# Patient Record
Sex: MALE | Race: BLACK OR AFRICAN AMERICAN | NOT HISPANIC OR LATINO | URBAN - METROPOLITAN AREA
[De-identification: names, ages, dates, MRNs, and addresses within clinical notes are randomized per-mention and may not be internally consistent; named-entity substitution may affect disease eponyms.]

---

## 2017-03-17 ENCOUNTER — EMERGENCY (EMERGENCY)
Facility: HOSPITAL | Age: 27
LOS: 1 days | Discharge: HOME | End: 2017-03-17
Attending: EMERGENCY MEDICINE | Admitting: EMERGENCY MEDICINE
Payer: MEDICAID

## 2017-03-17 VITALS
RESPIRATION RATE: 18 BRPM | DIASTOLIC BLOOD PRESSURE: 78 MMHG | TEMPERATURE: 98 F | OXYGEN SATURATION: 100 % | SYSTOLIC BLOOD PRESSURE: 133 MMHG | HEART RATE: 123 BPM

## 2017-03-17 LAB
ALBUMIN SERPL ELPH-MCNC: 3.2 G/DL — LOW (ref 3.3–5)
ALP SERPL-CCNC: 100 U/L — SIGNIFICANT CHANGE UP (ref 40–120)
ALT FLD-CCNC: 13 U/L — SIGNIFICANT CHANGE UP (ref 4–41)
AST SERPL-CCNC: 24 U/L — SIGNIFICANT CHANGE UP (ref 4–40)
BASOPHILS # BLD AUTO: 0.04 K/UL — SIGNIFICANT CHANGE UP (ref 0–0.2)
BASOPHILS NFR BLD AUTO: 0.2 % — SIGNIFICANT CHANGE UP (ref 0–2)
BILIRUB SERPL-MCNC: 0.2 MG/DL — SIGNIFICANT CHANGE UP (ref 0.2–1.2)
BUN SERPL-MCNC: 13 MG/DL — SIGNIFICANT CHANGE UP (ref 7–23)
CALCIUM SERPL-MCNC: 9.1 MG/DL — SIGNIFICANT CHANGE UP (ref 8.4–10.5)
CHLORIDE SERPL-SCNC: 95 MMOL/L — LOW (ref 98–107)
CO2 SERPL-SCNC: 28 MMOL/L — SIGNIFICANT CHANGE UP (ref 22–31)
CREAT SERPL-MCNC: 0.77 MG/DL — SIGNIFICANT CHANGE UP (ref 0.5–1.3)
EOSINOPHIL # BLD AUTO: 0.07 K/UL — SIGNIFICANT CHANGE UP (ref 0–0.5)
EOSINOPHIL NFR BLD AUTO: 0.4 % — SIGNIFICANT CHANGE UP (ref 0–6)
ERYTHROCYTE [SEDIMENTATION RATE] IN BLOOD: 104 MM/HR — HIGH (ref 1–15)
GLUCOSE SERPL-MCNC: 106 MG/DL — HIGH (ref 70–99)
HCT VFR BLD CALC: 34.1 % — LOW (ref 39–50)
HGB BLD-MCNC: 11 G/DL — LOW (ref 13–17)
HIV1 AG SER QL: SIGNIFICANT CHANGE UP
HIV1+2 AB SPEC QL: SIGNIFICANT CHANGE UP
IMM GRANULOCYTES NFR BLD AUTO: 0.5 % — SIGNIFICANT CHANGE UP (ref 0–1.5)
LYMPHOCYTES # BLD AUTO: 18.4 % — SIGNIFICANT CHANGE UP (ref 13–44)
LYMPHOCYTES # BLD AUTO: 3.07 K/UL — SIGNIFICANT CHANGE UP (ref 1–3.3)
MCHC RBC-ENTMCNC: 26.7 PG — LOW (ref 27–34)
MCHC RBC-ENTMCNC: 32.3 % — SIGNIFICANT CHANGE UP (ref 32–36)
MCV RBC AUTO: 82.8 FL — SIGNIFICANT CHANGE UP (ref 80–100)
MONOCYTES # BLD AUTO: 1.05 K/UL — HIGH (ref 0–0.9)
MONOCYTES NFR BLD AUTO: 6.3 % — SIGNIFICANT CHANGE UP (ref 2–14)
NEUTROPHILS # BLD AUTO: 12.37 K/UL — HIGH (ref 1.8–7.4)
NEUTROPHILS NFR BLD AUTO: 74.2 % — SIGNIFICANT CHANGE UP (ref 43–77)
PLATELET # BLD AUTO: 355 K/UL — SIGNIFICANT CHANGE UP (ref 150–400)
PMV BLD: 9.6 FL — SIGNIFICANT CHANGE UP (ref 7–13)
POTASSIUM SERPL-MCNC: 3.9 MMOL/L — SIGNIFICANT CHANGE UP (ref 3.5–5.3)
POTASSIUM SERPL-SCNC: 3.9 MMOL/L — SIGNIFICANT CHANGE UP (ref 3.5–5.3)
PROT SERPL-MCNC: 9.5 G/DL — HIGH (ref 6–8.3)
RBC # BLD: 4.12 M/UL — LOW (ref 4.2–5.8)
RBC # FLD: 15.3 % — HIGH (ref 10.3–14.5)
SODIUM SERPL-SCNC: 135 MMOL/L — SIGNIFICANT CHANGE UP (ref 135–145)
WBC # BLD: 16.69 K/UL — HIGH (ref 3.8–10.5)
WBC # FLD AUTO: 16.69 K/UL — HIGH (ref 3.8–10.5)

## 2017-03-17 PROCEDURE — 99284 EMERGENCY DEPT VISIT MOD MDM: CPT

## 2017-03-17 PROCEDURE — 73564 X-RAY EXAM KNEE 4 OR MORE: CPT | Mod: 26,RT

## 2017-03-17 PROCEDURE — 73610 X-RAY EXAM OF ANKLE: CPT | Mod: 26,RT

## 2017-03-17 RX ORDER — CLINDAMYCIN PHOSPHATE GEL USP, 1% 10 MG/G
1 GEL TOPICAL
Qty: 1 | Refills: 0
Start: 2017-03-17 | End: 2017-04-16

## 2017-03-17 RX ORDER — SODIUM CHLORIDE 9 MG/ML
1000 INJECTION INTRAMUSCULAR; INTRAVENOUS; SUBCUTANEOUS ONCE
Qty: 0 | Refills: 0 | Status: COMPLETED | OUTPATIENT
Start: 2017-03-17 | End: 2017-03-17

## 2017-03-17 NOTE — ED PROVIDER NOTE - LOWER EXTREMITY EXAM, RIGHT
R knee joint effusion, able to straight leg raise, able to bend only to 20 degrees, ankle non-tender, no swelling

## 2017-03-17 NOTE — ED ADULT TRIAGE NOTE - CHIEF COMPLAINT QUOTE
Pt c/o right ankle pain x a week,  right knee pain x a month, and crusted/ scabbed area on both sides of face- Pt reports they were initially pimples in the summer that open and began draining.  Denies trauma or fall. States  x Saw his PMD and was given Naproxen, and an antibiotic for the face- completed medications but still having symptoms.

## 2017-03-17 NOTE — ED PROVIDER NOTE - SKIN RASH DESCRIPTION
beard w/ crusting lesions on inferior portions of the beard, no active drainage, no cervical adenopathy, both axilla w/ evidence of healed abscess, consistent w/ hidradenitis suppurativa

## 2017-03-17 NOTE — ED PROVIDER NOTE - NS ED MD SCRIBE ATTENDING SCRIBE SECTIONS
VITAL SIGNS( Pullset)/HISTORY OF PRESENT ILLNESS/DISPOSITION/PAST MEDICAL/SURGICAL/SOCIAL HISTORY/REVIEW OF SYSTEMS/PHYSICAL EXAM/HIV

## 2017-03-17 NOTE — ED PROVIDER NOTE - PLAN OF CARE
Seen in ED for skin changes to under chin area and right knee swelling. Please call [facility] at [phone] Monday morning to make an appointment to discuss these matters. See your regular doctor within 2-3 days of discharge. Return to ED for any new or worsening symptoms particularly if you cannot bend your right knee, if right knee feels red or hot, or if you cannot bend your right knee as these are signs of a serious joint infection,.

## 2017-03-17 NOTE — ED PROVIDER NOTE - PROGRESS NOTE DETAILS
Ramandeep PGY3: Had extensive conversation about arthrocentesis with pt and family, explained risks and benefits of performing arthrocentesis, risks including pain, infection, benefits including improved diagnostic ability, improved pain in knee. Pt declines arthrocentesis at this time. Pt and family aware they can change their mind at any time Gabriela attg: Patient signed out to me at midnight, dispo pending fluids repeat vitals and discussion of admission. Following ivf, hr 90, patient feeling better, reports submental impetigo chronic, reaffirms rt knee swollen x 4 mo. Denies warm joint, red joint, unable to bend joint, unable to stand on joint. Declined arthrocentesis for therpeutic effect. Given elevated esr and chronicity of symptoms patient referred to ID clinic. Results and dc instructions below d/w patient.

## 2017-03-17 NOTE — ED PROVIDER NOTE - MEDICAL DECISION MAKING DETAILS
Impression - hidradenitis suppurativa w/ chronic impetigo of the face, tx w/ clindamycin and topical clindamycin; knee will get XR, get ESR, possible knee tap, labs.

## 2017-03-17 NOTE — ED PROVIDER NOTE - OBJECTIVE STATEMENT
25 y/o M, w/ no sig PMHx, presents to ED w/ multiple complaints. Reports rash/lesions w/ drainage under the R jaw and to the chin since summer 2016. Endorses soreness to armpits which worsen w/ sweat/heat. Has seen PMD regarding rash in February 2017 and was started on abx which dried out the lesions. Notes pus-like drainage from the areas of rash/lesions. Also c/o back soreness and upper chest soreness. Reports R knee pain x3-4months from an uncertain injury and ankle pain x2wks; no XR ever done. Was on Naproxen per PMD for the knee pain. Denies fever, chills, SOB, and other complaints. NKDA.

## 2017-03-17 NOTE — ED PROVIDER NOTE - CARE PLAN
Instructions for follow-up, activity and diet:	Seen in ED for skin changes to under chin area and right knee swelling. Please call [facility] at [phone] Monday morning to make an appointment to discuss these matters. See your regular doctor within 2-3 days of discharge. Return to ED for any new or worsening symptoms particularly if you cannot bend your right knee, if right knee feels red or hot, or if you cannot bend your right knee as these are signs of a serious joint infection,. Principal Discharge DX:	Hidradenitis suppurativa  Instructions for follow-up, activity and diet:	Seen in ED for skin changes to under chin area and right knee swelling. Please call [facility] at [phone] Monday morning to make an appointment to discuss these matters. See your regular doctor within 2-3 days of discharge. Return to ED for any new or worsening symptoms particularly if you cannot bend your right knee, if right knee feels red or hot, or if you cannot bend your right knee as these are signs of a serious joint infection,.  Secondary Diagnosis:	Arthritis

## 2017-03-18 VITALS
SYSTOLIC BLOOD PRESSURE: 138 MMHG | HEART RATE: 96 BPM | RESPIRATION RATE: 18 BRPM | TEMPERATURE: 99 F | DIASTOLIC BLOOD PRESSURE: 79 MMHG | OXYGEN SATURATION: 100 %

## 2017-03-18 RX ADMIN — SODIUM CHLORIDE 1000 MILLILITER(S): 9 INJECTION INTRAMUSCULAR; INTRAVENOUS; SUBCUTANEOUS at 00:07

## 2017-03-18 RX ADMIN — Medication 100 MILLIGRAM(S): at 00:06

## 2017-04-04 ENCOUNTER — APPOINTMENT (OUTPATIENT)
Dept: INFECTIOUS DISEASE | Facility: CLINIC | Age: 27
End: 2017-04-04

## 2017-04-04 ENCOUNTER — LABORATORY RESULT (OUTPATIENT)
Age: 27
End: 2017-04-04

## 2017-04-04 ENCOUNTER — OUTPATIENT (OUTPATIENT)
Dept: OUTPATIENT SERVICES | Facility: HOSPITAL | Age: 27
LOS: 1 days | End: 2017-04-04
Payer: MEDICAID

## 2017-04-04 VITALS
DIASTOLIC BLOOD PRESSURE: 80 MMHG | OXYGEN SATURATION: 100 % | HEIGHT: 73 IN | HEART RATE: 145 BPM | SYSTOLIC BLOOD PRESSURE: 118 MMHG | TEMPERATURE: 97.7 F | WEIGHT: 162 LBS | BODY MASS INDEX: 21.47 KG/M2

## 2017-04-04 DIAGNOSIS — Z78.9 OTHER SPECIFIED HEALTH STATUS: ICD-10-CM

## 2017-04-04 DIAGNOSIS — B97.89 OTHER VIRAL AGENTS AS THE CAUSE OF DISEASES CLASSIFIED ELSEWHERE: ICD-10-CM

## 2017-04-04 DIAGNOSIS — M25.561 PAIN IN RIGHT KNEE: ICD-10-CM

## 2017-04-04 LAB
HCT VFR BLD CALC: 37.3 % — LOW (ref 39–50)
HGB BLD-MCNC: 11.6 G/DL — LOW (ref 13–17)
MCHC RBC-ENTMCNC: 26.9 PG — LOW (ref 27–34)
MCHC RBC-ENTMCNC: 31.1 GM/DL — LOW (ref 32–36)
MCV RBC AUTO: 86.5 FL — SIGNIFICANT CHANGE UP (ref 80–100)
PLATELET # BLD AUTO: 489 K/UL — HIGH (ref 150–400)
RBC # BLD: 4.31 M/UL — SIGNIFICANT CHANGE UP (ref 4.2–5.8)
RBC # FLD: 16.2 % — HIGH (ref 10.3–14.5)
WBC # BLD: 16.74 K/UL — HIGH (ref 3.8–10.5)
WBC # FLD AUTO: 16.74 K/UL — HIGH (ref 3.8–10.5)

## 2017-04-04 PROCEDURE — 85027 COMPLETE CBC AUTOMATED: CPT

## 2017-04-04 PROCEDURE — G0463: CPT

## 2017-04-04 PROCEDURE — 87040 BLOOD CULTURE FOR BACTERIA: CPT

## 2017-04-04 PROCEDURE — 87389 HIV-1 AG W/HIV-1&-2 AB AG IA: CPT

## 2017-04-04 PROCEDURE — 36415 COLL VENOUS BLD VENIPUNCTURE: CPT

## 2017-04-05 LAB — HIV 1+2 AB+HIV1 P24 AG SERPL QL IA: SIGNIFICANT CHANGE UP

## 2017-04-10 LAB
CULTURE RESULTS: SIGNIFICANT CHANGE UP
CULTURE RESULTS: SIGNIFICANT CHANGE UP
SPECIMEN SOURCE: SIGNIFICANT CHANGE UP
SPECIMEN SOURCE: SIGNIFICANT CHANGE UP

## 2017-04-12 DIAGNOSIS — Z28.21 IMMUNIZATION NOT CARRIED OUT BECAUSE OF PATIENT REFUSAL: ICD-10-CM

## 2017-04-12 DIAGNOSIS — M25.561 PAIN IN RIGHT KNEE: ICD-10-CM

## 2017-04-12 DIAGNOSIS — D72.829 ELEVATED WHITE BLOOD CELL COUNT, UNSPECIFIED: ICD-10-CM

## 2017-04-12 DIAGNOSIS — L73.2 HIDRADENITIS SUPPURATIVA: ICD-10-CM

## 2017-04-26 ENCOUNTER — OUTPATIENT (OUTPATIENT)
Dept: OUTPATIENT SERVICES | Facility: HOSPITAL | Age: 27
LOS: 1 days | End: 2017-04-26
Payer: MEDICAID

## 2017-04-26 ENCOUNTER — APPOINTMENT (OUTPATIENT)
Dept: INFECTIOUS DISEASE | Facility: CLINIC | Age: 27
End: 2017-04-26

## 2017-04-26 VITALS
TEMPERATURE: 98.1 F | HEIGHT: 73 IN | BODY MASS INDEX: 21.6 KG/M2 | OXYGEN SATURATION: 100 % | HEART RATE: 98 BPM | DIASTOLIC BLOOD PRESSURE: 82 MMHG | SYSTOLIC BLOOD PRESSURE: 121 MMHG | WEIGHT: 163 LBS

## 2017-04-26 DIAGNOSIS — B97.89 OTHER VIRAL AGENTS AS THE CAUSE OF DISEASES CLASSIFIED ELSEWHERE: ICD-10-CM

## 2017-04-26 DIAGNOSIS — B20 HUMAN IMMUNODEFICIENCY VIRUS [HIV] DISEASE: ICD-10-CM

## 2017-04-26 PROCEDURE — G0463: CPT

## 2017-04-27 DIAGNOSIS — D72.829 ELEVATED WHITE BLOOD CELL COUNT, UNSPECIFIED: ICD-10-CM

## 2017-04-27 DIAGNOSIS — L73.2 HIDRADENITIS SUPPURATIVA: ICD-10-CM

## 2017-07-21 ENCOUNTER — INPATIENT (INPATIENT)
Facility: HOSPITAL | Age: 27
LOS: 9 days | Discharge: HOME | End: 2017-07-31
Attending: INTERNAL MEDICINE | Admitting: INTERNAL MEDICINE
Payer: MEDICAID

## 2017-07-21 VITALS
SYSTOLIC BLOOD PRESSURE: 115 MMHG | OXYGEN SATURATION: 98 % | DIASTOLIC BLOOD PRESSURE: 82 MMHG | TEMPERATURE: 98 F | RESPIRATION RATE: 16 BRPM | HEART RATE: 136 BPM

## 2017-07-21 DIAGNOSIS — Z29.9 ENCOUNTER FOR PROPHYLACTIC MEASURES, UNSPECIFIED: ICD-10-CM
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
## 2017-07-21 DIAGNOSIS — R55 SYNCOPE AND COLLAPSE: ICD-10-CM

## 2017-07-21 DIAGNOSIS — D72.829 ELEVATED WHITE BLOOD CELL COUNT, UNSPECIFIED: ICD-10-CM

## 2017-07-21 DIAGNOSIS — L73.2 HIDRADENITIS SUPPURATIVA: ICD-10-CM

## 2017-07-21 LAB
ALBUMIN SERPL ELPH-MCNC: 3.5 G/DL — SIGNIFICANT CHANGE UP (ref 3.3–5)
ALP SERPL-CCNC: 112 U/L — SIGNIFICANT CHANGE UP (ref 40–120)
ALT FLD-CCNC: 7 U/L — SIGNIFICANT CHANGE UP (ref 4–41)
APPEARANCE UR: CLEAR — SIGNIFICANT CHANGE UP
AST SERPL-CCNC: 16 U/L — SIGNIFICANT CHANGE UP (ref 4–40)
BASE EXCESS BLDV CALC-SCNC: 5.1 MMOL/L — SIGNIFICANT CHANGE UP
BASOPHILS # BLD AUTO: 0.05 K/UL — SIGNIFICANT CHANGE UP (ref 0–0.2)
BASOPHILS NFR BLD AUTO: 0.3 % — SIGNIFICANT CHANGE UP (ref 0–2)
BILIRUB SERPL-MCNC: 0.4 MG/DL — SIGNIFICANT CHANGE UP (ref 0.2–1.2)
BILIRUB UR-MCNC: NEGATIVE — SIGNIFICANT CHANGE UP
BLOOD GAS VENOUS - CREATININE: 0.68 MG/DL — SIGNIFICANT CHANGE UP (ref 0.5–1.3)
BLOOD UR QL VISUAL: NEGATIVE — SIGNIFICANT CHANGE UP
BUN SERPL-MCNC: 13 MG/DL — SIGNIFICANT CHANGE UP (ref 7–23)
CALCIUM SERPL-MCNC: 9.8 MG/DL — SIGNIFICANT CHANGE UP (ref 8.4–10.5)
CHLORIDE BLDV-SCNC: 109 MMOL/L — HIGH (ref 96–108)
CHLORIDE SERPL-SCNC: 95 MMOL/L — LOW (ref 98–107)
CK MB BLD-MCNC: 1 NG/ML — SIGNIFICANT CHANGE UP (ref 1–6.6)
CO2 SERPL-SCNC: 26 MMOL/L — SIGNIFICANT CHANGE UP (ref 22–31)
COLOR SPEC: YELLOW — SIGNIFICANT CHANGE UP
CREAT SERPL-MCNC: 0.97 MG/DL — SIGNIFICANT CHANGE UP (ref 0.5–1.3)
EOSINOPHIL # BLD AUTO: 0.01 K/UL — SIGNIFICANT CHANGE UP (ref 0–0.5)
EOSINOPHIL NFR BLD AUTO: 0.1 % — SIGNIFICANT CHANGE UP (ref 0–6)
GAS PNL BLDV: 132 MMOL/L — LOW (ref 136–146)
GLUCOSE BLDV-MCNC: 127 — HIGH (ref 70–99)
GLUCOSE SERPL-MCNC: 119 MG/DL — HIGH (ref 70–99)
GLUCOSE UR-MCNC: NEGATIVE — SIGNIFICANT CHANGE UP
HCO3 BLDV-SCNC: 27 MMOL/L — SIGNIFICANT CHANGE UP (ref 20–27)
HCT VFR BLD CALC: 37.4 % — LOW (ref 39–50)
HCT VFR BLDV CALC: 38.6 % — LOW (ref 39–51)
HGB BLD-MCNC: 12 G/DL — LOW (ref 13–17)
HGB BLDV-MCNC: 12.5 G/DL — LOW (ref 13–17)
IGA FLD-MCNC: 410 MG/DL — HIGH (ref 70–400)
IGG FLD-MCNC: 3374 MG/DL — HIGH (ref 700–1600)
IGM SERPL-MCNC: 173 MG/DL — SIGNIFICANT CHANGE UP (ref 40–230)
IMM GRANULOCYTES # BLD AUTO: 0.11 # — SIGNIFICANT CHANGE UP
IMM GRANULOCYTES NFR BLD AUTO: 0.6 % — SIGNIFICANT CHANGE UP (ref 0–1.5)
KETONES UR-MCNC: NEGATIVE — SIGNIFICANT CHANGE UP
LACTATE BLDV-MCNC: 1.4 MMOL/L — SIGNIFICANT CHANGE UP (ref 0.5–2)
LEUKOCYTE ESTERASE UR-ACNC: NEGATIVE — SIGNIFICANT CHANGE UP
LYMPHOCYTES # BLD AUTO: 1.97 K/UL — SIGNIFICANT CHANGE UP (ref 1–3.3)
LYMPHOCYTES # BLD AUTO: 10.9 % — LOW (ref 13–44)
MCHC RBC-ENTMCNC: 25.9 PG — LOW (ref 27–34)
MCHC RBC-ENTMCNC: 32.1 % — SIGNIFICANT CHANGE UP (ref 32–36)
MCV RBC AUTO: 80.8 FL — SIGNIFICANT CHANGE UP (ref 80–100)
MONOCYTES # BLD AUTO: 0.84 K/UL — SIGNIFICANT CHANGE UP (ref 0–0.9)
MONOCYTES NFR BLD AUTO: 4.6 % — SIGNIFICANT CHANGE UP (ref 2–14)
MUCOUS THREADS # UR AUTO: SIGNIFICANT CHANGE UP
NEUTROPHILS # BLD AUTO: 15.15 K/UL — HIGH (ref 1.8–7.4)
NEUTROPHILS NFR BLD AUTO: 83.5 % — HIGH (ref 43–77)
NITRITE UR-MCNC: NEGATIVE — SIGNIFICANT CHANGE UP
NRBC # FLD: 0 — SIGNIFICANT CHANGE UP
PCO2 BLDV: 52 MMHG — HIGH (ref 41–51)
PH BLDV: 7.38 PH — SIGNIFICANT CHANGE UP (ref 7.32–7.43)
PH UR: 7 — SIGNIFICANT CHANGE UP (ref 4.6–8)
PLATELET # BLD AUTO: 427 K/UL — HIGH (ref 150–400)
PMV BLD: 10.1 FL — SIGNIFICANT CHANGE UP (ref 7–13)
PO2 BLDV: 28 MMHG — LOW (ref 35–40)
POTASSIUM BLDV-SCNC: 3.9 MMOL/L — SIGNIFICANT CHANGE UP (ref 3.4–4.5)
POTASSIUM SERPL-MCNC: 4.2 MMOL/L — SIGNIFICANT CHANGE UP (ref 3.5–5.3)
POTASSIUM SERPL-SCNC: 4.2 MMOL/L — SIGNIFICANT CHANGE UP (ref 3.5–5.3)
PROT SERPL-MCNC: 11.1 G/DL — HIGH (ref 6–8.3)
PROT SERPL-MCNC: 8.4 G/DL — HIGH (ref 6–8.3)
PROT UR-MCNC: 20 — SIGNIFICANT CHANGE UP
RBC # BLD: 4.63 M/UL — SIGNIFICANT CHANGE UP (ref 4.2–5.8)
RBC # FLD: 15.7 % — HIGH (ref 10.3–14.5)
RBC CASTS # UR COMP ASSIST: SIGNIFICANT CHANGE UP (ref 0–?)
SAO2 % BLDV: 41.7 % — LOW (ref 60–85)
SODIUM SERPL-SCNC: 136 MMOL/L — SIGNIFICANT CHANGE UP (ref 135–145)
SP GR SPEC: 1.04 — HIGH (ref 1–1.03)
TROPONIN T SERPL-MCNC: < 0.06 NG/ML — SIGNIFICANT CHANGE UP (ref 0–0.06)
UROBILINOGEN FLD QL: NORMAL E.U. — SIGNIFICANT CHANGE UP (ref 0.1–0.2)
WBC # BLD: 18.13 K/UL — HIGH (ref 3.8–10.5)
WBC # FLD AUTO: 18.13 K/UL — HIGH (ref 3.8–10.5)
WBC UR QL: SIGNIFICANT CHANGE UP (ref 0–?)

## 2017-07-21 PROCEDURE — 70450 CT HEAD/BRAIN W/O DYE: CPT | Mod: 26

## 2017-07-21 PROCEDURE — 73030 X-RAY EXAM OF SHOULDER: CPT | Mod: 26,LT

## 2017-07-21 PROCEDURE — 99233 SBSQ HOSP IP/OBS HIGH 50: CPT | Mod: GC

## 2017-07-21 PROCEDURE — 74177 CT ABD & PELVIS W/CONTRAST: CPT | Mod: 26

## 2017-07-21 PROCEDURE — 71260 CT THORAX DX C+: CPT | Mod: 26

## 2017-07-21 PROCEDURE — 71020: CPT | Mod: 26

## 2017-07-21 RX ORDER — SODIUM CHLORIDE 9 MG/ML
2000 INJECTION INTRAMUSCULAR; INTRAVENOUS; SUBCUTANEOUS ONCE
Qty: 0 | Refills: 0 | Status: COMPLETED | OUTPATIENT
Start: 2017-07-21 | End: 2017-07-21

## 2017-07-21 RX ORDER — ACETAMINOPHEN 500 MG
650 TABLET ORAL EVERY 6 HOURS
Qty: 0 | Refills: 0 | Status: DISCONTINUED | OUTPATIENT
Start: 2017-07-21 | End: 2017-07-25

## 2017-07-21 RX ORDER — SODIUM CHLORIDE 9 MG/ML
1000 INJECTION INTRAMUSCULAR; INTRAVENOUS; SUBCUTANEOUS
Qty: 0 | Refills: 0 | Status: COMPLETED | OUTPATIENT
Start: 2017-07-21 | End: 2017-07-22

## 2017-07-21 RX ORDER — ACETAMINOPHEN 500 MG
650 TABLET ORAL ONCE
Qty: 0 | Refills: 0 | Status: COMPLETED | OUTPATIENT
Start: 2017-07-21 | End: 2017-07-21

## 2017-07-21 RX ADMIN — SODIUM CHLORIDE 2000 MILLILITER(S): 9 INJECTION INTRAMUSCULAR; INTRAVENOUS; SUBCUTANEOUS at 11:13

## 2017-07-21 RX ADMIN — SODIUM CHLORIDE 100 MILLILITER(S): 9 INJECTION INTRAMUSCULAR; INTRAVENOUS; SUBCUTANEOUS at 17:47

## 2017-07-21 NOTE — ED ADULT TRIAGE NOTE - CHIEF COMPLAINT QUOTE
states passed out onto floor. appears pale. . states hit head upon fall to floor.  vomited x1 yesterday. c/o l shoulder  x 3 days. c/o bilateral knee pain x 6 wks. states seen [facility] ed fungal infection/blood.  denies ch pains  fs  161... pulse irrreg hr 140-74. ekg presently states passed out onto floor. appears pale. . states hit head upon fall to floor.  vomited x1 yesterday. c/o l shoulder   pain x 3 days. c/o bilateral knee pain x 6 wks. states seen [facility] ed fungal infection/blood.  denies ch pains  fs  161... pulse irrreg hr 140-74. ekg presently

## 2017-07-21 NOTE — CONSULT NOTE ADULT - PROBLEM SELECTOR RECOMMENDATION 9
CT chest/abd/pelvis to eval lymphadenopathy  SPEP/UPEP, Serum LYUDMILA, Urine LYUDMILA, Immunoglobulins   smear reviewed CT chest/abd/pelvis to eval lymphadenopathy  SPEP/UPEP, Serum LYUDMILA, Urine LYUDMILA, Immunoglobulins   leukocytosis neutrophilic predominant, no immature cells reported  peripheral smear not available for review  will follow up labs and radiology

## 2017-07-21 NOTE — ED PROVIDER NOTE - SKIN, MLM
Skin normal color for race, warm, dry and intact. No evidence of rash. Skin normal color for race, warm, dry and intact. Rash to chin left face, chronic, raised, no redness or pus

## 2017-07-21 NOTE — H&P ADULT - PROBLEM SELECTOR PLAN 1
- Unwitnessed fall today with no LOC or pre-syncopal symptoms   - Most like due to dehydration from episodes of emesis and decreased PO intake   - EKG shows Sinus tachycardia, no ST changes, irregular rhythm or delta waves   - Check orthostatic BP  - c/w NS@100cc for 12 hours and reassess  - Check TSH

## 2017-07-21 NOTE — ED PROVIDER NOTE - ATTENDING CONTRIBUTION TO CARE
I performed a face to face evaluation of this patient and performed a full history and physical examination on the patient.  I agree with the resident's history, physical examination, and plan of the patient.  Pt with near syncope, hit head, mild headache, fell after getting up from chair felt lightheaded, no cp, sob HEENT no ecchymoses, rash to chin old and chronic, Neck supple nontender, heart s1,s2 rrr lung cta heart wnl, abd soft nontender, neuro wnl.  labs, ct head, monitor, ekg

## 2017-07-21 NOTE — ED PROVIDER NOTE - PROGRESS NOTE DETAILS
Carmelina Hunter MD PGY4  communciated w/ heme/onc fellow regarding patient spoke with Dr. Thorne, pt's pmd who is aware pt is here- he has not followed up- endorsed total protein had been high and WBC and was concerned for a hematologic process.  Pt also endorsed 30 lb weight loss without trying since April

## 2017-07-21 NOTE — H&P ADULT - PROBLEM SELECTOR PLAN 2
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

## 2017-07-21 NOTE — H&P ADULT - NSHPREVIEWOFSYSTEMS_GEN_ALL_CORE
REVIEW OF SYSTEMS:  CONSTITUTIONAL: No fever, weight loss, or fatigue  EYES: No eye pain, visual disturbances, or discharge  ENMT:  No difficulty hearing, tinnitus, vertigo; No sinus or throat pain  RESPIRATORY: No cough, wheezing, chills or hemoptysis; No shortness of breath  CARDIOVASCULAR: No chest pain, palpitations, dizziness, or leg swelling  GASTROINTESTINAL: No abdominal or epigastric pain. No nausea, vomiting, or hematemesis; No diarrhea or constipation. No melena or hematochezia.  GENITOURINARY: No dysuria, frequency, hematuria, or incontinence  NEUROLOGICAL: No headaches, memory loss, loss of strength, numbness, or tremors. + for fall  SKIN: No itching, burning. + for lesion along the right chin and arm pit   ENDOCRINE: No heat or cold intolerance; No hair loss  MUSCULOSKELETAL: + for knee, shoulder and ankle pain   PSYCHIATRIC: No depression, anxiety, mood swings, or difficulty sleeping  HEME/LYMPH: No easy bruising, or bleeding gums

## 2017-07-21 NOTE — H&P ADULT - NSHPSOCIALHISTORY_GEN_ALL_CORE
Social History:    Marital Status:  (   )    (  x ) Single    (   )    (  )   Occupation: Unemployed  Lives with: (  ) alone  (  ) children   (  ) spouse   ( x ) parents  (  ) other    Substance Use (street drugs): (  ) never used  ( x ) other: marijuana   Tobacco Usage:  (  x ) never smoked   (   ) former smoker   (   ) current smoker  (     ) pack year  (        ) last cigarette date  Alcohol Usage: Never drinker

## 2017-07-21 NOTE — ED PROVIDER NOTE - OBJECTIVE STATEMENT
26 y/o male w/ no signifiant PMH p/w syncopal episode. patient was in susual state of health, until he health nausea dn vomiting x 2 today following hamburger helper. patient stood up from rest and syncopized, hitting the floor. Mother found him on the floor.  Currently c/o left shoulder pain for 2 days, worsened w/ movment.. No chest pain or shortness of breath. Endorsing left sided headache, no visiion changes or nausea. 26 y/o male w/ no significant PMH p/w syncopal episode. patient was in usual state of health, until he health nausea dn vomiting x 2 today following hamburger helper. patient stood up from rest and synopsized, hitting the floor. Mother found him on the floor.  Currently c/o left shoulder pain for 2 days, worsened w/ movement.. No chest pain or shortness of breath. Endorsing left sided headache, no vision changes or nausea.  Mother at bedside provided additional history, denies seizure-like activity or confusion

## 2017-07-21 NOTE — ED ADULT NURSE NOTE - OBJECTIVE STATEMENT
Pt AOX3 recently tx with abx for facial infection, pt presents s/p syncopal episode tachy . Pt states that he was nauseous this morning after eating hamburger helper last night. Pt states he had a hard time keeping anything down this morning and vomited, then later stood up from the table, became dizzy then passed out. Pt denies chest pain, SOB, but reports recent non traumatic knee and ankle pain. Pt ordinally presented tachycardic on CM hr 120, now NSR at 99. Resp even and unlabored

## 2017-07-21 NOTE — H&P ADULT - HISTORY OF PRESENT ILLNESS
27 y.o. male with PMHx of Hidradenitis suppurative presenting after a fall. The patient state he was trying to standup from his chair felt lightheaded and fell on to his left sided. Denies any headache or vision changes prior to the fall and no LOC, tremors, bladder incontinence, tongue bitting after the fall. The patient's mother came after he had fell and helped him back into the chair. The patient stated he felt light headed. States he had 2-3 episode of emesis for the last two days after eating dinner two days ago. Last time he had vomited was the morning of the fall. The patient states he has been having decreased PO intake for the last few months and has lost up to 30lbs over the last 3 months. The patient also endorses left shoulder pain from the fall, and chronic pain in the right ankle and knee. Was previously assessed for the ankle and knee pain in March 2017, found to have a small effusion in the right knee, which the patient opted no to have tapped. Was also diagnosed with hidradenitis suppurative at that ED visit and was started on a course of clindamycin. The patient's PMD was contacted and stated the patient has been chronically leukocytotic and lab showed hypergammaglobulinemia. Suggested patient be admitted for further work for leukemia/lymphoma.      In the ED:    The patient received 1L bolus of NS and tylenol for pain. CT head was negative for acute pathology and CXR showed clear lungs. 27 y.o. male with PMHx of Hidradenitis suppurative presenting after a fall. The patient state he was trying to standup from his chair felt lightheaded and fell on to his left sided. Denies any headache or vision changes prior to the fall and no LOC, tremors, bladder incontinence, tongue bitting after the fall. The patient's mother came after he had fell and helped him back into the chair. The patient stated he felt light headed. States he had 2-3 episode of emesis for the last two days after eating dinner two days ago that has upset his stomach. Last time he had vomited was the morning of the fall. The patient states he has been having decreased PO intake for the last few months and has lost up to 30lbs over the last 3 months. The patient also endorses left shoulder pain from the fall, and chronic pain in the right ankle and knee. Was previously assessed for the ankle and knee pain in March 2017, found to have a small effusion in the right knee, which the patient opted no to have tapped. Was also diagnosed with hidradenitis suppurative at that ED visit and was started on a course of clindamycin. The patient's PMD was contacted and stated the patient has been chronically leukocytotic and lab showed hypergammaglobulinemia. Suggested patient be admitted for further work for leukemia/lymphoma.      In the ED:    The patient received 1L bolus of NS and tylenol for pain. CT head was negative for acute pathology and CXR showed clear lungs.

## 2017-07-21 NOTE — H&P ADULT - NSHPPHYSICALEXAM_GEN_ALL_CORE
Vital Signs Last 24 Hrs  T(C): 36.8 (21 Jul 2017 10:10), Max: 36.8 (21 Jul 2017 10:10)  T(F): 98.2 (21 Jul 2017 10:10), Max: 98.2 (21 Jul 2017 10:10)  HR: 97 (21 Jul 2017 15:43) (97 - 136)  BP: 110/69 (21 Jul 2017 15:43) (110/69 - 127/95)  BP(mean): --  RR: 16 (21 Jul 2017 15:43) (16 - 16)  SpO2: 100% (21 Jul 2017 15:43) (98% - 100%)    PHYSICAL EXAM:  GENERAL: NAD, well-groomed, well-developed  HEAD:  Atraumatic, Normocephalic  EYES: EOMI, PERRLA, conjunctiva and sclera clear  ENMT: No tonsillar erythema, exudates, or enlargement; Moist mucous membranes, Good dentition, No lesions  NECK: Supple, No JVD  CHEST/LUNG: Clear to percussion bilaterally; No rales, rhonchi, wheezing, or rubs  HEART: Regular rate and rhythm; No murmurs, rubs, or gallops  ABDOMEN: Soft, Nontender, Nondistended; Bowel sounds present  VASCULAR:  2+ Peripheral Pulses, No clubbing, cyanosis, or edema  LYMPH: Lymphadenopathy in the inguinal area  SKIN: Lesion on the right chin, with dry skin and erythema. Right axillary shows keloids and lesions  NERVOUS SYSTEM:  Alert & Oriented X3, Good concentration; Motor Strength 5/5 B/L upper and lower extremities

## 2017-07-21 NOTE — ED ADULT NURSE NOTE - CHIEF COMPLAINT QUOTE
states passed out onto floor. appears pale. . states hit head upon fall to floor.  vomited x1 yesterday. c/o l shoulder   pain x 3 days. c/o bilateral knee pain x 6 wks. states seen [facility] ed fungal infection/blood.  denies ch pains  fs  161... pulse irrreg hr 140-74. ekg presently

## 2017-07-21 NOTE — H&P ADULT - NSHPLABSRESULTS_GEN_ALL_CORE
Labs:    07-21    136  |  95<L>  |  13  ----------------------------<  119<H>  4.2   |  26  |  0.97    Ca    9.8      21 Jul 2017 11:24    TPro  11.1<H>  /  Alb  3.5  /  TBili  0.4  /  DBili  x   /  AST  16  /  ALT  7   /  AlkPhos  112  07-21                            12.0   18.13 )-----------( 427      ( 21 Jul 2017 11:24 )             37.4     pH, Venous: 7.38 pH (07-21-17 @ 13:52)  pCO2, Venous: 52 mmHg (07-21-17 @ 13:52)  pO2, Venous: 28 mmHg (07-21-17 @ 13:52)  HCO3, Venous: 27 mmol/L (07-21-17 @ 13:52)    Blood Gas Venous - Lactate: 1.4 mmol/L (07-21-17 @ 13:52)    Radiology:    < from: CT Head No Cont (07.21.17 @ 11:45) >    IMPRESSION:  Normal noncontrast head CT.    < end of copied text >    < from: Xray Chest 2 Views PA/Lat (07.21.17 @ 12:54) >    IMPRESSION Normal chest    < end of copied text >    EKG: Sinus tachycardia, no ST changes.

## 2017-07-21 NOTE — H&P ADULT - ASSESSMENT
27 y.o. male with PMHx of Hidradenitis suppurative admitted for fall due to dehydration. Concern for underlying hematological disorder with chronic leukocytosis, protein gap of 7.6, and outpatient records showing hypergammaglobulinemia 27 y.o. male with PMHx of Hidradenitis suppurative admitted for fall due to dehydration from food poisoning. Concern for underlying hematological disorder with chronic leukocytosis, protein gap of 7.6, and outpatient records showing hypergammaglobulinemia

## 2017-07-21 NOTE — H&P ADULT - PROBLEM SELECTOR PLAN 3
- Diagnosed on last ED visit   - Finished clindamycin course   - Healing better per the patient   - stable for now

## 2017-07-21 NOTE — CONSULT NOTE ADULT - SUBJECTIVE AND OBJECTIVE BOX
HPI:  27 y.o. male with PMHx of Hidradenitis suppurative presenting after a fall. The patient state he was trying to standup from his chair felt lightheaded and fell on to his left sided.    Patient had leukocytosis since March 2017. Saw infectious disease in April after fall and found to have hidradenitis suppurative on bilateral mandibles, axilla, and groin. Completed course of antibiotics. Patient has had weight lost of 3 lbs and presented to the ED with fall.    Also has protein gap and leukocytosis in the ED.     In the ED:    The patient received 1L bolus of NS and tylenol for pain. CT head was negative for acute pathology and CXR showed clear lungs. (21 Jul 2017 15:52)      Allergies    No Known Allergies    Intolerances        MEDICATIONS  (STANDING):  sodium chloride 0.9%. 1000 milliLiter(s) (100 mL/Hr) IV Continuous <Continuous>    MEDICATIONS  (PRN):  acetaminophen   Tablet. 650 milliGRAM(s) Oral every 6 hours PRN Mild and Moderate Pain      PAST MEDICAL & SURGICAL HISTORY:  Hidradenitis suppurativa  No significant past surgical history      FAMILY HISTORY:  No significant family history      SOCIAL HISTORY: No EtOH, no tobacco    REVIEW OF SYSTEMS:    CONSTITUTIONAL: Weight loss of 30 lbs  EYES/ENT: No visual changes;  No vertigo or throat pain   NECK: No pain or stiffness  RESPIRATORY: No cough, wheezing, hemoptysis; No shortness of breath  CARDIOVASCULAR: No chest pain or palpitations  GASTROINTESTINAL: Nausea and vomitiing X2  GENITOURINARY: No dysuria, frequency or hematuria  NEUROLOGICAL: No numbness or weakness  SKIN: Rashes as described in HPI  All other review of systems is negative unless indicated above.        T(F): 98.2 (07-21-17 @ 10:10), Max: 98.2 (07-21-17 @ 10:10)  HR: 97 (07-21-17 @ 15:43)  BP: 110/69 (07-21-17 @ 15:43)  RR: 16 (07-21-17 @ 15:43)  SpO2: 100% (07-21-17 @ 15:43)  Wt(kg): --    GENERAL: NAD, well-developed  HEAD:  Atraumatic, Normocephalic  EYES: EOMI, PERRLA, conjunctiva and sclera clear  NECK: Supple, No JVD  CHEST/LUNG: Clear to auscultation bilaterally; No wheeze  HEART: Regular rate and rhythm; No murmurs, rubs, or gallops  ABDOMEN: Soft, Nontender, Nondistended;   EXTREMITIES:  2+ Peripheral Pulses, No clubbing, cyanosis, or edema  NEUROLOGY: non-focal  SKIN: Pustular rash on bilateral lower mandibles, groin, and axilla  LYMPH: lymphadenopathy in groin 1.5-2cm rubbery mobile                          12.0   18.13 )-----------( 427      ( 21 Jul 2017 11:24 )             37.4     Complete Blood Count + Automated Diff (07.21.17 @ 11:24)    Nucleated RBC #: 0    WBC Count: 18.13 K/uL    RBC Count: 4.63 M/uL    Hemoglobin: 12.0 g/dL    Hematocrit: 37.4 %    Mean Cell Volume: 80.8 fL    Mean Cell Hemoglobin: 25.9 pg    Mean Cell Hemoglobin Conc: 32.1 %    Red Cell Distrib Width: 15.7 %    Platelet Count - Automated: 427 K/uL    MPV: 10.1 fl    Auto Neutrophil #: 15.15 K/uL    Auto Lymphocyte #: 1.97 K/uL    Auto Monocyte #: 0.84 K/uL    Auto Eosinophil #: 0.01 K/uL    Auto Basophil #: 0.05 K/uL    Auto Immature Granulocyte #: 0.11: (Includes meta, myelo and promyelocytes) #    Auto Neutrophil %: 83.5 %    Auto Lymphocyte %: 10.9 %    Auto Monocyte %: 4.6 %    Auto Eosinophil %: 0.1 %    Auto Basophil %: 0.3 %    Auto Immature Granulocyte %: 0.6: (Includes meta, myelo and promyelocytes) %        07-21    136  |  95<L>  |  13  ----------------------------<  119<H>  4.2   |  26  |  0.97    Ca    9.8      21 Jul 2017 11:24    TPro  11.1<H>  /  Alb  3.5  /  TBili  0.4  /  DBili  x   /  AST  16  /  ALT  7   /  AlkPhos  112  07-21

## 2017-07-22 DIAGNOSIS — M19.90 UNSPECIFIED OSTEOARTHRITIS, UNSPECIFIED SITE: ICD-10-CM

## 2017-07-22 LAB
ALBUMIN SERPL ELPH-MCNC: 2.6 G/DL — LOW (ref 3.3–5)
ALP SERPL-CCNC: 89 U/L — SIGNIFICANT CHANGE UP (ref 40–120)
ALT FLD-CCNC: 6 U/L — SIGNIFICANT CHANGE UP (ref 4–41)
AST SERPL-CCNC: 16 U/L — SIGNIFICANT CHANGE UP (ref 4–40)
BASOPHILS # BLD AUTO: 0.07 K/UL — SIGNIFICANT CHANGE UP (ref 0–0.2)
BASOPHILS NFR BLD AUTO: 0.6 % — SIGNIFICANT CHANGE UP (ref 0–2)
BILIRUB SERPL-MCNC: 0.2 MG/DL — SIGNIFICANT CHANGE UP (ref 0.2–1.2)
BUN SERPL-MCNC: 7 MG/DL — SIGNIFICANT CHANGE UP (ref 7–23)
CALCIUM SERPL-MCNC: 8.5 MG/DL — SIGNIFICANT CHANGE UP (ref 8.4–10.5)
CHLORIDE SERPL-SCNC: 102 MMOL/L — SIGNIFICANT CHANGE UP (ref 98–107)
CO2 SERPL-SCNC: 27 MMOL/L — SIGNIFICANT CHANGE UP (ref 22–31)
CREAT SERPL-MCNC: 0.57 MG/DL — SIGNIFICANT CHANGE UP (ref 0.5–1.3)
CRP SERPL-MCNC: 76.7 MG/L — HIGH (ref 0.3–5)
EOSINOPHIL # BLD AUTO: 0.24 K/UL — SIGNIFICANT CHANGE UP (ref 0–0.5)
EOSINOPHIL NFR BLD AUTO: 2 % — SIGNIFICANT CHANGE UP (ref 0–6)
ERYTHROCYTE [SEDIMENTATION RATE] IN BLOOD: 102 MM/HR — HIGH (ref 1–15)
GLUCOSE SERPL-MCNC: 112 MG/DL — HIGH (ref 70–99)
HCT VFR BLD CALC: 30.1 % — LOW (ref 39–50)
HGB BLD-MCNC: 9.4 G/DL — LOW (ref 13–17)
HIV1 AG SER QL: SIGNIFICANT CHANGE UP
HIV1+2 AB SPEC QL: SIGNIFICANT CHANGE UP
IGA FLD-MCNC: 375 MG/DL — SIGNIFICANT CHANGE UP (ref 70–400)
IGG FLD-MCNC: 3044 MG/DL — HIGH (ref 700–1600)
IGM SERPL-MCNC: 163 MG/DL — SIGNIFICANT CHANGE UP (ref 40–230)
IMM GRANULOCYTES # BLD AUTO: 0.05 # — SIGNIFICANT CHANGE UP
IMM GRANULOCYTES NFR BLD AUTO: 0.4 % — SIGNIFICANT CHANGE UP (ref 0–1.5)
LYMPHOCYTES # BLD AUTO: 2.97 K/UL — SIGNIFICANT CHANGE UP (ref 1–3.3)
LYMPHOCYTES # BLD AUTO: 25 % — SIGNIFICANT CHANGE UP (ref 13–44)
MAGNESIUM SERPL-MCNC: 1.9 MG/DL — SIGNIFICANT CHANGE UP (ref 1.6–2.6)
MCHC RBC-ENTMCNC: 26.2 PG — LOW (ref 27–34)
MCHC RBC-ENTMCNC: 31.2 % — LOW (ref 32–36)
MCV RBC AUTO: 83.8 FL — SIGNIFICANT CHANGE UP (ref 80–100)
MONOCYTES # BLD AUTO: 0.94 K/UL — HIGH (ref 0–0.9)
MONOCYTES NFR BLD AUTO: 7.9 % — SIGNIFICANT CHANGE UP (ref 2–14)
NEUTROPHILS # BLD AUTO: 7.61 K/UL — HIGH (ref 1.8–7.4)
NEUTROPHILS NFR BLD AUTO: 64.1 % — SIGNIFICANT CHANGE UP (ref 43–77)
NRBC # FLD: 0 — SIGNIFICANT CHANGE UP
PHOSPHATE SERPL-MCNC: 3.8 MG/DL — SIGNIFICANT CHANGE UP (ref 2.5–4.5)
PLASMODIUM AG BLD QL: SIGNIFICANT CHANGE UP
PLATELET # BLD AUTO: 355 K/UL — SIGNIFICANT CHANGE UP (ref 150–400)
PMV BLD: 10.4 FL — SIGNIFICANT CHANGE UP (ref 7–13)
POTASSIUM SERPL-MCNC: 3.8 MMOL/L — SIGNIFICANT CHANGE UP (ref 3.5–5.3)
POTASSIUM SERPL-SCNC: 3.8 MMOL/L — SIGNIFICANT CHANGE UP (ref 3.5–5.3)
PROT SERPL-MCNC: 8.3 G/DL — SIGNIFICANT CHANGE UP (ref 6–8.3)
PROT SERPL-MCNC: 8.5 G/DL — HIGH (ref 6–8.3)
PROT UR-MCNC: 10.4 MG/DL — SIGNIFICANT CHANGE UP
RBC # BLD: 3.59 M/UL — LOW (ref 4.2–5.8)
RBC # FLD: 15.8 % — HIGH (ref 10.3–14.5)
SODIUM SERPL-SCNC: 138 MMOL/L — SIGNIFICANT CHANGE UP (ref 135–145)
SPECIMEN SOURCE: SIGNIFICANT CHANGE UP
SPECIMEN SOURCE: SIGNIFICANT CHANGE UP
TSH SERPL-MCNC: 0.53 UIU/ML — SIGNIFICANT CHANGE UP (ref 0.27–4.2)
WBC # BLD: 11.88 K/UL — HIGH (ref 3.8–10.5)
WBC # FLD AUTO: 11.88 K/UL — HIGH (ref 3.8–10.5)

## 2017-07-22 PROCEDURE — 86334 IMMUNOFIX E-PHORESIS SERUM: CPT | Mod: 26

## 2017-07-22 PROCEDURE — 86335 IMMUNFIX E-PHORSIS/URINE/CSF: CPT | Mod: 26

## 2017-07-22 PROCEDURE — 84165 PROTEIN E-PHORESIS SERUM: CPT | Mod: 26

## 2017-07-22 PROCEDURE — 84166 PROTEIN E-PHORESIS/URINE/CSF: CPT | Mod: 26

## 2017-07-22 PROCEDURE — 99222 1ST HOSP IP/OBS MODERATE 55: CPT

## 2017-07-22 RX ORDER — SODIUM CHLORIDE 9 MG/ML
1000 INJECTION INTRAMUSCULAR; INTRAVENOUS; SUBCUTANEOUS
Qty: 0 | Refills: 0 | Status: DISCONTINUED | OUTPATIENT
Start: 2017-07-22 | End: 2017-07-23

## 2017-07-22 RX ADMIN — SODIUM CHLORIDE 100 MILLILITER(S): 9 INJECTION INTRAMUSCULAR; INTRAVENOUS; SUBCUTANEOUS at 05:18

## 2017-07-22 RX ADMIN — SODIUM CHLORIDE 75 MILLILITER(S): 9 INJECTION INTRAMUSCULAR; INTRAVENOUS; SUBCUTANEOUS at 17:08

## 2017-07-22 RX ADMIN — Medication 100 MILLIGRAM(S): at 17:06

## 2017-07-22 NOTE — PROGRESS NOTE ADULT - SUBJECTIVE AND OBJECTIVE BOX
Patient is a 27y old  Male who presents with a chief complaint of fall (2017 15:52)      SUBJECTIVE / OVERNIGHT EVENTS:    Vital Signs Last 24 Hrs  T(C): 36.7 (2017 05:17), Max: 36.8 (2017 10:10)  T(F): 98 (2017 05:17), Max: 98.2 (2017 10:10)  HR: 82 (2017 05:17) (82 - 136)  BP: 102/61 (2017 05:17) (102/61 - 127/95)  BP(mean): --  RR: 17 (2017 05:17) (16 - 18)  SpO2: 100% (2017 05:17) (98% - 100%)    CAPILLARY BLOOD GLUCOSE      I&O's Summary    2017 07:01  -  2017 07:00  --------------------------------------------------------  IN: 1200 mL / OUT: 1300 mL / NET: -100 mL    2017 07:01  -  2017 07:52  --------------------------------------------------------  IN: 0 mL / OUT: 700 mL / NET: -700 mL        MEDICATIONS  (STANDING):    MEDICATIONS  (PRN):  acetaminophen   Tablet. 650 milliGRAM(s) Oral every 6 hours PRN Mild and Moderate Pain        PHYSICAL EXAM  GENERAL: NAD, well-developed  HEAD:  Atraumatic, Normocephalic  EYES: EOMI, PERRLA, conjunctiva and sclera clear  NECK: Supple, No JVD  CHEST/LUNG: Clear to auscultation bilaterally; No wheeze  HEART: Regular rate and rhythm; No murmurs, rubs, or gallops  ABDOMEN: Soft, Nontender, Nondistended; Bowel sounds present  EXTREMITIES:  2+ Peripheral Pulses, No clubbing, cyanosis, or edema  PSYCH: AAOx3  SKIN: No rashes or lesions    LABS:                        9.4    11.88 )-----------( 355      ( 2017 06:00 )             30.1     07-22    138  |  102  |  7   ----------------------------<  112<H>  3.8   |  27  |  0.57    Ca    8.5      2017 06:00  Phos  3.8     -  Mg     1.9         TPro  8.3  /  Alb  2.6<L>  /  TBili  0.2  /  DBili  x   /  AST  16  /  ALT  6   /  AlkPhos  89        CARDIAC MARKERS ( 2017 11:24 )  x     / < 0.06 ng/mL / x     / 1.00 ng/mL / x          Urinalysis Basic - ( 2017 22:40 )    Color: YELLOW / Appearance: CLEAR / S.038 / pH: 7.0  Gluc: NEGATIVE / Ketone: NEGATIVE  / Bili: NEGATIVE / Urobili: NORMAL E.U.   Blood: NEGATIVE / Protein: 20 / Nitrite: NEGATIVE   Leuk Esterase: NEGATIVE / RBC: 0-2 / WBC 0-2   Sq Epi: x / Non Sq Epi: x / Bacteria: x        RADIOLOGY & ADDITIONAL TESTS:     MICROBIOLOGY:     CONSULTS: Patient is a 27y old  Male who presents with a chief complaint of fall (2017 15:52)      SUBJECTIVE / OVERNIGHT EVENTS:  -Feels better.     Vital Signs Last 24 Hrs  T(C): 36.7 (2017 05:17), Max: 36.8 (2017 10:10)  T(F): 98 (2017 05:17), Max: 98.2 (2017 10:10)  HR: 82 (2017 05:17) (82 - 136)  BP: 102/61 (2017 05:17) (102/61 - 127/95)  BP(mean): --  RR: 17 (2017 05:17) (16 - 18)  SpO2: 100% (2017 05:17) (98% - 100%)    CAPILLARY BLOOD GLUCOSE      I&O's Summary    2017 07:01  -  2017 07:00  --------------------------------------------------------  IN: 1200 mL / OUT: 1300 mL / NET: -100 mL    2017 07:01  -  2017 07:52  --------------------------------------------------------  IN: 0 mL / OUT: 700 mL / NET: -700 mL        MEDICATIONS  (STANDING):    MEDICATIONS  (PRN):  acetaminophen   Tablet. 650 milliGRAM(s) Oral every 6 hours PRN Mild and Moderate Pain        PHYSICAL EXAM  GENERAL: NAD, well-developed  HEAD:  Atraumatic, Normocephalic  EYES: EOMI, PERRLA, conjunctiva and sclera clear  NECK: Supple, No JVD  CHEST/LUNG: Clear to auscultation bilaterally; No wheeze  HEART: Regular rate and rhythm; No murmurs, rubs, or gallops  ABDOMEN: Soft, Nontender, Nondistended; Bowel sounds present  EXTREMITIES:  2+ Peripheral Pulses, No clubbing, cyanosis, or edema  PSYCH: AAOx3  SKIN: No rashes or lesions    LABS:                        9.4    11.88 )-----------( 355      ( 2017 06:00 )             30.1     07-22    138  |  102  |  7   ----------------------------<  112<H>  3.8   |  27  |  0.57    Ca    8.5      2017 06:00  Phos  3.8       Mg     1.9         TPro  8.3  /  Alb  2.6<L>  /  TBili  0.2  /  DBili  x   /  AST  16  /  ALT  6   /  AlkPhos  89        CARDIAC MARKERS ( 2017 11:24 )  x     / < 0.06 ng/mL / x     / 1.00 ng/mL / x          Urinalysis Basic - ( 2017 22:40 )    Color: YELLOW / Appearance: CLEAR / S.038 / pH: 7.0  Gluc: NEGATIVE / Ketone: NEGATIVE  / Bili: NEGATIVE / Urobili: NORMAL E.U.   Blood: NEGATIVE / Protein: 20 / Nitrite: NEGATIVE   Leuk Esterase: NEGATIVE / RBC: 0-2 / WBC 0-2   Sq Epi: x / Non Sq Epi: x / Bacteria: x        RADIOLOGY & ADDITIONAL TESTS:     MICROBIOLOGY:     CONSULTS: Patient is a 27y old  Male who presents with a chief complaint of fall (2017 15:52)      SUBJECTIVE / OVERNIGHT EVENTS:  -Feels better. Endorses few seconds of blurry vision the past couple weeks, has dec appetite. Denies further nausea, vomiting, dizziness since yesterday. Doesn't know why he passed out; was standing about to start walking when fell  -Endorses pain in L shoulder (2 wks, no trauma, no erythema, was swollen now resolved and R knee (2 mo ago banged R knee climbing into bed, has been swollen since starting the next morning).  -Hidradenitis suppurativa (b/l jaw, axilla, sacrum) started last fall. Used to be worse, but better with abx in past    Vital Signs Last 24 Hrs  T(C): 36.7 (2017 05:17), Max: 36.8 (2017 10:10)  T(F): 98 (2017 05:17), Max: 98.2 (2017 10:10)  HR: 82 (2017 05:17) (82 - 136)  BP: 102/61 (2017 05:17) (102/61 - 127/95)  BP(mean): --  RR: 17 (2017 05:17) (16 - 18)  SpO2: 100% (2017 05:17) (98% - 100%)    CAPILLARY BLOOD GLUCOSE      I&O's Summary    2017 07:  -  2017 07:00  --------------------------------------------------------  IN: 1200 mL / OUT: 1300 mL / NET: -100 mL    2017 07:01  -  2017 07:52  --------------------------------------------------------  IN: 0 mL / OUT: 700 mL / NET: -700 mL        MEDICATIONS  (STANDING):    MEDICATIONS  (PRN):  acetaminophen   Tablet. 650 milliGRAM(s) Oral every 6 hours PRN Mild and Moderate Pain        PHYSICAL EXAM  GENERAL: NAD, well-developed, thin  HEAD:  Atraumatic, Normocephalic  EYES: EOMI, PERRLA, conjunctiva and sclera clear  NECK: Supple, No JVD  CHEST/LUNG: Clear to auscultation bilaterally; respirations nonlabored  HEART: Regular rate and rhythm; No murmurs, rubs, or gallops  ABDOMEN: Soft, Nontender, Nondistended; Bowel sounds present  EXTREMITIES:  2+ Peripheral Pulses, No clubbing, cyanosis, or edema. L shoulder: no swelling or swelling, AROM limited 2/2 pain, TTP. R knee: swollen, limited flexion, no erythema, TTP.  PSYCH: AAOx3  NEURO: CNII - XII intact, no focal deficits, no meningeal signs  SKIN: open draining sores with foul smelling purulent discharge on jaw b/l     LABS:                        9.4    11.88 )-----------( 355      ( 2017 06:00 )             30.1     07-    138  |  102  |  7   ----------------------------<  112<H>  3.8   |  27  |  0.57    Ca    8.5      2017 06:00  Phos  3.8     -22  Mg     1.9     -    TPro  8.3  /  Alb  2.6<L>  /  TBili  0.2  /  DBili  x   /  AST  16  /  ALT  6   /  AlkPhos  89  -    serum protein 8.3  serum albumin 2.6  protein gap 5.7  IgG 3044    CARDIAC MARKERS ( 2017 11:24 )  x     / < 0.06 ng/mL / x     / 1.00 ng/mL / x        Urinalysis Basic - ( 2017 22:40 )    Color: YELLOW / Appearance: CLEAR / S.038 / pH: 7.0  Gluc: NEGATIVE / Ketone: NEGATIVE  / Bili: NEGATIVE / Urobili: NORMAL E.U.   Blood: NEGATIVE / Protein: 20 / Nitrite: NEGATIVE   Leuk Esterase: NEGATIVE / RBC: 0-2 / WBC 0-2   Sq Epi: x / Non Sq Epi: x / Bacteria: x        RADIOLOGY & ADDITIONAL TESTS:  < from: CT Head No Cont (07.21.17 @ 11:45) >  IMPRESSION:  Normal noncontrast head CT.    < from: CT Abdomen and Pelvis w/ Oral Cont and w/ IV Cont (17 @ 21:53) >  IMPRESSION:     Very mild lymphadenopathy in the inguinal region bilaterally.  Question of cellulitis involving bilateral gluteal region and the   subcutaneous tissues of the right shoulder.      < from: Xray Shoulder 2 Views, Left (17 @ 16:49) >  IMPRESSION:  Slightly increased sclerosis and cortical contour irregularity of the   left acromion may reflect presence of an os acromiale. Appears similarly   on the right side on concurrently performed chest radiograph. However   subtle acromial fracture cannot be entirely excluded. No dislocations or   additional suspected fractures.    Preserved glenohumeral joint space.    Maintained subacromial and coracoclavicular spaces.    No destructive osseous lesions or periosteal reaction.    No periarticular soft tissue calcifications.    < from: Xray Knee 4 Views, Right (17 @ 23:58) >  IMPRESSION:    No acute fracture.  Small joint effusion.       MICROBIOLOGY:   BCx  ng  HIV neg    CONSULTS: tristin, ID, will place dermatology Patient is a 27y old  Male who presents with a chief complaint of fall (2017 15:52)      SUBJECTIVE / OVERNIGHT EVENTS:  -Feels better. Endorses few seconds of blurry vision the past couple weeks, dec appetite. Denies further nausea, vomiting, dizziness since yesterday. Doesn't know why he passed out when stood up after vomiting earlier in the day  -Endorses pain in L shoulder (2 wks, no trauma, no erythema, was swollen now resolved and R knee (2 mo ago banged R knee climbing into bed, has been swollen since starting the next morning).  -Hidradenitis suppurativa (b/l jaw, axilla, sacrum) started last fall. Used to be worse, but better with clindamycin in past    Vital Signs Last 24 Hrs  T(C): 36.7 (2017 05:17), Max: 36.8 (2017 10:10)  T(F): 98 (2017 05:17), Max: 98.2 (2017 10:10)  HR: 82 (2017 05:17) (82 - 136)  BP: 102/61 (2017 05:17) (102/61 - 127/95)  BP(mean): --  RR: 17 (2017 05:17) (16 - 18)  SpO2: 100% (2017 05:17) (98% - 100%)    CAPILLARY BLOOD GLUCOSE      I&O's Summary    2017 07:  -  2017 07:00  --------------------------------------------------------  IN: 1200 mL / OUT: 1300 mL / NET: -100 mL    2017 07:01  -  2017 07:52  --------------------------------------------------------  IN: 0 mL / OUT: 700 mL / NET: -700 mL        MEDICATIONS  (STANDING):    MEDICATIONS  (PRN):  acetaminophen   Tablet. 650 milliGRAM(s) Oral every 6 hours PRN Mild and Moderate Pain        PHYSICAL EXAM  GENERAL: NAD, well-developed, thin  HEAD:  Atraumatic, Normocephalic  EYES: EOMI, PERRLA, conjunctiva and sclera clear  NECK: Supple, No JVD  CHEST/LUNG: Clear to auscultation bilaterally; respirations nonlabored  HEART: Regular rate and rhythm; No murmurs, rubs, or gallops  ABDOMEN: Soft, Nontender, Nondistended; Bowel sounds present, no masses  EXTREMITIES:  2+ Peripheral Pulses, No clubbing, cyanosis, or edema. L shoulder: no swelling or swelling, AROM limited 2/2 pain, TTP. R knee: swollen, limited flexion, no erythema, TTP.  PSYCH: AAOx3  NEURO: CNII - XII intact, no focal deficits, Brudzinski neg  LYMPH: palpable nontender, nondraining inguinal LN b/l  SKIN: open draining sores with foul smelling purulent discharge on jaw b/l, scabbed over sores on b/l axilla and sacrum    LABS:                        9.4    11.88 )-----------( 355      ( 2017 06:00 )             30.1     07-22    138  |  102  |  7   ----------------------------<  112<H>  3.8   |  27  |  0.57    Ca    8.5      2017 06:00  Phos  3.8     07-22  Mg     1.9     07-22    TPro  8.3  /  Alb  2.6<L>  /  TBili  0.2  /  DBili  x   /  AST  16  /  ALT  6   /  AlkPhos  89  07-22    serum protein 8.3  serum albumin 2.6  protein gap 5.7  IgG 3044    CARDIAC MARKERS ( 2017 11:24 )  x     / < 0.06 ng/mL / x     / 1.00 ng/mL / x        Urinalysis Basic - ( 2017 22:40 )    Color: YELLOW / Appearance: CLEAR / S.038 / pH: 7.0  Gluc: NEGATIVE / Ketone: NEGATIVE  / Bili: NEGATIVE / Urobili: NORMAL E.U.   Blood: NEGATIVE / Protein: 20 / Nitrite: NEGATIVE   Leuk Esterase: NEGATIVE / RBC: 0-2 / WBC 0-2   Sq Epi: x / Non Sq Epi: x / Bacteria: x        RADIOLOGY & ADDITIONAL TESTS:  < from: CT Head No Cont (17 @ 11:45) >  IMPRESSION:  Normal noncontrast head CT.    < from: CT Abdomen and Pelvis w/ Oral Cont and w/ IV Cont (17 @ 21:53) >  IMPRESSION:     Very mild lymphadenopathy in the inguinal region bilaterally.  Question of cellulitis involving bilateral gluteal region and the   subcutaneous tissues of the right shoulder.      < from: Xray Shoulder 2 Views, Left (17 @ 16:49) >  IMPRESSION:  Slightly increased sclerosis and cortical contour irregularity of the   left acromion may reflect presence of an os acromiale. Appears similarly   on the right side on concurrently performed chest radiograph. However   subtle acromial fracture cannot be entirely excluded. No dislocations or   additional suspected fractures.    Preserved glenohumeral joint space.    Maintained subacromial and coracoclavicular spaces.    No destructive osseous lesions or periosteal reaction.    No periarticular soft tissue calcifications.    < from: Xray Knee 4 Views, Right (17 @ 23:58) >  IMPRESSION:    No acute fracture.  Small joint effusion.       MICROBIOLOGY:   BCx  ng  HIV neg    CONSULTS: tristin, ID, will place dermatology Patient is a 27y old  Male who presents with a chief complaint of fall (2017 15:52)      SUBJECTIVE / OVERNIGHT EVENTS:  -Feels better. Endorses few seconds of blurry vision the past couple weeks, dec appetite. Denies further nausea, vomiting, dizziness since yesterday. Doesn't know why he passed out when stood up after vomiting earlier in the day  -Endorses pain in L shoulder (2 wks, no trauma, no erythema, was swollen now resolved and R knee (2 mo ago banged R knee climbing into bed, has been swollen since starting the next morning).  -Hidradenitis suppurativa (b/l jaw, axilla, sacrum) started last fall. Used to be worse, but better with clindamycin in past    Vital Signs Last 24 Hrs  T(C): 36.7 (2017 05:17), Max: 36.8 (2017 10:10)  T(F): 98 (2017 05:17), Max: 98.2 (2017 10:10)  HR: 82 (2017 05:17) (82 - 136)  BP: 102/61 (2017 05:17) (102/61 - 127/95)  BP(mean): --  RR: 17 (2017 05:17) (16 - 18)  SpO2: 100% (2017 05:17) (98% - 100%)    CAPILLARY BLOOD GLUCOSE      I&O's Summary    2017 07:  -  2017 07:00  --------------------------------------------------------  IN: 1200 mL / OUT: 1300 mL / NET: -100 mL    2017 07:01  -  2017 07:52  --------------------------------------------------------  IN: 0 mL / OUT: 700 mL / NET: -700 mL        MEDICATIONS  (STANDING):    MEDICATIONS  (PRN):  acetaminophen   Tablet. 650 milliGRAM(s) Oral every 6 hours PRN Mild and Moderate Pain        PHYSICAL EXAM  GENERAL: NAD, well-developed, thin  HEAD:  Atraumatic, Normocephalic  EYES: EOMI, PERRLA, conjunctiva and sclera clear  NECK: Supple, No JVD  CHEST/LUNG: Clear to auscultation bilaterally; respirations nonlabored  HEART: Regular rate and rhythm; No murmurs, rubs, or gallops  ABDOMEN: Soft, Nontender, Nondistended; Bowel sounds present, no masses  EXTREMITIES:  2+ Peripheral Pulses, No clubbing, cyanosis, or edema. L shoulder: no swelling or swelling, AROM limited 2/2 pain, TTP. R knee: swollen, limited flexion, no erythema, TTP.  PSYCH: AAOx3  NEURO: CNII - XII intact, no focal deficits, Brudzinski neg  LYMPH: palpable nontender, nondraining inguinal LN b/l  SKIN: open draining sores with foul smelling purulent discharge on jaw b/l, scabbed over sores on b/l axilla and sacrum    LABS:                        9.4    11.88 )-----------( 355      ( 2017 06:00 )             30.1     07-22    138  |  102  |  7   ----------------------------<  112<H>  3.8   |  27  |  0.57    Ca    8.5      2017 06:00  Phos  3.8     07-22  Mg     1.9     07-22    TPro  8.3  /  Alb  2.6<L>  /  TBili  0.2  /  DBili  x   /  AST  16  /  ALT  6   /  AlkPhos  89  07-22    serum protein 8.3  serum albumin 2.6  protein gap 5.7  IgG 3044    CRP 76    CARDIAC MARKERS ( 2017 11:24 )  x     / < 0.06 ng/mL / x     / 1.00 ng/mL / x        Urinalysis Basic - ( 2017 22:40 )    Color: YELLOW / Appearance: CLEAR / S.038 / pH: 7.0  Gluc: NEGATIVE / Ketone: NEGATIVE  / Bili: NEGATIVE / Urobili: NORMAL E.U.   Blood: NEGATIVE / Protein: 20 / Nitrite: NEGATIVE   Leuk Esterase: NEGATIVE / RBC: 0-2 / WBC 0-2   Sq Epi: x / Non Sq Epi: x / Bacteria: x        RADIOLOGY & ADDITIONAL TESTS:  < from: CT Head No Cont (17 @ 11:45) >  IMPRESSION:  Normal noncontrast head CT.    < from: CT Abdomen and Pelvis w/ Oral Cont and w/ IV Cont (17 @ 21:53) >  IMPRESSION:     Very mild lymphadenopathy in the inguinal region bilaterally.  Question of cellulitis involving bilateral gluteal region and the   subcutaneous tissues of the right shoulder.      < from: Xray Shoulder 2 Views, Left (17 @ 16:49) >  IMPRESSION:  Slightly increased sclerosis and cortical contour irregularity of the   left acromion may reflect presence of an os acromiale. Appears similarly   on the right side on concurrently performed chest radiograph. However   subtle acromial fracture cannot be entirely excluded. No dislocations or   additional suspected fractures.    Preserved glenohumeral joint space.    Maintained subacromial and coracoclavicular spaces.    No destructive osseous lesions or periosteal reaction.    No periarticular soft tissue calcifications.    < from: Xray Knee 4 Views, Right (17 @ 23:58) >  IMPRESSION:    No acute fracture.  Small joint effusion.       MICROBIOLOGY:   BCx  ng  HIV neg    CONSULTS: tristin, ID, will place dermatology

## 2017-07-22 NOTE — PROGRESS NOTE ADULT - PROBLEM SELECTOR PLAN 1
- Unwitnessed fall on 7/21/17 with no LOC or pre-syncopal symptoms   - Most like due to dehydration from episodes of emesis and decreased PO intake   - EKG shows Sinus tachycardia, no ST changes, irregular rhythm or delta waves

## 2017-07-22 NOTE — PROGRESS NOTE ADULT - PROBLEM SELECTOR PLAN 2
- Patient has had elevated Leukocytosis since March 2017  - Protein gap today of 5.7 and elevated IgG to 3000s.  - Weight loss of 30s and Lymphadenopathy with elevated ESR and CRP concerning for leukemia/lymphoma   - check SPEP/UPEP, serum and urine immunofixation  - CT chest/abd/pelvis to check for lymphadenopathy   - Hematology consulted, follow up recs

## 2017-07-22 NOTE — PROGRESS NOTE ADULT - PROBLEM SELECTOR PLAN 2
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 s/p 4L NS in the ED  - c/w NS@100cc for 12 hours and reassess need for IVF in AM

## 2017-07-22 NOTE — PROGRESS NOTE ADULT - PROBLEM SELECTOR PLAN 4
- Diagnosed on last ED visit. Currently affecting his buttocks, axilla, mandibles and groin.   - Finished clindamycin course   - Healing better per the patient   - stable for now. Will contact the PCP about necessary antibiotics during his stay as well as the utility of culture.  - Will F/U with wound care.

## 2017-07-22 NOTE — PROGRESS NOTE ADULT - ASSESSMENT
27 y.o. male with PMHx of Hidradenitis suppurative admitted for fall due to dehydration from food poisoning. Concern for underlying hematological disorder with chronic leukocytosis, protein gap of 7.6, and outpatient records showing hypergammaglobulinemia

## 2017-07-22 NOTE — PROGRESS NOTE ADULT - SUBJECTIVE AND OBJECTIVE BOX
Patient is a 27y old  Male who presents with a chief complaint of fall (2017 15:52)        SUBJECTIVE / OVERNIGHT EVENTS:      MEDICATIONS  (STANDING):    MEDICATIONS  (PRN):  acetaminophen   Tablet. 650 milliGRAM(s) Oral every 6 hours PRN Mild and Moderate Pain        CAPILLARY BLOOD GLUCOSE        I&O's Summary    2017 07:01  -  2017 07:00  --------------------------------------------------------  IN: 1200 mL / OUT: 1300 mL / NET: -100 mL    2017 07:  -  2017 09:05  --------------------------------------------------------  IN: 0 mL / OUT: 700 mL / NET: -700 mL        PHYSICAL EXAM  GENERAL: NAD, well-developed  HEAD:  Atraumatic, Normocephalic  EYES: EOMI, PERRLA, conjunctiva and sclera clear  NECK: Supple, No JVD  CHEST/LUNG: Clear to auscultation bilaterally; No wheeze  HEART: Regular rate and rhythm; No murmurs, rubs, or gallops  ABDOMEN: Soft, Nontender, Nondistended; Bowel sounds present  EXTREMITIES:  2+ Peripheral Pulses, No clubbing, cyanosis, or edema  PSYCH: AAOx3  SKIN: No rashes or lesions    LABS:                        9.4    11.88 )-----------( 355      ( 2017 06:00 )             30.1     -22    138  |  102  |  7   ----------------------------<  112<H>  3.8   |  27  |  0.57    Ca    8.5      2017 06:00  Phos  3.8     0722  Mg     1.9         TPro  8.3  /  Alb  2.6<L>  /  TBili  0.2  /  DBili  x   /  AST  16  /  ALT  6   /  AlkPhos  89        CARDIAC MARKERS ( 2017 11:24 )  x     / < 0.06 ng/mL / x     / 1.00 ng/mL / x          Urinalysis Basic - ( 2017 22:40 )    Color: YELLOW / Appearance: CLEAR / S.038 / pH: 7.0  Gluc: NEGATIVE / Ketone: NEGATIVE  / Bili: NEGATIVE / Urobili: NORMAL E.U.   Blood: NEGATIVE / Protein: 20 / Nitrite: NEGATIVE   Leuk Esterase: NEGATIVE / RBC: 0-2 / WBC 0-2   Sq Epi: x / Non Sq Epi: x / Bacteria: x        RADIOLOGY & ADDITIONAL TESTS:    Imaging Personally Reviewed:  < from: Xray Shoulder 2 Views, Left (17 @ 16:49) >  Slightly increased sclerosis and cortical contour irregularity of the   left acromion may reflect presence of an os acromiale. Appears similarly   on the right side on concurrently performed chest radiograph. However   subtle acromial fracture cannot be entirely excluded. No dislocations or   additional suspected fractures.    Preserved glenohumeral joint space.    Maintained subacromial and coracoclavicular spaces.    No destructive osseous lesionsor periosteal reaction.    No periarticular soft tissue calcifications.      < end of copied text >  < from: Xray Chest 2 Views PA/Lat (17 @ 12:54) >  Normal chest    < end of copied text >  < from: CT Head No Cont (17 @ 11:45) >  Normal noncontrast head CT.    < end of copied text >    Consultant(s) Notes Reviewed: Heme/Onc Patient is a 27y old  Male who presents with a chief complaint of fall (2017 15:52)        SUBJECTIVE / OVERNIGHT EVENTS: No acute overnight events. Pt states that he had felt light headed and passed out when he got up from a chair yesterday. His mother had witnessed the event and stated that she had not witnessed any convulsions. Afterwards, he got up and went to the couch. He has a history of hidradenitis supporativa that is affecting his jaw, axilla and groin and endorses joint pain. His right ankle, right knee and left shoulder have been causing him pain on movement and palpation. He had come into the ED once before for the knee pain, but did not want the fluid around the knee to be drained. He plays basketball in his free time, does not smoke, drinks socially and is not currently sexually active. Denies any recent fevers, chills, night sweats, chest or abdominal pain. Denies any stool or urination changes recently. States that he has lost about 40 lbs in the past few months and that his appetite has not been great.      MEDICATIONS  (STANDING):    MEDICATIONS  (PRN):  acetaminophen   Tablet. 650 milliGRAM(s) Oral every 6 hours PRN Mild and Moderate Pain        CAPILLARY BLOOD GLUCOSE        I&O's Summary    2017 07:  -  2017 07:00  --------------------------------------------------------  IN: 1200 mL / OUT: 1300 mL / NET: -100 mL    2017 07:  -  2017 09:05  --------------------------------------------------------  IN: 0 mL / OUT: 700 mL / NET: -700 mL        PHYSICAL EXAM  GENERAL: NAD, well-developed  HEAD:  Atraumatic, Normocephalic. Scales and fluid drainage are seen on the mandibles bilaterally.  EYES: EOMI, PERRLA, conjunctiva and sclera clear  NECK: Supple, No JVD  CHEST/LUNG: Clear to auscultation bilaterally; No wheeze  HEART: Regular rate and rhythm; No murmurs, rubs, or gallops  ABDOMEN: Soft, Nontender, Nondistended; Bowel sounds present  EXTREMITIES:  2+ Peripheral Pulses, No clubbing, cyanosis, or edema.  JOINTS: Right ankle joint is inflamed and with some effusion. ROM is moderately limited as compared to the left ankle joint. Slight pain on palpation and with motion. Right knee joint also inflamed and with effusion. Pain on palpation of the patella, but not medial or lateral tenderness. Anterior and posterior drawer negative. No medial tibial tenderness. ROM is limited by pain. Left shoulder joint painful on palpation. No effusion noted. Left shoulder extension, flexion, and abduction 3/5 in strength.  PSYCH: AAOx3  SKIN: No rashes or lesions    LABS:                        9.4    11.88 )-----------( 355      ( 2017 06:00 )             30.1     07-22    138  |  102  |  7   ----------------------------<  112<H>  3.8   |  27  |  0.57    Ca    8.5      2017 06:00  Phos  3.8     07-22  Mg     1.9     07-22    TPro  8.3  /  Alb  2.6<L>  /  TBili  0.2  /  DBili  x   /  AST  16  /  ALT  6   /  AlkPhos  89  07-22      CARDIAC MARKERS ( 2017 11:24 )  x     / < 0.06 ng/mL / x     / 1.00 ng/mL / x          Urinalysis Basic - ( 2017 22:40 )    Color: YELLOW / Appearance: CLEAR / S.038 / pH: 7.0  Gluc: NEGATIVE / Ketone: NEGATIVE  / Bili: NEGATIVE / Urobili: NORMAL E.U.   Blood: NEGATIVE / Protein: 20 / Nitrite: NEGATIVE   Leuk Esterase: NEGATIVE / RBC: 0-2 / WBC 0-2   Sq Epi: x / Non Sq Epi: x / Bacteria: x        RADIOLOGY & ADDITIONAL TESTS:    Imaging Personally Reviewed:  < from: Xray Shoulder 2 Views, Left (17 @ 16:49) >  Slightly increased sclerosis and cortical contour irregularity of the   left acromion may reflect presence of an os acromiale. Appears similarly   on the right side on concurrently performed chest radiograph. However   subtle acromial fracture cannot be entirely excluded. No dislocations or   additional suspected fractures.    Preserved glenohumeral joint space.    Maintained subacromial and coracoclavicular spaces.    No destructive osseous lesionsor periosteal reaction.    No periarticular soft tissue calcifications.      < end of copied text >  < from: Xray Chest 2 Views PA/Lat (17 @ 12:54) >  Normal chest    < end of copied text >  < from: CT Head No Cont (17 @ 11:45) >  Normal noncontrast head CT.    < end of copied text >    Consultant(s) Notes Reviewed: Heme/Onc

## 2017-07-22 NOTE — PROGRESS NOTE ADULT - PROBLEM SELECTOR PLAN 1
- Unwitnessed fall today with no LOC or pre-syncopal symptoms   - Most like due to dehydration from episodes of emesis and decreased PO intake   - EKG shows Sinus tachycardia, no ST changes, irregular rhythm or delta waves   - Check orthostatic BP  - c/w NS@100cc for 12 hours and reassess  - Check TSH - Unwitnessed fall today with no LOC or pre-syncopal symptoms, did hit head L forehead, CTH neg  - Most like due to dehydration from episodes of emesis and decreased PO intake   - EKG shows Sinus tachycardia, no ST changes, irregular rhythm or delta waves   - c/w NS@100cc for 12 hours and reassess - Unwitnessed fall today with no LOC or pre-syncopal symptoms, did hit head L forehead, CTH neg  - Most like due to dehydration from episodes of emesis and decreased PO intake   - EKG shows Sinus tachycardia, no ST changes, irregular rhythm or delta waves   -orthostatic hypotension neg  - c/w NS@100cc for 12 hours and reassess need for IVF in AM Patient has had elevated Leukocytosis since March 2017. Weight loss of 30s and LAD on exam concerning for leukemia/ lymphoma, CT c/a/p positive for inguinal LAD. +hypergammaglobulinemia  - f/u SPEP/UPEP, serum and urine immunofixation  - Hematology consulted, recommendations appreciated

## 2017-07-22 NOTE — CONSULT NOTE ADULT - SUBJECTIVE AND OBJECTIVE BOX
HPI:  27 y.o. male with PMHx of Hidradenitis suppurative presenting after a fall. The patient state he was trying to standup from his chair felt lightheaded and fell on to his left sided. Denies any headache or vision changes prior to the fall and no LOC, tremors, bladder incontinence, tongue bitting after the fall. The patient's mother came after he had fell and helped him back into the chair. The patient stated he felt light headed. States he had 2-3 episode of emesis for the last two days after eating dinner two days ago that has upset his stomach. Last time he had vomited was the morning of the fall. The patient states he has been having decreased PO intake for the last few months and has lost up to 30lbs over the last 3 months. The patient also endorses left shoulder pain from the fall, and chronic pain in the right ankle and knee. Was previously assessed for the ankle and knee pain in 2017, found to have a small effusion in the right knee, which the patient opted no to have tapped    Was also diagnosed with hidradenitis suppurative at that ED visit and was started on a course of clindamycin. The patient's PMD was contacted and stated the patient has been chronically leukocytotic and lab showed hypergammaglobulinemia. Suggested patient be admitted for further work for leukemia/lymphoma.          In the ED:    The patient received 1L bolus of NS and tylenol for pain. CT head was negative for acute pathology and CXR showed clear lungs. (2017 15:52)      PAST MEDICAL & SURGICAL HISTORY:  Hidradenitis suppurativa  No significant past surgical history      Allergies    No Known Allergies    Intolerances        ANTIMICROBIALS:      OTHER MEDS:  acetaminophen   Tablet. 650 milliGRAM(s) Oral every 6 hours PRN      SOCIAL HISTORY: single, not employed, no tobacco, rare EtoH    FAMILY HISTORY:  No significant family history      REVIEW OF SYSTEMS  [  ] ROS unobtainable because:    [  ] All other systems negative except as noted below:	    Constitutional:  [ ] fever [x ] weight loss  Skin:  [ ] rash [ ] phlebitis	  Eyes: [ ] icterus [ ] inflammation	  ENMT: [ ] discharge [ ] thrush [ ] ulcers [ ] exudates  Respiratory: [ ] dyspnea [ ] hemoptysis [ ] cough [ ] sputum	  Cardiovascular:  [ ] chest pain [ ] palpitations [ ] edema	  Gastrointestinal:  [ ] nausea [ ] vomiting [ ] diarrhea [ ] constipation [ ] pain	  Genitourinary:  [ ] dysuria [ ] frequency [ ] hematuria [ ] discharge [ ] flank pain  Musculoskeletal:  [ ] myalgias [ ] arthralgias [ ] arthritis	  Neurological:  [ ] headache [ ] seizures	  Psychiatric:  [ ] anxiety [ ] depression	  Hematology/Lymphatics:  [ ] lymphadenopathy  Endocrine:  [ ] adrenal [ ] thyroid  Allergic/Immunologic:	 [ ] transplant [ ] seasonal    PHYSICAL EXAM:  General: [x ] non-toxic  HEAD/EYES: [ ] crusted skin lesions bilaterally, with driange  ENT:  [x] normal [ ] supple [ ] thrush [ ] pharyngeal exudate  Cardiovascular:   [ ] murmur [x ] normal [ ] PPM/AICD  Respiratory:  [x ] clear to ausculation bilaterally  GI:  [x ] soft, non-tender, normal bowel sounds  :  [ ] alaniz [x ] no CVA tenderness   Musculoskeletal:  [x ] no synovitis  Neurologic:  [ ] non-focal exam   Skin:  [ ] scarring to bilateral axilla, ad gluteal region. tender areas with fluctuence  Lymph: [ ] no lymphadenopathy  Psychiatric:  [ ] appropriate affect [x ] alert & oriented  Lines:  [ x] no phlebitis [ ] central line          Drug Dosing Weight  Height (cm): 182.88 (2017 18:04)  Weight (kg): 66.4 (2017 18:04)  BMI (kg/m2): 19.9 (2017 18:04)  BSA (m2): 1.87 (2017 18:04)    Vital Signs Last 24 Hrs  T(F): 98 (17 @ 05:17), Max: 98.2 (17 @ 10:10)    Vital Signs Last 24 Hrs  HR: 82 (17 @ 05:17) (82 - 97)  BP: 102/61 (17 @ 05:17) (102/61 - 126/83)  RR: 17 (17 @ 05:17)  SpO2: 100% (17 @ 05:17) (100% - 100%)  Wt(kg): --                          9.4    11.88 )-----------( 355      ( 2017 06:00 )             30.1           138  |  102  |  7   ----------------------------<  112<H>  3.8   |  27  |  0.57    Ca    8.5      2017 06:00  Phos  3.8       Mg     1.9         TPro  8.3  /  Alb  2.6<L>  /  TBili  0.2  /  DBili  x   /  AST  16  /  ALT  6   /  AlkPhos  89        Urinalysis Basic - ( 2017 22:40 )    Color: YELLOW / Appearance: CLEAR / S.038 / pH: 7.0  Gluc: NEGATIVE / Ketone: NEGATIVE  / Bili: NEGATIVE / Urobili: NORMAL E.U.   Blood: NEGATIVE / Protein: 20 / Nitrite: NEGATIVE   Leuk Esterase: NEGATIVE / RBC: 0-2 / WBC 0-2   Sq Epi: x / Non Sq Epi: x / Bacteria: x        MICROBIOLOGY:    Blood culture : testing    RADIOLOGY:    < from: CT Abdomen and Pelvis w/ Oral Cont and w/ IV Cont (17 @ 21:53) >    EXAM:  CT ABDOMEN AND PELVIS OC IC      EXAM:  CT CHEST OC IC        PROCEDURE DATE:  2017         INTERPRETATION:  CLINICAL INFORMATION: Leukocytosis, concern for lymphoma    COMPARISON: None    PROCEDURE:   CT of the Chest, Abdomen and Pelvis was performed with intravenous   contrast.   Intravenous contrast: 90 ml Omnipaque 350. 10 ml discarded.  Oral contrast: positive contrast was administered.  Sagittal and coronal reformats were performed.    FINDINGS:    CHEST:     LUNGS AND LARGEAIRWAYS: Patent central airways. No pulmonary   consolidation.  PLEURA: No pleural effusion.  VESSELS: Within normal limits.  HEART: Heart size is normal. No pericardial effusion.  MEDIASTINUM AND MANDY: No lymphadenopathy.  CHEST WALL AND LOWER NECK:Bilateral gynecomastia. Minimal soft tissue   thickening at the level of right clavicle. A single enlarged left   axillary lymph node of 11 mm in short axis diameter.    ABDOMEN AND PELVIS:    LIVER: Within normal limits.  BILE DUCTS: Normal caliber.  GALLBLADDER: Within normal limits.  SPLEEN: Within normal limits.  PANCREAS: Within normal limits.  ADRENALS: Within normal limits.  KIDNEYS/URETERS: Symmetrically enhancing. No hydronephrosis or   nephrolithiasis.    BLADDER: Distended.  REPRODUCTIVE ORGANS: The prostate is normal.    BOWEL: No bowel obstruction. Appendix is normal.  PERITONEUM: No ascites.  VESSELS:  Within normal limits.  RETROPERITONEUM: No lymphadenopathy.    ABDOMINAL WALL: Moderate skin thickening and small amount of freefluid   in the subcutaneous tissues of gluteal region. Bilateral mildly enlarged   inguinal lymph nodes.   *  A right-sided inguinal node measures 3.0 x 1.4 cm (2:163).   *  The right inguinal node measures 1.7 x 1.5 cm (2:178).  *  A left-sided inguinal node measures 1.9 x 0.9 cm (2:164).  BONES: Likely costochondritis bilaterally at the level of the first ribs.   Os acromiale bilaterally. Increased mineralization at the sternomanubrial   joint may be related to prior trauma.     IMPRESSION:     Very mild lymphadenopathy in the inguinal region bilaterally.  Question of cellulitis involving bilateral gluteal region and the   subcutaneous tissues of the right shoulder.    < end of copied text >

## 2017-07-22 NOTE — PROGRESS NOTE ADULT - PROBLEM SELECTOR PLAN 3
- Inflammation and effusion of right knee, right ankle and left shoulder joint.  - Will consult rheumatology about possible drainage and evaluation.

## 2017-07-22 NOTE — PROGRESS NOTE ADULT - ASSESSMENT
27 y.o. male with PMHx of Hidradenitis suppurative admitted for fall due to dehydration from food poisoning. Concern for underlying hematological disorder with chronic leukocytosis, protein gap of 7.6, and outpatient records showing hypergammaglobulinemia 28yo M with PMHx of hidradenitis suppurative admitted for fall due to dehydration from food poisoning. Concern for underlying hematological disorder with chronic leukocytosis, protein gap of 7.6, and outpatient records showing hypergammaglobulinemia.

## 2017-07-22 NOTE — PROGRESS NOTE ADULT - PROBLEM SELECTOR PLAN 4
- IMPROVE score of 0   - No VTE PPX needed - IMPROVE score of 0   - No VTE PPX needed  - regular diet

## 2017-07-22 NOTE — CONSULT NOTE ADULT - ASSESSMENT
27 year old male admitted s/p fall with elevated WBC.  He denies recent fever.    On exam, he has evidence of suppurative hiradenitis with scarring. He would benefit from dermatology evaluation with long term follow up with dermatology.    In his case, I think a long 2-3 months course of doxycyline may be beneficial.  I would start doxycyline pending a dermatology evaluation (inpatient or outpatient)     Follow up blood cultures.

## 2017-07-22 NOTE — PROGRESS NOTE ADULT - PROBLEM SELECTOR PLAN 3
- Diagnosed on last ED visit   - Finished clindamycin course   - Healing better per the patient   - stable for now - Diagnosed on last ED visit. Finished clindamycin course and has been to ID clinic  -ID consulted  -will need derm f/u as OP for longterm therapy  - start doxycycline

## 2017-07-23 LAB
BASOPHILS # BLD AUTO: 0.07 K/UL — SIGNIFICANT CHANGE UP (ref 0–0.2)
BASOPHILS NFR BLD AUTO: 0.6 % — SIGNIFICANT CHANGE UP (ref 0–2)
BUN SERPL-MCNC: 5 MG/DL — LOW (ref 7–23)
CALCIUM SERPL-MCNC: 8.6 MG/DL — SIGNIFICANT CHANGE UP (ref 8.4–10.5)
CHLORIDE SERPL-SCNC: 104 MMOL/L — SIGNIFICANT CHANGE UP (ref 98–107)
CO2 SERPL-SCNC: 26 MMOL/L — SIGNIFICANT CHANGE UP (ref 22–31)
CREAT SERPL-MCNC: 0.59 MG/DL — SIGNIFICANT CHANGE UP (ref 0.5–1.3)
EOSINOPHIL # BLD AUTO: 0.28 K/UL — SIGNIFICANT CHANGE UP (ref 0–0.5)
EOSINOPHIL NFR BLD AUTO: 2.5 % — SIGNIFICANT CHANGE UP (ref 0–6)
GLUCOSE SERPL-MCNC: 82 MG/DL — SIGNIFICANT CHANGE UP (ref 70–99)
HCT VFR BLD CALC: 29.5 % — LOW (ref 39–50)
HGB BLD-MCNC: 9.6 G/DL — LOW (ref 13–17)
IMM GRANULOCYTES # BLD AUTO: 0.03 # — SIGNIFICANT CHANGE UP
IMM GRANULOCYTES NFR BLD AUTO: 0.3 % — SIGNIFICANT CHANGE UP (ref 0–1.5)
LDH SERPL L TO P-CCNC: 152 U/L — SIGNIFICANT CHANGE UP (ref 135–225)
LYMPHOCYTES # BLD AUTO: 3.47 K/UL — HIGH (ref 1–3.3)
LYMPHOCYTES # BLD AUTO: 30.8 % — SIGNIFICANT CHANGE UP (ref 13–44)
MAGNESIUM SERPL-MCNC: 1.8 MG/DL — SIGNIFICANT CHANGE UP (ref 1.6–2.6)
MCHC RBC-ENTMCNC: 27.3 PG — SIGNIFICANT CHANGE UP (ref 27–34)
MCHC RBC-ENTMCNC: 32.5 % — SIGNIFICANT CHANGE UP (ref 32–36)
MCV RBC AUTO: 83.8 FL — SIGNIFICANT CHANGE UP (ref 80–100)
MONOCYTES # BLD AUTO: 0.8 K/UL — SIGNIFICANT CHANGE UP (ref 0–0.9)
MONOCYTES NFR BLD AUTO: 7.1 % — SIGNIFICANT CHANGE UP (ref 2–14)
NEUTROPHILS # BLD AUTO: 6.62 K/UL — SIGNIFICANT CHANGE UP (ref 1.8–7.4)
NEUTROPHILS NFR BLD AUTO: 58.7 % — SIGNIFICANT CHANGE UP (ref 43–77)
NRBC # FLD: 0 — SIGNIFICANT CHANGE UP
PHOSPHATE SERPL-MCNC: 3.9 MG/DL — SIGNIFICANT CHANGE UP (ref 2.5–4.5)
PLATELET # BLD AUTO: 361 K/UL — SIGNIFICANT CHANGE UP (ref 150–400)
PMV BLD: 10.8 FL — SIGNIFICANT CHANGE UP (ref 7–13)
POTASSIUM SERPL-MCNC: 4 MMOL/L — SIGNIFICANT CHANGE UP (ref 3.5–5.3)
POTASSIUM SERPL-SCNC: 4 MMOL/L — SIGNIFICANT CHANGE UP (ref 3.5–5.3)
RBC # BLD: 3.52 M/UL — LOW (ref 4.2–5.8)
RBC # FLD: 15.7 % — HIGH (ref 10.3–14.5)
SODIUM SERPL-SCNC: 140 MMOL/L — SIGNIFICANT CHANGE UP (ref 135–145)
SPECIMEN SOURCE: SIGNIFICANT CHANGE UP
SPECIMEN SOURCE: SIGNIFICANT CHANGE UP
WBC # BLD: 11.27 K/UL — HIGH (ref 3.8–10.5)
WBC # FLD AUTO: 11.27 K/UL — HIGH (ref 3.8–10.5)

## 2017-07-23 PROCEDURE — 99232 SBSQ HOSP IP/OBS MODERATE 35: CPT | Mod: GC

## 2017-07-23 RX ADMIN — SODIUM CHLORIDE 75 MILLILITER(S): 9 INJECTION INTRAMUSCULAR; INTRAVENOUS; SUBCUTANEOUS at 06:09

## 2017-07-23 RX ADMIN — Medication 100 MILLIGRAM(S): at 17:30

## 2017-07-23 RX ADMIN — Medication 100 MILLIGRAM(S): at 06:09

## 2017-07-23 NOTE — CONSULT NOTE ADULT - SUBJECTIVE AND OBJECTIVE BOX
PEDIATRIC GENERAL SURGERY CONSULT NOTE    Patient is a 27y old  Male who presents with a chief complaint of fall (2017 15:52)      HPI:  27 y.o. male with PMHx of Hidradenitis suppurative presenting after a fall. The patient state he was trying to standup from his chair felt lightheaded and fell on to his left sided. Denies any headache or vision changes prior to the fall and no LOC, tremors, bladder incontinence, tongue bitting after the fall. The patient's mother came after he had fell and helped him back into the chair. The patient stated he felt light headed. States he had 2-3 episode of emesis for the last two days after eating dinner two days ago that has upset his stomach. Last time he had vomited was the morning of the fall. The patient states he has been having decreased PO intake for the last few months and has lost up to 30lbs over the last 3 months. The patient also endorses left shoulder pain from the fall, and chronic pain in the right ankle and knee. Was previously assessed for the ankle and knee pain in 2017, found to have a small effusion in the right knee, which the patient opted not to have tapped. Was also diagnosed with hidradenitis suppurative at that ED visit and was started on a course of clindamycin. The patient's PMD was contacted and stated the patient has been chronically leukocytotic and lab showed hypergammaglobulinemia. Suggested patient be admitted for further work for leukemia/lymphoma.      Surgical Oncology was consulted for groin lymph node biopsy to rule out lymphoma. The patient has a history of groin hidradenitis, but has had several months of palpable lymphadenopathy in bilateral groins. He again endorses a 30 pound weight loss over the past few months.         PAST MEDICAL & SURGICAL HISTORY:  Hidradenitis suppurativa  No significant past surgical history      FAMILY HISTORY:  No significant family history      SOCIAL HISTORY: Lives with mother    MEDICATIONS  (STANDING):  doxycycline hyclate Capsule 100 milliGRAM(s) Oral every 12 hours    MEDICATIONS  (PRN):  acetaminophen   Tablet. 650 milliGRAM(s) Oral every 6 hours PRN Mild and Moderate Pain    Allergies    No Known Allergies    Intolerances        Vital Signs Last 24 Hrs  T(C): 36.9 (2017 06:07), Max: 36.9 (2017 14:50)  T(F): 98.5 (2017 06:07), Max: 98.5 (2017 14:50)  HR: 76 (2017 06:07) (73 - 76)  BP: 112/76 (2017 06:07) (112/76 - 119/75)  BP(mean): --  RR: 17 (2017 06:07) (17 - 18)  SpO2: 100% (2017 06:07) (100% - 100%)  Daily     Daily                             9.6    11.27 )-----------( 361      ( 2017 06:30 )             29.5     07-23    140  |  104  |  5<L>  ----------------------------<  82  4.0   |  26  |  0.59    Ca    8.6      2017 06:20  Phos  3.9     07-23  Mg     1.8     07-23    TPro  8.3  /  Alb  2.6<L>  /  TBili  0.2  /  DBili  x   /  AST  16  /  ALT  6   /  AlkPhos  89  07-22      Urinalysis Basic - ( 2017 22:40 )    Color: YELLOW / Appearance: CLEAR / S.038 / pH: 7.0  Gluc: NEGATIVE / Ketone: NEGATIVE  / Bili: NEGATIVE / Urobili: NORMAL E.U.   Blood: NEGATIVE / Protein: 20 / Nitrite: NEGATIVE   Leuk Esterase: NEGATIVE / RBC: 0-2 / WBC 0-2   Sq Epi: x / Non Sq Epi: x / Bacteria: x    Physical Exam:  NAD, awake and alert  Scaly skin around mandibular area b/l  No cervical or supraclavicular adenopathy  Respirations nonlabored  Abdomen soft, nontender, nondistended  No guarding or rebound tenderness   Bilateral groin lymphadenopathy, multiple nodes, firm, nontender

## 2017-07-23 NOTE — PROVIDER CONTACT NOTE (CRITICAL VALUE NOTIFICATION) - ASSESSMENT
Pt is alert and oriented x4, displays no signs and symptoms of active bleeding at this time and is asymptomatic

## 2017-07-23 NOTE — PROGRESS NOTE ADULT - SUBJECTIVE AND OBJECTIVE BOX
CC: 27y old  Male p/w mechanical falls in setting of lightheadedness/weakness (21 Jul 2017 15:52)        SUBJECTIVE / OVERNIGHT EVENTS: No acute overnight events. Denies any  subjective fevers, chills, night sweats, chest or abdominal pain. Denies any stool or urination changes recently. tolerating antibiotics well, w/ no complaints.     MEDICATIONS  (STANDING):  doxycycline hyclate Capsule 100 milliGRAM(s) Oral every 12 hours  sodium chloride 0.9%. 1000 milliLiter(s) (75 mL/Hr) IV Continuous <Continuous>    MEDICATIONS  (PRN):  acetaminophen   Tablet. 650 milliGRAM(s) Oral every 6 hours PRN Mild and Moderate Pain    VS  T(C): 36.9 (07-23-17 @ 06:07)  HR: 76 (07-23-17 @ 06:07)  BP: 112/76 (07-23-17 @ 06:07)  RR: 17 (07-23-17 @ 06:07)  SpO2: 100% (07-23-17 @ 06:07)        PHYSICAL EXAM  GENERAL: NAD, well-developed  HEENT:  Atraumatic, Normocephalic. Scales and fluid drainage are seen on the mandibles bilaterally; EOMI, PERRLA, conjunctiva and sclera clear  NECK: Supple, No JVD  CHEST/LUNG: Clear to auscultation bilaterally; No wheeze  HEART: Regular rate and rhythm; No murmurs, rubs, or gallops  ABDOMEN: Soft, Nontender, Nondistended; Bowel sounds present  EXTREMITIES:  2+ Peripheral Pulses, No clubbing, cyanosis, or edema.  JOINTS: Right ankle joint is inflamed and with some effusion. ROM is moderately limited as compared to the left ankle joint. Slight pain on palpation and with motion. Right knee joint also inflamed and with effusion. Pain on palpation of the patella, but not medial or lateral tenderness. Anterior and posterior drawer negative. No medial tibial tenderness. ROM is limited by pain. Left shoulder joint painful on palpation. No effusion noted. Left shoulder extension, flexion, and abduction 3/5 in strength.  PSYCH: AAOx3  SKIN: No rashes or lesions      LABS: (Pending)     Consultant(s) Notes Reviewed: Heme/Onc

## 2017-07-23 NOTE — PROGRESS NOTE ADULT - PROBLEM SELECTOR PLAN 1
Patient with Leukocytosis, Lymphadenopathy and weight loss. Patient with inguinal LN on CT scans and on physical exam, most likely reactive given his skin lesion in the pubic area.   -Derm consult for biopsy.   -Unclear if lymphadenopathy is from reactive disease vs infection vs malignancy. Check LDH and we will start with core biopsies by IR.   -Check RPR, Send Flow cytometry from the morning labs (dark green top)  -We will review the smear.   -Check HTLV-1

## 2017-07-23 NOTE — PROGRESS NOTE ADULT - SUBJECTIVE AND OBJECTIVE BOX
Hematology Follow-up    INTERVAL HPI/OVERNIGHT EVENTS:  Patient S&E at bedside. Patient hydradenitis supurativa since last summer, per the patient the lesions in his face is getting better.     VITAL SIGNS:  T(F): 98.6 (17 @ 14:53)  HR: 72 (17 @ 14:53)  BP: 123/82 (17 @ 14:53)  RR: 18 (17 @ 14:53)  SpO2: 100% (17 @ 14:53)  Wt(kg): --    PHYSICAL EXAM:    Constitutional: AAOx3, NAD,   Eyes: PERRL, EOMI, sclera non-icteric  Neck: supple, no masses, no JVD  Respiratory: CTA b/l, good air entry b/l,  Cardiovascular: RRR, normal S1S2, no M/R/G  Gastrointestinal: soft, NTND, no masses palpable, BS normal in all four quadrants, no HSM  Extremities:  no c/c/e  Neurological: Grossly intact  Skin: rash with whitish drainage in his face     MEDICATIONS  (STANDING):  doxycycline hyclate Capsule 100 milliGRAM(s) Oral every 12 hours    MEDICATIONS  (PRN):  acetaminophen   Tablet. 650 milliGRAM(s) Oral every 6 hours PRN Mild and Moderate Pain      No Known Allergies      LABS:                        9.6    11.27 )-----------( 361      ( 2017 06:30 )             29.5         140  |  104  |  5<L>  ----------------------------<  82  4.0   |  26  |  0.59    Ca    8.6      2017 06:20  Phos  3.9       Mg     1.8         TPro  8.3  /  Alb  2.6<L>  /  TBili  0.2  /  DBili  x   /  AST  16  /  ALT  6   /  AlkPhos  89       Lactate Dehydrogenase, Serum: 152 U/L ( @ 06:20)    Urinalysis Basic - ( 2017 22:40 )    Color: YELLOW / Appearance: CLEAR / S.038 / pH: 7.0  Gluc: NEGATIVE / Ketone: NEGATIVE  / Bili: NEGATIVE / Urobili: NORMAL E.U.   Blood: NEGATIVE / Protein: 20 / Nitrite: NEGATIVE   Leuk Esterase: NEGATIVE / RBC: 0-2 / WBC 0-2   Sq Epi: x / Non Sq Epi: x / Bacteria: x        RADIOLOGY & ADDITIONAL TESTS:  Studies reviewed. Hematology Follow-up    INTERVAL HPI/OVERNIGHT EVENTS:  Patient S&E at bedside. Patient hydradenitis supurativa since last summer, per the patient the lesions in his face is getting better.     VITAL SIGNS:  T(F): 98.6 (17 @ 14:53)  HR: 72 (17 @ 14:53)  BP: 123/82 (17 @ 14:53)  RR: 18 (17 @ 14:53)  SpO2: 100% (17 @ 14:53)  Wt(kg): --    PHYSICAL EXAM:    Constitutional: AAOx3, NAD,   Eyes: PERRL, EOMI, sclera non-icteric  Neck: supple, no masses, no JVD  Respiratory: CTA b/l, good air entry b/l,  Cardiovascular: RRR, normal S1S2, no M/R/G  Gastrointestinal: soft, NTND, no masses palpable, BS normal in all four quadrants, no HSM  Extremities:  no c/c/e  Neurological: Grossly intact  Skin:  papules with draining sinuses in his face (getting better), drainage in his axilla bilateral, same lesions in the genital area. LN in the inguinal area are  palpable (right and left area)    MEDICATIONS  (STANDING):  doxycycline hyclate Capsule 100 milliGRAM(s) Oral every 12 hours    MEDICATIONS  (PRN):  acetaminophen   Tablet. 650 milliGRAM(s) Oral every 6 hours PRN Mild and Moderate Pain      No Known Allergies      LABS:                        9.6    11.27 )-----------( 361      ( 2017 06:30 )             29.5         140  |  104  |  5<L>  ----------------------------<  82  4.0   |  26  |  0.59    Ca    8.6      2017 06:20  Phos  3.9       Mg     1.8         TPro  8.3  /  Alb  2.6<L>  /  TBili  0.2  /  DBili  x   /  AST  16  /  ALT  6   /  AlkPhos  89       Lactate Dehydrogenase, Serum: 152 U/L ( @ 06:20)    Urinalysis Basic - ( 2017 22:40 )    Color: YELLOW / Appearance: CLEAR / S.038 / pH: 7.0  Gluc: NEGATIVE / Ketone: NEGATIVE  / Bili: NEGATIVE / Urobili: NORMAL E.U.   Blood: NEGATIVE / Protein: 20 / Nitrite: NEGATIVE   Leuk Esterase: NEGATIVE / RBC: 0-2 / WBC 0-2   Sq Epi: x / Non Sq Epi: x / Bacteria: x        RADIOLOGY & ADDITIONAL TESTS:  Studies reviewed.      < from: CT Abdomen and Pelvis w/ Oral Cont and w/ IV Cont (17 @ 21:53) >  RETROPERITONEUM: No lymphadenopathy.    ABDOMINAL WALL: Moderate skin thickening and small amount of freefluid   in the subcutaneous tissues of gluteal region. Bilateral mildly enlarged   inguinal lymph nodes.   *  A right-sided inguinal node measures 3.0 x 1.4 cm (2:163).   *  The right inguinal node measures 1.7 x 1.5 cm (2:178).  *  A left-sided inguinal node measures 1.9 x 0.9 cm (2:164).  BONES: Likely costochondritis bilaterally at the level of the first ribs.   Os acromiale bilaterally. Increased mineralization at the sternomanubrial   joint may be related to prior trauma.     IMPRESSION:     Very mild lymphadenopathy in the inguinal region bilaterally.  Question of cellulitis involving bilateral gluteal region and the   subcutaneous tissues of the right shoulder.    < end of copied text >

## 2017-07-24 ENCOUNTER — RESULT REVIEW (OUTPATIENT)
Age: 27
End: 2017-07-24

## 2017-07-24 DIAGNOSIS — R21 RASH AND OTHER NONSPECIFIC SKIN ERUPTION: ICD-10-CM

## 2017-07-24 LAB
BACTERIA WND CULT: SIGNIFICANT CHANGE UP
BASOPHILS # BLD AUTO: 0.08 K/UL — SIGNIFICANT CHANGE UP (ref 0–0.2)
BASOPHILS NFR BLD AUTO: 0.6 % — SIGNIFICANT CHANGE UP (ref 0–2)
BLD GP AB SCN SERPL QL: NEGATIVE — SIGNIFICANT CHANGE UP
BUN SERPL-MCNC: 10 MG/DL — SIGNIFICANT CHANGE UP (ref 7–23)
CALCIUM SERPL-MCNC: 8.9 MG/DL — SIGNIFICANT CHANGE UP (ref 8.4–10.5)
CHLORIDE SERPL-SCNC: 98 MMOL/L — SIGNIFICANT CHANGE UP (ref 98–107)
CO2 SERPL-SCNC: 28 MMOL/L — SIGNIFICANT CHANGE UP (ref 22–31)
CREAT SERPL-MCNC: 0.75 MG/DL — SIGNIFICANT CHANGE UP (ref 0.5–1.3)
EOSINOPHIL # BLD AUTO: 0.34 K/UL — SIGNIFICANT CHANGE UP (ref 0–0.5)
EOSINOPHIL NFR BLD AUTO: 2.5 % — SIGNIFICANT CHANGE UP (ref 0–6)
GLUCOSE SERPL-MCNC: 83 MG/DL — SIGNIFICANT CHANGE UP (ref 70–99)
HCT VFR BLD CALC: 34.7 % — LOW (ref 39–50)
HGB BLD-MCNC: 11.1 G/DL — LOW (ref 13–17)
IMM GRANULOCYTES # BLD AUTO: 0.07 # — SIGNIFICANT CHANGE UP
IMM GRANULOCYTES NFR BLD AUTO: 0.5 % — SIGNIFICANT CHANGE UP (ref 0–1.5)
LDH SERPL L TO P-CCNC: 133 U/L — LOW (ref 135–225)
LYMPHOCYTES # BLD AUTO: 30.3 % — SIGNIFICANT CHANGE UP (ref 13–44)
LYMPHOCYTES # BLD AUTO: 4.19 K/UL — HIGH (ref 1–3.3)
MAGNESIUM SERPL-MCNC: 1.9 MG/DL — SIGNIFICANT CHANGE UP (ref 1.6–2.6)
MCHC RBC-ENTMCNC: 26.6 PG — LOW (ref 27–34)
MCHC RBC-ENTMCNC: 32 % — SIGNIFICANT CHANGE UP (ref 32–36)
MCV RBC AUTO: 83.2 FL — SIGNIFICANT CHANGE UP (ref 80–100)
MONOCYTES # BLD AUTO: 0.98 K/UL — HIGH (ref 0–0.9)
MONOCYTES NFR BLD AUTO: 7.1 % — SIGNIFICANT CHANGE UP (ref 2–14)
NEUTROPHILS # BLD AUTO: 8.18 K/UL — HIGH (ref 1.8–7.4)
NEUTROPHILS NFR BLD AUTO: 59 % — SIGNIFICANT CHANGE UP (ref 43–77)
NRBC # FLD: 0 — SIGNIFICANT CHANGE UP
PHOSPHATE SERPL-MCNC: 3.9 MG/DL — SIGNIFICANT CHANGE UP (ref 2.5–4.5)
PLATELET # BLD AUTO: 421 K/UL — HIGH (ref 150–400)
PMV BLD: 10.6 FL — SIGNIFICANT CHANGE UP (ref 7–13)
POTASSIUM SERPL-MCNC: 4 MMOL/L — SIGNIFICANT CHANGE UP (ref 3.5–5.3)
POTASSIUM SERPL-SCNC: 4 MMOL/L — SIGNIFICANT CHANGE UP (ref 3.5–5.3)
PROT SERPL-MCNC: 9.6 G/DL — HIGH (ref 6–8.3)
PROT UR-MCNC: < 6 MG/DL — SIGNIFICANT CHANGE UP
RBC # BLD: 4.17 M/UL — LOW (ref 4.2–5.8)
RBC # FLD: 15.8 % — HIGH (ref 10.3–14.5)
RH IG SCN BLD-IMP: POSITIVE — SIGNIFICANT CHANGE UP
SODIUM SERPL-SCNC: 137 MMOL/L — SIGNIFICANT CHANGE UP (ref 135–145)
T PALLIDUM AB TITR SER: NEGATIVE — SIGNIFICANT CHANGE UP
WBC # BLD: 13.84 K/UL — HIGH (ref 3.8–10.5)
WBC # FLD AUTO: 13.84 K/UL — HIGH (ref 3.8–10.5)

## 2017-07-24 PROCEDURE — 11101 BIOPSY SKIN SUBQ&/MUCOUS MEMBRANE EA ADDL LESN: CPT

## 2017-07-24 PROCEDURE — 88305 TISSUE EXAM BY PATHOLOGIST: CPT | Mod: 26

## 2017-07-24 PROCEDURE — 11100 BX SKIN SUBCUTANEOUS&/MUCOUS MEMBRANE 1 LESION: CPT

## 2017-07-24 PROCEDURE — 99223 1ST HOSP IP/OBS HIGH 75: CPT | Mod: 25,GC

## 2017-07-24 RX ORDER — DEXTROSE MONOHYDRATE, SODIUM CHLORIDE, AND POTASSIUM CHLORIDE 50; .745; 4.5 G/1000ML; G/1000ML; G/1000ML
1000 INJECTION, SOLUTION INTRAVENOUS
Qty: 0 | Refills: 0 | Status: DISCONTINUED | OUTPATIENT
Start: 2017-07-24 | End: 2017-07-25

## 2017-07-24 RX ADMIN — Medication 100 MILLIGRAM(S): at 18:01

## 2017-07-24 RX ADMIN — Medication 100 MILLIGRAM(S): at 06:03

## 2017-07-24 NOTE — PROGRESS NOTE ADULT - PROBLEM SELECTOR PLAN 2
- Unwitnessed fall today with no LOC or pre-syncopal symptoms, did hit head L forehead, CTH neg  - Most like due to dehydration from episodes of emesis and decreased PO intake   - EKG shows Sinus tachycardia, no ST changes, irregular rhythm or delta waves   - orthostatic hypotension neg. s/p 4L NS in the ED  - ambulating well, good PO intake, no dizziness, d/c IVF Diagnosed on last ED visit. Finished topical clindamycin course and has been to ID clinic. 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 Finished topical clindamycin course and has been to ID clinic. 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

## 2017-07-24 NOTE — PROGRESS NOTE ADULT - ASSESSMENT
28yo M with PMHx of hidradenitis suppurative admitted for fall due to dehydration from food poisoning. Concern for underlying hematological disorder with chronic leukocytosis, protein gap of 7.6, and outpatient records showing hypergammaglobulinemia.

## 2017-07-24 NOTE — PROGRESS NOTE ADULT - PROBLEM SELECTOR PLAN 4
- IMPROVE score of 0   - No VTE PPX needed  - regular diet - IMPROVE score of 0   - No VTE PPX needed  - regular diet + ensure

## 2017-07-24 NOTE — ADVANCED PRACTICE NURSE CONSULT - REASON FOR CONSULT
Patient seen on skin care rounds after wound care referral received for assessment of skin impairment and recommendations of topical management. Chart reviewed: WBC 13.84k/uL, Serum albumin 2.6 g/dL, Bubba 21-22, patient interviewed. History of Hidradenitis Suppurativa, seen by Infectious disease in ED with topical Clindamycin. Patient admitted after a fall. As per H&P patient endorsed unintentional weight loss, 30 lbs in 3 months. Patient admitted for work up for leukemia/lymphoma. Patient seen on skin care rounds after wound care referral received for assessment of skin impairment and recommendations of topical management. Chart reviewed: WBC 13.84k/uL, Serum albumin 2.6 g/dL, Bubba 21-22, patient interviewed. History of Hidradenitis Suppurativa, seen by Infectious disease in ED with topical Clindamycin. Patient admitted after a fall. As per H&P patient endorsed unintentional weight loss, 30 lbs in 3 months. Patient admitted for work up for leukemia/lymphoma. Followed by General Surgery and Infectious Disease.

## 2017-07-24 NOTE — CONSULT NOTE ADULT - SUBJECTIVE AND OBJECTIVE BOX
HPI:  27 y.o. male with reported history of HS presenting after a fall. The patient state he was trying to standup from his chair felt lightheaded and fell on to his left sided. Denies any headache or vision changes prior to the fall and no LOC, tremors, bladder incontinence, tongue bitting after the fall. The patient's mother came after he had fell and helped him back into the chair. The patient stated he felt light headed. States he had 2-3 episode of emesis for the last two days after eating dinner two days ago that has upset his stomach. Last time he had vomited was the morning of the fall. The patient states he has been having decreased PO intake for the last few months and has lost up to 30lbs over the last 3 months. The patient's PMD was contacted and stated the patient has been chronically leukocytotic and lab showed hypergammaglobulinemia. Suggested patient be admitted for further work for leukemia/lymphoma.      Regarding his HS, he reports the lesions started approximately one year ago but did not seek care until an unrelated ED visit earlier this year. He was reportedly prescribed clindamycin po and took a short course for which it helped. He did not follow-up therafter. He feels the clindamycin helped. Started on Doxy here by ID      PAST MEDICAL & SURGICAL HISTORY:  Hidradenitis suppurativa  No significant past surgical history      REVIEW OF SYSTEMS      General: no fevers/chills, no lethary	    Skin/Breast: see HPI  	  Ophthalmologic: no eye pain or change in vision  	  ENMT: no dysphagia or change in hearing    Respiratory and Thorax: no SOB or cough  	  Cardiovascular: no palpitations or chest pain    Gastrointestinal: no abdomenal pain or blood in stool     Genitourinary: no dysuria or frequency    Musculoskeletal: no joint pains or weakness	    Neurological:no weakness, numbness , or tingling    MEDICATIONS  (STANDING):  doxycycline hyclate Capsule 100 milliGRAM(s) Oral every 12 hours  dextrose 5% + sodium chloride 0.45% with potassium chloride 20 mEq/L 1000 milliLiter(s) (75 mL/Hr) IV Continuous <Continuous>    MEDICATIONS  (PRN):  acetaminophen   Tablet. 650 milliGRAM(s) Oral every 6 hours PRN Mild and Moderate Pain      Allergies    No Known Allergies    SOCIAL HISTORY: [facility]    FAMILY HISTORY:  No significant family history      Vital Signs Last 24 Hrs  T(C): 37 (24 Jul 2017 14:54), Max: 37.1 (23 Jul 2017 21:34)  T(F): 98.6 (24 Jul 2017 14:54), Max: 98.8 (23 Jul 2017 21:34)  HR: 93 (24 Jul 2017 14:54) (70 - 93)  BP: 111/77 (24 Jul 2017 14:54) (111/77 - 127/82)  BP(mean): --  RR: 18 (24 Jul 2017 14:54) (18 - 18)  SpO2: 100% (24 Jul 2017 14:54) (100% - 100%)    PHYSICAL EXAM:     The patient was alert and oriented X 3, well nourished, and in no  apparent distress.  OP showed no ulcerations  There was no visible lymphadenopathy.  Conjunctiva were non injected  There was no clubbing or edema of extremities.  The scalp, hair, face, eyebrows, lips, OP, neck, chest, back,   extremities X 4, nails were examined.  There was no hyperhidrosis or bromhidrosis.    Of note on skin exam:     Face with crusted hypopigmented plaques    Axillae and groin with pseudoverrucous papules and nodules forming large eroded, draining plaques, some cord like areas and sinus tract formation    LABS:                        11.1   13.84 )-----------( 421      ( 24 Jul 2017 05:55 )             34.7     07-24    137  |  98  |  10  ----------------------------<  83  4.0   |  28  |  0.75    Ca    8.9      24 Jul 2017 05:55  Phos  3.9     07-24  Mg     1.9     07-24    TPro  9.6<H>  /  Alb  x   /  TBili  x   /  DBili  x   /  AST  x   /  ALT  x   /  AlkPhos  x   07-24          RADIOLOGY & ADDITIONAL STUDIES:

## 2017-07-24 NOTE — PROGRESS NOTE ADULT - PROBLEM SELECTOR PLAN 1
Patient has had elevated Leukocytosis since March 2017. Weight loss of 30s and LAD on exam concerning for leukemia/ lymphoma, CT c/a/p positive for inguinal LAD. +hypergammaglobulinemia  - f/u SPEP/UPEP, serum and urine immunofixation  - Hematology consulted, recommendations appreciated Patient has had elevated Leukocytosis since March 2017. Weight loss of 30s and LAD on exam concerning for leukemia/ lymphoma, CT c/a/p positive for inguinal LAD. +hypergammaglobulinemia  - f/u SPEP/UPEP, serum and urine immunofixation  - Hematology consulted, recommendations appreciated  - Surg onc: pt consented for inguinal LN biopsy tmrw

## 2017-07-24 NOTE — CONSULT NOTE ADULT - ATTENDING COMMENTS
Patient seen and examined agree with above. Biopsy c/w hidradenitis suppurativa. Presentation overall is c/f an autoinflammatory syndrome. Agree with inguinal lymph node biopsy to r/o underlying malignancy given history of B symptoms. Continue with doxycycline 100mg BID with plan to f/u as outpatient for further management of HS.
Mr. Reyna was seen in the emergency room. He presented several weeks ago with pruritus and a rash. He was diagnosed with hidradenitis suppurativa, affecting the axilla, groin, as well as both lateral aspects of the mandible. The appearance of the rash at this time looks more like impetigo. He was treated with clindamycin and he was seen in followup by infectious diseases. An HIV test was negative. He has lost approximately 30 pounds over the past 2 months with a poor appetite. He has had an elevated white blood cell count as well which was initially ascribed to the hidradenitis.    Given his significant weight loss and loss of appetite, we are concerned about the possibility of a lymphoma. He has lymphadenopathy in the groin on the right side which may be reactive, but also has the characteristics of a lymphoma. He has keloid formation in the right axilla which is a recent development along with hidradenitis.    CT scans should be performed to evaluate for additional lymphadenopathy. I agree with the evaluation as recommended by Dr. Reed. His mother was present in the emergency room and all questions were answered to the best of our ability.

## 2017-07-24 NOTE — ADVANCED PRACTICE NURSE CONSULT - ASSESSMENT
General: A&Ox4, ambulates independently, continent of urine and stool. Skin warm, dry with increased moisture in intertriginous folds, adequate skin turgor. Noted scattered areas of circular hypopigmentation on bilateral ileac crests and lumbar spine.    Hidradenitis Suppurativa- Bilateral mandibles extending beneath chin (affected area measures [id number], 100% crust beneath beard, unable to lift while cleansing), bilateral axilla, bilateral groin, sacral fold- all sites of H.S. similar in appearance evidence of scarring, no active drainage at time of assessment, no odor noted. Periwound skin intact, with increased moisture, no tenderness erythema or edema at this time.

## 2017-07-24 NOTE — PROGRESS NOTE ADULT - SUBJECTIVE AND OBJECTIVE BOX
Vital Signs Last 24 Hrs  T(C): 36.9 (24 Jul 2017 06:51), Max: 37.1 (23 Jul 2017 21:34)  T(F): 98.5 (24 Jul 2017 06:51), Max: 98.8 (23 Jul 2017 21:34)  HR: 70 (24 Jul 2017 06:51) (70 - 80)  BP: 118/79 (24 Jul 2017 06:51) (118/79 - 127/82)  BP(mean): --  RR: 18 (24 Jul 2017 06:51) (18 - 18)  SpO2: 100% (24 Jul 2017 06:51) (100% - 100%)    I&O's Detail    23 Jul 2017 07:01  -  24 Jul 2017 07:00  --------------------------------------------------------  IN:    Oral Fluid: 300 mL  Total IN: 300 mL    OUT:    Voided: 900 mL  Total OUT: 900 mL    Total NET: -600 mL                                11.1   13.84 )-----------( 421      ( 24 Jul 2017 05:55 )             34.7       07-24    137  |  98  |  10  ----------------------------<  83  4.0   |  28  |  0.75    Ca    8.9      24 Jul 2017 05:55  Phos  3.9     07-24  Mg     1.9     07-24    TPro  9.6<H>  /  Alb  x   /  TBili  x   /  DBili  x   /  AST  x   /  ALT  x   /  AlkPhos  x   07-24    HIV : negative    Palpable adenopathy ( right > left )      PLAN:    will schedule right inguinal lymph node biopsy for tomorrow

## 2017-07-24 NOTE — PROGRESS NOTE ADULT - SUBJECTIVE AND OBJECTIVE BOX
Patient is a 27y old  Male who presents with a chief complaint of fall (21 Jul 2017 15:52)      SUBJECTIVE / OVERNIGHT EVENTS:  -    Vital Signs Last 24 Hrs  T(C): 36.9 (24 Jul 2017 06:51), Max: 37.1 (23 Jul 2017 21:34)  T(F): 98.5 (24 Jul 2017 06:51), Max: 98.8 (23 Jul 2017 21:34)  HR: 70 (24 Jul 2017 06:51) (70 - 80)  BP: 118/79 (24 Jul 2017 06:51) (118/79 - 127/82)  BP(mean): --  RR: 18 (24 Jul 2017 06:51) (18 - 18)  SpO2: 100% (24 Jul 2017 06:51) (100% - 100%)      CAPILLARY BLOOD GLUCOSE      I&O's Detail        MEDICATIONS  (STANDING):  doxycycline hyclate Capsule 100 milliGRAM(s) Oral every 12 hours    MEDICATIONS  (PRN):  acetaminophen   Tablet. 650 milliGRAM(s) Oral every 6 hours PRN Mild and Moderate Pain      PHYSICAL EXAM  GENERAL: NAD, well-developed  HEENT:  Atraumatic, Normocephalic. Scales and fluid drainage are seen on the mandibles bilaterally; EOMI, PERRLA, conjunctiva and sclera clear  NECK: Supple, No JVD  CHEST/LUNG: Clear to auscultation bilaterally; No wheeze  HEART: Regular rate and rhythm; No murmurs, rubs, or gallops  ABDOMEN: Soft, Nontender, Nondistended; Bowel sounds present  EXTREMITIES:  2+ Peripheral Pulses, No clubbing, cyanosis, or edema.  JOINTS: Right ankle joint is inflamed and with some effusion. ROM is moderately limited as compared to the left ankle joint. Slight pain on palpation and with motion. Right knee joint also inflamed and with effusion. Pain on palpation of the patella, but not medial or lateral tenderness. Anterior and posterior drawer negative. No medial tibial tenderness. ROM is limited by pain. Left shoulder joint painful on palpation. No effusion noted. Left shoulder extension, flexion, and abduction 3/5 in strength.  PSYCH: AAOx3  SKIN: No rashes or lesions    LABS:                         RADIOLOGY & ADDITIONAL TESTS:       MICROBIOLOGY:   BCx 7/21 ng  HIV neg    CONSULTS: heme, ID, dermatology, surg onc Patient is a 27y old  Male who presents with a chief complaint of fall (21 Jul 2017 15:52)      SUBJECTIVE / OVERNIGHT EVENTS:  -No further nausea, no emesis in hospital. Ambulates to bathroom w/o issue. Denies CP, SOB, F/C, lightheadedness.    Vital Signs Last 24 Hrs  T(C): 36.9 (24 Jul 2017 06:51), Max: 37.1 (23 Jul 2017 21:34)  T(F): 98.5 (24 Jul 2017 06:51), Max: 98.8 (23 Jul 2017 21:34)  HR: 70 (24 Jul 2017 06:51) (70 - 80)  BP: 118/79 (24 Jul 2017 06:51) (118/79 - 127/82)  BP(mean): --  RR: 18 (24 Jul 2017 06:51) (18 - 18)  SpO2: 100% (24 Jul 2017 06:51) (100% - 100%)      CAPILLARY BLOOD GLUCOSE      I&O's Detail        MEDICATIONS  (STANDING):  doxycycline hyclate Capsule 100 milliGRAM(s) Oral every 12 hours    MEDICATIONS  (PRN):  acetaminophen   Tablet. 650 milliGRAM(s) Oral every 6 hours PRN Mild and Moderate Pain      PHYSICAL EXAM  GENERAL: NAD, well-developed  HEENT:  Atraumatic, Normocephalic. Scales and fluid drainage are seen on the mandibles bilaterally; EOMI, PERRLA, conjunctiva and sclera clear  NECK: Supple, No JVD  CHEST/LUNG: Clear to auscultation bilaterally; No wheeze  HEART: Regular rate and rhythm; No murmurs, rubs, or gallops  ABDOMEN: Soft, Nontender, Nondistended; Bowel sounds present  EXTREMITIES:  2+ Peripheral Pulses, No clubbing, cyanosis, or edema.  JOINTS: Right ankle joint is inflamed and with some effusion. ROM is moderately limited as compared to the left ankle joint. Slight pain on palpation and with motion. Right knee joint also inflamed and with effusion. Pain on palpation of the patella, but not medial or lateral tenderness. Anterior and posterior drawer negative. No medial tibial tenderness. ROM is limited by pain. Left shoulder joint painful on palpation. No effusion noted. Left shoulder extension, flexion, and abduction 3/5 in strength.  PSYCH: AAOx3  SKIN: No rashes or lesions    LABS:                        11.1   13.84 )-----------( 421      ( 24 Jul 2017 05:55 )             34.7     24 Jul 2017 05:55    137    |  98     |  10     ----------------------------<  83     4.0     |  28     |  0.75     Ca    8.9        24 Jul 2017 05:55  Phos  3.9       24 Jul 2017 05:55  Mg     1.9       24 Jul 2017 05:55    TPro  9.6    /  Alb  x      /  TBili  x      /  DBili  x      /  AST  x      /  ALT  x      /  AlkPhos  x      24 Jul 2017 05:55      RADIOLOGY & ADDITIONAL TESTS:       MICROBIOLOGY:   Wound cx 7/22 Corynebacterium, sensitivities pending   BCx 7/21 ng  HIV neg    CONSULTS: heme, dermatology, surg onc Patient is a 27y old  Male who presents with a chief complaint of fall (21 Jul 2017 15:52)      SUBJECTIVE / OVERNIGHT EVENTS:  -No further nausea, no emesis in hospital. Ambulates to bathroom w/o issue. Denies CP, SOB, F/C, lightheadedness.  -wound care saw pt, placed recs    Vital Signs Last 24 Hrs  T(C): 36.9 (24 Jul 2017 06:51), Max: 37.1 (23 Jul 2017 21:34)  T(F): 98.5 (24 Jul 2017 06:51), Max: 98.8 (23 Jul 2017 21:34)  HR: 70 (24 Jul 2017 06:51) (70 - 80)  BP: 118/79 (24 Jul 2017 06:51) (118/79 - 127/82)  BP(mean): --  RR: 18 (24 Jul 2017 06:51) (18 - 18)  SpO2: 100% (24 Jul 2017 06:51) (100% - 100%)      CAPILLARY BLOOD GLUCOSE      I&O's Detail        MEDICATIONS  (STANDING):  doxycycline hyclate Capsule 100 milliGRAM(s) Oral every 12 hours    MEDICATIONS  (PRN):  acetaminophen   Tablet. 650 milliGRAM(s) Oral every 6 hours PRN Mild and Moderate Pain      PHYSICAL EXAM  GENERAL: NAD, well-developed  HEENT:  Atraumatic, Normocephalic. Scales and fluid drainage are seen on the mandibles bilaterally; EOMI, PERRLA, conjunctiva and sclera clear  NECK: Supple, No JVD  CHEST/LUNG: Clear to auscultation bilaterally; No wheeze  HEART: Regular rate and rhythm; No murmurs, rubs, or gallops  ABDOMEN: Soft, Nontender, Nondistended; Bowel sounds present  EXTREMITIES:  2+ Peripheral Pulses, No clubbing, cyanosis, or edema.  JOINTS: Right ankle joint is inflamed and with some effusion. ROM is moderately limited as compared to the left ankle joint. Slight pain on palpation and with motion. Right knee joint also inflamed and with effusion. Pain on palpation of the patella, but not medial or lateral tenderness. Anterior and posterior drawer negative. No medial tibial tenderness. ROM is limited by pain. Left shoulder joint painful on palpation. No effusion noted. Left shoulder extension, flexion, and abduction 3/5 in strength.  PSYCH: AAOx3  SKIN: No rashes or lesions    LABS:                        11.1   13.84 )-----------( 421      ( 24 Jul 2017 05:55 )             34.7     24 Jul 2017 05:55    137    |  98     |  10     ----------------------------<  83     4.0     |  28     |  0.75     Ca    8.9        24 Jul 2017 05:55  Phos  3.9       24 Jul 2017 05:55  Mg     1.9       24 Jul 2017 05:55    TPro  9.6    /  Alb  x      /  TBili  x      /  DBili  x      /  AST  x      /  ALT  x      /  AlkPhos  x      24 Jul 2017 05:55      RADIOLOGY & ADDITIONAL TESTS:       MICROBIOLOGY:   Wound cx 7/22 Corynebacterium, sensitivities pending   BCx 7/21 ng  HIV neg    CONSULTS: heme, dermatology, surg onc

## 2017-07-24 NOTE — CONSULT NOTE ADULT - PROBLEM SELECTOR RECOMMENDATION 9
Though HS is in differential, certain features would be very unusual to see in HS alone (facial crusted plaques, pseudo verrucous plaques). Additional entities include inflammatory syndrome, malignancy, or atypical infectious process  -skin biopsy and tissue cx performed today to further characterize, results in 3-5 business days  -would proceed with LN bx 7/25 as planned  -ok to continue w/ doxy per ID  -will follow Though HS is in differential, certain features would be very unusual to see in HS alone (facial crusted plaques, pseudo verrucous plaques). Additional entities include autoinflammatory syndrome, malignancy, or atypical infectious process  -skin biopsy and tissue cx performed today to further characterize, results in 3-5 business days  -would proceed with LN bx 7/25 as planned  -ok to continue w/ doxy per ID  -will follow

## 2017-07-24 NOTE — PROGRESS NOTE ADULT - SUBJECTIVE AND OBJECTIVE BOX
SURGICAL ONCOLOGY PREOPERATIVE NOTE    HPI: 27M with PMHx hidradenitis now with weakness, weight loss, vomiting, admitted with leukocystosis/hypergammaglobulinemia and being worked up for lymphoma.    Vital Signs Last 24 Hrs  T(C): 36.9 (24 Jul 2017 06:51), Max: 37.1 (23 Jul 2017 21:34)  T(F): 98.5 (24 Jul 2017 06:51), Max: 98.8 (23 Jul 2017 21:34)  HR: 70 (24 Jul 2017 06:51) (70 - 80)  BP: 118/79 (24 Jul 2017 06:51) (118/79 - 127/82)  BP(mean): --  RR: 18 (24 Jul 2017 06:51) (18 - 18)  SpO2: 100% (24 Jul 2017 06:51) (100% - 100%)                        11.1   13.84 )-----------( 421      ( 24 Jul 2017 05:55 )             34.7     07-24    137  |  98  |  10  ----------------------------<  83  4.0   |  28  |  0.75    Ca    8.9      24 Jul 2017 05:55  Phos  3.9     07-24  Mg     1.9     07-24    TPro  9.6<H>  /  Alb  x   /  TBili  x   /  DBili  x   /  AST  x   /  ALT  x   /  AlkPhos  x   07-24    Gen: NAD  Groin: Palpable adenopathy (right > left). Nontender, mobile.

## 2017-07-24 NOTE — PROGRESS NOTE ADULT - PROBLEM SELECTOR PLAN 3
- Diagnosed on last ED visit. Finished clindamycin course and has been to ID clinic  - ID consulted, derm consult placed  - will need derm f/u as OP for longterm therapy  - c/w doxycycline - Unwitnessed fall today with no LOC or pre-syncopal symptoms, did hit head L forehead, CTH neg  - Most like due to dehydration from episodes of emesis and decreased PO intake   - EKG shows Sinus tachycardia, no ST changes, irregular rhythm or delta waves   - orthostatic hypotension neg. s/p 4L NS in the ED  - ambulating well, good PO intake, no dizziness, d/c IVF

## 2017-07-24 NOTE — PROGRESS NOTE ADULT - ASSESSMENT
26yo M w/u for lymphoma, scheduled for right inguinal lymph node biopsy on 7/25.  - NPO after midnight  - IVF after midnight  - Type & screen ordered  - Consent in chart  - D/w Dr. Calvillo

## 2017-07-24 NOTE — ADVANCED PRACTICE NURSE CONSULT - RECOMMEDATIONS
As per Infectious Disease continue Doxycyline. Follow up Dermatology consult.    Topical Recommendations:  Hidradenitis Suppurative- Maintain dry environment, affected areas with increased moisture from intertriginous locations, not actively draining at this time. Cleanse affected areas with NS. Dry well. Apply Interdry textile sheeting, under intertriginous folds leaving 2 inches exposed at ends to wick, remove to wash & dry affected area, then replace. Individual sheeting may be used for up to 5 days unless soiled. Apply liquid barrier film to sacral fold daily.     Continue low air loss bed therapy, continue heel elevation with Z-flex fluidized positioning boots, continue to turn & reposition q2h with Z-maty fluidized positioning device, soft pillow between bony prominences, continue moisture management as recommended above & single breathable pad, continue measures to decrease friction/shear/pressure. Continue with nutritional support as per dietary/orders.    Findings and plan discussed with patient and primary team. All questions and concerns addressed.

## 2017-07-25 ENCOUNTER — RESULT REVIEW (OUTPATIENT)
Age: 27
End: 2017-07-25

## 2017-07-25 ENCOUNTER — APPOINTMENT (OUTPATIENT)
Dept: SURGICAL ONCOLOGY | Facility: HOSPITAL | Age: 27
End: 2017-07-25

## 2017-07-25 LAB
BASOPHILS # BLD AUTO: 0.08 K/UL — SIGNIFICANT CHANGE UP (ref 0–0.2)
BASOPHILS NFR BLD AUTO: 0.6 % — SIGNIFICANT CHANGE UP (ref 0–2)
BUN SERPL-MCNC: 8 MG/DL — SIGNIFICANT CHANGE UP (ref 7–23)
CALCIUM SERPL-MCNC: 9 MG/DL — SIGNIFICANT CHANGE UP (ref 8.4–10.5)
CHLORIDE SERPL-SCNC: 97 MMOL/L — LOW (ref 98–107)
CO2 SERPL-SCNC: 28 MMOL/L — SIGNIFICANT CHANGE UP (ref 22–31)
CREAT SERPL-MCNC: 0.64 MG/DL — SIGNIFICANT CHANGE UP (ref 0.5–1.3)
CULTURE - ACID FAST SMEAR CONCENTRATED: SIGNIFICANT CHANGE UP
EOSINOPHIL # BLD AUTO: 0.28 K/UL — SIGNIFICANT CHANGE UP (ref 0–0.5)
EOSINOPHIL NFR BLD AUTO: 2.3 % — SIGNIFICANT CHANGE UP (ref 0–6)
GAS PNL BLDMV: SIGNIFICANT CHANGE UP
GAS PNL BLDMV: SIGNIFICANT CHANGE UP
GLUCOSE SERPL-MCNC: 86 MG/DL — SIGNIFICANT CHANGE UP (ref 70–99)
GRAM STN WND: SIGNIFICANT CHANGE UP
HCT VFR BLD CALC: 34.2 % — LOW (ref 39–50)
HGB BLD-MCNC: 11 G/DL — LOW (ref 13–17)
IMM GRANULOCYTES # BLD AUTO: 0.05 # — SIGNIFICANT CHANGE UP
IMM GRANULOCYTES NFR BLD AUTO: 0.4 % — SIGNIFICANT CHANGE UP (ref 0–1.5)
LYMPHOCYTES # BLD AUTO: 26.4 % — SIGNIFICANT CHANGE UP (ref 13–44)
LYMPHOCYTES # BLD AUTO: 3.29 K/UL — SIGNIFICANT CHANGE UP (ref 1–3.3)
MAGNESIUM SERPL-MCNC: 2 MG/DL — SIGNIFICANT CHANGE UP (ref 1.6–2.6)
MCHC RBC-ENTMCNC: 27.1 PG — SIGNIFICANT CHANGE UP (ref 27–34)
MCHC RBC-ENTMCNC: 32.2 % — SIGNIFICANT CHANGE UP (ref 32–36)
MCV RBC AUTO: 84.2 FL — SIGNIFICANT CHANGE UP (ref 80–100)
MONOCYTES # BLD AUTO: 0.96 K/UL — HIGH (ref 0–0.9)
MONOCYTES NFR BLD AUTO: 7.7 % — SIGNIFICANT CHANGE UP (ref 2–14)
NEUTROPHILS # BLD AUTO: 7.78 K/UL — HIGH (ref 1.8–7.4)
NEUTROPHILS NFR BLD AUTO: 62.6 % — SIGNIFICANT CHANGE UP (ref 43–77)
NRBC # FLD: 0 — SIGNIFICANT CHANGE UP
PHOSPHATE SERPL-MCNC: 4.2 MG/DL — SIGNIFICANT CHANGE UP (ref 2.5–4.5)
PLATELET # BLD AUTO: 385 K/UL — SIGNIFICANT CHANGE UP (ref 150–400)
PMV BLD: 10.3 FL — SIGNIFICANT CHANGE UP (ref 7–13)
POTASSIUM SERPL-MCNC: 3.9 MMOL/L — SIGNIFICANT CHANGE UP (ref 3.5–5.3)
POTASSIUM SERPL-SCNC: 3.9 MMOL/L — SIGNIFICANT CHANGE UP (ref 3.5–5.3)
RBC # BLD: 4.06 M/UL — LOW (ref 4.2–5.8)
RBC # FLD: 15.9 % — HIGH (ref 10.3–14.5)
SODIUM SERPL-SCNC: 136 MMOL/L — SIGNIFICANT CHANGE UP (ref 135–145)
SPECIMEN SOURCE: SIGNIFICANT CHANGE UP
WBC # BLD: 12.44 K/UL — HIGH (ref 3.8–10.5)
WBC # FLD AUTO: 12.44 K/UL — HIGH (ref 3.8–10.5)

## 2017-07-25 PROCEDURE — 88189 FLOWCYTOMETRY/READ 16 & >: CPT

## 2017-07-25 PROCEDURE — 88342 IMHCHEM/IMCYTCHM 1ST ANTB: CPT | Mod: 26,59

## 2017-07-25 PROCEDURE — 88360 TUMOR IMMUNOHISTOCHEM/MANUAL: CPT | Mod: 26,59

## 2017-07-25 PROCEDURE — 88364 INSITU HYBRIDIZATION (FISH): CPT | Mod: 26

## 2017-07-25 PROCEDURE — 88365 INSITU HYBRIDIZATION (FISH): CPT | Mod: 26,59

## 2017-07-25 PROCEDURE — 88341 IMHCHEM/IMCYTCHM EA ADD ANTB: CPT | Mod: 26,59

## 2017-07-25 PROCEDURE — 88307 TISSUE EXAM BY PATHOLOGIST: CPT | Mod: 26

## 2017-07-25 PROCEDURE — 88367 INSITU HYBRIDIZATION AUTO: CPT | Mod: 26

## 2017-07-25 PROCEDURE — 88312 SPECIAL STAINS GROUP 1: CPT | Mod: 26

## 2017-07-25 RX ORDER — OXYCODONE HYDROCHLORIDE 5 MG/1
10 TABLET ORAL ONCE
Qty: 0 | Refills: 0 | Status: DISCONTINUED | OUTPATIENT
Start: 2017-07-25 | End: 2017-07-25

## 2017-07-25 RX ORDER — ONDANSETRON 8 MG/1
4 TABLET, FILM COATED ORAL ONCE
Qty: 0 | Refills: 0 | Status: DISCONTINUED | OUTPATIENT
Start: 2017-07-25 | End: 2017-07-25

## 2017-07-25 RX ORDER — SODIUM CHLORIDE 9 MG/ML
1000 INJECTION, SOLUTION INTRAVENOUS
Qty: 0 | Refills: 0 | Status: DISCONTINUED | OUTPATIENT
Start: 2017-07-25 | End: 2017-07-26

## 2017-07-25 RX ORDER — OXYCODONE HYDROCHLORIDE 5 MG/1
5 TABLET ORAL ONCE
Qty: 0 | Refills: 0 | Status: DISCONTINUED | OUTPATIENT
Start: 2017-07-25 | End: 2017-07-25

## 2017-07-25 RX ORDER — FENTANYL CITRATE 50 UG/ML
25 INJECTION INTRAVENOUS
Qty: 0 | Refills: 0 | Status: DISCONTINUED | OUTPATIENT
Start: 2017-07-25 | End: 2017-07-25

## 2017-07-25 RX ADMIN — DEXTROSE MONOHYDRATE, SODIUM CHLORIDE, AND POTASSIUM CHLORIDE 75 MILLILITER(S): 50; .745; 4.5 INJECTION, SOLUTION INTRAVENOUS at 00:10

## 2017-07-25 RX ADMIN — DEXTROSE MONOHYDRATE, SODIUM CHLORIDE, AND POTASSIUM CHLORIDE 75 MILLILITER(S): 50; .745; 4.5 INJECTION, SOLUTION INTRAVENOUS at 13:05

## 2017-07-25 RX ADMIN — SODIUM CHLORIDE 75 MILLILITER(S): 9 INJECTION, SOLUTION INTRAVENOUS at 18:25

## 2017-07-25 RX ADMIN — SODIUM CHLORIDE 75 MILLILITER(S): 9 INJECTION, SOLUTION INTRAVENOUS at 21:56

## 2017-07-25 NOTE — PROGRESS NOTE ADULT - SUBJECTIVE AND OBJECTIVE BOX
Patient is a 27y old  Male who presents with a chief complaint of fall (21 Jul 2017 15:52)      SUBJECTIVE / OVERNIGHT EVENTS:  -Skin bx performed  -wound cx sent: AFB, fungal, bact    Vital Signs Last 24 Hrs  T(C): 36.7 (24 Jul 2017 21:16), Max: 37 (24 Jul 2017 14:54)  T(F): 98 (24 Jul 2017 21:16), Max: 98.6 (24 Jul 2017 14:54)  HR: 78 (24 Jul 2017 21:16) (78 - 93)  BP: 134/83 (24 Jul 2017 21:16) (111/77 - 134/83)  BP(mean): --  RR: 18 (24 Jul 2017 21:16) (18 - 18)  SpO2: 100% (24 Jul 2017 21:16) (100% - 100%)      CAPILLARY BLOOD GLUCOSE      I&O's Detail      MEDICATIONS  (STANDING):  doxycycline hyclate Capsule 100 milliGRAM(s) Oral every 12 hours  dextrose 5% + sodium chloride 0.45% with potassium chloride 20 mEq/L 1000 milliLiter(s) (75 mL/Hr) IV Continuous <Continuous>    MEDICATIONS  (PRN):  acetaminophen   Tablet. 650 milliGRAM(s) Oral every 6 hours PRN Mild and Moderate Pain      PHYSICAL EXAM  GENERAL: NAD, well-developed  HEENT:  Atraumatic, Normocephalic. Scales and fluid drainage are seen on the mandibles bilaterally; EOMI, PERRLA, conjunctiva and sclera clear  NECK: Supple, No JVD  CHEST/LUNG: Clear to auscultation bilaterally; No wheeze  HEART: Regular rate and rhythm; No murmurs, rubs, or gallops  ABDOMEN: Soft, Nontender, Nondistended; Bowel sounds present  EXTREMITIES:  2+ Peripheral Pulses, No clubbing, cyanosis, or edema.  JOINTS: Right ankle joint is inflamed and with some effusion. ROM is moderately limited as compared to the left ankle joint. Slight pain on palpation and with motion. Right knee joint also inflamed and with effusion. Pain on palpation of the patella, but not medial or lateral tenderness. Anterior and posterior drawer negative. No medial tibial tenderness. ROM is limited by pain. Left shoulder joint painful on palpation. No effusion noted. Left shoulder extension, flexion, and abduction 3/5 in strength.  PSYCH: AAOx3  SKIN: No rashes or lesions    LABS:      RADIOLOGY & ADDITIONAL TESTS:       MICROBIOLOGY:   Wound cx 7/22 Corynebacterium, sensitivities pending   BCx 7/21 ng  HIV neg    CONSULTS: heme, dermatology, surg onc Patient is a 27y old  Male who presents with a chief complaint of fall (21 Jul 2017 15:52)      SUBJECTIVE / OVERNIGHT EVENTS:  -Skin bx performed  -wound cx sent: AFB, fungal, bact  -to have inguinal bx today  -Pt has no complaints, denies CP/SOB    Vital Signs Last 24 Hrs  T(C): 36.6 (25 Jul 2017 14:33), Max: 37.4 (25 Jul 2017 07:49)  T(F): 97.8 (25 Jul 2017 14:33), Max: 99.3 (25 Jul 2017 07:49)  HR: 86 (25 Jul 2017 14:33) (65 - 86)  BP: 125/73 (25 Jul 2017 14:33) (115/72 - 134/83)  BP(mean): --  RR: 16 (25 Jul 2017 14:33) (16 - 18)  SpO2: 100% (25 Jul 2017 14:33) (100% - 100%)    CAPILLARY BLOOD GLUCOSE      I&O's Detail      MEDICATIONS  (STANDING):  doxycycline hyclate Capsule 100 milliGRAM(s) Oral every 12 hours  dextrose 5% + sodium chloride 0.45% with potassium chloride 20 mEq/L 1000 milliLiter(s) (75 mL/Hr) IV Continuous <Continuous>    MEDICATIONS  (PRN):  acetaminophen   Tablet. 650 milliGRAM(s) Oral every 6 hours PRN Mild and Moderate Pain      PHYSICAL EXAM  GENERAL: NAD, well-developed  HEENT:  Atraumatic, Normocephalic. Scales and fluid drainage are seen on the mandibles bilaterally; EOMI, PERRLA, conjunctiva and sclera clear  NECK: Supple, No JVD  CHEST/LUNG: Clear to auscultation bilaterally; No wheeze  HEART: Regular rate and rhythm; No murmurs, rubs, or gallops  ABDOMEN: Soft, Nontender, Nondistended; Bowel sounds present  EXTREMITIES:  2+ Peripheral Pulses, No clubbing, cyanosis, or edema.  JOINTS: Right ankle joint is inflamed and with some effusion. ROM is moderately limited as compared to the left ankle joint. Slight pain on palpation and with motion. Right knee joint also inflamed and with effusion. Pain on palpation of the patella, but not medial or lateral tenderness. Anterior and posterior drawer negative. No medial tibial tenderness. ROM is limited by pain. Left shoulder joint painful on palpation. No effusion noted. Left shoulder extension, flexion, and abduction 3/5 in strength.  PSYCH: AAOx3  SKIN: No rashes or lesions    LABS:                        11.0   12.44 )-----------( 385      ( 25 Jul 2017 06:00 )             34.2     25 Jul 2017 06:00    136    |  97     |  8      ----------------------------<  86     3.9     |  28     |  0.64     Ca    9.0        25 Jul 2017 06:00  Phos  4.2       25 Jul 2017 06:00  Mg     2.0       25 Jul 2017 06:00    UPEP nl  SPEP chronic inflammation, low albumin    RADIOLOGY & ADDITIONAL TESTS:  Skin biopsy 7/24  Final Diagnosis    Skin, left groin, punch biopsy  - Consistent with hidradenitis suppurativa     MICROBIOLOGY:   Wound cx 7/22 Corynebacterium, sensitivities pending   BCx 7/21 ng  RPR neg  HIV neg    CONSULTS: heme, dermatology, surg onc

## 2017-07-25 NOTE — PROGRESS NOTE ADULT - PROBLEM SELECTOR PLAN 2
Diagnosed on last ED visit. Finished topical clindamycin course and has been to ID clinic. Will need derm f/u as OP for longterm therapy  - f/u wound cx Corynebacterium prelim  - c/w doxycycline per ID  - f/u derm recs, HTLV for possible cutaneous T cell lymphoma, RPR Diagnosed on last ED visit. Finished topical clindamycin course and has been to ID clinic. Will need derm f/u as OP for longterm therapy. Skin bx consistent with hidradenitis suppurativa  - f/u wound cx Corynebacterium prelim  - c/w doxycycline per ID  - f/u derm recs, HTLV for possible cutaneous T cell lymphoma  -c/w wound care

## 2017-07-25 NOTE — PROGRESS NOTE ADULT - SUBJECTIVE AND OBJECTIVE BOX
27y    Male    has peripheral adenopathy, most prominent in right groin, with associated weight loss and anorexia, scheduled for excisional LN bx            CBC Full  -  ( 25 Jul 2017 06:00 )  WBC Count : 12.44 K/uL  Hemoglobin : 11.0 g/dL  Hematocrit : 34.2 %  Platelet Count - Automated : 385 K/uL  Mean Cell Volume : 84.2 fL  Mean Cell Hemoglobin : 27.1 pg  Mean Cell Hemoglobin Concentration : 32.2 %  Auto Neutrophil # : 7.78 K/uL  Auto Lymphocyte # : 3.29 K/uL  Auto Monocyte # : 0.96 K/uL  Auto Eosinophil # : 0.28 K/uL  Auto Basophil # : 0.08 K/uL  Auto Neutrophil % : 62.6 %  Auto Lymphocyte % : 26.4 %  Auto Monocyte % : 7.7 %  Auto Eosinophil % : 2.3 %  Auto Basophil % : 0.6 %    07-24    137  |  98  |  10  ----------------------------<  83  4.0   |  28  |  0.75    Ca    8.9      24 Jul 2017 05:55  Phos  3.9     07-24  Mg     1.9     07-24    TPro  9.6<H>  /  Alb  x   /  TBili  x   /  DBili  x   /  AST  x   /  ALT  x   /  AlkPhos  x   07-24        LIVER FUNCTIONS - ( 24 Jul 2017 05:55 )  Alb: x     / Pro: 9.6 g/dL / ALK PHOS: x     / ALT: x     / AST: x     / GGT: x             Vital Signs Last 24 Hrs  T(C): 36.7 (25 Jul 2017 07:50), Max: 37 (24 Jul 2017 14:54)  T(F): 98.1 (25 Jul 2017 07:50), Max: 98.6 (24 Jul 2017 14:54)  HR: 65 (25 Jul 2017 07:50) (65 - 93)  BP: 115/72 (25 Jul 2017 07:50) (111/77 - 134/83)  BP(mean): --  RR: 16 (25 Jul 2017 07:50) (16 - 18)  SpO2: 100% (25 Jul 2017 07:50) (100% - 100%)      I&O's Detail    I&O's Summary

## 2017-07-25 NOTE — PROGRESS NOTE ADULT - PROBLEM SELECTOR PLAN 3
- Unwitnessed fall today with no LOC or pre-syncopal symptoms, did hit head L forehead, CTH neg  - Most like due to dehydration from episodes of emesis and decreased PO intake   - EKG shows Sinus tachycardia, no ST changes, irregular rhythm or delta waves   - orthostatic hypotension neg. s/p 4L NS in the ED  - ambulating well, good PO intake, no dizziness, d/c IVF

## 2017-07-25 NOTE — PROGRESS NOTE ADULT - PROBLEM SELECTOR PLAN 1
Patient has had elevated Leukocytosis since March 2017. Weight loss of 30s and LAD on exam concerning for leukemia/ lymphoma, CT c/a/p positive for inguinal LAD. +hypergammaglobulinemia  - f/u SPEP/UPEP, serum and urine immunofixation  - Hematology consulted, recommendations appreciated  - Surg onc: pt consented for inguinal LN biopsy tmrw Patient has had elevated Leukocytosis since March 2017. Weight loss of 30s and LAD on exam concerning for leukemia/ lymphoma, CT c/a/p positive for inguinal LAD. +hypergammaglobulinemia  - Hematology consulted, recommendations appreciated  - Surg onc: inguinal LN biopsy lara

## 2017-07-25 NOTE — CHART NOTE - NSCHARTNOTEFT_GEN_A_CORE
Pre-Op Note    Pre-Op Diagnosis: Groin lymphadenopathy  Procedure: Right inguinal lymph node biopsy  Surgeon: Dr. Escoto    Hx: 27 y.o. male with PMHx of Hidradenitis suppurative presenting after a fall. The patient has been having decreased PO intake for the last few months and has lost up to 30lbs over the last 3 months. He has been chronically leukocytotic and lab showed hypergammaglobulinemia. Currently admitted for further work for leukemia/lymphoma.  Surgical Oncology was consulted for groin lymph node biopsy to rule out lymphoma. The patient has a history of groin hidradenitis, but has had several months of palpable lymphadenopathy in bilateral groins.    PAST MEDICAL & SURGICAL HISTORY:  Hidradenitis suppurativa  No significant past surgical history    CBC Full  -  ( 25 Jul 2017 06:00 )  WBC Count : 12.44 K/uL  Hemoglobin : 11.0 g/dL  Hematocrit : 34.2 %  Platelet Count - Automated : 385 K/uL  Mean Cell Volume : 84.2 fL  Mean Cell Hemoglobin : 27.1 pg  Mean Cell Hemoglobin Concentration : 32.2 %  Auto Neutrophil # : 7.78 K/uL  Auto Lymphocyte # : 3.29 K/uL  Auto Monocyte # : 0.96 K/uL  Auto Eosinophil # : 0.28 K/uL  Auto Basophil # : 0.08 K/uL  Auto Neutrophil % : 62.6 %  Auto Lymphocyte % : 26.4 %  Auto Monocyte % : 7.7 %  Auto Eosinophil % : 2.3 %  Auto Basophil % : 0.6 %    07-25    136  |  97<L>  |  8   ----------------------------<  86  3.9   |  28  |  0.64    Ca    9.0      25 Jul 2017 06:00  Phos  4.2     07-25  Mg     2.0     07-25    TPro  9.6<H>  /  Alb  x   /  TBili  x   /  DBili  x   /  AST  x   /  ALT  x   /  AlkPhos  x   07-24      CXR: clear lungs  EKG: N/A  Echo: N/A      A/P: 27yMale w/ b/l groin adenopathy, unintentional weight loss, leukocytosis planned for right inguinal lymp node biopsy today.  - NPO except meds  - IVF  - Pain control  - Consent in chart  - Dispo: back to floor

## 2017-07-25 NOTE — PROGRESS NOTE ADULT - ASSESSMENT
28yo M with PMHx of hidradenitis suppurative admitted for fall due to dehydration from food poisoning. Concern for underlying hematological disorder with chronic leukocytosis, protein gap of 7.6, and outpatient records showing hypergammaglobulinemia. 28yo M with PMHx of hidradenitis suppurative admitted for fall due to dehydration from food poisoning. Concern for underlying hematological disorder with chronic leukocytosis, protein gap of 7.6, and outpatient records showing hypergammaglobulinemia, SPEP consistent with chronic inflammation, pending inguinal LN biopsy.

## 2017-07-26 ENCOUNTER — TRANSCRIPTION ENCOUNTER (OUTPATIENT)
Age: 27
End: 2017-07-26

## 2017-07-26 LAB
BACTERIA BLD CULT: SIGNIFICANT CHANGE UP
BACTERIA BLD CULT: SIGNIFICANT CHANGE UP
CULTURE - ACID FAST SMEAR CONCENTRATED: SIGNIFICANT CHANGE UP
GAS PNL BLDMV: SIGNIFICANT CHANGE UP
GAS PNL BLDMV: SIGNIFICANT CHANGE UP
HTLV I+II AB PATRN SER RIPA-IMP: SIGNIFICANT CHANGE UP
SPECIMEN SOURCE: SIGNIFICANT CHANGE UP
SPECIMEN SOURCE: SIGNIFICANT CHANGE UP

## 2017-07-26 RX ORDER — DOCUSATE SODIUM 100 MG
100 CAPSULE ORAL THREE TIMES A DAY
Qty: 0 | Refills: 0 | Status: DISCONTINUED | OUTPATIENT
Start: 2017-07-26 | End: 2017-07-31

## 2017-07-26 RX ORDER — POLYETHYLENE GLYCOL 3350 17 G/17G
17 POWDER, FOR SOLUTION ORAL
Qty: 0 | Refills: 0 | Status: DISCONTINUED | OUTPATIENT
Start: 2017-07-26 | End: 2017-07-28

## 2017-07-26 RX ORDER — SENNA PLUS 8.6 MG/1
2 TABLET ORAL AT BEDTIME
Qty: 0 | Refills: 0 | Status: DISCONTINUED | OUTPATIENT
Start: 2017-07-26 | End: 2017-07-31

## 2017-07-26 RX ADMIN — Medication 100 MILLIGRAM(S): at 17:23

## 2017-07-26 RX ADMIN — Medication 100 MILLIGRAM(S): at 13:51

## 2017-07-26 RX ADMIN — POLYETHYLENE GLYCOL 3350 17 GRAM(S): 17 POWDER, FOR SOLUTION ORAL at 13:54

## 2017-07-26 NOTE — PROGRESS NOTE ADULT - PROBLEM SELECTOR PLAN 2
Diagnosed on last ED visit. Finished topical clindamycin course and has been to ID clinic. Will need derm f/u as OP for longterm therapy. Skin bx consistent with hidradenitis suppurativa  - f/u wound cx AFB, fungal, Corynebacterium, HTLV for possible cutaneous T cell lymphoma  - c/w doxycycline per ID  - c/w wound care

## 2017-07-26 NOTE — PROGRESS NOTE ADULT - SUBJECTIVE AND OBJECTIVE BOX
SURGICAL ONCOLOGY PROGRESS NOTE    Doing well. Tolerating diet. Pain controlled. Ambulating.    Vital Signs Last 24 Hrs  T(C): 36.7 (26 Jul 2017 05:37), Max: 37.4 (25 Jul 2017 07:49)  T(F): 98 (26 Jul 2017 05:37), Max: 99.3 (25 Jul 2017 07:49)  HR: 65 (26 Jul 2017 05:37) (60 - 99)  BP: 112/79 (26 Jul 2017 05:37) (112/79 - 133/75)  BP(mean): --  RR: 18 (26 Jul 2017 05:37) (14 - 21)  SpO2: 100% (26 Jul 2017 05:37) (97% - 100%)                        11.0   12.44 )-----------( 385      ( 25 Jul 2017 06:00 )             34.2     07-25    136  |  97<L>  |  8   ----------------------------<  86  3.9   |  28  |  0.64    Ca    9.0      25 Jul 2017 06:00  Phos  4.2     07-25  Mg     2.0     07-25      Exam:  Gen: NAD  Right groin: Incision c/d/i. Steri-strips in place. No swelling, erythema, drainage.

## 2017-07-26 NOTE — PROGRESS NOTE ADULT - ASSESSMENT
26yo M with PMHx of hidradenitis suppurative admitted for fall due to dehydration from food poisoning. Concern for underlying hematological disorder with chronic leukocytosis, protein gap of 7.6, and outpatient records showing hypergammaglobulinemia, SPEP consistent with chronic inflammation, pending inguinal LN biopsy.

## 2017-07-26 NOTE — PROGRESS NOTE ADULT - PROBLEM SELECTOR PLAN 1
Patient has had elevated Leukocytosis since March 2017. Weight loss of 30s and LAD on exam concerning for leukemia/ lymphoma, CT c/a/p positive for inguinal LAD. +hypergammaglobulinemia  - Hematology consulted, recommendations appreciated  - Surg onc: s/p R inguinal LN bx 7/25  - Derm: certain features of his hidradenitis suppurativa (facial crusted plaques, pseudo verrucous plaques) are unusual for HS alone. Additional DDx include inflammatory syndrome, malignancy, or atypical infectious process  -f/u path of LN bx

## 2017-07-26 NOTE — PROGRESS NOTE ADULT - ATTENDING COMMENTS
technique and indications of surgical biopsy of right inguinal adenopathy reviewed with him.    all questions answered.    consent on chart
agree with above
pending dermatology consult  lymph node biopsy in am
was seen by derm s/p skin biopsy  plan for ln biopsy today  continue doxy
will call id follow up hematologic work up  will need derma follow up can be done as outpt
appreciate id input  derm consult  heme work up in progress
Patient with extensive facial, axillary and groin weeping skin lesions over 1 year and weight loss of 30+ pounds since end of last year. He states that the facial skin lesions are somewhat improved since taking antibiotics. A CAT scan showed only small lymph nodes in the groin, and no other lymphadenopathy or organomegaly. We are asked about biopsy for the groin node. There is a possibility of lymphoma, but would also suspect that there could be inflammatory changes in the groin node due to all the skin breakdown in the groin. He also seems to form keloids. It might be better to start with a core needle of the groin node rather than excision. Moreover, a review of outpatient records, a dermatologic consult and a group discussion with hospital medicine and ID is warranted prior to making any final recommendations. In the meantime, please send RPR and HTLV ([facility] decent) to help assess for unusual skin manifestations.

## 2017-07-26 NOTE — PROGRESS NOTE ADULT - SUBJECTIVE AND OBJECTIVE BOX
ANESTHESIA POSTOP CHECK    27y Male POSTOP DAY 1 S/P     Vital Signs Last 24 Hrs  T(C): 36.9 (26 Jul 2017 14:51), Max: 36.9 (25 Jul 2017 20:49)  T(F): 98.4 (26 Jul 2017 14:51), Max: 98.4 (25 Jul 2017 20:49)  HR: 91 (26 Jul 2017 14:51) (60 - 99)  BP: 136/83 (26 Jul 2017 14:51) (112/79 - 136/83)  BP(mean): --  RR: 18 (26 Jul 2017 14:51) (14 - 21)  SpO2: 100% (26 Jul 2017 14:51) (97% - 100%)  I&O's Summary    25 Jul 2017 07:01  -  26 Jul 2017 07:00  --------------------------------------------------------  IN: 240 mL / OUT: 0 mL / NET: 240 mL        [x] NO APPARENT ANESTHESIA COMPLICATIONS

## 2017-07-26 NOTE — PROGRESS NOTE ADULT - ASSESSMENT
ASSESSMENT  27y male s/p  R inguinal lymph node biopsy. He is doing well post biopsy.     PLAN  - Diet: Regular   - Pain control with PO/IV medications.    - Continue home medications  - OOB, ambulate as tolerated  - continue chemical VTE ppx  - Will discuss with attending.    Clarke May PGY1  [id number]

## 2017-07-26 NOTE — PROGRESS NOTE ADULT - SUBJECTIVE AND OBJECTIVE BOX
STATUS POST:    Right Inguinal Lymph Node Biopsy     SUBJECTIVE:   Pt seen + examined. Denies groin pain and swelling. No specific complaints. Otherwise he denies nausea/vomiting, fever/chills, CP/SOB. Pain is well controlled.        VITALS  T(C): 36.9 (07-25-17 @ 20:49), Max: 37.4 (07-25-17 @ 07:49)  HR: 68 (07-25-17 @ 20:49) (60 - 99)  BP: 126/81 (07-25-17 @ 20:49) (115/72 - 133/75)  BP(mean): --  RR: 19 (07-25-17 @ 20:49) (14 - 21)  SpO2: 100% (07-25-17 @ 20:49) (97% - 100%)  Wt(kg): --  CAPILLARY BLOOD GLUCOSE          Is/Os    07-25 @ 07:01  -  07-26 @ 00:04  --------------------------------------------------------  IN:    Oral Fluid: 240 mL  Total IN: 240 mL    OUT:  Total OUT: 0 mL    Total NET: 240 mL        PHYSICAL EXAM:  General: NAD, Lying in bed comfortably    Neuro: alert, oriented x3    Wound: Patients R groin dressing is c/d/i.      Musculoskeletal: All 4 extremities moving spontaneously, no limitations, no LE edema, no calf tenderness.    MEDICATIONS (STANDING): lactated ringers. 1000 milliLiter(s) IV Continuous <Continuous>    MEDICATIONS (PRN):    LABS  CBC (07-25 @ 06:00)                              11.0<L>                         12.44<H>  )----------------(  385        62.6  % Neutrophils, 26.4  % Lymphocytes, ANC: 7.78<H>                              34.2<L>    BMP (07-25 @ 06:00)             136     |  97<L>   |  8     		Ca++ --      Ca 9.0                ---------------------------------( 86    		Mg 2.0                3.9     |  28      |  0.64  			Ph 4.2

## 2017-07-26 NOTE — PROGRESS NOTE ADULT - SUBJECTIVE AND OBJECTIVE BOX
Patient is a 27y old  Male who presents with a chief complaint of fall (21 Jul 2017 15:52)      SUBJECTIVE / OVERNIGHT EVENTS:  -R inguinal LN bx yesterday, no complaints. Is ambulating to bathroom. Pt would like to go home. Denies f/c/n/v/d, sob.      MEDICATIONS  (STANDING):  lactated ringers. 1000 milliLiter(s) (75 mL/Hr) IV Continuous <Continuous>  doxycycline hyclate Capsule 100 milliGRAM(s) Oral every 12 hours    MEDICATIONS  (PRN):      Vital Signs Last 24 Hrs  T(C): 36.7 (26 Jul 2017 05:37), Max: 37.4 (25 Jul 2017 13:42)  T(F): 98 (26 Jul 2017 05:37), Max: 99.3 (25 Jul 2017 13:42)  HR: 65 (26 Jul 2017 05:37) (60 - 99)  BP: 112/79 (26 Jul 2017 05:37) (112/79 - 133/75)  BP(mean): --  RR: 18 (26 Jul 2017 05:37) (14 - 21)  SpO2: 100% (26 Jul 2017 05:37) (97% - 100%)    CAPILLARY BLOOD GLUCOSE    I&O's Detail      PHYSICAL EXAM  GENERAL: NAD, well-developed  HEENT:  Atraumatic, Normocephalic. Scales and fluid drainage are seen on the mandibles bilaterally; EOMI, PERRLA, conjunctiva and sclera clear  NECK: Supple, No JVD  CHEST/LUNG: Clear to auscultation bilaterally; No wheeze  HEART: Regular rate and rhythm; No murmurs, rubs, or gallops  ABDOMEN: Soft, Nontender, Nondistended; Bowel sounds present  EXTREMITIES:  2+ Peripheral Pulses, No clubbing, cyanosis, or edema.  JOINTS: Right ankle joint is inflamed and with some effusion. ROM is moderately limited as compared to the left ankle joint. Slight pain on palpation and with motion. Right knee joint also inflamed and with effusion. Pain on palpation of the patella, but not medial or lateral tenderness. Anterior and posterior drawer negative. No medial tibial tenderness. ROM is limited by pain. Left shoulder joint painful on palpation. No effusion noted. Left shoulder extension, flexion, and abduction 3/5 in strength.  PSYCH: AAOx3  SKIN: No rashes or lesions, incision for R inguinal LN biopsy steristrips are c/d/i, site nontender    LABS:      Ca    9.0        25 Jul 2017 06:00      UPEP nl  SPEP chronic inflammation, low albumin    RADIOLOGY & ADDITIONAL TESTS:  Skin biopsy 7/24  Final Diagnosis    Skin, left groin, punch biopsy  - Consistent with hidradenitis suppurativa     MICROBIOLOGY:  Wound cx 7/24 bact, AFB, fungus pending  Wound cx 7/22 Corynebacterium  BCx 7/21 ng  RPR neg  HIV neg    CONSULTS: heme, dermatology, surg onc

## 2017-07-26 NOTE — PROGRESS NOTE ADULT - ASSESSMENT
26yo M with lymphadenopathy now POD1 s/p R inguinal lymph node biopsy. Doing well.  - Primary team to send pt home today.  - Can f/u with Dr. Presley Escoto in 1-2 weeks. Please call [phone] to make an appointment.  - D/w Dr. Calvillo

## 2017-07-27 ENCOUNTER — TRANSCRIPTION ENCOUNTER (OUTPATIENT)
Age: 27
End: 2017-07-27

## 2017-07-27 LAB
-  CEFAZOLIN: SIGNIFICANT CHANGE UP
-  CIPROFLOXACIN: SIGNIFICANT CHANGE UP
-  CLINDAMYCIN: SIGNIFICANT CHANGE UP
-  ERYTHROMYCIN: SIGNIFICANT CHANGE UP
-  GENTAMICIN: SIGNIFICANT CHANGE UP
-  MOXIFLOXACIN(AEROBIC): SIGNIFICANT CHANGE UP
-  OXACILLIN: SIGNIFICANT CHANGE UP
-  RIFAMPIN.: SIGNIFICANT CHANGE UP
-  TETRACYCLINE: SIGNIFICANT CHANGE UP
-  TRIMETHOPRIM/SULFAMETHOXAZOLE: SIGNIFICANT CHANGE UP
-  VANCOMYCIN: SIGNIFICANT CHANGE UP
BACTERIA BLD CULT: SIGNIFICANT CHANGE UP
BASOPHILS # BLD AUTO: 0.07 K/UL — SIGNIFICANT CHANGE UP (ref 0–0.2)
BASOPHILS NFR BLD AUTO: 0.6 % — SIGNIFICANT CHANGE UP (ref 0–2)
BUN SERPL-MCNC: 8 MG/DL — SIGNIFICANT CHANGE UP (ref 7–23)
CALCIUM SERPL-MCNC: 8.9 MG/DL — SIGNIFICANT CHANGE UP (ref 8.4–10.5)
CHLORIDE SERPL-SCNC: 100 MMOL/L — SIGNIFICANT CHANGE UP (ref 98–107)
CO2 SERPL-SCNC: 27 MMOL/L — SIGNIFICANT CHANGE UP (ref 22–31)
CREAT SERPL-MCNC: 0.75 MG/DL — SIGNIFICANT CHANGE UP (ref 0.5–1.3)
EOSINOPHIL # BLD AUTO: 0.26 K/UL — SIGNIFICANT CHANGE UP (ref 0–0.5)
EOSINOPHIL NFR BLD AUTO: 2.2 % — SIGNIFICANT CHANGE UP (ref 0–6)
GLUCOSE SERPL-MCNC: 87 MG/DL — SIGNIFICANT CHANGE UP (ref 70–99)
HCT VFR BLD CALC: 32.5 % — LOW (ref 39–50)
HGB BLD-MCNC: 10.5 G/DL — LOW (ref 13–17)
IMM GRANULOCYTES # BLD AUTO: 0.04 # — SIGNIFICANT CHANGE UP
IMM GRANULOCYTES NFR BLD AUTO: 0.3 % — SIGNIFICANT CHANGE UP (ref 0–1.5)
LYMPHOCYTES # BLD AUTO: 29.3 % — SIGNIFICANT CHANGE UP (ref 13–44)
LYMPHOCYTES # BLD AUTO: 3.39 K/UL — HIGH (ref 1–3.3)
MAGNESIUM SERPL-MCNC: 2 MG/DL — SIGNIFICANT CHANGE UP (ref 1.6–2.6)
MCHC RBC-ENTMCNC: 27 PG — SIGNIFICANT CHANGE UP (ref 27–34)
MCHC RBC-ENTMCNC: 32.3 % — SIGNIFICANT CHANGE UP (ref 32–36)
MCV RBC AUTO: 83.5 FL — SIGNIFICANT CHANGE UP (ref 80–100)
METHOD TYPE: SIGNIFICANT CHANGE UP
MONOCYTES # BLD AUTO: 0.86 K/UL — SIGNIFICANT CHANGE UP (ref 0–0.9)
MONOCYTES NFR BLD AUTO: 7.4 % — SIGNIFICANT CHANGE UP (ref 2–14)
NEUTROPHILS # BLD AUTO: 6.96 K/UL — SIGNIFICANT CHANGE UP (ref 1.8–7.4)
NEUTROPHILS NFR BLD AUTO: 60.2 % — SIGNIFICANT CHANGE UP (ref 43–77)
NRBC # FLD: 0 — SIGNIFICANT CHANGE UP
ORGANISM # SPEC MICROSCOPIC CNT: SIGNIFICANT CHANGE UP
PHOSPHATE SERPL-MCNC: 4.2 MG/DL — SIGNIFICANT CHANGE UP (ref 2.5–4.5)
PLATELET # BLD AUTO: 367 K/UL — SIGNIFICANT CHANGE UP (ref 150–400)
PMV BLD: 10.3 FL — SIGNIFICANT CHANGE UP (ref 7–13)
POTASSIUM SERPL-MCNC: 3.7 MMOL/L — SIGNIFICANT CHANGE UP (ref 3.5–5.3)
POTASSIUM SERPL-SCNC: 3.7 MMOL/L — SIGNIFICANT CHANGE UP (ref 3.5–5.3)
RBC # BLD: 3.89 M/UL — LOW (ref 4.2–5.8)
RBC # FLD: 16.1 % — HIGH (ref 10.3–14.5)
SODIUM SERPL-SCNC: 138 MMOL/L — SIGNIFICANT CHANGE UP (ref 135–145)
SPECIMEN SOURCE: SIGNIFICANT CHANGE UP
TM INTERPRETATION: SIGNIFICANT CHANGE UP
WBC # BLD: 11.58 K/UL — HIGH (ref 3.8–10.5)
WBC # FLD AUTO: 11.58 K/UL — HIGH (ref 3.8–10.5)

## 2017-07-27 RX ADMIN — Medication 100 MILLIGRAM(S): at 17:51

## 2017-07-27 RX ADMIN — Medication 100 MILLIGRAM(S): at 05:34

## 2017-07-27 NOTE — DISCHARGE NOTE ADULT - PATIENT PORTAL LINK FT
“You can access the [employer] Patient Portal, offered by [facility], by registering with the following website: [email or web address]”

## 2017-07-27 NOTE — DISCHARGE NOTE ADULT - CARE PROVIDERS DIRECT ADDRESSES
[email or web address]

## 2017-07-27 NOTE — DISCHARGE NOTE ADULT - ADDITIONAL INSTRUCTIONS
Please follow-up with the dermatology clinic within the next 1-2 weeks for further evaluation and recommendations.   Please follow-up with the hematologist within the next 1-2 weeks fro further evaluation.   Follow-up with your primary care doctor within the next several weeks for further management. Please follow-up with Dr. Mobley in the next 2 weeks. Please call [phone] to make an appointment. The clinic is located on: [address] [place] [address].   Please follow-up with Dr. Presley Escoto in 1-2 weeks. Please call [phone] to make an appointment.  Please follow-up with Dr. Cedric Blandon in the hematology clinic within the next 1-2 weeks for further evaluation adn follow-up of your lymph node biopsy. The clinic was located on: [address] [place] [address]. To schedule an appointment, please call: [phone].

## 2017-07-27 NOTE — DISCHARGE NOTE ADULT - PROVIDER TOKENS
TOKEN:'[id number]',TOKEN:'[id number]',FREE:[LAST:[Presley],FIRST:[Dr. Escoto],PHONE:[(   )    -],FAX:[(   )    -]]

## 2017-07-27 NOTE — DISCHARGE NOTE ADULT - CARE PLAN
Principal Discharge DX:	Hidradenitis suppurativa  Goal:	Treatment  Instructions for follow-up, activity and diet:	You were evaluated by the dermatology team during your hospitalization. You will need to continue taking doxycycline 100mg twice daily. Please see dermatology outpatient within the next 1 -2 weeks for further evaluation and recommendations.  Secondary Diagnosis:	Leukocytosis, unspecified type  Goal:	Management  Instructions for follow-up, activity and diet:	You had an elevated white count during your hospitalization, likely reactive secondary ot your acute skin infection.  You had a lymph node biopsy performed to further evaluate the cause for your elevated white count. Please continue taking doxycycline at home, as prescribed. Follow-up with the hematolgoists for results of your lymph  node biopsy. Follow- up with your primary care doctor for further evaluation and recommendations.  Secondary Diagnosis:	Lymphadenopathy  Goal:	Management  Instructions for follow-up, activity and diet:	You had a lymph node biopsy performed to further evaluate the cause for your elevated white count. Please continue taking doxycycline at home, as prescribed. Follow-up with the hematologist for results of your lymph  node biopsy.  Secondary Diagnosis:	Cutaneous eruption  Goal:	Management  Instructions for follow-up, activity and diet:	Continue to take doxycycline 100mg oral , twice daily, as prescribed.   Follow-up with the dermatologists and hematologists outpatient for further evaluation and recommendations.

## 2017-07-27 NOTE — DISCHARGE NOTE ADULT - HOSPITAL COURSE
27 y.o. male with PMHx of Hidradenitis suppurative presenting after a fall. The patient state he was trying to standup from his chair felt lightheaded and fell on to his left sided. Denies any headache or vision changes prior to the fall and no LOC, tremors, bladder incontinence, tongue bitting after the fall. The patient's mother came after he had fell and helped him back into the chair. The patient stated he felt light headed. States he had 2-3 episode of emesis for the last two days after eating dinner two days ago that has upset his stomach. Last time he had vomited was the morning of the fall. The patient states he has been having decreased PO intake for the last few months and has lost up to 30lbs over the last 3 months. The patient also endorses left shoulder pain from the fall, and chronic pain in the right ankle and knee. Was previously assessed for the ankle and knee pain in March 2017, found to have a small effusion in the right knee, which the patient opted no to have tapped. Was also diagnosed with hidradenitis suppurative at that ED visit and was started on a course of clindamycin. The patient's PMD was contacted and stated the patient has been chronically leukocytotic and lab showed hypergammaglobulinemia. Suggested patient be admitted for further work for leukemia/lymphoma.      In the ED: The patient received 1L bolus of NS and tylenol for pain. CT head was negative for acute pathology and CXR showed clear lungs.    Hospital course:  7/22: CT c/a/p pos for inguinal LAD and possible gluteal/Rt shoulder cellulitis, ID consult for hidradenitis suppurativa, start doxy, will need derm f/u as OP. Wound cx and BCx sent, orthostatics neg, spoke to lab and they received SPEP/UPEP  7/23: derm consult obtained, pending recs.; spoke with heme/onc attg.- unclear what is causing pt's skin rash and LAD, a skin bx might be helpful in diagnosing before proceeding with an excisional biopsy of LN or a core needle bx ; checking LDH, HTLV and syphilis screen per onc recs  7/24: consented for inguinal LN bx tmrw w/ surg onc, derm saw pt, wound care recs sent to nurse, wound cx growing Corynebacterium sensitivities pending, derm performed skin bx  7/25: skin bx shows hidradenitis suppurativa, had right inguinal LN bx with surg onc  7/26: ready to go home per jarred, tristin would like pt to stay in hospital until LN path results  7/27: inguinal LN flow cytometry: The lymphocyte immunophenotypic findings show no diagnostic abnormalities. Whether discharge per [facility] based on path results 27 y.o. male with PMHx of hidradenitis suppurative presenting after a fall. The patient states he was trying to standup from his chair felt lightheaded and fell on to his left sided. Denies any headache or vision changes prior to the fall and no LOC, tremors, bladder incontinence, tongue bitting after the fall. The patient's mother came after he had fell and helped him back into the chair. The patient stated he felt light headed. States he had 2-3 episode of emesis for the last two days after eating dinner two days ago that has upset his stomach. Last time he had vomited was the morning of the fall. The patient states he has been having decreased PO intake for the last few months and has lost up to 30lbs over the last 3 months. The patient also endorses left shoulder pain from the fall, and chronic pain in the right ankle and knee. Was previously assessed for the ankle and knee pain in March 2017, found to have a small effusion in the right knee, which the patient opted no to have tapped. Was also diagnosed with hidradenitis suppurative at that ED visit and was started on a course of clindamycin. The patient's PMD was contacted and stated the patient has been chronically leukocytotic and lab showed hypergammaglobulinemia. Suggested patient be admitted for further work for leukemia/lymphoma.      In the ED: The patient received 1L bolus of NS and tylenol for pain. CT head was negative for acute pathology and CXR showed clear lungs.    Hospital course: Hematology/oncology was consulted. On 7/22, CT c/a/p with oral and IV contrast was pos for inguinal LAD and possible gluteal/Rt shoulder cellulitis. ID was consulted for hidradenitis suppurative, which recommended to start doxycycline 100mg po q12h (7/22 - ) and a dermatology f/u as OP. Wound cx and BCx were sent. No orthostatic hypotension, pt had good PO intake, IVF were cancelled. On 7/23, dermatology was consulted. Per hematology/oncology consult, it is unclear what is causing pt's skin lesions and LAD, a skin bx might be helpful in diagnosing before proceeding with an excisional biopsy of LN or a core needle bx. Pt was HTLV and syphilis (RPR) neg on screening on hematology recs.  WBC protein gap, hypergammaglobulinemia values    7/24: consented for inguinal LN bx tmrw w/ surg onc, derm saw pt, wound care recs sent to nurse, wound cx growing Corynebacterium sensitivities pending, derm performed skin bx  7/25: skin bx shows hidradenitis suppurativa, had right inguinal LN bx with surg onc  7/26: ready to go home per jarred, tristin would like pt to stay in hospital until LN path results  7/27: inguinal LN flow cytometry: The lymphocyte immunophenotypic findings show no diagnostic abnormalities. Whether discharge per [facility] based on path results 27 y.o. male with PMHx of hidradenitis suppurative presenting after a fall. The patient states he was trying to standup from his chair felt lightheaded and fell on to his left sided. Denies any headache or vision changes prior to the fall and no LOC, tremors, bladder incontinence, tongue bitting after the fall. The patient's mother came after he had fell and helped him back into the chair. The patient stated he felt light headed. States he had 2-3 episode of emesis for the last two days after eating dinner two days ago that has upset his stomach. Last time he had vomited was the morning of the fall. The patient states he has been having decreased PO intake for the last few months and has lost up to 30lbs over the last 3 months. The patient also endorses left shoulder pain from the fall, and chronic pain in the right ankle and knee. Was previously assessed for the ankle and knee pain in March 2017, found to have a small effusion in the right knee, which the patient opted no to have tapped. Was also diagnosed with hidradenitis suppurative at that ED visit and was started on a course of clindamycin. The patient's PMD was contacted and stated the patient has been chronically leukocytotic and lab showed hypergammaglobulinemia. Suggested patient be admitted for further work for leukemia/lymphoma.      In the ED: The patient received 1L bolus of NS and tylenol for pain. CT head was negative for acute pathology and CXR showed clear lungs.    Hospital course: Hematology/oncology was consulted. On 7/22, CT c/a/p with oral and IV contrast was pos for inguinal LAD and possible gluteal/Rt shoulder cellulitis. ID was consulted for hidradenitis suppurative, which recommended to start doxycycline 100mg po q12h (7/22 - ) and a dermatology f/u as OP. Wound cx and BCx were sent. No orthostatic hypotension, pt had good PO intake, IVF were cancelled. On 7/23, dermatology was consulted. Per hematology/oncology consult, it is unclear what is causing pt's skin lesions and LAD, a skin bx might be helpful in diagnosing before proceeding with an excisional biopsy of LN or a core needle bx. Pt was HTLV and syphilis (RPR) neg on screening on hematology recommendations. Labs were remarkable for an leukocytosis an protein gap. On 7/ 7/24: consented for inguinal LN bx tmrw w/ surg onc, derm saw pt, wound care recs sent to nurse, wound cx growing Corynebacterium sensitivities pending, derm performed skin bx  7/25: skin bx shows hidradenitis suppurativa, had right inguinal LN bx with surg onc  7/26: ready to go home per derm, heme would like pt to stay in hospital until LN path results  7/27: inguinal LN flow cytometry: The lymphocyte immunophenotypic findings show no diagnostic abnormalities. Whether discharge per [facility] based on path results 27 y.o. male with PMHx of hidradenitis suppurative presenting after a fall. The patient states he was trying to standup from his chair felt lightheaded and fell on to his left sided. Denies any headache or vision changes prior to the fall and no LOC, tremors, bladder incontinence, tongue bitting after the fall. The patient's mother came after he had fell and helped him back into the chair. The patient stated he felt light headed. States he had 2-3 episode of emesis for the last two days after eating dinner two days ago that has upset his stomach. Last time he had vomited was the morning of the fall. The patient states he has been having decreased PO intake for the last few months and has lost up to 30lbs over the last 3 months. The patient also endorses left shoulder pain from the fall, and chronic pain in the right ankle and knee. Was previously assessed for the ankle and knee pain in March 2017, found to have a small effusion in the right knee, which the patient opted no to have tapped. Was also diagnosed with hidradenitis suppurative at that ED visit and was started on a course of clindamycin. The patient's PMD was contacted and stated the patient has been chronically leukocytotic and lab showed hypergammaglobulinemia. Suggested patient be admitted for further work for leukemia/lymphoma.      In the ED: The patient received 1L bolus of NS and tylenol for pain. CT head was negative for acute pathology and CXR showed clear lungs.    Hospital course: Hematology/oncology was consulted. On 7/22, CT c/a/p with oral and IV contrast was pos for inguinal LAD and possible gluteal/Rt shoulder cellulitis. ID was consulted for hidradenitis suppurative, which recommended to start doxycycline 100mg po q12h (7/22 - ) and a dermatology f/u as OP. Wound cx and BCx were sent. No orthostatic hypotension, pt had good PO intake, IVF were cancelled. On 7/23, dermatology was consulted. Per hematology/oncology consult, it is unclear what is causing pt's skin lesions and LAD, a skin bx might be helpful in diagnosing before proceeding with an excisional biopsy of LN or a core needle bx. Pt was HTLV and syphilis (RPR) neg on screening on hematology recommendations. Labs were remarkable for an leukocytosis an protein gap. Wound culture growing Corynebacterium sensitivities. Dermatology was consulted and obtained a skin biopsy on 7/25/17; results showed hidradenitis suppurativa. Pt had an excisional right inguinal lymph node biopsy on 7/25/17 by the surgical team. Flow cytometry reveals normal results (polyclonal B cells) and complete pathology still pending. On 7/31/17, patient was deemed clinically improve and medically optimized for discharge home with close outpatient follow-up with the hematology, surgery and the dermatology clinics following discharge.

## 2017-07-27 NOTE — DISCHARGE NOTE ADULT - PLAN OF CARE
Treatment You were evaluated by the dermatology team during your hospitalization. You will need to continue taking doxycycline 100mg twice daily. Please see dermatology outpatient within the next 1 -2 weeks for further evaluation and recommendations. Management You had an elevated white count during your hospitalization, likely reactive secondary ot your acute skin infection.  You had a lymph node biopsy performed to further evaluate the cause for your elevated white count. Please continue taking doxycycline at home, as prescribed. Follow-up with the hematolgoists for results of your lymph  node biopsy. Follow- up with your primary care doctor for further evaluation and recommendations. You had a lymph node biopsy performed to further evaluate the cause for your elevated white count. Please continue taking doxycycline at home, as prescribed. Follow-up with the hematologist for results of your lymph  node biopsy. Continue to take doxycycline 100mg oral , twice daily, as prescribed.   Follow-up with the dermatologists and hematologists outpatient for further evaluation and recommendations.

## 2017-07-27 NOTE — PROGRESS NOTE ADULT - ASSESSMENT
26yo M with PMHx of hidradenitis suppurative admitted for fall due to dehydration from food poisoning being worked up  for underlying hematological disorder with chronic leukocytosis, protein gap of 7.6, and outpatient records showing hypergammaglobulinemia, SPEP consistent with chronic inflammation, pending inguinal LN biopsy path.

## 2017-07-27 NOTE — DISCHARGE NOTE ADULT - CARE PROVIDER_API CALL
Cedric Blandon), Hematology; Internal Medicine; Medical Oncology  [address] [place] [address]  Phone: [phone]  Fax: [phone]    Lisa Mobley), Medicine  Dermatology  [address] [place] [address]  Phone: [phone]  Fax: [phone]    Dr. Tigist Sherwood  Phone: (   )    -  Fax: (   )    -

## 2017-07-27 NOTE — DISCHARGE NOTE ADULT - MEDICATION SUMMARY - MEDICATIONS TO TAKE
I will START or STAY ON the medications listed below when I get home from the hospital:    doxycycline monohydrate 100 mg oral capsule  -- 1 cap(s) by mouth every 12 hours  -- Indication: For Hidradenitis suppurativa

## 2017-07-27 NOTE — PROGRESS NOTE ADULT - SUBJECTIVE AND OBJECTIVE BOX
Patient is a 27y old  Male who presents with a chief complaint of fall (21 Jul 2017 15:52)      SUBJECTIVE / OVERNIGHT EVENTS:  -No complaints. No pain at R inguinal bx site. Denies f/c/n/v/d, sob.      MEDICATIONS  (STANDING):  doxycycline hyclate Capsule 100 milliGRAM(s) Oral every 12 hours  docusate sodium 100 milliGRAM(s) Oral three times a day  senna 2 Tablet(s) Oral at bedtime  polyethylene glycol 3350 17 Gram(s) Oral two times a day    MEDICATIONS  (PRN):      Vital Signs Last 24 Hrs  T(C): 36.9 (27 Jul 2017 05:29), Max: 36.9 (26 Jul 2017 14:51)  T(F): 98.5 (27 Jul 2017 05:29), Max: 98.5 (27 Jul 2017 05:29)  HR: 73 (27 Jul 2017 05:29) (73 - 91)  BP: 124/80 (27 Jul 2017 05:29) (124/80 - 136/83)  BP(mean): --  RR: 16 (27 Jul 2017 05:29) (16 - 18)  SpO2: 100% (27 Jul 2017 05:29) (100% - 100%)    CAPILLARY BLOOD GLUCOSE    I&O's Detail      PHYSICAL EXAM  GENERAL: NAD, well-developed  HEENT:  Atraumatic, Normocephalic. Scales and fluid drainage are seen on the mandibles bilaterally; EOMI, PERRLA, conjunctiva and sclera clear  NECK: Supple, No JVD  CHEST/LUNG: Clear to auscultation bilaterally; No wheeze  HEART: Regular rate and rhythm; No murmurs, rubs, or gallops  ABDOMEN: Soft, Nontender, Nondistended; Bowel sounds present  EXTREMITIES:  2+ Peripheral Pulses, No clubbing, cyanosis, or edema.  JOINTS: Right ankle joint is inflamed and with some effusion. ROM is moderately limited as compared to the left ankle joint. Slight pain on palpation and with motion. Right knee joint also inflamed and with effusion. Pain on palpation of the patella, but not medial or lateral tenderness. Anterior and posterior drawer negative. No medial tibial tenderness. ROM is limited by pain. Left shoulder joint painful on palpation. No effusion noted. Left shoulder extension, flexion, and abduction 3/5 in strength.  PSYCH: AAOx3  SKIN: No rashes or lesions, incision for R inguinal LN biopsy steristrips are c/d/i, site nontender    LABS:                        10.5   11.58 )-----------( 367      ( 27 Jul 2017 05:25 )             32.5     27 Jul 2017 05:25    138    |  100    |  8      ----------------------------<  87     3.7     |  27     |  0.75     Ca    8.9        27 Jul 2017 05:25  Phos  4.2       27 Jul 2017 05:25  Mg     2.0       27 Jul 2017 05:25        UPEP nl  SPEP chronic inflammation, low albumin    RADIOLOGY & ADDITIONAL TESTS:       MICROBIOLOGY:  Wound cx 7/24 Staph aureus, Staph epidermidis  Wound cx 7/24 AFB, fungus ng  Wound cx 7/22 Corynebacterium  BCx 7/21 ng  RPR neg  HIV neg    CONSULTS: heme, dermatology, surg onc Patient is a 27y old  Male who presents with a chief complaint of fall (21 Jul 2017 15:52)      SUBJECTIVE / OVERNIGHT EVENTS:  -No complaints. No pain at inguinal bx site. Denies f/c/n/v/d, sob.      MEDICATIONS  (STANDING):  doxycycline hyclate Capsule 100 milliGRAM(s) Oral every 12 hours  docusate sodium 100 milliGRAM(s) Oral three times a day  senna 2 Tablet(s) Oral at bedtime  polyethylene glycol 3350 17 Gram(s) Oral two times a day    MEDICATIONS  (PRN):      Vital Signs Last 24 Hrs  T(C): 36.9 (27 Jul 2017 05:29), Max: 36.9 (26 Jul 2017 14:51)  T(F): 98.5 (27 Jul 2017 05:29), Max: 98.5 (27 Jul 2017 05:29)  HR: 73 (27 Jul 2017 05:29) (73 - 91)  BP: 124/80 (27 Jul 2017 05:29) (124/80 - 136/83)  BP(mean): --  RR: 16 (27 Jul 2017 05:29) (16 - 18)  SpO2: 100% (27 Jul 2017 05:29) (100% - 100%)    CAPILLARY BLOOD GLUCOSE    I&O's Detail      PHYSICAL EXAM  GENERAL: NAD, well-developed  HEENT:  Atraumatic, Normocephalic. Scales and fluid drainage are seen on the mandibles bilaterally; EOMI, PERRLA, conjunctiva and sclera clear  NECK: Supple, No JVD  CHEST/LUNG: Clear to auscultation bilaterally; No wheeze  HEART: Regular rate and rhythm; No murmurs, rubs, or gallops  ABDOMEN: Soft, Nontender, Nondistended; Bowel sounds present  EXTREMITIES:  2+ Peripheral Pulses, No clubbing, cyanosis, or edema.  JOINTS: Right ankle joint is inflamed and with some effusion. ROM is moderately limited as compared to the left ankle joint. Slight pain on palpation and with motion. Right knee joint also inflamed and with effusion. Pain on palpation of the patella, but not medial or lateral tenderness. Anterior and posterior drawer negative. No medial tibial tenderness. ROM is limited by pain. Left shoulder joint painful on palpation. No effusion noted. Left shoulder extension, flexion, and abduction 3/5 in strength.  PSYCH: AAOx3  SKIN: No rashes or lesions, incision for R inguinal LN biopsy steristrips are c/d/i, site nontender    LABS:                        10.5   11.58 )-----------( 367      ( 27 Jul 2017 05:25 )             32.5     27 Jul 2017 05:25    138    |  100    |  8      ----------------------------<  87     3.7     |  27     |  0.75     Ca    8.9        27 Jul 2017 05:25  Phos  4.2       27 Jul 2017 05:25  Mg     2.0       27 Jul 2017 05:25        UPEP nl  SPEP chronic inflammation, low albumin    RADIOLOGY & ADDITIONAL TESTS:       MICROBIOLOGY:  Wound cx 7/24 Staph aureus, Staph epidermidis  Wound cx 7/24 AFB, fungus ng  Wound cx 7/22 Corynebacterium  BCx 7/21 ng  RPR neg  HIV neg    CONSULTS: heme, dermatology, surg onc

## 2017-07-28 LAB
BASOPHILS # BLD AUTO: 0.05 K/UL — SIGNIFICANT CHANGE UP (ref 0–0.2)
BASOPHILS NFR BLD AUTO: 0.4 % — SIGNIFICANT CHANGE UP (ref 0–2)
EOSINOPHIL # BLD AUTO: 0.3 K/UL — SIGNIFICANT CHANGE UP (ref 0–0.5)
EOSINOPHIL NFR BLD AUTO: 2.4 % — SIGNIFICANT CHANGE UP (ref 0–6)
HCT VFR BLD CALC: 33.9 % — LOW (ref 39–50)
HGB BLD-MCNC: 10.7 G/DL — LOW (ref 13–17)
IMM GRANULOCYTES # BLD AUTO: 0.03 # — SIGNIFICANT CHANGE UP
IMM GRANULOCYTES NFR BLD AUTO: 0.2 % — SIGNIFICANT CHANGE UP (ref 0–1.5)
LYMPHOCYTES # BLD AUTO: 28.7 % — SIGNIFICANT CHANGE UP (ref 13–44)
LYMPHOCYTES # BLD AUTO: 3.55 K/UL — HIGH (ref 1–3.3)
MCHC RBC-ENTMCNC: 26 PG — LOW (ref 27–34)
MCHC RBC-ENTMCNC: 31.6 % — LOW (ref 32–36)
MCV RBC AUTO: 82.3 FL — SIGNIFICANT CHANGE UP (ref 80–100)
MONOCYTES # BLD AUTO: 0.8 K/UL — SIGNIFICANT CHANGE UP (ref 0–0.9)
MONOCYTES NFR BLD AUTO: 6.5 % — SIGNIFICANT CHANGE UP (ref 2–14)
NEUTROPHILS # BLD AUTO: 7.62 K/UL — HIGH (ref 1.8–7.4)
NEUTROPHILS NFR BLD AUTO: 61.8 % — SIGNIFICANT CHANGE UP (ref 43–77)
NRBC # FLD: 0 — SIGNIFICANT CHANGE UP
PLATELET # BLD AUTO: 376 K/UL — SIGNIFICANT CHANGE UP (ref 150–400)
PMV BLD: 10.1 FL — SIGNIFICANT CHANGE UP (ref 7–13)
RBC # BLD: 4.12 M/UL — LOW (ref 4.2–5.8)
RBC # FLD: 16.3 % — HIGH (ref 10.3–14.5)
WBC # BLD: 12.35 K/UL — HIGH (ref 3.8–10.5)
WBC # FLD AUTO: 12.35 K/UL — HIGH (ref 3.8–10.5)

## 2017-07-28 RX ADMIN — Medication 100 MILLIGRAM(S): at 18:30

## 2017-07-28 RX ADMIN — Medication 100 MILLIGRAM(S): at 05:51

## 2017-07-28 NOTE — PROGRESS NOTE ADULT - PROBLEM SELECTOR PLAN 1
Patient has had elevated Leukocytosis since March 2017. Weight loss of 30s and LAD on exam concerning for leukemia/ lymphoma, CT c/a/p positive for inguinal LAD. +hypergammaglobulinemia  - Hematology consulted, recommendations appreciated  - Surg onc: s/p R inguinal LN bx 7/25  - Derm: certain features of his hidradenitis suppurativa (facial crusted plaques, pseudo verrucous plaques) are unusual for HS alone. Additional DDx include inflammatory syndrome, malignancy, or atypical infectious process  -f/u path of LN bx  -dispo: can go home per derm, f/u heme whether to wait for path results

## 2017-07-28 NOTE — PROGRESS NOTE ADULT - PROBLEM SELECTOR PLAN 3
- Unwitnessed fall pta with no LOC or pre-syncopal symptoms, did hit head L forehead, CTH neg  - Most like due to dehydration from episodes of emesis and decreased PO intake   - EKG shows Sinus tachycardia, no ST changes, irregular rhythm or delta waves   - orthostatic hypotension neg. s/p 4L NS in the ED  - ambulating well, good PO intake, no dizziness

## 2017-07-28 NOTE — PROGRESS NOTE ADULT - SUBJECTIVE AND OBJECTIVE BOX
Patient is a 27y old  Male who presents with a chief complaint of fall (21 Jul 2017 15:52)      SUBJECTIVE / OVERNIGHT EVENTS:  -No complaints. BM yesterday, nl color and consistency. Denies f/c/n/v/d, sob.      MEDICATIONS  (STANDING):  doxycycline hyclate Capsule 100 milliGRAM(s) Oral every 12 hours  docusate sodium 100 milliGRAM(s) Oral three times a day  senna 2 Tablet(s) Oral at bedtime  polyethylene glycol 3350 17 Gram(s) Oral two times a day    MEDICATIONS  (PRN):      Vital Signs Last 24 Hrs  T(C): 36.8 (28 Jul 2017 05:49), Max: 37.1 (27 Jul 2017 15:21)  T(F): 98.2 (28 Jul 2017 05:49), Max: 98.7 (27 Jul 2017 15:21)  HR: 90 (28 Jul 2017 05:49) (89 - 105)  BP: 116/74 (28 Jul 2017 05:49) (114/72 - 118/78)  BP(mean): --  RR: 16 (28 Jul 2017 05:49) (16 - 18)  SpO2: 100% (28 Jul 2017 05:49) (100% - 100%)    CAPILLARY BLOOD GLUCOSE    I&O's Summary      PHYSICAL EXAM  GENERAL: NAD, well-developed  HEENT:  Atraumatic, Normocephalic. Scales and fluid drainage are seen on the mandibles bilaterally; EOMI, PERRLA, conjunctiva and sclera clear  NECK: Supple, No JVD  CHEST/LUNG: Clear to auscultation bilaterally; No wheeze  HEART: Regular rate and rhythm; No murmurs, rubs, or gallops  ABDOMEN: Soft, Nontender, Nondistended; Bowel sounds present  EXTREMITIES:  2+ Peripheral Pulses, No clubbing, cyanosis, or edema.  JOINTS: Right ankle joint is inflamed and with some effusion. ROM is moderately limited as compared to the left ankle joint. Slight pain on palpation and with motion. Right knee joint also inflamed and with effusion. Pain on palpation of the patella, but not medial or lateral tenderness. Anterior and posterior drawer negative. No medial tibial tenderness. ROM is limited by pain. Left shoulder joint painful on palpation. No effusion noted. Left shoulder extension, flexion, and abduction 3/5 in strength.  PSYCH: AAOx3  SKIN: No rashes or lesions, incision for R inguinal LN biopsy steristrips are c/d/i, site nontender    LABS:                        10.7   12.35 )-----------( 376      ( 28 Jul 2017 06:00 )             33.9       RADIOLOGY & ADDITIONAL TESTS:       MICROBIOLOGY:  Wound cx 7/24 Staph aureus, Staph epidermidis  Wound cx 7/24 AFB, fungus ng  Wound cx 7/22 Corynebacterium  BCx 7/21 ng  RPR neg  HIV neg    CONSULTS: heme, dermatology, surg onc

## 2017-07-28 NOTE — PROGRESS NOTE ADULT - ASSESSMENT
28yo M with PMHx of hidradenitis suppurative admitted for fall due to dehydration from food poisoning being worked up  for underlying hematological disorder with chronic leukocytosis, protein gap of 7.6, and outpatient records showing hypergammaglobulinemia, SPEP consistent with chronic inflammation, pending inguinal LN biopsy path.

## 2017-07-29 LAB
BASOPHILS # BLD AUTO: 0.08 K/UL — SIGNIFICANT CHANGE UP (ref 0–0.2)
BASOPHILS NFR BLD AUTO: 0.6 % — SIGNIFICANT CHANGE UP (ref 0–2)
BUN SERPL-MCNC: 15 MG/DL — SIGNIFICANT CHANGE UP (ref 7–23)
CALCIUM SERPL-MCNC: 9.4 MG/DL — SIGNIFICANT CHANGE UP (ref 8.4–10.5)
CHLORIDE SERPL-SCNC: 97 MMOL/L — LOW (ref 98–107)
CO2 SERPL-SCNC: 25 MMOL/L — SIGNIFICANT CHANGE UP (ref 22–31)
CREAT SERPL-MCNC: 0.69 MG/DL — SIGNIFICANT CHANGE UP (ref 0.5–1.3)
EOSINOPHIL # BLD AUTO: 0.39 K/UL — SIGNIFICANT CHANGE UP (ref 0–0.5)
EOSINOPHIL NFR BLD AUTO: 2.8 % — SIGNIFICANT CHANGE UP (ref 0–6)
GLUCOSE SERPL-MCNC: 90 MG/DL — SIGNIFICANT CHANGE UP (ref 70–99)
HCT VFR BLD CALC: 35.5 % — LOW (ref 39–50)
HGB BLD-MCNC: 11.4 G/DL — LOW (ref 13–17)
IMM GRANULOCYTES # BLD AUTO: 0.04 # — SIGNIFICANT CHANGE UP
IMM GRANULOCYTES NFR BLD AUTO: 0.3 % — SIGNIFICANT CHANGE UP (ref 0–1.5)
LYMPHOCYTES # BLD AUTO: 34.4 % — SIGNIFICANT CHANGE UP (ref 13–44)
LYMPHOCYTES # BLD AUTO: 4.75 K/UL — HIGH (ref 1–3.3)
MAGNESIUM SERPL-MCNC: 2.1 MG/DL — SIGNIFICANT CHANGE UP (ref 1.6–2.6)
MCHC RBC-ENTMCNC: 26.8 PG — LOW (ref 27–34)
MCHC RBC-ENTMCNC: 32.1 % — SIGNIFICANT CHANGE UP (ref 32–36)
MCV RBC AUTO: 83.5 FL — SIGNIFICANT CHANGE UP (ref 80–100)
MONOCYTES # BLD AUTO: 0.92 K/UL — HIGH (ref 0–0.9)
MONOCYTES NFR BLD AUTO: 6.7 % — SIGNIFICANT CHANGE UP (ref 2–14)
NEUTROPHILS # BLD AUTO: 7.63 K/UL — HIGH (ref 1.8–7.4)
NEUTROPHILS NFR BLD AUTO: 55.2 % — SIGNIFICANT CHANGE UP (ref 43–77)
NRBC # FLD: 0 — SIGNIFICANT CHANGE UP
PHOSPHATE SERPL-MCNC: 4.5 MG/DL — SIGNIFICANT CHANGE UP (ref 2.5–4.5)
PLATELET # BLD AUTO: 398 K/UL — SIGNIFICANT CHANGE UP (ref 150–400)
PMV BLD: 10.2 FL — SIGNIFICANT CHANGE UP (ref 7–13)
POTASSIUM SERPL-MCNC: 3.9 MMOL/L — SIGNIFICANT CHANGE UP (ref 3.5–5.3)
POTASSIUM SERPL-SCNC: 3.9 MMOL/L — SIGNIFICANT CHANGE UP (ref 3.5–5.3)
RBC # BLD: 4.25 M/UL — SIGNIFICANT CHANGE UP (ref 4.2–5.8)
RBC # FLD: 16.1 % — HIGH (ref 10.3–14.5)
SODIUM SERPL-SCNC: 135 MMOL/L — SIGNIFICANT CHANGE UP (ref 135–145)
WBC # BLD: 13.81 K/UL — HIGH (ref 3.8–10.5)
WBC # FLD AUTO: 13.81 K/UL — HIGH (ref 3.8–10.5)

## 2017-07-29 RX ADMIN — Medication 100 MILLIGRAM(S): at 17:57

## 2017-07-29 RX ADMIN — Medication 100 MILLIGRAM(S): at 05:18

## 2017-07-29 NOTE — PROGRESS NOTE ADULT - SUBJECTIVE AND OBJECTIVE BOX
CC: Patient is a 27y old  Male who presents with a chief complaint of fall (21 Jul 2017 15:52)      SUBJECTIVE / OVERNIGHT EVENTS:No complaints. BM yesterday, nl color and consistency. Denies f/c/n/v/d, sob.      MEDICATIONS  (STANDING):  doxycycline hyclate Capsule 100 milliGRAM(s) Oral every 12 hours  docusate sodium 100 milliGRAM(s) Oral three times a day  senna 2 Tablet(s) Oral at bedtime    MEDICATIONS  (PRN):    Vital Signs Last 24 Hrs  T(C): 36.7 (29 Jul 2017 05:14), Max: 37.2 (28 Jul 2017 15:04)  T(F): 98 (29 Jul 2017 05:14), Max: 98.9 (28 Jul 2017 15:04)  HR: 82 (29 Jul 2017 05:14) (66 - 95)  BP: 121/81 (29 Jul 2017 05:14) (109/61 - 121/81)  BP(mean): --  RR: 18 (29 Jul 2017 05:14) (18 - 18)  SpO2: 100% (29 Jul 2017 05:14) (100% - 100%)    PHYSICAL EXAM  GENERAL: NAD, well-developed  HEENT:  Atraumatic, Normocephalic. Scales and fluid drainage are seen on the mandibles bilaterally; EOMI, PERRLA, conjunctiva and sclera clear  NECK: Supple, No JVD  CHEST/LUNG: Clear to auscultation bilaterally; No wheeze  HEART: Regular rate and rhythm; No murmurs, rubs, or gallops  ABDOMEN: Soft, Nontender, Nondistended; Bowel sounds present  EXTREMITIES:  2+ Peripheral Pulses, No clubbing, cyanosis, or edema.  JOINTS: Right ankle joint is inflamed and with some effusion. ROM is moderately limited as compared to the left ankle joint. Slight pain on palpation and with motion. Right knee joint also inflamed and with effusion. Pain on palpation of the patella, but not medial or lateral tenderness. Anterior and posterior drawer negative. No medial tibial tenderness. ROM is limited by pain. Left shoulder joint painful on palpation. No effusion noted. Left shoulder extension, flexion, and abduction 3/5 in strength.  PSYCH: AAOx3  SKIN: No rashes or lesions, incision for R inguinal LN biopsy steristrips are c/d/i, site nontender    LABS:                       11.4   13.81 )-----------( 398      ( 29 Jul 2017 05:30 )             35.5     29 Jul 2017 05:30    135    |  97     |  15     ----------------------------<  90     3.9     |  25     |  0.69     Ca    9.4        29 Jul 2017 05:30  Phos  4.5       29 Jul 2017 05:30  Mg     2.1       29 Jul 2017 05:30         MICROBIOLOGY:  Wound cx 7/24 Staph aureus, Staph epidermidis  Wound cx 7/24 AFB, fungus ng  Wound cx 7/22 Corynebacterium  BCx 7/21 ng  RPR neg  HIV neg    CONSULTS: heme, dermatology, surg onc CC: Patient is a 27y old  Male who presents with a chief complaint of fall (21 Jul 2017 15:52)      SUBJECTIVE / OVERNIGHT EVENTS:No complaints today, pt feels well. Denies f/c/n/v/d, sob.Tolerating diet and ambulating without difficulty.       MEDICATIONS  (STANDING):  doxycycline hyclate Capsule 100 milliGRAM(s) Oral every 12 hours  docusate sodium 100 milliGRAM(s) Oral three times a day  senna 2 Tablet(s) Oral at bedtime    MEDICATIONS  (PRN):    Vital Signs Last 24 Hrs  T(C): 36.7 (29 Jul 2017 05:14), Max: 37.2 (28 Jul 2017 15:04)  T(F): 98 (29 Jul 2017 05:14), Max: 98.9 (28 Jul 2017 15:04)  HR: 82 (29 Jul 2017 05:14) (66 - 95)  BP: 121/81 (29 Jul 2017 05:14) (109/61 - 121/81)  BP(mean): --  RR: 18 (29 Jul 2017 05:14) (18 - 18)  SpO2: 100% (29 Jul 2017 05:14) (100% - 100%)    PHYSICAL EXAM  GENERAL: NAD, well-developed  HEENT:  Atraumatic, Normocephalic. Scales and fluid drainage are seen on the mandibles bilaterally; EOMI, PERRLA, conjunctiva and sclera clear  NECK: Supple, No JVD  CHEST/LUNG: Clear to auscultation bilaterally; No wheeze  HEART: Regular rate and rhythm; No murmurs, rubs, or gallops  ABDOMEN: Soft, Nontender, Nondistended; Bowel sounds present  EXTREMITIES:  2+ Peripheral Pulses, No clubbing, cyanosis, or edema.  JOINTS: Right ankle joint is inflamed and with some effusion. ROM is moderately limited as compared to the left ankle joint. Slight pain on palpation and with motion. Right knee joint also inflamed and with effusion. Pain on palpation of the patella, but not medial or lateral tenderness. Anterior and posterior drawer negative. No medial tibial tenderness. ROM is limited by pain. Left shoulder joint painful on palpation. No effusion noted. Left shoulder extension, flexion, and abduction 3/5 in strength.  PSYCH: AAOx3  SKIN: No rashes or lesions, incision for R inguinal LN biopsy steristrips are c/d/i, site nontender    LABS:                       11.4   13.81 )-----------( 398      ( 29 Jul 2017 05:30 )             35.5     29 Jul 2017 05:30    135    |  97     |  15     ----------------------------<  90     3.9     |  25     |  0.69     Ca    9.4        29 Jul 2017 05:30  Phos  4.5       29 Jul 2017 05:30  Mg     2.1       29 Jul 2017 05:30         MICROBIOLOGY:  Wound cx 7/24 Staph aureus, Staph epidermidis  Wound cx 7/24 AFB, fungus ng  Wound cx 7/22 Corynebacterium  BCx 7/21 ng  RPR neg  HIV neg    CONSULTS: heme, dermatology, surg onc

## 2017-07-29 NOTE — PROGRESS NOTE ADULT - ASSESSMENT
28yo M with PMHx of hidradenitis suppurative admitted for fall due to dehydration from food poisoning being worked up  for underlying hematological disorder with chronic leukocytosis, protein gap of 7.6, and outpatient records showing hypergammaglobulinemia, SPEP consistent with chronic inflammation, pending inguinal LN biopsy path. 28yo M with PMHx of hidradenitis suppurative admitted for fall due to dehydration from food poisoning being worked up  for underlying hematological disorder with chronic leukocytosis, protein gap of 7.6, and outpatient records showing hypergammaglobulinemia, SPEP consistent with chronic inflammation. S/p excisional inguinal LN biopsy to r/o leukemia/lymphocytic process, awaiting pathology.

## 2017-07-30 LAB
BASOPHILS # BLD AUTO: 0.02 K/UL — SIGNIFICANT CHANGE UP (ref 0–0.2)
BASOPHILS NFR BLD AUTO: 0.2 % — SIGNIFICANT CHANGE UP (ref 0–2)
BUN SERPL-MCNC: 21 MG/DL — SIGNIFICANT CHANGE UP (ref 7–23)
CALCIUM SERPL-MCNC: 9.3 MG/DL — SIGNIFICANT CHANGE UP (ref 8.4–10.5)
CHLORIDE SERPL-SCNC: 98 MMOL/L — SIGNIFICANT CHANGE UP (ref 98–107)
CO2 SERPL-SCNC: 29 MMOL/L — SIGNIFICANT CHANGE UP (ref 22–31)
CREAT SERPL-MCNC: 0.8 MG/DL — SIGNIFICANT CHANGE UP (ref 0.5–1.3)
EOSINOPHIL # BLD AUTO: 0.06 K/UL — SIGNIFICANT CHANGE UP (ref 0–0.5)
EOSINOPHIL NFR BLD AUTO: 0.7 % — SIGNIFICANT CHANGE UP (ref 0–6)
GLUCOSE SERPL-MCNC: 81 MG/DL — SIGNIFICANT CHANGE UP (ref 70–99)
HCT VFR BLD CALC: 36.1 % — LOW (ref 39–50)
HGB BLD-MCNC: 11.5 G/DL — LOW (ref 13–17)
IMM GRANULOCYTES # BLD AUTO: 0.06 # — SIGNIFICANT CHANGE UP
IMM GRANULOCYTES NFR BLD AUTO: 0.7 % — SIGNIFICANT CHANGE UP (ref 0–1.5)
LYMPHOCYTES # BLD AUTO: 2.49 K/UL — SIGNIFICANT CHANGE UP (ref 1–3.3)
LYMPHOCYTES # BLD AUTO: 27.3 % — SIGNIFICANT CHANGE UP (ref 13–44)
MAGNESIUM SERPL-MCNC: 2.3 MG/DL — SIGNIFICANT CHANGE UP (ref 1.6–2.6)
MCHC RBC-ENTMCNC: 26.4 PG — LOW (ref 27–34)
MCHC RBC-ENTMCNC: 31.9 % — LOW (ref 32–36)
MCV RBC AUTO: 82.8 FL — SIGNIFICANT CHANGE UP (ref 80–100)
MONOCYTES # BLD AUTO: 0.66 K/UL — SIGNIFICANT CHANGE UP (ref 0–0.9)
MONOCYTES NFR BLD AUTO: 7.2 % — SIGNIFICANT CHANGE UP (ref 2–14)
NEUTROPHILS # BLD AUTO: 5.83 K/UL — SIGNIFICANT CHANGE UP (ref 1.8–7.4)
NEUTROPHILS NFR BLD AUTO: 63.9 % — SIGNIFICANT CHANGE UP (ref 43–77)
NRBC # FLD: 0 — SIGNIFICANT CHANGE UP
PHOSPHATE SERPL-MCNC: 4.6 MG/DL — HIGH (ref 2.5–4.5)
PLATELET # BLD AUTO: 396 K/UL — SIGNIFICANT CHANGE UP (ref 150–400)
PMV BLD: 10.2 FL — SIGNIFICANT CHANGE UP (ref 7–13)
POTASSIUM SERPL-MCNC: 4.4 MMOL/L — SIGNIFICANT CHANGE UP (ref 3.5–5.3)
POTASSIUM SERPL-SCNC: 4.4 MMOL/L — SIGNIFICANT CHANGE UP (ref 3.5–5.3)
RBC # BLD: 4.36 M/UL — SIGNIFICANT CHANGE UP (ref 4.2–5.8)
RBC # FLD: 16.4 % — HIGH (ref 10.3–14.5)
SODIUM SERPL-SCNC: 138 MMOL/L — SIGNIFICANT CHANGE UP (ref 135–145)
WBC # BLD: 9.12 K/UL — SIGNIFICANT CHANGE UP (ref 3.8–10.5)
WBC # FLD AUTO: 9.12 K/UL — SIGNIFICANT CHANGE UP (ref 3.8–10.5)

## 2017-07-30 RX ADMIN — Medication 100 MILLIGRAM(S): at 18:44

## 2017-07-30 RX ADMIN — Medication 100 MILLIGRAM(S): at 06:25

## 2017-07-30 NOTE — PROGRESS NOTE ADULT - SUBJECTIVE AND OBJECTIVE BOX
CC: Patient is a 27y old  Male who presents with a chief complaint of fall (21 Jul 2017 15:52)      SUBJECTIVE / OVERNIGHT EVENTS:No complaints today, pt feels well. Denies f/c/n/v/d, sob.Tolerating diet and ambulating without difficulty.       MEDICATIONS  (STANDING):  doxycycline hyclate Capsule 100 milliGRAM(s) Oral every 12 hours  docusate sodium 100 milliGRAM(s) Oral three times a day  senna 2 Tablet(s) Oral at bedtime    MEDICATIONS  (PRN):      Vital Signs Last 24 Hrs  T(C): 36.8 (30 Jul 2017 06:24), Max: 37.2 (29 Jul 2017 15:53)  T(F): 98.3 (30 Jul 2017 06:24), Max: 98.9 (29 Jul 2017 15:53)  HR: 85 (30 Jul 2017 06:24) (85 - 95)  BP: 122/82 (30 Jul 2017 06:24) (122/82 - 127/76)  BP(mean): --  RR: 18 (30 Jul 2017 06:24) (18 - 18)  SpO2: 100% (30 Jul 2017 06:24) (100% - 100%)      PHYSICAL EXAM  GENERAL: NAD, well-developed  HEENT:  Atraumatic, Normocephalic. Scales and fluid drainage are seen on the mandibles bilaterally; EOMI, PERRLA, conjunctiva and sclera clear  NECK: Supple, No JVD  CHEST/LUNG: Clear to auscultation bilaterally; No wheeze  HEART: Regular rate and rhythm; No murmurs, rubs, or gallops  ABDOMEN: Soft, Nontender, Nondistended; Bowel sounds present  EXTREMITIES:  2+ Peripheral Pulses, No clubbing, cyanosis, or edema.  JOINTS: Right ankle joint is inflamed and with some effusion. ROM is moderately limited as compared to the left ankle joint. Slight pain on palpation and with motion. Right knee joint also inflamed and with effusion. Pain on palpation of the patella, but not medial or lateral tenderness. Anterior and posterior drawer negative. No medial tibial tenderness. ROM is limited by pain. Left shoulder joint painful on palpation. No effusion noted. Left shoulder extension, flexion, and abduction 3/5 in strength.  PSYCH: AAOx3  SKIN: No rashes or lesions, incision for R inguinal LN biopsy steristrips are c/d/i, site nontender    LABS:                        11.5   x     )-----------( 396      ( 30 Jul 2017 06:21 )             36.1       Ca    9.4        29 Jul 2017 05:30         MICROBIOLOGY:  Wound cx 7/24 Staph aureus, Staph epidermidis  Wound cx 7/24 AFB, fungus ng  Wound cx 7/22 Corynebacterium  BCx 7/21 ng  RPR neg  HIV neg    CONSULTS: heme, dermatology, surg onc CC: Patient is a 27y old  Male who presents with a chief complaint of fall (21 Jul 2017 15:52)      SUBJECTIVE / OVERNIGHT EVENTS: No complaints today, pt feels well, would like to go home. Denies f/c/n/v/d, sob. Tolerating diet and ambulating without difficulty.      MEDICATIONS  (STANDING):  doxycycline hyclate Capsule 100 milliGRAM(s) Oral every 12 hours  docusate sodium 100 milliGRAM(s) Oral three times a day  senna 2 Tablet(s) Oral at bedtime    MEDICATIONS  (PRN):      Vital Signs Last 24 Hrs  T(C): 36.8 (30 Jul 2017 06:24), Max: 37.2 (29 Jul 2017 15:53)  T(F): 98.3 (30 Jul 2017 06:24), Max: 98.9 (29 Jul 2017 15:53)  HR: 85 (30 Jul 2017 06:24) (85 - 95)  BP: 122/82 (30 Jul 2017 06:24) (122/82 - 127/76)  BP(mean): --  RR: 18 (30 Jul 2017 06:24) (18 - 18)  SpO2: 100% (30 Jul 2017 06:24) (100% - 100%)      PHYSICAL EXAM  GENERAL: NAD, well-developed  HEENT:  Atraumatic, Normocephalic; EOMI, PERRLA, conjunctiva and sclera clear  NECK: Supple, No JVD  CHEST/LUNG: Clear to auscultation bilaterally; No wheeze  HEART: Regular rate and rhythm; No murmurs, rubs, or gallops  ABDOMEN: Soft, Nontender, Nondistended; Bowel sounds present  EXTREMITIES:  2+ Peripheral Pulses, No clubbing, cyanosis, or edema.  JOINTS: Nl . ROM is limited by pain. Left shoulder joint painful on palpation. No effusion noted. Left shoulder extension, flexion, and abduction 3/5 in strength.  PSYCH: AAOx3  SKIN: No rashes, incision for R inguinal LN biopsy steristrips are c/d/i, site nontender. On bilateral mandibles, axillae, and sacrum: Scales and scant purulent drainage, heaped up skin on axillae    LABS:                        11.5   9.12  )-----------( 396      ( 30 Jul 2017 06:21 )             36.1     30 Jul 2017 06:21    138    |  98     |  21     ----------------------------<  81     4.4     |  29     |  0.80     Ca    9.3        30 Jul 2017 06:21  Phos  4.6       30 Jul 2017 06:21  Mg     2.3       30 Jul 2017 06:21           MICROBIOLOGY:  Wound cx 7/24 Staph aureus, Staph epidermidis  Wound cx 7/24 AFB, fungus ng  Wound cx 7/22 Corynebacterium  BCx 7/21 ng  RPR neg  HIV neg    CONSULTS: heme, dermatology, surg onc

## 2017-07-30 NOTE — PROGRESS NOTE ADULT - ASSESSMENT
28yo M with PMHx of hidradenitis suppurative admitted for fall due to dehydration from food poisoning being worked up  for underlying hematological disorder with chronic leukocytosis, protein gap of 7.6, and outpatient records showing hypergammaglobulinemia, SPEP consistent with chronic inflammation. S/p excisional inguinal LN biopsy to r/o leukemia/lymphocytic process, awaiting pathology.

## 2017-07-30 NOTE — PROGRESS NOTE ADULT - PROBLEM SELECTOR PLAN 1
Patient has had elevated Leukocytosis since March 2017. Weight loss of 30s and LAD on exam concerning for leukemia/ lymphoma, CT c/a/p positive for inguinal LAD. +hypergammaglobulinemia  - Hematology consulted, recommendations appreciated  - Surg onc: s/p R inguinal LN bx 7/25  - Derm: certain features of his hidradenitis suppurativa (facial crusted plaques, pseudo verrucous plaques) are unusual for HS alone. Additional DDx include inflammatory syndrome, malignancy, or atypical infectious process  -f/u path of LN bx  -dispo: can go home per derm, f/u heme whether to wait for path results Luekocytosis now resolved. Patient has had elevated Leukocytosis since March 2017. Weight loss of 30s and LAD on exam concerning for leukemia/ lymphoma, CT c/a/p positive for inguinal LAD. +hypergammaglobulinemia  - Hematology consulted, recommendations appreciated  - Surg onc: s/p R inguinal LN bx 7/25  - Derm: certain features of his hidradenitis suppurativa (facial crusted plaques, pseudo verrucous plaques) are unusual for HS alone. Additional DDx include inflammatory syndrome, malignancy, or atypical infectious process  -f/u path of LN bx  -dispo: can go home per derm, f/u heme whether to wait for path results now that leukocytosis resolved, likely d/c home tmrw with Umesh GOEL appt

## 2017-07-30 NOTE — PROGRESS NOTE ADULT - PROBLEM SELECTOR PLAN 2
Diagnosed on last ED visit. Finished topical clindamycin course and has been to ID clinic. Will need derm f/u as OP for longterm therapy. 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 Finished topical clindamycin course and has been to ID clinic. Will need derm f/u as OP for longterm therapy. Skin bx consistent with hidradenitis suppurativa  - f/u wound cx AFB, fungal  - c/w doxycycline per ID  - c/w wound care

## 2017-07-31 VITALS
DIASTOLIC BLOOD PRESSURE: 83 MMHG | HEART RATE: 98 BPM | RESPIRATION RATE: 18 BRPM | SYSTOLIC BLOOD PRESSURE: 125 MMHG | TEMPERATURE: 99 F | OXYGEN SATURATION: 100 %

## 2017-07-31 LAB
BASOPHILS # BLD AUTO: 0.1 K/UL — SIGNIFICANT CHANGE UP (ref 0–0.2)
BASOPHILS NFR BLD AUTO: 0.8 % — SIGNIFICANT CHANGE UP (ref 0–2)
BUN SERPL-MCNC: 22 MG/DL — SIGNIFICANT CHANGE UP (ref 7–23)
CALCIUM SERPL-MCNC: 9.5 MG/DL — SIGNIFICANT CHANGE UP (ref 8.4–10.5)
CHLORIDE SERPL-SCNC: 100 MMOL/L — SIGNIFICANT CHANGE UP (ref 98–107)
CO2 SERPL-SCNC: 27 MMOL/L — SIGNIFICANT CHANGE UP (ref 22–31)
CREAT SERPL-MCNC: 0.75 MG/DL — SIGNIFICANT CHANGE UP (ref 0.5–1.3)
CULTURE - SURGICAL SITE: SIGNIFICANT CHANGE UP
EOSINOPHIL # BLD AUTO: 0.29 K/UL — SIGNIFICANT CHANGE UP (ref 0–0.5)
EOSINOPHIL NFR BLD AUTO: 2.4 % — SIGNIFICANT CHANGE UP (ref 0–6)
GLUCOSE SERPL-MCNC: 84 MG/DL — SIGNIFICANT CHANGE UP (ref 70–99)
HCT VFR BLD CALC: 34.8 % — LOW (ref 39–50)
HGB BLD-MCNC: 11.3 G/DL — LOW (ref 13–17)
IMM GRANULOCYTES # BLD AUTO: 0.03 # — SIGNIFICANT CHANGE UP
IMM GRANULOCYTES NFR BLD AUTO: 0.2 % — SIGNIFICANT CHANGE UP (ref 0–1.5)
LYMPHOCYTES # BLD AUTO: 26.4 % — SIGNIFICANT CHANGE UP (ref 13–44)
LYMPHOCYTES # BLD AUTO: 3.25 K/UL — SIGNIFICANT CHANGE UP (ref 1–3.3)
MAGNESIUM SERPL-MCNC: 2.1 MG/DL — SIGNIFICANT CHANGE UP (ref 1.6–2.6)
MCHC RBC-ENTMCNC: 27 PG — SIGNIFICANT CHANGE UP (ref 27–34)
MCHC RBC-ENTMCNC: 32.5 % — SIGNIFICANT CHANGE UP (ref 32–36)
MCV RBC AUTO: 83.3 FL — SIGNIFICANT CHANGE UP (ref 80–100)
MONOCYTES # BLD AUTO: 1 K/UL — HIGH (ref 0–0.9)
MONOCYTES NFR BLD AUTO: 8.1 % — SIGNIFICANT CHANGE UP (ref 2–14)
NEUTROPHILS # BLD AUTO: 7.62 K/UL — HIGH (ref 1.8–7.4)
NEUTROPHILS NFR BLD AUTO: 62.1 % — SIGNIFICANT CHANGE UP (ref 43–77)
NRBC # FLD: 0 — SIGNIFICANT CHANGE UP
PHOSPHATE SERPL-MCNC: 4.7 MG/DL — HIGH (ref 2.5–4.5)
PLATELET # BLD AUTO: 372 K/UL — SIGNIFICANT CHANGE UP (ref 150–400)
PMV BLD: 10.5 FL — SIGNIFICANT CHANGE UP (ref 7–13)
POTASSIUM SERPL-MCNC: 4.6 MMOL/L — SIGNIFICANT CHANGE UP (ref 3.5–5.3)
POTASSIUM SERPL-SCNC: 4.6 MMOL/L — SIGNIFICANT CHANGE UP (ref 3.5–5.3)
RBC # BLD: 4.18 M/UL — LOW (ref 4.2–5.8)
RBC # FLD: 16.5 % — HIGH (ref 10.3–14.5)
SODIUM SERPL-SCNC: 140 MMOL/L — SIGNIFICANT CHANGE UP (ref 135–145)
WBC # BLD: 12.29 K/UL — HIGH (ref 3.8–10.5)
WBC # FLD AUTO: 12.29 K/UL — HIGH (ref 3.8–10.5)

## 2017-07-31 RX ADMIN — Medication 100 MILLIGRAM(S): at 05:24

## 2017-07-31 RX ADMIN — Medication 100 MILLIGRAM(S): at 17:29

## 2017-07-31 NOTE — PROGRESS NOTE ADULT - SUBJECTIVE AND OBJECTIVE BOX
CC: Patient is a 27y old  Male who presents with a chief complaint of fall (21 Jul 2017 15:52)      SUBJECTIVE / OVERNIGHT EVENTS: No complaints today, pt feels well, would like to go home. Denies f/c/n/v/d, sob. Tolerating diet and ambulating without difficulty.      MEDICATIONS  (STANDING):  doxycycline hyclate Capsule 100 milliGRAM(s) Oral every 12 hours  docusate sodium 100 milliGRAM(s) Oral three times a day  senna 2 Tablet(s) Oral at bedtime    MEDICATIONS  (PRN):      Vital Signs Last 24 Hrs  T(C): 37 (31 Jul 2017 05:22), Max: 37.1 (30 Jul 2017 20:55)  T(F): 98.6 (31 Jul 2017 05:22), Max: 98.7 (30 Jul 2017 20:55)  HR: 92 (31 Jul 2017 05:22) (88 - 101)  BP: 117/78 (31 Jul 2017 05:22) (117/78 - 128/85)  BP(mean): --  RR: 16 (31 Jul 2017 05:22) (16 - 18)  SpO2: 100% (31 Jul 2017 05:22) (99% - 100%)    CAPILLARY BLOOD GLUCOSE    I&O's Detail    30 Jul 2017 07:01  -  31 Jul 2017 07:00  --------------------------------------------------------  IN:  Total IN: 0 mL    OUT:    Voided: 650 mL  Total OUT: 650 mL    Total NET: -650 mL      PHYSICAL EXAM  GENERAL: NAD, well-developed  HEENT:  Atraumatic, Normocephalic; EOMI, PERRLA, conjunctiva and sclera clear  NECK: Supple, No JVD  CHEST/LUNG: Clear to auscultation bilaterally; No wheeze  HEART: Regular rate and rhythm; No murmurs, rubs, or gallops  ABDOMEN: Soft, Nontender, Nondistended; Bowel sounds present  EXTREMITIES:  2+ Peripheral Pulses, No clubbing, cyanosis, or edema.  JOINTS: Nl . ROM is limited by pain. Left shoulder joint painful on palpation. No effusion noted. Left shoulder extension, flexion, and abduction 3/5 in strength.  PSYCH: AAOx3  SKIN: No rashes, incision for R inguinal LN biopsy steristrips are c/d/i, site nontender. On bilateral mandibles, axillae, and sacrum: Scales and scant purulent drainage, heaped up skin on axillae    LABS:                        11.3   12.29 )-----------( 372      ( 31 Jul 2017 05:20 )             34.8     31 Jul 2017 05:20    140    |  100    |  22     ----------------------------<  84     4.6     |  27     |  0.75     Ca    9.5        31 Jul 2017 05:20  Phos  4.7       31 Jul 2017 05:20  Mg     2.1       31 Jul 2017 05:20    MICROBIOLOGY:  Wound cx 7/24 Staph aureus, Staph epidermidis  Wound cx 7/24 AFB, fungus ng  Wound cx 7/22 Corynebacterium  BCx 7/21 ng  RPR neg  HIV neg    CONSULTS: heme, dermatology, surg onc

## 2017-07-31 NOTE — PROGRESS NOTE ADULT - PROBLEM SELECTOR PROBLEM 4
Need for prophylactic measure
Hidradenitis suppurativa
Need for prophylactic measure
Hidradenitis suppurativa

## 2017-07-31 NOTE — PROGRESS NOTE ADULT - ASSESSMENT
26yo M with PMHx of hidradenitis suppurative admitted for fall due to dehydration from food poisoning being worked up  for underlying hematological disorder with chronic leukocytosis, protein gap of 7.6, and outpatient records showing hypergammaglobulinemia, SPEP consistent with chronic inflammation. S/p excisional inguinal LN biopsy to r/o leukemia/lymphocytic process, awaiting pathology.

## 2017-07-31 NOTE — PROGRESS NOTE ADULT - PROBLEM SELECTOR PLAN 2
Diagnosed on last ED visit. Finished topical clindamycin course and has been to ID clinic. Will need derm f/u as OP for longterm therapy. Skin bx consistent with hidradenitis suppurativa  - f/u wound cx AFB, fungal  - c/w doxycycline per ID  - c/w wound care

## 2017-07-31 NOTE — PROGRESS NOTE ADULT - PROBLEM SELECTOR PLAN 1
Luekocytosis now resolved. Patient has had elevated Leukocytosis since March 2017. Weight loss of 30s and LAD on exam concerning for leukemia/ lymphoma, CT c/a/p positive for inguinal LAD. +hypergammaglobulinemia  - Hematology consulted, recommendations appreciated  - Surg onc: s/p R inguinal LN bx 7/25  - Derm: certain features of his hidradenitis suppurativa (facial crusted plaques, pseudo verrucous plaques) are unusual for HS alone. Additional DDx include inflammatory syndrome, malignancy, or atypical infectious process  -f/u path of LN bx  -dispo: can go home per derm, f/u heme whether to wait for path results now that leukocytosis resolved, likely d/c home tmrw with Umesh GOEL appt

## 2017-08-01 LAB — SPECIMEN SOURCE: SIGNIFICANT CHANGE UP

## 2017-08-02 LAB — HEMATOPATHOLOGY REPORT: SIGNIFICANT CHANGE UP

## 2017-08-09 ENCOUNTER — OUTPATIENT (OUTPATIENT)
Dept: OUTPATIENT SERVICES | Facility: HOSPITAL | Age: 27
LOS: 1 days | Discharge: HOME | End: 2017-08-09

## 2017-08-09 DIAGNOSIS — C85.10 UNSPECIFIED B-CELL LYMPHOMA, UNSPECIFIED SITE: ICD-10-CM

## 2017-08-10 ENCOUNTER — RESULT REVIEW (OUTPATIENT)
Age: 27
End: 2017-08-10

## 2017-08-10 ENCOUNTER — APPOINTMENT (OUTPATIENT)
Dept: HEMATOLOGY ONCOLOGY | Facility: CLINIC | Age: 27
End: 2017-08-10

## 2017-08-10 VITALS
HEIGHT: 72 IN | WEIGHT: 164.24 LBS | RESPIRATION RATE: 16 BRPM | TEMPERATURE: 98.2 F | BODY MASS INDEX: 22.25 KG/M2 | HEART RATE: 78 BPM | DIASTOLIC BLOOD PRESSURE: 74 MMHG | OXYGEN SATURATION: 100 % | SYSTOLIC BLOOD PRESSURE: 108 MMHG

## 2017-08-10 LAB
FERRITIN SERPL-MCNC: 111 NG/ML — SIGNIFICANT CHANGE UP (ref 30–400)
FOLATE SERPL-MCNC: 8.7 NG/ML — SIGNIFICANT CHANGE UP (ref 4.8–24.2)
HAPTOGLOB SERPL-MCNC: 253 MG/DL — HIGH (ref 34–200)
IRON SATN MFR SERPL: 20 UG/DL — LOW (ref 45–165)
IRON SATN MFR SERPL: 8 % — LOW (ref 16–55)
LDH SERPL L TO P-CCNC: 117 U/L — SIGNIFICANT CHANGE UP (ref 50–242)
TIBC SERPL-MCNC: 265 UG/DL — SIGNIFICANT CHANGE UP (ref 220–430)
TRANSFERRIN SERPL-MCNC: 215 MG/DL — SIGNIFICANT CHANGE UP (ref 200–360)
UIBC SERPL-MCNC: 245 UG/DL — SIGNIFICANT CHANGE UP (ref 110–370)
VIT B12 SERPL-MCNC: 684 PG/ML — SIGNIFICANT CHANGE UP (ref 243–894)

## 2017-08-11 DIAGNOSIS — D64.9 ANEMIA, UNSPECIFIED: ICD-10-CM

## 2017-08-14 ENCOUNTER — APPOINTMENT (OUTPATIENT)
Dept: DERMATOLOGY | Facility: CLINIC | Age: 27
End: 2017-08-14
Payer: COMMERCIAL

## 2017-08-14 VITALS
SYSTOLIC BLOOD PRESSURE: 110 MMHG | WEIGHT: 165 LBS | BODY MASS INDEX: 22.35 KG/M2 | HEIGHT: 72 IN | DIASTOLIC BLOOD PRESSURE: 70 MMHG

## 2017-08-14 DIAGNOSIS — Z91.89 OTHER SPECIFIED PERSONAL RISK FACTORS, NOT ELSEWHERE CLASSIFIED: ICD-10-CM

## 2017-08-14 PROCEDURE — 99214 OFFICE O/P EST MOD 30 MIN: CPT | Mod: 25,GC

## 2017-08-14 PROCEDURE — 11900 INJECT SKIN LESIONS </W 7: CPT | Mod: GC

## 2017-08-22 LAB — FUNGUS SPEC QL CULT: SIGNIFICANT CHANGE UP

## 2017-08-23 LAB — FUNGUS SPEC QL CULT: SIGNIFICANT CHANGE UP

## 2017-09-04 LAB — ACID FAST STN TISS: SIGNIFICANT CHANGE UP

## 2017-09-05 LAB — ACID FAST STN SPEC: SIGNIFICANT CHANGE UP

## 2017-10-16 ENCOUNTER — APPOINTMENT (OUTPATIENT)
Dept: DERMATOLOGY | Facility: CLINIC | Age: 27
End: 2017-10-16
Payer: MEDICAID

## 2017-10-16 VITALS — SYSTOLIC BLOOD PRESSURE: 122 MMHG | DIASTOLIC BLOOD PRESSURE: 70 MMHG

## 2017-10-16 PROCEDURE — 99214 OFFICE O/P EST MOD 30 MIN: CPT | Mod: GC

## 2017-10-16 RX ORDER — DOXYCYCLINE HYCLATE 100 MG/1
100 TABLET, DELAYED RELEASE ORAL
Qty: 60 | Refills: 0 | Status: COMPLETED | COMMUNITY
Start: 2017-08-14

## 2017-10-16 RX ORDER — CHLORHEXIDINE GLUCONATE 4% 4 G/100ML
4 LIQUID TOPICAL
Qty: 237 | Refills: 0 | Status: COMPLETED | COMMUNITY
Start: 2017-08-14

## 2017-11-07 ENCOUNTER — FORM ENCOUNTER (OUTPATIENT)
Age: 27
End: 2017-11-07

## 2017-11-08 ENCOUNTER — APPOINTMENT (OUTPATIENT)
Dept: CT IMAGING | Facility: IMAGING CENTER | Age: 27
End: 2017-11-08
Payer: MEDICAID

## 2017-11-08 ENCOUNTER — OUTPATIENT (OUTPATIENT)
Dept: OUTPATIENT SERVICES | Facility: HOSPITAL | Age: 27
LOS: 1 days | End: 2017-11-08
Payer: MEDICAID

## 2017-11-08 DIAGNOSIS — R59.1 GENERALIZED ENLARGED LYMPH NODES: ICD-10-CM

## 2017-11-08 PROCEDURE — 71260 CT THORAX DX C+: CPT

## 2017-11-08 PROCEDURE — 71260 CT THORAX DX C+: CPT | Mod: 26

## 2017-11-08 PROCEDURE — 74177 CT ABD & PELVIS W/CONTRAST: CPT | Mod: 26

## 2017-11-08 PROCEDURE — 74177 CT ABD & PELVIS W/CONTRAST: CPT

## 2017-11-13 ENCOUNTER — OUTPATIENT (OUTPATIENT)
Dept: OUTPATIENT SERVICES | Facility: HOSPITAL | Age: 27
LOS: 1 days | Discharge: HOME | End: 2017-11-13
Payer: MEDICAID

## 2017-11-13 DIAGNOSIS — C85.10 UNSPECIFIED B-CELL LYMPHOMA, UNSPECIFIED SITE: ICD-10-CM

## 2017-11-16 ENCOUNTER — RESULT REVIEW (OUTPATIENT)
Age: 27
End: 2017-11-16

## 2017-11-16 ENCOUNTER — APPOINTMENT (OUTPATIENT)
Dept: HEMATOLOGY ONCOLOGY | Facility: CLINIC | Age: 27
End: 2017-11-16

## 2017-11-16 VITALS
HEART RATE: 93 BPM | RESPIRATION RATE: 16 BRPM | TEMPERATURE: 98 F | DIASTOLIC BLOOD PRESSURE: 75 MMHG | WEIGHT: 151.02 LBS | SYSTOLIC BLOOD PRESSURE: 108 MMHG | OXYGEN SATURATION: 98 % | BODY MASS INDEX: 20.48 KG/M2

## 2017-11-16 PROCEDURE — 83020 HEMOGLOBIN ELECTROPHORESIS: CPT | Mod: 26,59

## 2017-11-17 LAB
BASOPHILS # BLD AUTO: 0.1 K/UL — SIGNIFICANT CHANGE UP (ref 0–0.2)
BASOPHILS NFR BLD AUTO: 0.8 % — SIGNIFICANT CHANGE UP (ref 0–2)
EOSINOPHIL # BLD AUTO: 0.2 K/UL — SIGNIFICANT CHANGE UP (ref 0–0.5)
EOSINOPHIL NFR BLD AUTO: 1.3 % — SIGNIFICANT CHANGE UP (ref 0–6)
LYMPHOCYTES # BLD AUTO: 29 % — SIGNIFICANT CHANGE UP (ref 13–44)
LYMPHOCYTES # BLD AUTO: 3.6 K/UL — HIGH (ref 1–3.3)
MONOCYTES # BLD AUTO: 0.6 K/UL — SIGNIFICANT CHANGE UP (ref 0–0.9)
MONOCYTES NFR BLD AUTO: 4.9 % — SIGNIFICANT CHANGE UP (ref 2–14)
NEUTROPHILS # BLD AUTO: 7.9 K/UL — HIGH (ref 1.8–7.4)
NEUTROPHILS NFR BLD AUTO: 63.9 % — SIGNIFICANT CHANGE UP (ref 43–77)

## 2017-11-20 LAB
HEMOGLOBIN INTERPRETATION: SIGNIFICANT CHANGE UP
HGB A MFR BLD: 97.8 % — SIGNIFICANT CHANGE UP (ref 95.8–98)
HGB A2 MFR BLD: 2.2 % — SIGNIFICANT CHANGE UP (ref 2–3.2)

## 2018-01-16 ENCOUNTER — LABORATORY RESULT (OUTPATIENT)
Age: 28
End: 2018-01-16

## 2018-01-16 ENCOUNTER — APPOINTMENT (OUTPATIENT)
Dept: DERMATOLOGY | Facility: CLINIC | Age: 28
End: 2018-01-16
Payer: MEDICAID

## 2018-01-16 VITALS — SYSTOLIC BLOOD PRESSURE: 110 MMHG | DIASTOLIC BLOOD PRESSURE: 70 MMHG

## 2018-01-16 PROCEDURE — 99214 OFFICE O/P EST MOD 30 MIN: CPT | Mod: 25,GC

## 2018-01-16 PROCEDURE — 11900 INJECT SKIN LESIONS </W 7: CPT | Mod: GC

## 2018-01-16 RX ORDER — DOXYCYCLINE HYCLATE 100 MG/1
100 TABLET ORAL TWICE DAILY
Qty: 1 | Refills: 2 | Status: COMPLETED | COMMUNITY
Start: 2017-08-14 | End: 2018-01-16

## 2018-01-17 ENCOUNTER — CHART COPY (OUTPATIENT)
Age: 28
End: 2018-01-17

## 2018-01-17 LAB
ALBUMIN SERPL ELPH-MCNC: 3.3 G/DL
ALP BLD-CCNC: 121 U/L
ALT SERPL-CCNC: 16 U/L
ANION GAP SERPL CALC-SCNC: 12 MMOL/L
AST SERPL-CCNC: 23 U/L
BASOPHILS # BLD AUTO: 0.23 K/UL
BASOPHILS NFR BLD AUTO: 1.8 %
BILIRUB SERPL-MCNC: <0.2 MG/DL
BUN SERPL-MCNC: 13 MG/DL
CALCIUM SERPL-MCNC: 9.4 MG/DL
CHLORIDE SERPL-SCNC: 102 MMOL/L
CO2 SERPL-SCNC: 26 MMOL/L
CREAT SERPL-MCNC: 0.76 MG/DL
EOSINOPHIL # BLD AUTO: 0.56 K/UL
EOSINOPHIL NFR BLD AUTO: 4.5
GLUCOSE SERPL-MCNC: 73 MG/DL
HBV SURFACE AB SER QL: REACTIVE
HBV SURFACE AG SER QL: NONREACTIVE
HCT VFR BLD CALC: 34.7 %
HCV RNA SERPL NAA DL=5-ACNC: NOT DETECTED IU/ML
HCV RNA SERPL NAA+PROBE-LOG IU: NOT DETECTED LOGIU/ML
HGB BLD-MCNC: 10.7 G/DL
LYMPHOCYTES # BLD AUTO: 4.13 K/UL
LYMPHOCYTES NFR BLD AUTO: 33 %
MAN DIFF?: NORMAL
MCHC RBC-ENTMCNC: 26.4 PG
MCHC RBC-ENTMCNC: 30.8 GM/DL
MCV RBC AUTO: 85.7 FL
MONOCYTES # BLD AUTO: 0.89 K/UL
MONOCYTES NFR BLD AUTO: 7.1 %
NEUTROPHILS # BLD AUTO: 6.7 K/UL
NEUTROPHILS NFR BLD AUTO: 53.6 %
PLATELET # BLD AUTO: 427 K/UL
POTASSIUM SERPL-SCNC: 4.3 MMOL/L
PROT SERPL-MCNC: 9.4 G/DL
RBC # BLD: 4.05 M/UL
RBC # FLD: 17.1 %
SODIUM SERPL-SCNC: 140 MMOL/L
WBC # FLD AUTO: 12.5 K/UL

## 2018-01-18 LAB
ADJUSTED MITOGEN: 4.27 IU/ML
ADJUSTED TB AG: 0 IU/ML
M TB IFN-G BLD-IMP: NEGATIVE
QUANTIFERON GOLD NIL: 0.01 IU/ML

## 2018-03-13 ENCOUNTER — OUTPATIENT (OUTPATIENT)
Dept: OUTPATIENT SERVICES | Facility: HOSPITAL | Age: 28
LOS: 1 days | Discharge: HOME | End: 2018-03-13

## 2018-03-13 DIAGNOSIS — C85.10 UNSPECIFIED B-CELL LYMPHOMA, UNSPECIFIED SITE: ICD-10-CM

## 2018-03-15 ENCOUNTER — APPOINTMENT (OUTPATIENT)
Dept: HEMATOLOGY ONCOLOGY | Facility: CLINIC | Age: 28
End: 2018-03-15

## 2018-07-03 NOTE — PRE-OP CHECKLIST - AS BP NONINV SITE
Problem: Suicide/Homicide (Adult/Pediatric)  Goal: *STG: Remains safe in hospital  Outcome: Progressing Towards Goal  Pt slept 5 hours. Pt had uneventful night and required no PRN's. Pt had no complaints or signs of distress throughout night. Respirations were unlabored. Continuing to monitor with q15 min rounds for safety. right upper arm

## 2018-08-14 PROBLEM — L73.2 HIDRADENITIS SUPPURATIVA: Chronic | Status: ACTIVE | Noted: 2017-07-21

## 2018-08-23 ENCOUNTER — APPOINTMENT (OUTPATIENT)
Dept: INTERNAL MEDICINE | Facility: CLINIC | Age: 28
End: 2018-08-23
Payer: MEDICAID

## 2018-08-23 VITALS
BODY MASS INDEX: 22.35 KG/M2 | TEMPERATURE: 99.1 F | HEIGHT: 72 IN | OXYGEN SATURATION: 99 % | SYSTOLIC BLOOD PRESSURE: 120 MMHG | WEIGHT: 165 LBS | RESPIRATION RATE: 16 BRPM | DIASTOLIC BLOOD PRESSURE: 84 MMHG | HEART RATE: 98 BPM

## 2018-08-23 DIAGNOSIS — Z82.49 FAMILY HISTORY OF ISCHEMIC HEART DISEASE AND OTHER DISEASES OF THE CIRCULATORY SYSTEM: ICD-10-CM

## 2018-08-23 DIAGNOSIS — Z78.9 OTHER SPECIFIED HEALTH STATUS: ICD-10-CM

## 2018-08-23 PROCEDURE — 99204 OFFICE O/P NEW MOD 45 MIN: CPT | Mod: Q5

## 2018-08-23 RX ORDER — NAPROXEN 500 MG/1
TABLET ORAL
Refills: 0 | Status: DISCONTINUED | COMMUNITY
End: 2018-08-23

## 2018-08-23 RX ORDER — DOXYCYCLINE HYCLATE 100 MG/1
100 CAPSULE ORAL
Qty: 1 | Refills: 1 | Status: DISCONTINUED | COMMUNITY
Start: 2018-01-16 | End: 2018-08-23

## 2018-08-23 NOTE — PHYSICAL EXAM
[No Acute Distress] : no acute distress [Well Nourished] : well nourished [Well Developed] : well developed [Well-Appearing] : well-appearing [Normal Oropharynx] : the oropharynx was normal [No JVD] : no jugular venous distention [No Respiratory Distress] : no respiratory distress  [Clear to Auscultation] : lungs were clear to auscultation bilaterally [Normal Rate] : normal rate  [No Edema] : there was no peripheral edema [Soft] : abdomen soft [Normal Anterior Cervical Nodes] : no anterior cervical lymphadenopathy [No CVA Tenderness] : no CVA  tenderness [Speech Grossly Normal] : speech grossly normal [Memory Grossly Normal] : memory grossly normal [Normal Affect] : the affect was normal [Alert and Oriented x3] : oriented to person, place, and time [Normal Mood] : the mood was normal [Normal Insight/Judgement] : insight and judgment were intact [de-identified] : +Slight odor detected [de-identified] : ++Severe Scarring, Boils/ Abcesses oozing, Robi Axilla, Groin area, Inner thighs, Scab/ Crusting, ++buttocks, Anus  [de-identified] : Walks slowly

## 2018-08-23 NOTE — ASSESSMENT
[FreeTextEntry1] : 28 yr old presents seeking to establish new PCP\par #1 Hidradenitis Supp-  ++Severe, encouraged strongly to return to Derm and consider other type of treatment offered. Continue supportive skin care. Restarted Doxy 100mg po BID \par #2 Perianal abcess- Consider surgical Referral in near future\par #3 Anemia- Get labs from recent ED visit/ [facility]\par Awaiting records from [facility] [address] / Labs , F/U 1 month ( After Derm)

## 2018-08-23 NOTE — COUNSELING
[Healthy eating counseling provided] : healthy eating [Activity counseling provided] : activity [Engage in a relaxing activity] : Engage in a relaxing activity [Plan in advance] : Plan in advance [None] : None [Good understanding] : Patient has a good understanding of lifestyle changes and the steps needed to achieve self management goals [Financial issues] : Financial issues [de-identified] : Discussed proper  nutritional intake with less sugars if possible, Discussed importance of hygiene with this conditions

## 2018-08-23 NOTE — HEALTH RISK ASSESSMENT
[No falls in past year] : Patient reported no falls in the past year [0] : 2) Feeling down, depressed, or hopeless: Not at all (0) [Intercurrent ED visits] : went to ED [] : No [de-identified] : [facility]  [de-identified] : nothing recently/ Derm in past  [[id number]] : 0

## 2018-08-23 NOTE — HISTORY OF PRESENT ILLNESS
[FreeTextEntry8] : 28 yr old presents today, seeks to establish new PCP since last PCP Dr. Elder retired. Pt has no major complaints today. Reports recent visit to ED for fainting and was told he was slightly dehydrated and his BP was elevated. Pt has severe skin condition/ chronic boils and abscess which drain constantly producing a foul odor. Pt has been to several specialists including Derm and stopped antibiotic on his own. Apparently he was lost to F/U with Derm, but Provider had long discussion of newer agent medication risks/ benefits. Pt had to stop working due to his condition. Lives with his Mother at this time.

## 2018-08-23 NOTE — REVIEW OF SYSTEMS
[Itching] : itching [Shortness Of Breath] : shortness of breath [Skin Rash] : skin rash [Negative] : Heme/Lymph [FreeTextEntry3] : +discomfort  [FreeTextEntry6] : on occasion [de-identified] : Many boils/ scabs/ oozing under arms, perineal, Anus , painful

## 2018-09-24 ENCOUNTER — APPOINTMENT (OUTPATIENT)
Dept: DERMATOLOGY | Facility: CLINIC | Age: 28
End: 2018-09-24
Payer: MEDICAID

## 2018-09-24 LAB
ALBUMIN SERPL ELPH-MCNC: 3.7 G/DL
ALP BLD-CCNC: 102 U/L
ALT SERPL-CCNC: 7 U/L
ANION GAP SERPL CALC-SCNC: 12 MMOL/L
AST SERPL-CCNC: 12 U/L
BASOPHILS # BLD AUTO: 0.06 K/UL
BASOPHILS NFR BLD AUTO: 0.6 %
BILIRUB SERPL-MCNC: 0.2 MG/DL
BUN SERPL-MCNC: 10 MG/DL
CALCIUM SERPL-MCNC: 9.3 MG/DL
CHLORIDE SERPL-SCNC: 102 MMOL/L
CO2 SERPL-SCNC: 26 MMOL/L
CREAT SERPL-MCNC: 0.75 MG/DL
EOSINOPHIL # BLD AUTO: 0.3 K/UL
EOSINOPHIL NFR BLD AUTO: 2.9 %
HCT VFR BLD CALC: 36.9 %
HGB BLD-MCNC: 11.6 G/DL
IMM GRANULOCYTES NFR BLD AUTO: 0.2 %
LYMPHOCYTES # BLD AUTO: 2.64 K/UL
LYMPHOCYTES NFR BLD AUTO: 25.5 %
MAN DIFF?: NORMAL
MCHC RBC-ENTMCNC: 25.8 PG
MCHC RBC-ENTMCNC: 31.4 GM/DL
MCV RBC AUTO: 82.2 FL
MONOCYTES # BLD AUTO: 0.94 K/UL
MONOCYTES NFR BLD AUTO: 9.1 %
NEUTROPHILS # BLD AUTO: 6.41 K/UL
NEUTROPHILS NFR BLD AUTO: 61.7 %
PLATELET # BLD AUTO: 385 K/UL
POTASSIUM SERPL-SCNC: 4.4 MMOL/L
PROT SERPL-MCNC: 8.5 G/DL
RBC # BLD: 4.49 M/UL
RBC # FLD: 18.4 %
SODIUM SERPL-SCNC: 140 MMOL/L
WBC # FLD AUTO: 10.37 K/UL

## 2018-09-24 PROCEDURE — 99214 OFFICE O/P EST MOD 30 MIN: CPT

## 2018-09-24 RX ORDER — CHLORHEXIDINE GLUCONATE 20 %
20 SOLUTION, NON-ORAL MISCELLANEOUS
Qty: 1 | Refills: 11 | Status: ACTIVE | COMMUNITY
Start: 2017-08-14 | End: 1900-01-01

## 2018-10-16 ENCOUNTER — APPOINTMENT (OUTPATIENT)
Dept: DERMATOLOGY | Facility: CLINIC | Age: 28
End: 2018-10-16
Payer: MEDICAID

## 2018-10-16 VITALS — DIASTOLIC BLOOD PRESSURE: 76 MMHG | SYSTOLIC BLOOD PRESSURE: 124 MMHG

## 2018-10-16 PROCEDURE — 96401 CHEMO ANTI-NEOPL SQ/IM: CPT

## 2018-10-16 PROCEDURE — 96372 THER/PROPH/DIAG INJ SC/IM: CPT | Mod: 59

## 2018-10-16 PROCEDURE — 99214 OFFICE O/P EST MOD 30 MIN: CPT | Mod: 25

## 2019-01-15 ENCOUNTER — APPOINTMENT (OUTPATIENT)
Dept: DERMATOLOGY | Facility: CLINIC | Age: 29
End: 2019-01-15
Payer: MEDICAID

## 2019-01-15 PROCEDURE — 99214 OFFICE O/P EST MOD 30 MIN: CPT

## 2019-01-15 NOTE — PHYSICAL EXAM
[Alert] : alert [Oriented x 3] : ~L oriented x 3 [Well Nourished] : well nourished [Conjunctiva Non-injected] : conjunctiva non-injected [No Visual Lymphadenopathy] : no visual  lymphadenopathy [No Clubbing] : no clubbing [No Edema] : no edema [No Bromhidrosis] : no bromhidrosis [No Chromhidrosis] : no chromhidrosis [FreeTextEntry3] : scarred sinus tracts in b/l axilla, groin, intergluteal cleft, no active drainage today\par \par hypertrophic scars on face

## 2019-01-15 NOTE — HISTORY OF PRESENT ILLNESS
[FreeTextEntry1] : f/u HS [de-identified] : 28m here to f/u HS. Significantly improved on Humira (start Oct 2018).  Almost all active draining areas have cleared.\par Pt very pleased. Denies SE to Humira. \par \par \par Accompanied by Grandfather\par Presenting hx:\par He has had draining and painful growths of both axillae and groin, treated in the past with courses of antibiotics including clinda and Keflex for cellulitis. Also has crusting on face. Of note, previously seen at [facility] where he was a/w syncopal episode and underwent LN bx as part of r/o lymphoma w/u (remainder of workup neg). While in the hospital, biopsy performed of groin lesion, c/w HS.

## 2019-01-17 LAB
ALBUMIN SERPL ELPH-MCNC: 3.8 G/DL
ALP BLD-CCNC: 101 U/L
ALT SERPL-CCNC: 9 U/L
ANION GAP SERPL CALC-SCNC: 12 MMOL/L
AST SERPL-CCNC: 21 U/L
BASOPHILS # BLD AUTO: 0.06 K/UL
BASOPHILS NFR BLD AUTO: 0.6 %
BILIRUB SERPL-MCNC: 0.2 MG/DL
BUN SERPL-MCNC: 18 MG/DL
CALCIUM SERPL-MCNC: 8.9 MG/DL
CHLORIDE SERPL-SCNC: 103 MMOL/L
CO2 SERPL-SCNC: 22 MMOL/L
CREAT SERPL-MCNC: 0.91 MG/DL
EOSINOPHIL # BLD AUTO: 0.26 K/UL
EOSINOPHIL NFR BLD AUTO: 2.7 %
GLUCOSE SERPL-MCNC: 87 MG/DL
HBV SURFACE AB SER QL: REACTIVE
HBV SURFACE AG SER QL: NONREACTIVE
HCT VFR BLD CALC: 40.5 %
HCV AB SER QL: NONREACTIVE
HCV S/CO RATIO: 0.16 S/CO
HGB BLD-MCNC: 12.5 G/DL
IMM GRANULOCYTES NFR BLD AUTO: 0.1 %
LYMPHOCYTES # BLD AUTO: 4.36 K/UL
LYMPHOCYTES NFR BLD AUTO: 45.5 %
M TB IFN-G BLD-IMP: NEGATIVE
MAN DIFF?: NORMAL
MCHC RBC-ENTMCNC: 26.8 PG
MCHC RBC-ENTMCNC: 30.9 GM/DL
MCV RBC AUTO: 86.9 FL
MONOCYTES # BLD AUTO: 0.74 K/UL
MONOCYTES NFR BLD AUTO: 7.7 %
NEUTROPHILS # BLD AUTO: 4.16 K/UL
NEUTROPHILS NFR BLD AUTO: 43.4 %
PLATELET # BLD AUTO: 230 K/UL
POTASSIUM SERPL-SCNC: 4.5 MMOL/L
PROT SERPL-MCNC: 8.5 G/DL
QUANTIFERON TB PLUS MITOGEN MINUS NIL: 8.8 IU/ML
QUANTIFERON TB PLUS NIL: 0.02 IU/ML
QUANTIFERON TB PLUS TB1 MINUS NIL: 0 IU/ML
QUANTIFERON TB PLUS TB2 MINUS NIL: 0 IU/ML
RBC # BLD: 4.66 M/UL
RBC # FLD: 18.5 %
SODIUM SERPL-SCNC: 137 MMOL/L
WBC # FLD AUTO: 9.59 K/UL

## 2019-01-18 ENCOUNTER — MEDICATION RENEWAL (OUTPATIENT)
Age: 29
End: 2019-01-18

## 2019-04-16 ENCOUNTER — APPOINTMENT (OUTPATIENT)
Dept: DERMATOLOGY | Facility: CLINIC | Age: 29
End: 2019-04-16
Payer: MEDICAID

## 2019-04-16 ENCOUNTER — LABORATORY RESULT (OUTPATIENT)
Age: 29
End: 2019-04-16

## 2019-04-16 PROCEDURE — 99214 OFFICE O/P EST MOD 30 MIN: CPT

## 2019-04-17 LAB
ALBUMIN SERPL ELPH-MCNC: 4.4 G/DL
ALP BLD-CCNC: 98 U/L
ALT SERPL-CCNC: <5 U/L
ANION GAP SERPL CALC-SCNC: 7 MMOL/L
AST SERPL-CCNC: <5 U/L
BASOPHILS # BLD AUTO: 0.09 K/UL
BASOPHILS NFR BLD AUTO: 0.9 %
BILIRUB SERPL-MCNC: <0.2 MG/DL
BUN SERPL-MCNC: 13 MG/DL
CALCIUM SERPL-MCNC: 9.3 MG/DL
CHLORIDE SERPL-SCNC: 106 MMOL/L
CO2 SERPL-SCNC: 28 MMOL/L
CREAT SERPL-MCNC: 0.95 MG/DL
EOSINOPHIL # BLD AUTO: 0.21 K/UL
EOSINOPHIL NFR BLD AUTO: 2 %
GLUCOSE SERPL-MCNC: 98 MG/DL
HCT VFR BLD CALC: 43 %
HGB BLD-MCNC: 13.9 G/DL
IMM GRANULOCYTES NFR BLD AUTO: 0.1 %
LYMPHOCYTES # BLD AUTO: 4.74 K/UL
LYMPHOCYTES NFR BLD AUTO: 45.7 %
MAN DIFF?: NORMAL
MCHC RBC-ENTMCNC: 28.5 PG
MCHC RBC-ENTMCNC: 32.3 GM/DL
MCV RBC AUTO: 88.1 FL
MONOCYTES # BLD AUTO: 0.68 K/UL
MONOCYTES NFR BLD AUTO: 6.6 %
NEUTROPHILS # BLD AUTO: 4.65 K/UL
NEUTROPHILS NFR BLD AUTO: 44.7 %
PLATELET # BLD AUTO: 232 K/UL
POTASSIUM SERPL-SCNC: 5 MMOL/L
PROT SERPL-MCNC: 8.1 G/DL
RBC # BLD: 4.88 M/UL
RBC # FLD: 15.4 %
SODIUM SERPL-SCNC: 141 MMOL/L
WBC # FLD AUTO: 10.38 K/UL

## 2019-07-16 ENCOUNTER — LABORATORY RESULT (OUTPATIENT)
Age: 29
End: 2019-07-16

## 2019-07-16 ENCOUNTER — APPOINTMENT (OUTPATIENT)
Dept: DERMATOLOGY | Facility: CLINIC | Age: 29
End: 2019-07-16
Payer: MEDICAID

## 2019-07-16 VITALS
HEIGHT: 74 IN | BODY MASS INDEX: 25.03 KG/M2 | WEIGHT: 195 LBS | SYSTOLIC BLOOD PRESSURE: 122 MMHG | DIASTOLIC BLOOD PRESSURE: 84 MMHG

## 2019-07-16 LAB
ALBUMIN SERPL ELPH-MCNC: 4.5 G/DL
ALP BLD-CCNC: 116 U/L
ALT SERPL-CCNC: 13 U/L
ANION GAP SERPL CALC-SCNC: 11 MMOL/L
AST SERPL-CCNC: 12 U/L
BASOPHILS # BLD AUTO: 0.09 K/UL
BASOPHILS NFR BLD AUTO: 1 %
BILIRUB SERPL-MCNC: 0.2 MG/DL
BUN SERPL-MCNC: 18 MG/DL
CALCIUM SERPL-MCNC: 9.4 MG/DL
CHLORIDE SERPL-SCNC: 104 MMOL/L
CO2 SERPL-SCNC: 25 MMOL/L
CREAT SERPL-MCNC: 1.04 MG/DL
EOSINOPHIL # BLD AUTO: 0.24 K/UL
EOSINOPHIL NFR BLD AUTO: 2.6 %
GLUCOSE SERPL-MCNC: 96 MG/DL
HCT VFR BLD CALC: 45.3 %
HGB BLD-MCNC: 14.4 G/DL
IMM GRANULOCYTES NFR BLD AUTO: 0.2 %
LYMPHOCYTES # BLD AUTO: 4.55 K/UL
LYMPHOCYTES NFR BLD AUTO: 49.6 %
MAN DIFF?: NORMAL
MCHC RBC-ENTMCNC: 29.4 PG
MCHC RBC-ENTMCNC: 31.8 GM/DL
MCV RBC AUTO: 92.6 FL
MONOCYTES # BLD AUTO: 0.61 K/UL
MONOCYTES NFR BLD AUTO: 6.7 %
NEUTROPHILS # BLD AUTO: 3.66 K/UL
NEUTROPHILS NFR BLD AUTO: 39.9 %
PLATELET # BLD AUTO: 225 K/UL
POTASSIUM SERPL-SCNC: 4.6 MMOL/L
PROT SERPL-MCNC: 8.1 G/DL
RBC # BLD: 4.89 M/UL
RBC # FLD: 14.3 %
SODIUM SERPL-SCNC: 140 MMOL/L
WBC # FLD AUTO: 9.17 K/UL

## 2019-07-16 PROCEDURE — 99214 OFFICE O/P EST MOD 30 MIN: CPT | Mod: 25

## 2019-07-16 PROCEDURE — 11900 INJECT SKIN LESIONS </W 7: CPT

## 2019-07-17 ENCOUNTER — RX RENEWAL (OUTPATIENT)
Age: 29
End: 2019-07-17

## 2019-08-16 NOTE — END OF VISIT
Diabetes education follow up     Taking diabetes medications?   yes:     Diabetes Medication(s)     Biguanides       metFORMIN (GLUCOPHAGE-XR) 500 MG 24 hr tablet    TAKE ONE TABLET BY MOUTH ONE TIME DAILY (WITH DINNER)    Insulin       insulin aspart (NOVOLOG FLEXPEN) 100 UNIT/ML pen    inject 7 units subcutaneously with dinner + sliding scale  (max daily dose 15units)     insulin glargine (LANTUS SOLOSTAR) 100 UNIT/ML pen    Inject 40 units in the evening.          Fastin, 123, 128, 127, 125, 127  Before lunch 126, 124, 130, 129, 117, 139, 133, 123  Before dinner 122, 120, 119, 127, 12, 128  Bedtime: 124, 128, 129, 124, 135, 124, 127, 126    Assessmenet/Plan  Blood sugars in target.  New A1c lab ordered for 19 or after.    Ameena Sesay RD, LD, CDE  Diabetes Education     [] : Resident

## 2019-08-27 NOTE — DISCHARGE NOTE ADULT - NS AS DC FU INST LIST INST
Nine Month Well Child Exam      Patricia Gardner is a 8 m.o. female here for well child exam.    INFORMANT: mom    Parent concerns    no    Any major changes in the family lately? no  Any concerns with vision or hearing? no     DIET HISTORY:  Feeding pattern: Danielle Rim, 8oz, 4 times per day for a total of about 32 oz/day  Juice? 0 oz per day  Baby cereal? 2 TBSP,  2 times per day  Stage 2 baby food, 2 times per day  Has started table foods? no  Feeding difficulties? no  Understands no honey, eggs, milk, peanut butter or strawberries until after 1 year? n/a    ELIMINATION:  Wets 6-8 diapers/day? yes  Has at least 1 bowel movement/day? yes  BMs are soft? yes    SLEEP:  Falls asleep independently? yes  Sleeps in parents' bed? no  Sleeps through without feeding?:  yes  Problems? no    DEVELOPMENTAL:  Special services:    Receives OT, PT, Speech, and/or is involved with Early Intervention? no  Fine Motor:   Uses a pincer grasp? Yes   Feeds self? yes   Holds own bottle? yes   Uses a sippy cup? no     Gross Motor:              Crawls? yes   Sits without support? yes   Pulls self to standing? yes    Language:   Says mama/dhruv non-specifically? Yes  Mama, [address], baba, [place]. Social:   Waves bye-bye? trying   Plays pat-a-cake? no   Claps? yes    SAFETY:    Uses a car-seat? Yes  Is it rear-facing? Yes  Any smokers in the home?  No  Has smoke detectors in home?:  Yes  Has carbon monoxide detectors?:  Yes  Guns/weapons in the home?: no  Any other safety concerns in the home?:  No  Adverse reactions to 6 month immunizations? no  Pets?  no    SOCIAL:  Current child-care arrangements: : 4 days per week, 9 hrs per day  Sibling relations: sister and brother  Caregiver  has been feeling sad, anxious, hopeless or depressed?: no  CHIEF COMPLAINT    Chief Complaint   Patient presents with    Well Child     10 month       HPI    Hemal Quinones is a 8 m.o. female who presents for [place] was the Mother    Review of no

## 2019-10-15 ENCOUNTER — RESULT REVIEW (OUTPATIENT)
Age: 29
End: 2019-10-15

## 2019-10-15 ENCOUNTER — LABORATORY RESULT (OUTPATIENT)
Age: 29
End: 2019-10-15

## 2019-10-15 ENCOUNTER — APPOINTMENT (OUTPATIENT)
Dept: DERMATOLOGY | Facility: CLINIC | Age: 29
End: 2019-10-15
Payer: MEDICAID

## 2019-10-15 LAB
ALBUMIN SERPL ELPH-MCNC: 4.4 G/DL
ALP BLD-CCNC: 101 U/L
ALT SERPL-CCNC: 14 U/L
ANION GAP SERPL CALC-SCNC: 15 MMOL/L
AST SERPL-CCNC: 14 U/L
BASOPHILS # BLD AUTO: 0.09 K/UL
BASOPHILS NFR BLD AUTO: 0.9 %
BILIRUB SERPL-MCNC: <0.2 MG/DL
BUN SERPL-MCNC: 18 MG/DL
CALCIUM SERPL-MCNC: 9.7 MG/DL
CHLORIDE SERPL-SCNC: 103 MMOL/L
CO2 SERPL-SCNC: 24 MMOL/L
CREAT SERPL-MCNC: 1.07 MG/DL
EOSINOPHIL # BLD AUTO: 0.21 K/UL
EOSINOPHIL NFR BLD AUTO: 2 %
GLUCOSE SERPL-MCNC: 113 MG/DL
HCT VFR BLD CALC: 44.8 %
HGB BLD-MCNC: 14.2 G/DL
IMM GRANULOCYTES NFR BLD AUTO: 0.2 %
LYMPHOCYTES # BLD AUTO: 5.7 K/UL
LYMPHOCYTES NFR BLD AUTO: 54.9 %
MAN DIFF?: NORMAL
MCHC RBC-ENTMCNC: 29 PG
MCHC RBC-ENTMCNC: 31.7 GM/DL
MCV RBC AUTO: 91.4 FL
MONOCYTES # BLD AUTO: 0.54 K/UL
MONOCYTES NFR BLD AUTO: 5.2 %
NEUTROPHILS # BLD AUTO: 3.82 K/UL
NEUTROPHILS NFR BLD AUTO: 36.8 %
PLATELET # BLD AUTO: 243 K/UL
POTASSIUM SERPL-SCNC: 4.4 MMOL/L
PROT SERPL-MCNC: 7.8 G/DL
RBC # BLD: 4.9 M/UL
RBC # FLD: 14.5 %
SODIUM SERPL-SCNC: 142 MMOL/L
WBC # FLD AUTO: 10.38 K/UL

## 2019-10-15 PROCEDURE — 99214 OFFICE O/P EST MOD 30 MIN: CPT

## 2019-10-15 NOTE — PHYSICAL EXAM
[Oriented x 3] : ~L oriented x 3 [Alert] : alert [Well Nourished] : well nourished [Conjunctiva Non-injected] : conjunctiva non-injected [No Visual Lymphadenopathy] : no visual  lymphadenopathy [No Clubbing] : no clubbing [No Edema] : no edema [No Bromhidrosis] : no bromhidrosis [No Chromhidrosis] : no chromhidrosis [FreeTextEntry3] : scarred sinus tracts in scalp b/l axilla, groin, intergluteal cleft and buttocks no active drainage today\par \par hypertrophic scars on face

## 2019-10-15 NOTE — HISTORY OF PRESENT ILLNESS
[FreeTextEntry1] : f/u HS [de-identified] : 29M here to f/u HS. Significantly improved on Humira (start Oct 2018).  Almost all active draining areas have cleared. No active lesions. Using BPO and clindamycin.\par \par Pt very pleased. Denies SE to Humira. \par \par Presenting hx:\par He has had draining and painful growths of both axillae and groin, treated in the past with courses of antibiotics including clinda and Keflex for cellulitis. Also has crusting on face. Of note, previously seen at [facility] where he was a/w syncopal episode and underwent LN bx as part of r/o lymphoma w/u (remainder of workup neg). While in the hospital, biopsy performed of groin lesion, c/w HS.

## 2019-11-20 ENCOUNTER — APPOINTMENT (OUTPATIENT)
Dept: INTERNAL MEDICINE | Facility: CLINIC | Age: 29
End: 2019-11-20
Payer: MEDICAID

## 2019-11-20 VITALS
WEIGHT: 199 LBS | BODY MASS INDEX: 25.54 KG/M2 | HEART RATE: 111 BPM | DIASTOLIC BLOOD PRESSURE: 79 MMHG | TEMPERATURE: 100.1 F | OXYGEN SATURATION: 97 % | HEIGHT: 74 IN | SYSTOLIC BLOOD PRESSURE: 110 MMHG

## 2019-11-20 DIAGNOSIS — K61.0 ANAL ABSCESS: ICD-10-CM

## 2019-11-20 PROCEDURE — 99213 OFFICE O/P EST LOW 20 MIN: CPT

## 2019-11-20 NOTE — PLAN
[FreeTextEntry1] : antibiotics, contact dr. isabel,\par pain meds sparingly\par stool softerners\par

## 2019-11-20 NOTE — HISTORY OF PRESENT ILLNESS
[FreeTextEntry8] : started humira  about a year ago and doing well. until Tues, flared up and can't sit down. no fever no chills

## 2019-11-20 NOTE — HEALTH RISK ASSESSMENT
[No] : In the past 12 months have you used drugs other than those required for medical reasons? No [No falls in past year] : Patient reported no falls in the past year [0] : 2) Feeling down, depressed, or hopeless: Not at all (0) [] : No [Audit-CScore] : 0 [de-identified] : dermatologist  [de-identified] : none [de-identified] : walk [[id number]] : 0

## 2019-11-20 NOTE — PHYSICAL EXAM
[Normal] : no respiratory distress, lungs were clear to auscultation bilaterally and no accessory muscle use [de-identified] : firm and tender abscess in right buttock, no puss, indurated [de-identified] : unable to sit down

## 2019-12-19 ENCOUNTER — RX RENEWAL (OUTPATIENT)
Age: 29
End: 2019-12-19

## 2020-01-21 ENCOUNTER — LABORATORY RESULT (OUTPATIENT)
Age: 30
End: 2020-01-21

## 2020-01-21 ENCOUNTER — APPOINTMENT (OUTPATIENT)
Dept: DERMATOLOGY | Facility: CLINIC | Age: 30
End: 2020-01-21
Payer: MEDICAID

## 2020-01-21 PROCEDURE — 99214 OFFICE O/P EST MOD 30 MIN: CPT

## 2020-01-23 LAB
ALBUMIN SERPL ELPH-MCNC: 4.2 G/DL
ALP BLD-CCNC: 87 U/L
ALT SERPL-CCNC: 6 U/L
ANION GAP SERPL CALC-SCNC: 12 MMOL/L
AST SERPL-CCNC: 12 U/L
BASOPHILS # BLD AUTO: 0.1 K/UL
BASOPHILS NFR BLD AUTO: 0.9 %
BILIRUB SERPL-MCNC: 0.2 MG/DL
BUN SERPL-MCNC: 11 MG/DL
CALCIUM SERPL-MCNC: 9.5 MG/DL
CHLORIDE SERPL-SCNC: 104 MMOL/L
CO2 SERPL-SCNC: 25 MMOL/L
CREAT SERPL-MCNC: 1.02 MG/DL
EOSINOPHIL # BLD AUTO: 0.2 K/UL
EOSINOPHIL NFR BLD AUTO: 1.8 %
GLUCOSE SERPL-MCNC: 97 MG/DL
HBV CORE IGG+IGM SER QL: NONREACTIVE
HBV SURFACE AB SER QL: REACTIVE
HBV SURFACE AG SER QL: NONREACTIVE
HCT VFR BLD CALC: 45.7 %
HCV AB SER QL: NONREACTIVE
HCV S/CO RATIO: 0.17 S/CO
HGB BLD-MCNC: 14.4 G/DL
LYMPHOCYTES # BLD AUTO: 5.21 K/UL
LYMPHOCYTES NFR BLD AUTO: 47.8 %
M TB IFN-G BLD-IMP: NEGATIVE
MAN DIFF?: NORMAL
MCHC RBC-ENTMCNC: 29.3 PG
MCHC RBC-ENTMCNC: 31.5 GM/DL
MCV RBC AUTO: 93.1 FL
MONOCYTES # BLD AUTO: 0.85 K/UL
MONOCYTES NFR BLD AUTO: 7.8 %
NEUTROPHILS # BLD AUTO: 3.69 K/UL
NEUTROPHILS NFR BLD AUTO: 33.9 %
PLATELET # BLD AUTO: 231 K/UL
POTASSIUM SERPL-SCNC: 4.1 MMOL/L
PROT SERPL-MCNC: 8.2 G/DL
QUANTIFERON TB PLUS MITOGEN MINUS NIL: >10 IU/ML
QUANTIFERON TB PLUS NIL: 0.04 IU/ML
QUANTIFERON TB PLUS TB1 MINUS NIL: 0.01 IU/ML
QUANTIFERON TB PLUS TB2 MINUS NIL: -0.01 IU/ML
RBC # BLD: 4.91 M/UL
RBC # FLD: 14.8 %
SODIUM SERPL-SCNC: 141 MMOL/L
WBC # FLD AUTO: 10.89 K/UL

## 2020-02-22 ENCOUNTER — APPOINTMENT (OUTPATIENT)
Dept: INTERNAL MEDICINE | Facility: CLINIC | Age: 30
End: 2020-02-22
Payer: MEDICAID

## 2020-02-22 ENCOUNTER — LABORATORY RESULT (OUTPATIENT)
Age: 30
End: 2020-02-22

## 2020-02-22 VITALS — DIASTOLIC BLOOD PRESSURE: 80 MMHG | SYSTOLIC BLOOD PRESSURE: 110 MMHG

## 2020-02-22 VITALS — OXYGEN SATURATION: 98 % | BODY MASS INDEX: 28.36 KG/M2 | TEMPERATURE: 98.5 F | WEIGHT: 221 LBS | HEIGHT: 74 IN

## 2020-02-22 DIAGNOSIS — Z00.00 ENCOUNTER FOR GENERAL ADULT MEDICAL EXAMINATION W/OUT ABNORMAL FINDINGS: ICD-10-CM

## 2020-02-22 DIAGNOSIS — R59.1 GENERALIZED ENLARGED LYMPH NODES: ICD-10-CM

## 2020-02-22 PROCEDURE — 99214 OFFICE O/P EST MOD 30 MIN: CPT | Mod: 25

## 2020-02-22 PROCEDURE — 36415 COLL VENOUS BLD VENIPUNCTURE: CPT

## 2020-02-22 NOTE — HISTORY OF PRESENT ILLNESS
[FreeTextEntry1] : evaluation skin [de-identified] : Mr Reyna is a 28 yo male with long hx hidradenitis suppurativa comes to office for evaluation. major current issue is to start medical marijuana and referral to Dr Mcgowan was given. patient followed regularly by dermatology. also c/o white spot R pharynx area w/o sore throat

## 2020-02-22 NOTE — ASSESSMENT
[FreeTextEntry1] : returns for routine labs and referral for medical marijuana  will check to r/o strep R pharynx

## 2020-02-22 NOTE — HEALTH RISK ASSESSMENT
[No falls in past year] : Patient reported no falls in the past year [No] : In the past 12 months have you used drugs other than those required for medical reasons? No [0] : 1) Little interest or pleasure doing things: Not at all (0) [] : No [de-identified] : dermatologist  [Audit-CScore] : 0 [de-identified] : none [[id number]] : 0 [de-identified] : normal

## 2020-02-22 NOTE — PHYSICAL EXAM
[No Lymphadenopathy] : no lymphadenopathy [No Respiratory Distress] : no respiratory distress  [Regular Rhythm] : with a regular rhythm [No Edema] : there was no peripheral edema [Normal Rate] : normal rate  [No Focal Deficits] : no focal deficits [de-identified] : white ruptured follicle R posterior throat [Soft] : abdomen soft [Non Tender] : non-tender

## 2020-03-12 LAB
ALBUMIN SERPL ELPH-MCNC: 4.7 G/DL
ALP BLD-CCNC: 86 U/L
ALT SERPL-CCNC: 12 U/L
ANION GAP SERPL CALC-SCNC: 10 MMOL/L
AST SERPL-CCNC: 19 U/L
BACTERIA THROAT CULT: NORMAL
BASOPHILS # BLD AUTO: 0.1 K/UL
BASOPHILS NFR BLD AUTO: 1 %
BILIRUB SERPL-MCNC: 0.3 MG/DL
BUN SERPL-MCNC: 16 MG/DL
CALCIUM SERPL-MCNC: 9.4 MG/DL
CHLORIDE SERPL-SCNC: 105 MMOL/L
CHOLEST SERPL-MCNC: 205 MG/DL
CHOLEST/HDLC SERPL: 3.6 RATIO
CO2 SERPL-SCNC: 26 MMOL/L
CREAT SERPL-MCNC: 0.93 MG/DL
EOSINOPHIL # BLD AUTO: 0.2 K/UL
EOSINOPHIL NFR BLD AUTO: 1.9 %
ESTIMATED AVERAGE GLUCOSE: 105 MG/DL
GLUCOSE SERPL-MCNC: 91 MG/DL
HBA1C MFR BLD HPLC: 5.3 %
HCT VFR BLD CALC: 46.1 %
HDLC SERPL-MCNC: 58 MG/DL
HGB BLD-MCNC: 14.7 G/DL
IMM GRANULOCYTES NFR BLD AUTO: 0.3 %
LDLC SERPL CALC-MCNC: 131 MG/DL
LYMPHOCYTES # BLD AUTO: 5.26 K/UL
LYMPHOCYTES NFR BLD AUTO: 50.7 %
MAN DIFF?: NORMAL
MCHC RBC-ENTMCNC: 29.4 PG
MCHC RBC-ENTMCNC: 31.9 GM/DL
MCV RBC AUTO: 92.2 FL
MONOCYTES # BLD AUTO: 0.64 K/UL
MONOCYTES NFR BLD AUTO: 6.2 %
NEUTROPHILS # BLD AUTO: 4.15 K/UL
NEUTROPHILS NFR BLD AUTO: 39.9 %
PLATELET # BLD AUTO: 242 K/UL
POTASSIUM SERPL-SCNC: 5.1 MMOL/L
PROT SERPL-MCNC: 8.4 G/DL
RBC # BLD: 5 M/UL
RBC # FLD: 15.2 %
SODIUM SERPL-SCNC: 142 MMOL/L
T4 FREE SERPL-MCNC: 1.3 NG/DL
TRIGL SERPL-MCNC: 82 MG/DL
TSH SERPL-ACNC: 1.31 UIU/ML
WBC # FLD AUTO: 10.38 K/UL

## 2020-04-21 ENCOUNTER — LABORATORY RESULT (OUTPATIENT)
Age: 30
End: 2020-04-21

## 2020-04-21 ENCOUNTER — APPOINTMENT (OUTPATIENT)
Dept: DERMATOLOGY | Facility: CLINIC | Age: 30
End: 2020-04-21
Payer: MEDICAID

## 2020-04-21 VITALS — HEIGHT: 73 IN | BODY MASS INDEX: 29.82 KG/M2 | WEIGHT: 225 LBS

## 2020-04-21 LAB
ALBUMIN SERPL ELPH-MCNC: 4.2 G/DL
ALP BLD-CCNC: 100 U/L
ALT SERPL-CCNC: 13 U/L
ANION GAP SERPL CALC-SCNC: 12 MMOL/L
AST SERPL-CCNC: 20 U/L
BASOPHILS # BLD AUTO: 0.11 K/UL
BASOPHILS NFR BLD AUTO: 0.7 %
BILIRUB SERPL-MCNC: 0.2 MG/DL
BUN SERPL-MCNC: 19 MG/DL
CALCIUM SERPL-MCNC: 9.6 MG/DL
CHLORIDE SERPL-SCNC: 103 MMOL/L
CO2 SERPL-SCNC: 24 MMOL/L
CREAT SERPL-MCNC: 0.88 MG/DL
EOSINOPHIL # BLD AUTO: 0.28 K/UL
EOSINOPHIL NFR BLD AUTO: 1.9 %
GLUCOSE SERPL-MCNC: 100 MG/DL
HCT VFR BLD CALC: 45.5 %
HGB BLD-MCNC: 14 G/DL
IMM GRANULOCYTES NFR BLD AUTO: 0.3 %
LYMPHOCYTES # BLD AUTO: 7.3 K/UL
LYMPHOCYTES NFR BLD AUTO: 49.7 %
MAN DIFF?: NORMAL
MCHC RBC-ENTMCNC: 28.7 PG
MCHC RBC-ENTMCNC: 30.8 GM/DL
MCV RBC AUTO: 93.2 FL
MONOCYTES # BLD AUTO: 1.11 K/UL
MONOCYTES NFR BLD AUTO: 7.6 %
NEUTROPHILS # BLD AUTO: 5.84 K/UL
NEUTROPHILS NFR BLD AUTO: 39.8 %
PLATELET # BLD AUTO: 274 K/UL
POTASSIUM SERPL-SCNC: 5.1 MMOL/L
PROT SERPL-MCNC: 8.4 G/DL
RBC # BLD: 4.88 M/UL
RBC # FLD: 14.7 %
SODIUM SERPL-SCNC: 139 MMOL/L
WBC # FLD AUTO: 14.68 K/UL

## 2020-04-21 PROCEDURE — 99214 OFFICE O/P EST MOD 30 MIN: CPT

## 2020-04-21 NOTE — HISTORY OF PRESENT ILLNESS
[de-identified] : 29M here to f/u HS. Significantly improved on Humira (start Oct 2018).  Almost all active draining areas have cleared. Has an active area on the R temple and R suprapubic area x 1 week. Tender. The one in the R pubic area has drained. No fevers, chills. Using BPO and clindamycin.\par \par Pt very pleased. Denies SE to Humira. \par \par Presenting hx:\par He has had draining and painful growths of both axillae and groin, treated in the past with courses of antibiotics including clinda and Keflex for cellulitis. Also has crusting on face. Of note, previously seen at [facility] where he was a/w syncopal episode and underwent LN bx as part of r/o lymphoma w/u (remainder of workup neg). While in the hospital, biopsy performed of groin lesion, c/w HS.  [FreeTextEntry1] : f/u HS

## 2020-07-02 NOTE — ED PROVIDER NOTE - ST/T WAVE
· Acute metabolic encephalopathy secondary to pneumonia    · Mentation appears to be at baseline no ischemic changes

## 2020-07-06 ENCOUNTER — EMERGENCY (EMERGENCY)
Facility: HOSPITAL | Age: 30
LOS: 1 days | Discharge: HOME | End: 2020-07-06
Attending: EMERGENCY MEDICINE | Admitting: EMERGENCY MEDICINE
Payer: MEDICAID

## 2020-07-06 VITALS
HEART RATE: 111 BPM | DIASTOLIC BLOOD PRESSURE: 99 MMHG | SYSTOLIC BLOOD PRESSURE: 152 MMHG | RESPIRATION RATE: 18 BRPM | OXYGEN SATURATION: 95 % | TEMPERATURE: 99 F

## 2020-07-06 VITALS — OXYGEN SATURATION: 100 %

## 2020-07-06 LAB
ALBUMIN SERPL ELPH-MCNC: 4.3 G/DL — SIGNIFICANT CHANGE UP (ref 3.3–5)
ALP SERPL-CCNC: 87 U/L — SIGNIFICANT CHANGE UP (ref 40–120)
ALT FLD-CCNC: 8 U/L — SIGNIFICANT CHANGE UP (ref 4–41)
ANION GAP SERPL CALC-SCNC: 13 MMO/L — SIGNIFICANT CHANGE UP (ref 7–14)
AST SERPL-CCNC: 13 U/L — SIGNIFICANT CHANGE UP (ref 4–40)
BASOPHILS # BLD AUTO: 0.06 K/UL — SIGNIFICANT CHANGE UP (ref 0–0.2)
BASOPHILS NFR BLD AUTO: 0.4 % — SIGNIFICANT CHANGE UP (ref 0–2)
BILIRUB SERPL-MCNC: 0.4 MG/DL — SIGNIFICANT CHANGE UP (ref 0.2–1.2)
BUN SERPL-MCNC: 5 MG/DL — LOW (ref 7–23)
CALCIUM SERPL-MCNC: 9.5 MG/DL — SIGNIFICANT CHANGE UP (ref 8.4–10.5)
CHLORIDE SERPL-SCNC: 101 MMOL/L — SIGNIFICANT CHANGE UP (ref 98–107)
CO2 SERPL-SCNC: 24 MMOL/L — SIGNIFICANT CHANGE UP (ref 22–31)
CREAT SERPL-MCNC: 0.81 MG/DL — SIGNIFICANT CHANGE UP (ref 0.5–1.3)
EOSINOPHIL # BLD AUTO: 0.02 K/UL — SIGNIFICANT CHANGE UP (ref 0–0.5)
EOSINOPHIL NFR BLD AUTO: 0.1 % — SIGNIFICANT CHANGE UP (ref 0–6)
GLUCOSE SERPL-MCNC: 103 MG/DL — HIGH (ref 70–99)
HCT VFR BLD CALC: 42 % — SIGNIFICANT CHANGE UP (ref 39–50)
HGB BLD-MCNC: 14.2 G/DL — SIGNIFICANT CHANGE UP (ref 13–17)
IMM GRANULOCYTES NFR BLD AUTO: 0.3 % — SIGNIFICANT CHANGE UP (ref 0–1.5)
LYMPHOCYTES # BLD AUTO: 32.5 % — SIGNIFICANT CHANGE UP (ref 13–44)
LYMPHOCYTES # BLD AUTO: 4.99 K/UL — HIGH (ref 1–3.3)
MCHC RBC-ENTMCNC: 29.5 PG — SIGNIFICANT CHANGE UP (ref 27–34)
MCHC RBC-ENTMCNC: 33.8 % — SIGNIFICANT CHANGE UP (ref 32–36)
MCV RBC AUTO: 87.3 FL — SIGNIFICANT CHANGE UP (ref 80–100)
MONOCYTES # BLD AUTO: 0.98 K/UL — HIGH (ref 0–0.9)
MONOCYTES NFR BLD AUTO: 6.4 % — SIGNIFICANT CHANGE UP (ref 2–14)
NEUTROPHILS # BLD AUTO: 9.27 K/UL — HIGH (ref 1.8–7.4)
NEUTROPHILS NFR BLD AUTO: 60.3 % — SIGNIFICANT CHANGE UP (ref 43–77)
NRBC # FLD: 0 K/UL — SIGNIFICANT CHANGE UP (ref 0–0)
PLATELET # BLD AUTO: 264 K/UL — SIGNIFICANT CHANGE UP (ref 150–400)
PMV BLD: 11.2 FL — SIGNIFICANT CHANGE UP (ref 7–13)
POTASSIUM SERPL-MCNC: 3.4 MMOL/L — LOW (ref 3.5–5.3)
POTASSIUM SERPL-SCNC: 3.4 MMOL/L — LOW (ref 3.5–5.3)
PROT SERPL-MCNC: 8.8 G/DL — HIGH (ref 6–8.3)
RBC # BLD: 4.81 M/UL — SIGNIFICANT CHANGE UP (ref 4.2–5.8)
RBC # FLD: 14.8 % — HIGH (ref 10.3–14.5)
SODIUM SERPL-SCNC: 138 MMOL/L — SIGNIFICANT CHANGE UP (ref 135–145)
WBC # BLD: 15.36 K/UL — HIGH (ref 3.8–10.5)
WBC # FLD AUTO: 15.36 K/UL — HIGH (ref 3.8–10.5)

## 2020-07-06 PROCEDURE — 99283 EMERGENCY DEPT VISIT LOW MDM: CPT

## 2020-07-06 RX ORDER — SODIUM CHLORIDE 9 MG/ML
1000 INJECTION INTRAMUSCULAR; INTRAVENOUS; SUBCUTANEOUS ONCE
Refills: 0 | Status: COMPLETED | OUTPATIENT
Start: 2020-07-06 | End: 2020-07-06

## 2020-07-06 RX ADMIN — SODIUM CHLORIDE 1000 MILLILITER(S): 9 INJECTION INTRAMUSCULAR; INTRAVENOUS; SUBCUTANEOUS at 22:47

## 2020-07-06 RX ADMIN — Medication 300 MILLIGRAM(S): at 22:47

## 2020-07-06 RX ADMIN — SODIUM CHLORIDE 1000 MILLILITER(S): 9 INJECTION INTRAMUSCULAR; INTRAVENOUS; SUBCUTANEOUS at 21:36

## 2020-07-06 NOTE — ED PROVIDER NOTE - OBJECTIVE STATEMENT
29 y/o male with hx of hidradenitis and bacteremia presents to the ED c/o lightheadedness. He reports he had a draining lesion on abdomen a few days ago and afterwards began to feel lightheaded when he stood. Denies chest pain or shortness of breath. Reports he was concerned he could be developing sepsis because of his past bacteremia. Denies fevers, chills, nausea, or vomiting.

## 2020-07-06 NOTE — ED PROVIDER NOTE - ATTENDING CONTRIBUTION TO CARE
agree with resident note    "31 y/o male with hx of hidradenitis and bacteremia presents to the ED c/o lightheadedness. He reports he had a draining lesion on abdomen a few days ago and afterwards began to feel lightheaded when he stood. Denies chest pain or shortness of breath. Reports he was concerned he could be developing sepsis because of his past bacteremia. Denies fevers, chills, nausea, or vomiting."  Pt concerned because in past had a draining abscess and developed sepsis. Then fever, vomiting.  Now no systemic symptoms    PE: well appearing male in no distress; VSS: CTAB/L; s1 s2 no m/r/g abd soft/NT/ND ext: no edema skin: has lesions none draining; no cellulitis    afebrile well appearing; will check labs given prior hx of sepsis

## 2020-07-06 NOTE — ED ADULT NURSE NOTE - ED STAT RN HANDOFF DETAILS
Blood cultures cancelled by provider.  Pt medicated as per EMAR.  Patient discharged by provider with instructions.  Patient leaving ED ambulatory without assistance.

## 2020-07-06 NOTE — ED PROVIDER NOTE - PATIENT PORTAL LINK FT
You can access the FollowMyHealth Patient Portal offered by [facility] by registering at the following website: [email or web address]. By joining [employer]’s FollowMyHealth portal, you will also be able to view your health information using other applications (apps) compatible with our system.

## 2020-07-06 NOTE — ED PROVIDER NOTE - CLINICAL SUMMARY MEDICAL DECISION MAKING FREE TEXT BOX
29 y/o male with hx of hidradenitis and bacteremia presenting to the ED with lightheadedness. Vitals stable. EKG with t wave inversions in the inferior leads which are unchanged from prior. Exam with diffuse furuncles. Will check labs to r/o infectious etiology and orthostatics. Dose fluids. Reassess.

## 2020-07-06 NOTE — ED ADULT TRIAGE NOTE - CHIEF COMPLAINT QUOTE
pt complaints of lightheadedness and dizziness when standing up from a sitting position that started today. pt appears comfortable, using cellphone in triage.

## 2020-07-06 NOTE — ED ADULT NURSE NOTE - OBJECTIVE STATEMENT
Patient is a 30y male, A&Ox4, ambulatory at baseline, complaining of dizziness when standing up x 2days. History of Hidradenitis suppurativa. Orthostatic blood pressures completed as per order. At time of orthostatics, patient denied dizziness when standing. IV initiated 20 gauge left antecubital. Labs drawn and sent. Stretcher in lowest locked position. blanket provided for comfort. Patient brought to Results waiting via wheelchair.

## 2020-07-06 NOTE — ED PROVIDER NOTE - PHYSICAL EXAMINATION
GENERAL: Awake, alert, NAD  HEENT: NC/AT, moist mucous membranes, PERRL, EOMI  LUNGS: CTAB, no wheezes or crackles   CARDIAC: RRR, no m/r/g  ABDOMEN: Soft, normal BS, non tender, non distended, no rebound, no guarding  BACK: No midline spinal tenderness, no CVA tenderness  EXT: No edema, no calf tenderness, 2+ DP pulses bilaterally, no deformities.  NEURO: A&Ox3. Moving all extremities.  SKIN: Warm and dry. Diffuse furuncles and chronic skin lesions.  PSYCH: Normal affect.

## 2020-07-06 NOTE — ED ADULT NURSE NOTE - NSIMPLEMENTINTERV_GEN_ALL_ED
Implemented All Universal Safety Interventions:  Hope Hull to call system. Call bell, personal items and telephone within reach. Instruct patient to call for assistance. Room bathroom lighting operational. Non-slip footwear when patient is off stretcher. Physically safe environment: no spills, clutter or unnecessary equipment. Stretcher in lowest position, wheels locked, appropriate side rails in place.

## 2020-07-08 ENCOUNTER — OUTPATIENT (OUTPATIENT)
Dept: OUTPATIENT SERVICES | Facility: HOSPITAL | Age: 30
LOS: 1 days | Discharge: HOME | End: 2020-07-08

## 2020-07-08 DIAGNOSIS — C85.10 UNSPECIFIED B-CELL LYMPHOMA, UNSPECIFIED SITE: ICD-10-CM

## 2020-07-09 ENCOUNTER — RESULT REVIEW (OUTPATIENT)
Age: 30
End: 2020-07-09

## 2020-07-09 ENCOUNTER — APPOINTMENT (OUTPATIENT)
Dept: HEMATOLOGY ONCOLOGY | Facility: CLINIC | Age: 30
End: 2020-07-09
Payer: MEDICAID

## 2020-07-09 ENCOUNTER — OUTPATIENT (OUTPATIENT)
Dept: OUTPATIENT SERVICES | Facility: HOSPITAL | Age: 30
LOS: 1 days | End: 2020-07-09
Payer: MEDICAID

## 2020-07-09 VITALS
SYSTOLIC BLOOD PRESSURE: 134 MMHG | HEART RATE: 66 BPM | WEIGHT: 199.51 LBS | BODY MASS INDEX: 26.32 KG/M2 | RESPIRATION RATE: 14 BRPM | OXYGEN SATURATION: 99 % | TEMPERATURE: 98.3 F | DIASTOLIC BLOOD PRESSURE: 82 MMHG

## 2020-07-09 DIAGNOSIS — C85.10 UNSPECIFIED B-CELL LYMPHOMA, UNSPECIFIED SITE: ICD-10-CM

## 2020-07-09 LAB
BASOPHILS # BLD AUTO: 0.07 K/UL — SIGNIFICANT CHANGE UP (ref 0–0.2)
BASOPHILS NFR BLD AUTO: 0.7 % — SIGNIFICANT CHANGE UP (ref 0–2)
EOSINOPHIL # BLD AUTO: 0.09 K/UL — SIGNIFICANT CHANGE UP (ref 0–0.5)
EOSINOPHIL NFR BLD AUTO: 0.9 % — SIGNIFICANT CHANGE UP (ref 0–6)
ERYTHROCYTE [SEDIMENTATION RATE] IN BLOOD: 25 MM/HR — HIGH (ref 0–15)
HCT VFR BLD CALC: 44.8 % — SIGNIFICANT CHANGE UP (ref 39–50)
HGB BLD-MCNC: 14.4 G/DL — SIGNIFICANT CHANGE UP (ref 13–17)
IMM GRANULOCYTES NFR BLD AUTO: 0.4 % — SIGNIFICANT CHANGE UP (ref 0–1.5)
LYMPHOCYTES # BLD AUTO: 3.61 K/UL — HIGH (ref 1–3.3)
LYMPHOCYTES # BLD AUTO: 37.6 % — SIGNIFICANT CHANGE UP (ref 13–44)
MCHC RBC-ENTMCNC: 29.3 PG — SIGNIFICANT CHANGE UP (ref 27–34)
MCHC RBC-ENTMCNC: 32.1 GM/DL — SIGNIFICANT CHANGE UP (ref 32–36)
MCV RBC AUTO: 91.1 FL — SIGNIFICANT CHANGE UP (ref 80–100)
MONOCYTES # BLD AUTO: 0.72 K/UL — SIGNIFICANT CHANGE UP (ref 0–0.9)
MONOCYTES NFR BLD AUTO: 7.5 % — SIGNIFICANT CHANGE UP (ref 2–14)
NEUTROPHILS # BLD AUTO: 5.07 K/UL — SIGNIFICANT CHANGE UP (ref 1.8–7.4)
NEUTROPHILS NFR BLD AUTO: 52.9 % — SIGNIFICANT CHANGE UP (ref 43–77)
NRBC # BLD: 0 /100 WBCS — SIGNIFICANT CHANGE UP (ref 0–0)
PLATELET # BLD AUTO: 273 K/UL — SIGNIFICANT CHANGE UP (ref 150–400)
RBC # BLD: 4.92 M/UL — SIGNIFICANT CHANGE UP (ref 4.2–5.8)
RBC # FLD: 15.1 % — HIGH (ref 10.3–14.5)
WBC # BLD: 9.6 K/UL — SIGNIFICANT CHANGE UP (ref 3.8–10.5)
WBC # FLD AUTO: 9.6 K/UL — SIGNIFICANT CHANGE UP (ref 3.8–10.5)

## 2020-07-09 PROCEDURE — 87205 SMEAR GRAM STAIN: CPT

## 2020-07-09 PROCEDURE — 88184 FLOWCYTOMETRY/ TC 1 MARKER: CPT

## 2020-07-09 PROCEDURE — 99214 OFFICE O/P EST MOD 30 MIN: CPT

## 2020-07-09 PROCEDURE — 88185 FLOWCYTOMETRY/TC ADD-ON: CPT

## 2020-07-09 RX ORDER — ADALIMUMAB 40MG/0.8ML
40 KIT SUBCUTANEOUS
Qty: 3 | Refills: 0 | Status: DISCONTINUED | COMMUNITY
Start: 2018-09-24 | End: 2020-07-09

## 2020-07-10 LAB — TM INTERPRETATION: SIGNIFICANT CHANGE UP

## 2020-07-16 LAB
CRP SERPL-MCNC: 0.57 MG/DL
FOLATE SERPL-MCNC: >20 NG/ML
RHEUMATOID FACT SER QL: 10 IU/ML
URATE SERPL-MCNC: 6.9 MG/DL
VIT B12 SERPL-MCNC: 738 PG/ML

## 2020-07-21 ENCOUNTER — LABORATORY RESULT (OUTPATIENT)
Age: 30
End: 2020-07-21

## 2020-07-21 ENCOUNTER — APPOINTMENT (OUTPATIENT)
Dept: DERMATOLOGY | Facility: CLINIC | Age: 30
End: 2020-07-21
Payer: MEDICAID

## 2020-07-21 VITALS — HEIGHT: 73 IN | WEIGHT: 196 LBS | BODY MASS INDEX: 25.98 KG/M2

## 2020-07-21 VITALS — TEMPERATURE: 96 F

## 2020-07-21 PROCEDURE — 11900 INJECT SKIN LESIONS </W 7: CPT

## 2020-07-21 PROCEDURE — 99214 OFFICE O/P EST MOD 30 MIN: CPT | Mod: 25

## 2020-07-22 LAB
ALBUMIN SERPL ELPH-MCNC: 4.5 G/DL
ALP BLD-CCNC: 98 U/L
ALT SERPL-CCNC: 8 U/L
ANION GAP SERPL CALC-SCNC: 12 MMOL/L
AST SERPL-CCNC: 16 U/L
BASOPHILS # BLD AUTO: 0.08 K/UL
BASOPHILS NFR BLD AUTO: 0.3 %
BILIRUB SERPL-MCNC: 0.7 MG/DL
BUN SERPL-MCNC: 13 MG/DL
CALCIUM SERPL-MCNC: 10 MG/DL
CHLORIDE SERPL-SCNC: 99 MMOL/L
CO2 SERPL-SCNC: 25 MMOL/L
CREAT SERPL-MCNC: 1.06 MG/DL
EOSINOPHIL # BLD AUTO: 0.02 K/UL
EOSINOPHIL NFR BLD AUTO: 0.1 %
GLUCOSE SERPL-MCNC: 123 MG/DL
HCT VFR BLD CALC: 44.5 %
HGB BLD-MCNC: 14.3 G/DL
IMM GRANULOCYTES NFR BLD AUTO: 0.5 %
LYMPHOCYTES # BLD AUTO: 4.48 K/UL
LYMPHOCYTES NFR BLD AUTO: 18.9 %
MAN DIFF?: NORMAL
MCHC RBC-ENTMCNC: 29.3 PG
MCHC RBC-ENTMCNC: 32.1 GM/DL
MCV RBC AUTO: 91.2 FL
MONOCYTES # BLD AUTO: 1.88 K/UL
MONOCYTES NFR BLD AUTO: 7.9 %
NEUTROPHILS # BLD AUTO: 17.08 K/UL
NEUTROPHILS NFR BLD AUTO: 72.3 %
PLATELET # BLD AUTO: 244 K/UL
POTASSIUM SERPL-SCNC: 4.2 MMOL/L
PROT SERPL-MCNC: 8.4 G/DL
RBC # BLD: 4.88 M/UL
RBC # FLD: 15.3 %
SODIUM SERPL-SCNC: 135 MMOL/L
WBC # FLD AUTO: 23.66 K/UL

## 2020-07-28 ENCOUNTER — LABORATORY RESULT (OUTPATIENT)
Age: 30
End: 2020-07-28

## 2020-07-29 LAB
BASOPHILS # BLD AUTO: 0.09 K/UL
BASOPHILS NFR BLD AUTO: 0.7 %
EOSINOPHIL # BLD AUTO: 0.22 K/UL
EOSINOPHIL NFR BLD AUTO: 1.6 %
HCT VFR BLD CALC: 41.2 %
HGB BLD-MCNC: 12.5 G/DL
IMM GRANULOCYTES NFR BLD AUTO: 0.6 %
LYMPHOCYTES # BLD AUTO: 5.91 K/UL
LYMPHOCYTES NFR BLD AUTO: 43.3 %
MAN DIFF?: NORMAL
MCHC RBC-ENTMCNC: 28.5 PG
MCHC RBC-ENTMCNC: 30.3 GM/DL
MCV RBC AUTO: 93.8 FL
MONOCYTES # BLD AUTO: 1.04 K/UL
MONOCYTES NFR BLD AUTO: 7.6 %
NEUTROPHILS # BLD AUTO: 6.31 K/UL
NEUTROPHILS NFR BLD AUTO: 46.2 %
PLATELET # BLD AUTO: 346 K/UL
RBC # BLD: 4.39 M/UL
RBC # FLD: 15.6 %
WBC # FLD AUTO: 13.65 K/UL

## 2020-10-23 ENCOUNTER — APPOINTMENT (OUTPATIENT)
Dept: DERMATOLOGY | Facility: CLINIC | Age: 30
End: 2020-10-23
Payer: MEDICAID

## 2020-10-23 ENCOUNTER — LABORATORY RESULT (OUTPATIENT)
Age: 30
End: 2020-10-23

## 2020-10-23 VITALS — TEMPERATURE: 96.8 F

## 2020-10-23 PROCEDURE — 99214 OFFICE O/P EST MOD 30 MIN: CPT

## 2020-10-23 PROCEDURE — 99072 ADDL SUPL MATRL&STAF TM PHE: CPT

## 2020-10-25 LAB
ALBUMIN SERPL ELPH-MCNC: 4.3 G/DL
ALP BLD-CCNC: 93 U/L
ALT SERPL-CCNC: 9 U/L
ANION GAP SERPL CALC-SCNC: 9 MMOL/L
AST SERPL-CCNC: 12 U/L
BASOPHILS # BLD AUTO: 0.07 K/UL
BASOPHILS NFR BLD AUTO: 0.6 %
BILIRUB SERPL-MCNC: 0.3 MG/DL
BUN SERPL-MCNC: 13 MG/DL
CALCIUM SERPL-MCNC: 9.3 MG/DL
CHLORIDE SERPL-SCNC: 106 MMOL/L
CO2 SERPL-SCNC: 28 MMOL/L
CREAT SERPL-MCNC: 1.03 MG/DL
EOSINOPHIL # BLD AUTO: 0.18 K/UL
EOSINOPHIL NFR BLD AUTO: 1.6 %
GLUCOSE SERPL-MCNC: 95 MG/DL
HBV CORE IGG+IGM SER QL: NONREACTIVE
HBV SURFACE AG SER QL: NONREACTIVE
HCT VFR BLD CALC: 43.9 %
HCV AB SER QL: NONREACTIVE
HCV S/CO RATIO: 0.16 S/CO
HGB BLD-MCNC: 14.1 G/DL
IMM GRANULOCYTES NFR BLD AUTO: 0.2 %
LYMPHOCYTES # BLD AUTO: 4.82 K/UL
LYMPHOCYTES NFR BLD AUTO: 43.2 %
M TB IFN-G BLD-IMP: NEGATIVE
MAN DIFF?: NORMAL
MCHC RBC-ENTMCNC: 29.2 PG
MCHC RBC-ENTMCNC: 32.1 GM/DL
MCV RBC AUTO: 90.9 FL
MONOCYTES # BLD AUTO: 0.85 K/UL
MONOCYTES NFR BLD AUTO: 7.6 %
NEUTROPHILS # BLD AUTO: 5.22 K/UL
NEUTROPHILS NFR BLD AUTO: 46.8 %
PLATELET # BLD AUTO: 218 K/UL
POTASSIUM SERPL-SCNC: 4.5 MMOL/L
PROT SERPL-MCNC: 7.6 G/DL
QUANTIFERON TB PLUS MITOGEN MINUS NIL: 8.29 IU/ML
QUANTIFERON TB PLUS NIL: 0.02 IU/ML
QUANTIFERON TB PLUS TB1 MINUS NIL: 0 IU/ML
QUANTIFERON TB PLUS TB2 MINUS NIL: 0 IU/ML
RBC # BLD: 4.83 M/UL
RBC # FLD: 15.1 %
SODIUM SERPL-SCNC: 143 MMOL/L
WBC # FLD AUTO: 11.16 K/UL

## 2020-11-23 NOTE — PATIENT PROFILE ADULT. - ARE SIGNIFICANT INDICATORS COMPLETE.
11/23/20 0754   Patient Assessment/Suction   Level of Consciousness (AVPU) alert   Respiratory Effort Unlabored   All Lung Fields Breath Sounds diminished   PRE-TX-O2   O2 Device (Oxygen Therapy) nasal cannula   $ Is the patient on Low Flow Oxygen? Yes   Flow (L/min) 2   SpO2 (!) 93 %   Pulse Oximetry Type Continuous   $ Pulse Oximetry - Multiple Charge Pulse Oximetry - Multiple   Pulse 87   Resp 16   Inhaler   $ Inhaler Charges MDI (Metered Dose Inahler) Treatment   Daily Review of Necessity (Inhaler) completed   Respiratory Treatment Status (Inhaler) given   Treatment Route (Inhaler) spacer/holding chamber   Patient Position (Inhaler) HOB elevated   Post Treatment Assessment (Inhaler) breath sounds unchanged   Signs of Intolerance (Inhaler) none   Respiratory Evaluation   $ Care Plan Tech Time 15 min      Yes

## 2021-01-06 ENCOUNTER — OUTPATIENT (OUTPATIENT)
Dept: OUTPATIENT SERVICES | Facility: HOSPITAL | Age: 31
LOS: 1 days | Discharge: HOME | End: 2021-01-06

## 2021-01-06 DIAGNOSIS — C85.10 UNSPECIFIED B-CELL LYMPHOMA, UNSPECIFIED SITE: ICD-10-CM

## 2021-01-06 NOTE — PHYSICAL EXAM
[Normal] : normoactive bowel sounds, soft and nontender, no hepatosplenomegaly or masses appreciated [de-identified] : no axillary lymphadenopathy, there is shotty sub centemeber bilateral inguinal lymphadenopathy.   [de-identified] : Prominent left SCM tension without lymphadenopathy.  [de-identified] : Hydradenitis nodules in the axilla, groin, scarring without nodules in the buttocks and gluteal fold.

## 2021-01-06 NOTE — ASSESSMENT
[FreeTextEntry1] : This is a 30 year old man with a history of hydradenitis suppurativa.  Patient on Humira and responding well, no perceivable side effects with treatment.  Patient is feeling well, no major complaints. HE was asked t o return to our service for reevaluation of lymphocytosis and lymphadenopathy.  WBC 14.68k/uL, Hg 14.0 ALC 7,300 49.7% without neutropenia (N39.8% ANC 5,800),  The lymphadenopathy is not pronounced right now, on exam they are subcentimeter in the groin area. Suspected that this may be reactive, however given the Humira therapy will need to rule out clonal lymphocyte disorder. Check flow cytometry.  Would follow roughly every 6 months unless a clone is detected.  \par \par Spent > 30 minutes in direct patient care and and addressed all questions and concerns.  >50% of this time was in direct face to face contact with patient during exam and counseling. \par \par Addendum CAlled patient informed him on 7/14/20, WBC back to a normal 9.6 with normal diff, Flow cytometry unremarkable. Leukocytosis probably from hydradenitis flare, does not appear to be a primary hematologic disorder.

## 2021-01-06 NOTE — REVIEW OF SYSTEMS
[Negative] : Respiratory [de-identified] : Hydradenitis suppurativa flares occasionally [de-identified] : noted small LN in groin

## 2021-01-06 NOTE — HISTORY OF PRESENT ILLNESS
Verified Results  MA US BREAST SCREEN KEEGAN [id number] 04:17PM NICOLE PORTILLO   Ordering Provider: NICOLE PORTILLO.    Reason For Study: dense breasts,dense breasts.   [Aug 17, 2018 5:45PM NICOLE PORTILLO]  Mammogram is good, continue self breast exam, continue screening mammogram.     Test Name Result Flag Reference   MA US BREAST SCREEN KEEGAN (Report)     Accession #    [id number]    #[id number] - MA US BREAST SCREEN KEEGAN    COMPLETE SCREENING ULTRASOUND OF BOTH BREASTS AND AXILLA: 8/16/2018    CLINICAL HISTORY:Dense breasts (asymptomatic screen).    COMPARISON:   Comparison is made to exams dated: 7/31/2018 mammogram and 7/27/2017 mammogram - [facility].    FINDINGS:  Color flow and real-time ultrasound of both breasts four quadrants, retroareolar, and axilla   regions were performed. All representative images including gray scale of the real time   examination were reviewed.    No abnormalities were seen sonographically in either breast or either axilla.    IMPRESSION: BENIGN  There is no sonographic evidence of malignancy.  Return to annual mammogram screening schedule is recommended.    ULTRASOUND BI-RADS: 2 BENIGN    Mich Ortiz M.D.  ag/alexia:8/17/2018 08:20:18    Imaging Technologist: Kiersten Molina, [facility]  letter sent: Normal Single Exam  [id number]     **** FINAL ****    Dictated By:   ALESSANDRA JAMES, MICH CROCKER    Electronically Reviewed and Approved By:  ALESSANDRA JAMES, MICH CROCKER       Message   Mammogram is good, continue self breast exam, continue screening mammogram.      
[de-identified] : This is a 30 year old man with a history of hidradenitis suppurativa, currently doing well with this for the past year on Humira since 2019.  Hx of lymphadenopathy in the axillary and gorin area. Biopsy in 2017 demonstrated reactive LN.  Since then he has not required hematologic follow up until more recently, WBC increased to 14.6 with a lymphocytosis 49.7% with ALC 7,300.  Patient was instructed to resume follow up with hematology.  Patient states eh feels fine except for occasional flare despite the Humira 40mg weekly.  FOr the most part this is much better controlled, only occasionally has a nodule that will exude pus.  He denies fevers, chills, loss of appetite. His appetite is a lot better. no bleeding from anywhere.   Lost a lot of weight in 2017 with the original flare of the Hydradenitis, and has since gained it back now 199 lbs.  \par \par patient's cell is [phone]

## 2021-01-11 ENCOUNTER — RESULT REVIEW (OUTPATIENT)
Age: 31
End: 2021-01-11

## 2021-01-11 ENCOUNTER — APPOINTMENT (OUTPATIENT)
Dept: HEMATOLOGY ONCOLOGY | Facility: CLINIC | Age: 31
End: 2021-01-11
Payer: MEDICAID

## 2021-01-11 VITALS
SYSTOLIC BLOOD PRESSURE: 124 MMHG | HEART RATE: 66 BPM | OXYGEN SATURATION: 99 % | RESPIRATION RATE: 14 BRPM | WEIGHT: 198.42 LBS | TEMPERATURE: 98 F | DIASTOLIC BLOOD PRESSURE: 78 MMHG | BODY MASS INDEX: 26.18 KG/M2

## 2021-01-11 LAB
BASOPHILS # BLD AUTO: 0.09 K/UL — SIGNIFICANT CHANGE UP (ref 0–0.2)
BASOPHILS NFR BLD AUTO: 1 % — SIGNIFICANT CHANGE UP (ref 0–2)
EOSINOPHIL # BLD AUTO: 0.17 K/UL — SIGNIFICANT CHANGE UP (ref 0–0.5)
EOSINOPHIL NFR BLD AUTO: 2 % — SIGNIFICANT CHANGE UP (ref 0–6)
HCT VFR BLD CALC: 43.5 % — SIGNIFICANT CHANGE UP (ref 39–50)
HGB BLD-MCNC: 13.4 G/DL — SIGNIFICANT CHANGE UP (ref 13–17)
LYMPHOCYTES # BLD AUTO: 4.77 K/UL — HIGH (ref 1–3.3)
LYMPHOCYTES # BLD AUTO: 55 % — HIGH (ref 13–44)
MCHC RBC-ENTMCNC: 28.8 PG — SIGNIFICANT CHANGE UP (ref 27–34)
MCHC RBC-ENTMCNC: 30.8 G/DL — LOW (ref 32–36)
MCV RBC AUTO: 93.3 FL — SIGNIFICANT CHANGE UP (ref 80–100)
MONOCYTES # BLD AUTO: 0.95 K/UL — HIGH (ref 0–0.9)
MONOCYTES NFR BLD AUTO: 11 % — SIGNIFICANT CHANGE UP (ref 2–14)
NEUTROPHILS # BLD AUTO: 2.69 K/UL — SIGNIFICANT CHANGE UP (ref 1.8–7.4)
NEUTROPHILS NFR BLD AUTO: 31 % — LOW (ref 43–77)
NRBC # BLD: 1 /100 — HIGH (ref 0–0)
NRBC # BLD: SIGNIFICANT CHANGE UP /100 WBCS (ref 0–0)
PLAT MORPH BLD: NORMAL — SIGNIFICANT CHANGE UP
PLATELET # BLD AUTO: 224 K/UL — SIGNIFICANT CHANGE UP (ref 150–400)
RBC # BLD: 4.66 M/UL — SIGNIFICANT CHANGE UP (ref 4.2–5.8)
RBC # FLD: 15.4 % — HIGH (ref 10.3–14.5)
RBC BLD AUTO: SIGNIFICANT CHANGE UP
WBC # BLD: 8.68 K/UL — SIGNIFICANT CHANGE UP (ref 3.8–10.5)
WBC # FLD AUTO: 8.68 K/UL — SIGNIFICANT CHANGE UP (ref 3.8–10.5)

## 2021-01-11 PROCEDURE — 99213 OFFICE O/P EST LOW 20 MIN: CPT

## 2021-01-11 PROCEDURE — 99072 ADDL SUPL MATRL&STAF TM PHE: CPT

## 2021-01-19 LAB
ALBUMIN MFR SERPL ELPH: 50.4 %
ALBUMIN SERPL ELPH-MCNC: 4.1 G/DL
ALBUMIN SERPL-MCNC: 4 G/DL
ALBUMIN/GLOB SERPL: 1 RATIO
ALP BLD-CCNC: 89 U/L
ALPHA1 GLOB MFR SERPL ELPH: 3.6 %
ALPHA1 GLOB SERPL ELPH-MCNC: 0.3 G/DL
ALPHA2 GLOB MFR SERPL ELPH: 6.6 %
ALPHA2 GLOB SERPL ELPH-MCNC: 0.5 G/DL
ALT SERPL-CCNC: 10 U/L
ANION GAP SERPL CALC-SCNC: 8 MMOL/L
AST SERPL-CCNC: 14 U/L
B-GLOBULIN MFR SERPL ELPH: 10.7 %
B-GLOBULIN SERPL ELPH-MCNC: 0.9 G/DL
BILIRUB SERPL-MCNC: 0.2 MG/DL
BUN SERPL-MCNC: 12 MG/DL
CALCIUM SERPL-MCNC: 9.1 MG/DL
CHLORIDE SERPL-SCNC: 106 MMOL/L
CO2 SERPL-SCNC: 28 MMOL/L
CREAT SERPL-MCNC: 1.14 MG/DL
DEPRECATED KAPPA LC FREE/LAMBDA SER: 1.68 RATIO
GAMMA GLOB FLD ELPH-MCNC: 2.3 G/DL
GAMMA GLOB MFR SERPL ELPH: 28.7 %
GLUCOSE SERPL-MCNC: 90 MG/DL
IGA SER QL IEP: 311 MG/DL
IGG SER QL IEP: 2342 MG/DL
IGM SER QL IEP: 220 MG/DL
INTERPRETATION SERPL IEP-IMP: NORMAL
KAPPA LC CSF-MCNC: 2.25 MG/DL
KAPPA LC SERPL-MCNC: 3.77 MG/DL
LDH SERPL-CCNC: 134 U/L
M PROTEIN SPEC IFE-MCNC: NORMAL
POTASSIUM SERPL-SCNC: 4.8 MMOL/L
PROT SERPL-MCNC: 8 G/DL
SODIUM SERPL-SCNC: 141 MMOL/L

## 2021-01-19 NOTE — REVIEW OF SYSTEMS
[Negative] : Neurological [de-identified] : hydradenitis lesions in the cheek, axilla, groin, inner thighs, and gluteal fold, significant scarring, none appear overtly active right now.

## 2021-01-19 NOTE — HISTORY OF PRESENT ILLNESS
[de-identified] : This is a 30 year old man with a history of hidradenitis suppurativa, currently doing well with this for the past year on Humira since 2019.  Hx of lymphadenopathy in the axillary and groin area. Biopsy in 2017 demonstrated reactive LN.  Since then he has not required hematologic follow up until more recently, WBC increased to 14.6 with a lymphocytosis 49.7% with ALC 7,300.  Patient was instructed to resume follow up with hematology.  Patient states eh feels fine except for occasional flare despite the Humira 40mg weekly.  FOr the most part this is much better controlled, only occasionally has a nodule that will exude pus.  He denies fevers, chills, loss of appetite. His appetite is a lot better. no bleeding from anywhere.   Lost a lot of weight in 2017 with the original flare of the Hydradenitis, and has since gained it back now 199 lbs.  \par \par patient's cell is [phone] [de-identified] : Takes Humira once a week, during flare-ups patient is treated at Dr. Mobley at the office.  Flares resolved, however WBC counts \par During flare-ups patient sees Dr. Mobley.  During follow ups in between WBC as high as 23,000, though more recently in October it has been in the 11,000 range.  Patient still experiencing flare-ups and complications of infections and pain in the area around the HS lesions on his axilla, buttocks and inner thighs from time to time, none right now.  More recently Humira had been tapered to 4-8 mg weekly.

## 2021-01-19 NOTE — ASSESSMENT
[FreeTextEntry1] : This is a 30 year old man with a history of hydradenitis suppurativa.  Patient on Humira and responding well, no perceivable side effects with treatment.  Patient is feeling well, no major complaints. HE was asked t o return to our service for reevaluation of lymphocytosis and lymphadenopathy.  WBC 14.68k/uL, Hg 14.0 ALC 7,300 49.7% without neutropenia (N39.8% ANC 5,800) on initial presentation. Today WBC 8.6k/uL, normal differential.  Leukocytosis appears to be reactive coinciding with the flares of his hydradenitis suppuritiva, or during active infections of these lesions.  Leukemoid reaction. Does not require hematologic follow up. Can discontinue, and return should new hematologic issues arise.  \par \par Addendum 1/19/21\par Called patient back re: bloodwork.  I had to walk back from my comments about patient not needing to come back for hematologic follow up anymore.  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  INformed patient path this was not previously detected, but this is something which should be followed up on.  Patient is on immune therapy with the weekly humira, which can increase the incidence of hematologic disorder's like this. Will follow with him periodically for a while longer. Will have checkout staff call him to arrange follow up in 6 months.

## 2021-04-23 ENCOUNTER — APPOINTMENT (OUTPATIENT)
Dept: DERMATOLOGY | Facility: CLINIC | Age: 31
End: 2021-04-23
Payer: MEDICAID

## 2021-04-23 ENCOUNTER — LABORATORY RESULT (OUTPATIENT)
Age: 31
End: 2021-04-23

## 2021-04-23 PROCEDURE — 99072 ADDL SUPL MATRL&STAF TM PHE: CPT

## 2021-04-23 PROCEDURE — 99214 OFFICE O/P EST MOD 30 MIN: CPT

## 2021-04-25 LAB
ALBUMIN SERPL ELPH-MCNC: 4.5 G/DL
ALP BLD-CCNC: 95 U/L
ALT SERPL-CCNC: 11 U/L
ANION GAP SERPL CALC-SCNC: 8 MMOL/L
AST SERPL-CCNC: 17 U/L
BASOPHILS # BLD AUTO: 0.09 K/UL
BASOPHILS NFR BLD AUTO: 1 %
BILIRUB SERPL-MCNC: 0.2 MG/DL
BUN SERPL-MCNC: 13 MG/DL
CALCIUM SERPL-MCNC: 9.8 MG/DL
CHLORIDE SERPL-SCNC: 105 MMOL/L
CO2 SERPL-SCNC: 29 MMOL/L
CREAT SERPL-MCNC: 1.01 MG/DL
EOSINOPHIL # BLD AUTO: 0.17 K/UL
EOSINOPHIL NFR BLD AUTO: 2 %
GLUCOSE SERPL-MCNC: 107 MG/DL
HCT VFR BLD CALC: 45.1 %
HGB BLD-MCNC: 14.3 G/DL
IMM GRANULOCYTES NFR BLD AUTO: 0.2 %
LYMPHOCYTES # BLD AUTO: 4.51 K/UL
LYMPHOCYTES NFR BLD AUTO: 52 %
MAN DIFF?: NORMAL
MCHC RBC-ENTMCNC: 29.2 PG
MCHC RBC-ENTMCNC: 31.7 GM/DL
MCV RBC AUTO: 92.2 FL
MONOCYTES # BLD AUTO: 0.56 K/UL
MONOCYTES NFR BLD AUTO: 6.5 %
NEUTROPHILS # BLD AUTO: 3.33 K/UL
NEUTROPHILS NFR BLD AUTO: 38.3 %
PLATELET # BLD AUTO: 237 K/UL
POTASSIUM SERPL-SCNC: 4.5 MMOL/L
PROT SERPL-MCNC: 8.3 G/DL
RBC # BLD: 4.89 M/UL
RBC # FLD: 15 %
SODIUM SERPL-SCNC: 142 MMOL/L
WBC # FLD AUTO: 8.68 K/UL

## 2021-04-29 ENCOUNTER — NON-APPOINTMENT (OUTPATIENT)
Age: 31
End: 2021-04-29

## 2021-05-07 ENCOUNTER — NON-APPOINTMENT (OUTPATIENT)
Age: 31
End: 2021-05-07

## 2021-07-08 NOTE — PHYSICAL EXAM
No [Oriented x 3] : ~L oriented x 3 [Alert] : alert [Conjunctiva Non-injected] : conjunctiva non-injected [Well Nourished] : well nourished [No Clubbing] : no clubbing [No Visual Lymphadenopathy] : no visual  lymphadenopathy [No Edema] : no edema [No Bromhidrosis] : no bromhidrosis [No Chromhidrosis] : no chromhidrosis [FreeTextEntry3] : scarred sinus tracts in scalp b/l axilla, groin, intergluteal cleft and buttocks\par R temple and R suprapubic region with erythematous tender nodules\par \par hypertrophic scars on face

## 2021-10-26 ENCOUNTER — LABORATORY RESULT (OUTPATIENT)
Age: 31
End: 2021-10-26

## 2021-10-26 ENCOUNTER — APPOINTMENT (OUTPATIENT)
Dept: DERMATOLOGY | Facility: CLINIC | Age: 31
End: 2021-10-26
Payer: MEDICAID

## 2021-10-26 PROCEDURE — 99214 OFFICE O/P EST MOD 30 MIN: CPT

## 2021-10-29 ENCOUNTER — NON-APPOINTMENT (OUTPATIENT)
Age: 31
End: 2021-10-29

## 2022-01-01 NOTE — ED ADULT NURSE NOTE - DOES PATIENT HAVE ADVANCE DIRECTIVE
Thanks for visiting us today!    Remember these important phone numbers:    [phone] for phone nurses during the day and our nurse answering service at night    [phone]  for scheduling or changing future appointments    [phone] for the Poison Control Center    When leaving a message for our staff, please include:   the spelling of your child’s full name and date of birth  your full name and relationship to child  best phone number and time to reach you   reason for the call        We strongly recommend that all children age 6 months and older receive the COVID-19 vaccine. Call our office at [phone] to schedule.        Is your child signed up for SeeOn? If you do this, you can message us rather than playing phone tag! You can also look at labs, pay your bills, and do some scheduling. Go to your own account first. (If you don't have one yet, you can set one up at the website below or at your doctor's office).  Using a web browser (not the phone moira), on the right hand side of your page, click the button marked \"Request Access to my Child's Records.\" Fill out the information. In a few days your child's information will be linked to your account. It's that simple!! Here is the website for more information:     [email or web address]      --------------------------------------------------------------------------------------------------------------------    Healthy Habits:    * Avoid sun, do not use sunscreen    * No second-hand smoke     Diet:    * Breast milk or formula with iron  * Supplemental vitamin D recommended   * Feed every 2-4 hours ad romain, typically between 1-3 ounces per feed    Injury Prevention:    * Car seat safety: facing to the rear, properly secured    * Crib safety: no pillows, blankets, bumper pads, stuffed animals. No co-sleeping. Back to sleep   * Vary head position to avoid flat areas on head, place on tummy to play    Family Interaction:    * Hold, cuddle, rock, talk,  sing, play, read - can't spoil at this age   * Many moms feel down, depressed, or hopeless at this time - contact your doctor if you feel this way     Other:    * Skin care: normal  rashes, peeling   * Cord care: keep clean and dry. No tub bath until cord off   * Illness: call with temperature 100.4 F or greater (rectal). Wash hands, avoid sick people   * Recommend that all caregivers be immunized for pertussis    unknown

## 2022-04-26 ENCOUNTER — APPOINTMENT (OUTPATIENT)
Dept: DERMATOLOGY | Facility: CLINIC | Age: 32
End: 2022-04-26
Payer: MEDICAID

## 2022-04-26 PROCEDURE — 99214 OFFICE O/P EST MOD 30 MIN: CPT

## 2022-04-27 LAB
ALBUMIN SERPL ELPH-MCNC: 5 G/DL
ALP BLD-CCNC: 103 U/L
ALT SERPL-CCNC: 12 U/L
ANION GAP SERPL CALC-SCNC: 13 MMOL/L
AST SERPL-CCNC: 17 U/L
BASOPHILS # BLD AUTO: 0.06 K/UL
BASOPHILS NFR BLD AUTO: 0.5 %
BILIRUB SERPL-MCNC: 0.4 MG/DL
BUN SERPL-MCNC: 14 MG/DL
CALCIUM SERPL-MCNC: 10.2 MG/DL
CHLORIDE SERPL-SCNC: 103 MMOL/L
CO2 SERPL-SCNC: 24 MMOL/L
CREAT SERPL-MCNC: 1.07 MG/DL
EGFR: 95 ML/MIN/1.73M2
EOSINOPHIL # BLD AUTO: 0.11 K/UL
EOSINOPHIL NFR BLD AUTO: 1 %
GLUCOSE SERPL-MCNC: 97 MG/DL
HCT VFR BLD CALC: 47.1 %
HGB BLD-MCNC: 15.2 G/DL
IMM GRANULOCYTES NFR BLD AUTO: 0.3 %
LYMPHOCYTES # BLD AUTO: 4.01 K/UL
LYMPHOCYTES NFR BLD AUTO: 35.4 %
MAN DIFF?: NORMAL
MCHC RBC-ENTMCNC: 29.8 PG
MCHC RBC-ENTMCNC: 32.3 GM/DL
MCV RBC AUTO: 92.4 FL
MONOCYTES # BLD AUTO: 0.87 K/UL
MONOCYTES NFR BLD AUTO: 7.7 %
NEUTROPHILS # BLD AUTO: 6.25 K/UL
NEUTROPHILS NFR BLD AUTO: 55.1 %
PLATELET # BLD AUTO: 291 K/UL
POTASSIUM SERPL-SCNC: 4.6 MMOL/L
PROT SERPL-MCNC: 8.9 G/DL
RBC # BLD: 5.1 M/UL
RBC # FLD: 13.9 %
SODIUM SERPL-SCNC: 140 MMOL/L
WBC # FLD AUTO: 11.33 K/UL

## 2022-05-02 ENCOUNTER — NON-APPOINTMENT (OUTPATIENT)
Age: 32
End: 2022-05-02

## 2022-07-29 ENCOUNTER — APPOINTMENT (OUTPATIENT)
Dept: DERMATOLOGY | Facility: CLINIC | Age: 32
End: 2022-07-29

## 2022-07-29 PROCEDURE — 99214 OFFICE O/P EST MOD 30 MIN: CPT | Mod: 25

## 2022-07-29 PROCEDURE — 11900 INJECT SKIN LESIONS </W 7: CPT

## 2022-08-25 ENCOUNTER — APPOINTMENT (OUTPATIENT)
Dept: INTERNAL MEDICINE | Facility: CLINIC | Age: 32
End: 2022-08-25

## 2022-08-25 VITALS
DIASTOLIC BLOOD PRESSURE: 80 MMHG | OXYGEN SATURATION: 97 % | WEIGHT: 186 LBS | HEIGHT: 73 IN | TEMPERATURE: 98.1 F | HEART RATE: 94 BPM | SYSTOLIC BLOOD PRESSURE: 124 MMHG | BODY MASS INDEX: 24.65 KG/M2

## 2022-08-25 DIAGNOSIS — Z87.891 PERSONAL HISTORY OF NICOTINE DEPENDENCE: ICD-10-CM

## 2022-08-25 PROCEDURE — 36415 COLL VENOUS BLD VENIPUNCTURE: CPT

## 2022-08-25 PROCEDURE — 99395 PREV VISIT EST AGE 18-39: CPT | Mod: 25

## 2022-09-05 LAB
ALBUMIN SERPL ELPH-MCNC: 4.3 G/DL
ALP BLD-CCNC: 97 U/L
ALT SERPL-CCNC: 11 U/L
ANION GAP SERPL CALC-SCNC: 11 MMOL/L
AST SERPL-CCNC: 16 U/L
BASOPHILS # BLD AUTO: 0.08 K/UL
BASOPHILS NFR BLD AUTO: 0.9 %
BILIRUB SERPL-MCNC: 0.4 MG/DL
BUN SERPL-MCNC: 12 MG/DL
CALCIUM SERPL-MCNC: 9.3 MG/DL
CHLORIDE SERPL-SCNC: 100 MMOL/L
CHOLEST SERPL-MCNC: 168 MG/DL
CO2 SERPL-SCNC: 28 MMOL/L
CREAT SERPL-MCNC: 1.3 MG/DL
EGFR: 75 ML/MIN/1.73M2
EOSINOPHIL # BLD AUTO: 0.11 K/UL
EOSINOPHIL NFR BLD AUTO: 1.2 %
GLUCOSE SERPL-MCNC: 89 MG/DL
HBV SURFACE AB SER QL: REACTIVE
HBV SURFACE AG SER QL: NONREACTIVE
HCT VFR BLD CALC: 43.4 %
HCV AB SER QL: NONREACTIVE
HCV S/CO RATIO: 0.23 S/CO
HDLC SERPL-MCNC: 54 MG/DL
HGB BLD-MCNC: 13.9 G/DL
HIV1+2 AB SPEC QL IA.RAPID: NONREACTIVE
IMM GRANULOCYTES NFR BLD AUTO: 0.2 %
LDLC SERPL CALC-MCNC: 97 MG/DL
LYMPHOCYTES # BLD AUTO: 2.26 K/UL
LYMPHOCYTES NFR BLD AUTO: 24 %
MAN DIFF?: NORMAL
MCHC RBC-ENTMCNC: 29.8 PG
MCHC RBC-ENTMCNC: 32 GM/DL
MCV RBC AUTO: 92.9 FL
MONOCYTES # BLD AUTO: 0.96 K/UL
MONOCYTES NFR BLD AUTO: 10.2 %
NEUTROPHILS # BLD AUTO: 5.98 K/UL
NEUTROPHILS NFR BLD AUTO: 63.5 %
NONHDLC SERPL-MCNC: 114 MG/DL
PLATELET # BLD AUTO: 203 K/UL
POTASSIUM SERPL-SCNC: 4.5 MMOL/L
PROT SERPL-MCNC: 8.6 G/DL
RBC # BLD: 4.67 M/UL
RBC # FLD: 15 %
SODIUM SERPL-SCNC: 139 MMOL/L
T4 FREE SERPL-MCNC: 1.3 NG/DL
TRIGL SERPL-MCNC: 83 MG/DL
TSH SERPL-ACNC: 0.89 UIU/ML
WBC # FLD AUTO: 9.41 K/UL

## 2022-09-06 ENCOUNTER — TRANSCRIPTION ENCOUNTER (OUTPATIENT)
Age: 32
End: 2022-09-06

## 2022-09-07 NOTE — HEALTH RISK ASSESSMENT
[Good] : ~his/her~  mood as  good [Never] : Never [No] : In the past 12 months have you used drugs other than those required for medical reasons? No [No falls in past year] : Patient reported no falls in the past year [0] : 2) Feeling down, depressed, or hopeless: Not at all (0) [HIV test declined] : HIV test declined [Hepatitis C test declined] : Hepatitis C test declined [Fully functional (bathing, dressing, toileting, transferring, walking, feeding)] : Fully functional (bathing, dressing, toileting, transferring, walking, feeding) [Fully functional (using the telephone, shopping, preparing meals, housekeeping, doing laundry, using] : Fully functional and needs no help or supervision to perform IADLs (using the telephone, shopping, preparing meals, housekeeping, doing laundry, using transportation, managing medications and managing finances) [Reports normal functional visual acuity (ie: able to read med bottle)] : Reports normal functional visual acuity [Carbon Monoxide Detector] : carbon monoxide detector [Safety elements used in home] : safety elements used in home [Seat Belt] :  uses seat belt [FreeTextEntry1] : dermatologist Dr. Mobley [de-identified] : none [de-identified] : none [Audit-CScore] : 0 [de-identified] : walking [de-identified] : regular [[id number]] : 0 [Change in mental status noted] : No change in mental status noted [Reports changes in hearing] : Reports no changes in hearing [Reports changes in vision] : Reports no changes in vision [Reports changes in dental health] : Reports no changes in dental health [Smoke Detector] : no smoke detector [Sunscreen] : does not use sunscreen

## 2022-09-07 NOTE — HISTORY OF PRESENT ILLNESS
[FreeTextEntry1] : no current complaints\par requesting exam [de-identified] : 31 yo male currently on Humera for management hidradenitis suppurativa present for routine f/u\par at time of exam there were no specific complaints

## 2022-09-07 NOTE — PHYSICAL EXAM
[No Acute Distress] : no acute distress [No JVD] : no jugular venous distention [No Lymphadenopathy] : no lymphadenopathy [No Accessory Muscle Use] : no accessory muscle use [Normal Rate] : normal rate  [Regular Rhythm] : with a regular rhythm [No Edema] : there was no peripheral edema [Soft] : abdomen soft [Non Tender] : non-tender [No Mass] : no mass [No Focal Deficits] : no focal deficits [Normal Gait] : normal gait [Normal Affect] : the affect was normal [Alert and Oriented x3] : oriented to person, place, and time [de-identified] : scattered areas fibrotic scars neck

## 2022-09-15 ENCOUNTER — APPOINTMENT (OUTPATIENT)
Dept: DERMATOLOGY | Facility: CLINIC | Age: 32
End: 2022-09-15

## 2022-09-15 PROCEDURE — 99213 OFFICE O/P EST LOW 20 MIN: CPT

## 2022-09-20 ENCOUNTER — NON-APPOINTMENT (OUTPATIENT)
Age: 32
End: 2022-09-20

## 2022-10-01 NOTE — ED ADULT TRIAGE NOTE - SOURCE OF INFORMATION
Pt resting comfortably on [place]'s cart, transported out of ED, belonging given to [place] prior to leaving from Bin [facility].   Patient

## 2022-10-05 RX ORDER — BENZOYL PEROXIDE 100 MG/ML
10 LIQUID TOPICAL
Qty: 1 | Refills: 5 | Status: ACTIVE | COMMUNITY
Start: 2019-01-15 | End: 1900-01-01

## 2022-10-25 ENCOUNTER — APPOINTMENT (OUTPATIENT)
Dept: DERMATOLOGY | Facility: CLINIC | Age: 32
End: 2022-10-25

## 2022-10-25 DIAGNOSIS — Z79.899 OTHER LONG TERM (CURRENT) DRUG THERAPY: ICD-10-CM

## 2022-10-25 PROCEDURE — 99214 OFFICE O/P EST MOD 30 MIN: CPT

## 2022-11-03 NOTE — ED PROVIDER NOTE - MEDICAL DECISION MAKING DETAILS
28 y/o male w/ syncope following nausea and vomiting. Will send labs, ct head for fall, discuss findings with PMD, possible admission Picato Counseling:  I discussed with the patient the risks of Picato including but not limited to erythema, scaling, itching, weeping, crusting, and pain.

## 2022-11-10 ENCOUNTER — APPOINTMENT (OUTPATIENT)
Dept: DERMATOLOGY | Facility: CLINIC | Age: 32
End: 2022-11-10

## 2023-01-18 ENCOUNTER — APPOINTMENT (OUTPATIENT)
Dept: PLASTIC SURGERY | Facility: CLINIC | Age: 33
End: 2023-01-18
Payer: MEDICAID

## 2023-01-18 VITALS
BODY MASS INDEX: 22.48 KG/M2 | HEART RATE: 123 BPM | TEMPERATURE: 97.5 F | WEIGHT: 166 LBS | SYSTOLIC BLOOD PRESSURE: 111 MMHG | DIASTOLIC BLOOD PRESSURE: 76 MMHG | HEIGHT: 72 IN

## 2023-01-18 PROCEDURE — 99203 OFFICE O/P NEW LOW 30 MIN: CPT

## 2023-01-18 NOTE — HISTORY OF PRESENT ILLNESS
[FreeTextEntry1] : 32-year-old left-hand-dominant man with a longstanding history of hidradenitis suppurativa.  He is currently managed by dermatology with Humira.  His areas of greatest concern include the right and left buttocks.  He is also concerned about the bilateral axilla.  He denies prior surgical procedures.\par \par He does not use nicotine or tobacco products.  He regularly smokes marijuana and has a prescription card.  He occasionally drinks alcohol.  He can have assistance at home in the evenings.

## 2023-01-18 NOTE — ASSESSMENT
[FreeTextEntry1] : Patient agrees to switch to non-smoked forms of marijuana.  We will plan for excision of the bilateral buttock involved tissues with skin grafting 3 weeks later.  The patient will need to be off of Humira for the procedures, which he takes weekly.

## 2023-01-18 NOTE — PHYSICAL EXAM
[de-identified] : NAD.  BMI 22.5 [de-identified] : Normal respiratory effort [de-identified] : Bilateral [place] stage III hidradenitis of the axillae [de-identified] : Right upper buttock with a large, approximately 25 x 15 cm area of induration.  There is a small area of left perianal involvement and left upper buttock involvement.  There is no expressible drainage.

## 2023-02-18 ENCOUNTER — INPATIENT (INPATIENT)
Facility: HOSPITAL | Age: 33
LOS: 17 days | Discharge: NURSING, REHAB OR LONG-TERM CARE | End: 2023-03-08
Attending: SURGERY | Admitting: SURGERY
Payer: MEDICAID

## 2023-02-18 VITALS
TEMPERATURE: 99 F | SYSTOLIC BLOOD PRESSURE: 130 MMHG | OXYGEN SATURATION: 98 % | DIASTOLIC BLOOD PRESSURE: 78 MMHG | RESPIRATION RATE: 16 BRPM | HEART RATE: 145 BPM

## 2023-02-18 LAB
ALBUMIN SERPL ELPH-MCNC: 2.2 G/DL — LOW (ref 3.3–5)
ALP SERPL-CCNC: 89 U/L — SIGNIFICANT CHANGE UP (ref 40–120)
ALT FLD-CCNC: 6 U/L — SIGNIFICANT CHANGE UP (ref 4–41)
ANION GAP SERPL CALC-SCNC: 8 MMOL/L — SIGNIFICANT CHANGE UP (ref 7–14)
AST SERPL-CCNC: 13 U/L — SIGNIFICANT CHANGE UP (ref 4–40)
B PERT DNA SPEC QL NAA+PROBE: SIGNIFICANT CHANGE UP
B PERT+PARAPERT DNA PNL SPEC NAA+PROBE: SIGNIFICANT CHANGE UP
BASE EXCESS BLDV CALC-SCNC: 5.5 MMOL/L — HIGH (ref -2–3)
BASOPHILS # BLD AUTO: 0.09 K/UL — SIGNIFICANT CHANGE UP (ref 0–0.2)
BASOPHILS NFR BLD AUTO: 0.4 % — SIGNIFICANT CHANGE UP (ref 0–2)
BILIRUB SERPL-MCNC: 0.4 MG/DL — SIGNIFICANT CHANGE UP (ref 0.2–1.2)
BLOOD GAS VENOUS COMPREHENSIVE RESULT: SIGNIFICANT CHANGE UP
BORDETELLA PARAPERTUSSIS (RAPRVP): SIGNIFICANT CHANGE UP
BUN SERPL-MCNC: 17 MG/DL — SIGNIFICANT CHANGE UP (ref 7–23)
C PNEUM DNA SPEC QL NAA+PROBE: SIGNIFICANT CHANGE UP
CALCIUM SERPL-MCNC: 8.7 MG/DL — SIGNIFICANT CHANGE UP (ref 8.4–10.5)
CHLORIDE BLDV-SCNC: 99 MMOL/L — SIGNIFICANT CHANGE UP (ref 96–108)
CHLORIDE SERPL-SCNC: 95 MMOL/L — LOW (ref 98–107)
CO2 BLDV-SCNC: 31.9 MMOL/L — HIGH (ref 22–26)
CO2 SERPL-SCNC: 26 MMOL/L — SIGNIFICANT CHANGE UP (ref 22–31)
CREAT SERPL-MCNC: 0.83 MG/DL — SIGNIFICANT CHANGE UP (ref 0.5–1.3)
CRP SERPL-MCNC: 188 MG/L — HIGH
EGFR: 119 ML/MIN/1.73M2 — SIGNIFICANT CHANGE UP
EOSINOPHIL # BLD AUTO: 0.05 K/UL — SIGNIFICANT CHANGE UP (ref 0–0.5)
EOSINOPHIL NFR BLD AUTO: 0.2 % — SIGNIFICANT CHANGE UP (ref 0–6)
ERYTHROCYTE [SEDIMENTATION RATE] IN BLOOD: 124 MM/HR — HIGH (ref 1–15)
FLUAV SUBTYP SPEC NAA+PROBE: SIGNIFICANT CHANGE UP
FLUBV RNA SPEC QL NAA+PROBE: SIGNIFICANT CHANGE UP
GAS PNL BLDV: 132 MMOL/L — LOW (ref 136–145)
GLUCOSE BLDV-MCNC: 106 MG/DL — HIGH (ref 70–99)
GLUCOSE SERPL-MCNC: 104 MG/DL — HIGH (ref 70–99)
HADV DNA SPEC QL NAA+PROBE: SIGNIFICANT CHANGE UP
HCO3 BLDV-SCNC: 30 MMOL/L — HIGH (ref 22–29)
HCOV 229E RNA SPEC QL NAA+PROBE: SIGNIFICANT CHANGE UP
HCOV HKU1 RNA SPEC QL NAA+PROBE: SIGNIFICANT CHANGE UP
HCOV NL63 RNA SPEC QL NAA+PROBE: SIGNIFICANT CHANGE UP
HCOV OC43 RNA SPEC QL NAA+PROBE: SIGNIFICANT CHANGE UP
HCT VFR BLD CALC: 27.3 % — LOW (ref 39–50)
HCT VFR BLDA CALC: 28 % — LOW (ref 39–51)
HGB BLD CALC-MCNC: 9.3 G/DL — LOW (ref 12.6–17.4)
HGB BLD-MCNC: 8.5 G/DL — LOW (ref 13–17)
HMPV RNA SPEC QL NAA+PROBE: SIGNIFICANT CHANGE UP
HPIV1 RNA SPEC QL NAA+PROBE: SIGNIFICANT CHANGE UP
HPIV2 RNA SPEC QL NAA+PROBE: SIGNIFICANT CHANGE UP
HPIV3 RNA SPEC QL NAA+PROBE: SIGNIFICANT CHANGE UP
HPIV4 RNA SPEC QL NAA+PROBE: SIGNIFICANT CHANGE UP
IANC: 16.57 K/UL — HIGH (ref 1.8–7.4)
IMM GRANULOCYTES NFR BLD AUTO: 0.8 % — SIGNIFICANT CHANGE UP (ref 0–0.9)
LACTATE BLDV-MCNC: 1 MMOL/L — SIGNIFICANT CHANGE UP (ref 0.5–2)
LACTATE BLDV-MCNC: 2.1 MMOL/L — HIGH (ref 0.5–2)
LYMPHOCYTES # BLD AUTO: 16.6 % — SIGNIFICANT CHANGE UP (ref 13–44)
LYMPHOCYTES # BLD AUTO: 3.67 K/UL — HIGH (ref 1–3.3)
M PNEUMO DNA SPEC QL NAA+PROBE: SIGNIFICANT CHANGE UP
MCHC RBC-ENTMCNC: 24.1 PG — LOW (ref 27–34)
MCHC RBC-ENTMCNC: 31.1 GM/DL — LOW (ref 32–36)
MCV RBC AUTO: 77.3 FL — LOW (ref 80–100)
MONOCYTES # BLD AUTO: 1.51 K/UL — HIGH (ref 0–0.9)
MONOCYTES NFR BLD AUTO: 6.8 % — SIGNIFICANT CHANGE UP (ref 2–14)
NEUTROPHILS # BLD AUTO: 16.57 K/UL — HIGH (ref 1.8–7.4)
NEUTROPHILS NFR BLD AUTO: 75.2 % — SIGNIFICANT CHANGE UP (ref 43–77)
NRBC # BLD: 0 /100 WBCS — SIGNIFICANT CHANGE UP (ref 0–0)
NRBC # FLD: 0 K/UL — SIGNIFICANT CHANGE UP (ref 0–0)
PCO2 BLDV: 46 MMHG — SIGNIFICANT CHANGE UP (ref 42–55)
PH BLDV: 7.43 — SIGNIFICANT CHANGE UP (ref 7.32–7.43)
PLATELET # BLD AUTO: 467 K/UL — HIGH (ref 150–400)
PO2 BLDV: 43 MMHG — SIGNIFICANT CHANGE UP (ref 25–45)
POTASSIUM BLDV-SCNC: 4.4 MMOL/L — SIGNIFICANT CHANGE UP (ref 3.5–5.1)
POTASSIUM SERPL-MCNC: 4.4 MMOL/L — SIGNIFICANT CHANGE UP (ref 3.5–5.3)
POTASSIUM SERPL-SCNC: 4.4 MMOL/L — SIGNIFICANT CHANGE UP (ref 3.5–5.3)
PROT SERPL-MCNC: 10.4 G/DL — HIGH (ref 6–8.3)
RAPID RVP RESULT: SIGNIFICANT CHANGE UP
RBC # BLD: 3.53 M/UL — LOW (ref 4.2–5.8)
RBC # FLD: 16.8 % — HIGH (ref 10.3–14.5)
RSV RNA SPEC QL NAA+PROBE: SIGNIFICANT CHANGE UP
RV+EV RNA SPEC QL NAA+PROBE: SIGNIFICANT CHANGE UP
SAO2 % BLDV: 66.3 % — LOW (ref 67–88)
SARS-COV-2 RNA SPEC QL NAA+PROBE: SIGNIFICANT CHANGE UP
SODIUM SERPL-SCNC: 129 MMOL/L — LOW (ref 135–145)
TSH SERPL-MCNC: 1.29 UIU/ML — SIGNIFICANT CHANGE UP (ref 0.27–4.2)
WBC # BLD: 22.07 K/UL — HIGH (ref 3.8–10.5)
WBC # FLD AUTO: 22.07 K/UL — HIGH (ref 3.8–10.5)

## 2023-02-18 PROCEDURE — G1004: CPT

## 2023-02-18 PROCEDURE — 72193 CT PELVIS W/DYE: CPT | Mod: 26,MG

## 2023-02-18 PROCEDURE — 99285 EMERGENCY DEPT VISIT HI MDM: CPT

## 2023-02-18 PROCEDURE — 73610 X-RAY EXAM OF ANKLE: CPT | Mod: 26,50

## 2023-02-18 PROCEDURE — 71045 X-RAY EXAM CHEST 1 VIEW: CPT | Mod: 26

## 2023-02-18 PROCEDURE — 93970 EXTREMITY STUDY: CPT | Mod: 26

## 2023-02-18 RX ORDER — PIPERACILLIN AND TAZOBACTAM 4; .5 G/20ML; G/20ML
3.38 INJECTION, POWDER, LYOPHILIZED, FOR SOLUTION INTRAVENOUS ONCE
Refills: 0 | Status: COMPLETED | OUTPATIENT
Start: 2023-02-18 | End: 2023-02-18

## 2023-02-18 RX ORDER — SODIUM CHLORIDE 9 MG/ML
1000 INJECTION INTRAMUSCULAR; INTRAVENOUS; SUBCUTANEOUS ONCE
Refills: 0 | Status: COMPLETED | OUTPATIENT
Start: 2023-02-18 | End: 2023-02-18

## 2023-02-18 RX ORDER — ACETAMINOPHEN 500 MG
975 TABLET ORAL ONCE
Refills: 0 | Status: COMPLETED | OUTPATIENT
Start: 2023-02-18 | End: 2023-02-18

## 2023-02-18 RX ORDER — VANCOMYCIN HCL 1 G
1000 VIAL (EA) INTRAVENOUS ONCE
Refills: 0 | Status: COMPLETED | OUTPATIENT
Start: 2023-02-18 | End: 2023-02-18

## 2023-02-18 RX ADMIN — PIPERACILLIN AND TAZOBACTAM 200 GRAM(S): 4; .5 INJECTION, POWDER, LYOPHILIZED, FOR SOLUTION INTRAVENOUS at 18:05

## 2023-02-18 RX ADMIN — SODIUM CHLORIDE 1000 MILLILITER(S): 9 INJECTION INTRAMUSCULAR; INTRAVENOUS; SUBCUTANEOUS at 19:21

## 2023-02-18 RX ADMIN — PIPERACILLIN AND TAZOBACTAM 3.38 GRAM(S): 4; .5 INJECTION, POWDER, LYOPHILIZED, FOR SOLUTION INTRAVENOUS at 18:40

## 2023-02-18 RX ADMIN — Medication 1000 MILLIGRAM(S): at 20:22

## 2023-02-18 RX ADMIN — SODIUM CHLORIDE 1000 MILLILITER(S): 9 INJECTION INTRAMUSCULAR; INTRAVENOUS; SUBCUTANEOUS at 17:58

## 2023-02-18 RX ADMIN — Medication 975 MILLIGRAM(S): at 20:22

## 2023-02-18 RX ADMIN — Medication 250 MILLIGRAM(S): at 19:07

## 2023-02-18 RX ADMIN — Medication 975 MILLIGRAM(S): at 18:06

## 2023-02-18 RX ADMIN — SODIUM CHLORIDE 1000 MILLILITER(S): 9 INJECTION INTRAMUSCULAR; INTRAVENOUS; SUBCUTANEOUS at 18:06

## 2023-02-18 NOTE — ED PROVIDER NOTE - OBJECTIVE STATEMENT
31 y/o male hx hidradenitis suppurativa presents to ER c/o ankle swelling. Pt. states that for the past 3-4 days has been experiencing bl ankle swelling pain and states feel very warm. Pt. denies any trauma to area but states that he has been stretching them a lot. Pt. also sttaes he has had had increased  draining from gluteal wounds which are chronic - supposed to follow up with derm in april for possible procedure but states recently increased drainage to area. Pt. denies fever chills weakness dizziness sob bodyaches.

## 2023-02-18 NOTE — ED PROVIDER NOTE - ATTENDING APP SHARED VISIT CONTRIBUTION OF CARE
I have personally seen and examined this patient.  I have fully participated in the care of this patient. I performed a substantive portion of the visit including all aspects of the medical decision making. I have reviewed all pertinent clinical information, including history, physical exam, plan and the ACP’s note and agree except as noted. - MD Lacho.     see dispo charts

## 2023-02-18 NOTE — ED PROVIDER NOTE - NS ED ATTENDING STATEMENT MOD
This was a shared visit with the DALIA. I reviewed and verified the documentation and independently performed the documented:

## 2023-02-18 NOTE — ED PROVIDER NOTE - SKIN WOUND TYPE
gluteal region: + diffuse scattered shallow ulcerations with erythema and drainaged noted. no localized fluctuance  but induration tracking inferior along medial gluteal ridge.  + purulent/blood drainage noted.

## 2023-02-18 NOTE — ED PROVIDER NOTE - PROGRESS NOTE DETAILS
Jami PGY 2 Received sign out regarding patient, will follow up with labs, imaging.  currently pending labs / imaging Jami PGY 2 Surgery aware.

## 2023-02-18 NOTE — ED ADULT NURSE NOTE - SEPSIS REFERENCE DATA FOR CRITERIA 1 WBC
From: Paulino Graves  To: Dianne Deutsch CNP  Sent: 2/6/2018 10:09 AM CST  Subject: Prescription Question    This message is being sent by Zulema Barraza on behalf of Paulino Guerra. Day    Needs refill on birth control sent to [facility]. Thanks!
Abnormal WBC: < 4,000 OR > 12,000

## 2023-02-18 NOTE — ED ADULT NURSE NOTE - NSIMPLEMENTINTERV_GEN_ALL_ED
Implemented All Fall Risk Interventions:  [place] to call system. Call bell, personal items and telephone within reach. Instruct patient to call for assistance. Room bathroom lighting operational. Non-slip footwear when patient is off stretcher. Physically safe environment: no spills, clutter or unnecessary equipment. Stretcher in lowest position, wheels locked, appropriate side rails in place. Provide visual cue, wrist band, yellow gown, etc. Monitor gait and stability. Monitor for mental status changes and reorient to person, place, and time. Review medications for side effects contributing to fall risk. Reinforce activity limits and safety measures with patient and family.

## 2023-02-18 NOTE — ED ADULT NURSE NOTE - OBJECTIVE STATEMENT
Pt endorsing B/L ankle pain and swelling x4days. Pt states on Tuesday he stretched his ankles and has had pain to the site ever since. Pt b/l ankles are noted to be swollen and hot to the touch. Pt also noted to be tachycardic with a hr of 130's. Pt denies SOB, CP, dizziness or any other cardiac symptoms. EKG completed in triage.

## 2023-02-18 NOTE — ED PROVIDER NOTE - CLINICAL SUMMARY MEDICAL DECISION MAKING FREE TEXT BOX
31 y/o male c/o bl ankle swelling and warmth - and worsening gluteal wound drainage  -found to be febrile and tachycardic in ER - r/o sepsis likely secondary to infected wounds - will need to r/o deep space infection as well as dvt  -labs blood cx   -xray ankle us duplex  -ct pelvis w/ iv contrast

## 2023-02-18 NOTE — ED ADULT TRIAGE NOTE - CHIEF COMPLAINT QUOTE
Pt c/o bilateral ankle swelling since last week. Denies pain or injury, denies SOB. Tachycardic in triage, denies chest pain or palpitations.

## 2023-02-18 NOTE — ED PROVIDER NOTE - CARDIAC, MLM
1. No change in BP/ heart medicines  2.  Follow up with myself or Dr. Darnell Charlton in February 2018
Normal rate, regular rhythm.  Heart sounds S1, S2.  No murmurs, rubs or gallops.

## 2023-02-19 ENCOUNTER — NON-APPOINTMENT (OUTPATIENT)
Age: 33
End: 2023-02-19

## 2023-02-19 DIAGNOSIS — R50.9 FEVER, UNSPECIFIED: ICD-10-CM

## 2023-02-19 DIAGNOSIS — A41.9 SEPSIS, UNSPECIFIED ORGANISM: ICD-10-CM

## 2023-02-19 DIAGNOSIS — E87.1 HYPO-OSMOLALITY AND HYPONATREMIA: ICD-10-CM

## 2023-02-19 DIAGNOSIS — L73.2 HIDRADENITIS SUPPURATIVA: ICD-10-CM

## 2023-02-19 LAB
ANION GAP SERPL CALC-SCNC: 6 MMOL/L — LOW (ref 7–14)
APPEARANCE UR: CLEAR — SIGNIFICANT CHANGE UP
BASOPHILS # BLD AUTO: 0.06 K/UL — SIGNIFICANT CHANGE UP (ref 0–0.2)
BASOPHILS NFR BLD AUTO: 0.4 % — SIGNIFICANT CHANGE UP (ref 0–2)
BILIRUB UR-MCNC: NEGATIVE — SIGNIFICANT CHANGE UP
BUN SERPL-MCNC: 9 MG/DL — SIGNIFICANT CHANGE UP (ref 7–23)
CALCIUM SERPL-MCNC: 8.4 MG/DL — SIGNIFICANT CHANGE UP (ref 8.4–10.5)
CHLORIDE SERPL-SCNC: 99 MMOL/L — SIGNIFICANT CHANGE UP (ref 98–107)
CO2 SERPL-SCNC: 26 MMOL/L — SIGNIFICANT CHANGE UP (ref 22–31)
COLOR SPEC: SIGNIFICANT CHANGE UP
CREAT SERPL-MCNC: 0.61 MG/DL — SIGNIFICANT CHANGE UP (ref 0.5–1.3)
DIFF PNL FLD: NEGATIVE — SIGNIFICANT CHANGE UP
EGFR: 131 ML/MIN/1.73M2 — SIGNIFICANT CHANGE UP
EOSINOPHIL # BLD AUTO: 0.24 K/UL — SIGNIFICANT CHANGE UP (ref 0–0.5)
EOSINOPHIL NFR BLD AUTO: 1.7 % — SIGNIFICANT CHANGE UP (ref 0–6)
FERRITIN SERPL-MCNC: 452 NG/ML — HIGH (ref 30–400)
FOLATE SERPL-MCNC: 14.3 NG/ML — SIGNIFICANT CHANGE UP (ref 3.1–17.5)
GLUCOSE SERPL-MCNC: 148 MG/DL — HIGH (ref 70–99)
GLUCOSE UR QL: NEGATIVE — SIGNIFICANT CHANGE UP
HCT VFR BLD CALC: 26.2 % — LOW (ref 39–50)
HGB BLD-MCNC: 8 G/DL — LOW (ref 13–17)
IANC: 10.49 K/UL — HIGH (ref 1.8–7.4)
IMM GRANULOCYTES NFR BLD AUTO: 0.5 % — SIGNIFICANT CHANGE UP (ref 0–0.9)
IRON SATN MFR SERPL: 18 % — SIGNIFICANT CHANGE UP (ref 14–50)
IRON SATN MFR SERPL: 26 UG/DL — LOW (ref 45–165)
KETONES UR-MCNC: NEGATIVE — SIGNIFICANT CHANGE UP
LEUKOCYTE ESTERASE UR-ACNC: NEGATIVE — SIGNIFICANT CHANGE UP
LYMPHOCYTES # BLD AUTO: 17.4 % — SIGNIFICANT CHANGE UP (ref 13–44)
LYMPHOCYTES # BLD AUTO: 2.52 K/UL — SIGNIFICANT CHANGE UP (ref 1–3.3)
MAGNESIUM SERPL-MCNC: 1.9 MG/DL — SIGNIFICANT CHANGE UP (ref 1.6–2.6)
MCHC RBC-ENTMCNC: 23.9 PG — LOW (ref 27–34)
MCHC RBC-ENTMCNC: 30.5 GM/DL — LOW (ref 32–36)
MCV RBC AUTO: 78.2 FL — LOW (ref 80–100)
MONOCYTES # BLD AUTO: 1.09 K/UL — HIGH (ref 0–0.9)
MONOCYTES NFR BLD AUTO: 7.5 % — SIGNIFICANT CHANGE UP (ref 2–14)
MRSA PCR RESULT.: SIGNIFICANT CHANGE UP
NEUTROPHILS # BLD AUTO: 10.49 K/UL — HIGH (ref 1.8–7.4)
NEUTROPHILS NFR BLD AUTO: 72.5 % — SIGNIFICANT CHANGE UP (ref 43–77)
NITRITE UR-MCNC: NEGATIVE — SIGNIFICANT CHANGE UP
NRBC # BLD: 0 /100 WBCS — SIGNIFICANT CHANGE UP (ref 0–0)
NRBC # FLD: 0 K/UL — SIGNIFICANT CHANGE UP (ref 0–0)
PH UR: 6.5 — SIGNIFICANT CHANGE UP (ref 5–8)
PHOSPHATE SERPL-MCNC: 3.7 MG/DL — SIGNIFICANT CHANGE UP (ref 2.5–4.5)
PLATELET # BLD AUTO: 431 K/UL — HIGH (ref 150–400)
POTASSIUM SERPL-MCNC: 3.8 MMOL/L — SIGNIFICANT CHANGE UP (ref 3.5–5.3)
POTASSIUM SERPL-SCNC: 3.8 MMOL/L — SIGNIFICANT CHANGE UP (ref 3.5–5.3)
PROT UR-MCNC: NEGATIVE — SIGNIFICANT CHANGE UP
RBC # BLD: 3.35 M/UL — LOW (ref 4.2–5.8)
RBC # FLD: 16.9 % — HIGH (ref 10.3–14.5)
RBC CASTS # UR COMP ASSIST: 1 /HPF — SIGNIFICANT CHANGE UP (ref 0–4)
S AUREUS DNA NOSE QL NAA+PROBE: SIGNIFICANT CHANGE UP
SODIUM SERPL-SCNC: 131 MMOL/L — LOW (ref 135–145)
SP GR SPEC: 1.01 — SIGNIFICANT CHANGE UP (ref 1.01–1.05)
TIBC SERPL-MCNC: 143 UG/DL — LOW (ref 220–430)
UIBC SERPL-MCNC: 117 UG/DL — SIGNIFICANT CHANGE UP (ref 110–370)
UROBILINOGEN FLD QL: SIGNIFICANT CHANGE UP
VIT B12 SERPL-MCNC: 1075 PG/ML — HIGH (ref 200–900)
WBC # BLD: 14.47 K/UL — HIGH (ref 3.8–10.5)
WBC # FLD AUTO: 14.47 K/UL — HIGH (ref 3.8–10.5)
WBC UR QL: 1 /HPF — SIGNIFICANT CHANGE UP (ref 0–5)

## 2023-02-19 PROCEDURE — 99222 1ST HOSP IP/OBS MODERATE 55: CPT

## 2023-02-19 PROCEDURE — 99223 1ST HOSP IP/OBS HIGH 75: CPT

## 2023-02-19 RX ORDER — CHLORHEXIDINE GLUCONATE 213 G/1000ML
1 SOLUTION TOPICAL DAILY
Refills: 0 | Status: DISCONTINUED | OUTPATIENT
Start: 2023-02-19 | End: 2023-03-08

## 2023-02-19 RX ORDER — VANCOMYCIN HCL 1 G
1000 VIAL (EA) INTRAVENOUS EVERY 12 HOURS
Refills: 0 | Status: DISCONTINUED | OUTPATIENT
Start: 2023-02-19 | End: 2023-02-19

## 2023-02-19 RX ORDER — PIPERACILLIN AND TAZOBACTAM 4; .5 G/20ML; G/20ML
3.38 INJECTION, POWDER, LYOPHILIZED, FOR SOLUTION INTRAVENOUS EVERY 8 HOURS
Refills: 0 | Status: DISCONTINUED | OUTPATIENT
Start: 2023-02-19 | End: 2023-02-22

## 2023-02-19 RX ORDER — ACETAMINOPHEN 500 MG
650 TABLET ORAL ONCE
Refills: 0 | Status: COMPLETED | OUTPATIENT
Start: 2023-02-19 | End: 2023-02-19

## 2023-02-19 RX ORDER — PIPERACILLIN AND TAZOBACTAM 4; .5 G/20ML; G/20ML
3.38 INJECTION, POWDER, LYOPHILIZED, FOR SOLUTION INTRAVENOUS ONCE
Refills: 0 | Status: DISCONTINUED | OUTPATIENT
Start: 2023-02-19 | End: 2023-02-19

## 2023-02-19 RX ORDER — ACETAMINOPHEN 500 MG
650 TABLET ORAL EVERY 6 HOURS
Refills: 0 | Status: DISCONTINUED | OUTPATIENT
Start: 2023-02-19 | End: 2023-02-22

## 2023-02-19 RX ORDER — INFLUENZA VIRUS VACCINE 15; 15; 15; 15 UG/.5ML; UG/.5ML; UG/.5ML; UG/.5ML
0.5 SUSPENSION INTRAMUSCULAR ONCE
Refills: 0 | Status: COMPLETED | OUTPATIENT
Start: 2023-02-19 | End: 2023-02-19

## 2023-02-19 RX ORDER — LIDOCAINE HCL 20 MG/ML
40 VIAL (ML) INJECTION ONCE
Refills: 0 | Status: COMPLETED | OUTPATIENT
Start: 2023-02-19 | End: 2023-02-19

## 2023-02-19 RX ORDER — VANCOMYCIN HCL 1 G
1000 VIAL (EA) INTRAVENOUS ONCE
Refills: 0 | Status: COMPLETED | OUTPATIENT
Start: 2023-02-19 | End: 2023-02-19

## 2023-02-19 RX ADMIN — Medication 650 MILLIGRAM(S): at 23:00

## 2023-02-19 RX ADMIN — PIPERACILLIN AND TAZOBACTAM 25 GRAM(S): 4; .5 INJECTION, POWDER, LYOPHILIZED, FOR SOLUTION INTRAVENOUS at 15:57

## 2023-02-19 RX ADMIN — Medication 100 MILLIGRAM(S): at 20:06

## 2023-02-19 RX ADMIN — Medication 100 MILLIGRAM(S): at 05:05

## 2023-02-19 RX ADMIN — PIPERACILLIN AND TAZOBACTAM 25 GRAM(S): 4; .5 INJECTION, POWDER, LYOPHILIZED, FOR SOLUTION INTRAVENOUS at 07:36

## 2023-02-19 RX ADMIN — Medication 100 MILLIGRAM(S): at 15:05

## 2023-02-19 RX ADMIN — PIPERACILLIN AND TAZOBACTAM 25 GRAM(S): 4; .5 INJECTION, POWDER, LYOPHILIZED, FOR SOLUTION INTRAVENOUS at 22:13

## 2023-02-19 RX ADMIN — Medication 250 MILLIGRAM(S): at 06:01

## 2023-02-19 RX ADMIN — Medication 650 MILLIGRAM(S): at 22:12

## 2023-02-19 RX ADMIN — CHLORHEXIDINE GLUCONATE 1 APPLICATION(S): 213 SOLUTION TOPICAL at 12:07

## 2023-02-19 RX ADMIN — Medication 650 MILLIGRAM(S): at 05:05

## 2023-02-19 RX ADMIN — Medication 650 MILLIGRAM(S): at 06:09

## 2023-02-19 NOTE — H&P ADULT - ASSESSMENT
32 y,o with hidradenitis suppurativa on Humira, who presents with bilateral ankle pain and increased discharge from hidradenitis, found to be septic likely due to buttock superficial abscesses.

## 2023-02-19 NOTE — H&P ADULT - HISTORY OF PRESENT ILLNESS
32 y,o with hidradenitis suppurativa on Humira, who presents with bilateral ankle pain and increased discharge from hidradenitis.     Patient report started to have bilateral ankle pain on Monday, denies any trauma, falls or twisted ankle. Pain is ache, able to move and walk, pain improved with tylenol. Also endorses few days of worsening discharge from buttocks lesions, discharge described as yellow and dark. He denies any pain, states area is not as swollen as other flare episodes. He denies any fever, chills, sick contacts, nausea/vomiting/diarrhea. Has been on Humira per OP Dermatology, 40 mg weekly, missed last week dose and was due for it tomorrow 2/20 (Dr. Mobley OP derm).    In the Emergency room patient found to be febrile to 101.9, , leukocytosis of 22, x-ray of ankle without fracture or acute pathology, CT abdomen showed extensive skin thickening/soft tissue inflammation laterally along the gluteal fold. Small areas of thin hypodense collection with variable degrees of internal gas may represent superficial abscesses. Patient received broad spectrum antibiotics.

## 2023-02-19 NOTE — CONSULT NOTE ADULT - ASSESSMENT
is a 32 y,o with hidradenitis suppurativa on Humira, who presents with bilateral ankle pain and increased discharge from hidradenitis. Patient report started to have bilateral ankle pain on Monday, denies any trauma, falls or twisted ankle. Pain is ache, able to move and walk, pain improved with tylenol. Also endorses few days of worsening discharge from buttocks lesions, discharge described as yellow and dark. He denies any pain, states area is not as swollen as other flare episodes. He denies any fever, chills, sick contacts, nausea/vomiting/diarrhea. Has been on Humira per OP Dermatology, 40 mg weekly, missed last week dose and was due for it tomorrow 2/20 (Dr. Mobley OP derm). In the Emergency room patient found to be febrile to 101.9, , leukocytosis of 22, x-ray of ankle without fracture or acute pathology, CT abdomen showed extensive skin thickening/soft tissue inflammation laterally along the gluteal fold. Small areas of thin hypodense collection with variable degrees of internal gas may represent superficial abscesses. Patient received vanc, clinda and zosyn and ID consulted.    WORKUP  CXR, RVP and UA negative  CRP: 188.0 and  mm/hr (02-18   CT Pelvis w/ IV Cont (02.18.): Extensive skin thickening/soft tissue inflammation laterally along the gluteal fold. Small areas of thin hypodense collection with variable  degrees of internal gas may represent superficial abscesses. Presence of subcutaneous air in this patient may be secondary to recent  instrumentation/procedure, however gas-forming bacterial infection cannot  be excluded on the basis of this study alone. Bulky, rounded lymph nodes, similar in appearance to that seen 2017.   x-ray of ankle without fracture or acute pathology,     DIAGNOSIS and IMPRESSION  ·	Gluteal abscess with SubQ air  ·	Gluteal HS on Humira outpt  ·	ANkle Pain    Fever to 101.9 with leukocytosis to 22k on admission  Pt is on clindamycin IVPB 600 every 8 hours, Zosyn 3.375 every 8 hours and vanc 1000 every 12 hours    RECOMMENDATIONS  BLood cx in lab -> Will f/u      PT TO BE SEEN. PRELIM NOTE  PENDING RECS. PLEASE WAIT FOR FINAL RECS AFTER DISCUSSION WITH ATTENDING#    Antoine Gutierrez MD, PGY5  ID fellow  [employer] Teams Preferred  After 5pm/weekends call [phone]    is a 32 y,o with hidradenitis suppurativa on Humira, who presents with bilateral ankle pain and increased discharge from hidradenitis. Patient report started to have bilateral ankle pain on Monday, denies any trauma, falls or twisted ankle. Pain is ache, able to move and walk, pain improved with tylenol. Also endorses few days of worsening discharge from buttocks lesions, discharge described as yellow and dark. He denies any pain, states area is not as swollen as other flare episodes. He denies any fever, chills, sick contacts, nausea/vomiting/diarrhea. Has been on Humira per OP Dermatology, 40 mg weekly, missed last week dose and was due for it tomorrow 2/20 (Dr. Mobley OP derm). In the Emergency room patient found to be febrile to 101.9, , leukocytosis of 22, x-ray of ankle without fracture or acute pathology, CT abdomen showed extensive skin thickening/soft tissue inflammation laterally along the gluteal fold. Small areas of thin hypodense collection with variable degrees of internal gas may represent superficial abscesses. Patient received vanc, clinda and zosyn and ID consulted.    WORKUP  CXR, RVP and UA negative  CRP: 188.0 and  mm/hr (02-18   CT Pelvis w/ IV Cont (02.18.): Extensive skin thickening/soft tissue inflammation laterally along the gluteal fold. Small areas of thin hypodense collection with variable  degrees of internal gas may represent superficial abscesses. Presence of subcutaneous air in this patient may be secondary to recent  instrumentation/procedure, however gas-forming bacterial infection cannot  be excluded on the basis of this study alone. Bulky, rounded lymph nodes, similar in appearance to that seen 2017.   x-ray of ankle without fracture or acute pathology,     DIAGNOSIS and IMPRESSION  ·	Gluteal abscess with SubQ air  ·	Gluteal HS on Humira outpt  ·	ANkle Pain    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     RECOMMENDATIONS  BLood cx in lab -> Will f/u  Gluteal wound with significant discharge -> Swabbed for G/S and culture  c/w Zosyn    PT TO BE SEEN. PRELIM NOTE  PENDING RECS. PLEASE WAIT FOR FINAL RECS AFTER DISCUSSION WITH ATTENDING#    Antoine Gutierrez MD, PGY5  ID fellow  [employer] Teams Preferred  After 5pm/weekends call [phone]    is a 32 y,o with hidradenitis suppurativa on Humira, who presents with bilateral ankle pain and increased discharge from hidradenitis. Patient report started to have bilateral ankle pain on Monday, denies any trauma, falls or twisted ankle. Pain is ache, able to move and walk, pain improved with tylenol. Also endorses few days of worsening discharge from buttocks lesions, discharge described as yellow and dark. He denies any pain, states area is not as swollen as other flare episodes. He denies any fever, chills, sick contacts, nausea/vomiting/diarrhea. Has been on Humira per OP Dermatology, 40 mg weekly, missed last week dose and was due for it tomorrow 2/20 (Dr. Mobley OP derm). In the Emergency room patient found to be febrile to 101.9, , leukocytosis of 22, x-ray of ankle without fracture or acute pathology, CT abdomen showed extensive skin thickening/soft tissue inflammation laterally along the gluteal fold. Small areas of thin hypodense collection with variable degrees of internal gas may represent superficial abscesses. Patient received vanc, clinda and zosyn and ID consulted.    WORKUP  CXR, RVP and UA negative  CRP: 188.0 and  mm/hr (02-18   CT Pelvis w/ IV Cont (02.18.): Extensive skin thickening/soft tissue inflammation laterally along the gluteal fold. Small areas of thin hypodense collection with variable  degrees of internal gas may represent superficial abscesses. Presence of subcutaneous air in this patient may be secondary to recent  instrumentation/procedure, however gas-forming bacterial infection cannot  be excluded on the basis of this study alone. Bulky, rounded lymph nodes, similar in appearance to that seen 2017.   x-ray of ankle without fracture or acute pathology,     DIAGNOSIS and IMPRESSION  ·	Gluteal abscess with SubQ air  ·	Gluteal HS on Humira outpt  ·	ANkle Pain    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     RECOMMENDATIONS  BLood cx in lab -> Will f/u  Gluteal wound with significant discharge -> Swabbed for G/S and culture  c/w Zosyn  Recommend Dermat and Plastic sx eval  Recommend Doxy 100mg BID PO  Stop Clinda and Vanc    Pt seen and examined. Case d/w attending   Recommendation provided to primary team via MS Teams.      Antoine Gutierrez MD, PGY5  ID fellow  [employer] Teams Preferred  After 5pm/weekends call [phone]

## 2023-02-19 NOTE — CHART NOTE - NSCHARTNOTEFT_GEN_A_CORE
Called and left a message with plastic surgery service for consult. phone number given for call back. Will await call back and follow up recommendations. Called and left a message with plastic surgery service for consult. phone number given for call back. Will await call back and follow up recommendations.    Addedum: Spoke with Dr. Tariq who advised acute care surgery should follow and manage patient. Will attempt to coordinate to have surgery and plastics discuss to determine who best can manage patient. Called and left a message with plastic surgery service for consult. phone number given for call back. Will await call back and follow up recommendations.    Addedum: Spoke with Dr. Tariq who advised acute care surgery should follow and manage patient. Spoke to surgery B team and provided Dr. Tariq's phone number so they can discuss the case. Medicine tem to continue medical care.

## 2023-02-19 NOTE — ED ADULT NURSE REASSESSMENT NOTE - NS ED NURSE REASSESS COMMENT FT1
Patient resting in stretcher, no acute distress noted at this time. Patient updated on plan of care, will continue to monitor.
pt noted to have draining wounds to the intergluteal cleft as well as b/l axilla. Pt has PMHx Hidradenitis.
Report received from ROYAL Kumar. Pt A&Ox4, ambulatory. Respirations equal and unlabored. Pt resting in stretcher at this time with no complaints. IV patent. VSS. Safety maintained, bed in lowest position, side rails raised, call bell in reach. Awaiting CT results. No acute distress noted at this time.

## 2023-02-19 NOTE — PATIENT PROFILE ADULT - FALL HARM RISK - HARM RISK INTERVENTIONS

## 2023-02-19 NOTE — CONSULT NOTE ADULT - ASSESSMENT
32 year old male with bilateral buttock hidradenitis suppurativa. Presenting with leukocytosis and tachycardia.    Plan:  - Given that patient is scheduled for surgery for this problem with plastic surgery (Dr. Tariq) in April and the fact that patient would like to have plastics evaluate him, would request plastic surgery consultation for evaluation and possible drainage  - Recommend work up for leukocytosis  - Recommend resuscitation for tachycardia    To be discussed with acute care surgery attending on call, Dr. Marie.      B Team Surgery  [id number]

## 2023-02-19 NOTE — H&P ADULT - NSHPREVIEWOFSYSTEMS_GEN_ALL_CORE

## 2023-02-19 NOTE — H&P ADULT - NSHPLABSRESULTS_GEN_ALL_CORE
.  LABS:                         8.0    14.47 )-----------( 431      ( 19 Feb 2023 10:38 )             26.2     02-19    131<L>  |  99  |  9   ----------------------------<  148<H>  3.8   |  26  |  0.61    Ca    8.4      19 Feb 2023 10:38  Phos  3.7     02-19  Mg     1.90     02-19    TPro  10.4<H>  /  Alb  2.2<L>  /  TBili  0.4  /  DBili  x   /  AST  13  /  ALT  6   /  AlkPhos  89  02-18                  RADIOLOGY, EKG & ADDITIONAL TESTS: Reviewed.   < from: CT Pelvis w/ IV Cont (02.18.23 @ 22:09) >    IMPRESSION:  Extensive skin thickening/soft tissue inflammation laterally along the   gluteal fold. Small areas of thin hypodense collection with variable   degrees of internal gas may represent superficial abscesses.    Presence of subcutaneous air in this patient may be secondary to recent   instrumentation/procedure, however gas-forming bacterial infection cannot   be excluded on the basis of this study alone.    Bulky, rounded lymph nodes, similar in appearance to that seen 2017.    Circumferential bladder wall thickening; correlate with urinalysis.      < end of copied text >    < from: US Duplex Venous Lower Ext Complete, Bilateral (02.18.23 @ 18:54) >    IMPRESSION:  No evidence of deep venous thrombosis in either lower extremity.  Prominent right groin lymph node. CT of 11/8/2017 did demonstrate a   similar finding.    < end of copied text >    CXR personally reviewed: no infiltrate, clear lungs

## 2023-02-19 NOTE — H&P ADULT - PROBLEM SELECTOR PLAN 2
On OP Humira 40 mg weekly  -follows with Dr. Mobley OP derm  -Been evaluated OP for excision by plastic surgery  -follow up surgery recs

## 2023-02-19 NOTE — CONSULT NOTE ADULT - ATTENDING COMMENTS
Above noted. The History reviewed, the patient was examined yesterday, after speaking with Dr. Tariq, who asked me to take care of the urgent care for the patient.  Physical Exam: Low grade fever.  Buttocks: Multiple areas with purulent drainage over both buttock areas, more extensive on the right side.  Impression: Chronic hidradenitis suppurativa over both buttocks.  Plan: Debridement in the Operating Room today. Discussed in detail with the patient and his family (Mother and Grandfather), including risks: hemorrhage, re-infection and the need for further debridements.

## 2023-02-19 NOTE — H&P ADULT - PROBLEM SELECTOR PLAN 1
Fever to 101.9, , leukocytosis of 22. Increase hidradenitis discharge and ankle pain.   CT abdomen showed extensive skin thickening/soft tissue inflammation laterally along the gluteal fold. Small areas of thin hypodense collection with variable degrees of internal gas may represent superficial abscesses.  Gas-forming bacterial infection cannot be excluded.  Source gluteal wounds  -CXR no PNA, RVP/covid negative  -U/A pending collection  -follow up BCx, MRSA swab  -on broad spectrum: Vanc, Zosyn + clinda  -surgery consulted for I & D  -ID consult Fever to 101.9, , leukocytosis of 22, lactate>2. Increase hidradenitis discharge and ankle pain.   CT abdomen showed extensive skin thickening/soft tissue inflammation laterally along the gluteal fold. Small areas of thin hypodense collection with variable degrees of internal gas may represent superficial abscesses.  Gas-forming bacterial infection cannot be excluded.  Source gluteal wounds  -CXR no PNA, RVP/covid negative  -U/A pending collection  -follow up BCx, MRSA swab  -on broad spectrum: Vanc, Zosyn + clinda  -surgery consulted for I & D  -ID consult

## 2023-02-19 NOTE — H&P ADULT - PROBLEM SELECTOR PLAN 3
Na 129, likely hypovolemic hyponatremia from fever/insensible losses  s/p IVFs, improving  -trend BMP  -encourage PO intake

## 2023-02-19 NOTE — CONSULT NOTE ADULT - SUBJECTIVE AND OBJECTIVE BOX
Patient is a 32y old  Male who presents with a chief complaint of sepsis (2023 12:23)    HPI:  is a 32 y,o with hidradenitis suppurativa on Humira, who presents with bilateral ankle pain and increased discharge from hidradenitis. Patient report started to have bilateral ankle pain on Monday, denies any trauma, falls or twisted ankle. Pain is ache, able to move and walk, pain improved with tylenol. Also endorses few days of worsening discharge from buttocks lesions, discharge described as yellow and dark. He denies any pain, states area is not as swollen as other flare episodes. He denies any fever, chills, sick contacts, nausea/vomiting/diarrhea. Has been on Humira per [facility], 40 mg weekly, missed last week dose and was due for it tomorrow  (Dr. Mobley [facility]). In the Emergency room patient found to be febrile to 101.9, , leukocytosis of 22, x-ray of ankle without fracture or acute pathology, CT abdomen showed extensive skin thickening/soft tissue inflammation laterally along the gluteal fold. Small areas of thin hypodense collection with variable degrees of internal gas may represent superficial abscesses. Patient received vanc, clinda and zosyn and ID consulted.       REVIEW OF SYSTEMS  [  ] ROS unobtainable because:    [ x ] All other systems negative except as noted below    Constitutional:  [ ] fever [ ] chills  [ ] weight loss  [ ]night sweat  [ ]poor appetite/PO intake [ ]fatigue   Skin:  [ ] rash [ ] phlebitis	  Eyes: [ ] icterus [ ] pain  [ ] discharge	  ENMT: [ ] sore throat  [ ] thrush [ ] ulcers [ ] exudates [ ]anosmia  Respiratory: [ ] dyspnea [ ] hemoptysis [ ] cough [ ] sputum	  Cardiovascular:  [ ] chest pain [ ] palpitations [ ] edema	  Gastrointestinal:  [ ] nausea [ ] vomiting [ ] diarrhea [ ] constipation [ ] pain	  Genitourinary:  [ ] dysuria [ ] frequency [ ] hematuria [ ] discharge [ ] flank pain  [ ] incontinence  Musculoskeletal:  [ ] myalgias [ ] arthralgias [ ] arthritis  [ ] back pain  Neurological:  [ ] headache [ ] weakness [ ] seizures  [ ] confusion/altered mental status    prior hospital charts reviewed [V]  primary team notes reviewed [V]  other consultant notes reviewed [V]    PAST MEDICAL & SURGICAL HISTORY:  Hidradenitis suppurativa      No significant past surgical history          SOCIAL HISTORY:  - Denied smoking/vaping/alcohol/recreational drug use    FAMILY HISTORY:  No significant family history        Allergies  No Known Allergies        ANTIMICROBIALS:  clindamycin IVPB 600 every 8 hours  piperacillin/tazobactam IVPB.. 3.375 every 8 hours  vancomycin  IVPB 1000 every 12 hours      ANTIMICROBIALS (past 90 days):  MEDICATIONS  (STANDING):  clindamycin IVPB   100 mL/Hr IV Intermittent (23 @ 05:05)    piperacillin/tazobactam IVPB..   25 mL/Hr IV Intermittent (23 @ 07:36)    piperacillin/tazobactam IVPB...   200 mL/Hr IV Intermittent (23 @ 18:05)    vancomycin  IVPB.   250 mL/Hr IV Intermittent (23 @ 19:07)    vancomycin  IVPB.   250 mL/Hr IV Intermittent (23 @ 06:01)        OTHER MEDS:   MEDICATIONS  (STANDING):  influenza   Vaccine 0.5 once      VITALS:  Vital Signs Last 24 Hrs  T(F): 98.3 (23 @ 07:25), Max: 101.9 (23 @ 17:05)    Vital Signs Last 24 Hrs  HR: 85 (23 @ 07:25) (85 - 145)  BP: 112/57 (23 @ 07:25) (98/58 - 130/78)  RR: 16 (23 @ 07:25)  SpO2: 100% (23 @ 07:25) (98% - 100%)  Wt(kg): --    EXAM:    GA: NAD, AOx3  HEENT: oral cavity no lesion  CV: nl S1/S2, no RMG  Lungs: CTAB, No distress  Abd: BS+, soft, nontender, no rebounding pain  Ext: no edema  Neuro: No focal deficits  Skin: Intact  IV: no phlebitis    Labs:                        8.0    14.47 )-----------( 431      ( 2023 10:38 )             26.2         131<L>  |  99  |  9   ----------------------------<  148<H>  3.8   |  26  |  0.61    Ca    8.4      2023 10:38  Phos  3.7       Mg     1.90         TPro  10.4<H>  /  Alb  2.2<L>  /  TBili  0.4  /  DBili  x   /  AST  13  /  ALT  6   /  AlkPhos  89        WBC Trend:  WBC Count: 14.47 (23 @ 10:38)  WBC Count: 22.07 (23 @ 17:50)      Auto Neutrophil #: 10.49 K/uL (23 @ 10:38)  Auto Neutrophil #: 16.57 K/uL (23 @ 17:50)      Creatine Trend:  Creatinine, Serum: 0.61 ()  Creatinine, Serum: 0.83 ()      Liver Biochemical Testing Trend:  Alanine Aminotransferase (ALT/SGPT): 6 ()  Aspartate Aminotransferase (AST/SGOT): 13 (23 @ 17:50)  Bilirubin Total, Serum: 0.4 ()      Trend LDH      Auto Eosinophil %: 1.7 % (23 @ 10:38)  Auto Eosinophil %: 0.2 % (23 @ 17:50)      Urinalysis Basic - ( 2023 12:45 )    Color: Light Yellow / Appearance: Clear / S.012 / pH: x  Gluc: x / Ketone: Negative  / Bili: Negative / Urobili: <2 mg/dL   Blood: x / Protein: Negative / Nitrite: Negative   Leuk Esterase: Negative / RBC: x / WBC x   Sq Epi: x / Non Sq Epi: x / Bacteria: x        MICROBIOLOGY:    RVPt: NotDetec ( @ 18:01)  CRP: 188.0 ()  Ferritin 452 ()   mm/hr (23 @ 17:50)    RADIOLOGY:  imaging below personally reviewed    < from: CT Pelvis w/ IV Cont (23 @ 22:09) >  Extensive skin thickening/soft tissue inflammation laterally along the gluteal fold. Small areas of thin hypodense collection with variable  degrees of internal gas may represent superficial abscesses. Presence of subcutaneous air in this patient may be secondary to recent  instrumentation/procedure, however gas-forming bacterial infection cannot  be excluded on the basis of this study alone. Bulky, rounded lymph nodes, similar in appearance to that seen 2017. Circumferential bladder wall thickening; correlate with urinalysis.   < end of copied text >      < from: US Duplex Venous Lower Ext Complete, Bilateral (23 @ 18:54) >  No evidence of deep venous thrombosis in either lower extremity. Prominent right groin lymph node. CT of 2017 did demonstrate a similar finding.  < end of copied text >    < from: Xray Chest 1 View- PORTABLE-Urgent (Xray Chest 1 View- PORTABLE-Urgent .) (23 @ 18:07) >  Clear lungs.  < end of copied text >    < from: Xray Ankle Complete 3 Views, Bilateral (23 @ 18:07) >  No acute fracture  < end of copied text >     Patient is a 32y old  Male who presents with a chief complaint of sepsis (2023 12:23)    HPI:  is a 32 y,o with hidradenitis suppurativa on Humira, who presents with bilateral ankle pain and increased discharge from hidradenitis. Patient report started to have bilateral ankle pain on Monday, denies any trauma, falls or twisted ankle. Pain is ache, able to move and walk, pain improved with tylenol. Also endorses few days of worsening discharge from buttocks lesions, discharge described as yellow and dark. He denies any pain, states area is not as swollen as other flare episodes. He denies any fever, chills, sick contacts, nausea/vomiting/diarrhea. Has been on Humira per [facility], 40 mg weekly, missed last week dose and was due for it tomorrow  (Dr. Mobley [facility]). In the Emergency room patient found to be febrile to 101.9, , leukocytosis of 22, x-ray of ankle without fracture or acute pathology, CT abdomen showed extensive skin thickening/soft tissue inflammation laterally along the gluteal fold. Small areas of thin hypodense collection with variable degrees of internal gas may represent superficial abscesses. Patient received vanc, clinda and zosyn and ID consulted.   Pt report no fevers at home. Reports pain started when he was streching his legs minimally. No FH of autoimmune conditions. Reports that he has been having worsening discharge from gluteal and axillary regions since january and is planned for a surgery in april. HS was diagnosed in  and has been on humira for a few years now.        REVIEW OF SYSTEMS  [  ] ROS unobtainable because:    [ x ] All other systems negative except as noted below    Constitutional:  [ ] fever [ ] chills  [ ] weight loss  [ ]night sweat  [ ]poor appetite/PO intake [ ]fatigue   Skin:  [ ] rash [ ] phlebitis	  Eyes: [ ] icterus [ ] pain  [ ] discharge	  ENMT: [ ] sore throat  [ ] thrush [ ] ulcers [ ] exudates [ ]anosmia  Respiratory: [ ] dyspnea [ ] hemoptysis [ ] cough [ ] sputum	  Cardiovascular:  [ ] chest pain [ ] palpitations [ ] edema	  Gastrointestinal:  [ ] nausea [ ] vomiting [ ] diarrhea [ ] constipation [ ] pain	  Genitourinary:  [ ] dysuria [ ] frequency [ ] hematuria [ ] discharge [ ] flank pain  [ ] incontinence  Musculoskeletal:  [ ] myalgias [ ] arthralgias [ ] arthritis  [ ] back pain  Neurological:  [ ] headache [ ] weakness [ ] seizures  [ ] confusion/altered mental status    prior hospital charts reviewed [V]  primary team notes reviewed [V]  other consultant notes reviewed [V]    PAST MEDICAL & SURGICAL HISTORY:  Hidradenitis suppurativa      No significant past surgical history          SOCIAL HISTORY:  - Denied smoking/vaping/alcohol/recreational drug use    FAMILY HISTORY:  No significant family history        Allergies  No Known Allergies        ANTIMICROBIALS:  clindamycin IVPB 600 every 8 hours  piperacillin/tazobactam IVPB.. 3.375 every 8 hours  vancomycin  IVPB 1000 every 12 hours      ANTIMICROBIALS (past 90 days):  MEDICATIONS  (STANDING):  clindamycin IVPB   100 mL/Hr IV Intermittent (23 @ 05:05)    piperacillin/tazobactam IVPB..   25 mL/Hr IV Intermittent (23 @ 07:36)    piperacillin/tazobactam IVPB...   200 mL/Hr IV Intermittent (23 @ 18:05)    vancomycin  IVPB.   250 mL/Hr IV Intermittent (23 @ 19:07)    vancomycin  IVPB.   250 mL/Hr IV Intermittent (23 @ 06:01)        OTHER MEDS:   MEDICATIONS  (STANDING):  influenza   Vaccine 0.5 once      VITALS:  Vital Signs Last 24 Hrs  T(F): 98.3 (23 @ 07:25), Max: 101.9 (23 @ 17:05)    Vital Signs Last 24 Hrs  HR: 85 (23 @ 07:25) (85 - 145)  BP: 112/57 (23 @ 07:25) (98/58 - 130/78)  RR: 16 (23 @ 07:25)  SpO2: 100% (23 @ 07:25) (98% - 100%)  Wt(kg): --    EXAM:    GA: NAD, AOx3  HEENT: oral cavity no lesion  CV: nl S1/S2, no RMG  Lungs: CTAB, No distress  Abd: BS+, soft, nontender, no rebounding pain  Ext: B/L ankle tender and right ankle swollen. No warmth or erythema  Neuro: No focal deficits  Skin: Significant scarring and sinus tracts in B.L axillary and gluteal cleft with purulence whitish drain in axillary and purulent brownish drainage in gluteal cleft. Associated with tenderness  IV: no phlebitis    Labs:                        8.0    14.47 )-----------( 431      ( 2023 10:38 )             26.2         131<L>  |  99  |  9   ----------------------------<  148<H>  3.8   |  26  |  0.61    Ca    8.4      2023 10:38  Phos  3.7       Mg     1.90         TPro  10.4<H>  /  Alb  2.2<L>  /  TBili  0.4  /  DBili  x   /  AST  13  /  ALT  6   /  AlkPhos  89        WBC Trend:  WBC Count: 14.47 (23 @ 10:38)  WBC Count: 22.07 (23 @ 17:50)      Auto Neutrophil #: 10.49 K/uL (23 @ 10:38)  Auto Neutrophil #: 16.57 K/uL (23 @ 17:50)      Creatine Trend:  Creatinine, Serum: 0.61 ()  Creatinine, Serum: 0.83 ()      Liver Biochemical Testing Trend:  Alanine Aminotransferase (ALT/SGPT): 6 ()  Aspartate Aminotransferase (AST/SGOT): 13 (23 @ 17:50)  Bilirubin Total, Serum: 0.4 ()      Trend LDH      Auto Eosinophil %: 1.7 % (23 @ 10:38)  Auto Eosinophil %: 0.2 % (23 @ 17:50)      Urinalysis Basic - ( 2023 12:45 )    Color: Light Yellow / Appearance: Clear / S.012 / pH: x  Gluc: x / Ketone: Negative  / Bili: Negative / Urobili: <2 mg/dL   Blood: x / Protein: Negative / Nitrite: Negative   Leuk Esterase: Negative / RBC: x / WBC x   Sq Epi: x / Non Sq Epi: x / Bacteria: x        MICROBIOLOGY:    RVPt: NotDetec ( @ 18:01)  CRP: 188.0 ()  Ferritin 452 ()   mm/hr (23 @ 17:50)    RADIOLOGY:  imaging below personally reviewed    < from: CT Pelvis w/ IV Cont (23 @ 22:09) >  Extensive skin thickening/soft tissue inflammation laterally along the gluteal fold. Small areas of thin hypodense collection with variable  degrees of internal gas may represent superficial abscesses. Presence of subcutaneous air in this patient may be secondary to recent  instrumentation/procedure, however gas-forming bacterial infection cannot  be excluded on the basis of this study alone. Bulky, rounded lymph nodes, similar in appearance to that seen 2017. Circumferential bladder wall thickening; correlate with urinalysis.   < end of copied text >      < from: US Duplex Venous Lower Ext Complete, Bilateral (23 @ 18:54) >  No evidence of deep venous thrombosis in either lower extremity. Prominent right groin lymph node. CT of 2017 did demonstrate a similar finding.  < end of copied text >    < from: Xray Chest 1 View- PORTABLE-Urgent (Xray Chest 1 View- PORTABLE-Urgent .) (23 @ 18:07) >  Clear lungs.  < end of copied text >    < from: Xray Ankle Complete 3 Views, Bilateral (23 @ 18:07) >  No acute fracture  < end of copied text >     Patient is a 32y old  Male who presents with a chief complaint of sepsis (2023 12:23)    HPI:  is a 32 y,o with hidradenitis suppurativa on Humira, who presents with bilateral ankle pain and increased discharge from hidradenitis. Patient report started to have bilateral ankle pain on Monday, denies any trauma, falls or twisted ankle. Pain is ache, able to move and walk, pain improved with tylenol. Also endorses few days of worsening discharge from buttocks lesions, discharge described as yellow and dark. He denies any pain, states area is not as swollen as other flare episodes. He denies any fever, chills, sick contacts, nausea/vomiting/diarrhea. Has been on Humira per [facility], 40 mg weekly, missed last week dose and was due for it tomorrow  (Dr. Mobley [facility]). In the Emergency room patient found to be febrile to 101.9, , leukocytosis of 22, x-ray of ankle without fracture or acute pathology, CT abdomen showed extensive skin thickening/soft tissue inflammation laterally along the gluteal fold. Small areas of thin hypodense collection with variable degrees of internal gas may represent superficial abscesses. Patient received vanc, clinda and zosyn and ID consulted.   Pt report no fevers at home. Reports pain started when he was streching his legs minimally. No FH of autoimmune conditions. Reports that he has been having worsening discharge from gluteal and axillary regions since january and is planned for a surgery in april. HS was diagnosed in  and has been on humira for a few years now.        REVIEW OF SYSTEMS  [  ] ROS unobtainable because:    [ x ] All other systems negative except as noted below    Constitutional:  [ ] fever [ ] chills   Skin:  [ ] rash [ ] phlebitis; per HPI 	  Eyes: [ ] icterus [ ] pain  [ ] discharge	  ENMT: [ ] sore throat  [ ] thrush   Respiratory: [ ] dyspnea [ ] cough [ ] sputum	  Cardiovascular:  [ ] chest pain  Gastrointestinal:  [ ] nausea [ ] vomiting [ ] diarrhea [ ] constipation [ ] pain	  Genitourinary:  [ ] dysuria [ ] frequency   Musculoskeletal:  [ ] myalgias  [ ] back pain; + ankle pain and swelling   Neurological:  [ ] headache  [ ] confusion/altered mental status  Extremities: No edema.       prior hospital charts reviewed [V]  primary team notes reviewed [V]  other consultant notes reviewed [V]    PAST MEDICAL & SURGICAL HISTORY:  Hidradenitis suppurativa      No significant past surgical history          SOCIAL HISTORY:  - lives with mother, uses medical marijuana         FAMILY HISTORY:  No significant family history of autoimmune ds in first degree relatives         Allergies  No Known Allergies        ANTIMICROBIALS:  clindamycin IVPB 600 every 8 hours  piperacillin/tazobactam IVPB.. 3.375 every 8 hours  vancomycin  IVPB 1000 every 12 hours      ANTIMICROBIALS (past 90 days):  MEDICATIONS  (STANDING):  clindamycin IVPB   100 mL/Hr IV Intermittent (23 @ 05:05)    piperacillin/tazobactam IVPB..   25 mL/Hr IV Intermittent (23 @ 07:36)    piperacillin/tazobactam IVPB...   200 mL/Hr IV Intermittent (23 @ 18:05)    vancomycin  IVPB.   250 mL/Hr IV Intermittent (23 @ 19:07)    vancomycin  IVPB.   250 mL/Hr IV Intermittent (23 @ 06:01)        OTHER MEDS:   MEDICATIONS  (STANDING):  influenza   Vaccine 0.5 once      VITALS:  Vital Signs Last 24 Hrs  T(F): 98.3 (23 @ 07:25), Max: 101.9 (23 @ 17:05)    Vital Signs Last 24 Hrs  HR: 85 (23 @ 07:25) (85 - 145)  BP: 112/57 (23 @ 07:25) (98/58 - 130/78)  RR: 16 (23 @ 07:25)  SpO2: 100% (23 @ 07:25) (98% - 100%)  Wt(kg): --      EXAM:  GA: NAD, AOx3  Eyes: No discharge   ENT: oral cavity no lesion  CV: nl S1/S2,   Lungs: + air entry b/l  Abd: BS+, soft, nontender  Ext: No edema   MSK: B/L ankle tender and right ankle swollen. No warmth or erythema  Neuro: No new focal deficits  Skin: Significant scarring and sinus tracts in B.L axillary and gluteal cleft with purulence whitish drain in axillary and purulent brownish drainage in gluteal cleft. Associated with tenderness  IV: no phlebitis      Labs:                        8.0    14.47 )-----------( 431      ( 2023 10:38 )             26.2         131<L>  |  99  |  9   ----------------------------<  148<H>  3.8   |  26  |  0.61    Ca    8.4      2023 10:38  Phos  3.7       Mg     1.90         TPro  10.4<H>  /  Alb  2.2<L>  /  TBili  0.4  /  DBili  x   /  AST  13  /  ALT  6   /  AlkPhos  89        WBC Trend:  WBC Count: 14.47 (23 @ 10:38)  WBC Count: 22.07 (23 @ 17:50)      Auto Neutrophil #: 10.49 K/uL (23 @ 10:38)  Auto Neutrophil #: 16.57 K/uL (23 @ 17:50)      Creatine Trend:  Creatinine, Serum: 0.61 ()  Creatinine, Serum: 0.83 ()      Liver Biochemical Testing Trend:  Alanine Aminotransferase (ALT/SGPT): 6 ()  Aspartate Aminotransferase (AST/SGOT): 13 (23 @ 17:50)  Bilirubin Total, Serum: 0.4 ()      Trend LDH      Auto Eosinophil %: 1.7 % (23 @ 10:38)  Auto Eosinophil %: 0.2 % (23 @ 17:50)      Urinalysis Basic - ( 2023 12:45 )    Color: Light Yellow / Appearance: Clear / S.012 / pH: x  Gluc: x / Ketone: Negative  / Bili: Negative / Urobili: <2 mg/dL   Blood: x / Protein: Negative / Nitrite: Negative   Leuk Esterase: Negative / RBC: x / WBC x   Sq Epi: x / Non Sq Epi: x / Bacteria: x        MICROBIOLOGY:    RVPt: NotDetec ( @ 18:01)  CRP: 188.0 ()  Ferritin 452 ()   mm/hr (23 @ 17:50)    RADIOLOGY:  imaging below personally reviewed except doppler    < from: CT Pelvis w/ IV Cont (23 @ 22:09) >  Extensive skin thickening/soft tissue inflammation laterally along the gluteal fold. Small areas of thin hypodense collection with variable  degrees of internal gas may represent superficial abscesses. Presence of subcutaneous air in this patient may be secondary to recent  instrumentation/procedure, however gas-forming bacterial infection cannot  be excluded on the basis of this study alone. Bulky, rounded lymph nodes, similar in appearance to that seen 2017. Circumferential bladder wall thickening; correlate with urinalysis.       < from: US Duplex Venous Lower Ext Complete, Bilateral (23 @ 18:54) >  No evidence of deep venous thrombosis in either lower extremity. Prominent right groin lymph node. CT of 2017 did demonstrate a similar finding.      < from: Xray Chest 1 View- PORTABLE-Urgent (Xray Chest 1 View- PORTABLE-Urgent .) (23 @ 18:07) >  Clear lungs.      < from: Xray Ankle Complete 3 Views, Bilateral (23 @ 18:07) >  No acute fracture

## 2023-02-19 NOTE — CONSULT NOTE ADULT - ATTENDING COMMENTS
32 y,o with hidradenitis suppurativa on Humira, who presents with bilateral ankle pain and increased discharge from hidradenitis. Patient report started to have bilateral ankle pain on Monday, denies any trauma, falls or twisted ankle. Pain is ache, able to move and walk, pain improved with tylenol. Also endorses few days of worsening discharge from buttocks lesions, discharge described as yellow and dark. He denies any pain, states area is not as swollen as other flare episodes. He denies any fever, chills, sick contacts, nausea/vomiting/diarrhea. Has been on Humira per [facility], 40 mg weekly, missed last week dose and was due for it tomorrow 2/20 (Dr. Mobley [facility]). In the Emergency room patient found to be febrile to 101.9, , leukocytosis of 22, x-ray of ankle without fracture or acute pathology, CT abdomen showed extensive skin thickening/soft tissue inflammation laterally along the gluteal fold. Small areas of thin hypodense collection with variable degrees of internal gas may represent superficial abscesses. Patient received vanc, clinda and zosyn and ID consulted.    WORKUP  CXR, RVP and UA negative  CRP: 188.0 and  mm/hr (02-18   CT Pelvis w/ IV Cont (02.18.): Extensive skin thickening/soft tissue inflammation laterally along the gluteal fold. Small areas of thin hypodense collection with variable  degrees of internal gas may represent superficial abscesses. Presence of subcutaneous air in this patient may be secondary to recent  instrumentation/procedure, however gas-forming bacterial infection cannot  be excluded on the basis of this study alone. Bulky, rounded lymph nodes, similar in appearance to that seen 2017.   x-ray of ankle without fracture or acute pathology,     DIAGNOSIS and IMPRESSION  Gluteal abscess with SubQ air  immunosuppressed state   ANkle Pain  fever, leucocytosis, sepsis.      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       RECOMMENDATIONS  Blood cx in lab -> Will f/u  Gluteal wound with significant discharge -> Swabbed for G/S and culture  c/w Zosyn  Recommend Dermat and Plastic sx eval  Recommend Doxy 100mg BID PO  Stop Clinda and Vanc  trend cbc for leucocytosis   CT of ankle lt side.   c/w wound care.       Plan discussed with consulting team.     Dwight Mike  Please contact through MS Teams   If no response or past 5 pm/weekend call [phone].

## 2023-02-19 NOTE — CONSULT NOTE ADULT - SUBJECTIVE AND OBJECTIVE BOX
General Surgery Consult  Consulting surgical team: Acute Care Surgery/B Team  Consulting attending: Dr. Marie    HPI:  Patient is a 32 year old male with PMHx of hidradenitis suppurativa presenting to the ED with complaints of ankle swelling. Also has complaints of drainage from chronic gluteal wounds. He has been following with dermatology and recently plastic surgery (Dr. Tariq) for surgery of his bilateral buttock wounds. Today, there is concerns for superficial abscess in the area of his gluteal wounds on CT imaging. Has an elevated WBC count and is tachycardic in the ED. Denies nausea, vomiting, fevers, and chills.      PAST MEDICAL HISTORY:  No pertinent past medical history    Hidradenitis suppurativa        PAST SURGICAL HISTORY:  No significant past surgical history        MEDICATIONS:  lidocaine 2% Injectable 40 milliLiter(s) Local Injection Once      ALLERGIES:  No Known Allergies      VITALS & I/Os:  Vital Signs Last 24 Hrs  T(C): 36.5 (19 Feb 2023 03:37), Max: 38.8 (18 Feb 2023 17:05)  T(F): 97.7 (19 Feb 2023 03:37), Max: 101.9 (18 Feb 2023 17:05)  HR: 98 (19 Feb 2023 03:37) (85 - 145)  BP: 112/61 (19 Feb 2023 03:37) (98/58 - 130/78)  BP(mean): 70 (19 Feb 2023 00:15) (70 - 70)  RR: 17 (19 Feb 2023 03:37) (14 - 18)  SpO2: 100% (19 Feb 2023 03:37) (98% - 100%)    Parameters below as of 19 Feb 2023 03:37  Patient On (Oxygen Delivery Method): room air        I&O's Summary      PHYSICAL EXAM:  Gen: NAD  CV: Low grade tachycardic when evaluated  Resp: Nonlabored breathing on room air  Buttock: Bilateral chronic wounds with possible purulent drainage noted        LABS:                        8.5    22.07 )-----------( 467      ( 18 Feb 2023 17:50 )             27.3     02-18    129<L>  |  95<L>  |  17  ----------------------------<  104<H>  4.4   |  26  |  0.83    Ca    8.7      18 Feb 2023 17:50    TPro  10.4<H>  /  Alb  2.2<L>  /  TBili  0.4  /  DBili  x   /  AST  13  /  ALT  6   /  AlkPhos  89  02-18    Lactate:  02-18 @ 21:42  1.0  02-18 @ 17:50  2.1                  IMAGING:  < from: CT Pelvis w/ IV Cont (02.18.23 @ 22:09) >    ACC: [id number] EXAM:  CT PELVIS ONLY IC   ORDERED BY: NORMA MERINO     PROCEDURE DATE:  02/18/2023          INTERPRETATION:  CLINICAL INFORMATION: Surgical wound/site drainage.   History of hidradenitis vertebral with pain and discharge from gluteal   wounds. Surgery in January 2023.    COMPARISON: CT chest abdomen pelvis dated 11/8/2017    CONTRAST/COMPLICATIONS:  IV Contrast: Omnipaque 350  90 cc administered   10 cc discarded  Oral Contrast: NONE  Complications: None reported at time of study completion    PROCEDURE:  CT of the Pelvis was performed.  Sagittal and coronal reformats were performed.    FINDINGS:  BLADDER: Mild circumferential bladder wall thickening.  REPRODUCTIVE ORGANS: Prostate is within normal limits.  LYMPH NODES: Bulky, rounded lymph nodes in the bilateral inguinal chain.    VISUALIZED PORTIONS:  ABDOMINAL ORGANS: Within normal limits.  BOWEL: Within normal limits. Normal appendix.  PERITONEUM: No ascites.  VESSELS: Within normal limits.  ABDOMINAL WALL: Notable gluteal skin thickening with extensive soft   tissue inflammation along the gluteal fold bilaterally. There are small   areas of thin hypodense collection with variable degrees of small   internal gas bilaterally along the gluteal cleft (series 301-59)(series   602-90).  BONES: Within normal limits.    IMPRESSION:  Extensive skin thickening/soft tissue inflammation laterally along the   gluteal fold. Small areas of thin hypodense collection with variable   degrees of internal gas may represent superficial abscesses.    Presence of subcutaneous air in this patient may be secondary to recent   instrumentation/procedure, however gas-forming bacterial infection cannot   be excluded on the basis of this study alone.    Bulky, rounded lymph nodes, similar in appearance to that seen 2017.    Circumferential bladder wall thickening; correlate with urinalysis.    --- End of Report ---          RUTHY RADER MD; Resident Radiologist  This document has been electronically signed.  DAVID REEDER MD; Attending Radiologist  This document has been electronically signed. Feb 19 2023 12:03AM    < end of copied text >

## 2023-02-19 NOTE — H&P ADULT - NSHPPHYSICALEXAM_GEN_ALL_CORE
.  VITAL SIGNS:  T(C): 36.8 (02-19-23 @ 07:25), Max: 38.8 (02-18-23 @ 17:05)  T(F): 98.3 (02-19-23 @ 07:25), Max: 101.9 (02-18-23 @ 17:05)  HR: 85 (02-19-23 @ 07:25) (85 - 145)  BP: 112/57 (02-19-23 @ 07:25) (98/58 - 130/78)  BP(mean): 70 (02-19-23 @ 00:15) (70 - 70)  RR: 16 (02-19-23 @ 07:25) (14 - 18)  SpO2: 100% (02-19-23 @ 07:25) (98% - 100%)  Wt(kg): --    PHYSICAL EXAM:    Constitutional: WDWN resting comfortably in bed; NAD  Head: NC/AT  Eyes: PERRL, EOMI, anicteric sclera  ENT: no nasal discharge; uvula midline, no oropharyngeal erythema or exudates; MMM  Neck: supple; no JVD or thyromegaly  Respiratory: CTA B/L; no W/R/R, no retractions  Cardiac: +S1/S2; RRR; no M/R/G; PMI non-displaced  Gastrointestinal: abdomen soft, NT/ND; no rebound or guarding; +BSx4  Extremities: WWP, no clubbing or cyanosis; no peripheral edema  Musculoskeletal: NROM x4; bilateral ankle swelling, no tenderness or erythema  Vascular: 2+ radial, femoral, DP/PT pulses B/L  Dermatologic: bilateral buttock wounds with yellow/bloody discharge  Neurologic: AAOx3; CNII-XII grossly intact; no focal deficits  Psychiatric: affect and characteristics of appearance, verbalizations, behaviors are appropriate

## 2023-02-20 ENCOUNTER — TRANSCRIPTION ENCOUNTER (OUTPATIENT)
Age: 33
End: 2023-02-20

## 2023-02-20 DIAGNOSIS — M25.473 EFFUSION, UNSPECIFIED ANKLE: ICD-10-CM

## 2023-02-20 DIAGNOSIS — Z29.9 ENCOUNTER FOR PROPHYLACTIC MEASURES, UNSPECIFIED: ICD-10-CM

## 2023-02-20 LAB
ANION GAP SERPL CALC-SCNC: 8 MMOL/L — SIGNIFICANT CHANGE UP (ref 7–14)
APTT BLD: 29.1 SEC — SIGNIFICANT CHANGE UP (ref 27–36.3)
BLD GP AB SCN SERPL QL: NEGATIVE — SIGNIFICANT CHANGE UP
BUN SERPL-MCNC: 9 MG/DL — SIGNIFICANT CHANGE UP (ref 7–23)
CALCIUM SERPL-MCNC: 8.9 MG/DL — SIGNIFICANT CHANGE UP (ref 8.4–10.5)
CHLORIDE SERPL-SCNC: 98 MMOL/L — SIGNIFICANT CHANGE UP (ref 98–107)
CO2 SERPL-SCNC: 25 MMOL/L — SIGNIFICANT CHANGE UP (ref 22–31)
CREAT SERPL-MCNC: 0.76 MG/DL — SIGNIFICANT CHANGE UP (ref 0.5–1.3)
EGFR: 122 ML/MIN/1.73M2 — SIGNIFICANT CHANGE UP
GLUCOSE SERPL-MCNC: 92 MG/DL — SIGNIFICANT CHANGE UP (ref 70–99)
HCT VFR BLD CALC: 27.9 % — LOW (ref 39–50)
HGB BLD-MCNC: 8.4 G/DL — LOW (ref 13–17)
INR BLD: 1.56 RATIO — HIGH (ref 0.88–1.16)
MAGNESIUM SERPL-MCNC: 2 MG/DL — SIGNIFICANT CHANGE UP (ref 1.6–2.6)
MCHC RBC-ENTMCNC: 23.9 PG — LOW (ref 27–34)
MCHC RBC-ENTMCNC: 30.1 GM/DL — LOW (ref 32–36)
MCV RBC AUTO: 79.5 FL — LOW (ref 80–100)
NRBC # BLD: 0 /100 WBCS — SIGNIFICANT CHANGE UP (ref 0–0)
NRBC # FLD: 0 K/UL — SIGNIFICANT CHANGE UP (ref 0–0)
PHOSPHATE SERPL-MCNC: 4.5 MG/DL — SIGNIFICANT CHANGE UP (ref 2.5–4.5)
PLATELET # BLD AUTO: 399 K/UL — SIGNIFICANT CHANGE UP (ref 150–400)
POTASSIUM SERPL-MCNC: 4.1 MMOL/L — SIGNIFICANT CHANGE UP (ref 3.5–5.3)
POTASSIUM SERPL-SCNC: 4.1 MMOL/L — SIGNIFICANT CHANGE UP (ref 3.5–5.3)
PROTHROM AB SERPL-ACNC: 18.2 SEC — HIGH (ref 10.5–13.4)
RBC # BLD: 3.51 M/UL — LOW (ref 4.2–5.8)
RBC # FLD: 17.2 % — HIGH (ref 10.3–14.5)
RH IG SCN BLD-IMP: POSITIVE — SIGNIFICANT CHANGE UP
SODIUM SERPL-SCNC: 131 MMOL/L — LOW (ref 135–145)
WBC # BLD: 18.17 K/UL — HIGH (ref 3.8–10.5)
WBC # FLD AUTO: 18.17 K/UL — HIGH (ref 3.8–10.5)

## 2023-02-20 PROCEDURE — 99252 IP/OBS CONSLTJ NEW/EST SF 35: CPT

## 2023-02-20 PROCEDURE — 99233 SBSQ HOSP IP/OBS HIGH 50: CPT

## 2023-02-20 PROCEDURE — 99221 1ST HOSP IP/OBS SF/LOW 40: CPT

## 2023-02-20 RX ORDER — SODIUM CHLORIDE 9 MG/ML
1000 INJECTION, SOLUTION INTRAVENOUS
Refills: 0 | Status: DISCONTINUED | OUTPATIENT
Start: 2023-02-20 | End: 2023-02-21

## 2023-02-20 RX ADMIN — CHLORHEXIDINE GLUCONATE 1 APPLICATION(S): 213 SOLUTION TOPICAL at 17:18

## 2023-02-20 RX ADMIN — Medication 100 MILLIGRAM(S): at 17:17

## 2023-02-20 RX ADMIN — PIPERACILLIN AND TAZOBACTAM 25 GRAM(S): 4; .5 INJECTION, POWDER, LYOPHILIZED, FOR SOLUTION INTRAVENOUS at 15:02

## 2023-02-20 RX ADMIN — PIPERACILLIN AND TAZOBACTAM 25 GRAM(S): 4; .5 INJECTION, POWDER, LYOPHILIZED, FOR SOLUTION INTRAVENOUS at 06:22

## 2023-02-20 RX ADMIN — Medication 100 MILLIGRAM(S): at 06:22

## 2023-02-20 RX ADMIN — PIPERACILLIN AND TAZOBACTAM 25 GRAM(S): 4; .5 INJECTION, POWDER, LYOPHILIZED, FOR SOLUTION INTRAVENOUS at 23:14

## 2023-02-20 RX ADMIN — SODIUM CHLORIDE 75 MILLILITER(S): 9 INJECTION, SOLUTION INTRAVENOUS at 11:34

## 2023-02-20 NOTE — PROGRESS NOTE ADULT - ASSESSMENT
32 year old male with bilateral buttock hidradenitis suppurativa. Presenting with leukocytosis and tachycardia    Plan  - Possible OR tonight given worsening sepsis  - Keep NPO/IVF  - Abx  - Preop labs     discussed with Attending    Tanmay Lenz MD, PGY 4  B Team Surgery  [phone]

## 2023-02-20 NOTE — CONSULT NOTE ADULT - ASSESSMENT
___________________________  #HS   #Sepsis presumed 2/2 gluteal abscess given CT imaging with small areas of thin hypodense collection w/ variable   degrees of internal gas c/f superficial abscesses.    - Agree with antibiotics (Zosyn & Doxy). ID following.   - Agree with holding Humira given ongoing active infection- may re-start pending surgical intervention and when pt is afebrile.   - Plan for OR with surgery tomorrow for I&D per notes.   - HS flares may be treated with intralesional Kenalog (ILK) and oral antibiotics, given current active infection would defer any ILK at this time.     The patient's chart was reviewed in addition to being seen and examined at bedside with the dermatology attending Dr. Krishna.  Recommendations were communicated with the primary team.  Please page [phone] with a 10-digit call-back number for further related questions.        Sujey Mathur MD  Resident Physician, PGY-2  [facility] Dermatology  Pager: [phone]  Office: [phone] ___________________________  #HS   #Sepsis presumed 2/2 gluteal abscess given CT imaging with small areas of thin hypodense collection w/ variable   degrees of internal gas c/f superficial abscesses.    - Agree with antibiotics (Zosyn & Doxy). ID following.   - Agree with holding Humira given ongoing active infection- may re-start pending surgical intervention and when pt is afebrile.   - Plan for OR with surgery tomorrow for I&D per notes.   - HS flares may be treated with intralesional Kenalog (ILK) and oral antibiotics, given overall clinical picture would defer any ILK at this time.     The patient's chart was reviewed in addition to being seen and examined at bedside with the dermatology attending Dr. Krishna.  Recommendations were communicated with the primary team.  Please page [phone] with a 10-digit call-back number for further related questions.        Sujey Mathur MD  Resident Physician, PGY-2  [facility] Dermatology  Pager: [phone]  Office: [phone]

## 2023-02-20 NOTE — CONSULT NOTE ADULT - SUBJECTIVE AND OBJECTIVE BOX
Plastic Surgery Consult Note  (pg [facility]: [id number], [facility]: [phone])    HPI:  32 y,o with hidradenitis suppurativa on Humira, who presents with bilateral ankle pain and increased discharge from hidradenitis.     Patient report started to have bilateral ankle pain on Monday, denies any trauma, falls or twisted ankle. Pain is ache, able to move and walk, pain improved with tylenol. Also endorses few days of worsening discharge from buttocks lesions, discharge described as yellow and dark. He denies any pain, states area is not as swollen as other flare episodes. He denies any fever, chills, sick contacts, nausea/vomiting/diarrhea. Has been on Humira per OP Dermatology, 40 mg weekly, missed last week dose and was due for it tomorrow  (Dr. Mobley OP derm).    In the Emergency room patient found to be febrile to 101.9, , leukocytosis of 22, x-ray of ankle without fracture or acute pathology, CT abdomen showed extensive skin thickening/soft tissue inflammation laterally along the gluteal fold. Small areas of thin hypodense collection with variable degrees of internal gas may represent superficial abscesses. Patient received broad spectrum antibiotics.  (2023 12:23)    Patient is planning for formal reconstruction of his HS with Dr. Tariq in April when off Humira and more medically optimized. Patient presenting with what appears to be acute HS flare up, undergoing medical workup. He has had increased drainage from his buttocks skin for the past few days as well as some increase in pain. He denies fevers at home, chills, or any other recent changes.    PAST MEDICAL & SURGICAL HISTORY:  Hidradenitis suppurativa      No significant past surgical history        Allergies    No Known Allergies    Intolerances      Home Medications:  Humira Pen 40 mg/0.4 mL subcutaneous kit: 1 each subcutaneous once a week (2023 12:21)    MEDICATIONS  (STANDING):  chlorhexidine 2% Cloths 1 Application(s) Topical daily  doxycycline monohydrate Capsule 100 milliGRAM(s) Oral every 12 hours  influenza   Vaccine 0.5 milliLiter(s) IntraMuscular once  piperacillin/tazobactam IVPB.. 3.375 Gram(s) IV Intermittent every 8 hours      SOCIAL HISTORY:  FAMILY HISTORY:  No significant family history        ___________________________________________  OBJECTIVE:  Vital Signs Last 24 Hrs  T(C): 36.6 (2023 06:15), Max: 37.8 (2023 21:08)  T(F): 97.9 (2023 06:15), Max: 100 (2023 21:08)  HR: 97 (2023 06:15) (97 - 107)  BP: 120/77 (2023 06:15) (99/60 - 120/77)  BP(mean): --  RR: 18 (2023 06:15) (17 - 18)  SpO2: 100% (2023 06:15) (100% - 100%)    Parameters below as of 2023 06:15  Patient On (Oxygen Delivery Method): room air    CAPILLARY BLOOD GLUCOSE        I&O's Detail    General: Well developed, well nourished, NAD  Neuro: Alert and oriented, no focal deficits, moves all extremities spontaneously  HEENT: NCAT, EOMI, anicteric, mucosa moist  Respiratory: Airway patent, respirations unlabored  CVS: Regular rate and rhythm  Abdomen: Soft, nontender, nondistended  Extremities: No edema, sensation and movement grossly intact  Skin: Warm, dry, appropriate color  Buttocks: HS present, 3 areas of firmness in mid gluteal cleft, inferior gluteal cleft, and right buttock with pus draining spontaneously. Mildly tender to palpation  ____________________________________________  LABS:  CBC Full  -  ( 2023 06:00 )  WBC Count : 18.17 K/uL  RBC Count : 3.51 M/uL  Hemoglobin : 8.4 g/dL  Hematocrit : 27.9 %  Platelet Count - Automated : 399 K/uL  Mean Cell Volume : 79.5 fL  Mean Cell Hemoglobin : 23.9 pg  Mean Cell Hemoglobin Concentration : 30.1 gm/dL  Auto Neutrophil # : x  Auto Lymphocyte # : x  Auto Monocyte # : x  Auto Eosinophil # : x  Auto Basophil # : x  Auto Neutrophil % : x  Auto Lymphocyte % : x  Auto Monocyte % : x  Auto Eosinophil % : x  Auto Basophil % : x        131<L>  |  98  |  9   ----------------------------<  92  4.1   |  25  |  0.76    Ca    8.9      2023 06:00  Phos  4.5     -  Mg     2.00     -    TPro  10.4<H>  /  Alb  2.2<L>  /  TBili  0.4  /  DBili  x   /  AST  13  /  ALT  6   /  AlkPhos  89      LIVER FUNCTIONS - ( 2023 17:50 )  Alb: 2.2 g/dL / Pro: 10.4 g/dL / ALK PHOS: 89 U/L / ALT: 6 U/L / AST: 13 U/L / GGT: x             Urinalysis Basic - ( 2023 12:45 )    Color: Light Yellow / Appearance: Clear / S.012 / pH: x  Gluc: x / Ketone: Negative  / Bili: Negative / Urobili: <2 mg/dL   Blood: x / Protein: Negative / Nitrite: Negative   Leuk Esterase: Negative / RBC: 1 /HPF / WBC 1 /HPF   Sq Epi: x / Non Sq Epi: x / Bacteria: x            ____________________________________________  MICRO:  RECENT CULTURES:  Specimen Source: .Blood Blood-Venous  Date/Time:  @ 18:00  Culture Results:   No growth to date.  Gram Stain: --  Organism: --  Specimen Source: .Blood Blood  Date/Time:  @ 17:50  Culture Results:   No growth to date.  Gram Stain: --  Organism: --    ____________________________________________  RADIOLOGY:  < from: CT Pelvis w/ IV Cont (23 @ 22:09) >  COMPARISON: CT chest abdomen pelvis dated 2017    CONTRAST/COMPLICATIONS:  IV Contrast: Omnipaque 350  90 cc administered   10 cc discarded  Oral Contrast: NONE  Complications: None reported at time of study completion    PROCEDURE:  CT of the Pelvis was performed.  Sagittal and coronal reformats were performed.    FINDINGS:  BLADDER: Mild circumferential bladder wall thickening.  REPRODUCTIVE ORGANS: Prostate is within normal limits.  LYMPH NODES: Bulky, rounded lymph nodes in the bilateral inguinal chain.    VISUALIZED PORTIONS:  ABDOMINAL ORGANS: Within normal limits.  BOWEL: Within normal limits. Normal appendix.  PERITONEUM: No ascites.  VESSELS: Within normal limits.  ABDOMINAL WALL: Notable gluteal skin thickening with extensive soft   tissue inflammation along the gluteal fold bilaterally. There are small   areas of thin hypodense collection with variable degrees of small   internal gas bilaterally along the gluteal cleft (series 301-59)(series   602-90).  BONES: Within normal limits.    IMPRESSION:  Extensive skin thickening/soft tissue inflammation laterally along the   gluteal fold. Small areas of thin hypodense collection with variable   degrees of internal gas may represent superficial abscesses.    Presence of subcutaneous air in this patient may be secondary to recent   instrumentation/procedure, however gas-forming bacterial infection cannot   be excluded on the basis of this study alone.    Bulky, rounded lymph nodes, similar in appearance to that seen 2017.    Circumferential bladder wall thickening; correlate with urinalysis.    --- End of Report ---    < end of copied text >

## 2023-02-20 NOTE — CONSULT NOTE ADULT - ATTENDING COMMENTS
HS flare with complex gluteal abscess as mostly likely sepsis source. Exam with severe scarred sinus tracts involving entire gluteal region, +active pus drainage and areas firm to touch.   No acute intervention from derm standpoint, will see how patient is doing with surgery intervention (I&D). Hold Humira for now until patient is more stable from systemic standpoint.     40 minutes total spend on encounter including chart review, patient exam, and care coordination.   Results communicated with primary team.   Yamilka Krishna MD  Assistant Clinical Professor of Dermatology  [employer] at [facility]

## 2023-02-20 NOTE — PROGRESS NOTE ADULT - ASSESSMENT
32M with hidradenitis suppurativa on Humira, who presents with bilateral ankle pain and increased discharge from hidradenitis, found to be septic likely due to buttock superficial abscesses.

## 2023-02-20 NOTE — PROGRESS NOTE ADULT - SUBJECTIVE AND OBJECTIVE BOX
[facility] Division of Hospital Medicine  Jeanne Lam MD  Pager: [phone]      Patient is a 32y old  Male who presents with a chief complaint of sepsis (2023 10:34)      SUBJECTIVE / OVERNIGHT EVENTS: patient reports bilateral ankle pain and swelling x1 week, R ankle improved but L ankle still painful and swollen. Denies other joint pain/swelling, denies trauma. Never had a similar experience in the past. Denies fevers/chills. Reports increased drainage from buttocks lesions.     MEDICATIONS  (STANDING):  chlorhexidine 2% Cloths 1 Application(s) Topical daily  dextrose 5% + lactated ringers. 1000 milliLiter(s) (75 mL/Hr) IV Continuous <Continuous>  doxycycline monohydrate Capsule 100 milliGRAM(s) Oral every 12 hours  influenza   Vaccine 0.5 milliLiter(s) IntraMuscular once  piperacillin/tazobactam IVPB.. 3.375 Gram(s) IV Intermittent every 8 hours    MEDICATIONS  (PRN):  acetaminophen     Tablet .. 650 milliGRAM(s) Oral every 6 hours PRN Moderate Pain (4 - 6)      CAPILLARY BLOOD GLUCOSE        I&O's Summary      PHYSICAL EXAM:  Vital Signs Last 24 Hrs  T(C): 36.3 (2023 12:45), Max: 37.8 (2023 21:08)  T(F): 97.3 (2023 12:45), Max: 100 (2023 21:08)  HR: 104 (2023 12:45) (97 - 107)  BP: 113/67 (2023 12:45) (99/60 - 120/77)  BP(mean): --  RR: 18 (2023 12:45) (17 - 18)  SpO2: 100% (2023 12:45) (100% - 100%)    Parameters below as of 2023 12:45  Patient On (Oxygen Delivery Method): room air        CONSTITUTIONAL: NAD, well-developed, well-groomed  EYES: Conjunctiva and sclera clear  RESPIRATORY: Normal respiratory effort; lungs are clear to auscultation bilaterally  CARDIOVASCULAR:  S1/S2  ABDOMEN: Nontender to palpation, normoactive bowel sounds, no rebound/guarding  MUSCULOSKELETAL:  L ankle w/ swelling and tenderness to palpation > R ankle swelling and tenderness to palpation   PSYCH: A+O to person, place, and time; affect appropriate  NEUROLOGY: no focal deficits  SKIN: 3 areas of firmness in mid gluteal cleft, inferior gluteal cleft, and right buttock with pus draining spontaneously. Mildly tender to palpation    LABS:                        8.4    18.17 )-----------( 399      ( 2023 06:00 )             27.9     02-20    131<L>  |  98  |  9   ----------------------------<  92  4.1   |  25  |  0.76    Ca    8.9      2023 06:00  Phos  4.5     02-20  Mg     2.00     02-20    TPro  10.4<H>  /  Alb  2.2<L>  /  TBili  0.4  /  DBili  x   /  AST  13  /  ALT  6   /  AlkPhos  89  02-18    PT/INR - ( 2023 11:20 )   PT: 18.2 sec;   INR: 1.56 ratio         PTT - ( 2023 11:20 )  PTT:29.1 sec      Urinalysis Basic - ( 2023 12:45 )    Color: Light Yellow / Appearance: Clear / S.012 / pH: x  Gluc: x / Ketone: Negative  / Bili: Negative / Urobili: <2 mg/dL   Blood: x / Protein: Negative / Nitrite: Negative   Leuk Esterase: Negative / RBC: 1 /HPF / WBC 1 /HPF   Sq Epi: x / Non Sq Epi: x / Bacteria: x        Culture - Blood (collected 2023 18:00)  Source: .Blood Blood-Venous  Preliminary Report (2023 01:02):    No growth to date.    Culture - Blood (collected 2023 17:50)  Source: .Blood Blood  Preliminary Report (2023 01:02):    No growth to date.

## 2023-02-20 NOTE — PROGRESS NOTE ADULT - SUBJECTIVE AND OBJECTIVE BOX
Surgery Progress Note    S: Patient seen and examined. No acute events overnight.    O:  Physical Exam:  Gen: Laying in bed, NAD  HEENT: atrumatic, EMOI  Resp: Unlabored breathing  Abd: soft, nontender, nondistended, no rebound or guarding.   Buttock: extensive hydrinitis with multiple draining wounds   Ext: Moves 4 extremities spontaneously    Vital Signs Last 24 Hrs  T(C): 36.6 (20 Feb 2023 06:15), Max: 37.8 (19 Feb 2023 21:08)  T(F): 97.9 (20 Feb 2023 06:15), Max: 100 (19 Feb 2023 21:08)  HR: 97 (20 Feb 2023 06:15) (97 - 107)  BP: 120/77 (20 Feb 2023 06:15) (99/60 - 120/77)  BP(mean): --  RR: 18 (20 Feb 2023 06:15) (17 - 18)  SpO2: 100% (20 Feb 2023 06:15) (100% - 100%)    Parameters below as of 20 Feb 2023 06:15  Patient On (Oxygen Delivery Method): room air        I&O's Detail                            8.4    18.17 )-----------( 399      ( 20 Feb 2023 06:00 )             27.9       02-20    131<L>  |  98  |  9   ----------------------------<  92  4.1   |  25  |  0.76    Ca    8.9      20 Feb 2023 06:00  Phos  4.5     02-20  Mg     2.00     02-20    TPro  10.4<H>  /  Alb  2.2<L>  /  TBili  0.4  /  DBili  x   /  AST  13  /  ALT  6   /  AlkPhos  89  02-18

## 2023-02-20 NOTE — PROGRESS NOTE ADULT - PROBLEM SELECTOR PLAN 4
Na 129, likely hypovolemic hyponatremia from fever/insensible losses  s/p IVFs, improving  -trend BMP  -encourage PO intake Na 129, likely hypovolemic hyponatremia from fever/insensible losses  -s/p IVFs, improving  -trend BMP

## 2023-02-20 NOTE — PROGRESS NOTE ADULT - PROBLEM SELECTOR PLAN 1
Fever to 101.9, , leukocytosis of 22, lactate>2. Increase hidradenitis discharge and ankle pain.   - CT abdomen showed extensive skin thickening/soft tissue inflammation laterally along the gluteal fold. Small areas of thin hypodense collection with variable degrees of internal gas may represent superficial abscesses.  Gas-forming bacterial infection cannot be excluded.  - Source likely gluteal wounds  - CXR no PNA, RVP/covid negative. UA negative   - Bcx NGTD   - ID following, on Zosyn + PO Doxy   - Surgery consulted, tentative plan for I&D today Fever to 101.9, , leukocytosis of 22, lactate>2. Increase hidradenitis discharge and ankle pain.   - CT abdomen showed extensive skin thickening/soft tissue inflammation laterally along the gluteal fold. Small areas of thin hypodense collection with variable degrees of internal gas may represent superficial abscesses.  Gas-forming bacterial infection cannot be excluded.  - Source likely gluteal wounds  - CXR no PNA, RVP/covid negative. UA negative   - Bcx NGTD   - ID following, on Zosyn + PO Doxy   - Surgery consulted, tentative plan for I&D today--> Patient has no active chest pain, no evidence of acute CHF. His EKG shows sinus tachycardia and was done while patient had acute sepsis. He had a normal chest X-ray. Patient is medically optimized and can proceed to OR without further testing. Of note, he is on Humira, will need to clarify with derm when this can be restarted.

## 2023-02-20 NOTE — CONSULT NOTE ADULT - SUBJECTIVE AND OBJECTIVE BOX
HPI:  32 y,o with hidradenitis suppurativa on Humira, who presents with bilateral ankle pain and increased discharge from hidradenitis.     Patient report started to have bilateral ankle pain on Monday, denies any trauma, falls or twisted ankle. Pain is ache, able to move and walk, pain improved with tylenol. Also endorses few days of worsening discharge from buttocks lesions, discharge described as yellow and dark. He denies any pain, states area is not as swollen as other flare episodes. He denies any fever, chills, sick contacts, nausea/vomiting/diarrhea. Has been on Humira per OP Dermatology, 40 mg weekly, missed last week dose and was due for it tomorrow  (Dr. Mobley OP derm).    In the Emergency room patient found to be febrile to 101.9, , leukocytosis of 22, x-ray of ankle without fracture or acute pathology, CT abdomen showed extensive skin thickening/soft tissue inflammation laterally along the gluteal fold. Small areas of thin hypodense collection with variable degrees of internal gas may represent superficial abscesses. Patient received broad spectrum antibiotics.  (2023 12:23)    Derm HPI:   Confirmed the above- pt missed last dose of humira and has noticed increased drainage from buttocks. States that gluteal fold is not typically a hardened area as it is during our exam today. Buttocks is not particularly tender.         PAST MEDICAL & SURGICAL HISTORY:  Hidradenitis suppurativa      No significant past surgical history          Review of Systems:    General: no fevers/chills, no fatigue 	  Skin/Breast: see HPI  Ophthalmologic: no change in vision  ENT: no change in hearing  Respiratory and Thorax: no SOB or cough  Cardiovascular: no palpitations or chest pain  Gastrointestinal: no abdominal pain or blood in stool   Genitourinary: no dysuria or frequency  Musculoskeletal: + joint pains, no weakness	  Neurological: no weakness, numbness , or tingling    MEDICATIONS  (STANDING):  chlorhexidine 2% Cloths 1 Application(s) Topical daily  dextrose 5% + lactated ringers. 1000 milliLiter(s) IV Continuous <Continuous>  doxycycline monohydrate Capsule 100 milliGRAM(s) Oral every 12 hours  influenza   Vaccine 0.5 milliLiter(s) IntraMuscular once  piperacillin/tazobactam IVPB.. 3.375 Gram(s) IV Intermittent every 8 hours    ALLERGIES: No Known Allergies        SOCIAL HISTORY:  ____________________________________  Social History:  lives with mother, unemployed  FAMILY HISTORY:  No significant family history          VITAL SIGNS LAST 24 HOURS:  T(F): 97.3 ( @ 12:45), Max: 100 ( @ 21:08)  HR: 104 ( @ 12:45) (97 - 107)  BP: 113/67 ( @ 12:45) (99/60 - 120/77)  RR: 18 ( @ 12:45) (18 - 18)    PHYSICAL EXAM:     The patient was alert and oriented X 3, well nourished, and in no  apparent distress.  There was no visible lymphadenopathy.  Conjunctiva were non injected  There was no clubbing or edema of extremities.  The  face, eyebrows, lips, neck, back,   extremities X 4, nails were examined.  There was no hyperhidrosis or bromhidrosis.    Of note on skin exam:   - large area of induration surrounding gluteal fold with active drainage  - multiple erythematous nodules on buttocks, none tender   ____________________________________    LABS:                        8.4    18.17 )-----------( 399      ( 2023 06:00 )             27.9     02-20    131<L>  |  98  |  9   ----------------------------<  92  4.1   |  25  |  0.76    Ca    8.9      2023 06:00  Phos  4.5     02-20  Mg     2.00     02-20      PT/INR - ( 2023 11:20 )   PT: 18.2 sec;   INR: 1.56 ratio         PTT - ( 2023 11:20 )  PTT:29.1 sec  Urinalysis Basic - ( 2023 12:45 )    Color: Light Yellow / Appearance: Clear / S.012 / pH: x  Gluc: x / Ketone: Negative  / Bili: Negative / Urobili: <2 mg/dL   Blood: x / Protein: Negative / Nitrite: Negative   Leuk Esterase: Negative / RBC: 1 /HPF / WBC 1 /HPF   Sq Epi: x / Non Sq Epi: x / Bacteria: x

## 2023-02-20 NOTE — CONSULT NOTE ADULT - ASSESSMENT
ASSESSMENT/PLAN:   JANNA CYR is a 32y Male with HS presenting with likely acute flare up. He has leukocytosis and low grade tachycardia but otherwise is stable and feels well.    - No indication for formal reconstruction at this time  - Will continue to plan for formal reconstruction in April with Dr. Tariq when patient is off Humira and further optimized  - Recommend ACS evaluation for acute management of HS flare up  - Appreciate excellent care per primary team    Discussed with attending Dr. Tariq    Plastic Surgery   [id number]

## 2023-02-20 NOTE — PROGRESS NOTE ADULT - PROBLEM SELECTOR PLAN 3
Unclear etiology, will get CT of bilateral ankles per ID recs   - Pending results, consider rheum eval Unclear etiology, will get CT of bilateral ankles per ID recs   - Xrays done in ED are unremarkable   - Pending results, consider rheum eval

## 2023-02-21 LAB
ANION GAP SERPL CALC-SCNC: 7 MMOL/L — SIGNIFICANT CHANGE UP (ref 7–14)
ANION GAP SERPL CALC-SCNC: 8 MMOL/L — SIGNIFICANT CHANGE UP (ref 7–14)
APTT BLD: 29.1 SEC — SIGNIFICANT CHANGE UP (ref 27–36.3)
BLD GP AB SCN SERPL QL: NEGATIVE — SIGNIFICANT CHANGE UP
BUN SERPL-MCNC: 8 MG/DL — SIGNIFICANT CHANGE UP (ref 7–23)
BUN SERPL-MCNC: 8 MG/DL — SIGNIFICANT CHANGE UP (ref 7–23)
CALCIUM SERPL-MCNC: 8.7 MG/DL — SIGNIFICANT CHANGE UP (ref 8.4–10.5)
CALCIUM SERPL-MCNC: 9 MG/DL — SIGNIFICANT CHANGE UP (ref 8.4–10.5)
CHLORIDE SERPL-SCNC: 98 MMOL/L — SIGNIFICANT CHANGE UP (ref 98–107)
CHLORIDE SERPL-SCNC: 98 MMOL/L — SIGNIFICANT CHANGE UP (ref 98–107)
CO2 SERPL-SCNC: 24 MMOL/L — SIGNIFICANT CHANGE UP (ref 22–31)
CO2 SERPL-SCNC: 26 MMOL/L — SIGNIFICANT CHANGE UP (ref 22–31)
CREAT SERPL-MCNC: 0.74 MG/DL — SIGNIFICANT CHANGE UP (ref 0.5–1.3)
CREAT SERPL-MCNC: 0.91 MG/DL — SIGNIFICANT CHANGE UP (ref 0.5–1.3)
CULTURE RESULTS: SIGNIFICANT CHANGE UP
EGFR: 115 ML/MIN/1.73M2 — SIGNIFICANT CHANGE UP
EGFR: 123 ML/MIN/1.73M2 — SIGNIFICANT CHANGE UP
GLUCOSE SERPL-MCNC: 112 MG/DL — HIGH (ref 70–99)
GLUCOSE SERPL-MCNC: 118 MG/DL — HIGH (ref 70–99)
HCT VFR BLD CALC: 26.3 % — LOW (ref 39–50)
HCT VFR BLD CALC: 27.4 % — LOW (ref 39–50)
HGB BLD-MCNC: 8.1 G/DL — LOW (ref 13–17)
HGB BLD-MCNC: 8.2 G/DL — LOW (ref 13–17)
INR BLD: 1.49 RATIO — HIGH (ref 0.88–1.16)
MAGNESIUM SERPL-MCNC: 1.8 MG/DL — SIGNIFICANT CHANGE UP (ref 1.6–2.6)
MAGNESIUM SERPL-MCNC: 1.9 MG/DL — SIGNIFICANT CHANGE UP (ref 1.6–2.6)
MCHC RBC-ENTMCNC: 24.3 PG — LOW (ref 27–34)
MCHC RBC-ENTMCNC: 24.4 PG — LOW (ref 27–34)
MCHC RBC-ENTMCNC: 29.9 GM/DL — LOW (ref 32–36)
MCHC RBC-ENTMCNC: 30.8 GM/DL — LOW (ref 32–36)
MCV RBC AUTO: 79.2 FL — LOW (ref 80–100)
MCV RBC AUTO: 81.1 FL — SIGNIFICANT CHANGE UP (ref 80–100)
NRBC # BLD: 0 /100 WBCS — SIGNIFICANT CHANGE UP (ref 0–0)
NRBC # BLD: 0 /100 WBCS — SIGNIFICANT CHANGE UP (ref 0–0)
NRBC # FLD: 0 K/UL — SIGNIFICANT CHANGE UP (ref 0–0)
NRBC # FLD: 0 K/UL — SIGNIFICANT CHANGE UP (ref 0–0)
PHOSPHATE SERPL-MCNC: 4.5 MG/DL — SIGNIFICANT CHANGE UP (ref 2.5–4.5)
PHOSPHATE SERPL-MCNC: 5.1 MG/DL — HIGH (ref 2.5–4.5)
PLATELET # BLD AUTO: 396 K/UL — SIGNIFICANT CHANGE UP (ref 150–400)
PLATELET # BLD AUTO: 480 K/UL — HIGH (ref 150–400)
POTASSIUM SERPL-MCNC: 4.1 MMOL/L — SIGNIFICANT CHANGE UP (ref 3.5–5.3)
POTASSIUM SERPL-MCNC: 5.2 MMOL/L — SIGNIFICANT CHANGE UP (ref 3.5–5.3)
POTASSIUM SERPL-SCNC: 4.1 MMOL/L — SIGNIFICANT CHANGE UP (ref 3.5–5.3)
POTASSIUM SERPL-SCNC: 5.2 MMOL/L — SIGNIFICANT CHANGE UP (ref 3.5–5.3)
PROTHROM AB SERPL-ACNC: 17.3 SEC — HIGH (ref 10.5–13.4)
RBC # BLD: 3.32 M/UL — LOW (ref 4.2–5.8)
RBC # BLD: 3.38 M/UL — LOW (ref 4.2–5.8)
RBC # FLD: 17.1 % — HIGH (ref 10.3–14.5)
RBC # FLD: 17.7 % — HIGH (ref 10.3–14.5)
RH IG SCN BLD-IMP: POSITIVE — SIGNIFICANT CHANGE UP
SODIUM SERPL-SCNC: 130 MMOL/L — LOW (ref 135–145)
SODIUM SERPL-SCNC: 131 MMOL/L — LOW (ref 135–145)
SPECIMEN SOURCE: SIGNIFICANT CHANGE UP
WBC # BLD: 14.26 K/UL — HIGH (ref 3.8–10.5)
WBC # BLD: 16.42 K/UL — HIGH (ref 3.8–10.5)
WBC # FLD AUTO: 14.26 K/UL — HIGH (ref 3.8–10.5)
WBC # FLD AUTO: 16.42 K/UL — HIGH (ref 3.8–10.5)

## 2023-02-21 PROCEDURE — 99233 SBSQ HOSP IP/OBS HIGH 50: CPT

## 2023-02-21 PROCEDURE — 88305 TISSUE EXAM BY PATHOLOGIST: CPT | Mod: 26

## 2023-02-21 PROCEDURE — 99232 SBSQ HOSP IP/OBS MODERATE 35: CPT

## 2023-02-21 RX ORDER — HYDROMORPHONE HYDROCHLORIDE 2 MG/ML
0.5 INJECTION INTRAMUSCULAR; INTRAVENOUS; SUBCUTANEOUS
Refills: 0 | Status: DISCONTINUED | OUTPATIENT
Start: 2023-02-21 | End: 2023-02-22

## 2023-02-21 RX ORDER — SODIUM CHLORIDE 9 MG/ML
1000 INJECTION, SOLUTION INTRAVENOUS
Refills: 0 | Status: DISCONTINUED | OUTPATIENT
Start: 2023-02-21 | End: 2023-02-22

## 2023-02-21 RX ORDER — NALOXONE HYDROCHLORIDE 4 MG/.1ML
0.1 SPRAY NASAL
Refills: 0 | Status: DISCONTINUED | OUTPATIENT
Start: 2023-02-21 | End: 2023-03-08

## 2023-02-21 RX ORDER — HYDROMORPHONE HYDROCHLORIDE 2 MG/ML
30 INJECTION INTRAMUSCULAR; INTRAVENOUS; SUBCUTANEOUS
Refills: 0 | Status: DISCONTINUED | OUTPATIENT
Start: 2023-02-21 | End: 2023-02-22

## 2023-02-21 RX ORDER — OXYCODONE HYDROCHLORIDE 5 MG/1
5 TABLET ORAL ONCE
Refills: 0 | Status: DISCONTINUED | OUTPATIENT
Start: 2023-02-21 | End: 2023-02-22

## 2023-02-21 RX ORDER — ONDANSETRON 8 MG/1
4 TABLET, FILM COATED ORAL ONCE
Refills: 0 | Status: DISCONTINUED | OUTPATIENT
Start: 2023-02-21 | End: 2023-02-22

## 2023-02-21 RX ADMIN — PIPERACILLIN AND TAZOBACTAM 25 GRAM(S): 4; .5 INJECTION, POWDER, LYOPHILIZED, FOR SOLUTION INTRAVENOUS at 14:52

## 2023-02-21 RX ADMIN — CHLORHEXIDINE GLUCONATE 1 APPLICATION(S): 213 SOLUTION TOPICAL at 11:42

## 2023-02-21 RX ADMIN — PIPERACILLIN AND TAZOBACTAM 25 GRAM(S): 4; .5 INJECTION, POWDER, LYOPHILIZED, FOR SOLUTION INTRAVENOUS at 07:05

## 2023-02-21 RX ADMIN — Medication 100 MILLIGRAM(S): at 05:41

## 2023-02-21 RX ADMIN — SODIUM CHLORIDE 75 MILLILITER(S): 9 INJECTION, SOLUTION INTRAVENOUS at 22:37

## 2023-02-21 RX ADMIN — PIPERACILLIN AND TAZOBACTAM 25 GRAM(S): 4; .5 INJECTION, POWDER, LYOPHILIZED, FOR SOLUTION INTRAVENOUS at 23:00

## 2023-02-21 RX ADMIN — HYDROMORPHONE HYDROCHLORIDE 30 MILLILITER(S): 2 INJECTION INTRAMUSCULAR; INTRAVENOUS; SUBCUTANEOUS at 23:00

## 2023-02-21 RX ADMIN — SODIUM CHLORIDE 75 MILLILITER(S): 9 INJECTION, SOLUTION INTRAVENOUS at 01:30

## 2023-02-21 NOTE — PROGRESS NOTE ADULT - PROBLEM SELECTOR PLAN 4
Na 129, likely hypovolemic hyponatremia from fever/insensible losses  -s/p IVFs, improving  -trend BMP

## 2023-02-21 NOTE — ADVANCED PRACTICE NURSE CONSULT - REASON FOR CONSULT
Patient is appropriately being followed by dermatology, plastics, and surgery at this time with plans for the OR 2/21/23. Please re-consult if needed post surgery, thank you.

## 2023-02-21 NOTE — PROGRESS NOTE ADULT - ASSESSMENT
32 y,o with hidradenitis suppurativa on Humira, who presents with bilateral ankle pain and increased discharge from hidradenitis. Patient report started to have bilateral ankle pain on Monday, denies any trauma, falls or twisted ankle. Pain is ache, able to move and walk, pain improved with tylenol. Also endorses few days of worsening discharge from buttocks lesions, discharge described as yellow and dark. He denies any pain, states area is not as swollen as other flare episodes. He denies any fever, chills, sick contacts, nausea/vomiting/diarrhea. Has been on Humira per [facility], 40 mg weekly, missed last week dose and was due for it tomorrow 2/20 (Dr. Mobley OP derm). In the Emergency room patient found to be febrile to 101.9, , leukocytosis of 22, x-ray of ankle without fracture or acute pathology, CT abdomen showed extensive skin thickening/soft tissue inflammation laterally along the gluteal fold. Small areas of thin hypodense collection with variable degrees of internal gas may represent superficial abscesses. Patient received vanc, clinda and zosyn and ID consulted.    WORKUP  CXR, RVP and UA negative  CRP: 188.0 and  mm/hr (02-18   CT Pelvis w/ IV Cont (02.18.): Extensive skin thickening/soft tissue inflammation laterally along the gluteal fold. Small areas of thin hypodense collection with variable  degrees of internal gas may represent superficial abscesses. Presence of subcutaneous air in this patient may be secondary to recent  instrumentation/procedure, however gas-forming bacterial infection cannot  be excluded on the basis of this study alone. Bulky, rounded lymph nodes, similar in appearance to that seen 2017.   x-ray of ankle without fracture or acute pathology,       DIAGNOSIS and IMPRESSION  Gluteal abscess with SubQ air  immunosuppressed state   Ankle Pain  fever, leucocytosis,      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         RECOMMENDATIONS  Blood cx in lab -> NTD   Gluteal abscess with polymicrobial growth.   based on cx can transition zosyn to unasyn   s/p Dermat and Plastic sx eval.   plan for I&D per surgery.   c/w Doxy 100mg BID PO  trend cbc for leucocytosis, downtrending   CT of ankle lt side.   c/w wound care.         Dwight Mike  Please contact through MS Teams   If no response or past 5 pm/weekend call [phone].

## 2023-02-21 NOTE — PROGRESS NOTE ADULT - PROBLEM SELECTOR PLAN 3
Unclear etiology, will get CT of bilateral ankles per ID recs   - Xrays done in ED are unremarkable   [ ]  Pending results, consider rheum eval

## 2023-02-21 NOTE — PROGRESS NOTE ADULT - PROBLEM SELECTOR PLAN 2
On OP Humira 40 mg weekly  - Follows with Dr. Mobley OP derm, concern for HS flare --> derm consulted   - Was being evaluated for formal reconstruction of his HS with Dr. Tariq in April when off Humira. Seen by plastics and no indication for formal reconstruction at this time
On OP Humira 40 mg weekly  - Follows with Dr. Mobley OP derm, concern for HS flare --> derm consulted   - Was being evaluated for formal reconstruction of his HS with Dr. Tariq in April when off Humira. Seen by plastics and no indication for formal reconstruction at this time

## 2023-02-21 NOTE — PROGRESS NOTE ADULT - SUBJECTIVE AND OBJECTIVE BOX
Surgery Progress Note       Subjective:  Patient seen and examined. Abscess actively draining on dressing. Pt to go for b/l buttock I&D today      Vital Signs:  Vital Signs Last 24 Hrs  T(C): 36.7 (21 Feb 2023 05:17), Max: 36.8 (20 Feb 2023 21:55)  T(F): 98 (21 Feb 2023 05:17), Max: 98.3 (20 Feb 2023 21:55)  HR: 97 (21 Feb 2023 05:17) (97 - 104)  BP: 119/67 (21 Feb 2023 05:17) (102/61 - 119/67)  BP(mean): --  RR: 17 (21 Feb 2023 05:17) (17 - 18)  SpO2: 100% (21 Feb 2023 05:17) (100% - 100%)    Parameters below as of 21 Feb 2023 05:17  Patient On (Oxygen Delivery Method): room air        CAPILLARY BLOOD GLUCOSE          I&O's Detail        Physical Exam:  Gen: Laying in bed, NAD  HEENT: atrumatic, EMOI  Resp: Unlabored breathing  Abd: soft, nontender, nondistended, no rebound or guarding.   Buttock: extensive hydradenitis with multiple draining wounds, covered by dressing  Ext: Moves 4 extremities spontaneously      Labs:    02-21    131<L>  |  98  |  8   ----------------------------<  118<H>  4.1   |  26  |  0.74    Ca    8.7      21 Feb 2023 04:35  Phos  4.5     02-21  Mg     1.90     02-21                              8.1    14.26 )-----------( 480      ( 21 Feb 2023 04:35 )             26.3     PT/INR - ( 21 Feb 2023 04:35 )   PT: 17.3 sec;   INR: 1.49 ratio         PTT - ( 21 Feb 2023 04:35 )  PTT:29.1 sec

## 2023-02-21 NOTE — PROGRESS NOTE ADULT - SUBJECTIVE AND OBJECTIVE BOX
32yPatient is a 32y old  Male who presents with a chief complaint of sepsis (21 Feb 2023 12:21)      Interval history:  Afebrile, pain still in buttocks.     Allergies:   No Known Allergies    Antimicrobials:  doxycycline monohydrate Capsule 100 milliGRAM(s) Oral every 12 hours  piperacillin/tazobactam IVPB.. 3.375 Gram(s) IV Intermittent every 8 hours      REVIEW OF SYSTEMS:  No chest pain   No SOB  No N/V  No rash.       Vital Signs Last 24 Hrs  T(C): 37 (02-21-23 @ 17:30), Max: 37 (02-21-23 @ 08:36)  T(F): 98.6 (02-21-23 @ 17:30), Max: 98.6 (02-21-23 @ 08:36)  HR: 99 (02-21-23 @ 17:30) (97 - 101)  BP: 129/77 (02-21-23 @ 17:30) (102/61 - 129/86)  BP(mean): 90 (02-21-23 @ 17:30) (90 - 90)  RR: 18 (02-21-23 @ 17:30) (16 - 18)  SpO2: 100% (02-21-23 @ 17:30) (99% - 100%)      PHYSICAL EXAM:  Pt in no acute distress, alert, awake.   breathing comfortably   non distended abdomen  no edema LE   no phlebitis                             8.1    14.26 )-----------( 480      ( 21 Feb 2023 04:35 )             26.3   02-21    131<L>  |  98  |  8   ----------------------------<  118<H>  4.1   |  26  |  0.74    Ca    8.7      21 Feb 2023 04:35  Phos  4.5     02-21  Mg     1.90     02-21        Culture - Abscess with Gram Stain (collected 19 Feb 2023 16:01)  Source: .Abscess Gluteal  Final Report (21 Feb 2023 20:46):    Rare Actinomyces turicensis "Susceptibilities not performed"    Rare Streptococcus anginosus "Susceptibilities not performed"

## 2023-02-21 NOTE — PROGRESS NOTE ADULT - PROBLEM SELECTOR PLAN 1
Fever to 101.9, , leukocytosis of 22, lactate>2. Increase hidradenitis discharge and ankle pain.   - CT abdomen showed extensive skin thickening/soft tissue inflammation laterally along the gluteal fold. Small areas of thin hypodense collection with variable degrees of internal gas may represent superficial abscesses.  Gas-forming bacterial infection cannot be excluded.  - Source likely gluteal wounds  - CXR no PNA, RVP/covid negative. UA negative   - Bcx NGTD   - ID following, on Zosyn + PO Doxy   - Surgery consulted, tentative plan for I&D today--> Patient has no active chest pain, no evidence of acute CHF. His EKG shows sinus tachycardia and was done while patient had acute sepsis. He had a normal chest X-ray. Patient is medically optimized and can proceed to OR without further testing.   [ ] Of note, he is on Humira, will need to clarify with derm when this can be restarted.

## 2023-02-21 NOTE — PROGRESS NOTE ADULT - ASSESSMENT
32 year old male with bilateral buttock hidradenitis suppurativa. Presenting with leukocytosis and tachycardia    Plan  - OR today for b/l I&D  - Keep NPO/IVF  - Abx      B Team Surgery  [id number]

## 2023-02-21 NOTE — PROGRESS NOTE ADULT - SUBJECTIVE AND OBJECTIVE BOX
[facility]  Division of Hospital Medicine  Fabiana Goins MD  Pager: [phone]      Patient is a 32y old  Male who presents with a chief complaint of sepsis (2023 09:36)      SUBJECTIVE / OVERNIGHT EVENTS: Patient examined at bedside. Awaiting surgery, Reports he is hungry. No pain or sob   ADDITIONAL REVIEW OF SYSTEMS:    MEDICATIONS  (STANDING):  chlorhexidine 2% Cloths 1 Application(s) Topical daily  dextrose 5% + lactated ringers. 1000 milliLiter(s) (75 mL/Hr) IV Continuous <Continuous>  doxycycline monohydrate Capsule 100 milliGRAM(s) Oral every 12 hours  influenza   Vaccine 0.5 milliLiter(s) IntraMuscular once  piperacillin/tazobactam IVPB.. 3.375 Gram(s) IV Intermittent every 8 hours    MEDICATIONS  (PRN):  acetaminophen     Tablet .. 650 milliGRAM(s) Oral every 6 hours PRN Moderate Pain (4 - 6)      CAPILLARY BLOOD GLUCOSE        I&O's Summary      PHYSICAL EXAM:  Vital Signs Last 24 Hrs  T(C): 36.7 (2023 05:17), Max: 36.8 (2023 21:55)  T(F): 98 (2023 05:17), Max: 98.3 (2023 21:55)  HR: 97 (2023 05:17) (97 - 104)  BP: 119/67 (2023 05:17) (102/61 - 119/67)  BP(mean): --  RR: 17 (2023 05:17) (17 - 18)  SpO2: 100% (2023 05:17) (100% - 100%)    Parameters below as of 2023 05:17  Patient On (Oxygen Delivery Method): room air    CONSTITUTIONAL: Young man in NAD, well-developed, well-groomed  EYES: Conjunctiva and sclera clear  RESPIRATORY: Normal respiratory effort; lungs are clear to auscultation bilaterally  CARDIOVASCULAR:  S1/S2  ABDOMEN: Nontender to palpation, normoactive bowel sounds, no rebound/guarding  MUSCULOSKELETAL:  L ankle w/ swelling and tenderness to palpation > R ankle swelling and tenderness to palpation   PSYCH: A+O to person, place, and time; affect appropriate  NEUROLOGY: no focal deficits  SKIN: 3 areas of firmness in mid gluteal cleft, inferior gluteal cleft, and right buttock with pus draining spontaneously. Mildly tender to palpation    LABS:                        8.1    14.26 )-----------( 480      ( 2023 04:35 )             26.3     -    131<L>  |  98  |  8   ----------------------------<  118<H>  4.1   |  26  |  0.74    Ca    8.7      2023 04:35  Phos  4.5       Mg     1.90           PT/INR - ( 2023 04:35 )   PT: 17.3 sec;   INR: 1.49 ratio         PTT - ( 2023 04:35 )  PTT:29.1 sec      Urinalysis Basic - ( 2023 12:45 )    Color: Light Yellow / Appearance: Clear / S.012 / pH: x  Gluc: x / Ketone: Negative  / Bili: Negative / Urobili: <2 mg/dL   Blood: x / Protein: Negative / Nitrite: Negative   Leuk Esterase: Negative / RBC: 1 /HPF / WBC 1 /HPF   Sq Epi: x / Non Sq Epi: x / Bacteria: x        Culture - Abscess with Gram Stain (collected 2023 16:01)  Source: .Abscess Gluteal  Preliminary Report (2023 16:23):    No growth    Culture - Blood (collected 2023 18:00)  Source: .Blood Blood-Venous  Preliminary Report (2023 01:02):    No growth to date.    Culture - Blood (collected 2023 17:50)  Source: .Blood Blood  Preliminary Report (2023 01:02):    No growth to date.        RADIOLOGY & ADDITIONAL TESTS:  Results Reviewed:   Imaging Personally Reviewed:  Electrocardiogram Personally Reviewed:    COORDINATION OF CARE:  Care Discussed with Consultants/Other Providers [Y/N]:  Prior or Outpatient Records Reviewed [Y/N]:

## 2023-02-22 LAB
ANION GAP SERPL CALC-SCNC: 7 MMOL/L — SIGNIFICANT CHANGE UP (ref 7–14)
BUN SERPL-MCNC: 11 MG/DL — SIGNIFICANT CHANGE UP (ref 7–23)
CALCIUM SERPL-MCNC: 8.9 MG/DL — SIGNIFICANT CHANGE UP (ref 8.4–10.5)
CHLORIDE SERPL-SCNC: 97 MMOL/L — LOW (ref 98–107)
CO2 SERPL-SCNC: 27 MMOL/L — SIGNIFICANT CHANGE UP (ref 22–31)
CREAT SERPL-MCNC: 0.7 MG/DL — SIGNIFICANT CHANGE UP (ref 0.5–1.3)
EGFR: 126 ML/MIN/1.73M2 — SIGNIFICANT CHANGE UP
GLUCOSE SERPL-MCNC: 136 MG/DL — HIGH (ref 70–99)
HCT VFR BLD CALC: 26.8 % — LOW (ref 39–50)
HGB BLD-MCNC: 8.1 G/DL — LOW (ref 13–17)
MAGNESIUM SERPL-MCNC: 1.9 MG/DL — SIGNIFICANT CHANGE UP (ref 1.6–2.6)
MCHC RBC-ENTMCNC: 24.3 PG — LOW (ref 27–34)
MCHC RBC-ENTMCNC: 30.2 GM/DL — LOW (ref 32–36)
MCV RBC AUTO: 80.5 FL — SIGNIFICANT CHANGE UP (ref 80–100)
NRBC # BLD: 0 /100 WBCS — SIGNIFICANT CHANGE UP (ref 0–0)
NRBC # FLD: 0 K/UL — SIGNIFICANT CHANGE UP (ref 0–0)
PHOSPHATE SERPL-MCNC: 5.3 MG/DL — HIGH (ref 2.5–4.5)
PLATELET # BLD AUTO: 422 K/UL — HIGH (ref 150–400)
POTASSIUM SERPL-MCNC: 4.4 MMOL/L — SIGNIFICANT CHANGE UP (ref 3.5–5.3)
POTASSIUM SERPL-SCNC: 4.4 MMOL/L — SIGNIFICANT CHANGE UP (ref 3.5–5.3)
RBC # BLD: 3.33 M/UL — LOW (ref 4.2–5.8)
RBC # FLD: 17.5 % — HIGH (ref 10.3–14.5)
SODIUM SERPL-SCNC: 131 MMOL/L — LOW (ref 135–145)
WBC # BLD: 15.06 K/UL — HIGH (ref 3.8–10.5)
WBC # FLD AUTO: 15.06 K/UL — HIGH (ref 3.8–10.5)

## 2023-02-22 PROCEDURE — 99232 SBSQ HOSP IP/OBS MODERATE 35: CPT

## 2023-02-22 RX ORDER — SODIUM CHLORIDE 9 MG/ML
1000 INJECTION INTRAMUSCULAR; INTRAVENOUS; SUBCUTANEOUS
Refills: 0 | Status: DISCONTINUED | OUTPATIENT
Start: 2023-02-22 | End: 2023-02-22

## 2023-02-22 RX ORDER — OXYCODONE HYDROCHLORIDE 5 MG/1
5 TABLET ORAL
Refills: 0 | Status: DISCONTINUED | OUTPATIENT
Start: 2023-02-22 | End: 2023-02-27

## 2023-02-22 RX ORDER — AMPICILLIN SODIUM AND SULBACTAM SODIUM 250; 125 MG/ML; MG/ML
3 INJECTION, POWDER, FOR SUSPENSION INTRAMUSCULAR; INTRAVENOUS EVERY 6 HOURS
Refills: 0 | Status: DISCONTINUED | OUTPATIENT
Start: 2023-02-22 | End: 2023-02-27

## 2023-02-22 RX ORDER — ACETAMINOPHEN 500 MG
975 TABLET ORAL EVERY 6 HOURS
Refills: 0 | Status: DISCONTINUED | OUTPATIENT
Start: 2023-02-22 | End: 2023-02-25

## 2023-02-22 RX ORDER — ENOXAPARIN SODIUM 100 MG/ML
40 INJECTION SUBCUTANEOUS EVERY 24 HOURS
Refills: 0 | Status: DISCONTINUED | OUTPATIENT
Start: 2023-02-22 | End: 2023-02-27

## 2023-02-22 RX ADMIN — AMPICILLIN SODIUM AND SULBACTAM SODIUM 200 GRAM(S): 250; 125 INJECTION, POWDER, FOR SUSPENSION INTRAMUSCULAR; INTRAVENOUS at 23:21

## 2023-02-22 RX ADMIN — AMPICILLIN SODIUM AND SULBACTAM SODIUM 200 GRAM(S): 250; 125 INJECTION, POWDER, FOR SUSPENSION INTRAMUSCULAR; INTRAVENOUS at 17:10

## 2023-02-22 RX ADMIN — Medication 975 MILLIGRAM(S): at 06:38

## 2023-02-22 RX ADMIN — PIPERACILLIN AND TAZOBACTAM 25 GRAM(S): 4; .5 INJECTION, POWDER, LYOPHILIZED, FOR SOLUTION INTRAVENOUS at 06:39

## 2023-02-22 RX ADMIN — Medication 100 MILLIGRAM(S): at 17:10

## 2023-02-22 RX ADMIN — CHLORHEXIDINE GLUCONATE 1 APPLICATION(S): 213 SOLUTION TOPICAL at 11:35

## 2023-02-22 RX ADMIN — HYDROMORPHONE HYDROCHLORIDE 30 MILLILITER(S): 2 INJECTION INTRAMUSCULAR; INTRAVENOUS; SUBCUTANEOUS at 08:36

## 2023-02-22 RX ADMIN — Medication 975 MILLIGRAM(S): at 00:00

## 2023-02-22 RX ADMIN — Medication 100 MILLIGRAM(S): at 06:38

## 2023-02-22 RX ADMIN — ENOXAPARIN SODIUM 40 MILLIGRAM(S): 100 INJECTION SUBCUTANEOUS at 06:38

## 2023-02-22 RX ADMIN — HYDROMORPHONE HYDROCHLORIDE 30 MILLILITER(S): 2 INJECTION INTRAMUSCULAR; INTRAVENOUS; SUBCUTANEOUS at 00:30

## 2023-02-22 NOTE — PROGRESS NOTE ADULT - SUBJECTIVE AND OBJECTIVE BOX
Anesthesia Pain Management Service    SUBJECTIVE: Patient is doing well with IV PCA and no significant problems reported. Patient states he wants his dressing to be changed because it is partially saturated.     Pain Scale Score	At rest: _1/10__ 	With Activity: ___ 	[X ] Refer to charted pain scores    THERAPY:    [ ] IV PCA Morphine		[ ] 5 mg/mL	[ ] 1 mg/mL  [X ] IV PCA Hydromorphone	[ ] 5 mg/mL	[X ] 1 mg/mL  [ ] IV PCA Fentanyl		[ ] 50 micrograms/mL    Demand dose __0.2_ lockout __6_ (minutes) Continuous Rate _0__ Total: _0.2__   mg used (in past 24 hrs)      MEDICATIONS  (STANDING):  acetaminophen     Tablet .. 975 milliGRAM(s) Oral every 6 hours  chlorhexidine 2% Cloths 1 Application(s) Topical daily  doxycycline monohydrate Capsule 100 milliGRAM(s) Oral every 12 hours  enoxaparin Injectable 40 milliGRAM(s) SubCutaneous every 24 hours  influenza   Vaccine 0.5 milliLiter(s) IntraMuscular once  piperacillin/tazobactam IVPB.. 3.375 Gram(s) IV Intermittent every 8 hours  sodium chloride 0.9%. 1000 milliLiter(s) (75 mL/Hr) IV Continuous <Continuous>    MEDICATIONS  (PRN):  naloxone Injectable 0.1 milliGRAM(s) IV Push every 3 minutes PRN For ANY of the following changes in patient status:  A. RR LESS THAN 10 breaths per minute, B. Oxygen saturation LESS THAN 90%, C. Sedation score of 6  oxyCODONE    IR 5 milliGRAM(s) Oral every 3 hours PRN Moderate- Severe Pain (4 - 10)      OBJECTIVE: Patient sitting up in bed.    Sedation Score:	[ X] Alert	[ ] Drowsy 	[ ] Arousable	[ ] Asleep	[ ] Unresponsive    Side Effects:	[X ] None	[ ] Nausea	[ ] Vomiting	[ ] Pruritus  		[ ] Other:    Vital Signs Last 24 Hrs  T(C): 36.5 (22 Feb 2023 09:09), Max: 37 (21 Feb 2023 17:30)  T(F): 97.7 (22 Feb 2023 09:09), Max: 98.6 (21 Feb 2023 17:30)  HR: 82 (22 Feb 2023 09:09) (65 - 100)  BP: 128/64 (22 Feb 2023 09:09) (103/60 - 129/77)  BP(mean): 83 (22 Feb 2023 00:00) (70 - 90)  RR: 18 (22 Feb 2023 09:09) (13 - 21)  SpO2: 100% (22 Feb 2023 09:09) (99% - 100%)    Parameters below as of 22 Feb 2023 09:09  Patient On (Oxygen Delivery Method): room air        ASSESSMENT/ PLAN    Therapy to  be:	[ ] Continue   [ X] Discontinued   [X ] Change to prn Analgesics    Documentation and Verification of current medications:   [X] Done	[ ] Not done, not elligible    Comments: Discussed patient with primary team, IV PCA discontinued. Team to change dressing early afternoon. PRN Oral/IV opioids and/or Adjuvant non-opioid medication to be ordered at this point.    Progress Note written now but Patient was seen earlier.

## 2023-02-22 NOTE — PROGRESS NOTE ADULT - SUBJECTIVE AND OBJECTIVE BOX
Anesthesia Post Operative Note    s/p day 1 of procedure: hidradenitis    32y Male POSTOP DAY 1 S/P hidradentis    Vital Signs Last 24 Hrs  T(C): 36.5 (22 Feb 2023 09:09), Max: 37 (21 Feb 2023 17:30)  T(F): 97.7 (22 Feb 2023 09:09), Max: 98.6 (21 Feb 2023 17:30)  HR: 82 (22 Feb 2023 09:09) (65 - 99)  BP: 128/64 (22 Feb 2023 09:09) (103/60 - 129/77)  BP(mean): 83 (22 Feb 2023 00:00) (70 - 90)  RR: 18 (22 Feb 2023 09:09) (13 - 21)  SpO2: 100% (22 Feb 2023 09:09) (99% - 100%)    Parameters below as of 22 Feb 2023 09:09  Patient On (Oxygen Delivery Method): room air        Patient seen at: bedside    Doing well, no anesthetic complications or complaints noted or reported.  Pain is controlled.

## 2023-02-22 NOTE — PROGRESS NOTE ADULT - ASSESSMENT
32 y,o with hidradenitis suppurativa on Humira, who presents with bilateral ankle pain and increased discharge from hidradenitis. Patient report started to have bilateral ankle pain on Monday, denies any trauma, falls or twisted ankle. Pain is ache, able to move and walk, pain improved with tylenol. Also endorses few days of worsening discharge from buttocks lesions, discharge described as yellow and dark. He denies any pain, states area is not as swollen as other flare episodes. He denies any fever, chills, sick contacts, nausea/vomiting/diarrhea. Has been on Humira per OP Dermatology, 40 mg weekly, missed last week dose and was due for it tomorrow 2/20 (Dr. Mobley OP derm). In the Emergency room patient found to be febrile to 101.9, , leukocytosis of 22, x-ray of ankle without fracture or acute pathology, CT abdomen showed extensive skin thickening/soft tissue inflammation laterally along the gluteal fold. Small areas of thin hypodense collection with variable degrees of internal gas may represent superficial abscesses. Patient received vanc, clinda and zosyn and ID consulted.    WORKUP  CXR, RVP and UA negative  CRP: 188.0 and  mm/hr (02-18   CT Pelvis w/ IV Cont (02.18.): Extensive skin thickening/soft tissue inflammation laterally along the gluteal fold. Small areas of thin hypodense collection with variable  degrees of internal gas may represent superficial abscesses. Presence of subcutaneous air in this patient may be secondary to recent  instrumentation/procedure, however gas-forming bacterial infection cannot  be excluded on the basis of this study alone. Bulky, rounded lymph nodes, similar in appearance to that seen 2017.   x-ray of ankle without fracture or acute pathology,       DIAGNOSIS and IMPRESSION  Gluteal abscess with SubQ air  immunosuppressed state   Ankle Pain  fever, leucocytosis,      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         RECOMMENDATIONS  Blood cx in lab -> NTD   Gluteal abscess with polymicrobial growth.   c/w unasyn   s/p Dermat and Plastic sx eval.   s/p I&D; f/u OR cx  c/w Doxy 100mg BID PO  trend cbc for leucocytosis,   ankle pain improved.   c/w wound care.         Dwight Mike  Please contact through MS Teams   If no response or past 5 pm/weekend call [phone].

## 2023-02-22 NOTE — PROGRESS NOTE ADULT - SUBJECTIVE AND OBJECTIVE BOX
TEAM SURGERY PROGRESS NOTE    POST OPERATIVE DAY #: 1    SUBJECTIVE: Patient seen and examined at bedside on AM rounds, patient without complaints, pain well controlled. Denies fever, chills, n/v.     Vital Signs Last 24 Hrs  T(C): 36.5 (22 Feb 2023 06:00), Max: 37 (21 Feb 2023 17:30)  T(F): 97.7 (22 Feb 2023 06:00), Max: 98.6 (21 Feb 2023 17:30)  HR: 70 (22 Feb 2023 06:00) (65 - 100)  BP: 109/66 (22 Feb 2023 06:00) (103/60 - 129/77)  BP(mean): 83 (22 Feb 2023 00:00) (70 - 90)  RR: 18 (22 Feb 2023 06:00) (13 - 21)  SpO2: 100% (22 Feb 2023 06:00) (99% - 100%)    Parameters below as of 22 Feb 2023 06:00  Patient On (Oxygen Delivery Method): room air      I&O's Detail    21 Feb 2023 07:01  -  22 Feb 2023 07:00  --------------------------------------------------------  IN:    IV PiggyBack: 150 mL    Lactated Ringers: 525 mL    Oral Fluid: 580 mL    sodium chloride 0.9%: 75 mL  Total IN: 1330 mL    OUT:    Voided (mL): 900 mL  Total OUT: 900 mL    Total NET: 430 mL        MEDICATIONS  (STANDING):  acetaminophen     Tablet .. 975 milliGRAM(s) Oral every 6 hours  chlorhexidine 2% Cloths 1 Application(s) Topical daily  doxycycline monohydrate Capsule 100 milliGRAM(s) Oral every 12 hours  enoxaparin Injectable 40 milliGRAM(s) SubCutaneous every 24 hours  HYDROmorphone PCA (1 mG/mL) 30 milliLiter(s) PCA Continuous PCA Continuous  influenza   Vaccine 0.5 milliLiter(s) IntraMuscular once  piperacillin/tazobactam IVPB.. 3.375 Gram(s) IV Intermittent every 8 hours  sodium chloride 0.9%. 1000 milliLiter(s) (75 mL/Hr) IV Continuous <Continuous>    MEDICATIONS  (PRN):  HYDROmorphone PCA (1 mG/mL) Rescue Clinician Bolus 0.5 milliGRAM(s) IV Push every 15 minutes PRN for Pain Scale GREATER THAN 6  naloxone Injectable 0.1 milliGRAM(s) IV Push every 3 minutes PRN For ANY of the following changes in patient status:  A. RR LESS THAN 10 breaths per minute, B. Oxygen saturation LESS THAN 90%, C. Sedation score of 6      Physical Exam  General: A&Ox3, NAD  Respiratory: Equal bilateral expansion  Cardiovascular: Regular rate & rhythm  Abdominal: Soft. NTND.  Extremities: Kerlix wrap in R inner thigh mildly saturated w/ serosanguinous fluid. Posterior dressing over R/L open gluteal wounds is c/d/i.     LABS:                        8.2    16.42 )-----------( 396      ( 21 Feb 2023 22:00 )             27.4     02-21    130<L>  |  98  |  8   ----------------------------<  112<H>  5.2   |  24  |  0.91    Ca    9.0      21 Feb 2023 22:00  Phos  5.1     02-21  Mg     1.80     02-21      PT/INR - ( 21 Feb 2023 04:35 )   PT: 17.3 sec;   INR: 1.49 ratio         PTT - ( 21 Feb 2023 04:35 )  PTT:29.1 sec        Patient is a 32y Male

## 2023-02-22 NOTE — PROGRESS NOTE ADULT - SUBJECTIVE AND OBJECTIVE BOX
Anesthesia Pain Management Service- Attending Addendum    SUBJECTIVE: Patient's pain control adequate    Therapy:	  [ X] IV PCA	   [ ] Epidural           [ ] s/p Spinal Opoid              [ ] Postpartum infusion	  [ ] Patient controlled regional anesthesia (PCRA)    [ ] prn Analgesics    Allergies    No Known Allergies    Intolerances      MEDICATIONS  (STANDING):  acetaminophen     Tablet .. 975 milliGRAM(s) Oral every 6 hours  ampicillin/sulbactam  IVPB 3 Gram(s) IV Intermittent every 6 hours  chlorhexidine 2% Cloths 1 Application(s) Topical daily  doxycycline monohydrate Capsule 100 milliGRAM(s) Oral every 12 hours  enoxaparin Injectable 40 milliGRAM(s) SubCutaneous every 24 hours  influenza   Vaccine 0.5 milliLiter(s) IntraMuscular once  sodium chloride 0.9%. 1000 milliLiter(s) (75 mL/Hr) IV Continuous <Continuous>    MEDICATIONS  (PRN):  naloxone Injectable 0.1 milliGRAM(s) IV Push every 3 minutes PRN For ANY of the following changes in patient status:  A. RR LESS THAN 10 breaths per minute, B. Oxygen saturation LESS THAN 90%, C. Sedation score of 6  oxyCODONE    IR 5 milliGRAM(s) Oral every 3 hours PRN Moderate- Severe Pain (4 - 10)      OBJECTIVE:   [X] No new signs     [ ] Other:    Side Effects:  [X ] None			[ ] Other:      ASSESSMENT/PLAN  -Discontinue current therapy    [ ] Therapy changed to:    [ ] IV PCA       [ ] Epidural     [ X] prn Analgesics     Comments: Pain management per primary team, APS to sign off    Note entered after patient seen

## 2023-02-22 NOTE — PROGRESS NOTE ADULT - SUBJECTIVE AND OBJECTIVE BOX
32yPatient is a 32y old  Male who presents with a chief complaint of s/p hidradenitis (22 Feb 2023 14:01)      Interval history:  Afebrile, s/p I&D yesterday.       Allergies:   No Known Allergies    Antimicrobials:  ampicillin/sulbactam  IVPB 3 Gram(s) IV Intermittent every 6 hours  doxycycline monohydrate Capsule 100 milliGRAM(s) Oral every 12 hours      REVIEW OF SYSTEMS:  No chest pain   No  SOB  No  abdominal pain   No rash.     Vital Signs Last 24 Hrs  T(C): 36.9 (02-22-23 @ 22:00), Max: 36.9 (02-22-23 @ 14:00)  T(F): 98.4 (02-22-23 @ 22:00), Max: 98.5 (02-22-23 @ 14:00)  HR: 64 (02-22-23 @ 22:00) (64 - 100)  BP: 158/75 (02-22-23 @ 22:00) (109/66 - 158/75)  BP(mean): --  RR: 15 (02-22-23 @ 22:00) (15 - 18)  SpO2: 101% (02-22-23 @ 22:00) (100% - 101%)      PHYSICAL EXAM:  Pt in no acute distress, alert, awake.   breathing comfortably   non distended abdomen  dressing in buttock area  no edema LE   no phlebitis                             8.1    15.06 )-----------( 422      ( 22 Feb 2023 06:01 )             26.8   02-22    131<L>  |  97<L>  |  11  ----------------------------<  136<H>  4.4   |  27  |  0.70    Ca    8.9      22 Feb 2023 06:01  Phos  5.3     02-22  Mg     1.90     02-22        Culture - Fungal, Other (collected 21 Feb 2023 19:14)  Source: .Other RIGHT BUTTOCK  Preliminary Report (22 Feb 2023 07:16):    Testing in progress

## 2023-02-22 NOTE — PROGRESS NOTE ADULT - ASSESSMENT
32y Male s/p surgical resection of multiple hidradenitis suppurativa lesions on bilateral buttocks (more extensive on right side) and right inner thigh.      - Diet: regular  - Activity: bedrest, off load pressure from right side for tonight  - Pain medication: Dilaudid PCA, tylenol  - daily dressing changes (packed gauze & kerlix wrap in R inner thigh; Adaptic, gauze, abd pad taped in place over buttocks)  - consult wound care  - c/w abx  - consider PT consult   - DVT ppx    Team B, [id number].

## 2023-02-22 NOTE — CHART NOTE - NSCHARTNOTEFT_GEN_A_CORE
Surgery Post op Note    Procedure: resection of hidradenitis suppurative lesions on bilateral buttocks and R inner thigh    Subjective: Patient seen and evaluated at the bedside. Not currently endorsing any pain. States that he gave himself a push from the PCA only once, which was corroborated by interrogation of the PCA. Has not had anything to eat or drink but is hungry and is requesting a sandwich. Has not passed gas yet. States that throat is a little sore, likely from intubation.     Objective:   Vital Signs Last 24 Hrs  T(C): 36.3 (22 Feb 2023 00:30), Max: 37 (21 Feb 2023 08:36)  T(F): 97.3 (22 Feb 2023 00:30), Max: 98.6 (21 Feb 2023 08:36)  HR: 65 (22 Feb 2023 00:30) (65 - 100)  BP: 117/75 (22 Feb 2023 00:30) (103/60 - 129/86)  BP(mean): 83 (22 Feb 2023 00:00) (70 - 90)  RR: 18 (22 Feb 2023 00:30) (13 - 21)  SpO2: 100% (22 Feb 2023 00:30) (99% - 100%)    Parameters below as of 22 Feb 2023 00:30  Patient On (Oxygen Delivery Method): room air      I&O's Summary    21 Feb 2023 07:01  -  22 Feb 2023 02:58  --------------------------------------------------------  IN: 375 mL / OUT: 0 mL / NET: 375 mL      I&O's Detail    21 Feb 2023 07:01  -  22 Feb 2023 02:58  --------------------------------------------------------  IN:    IV PiggyBack: 50 mL    Lactated Ringers: 225 mL    Oral Fluid: 100 mL  Total IN: 375 mL    OUT:    Voided (mL): 0 mL  Total OUT: 0 mL    Total NET: 375 mL      Labs:                        8.2    16.42 )-----------( 396      ( 21 Feb 2023 22:00 )             27.4     02-21    130<L>  |  98  |  8   ----------------------------<  112<H>  5.2   |  24  |  0.91    Ca    9.0      21 Feb 2023 22:00  Phos  5.1     02-21  Mg     1.80     02-21      PT/INR - ( 21 Feb 2023 04:35 )   PT: 17.3 sec;   INR: 1.49 ratio         PTT - ( 21 Feb 2023 04:35 )  PTT:29.1 sec      MEDICATIONS  (STANDING):  chlorhexidine 2% Cloths 1 Application(s) Topical daily  doxycycline monohydrate Capsule 100 milliGRAM(s) Oral every 12 hours  HYDROmorphone PCA (1 mG/mL) 30 milliLiter(s) PCA Continuous PCA Continuous  influenza   Vaccine 0.5 milliLiter(s) IntraMuscular once  lactated ringers. 1000 milliLiter(s) (75 mL/Hr) IV Continuous <Continuous>  piperacillin/tazobactam IVPB.. 3.375 Gram(s) IV Intermittent every 8 hours    MEDICATIONS  (PRN):  acetaminophen     Tablet .. 650 milliGRAM(s) Oral every 6 hours PRN Moderate Pain (4 - 6)  HYDROmorphone  Injectable 0.5 milliGRAM(s) IV Push every 10 minutes PRN Moderate Pain (4 - 6)  HYDROmorphone PCA (1 mG/mL) Rescue Clinician Bolus 0.5 milliGRAM(s) IV Push every 15 minutes PRN for Pain Scale GREATER THAN 6  naloxone Injectable 0.1 milliGRAM(s) IV Push every 3 minutes PRN For ANY of the following changes in patient status:  A. RR LESS THAN 10 breaths per minute, B. Oxygen saturation LESS THAN 90%, C. Sedation score of 6  ondansetron Injectable 4 milliGRAM(s) IV Push once PRN Nausea and/or Vomiting  oxyCODONE    IR 5 milliGRAM(s) Oral once PRN Moderate Pain (4 - 6)      Physical Exam:  General: well developed, well nourished, NAD  Respiratory: respirations non labored  Cardiac: appears regular rate & rhythm  Gastrointestinal: soft, nontender, nondistended  Extremities: Kerlix wrap in R inner thigh mildly saturated w/ serosanguinous fluid. Posterior dressing over R/L open gluteal wounds is c/d/i.       Assessment/Plan: 32y Male s/p surgical resection of multiple hidradenitis suppurativa lesions on bilateral buttocks (more extensive on right side) and right inner thigh. Patient recovering appropriately, pain surprisingly well controlled.     - Diet: regular  - Activity: bedrest, off load pressure from right side for tonight  - Labs: pacu labs WNL, f/u am labs  - Pain medication: Dilaudid PCA   - daily dressing changes by surgery (packed gauze & kerlix wrap in R inner thigh; Adaptic, gauze, abd pad taped in place over buttocks)  - wound care consult   - consider PT consult   - surgery will assume care as primary team under Dr. Marie    Team B, [id number] Surgery Post op Note    Procedure: resection of hidradenitis suppurative lesions on bilateral buttocks and R inner thigh    Subjective: Patient seen and evaluated at the bedside. Not currently endorsing any pain. States that he gave himself a push from the PCA only once, which was corroborated by interrogation of the PCA. Has not had anything to eat or drink but is hungry and requesting a sandwich. Has not passed gas yet. States that throat is a little sore, likely from intubation.     Objective:   Vital Signs Last 24 Hrs  T(C): 36.3 (22 Feb 2023 00:30), Max: 37 (21 Feb 2023 08:36)  T(F): 97.3 (22 Feb 2023 00:30), Max: 98.6 (21 Feb 2023 08:36)  HR: 65 (22 Feb 2023 00:30) (65 - 100)  BP: 117/75 (22 Feb 2023 00:30) (103/60 - 129/86)  BP(mean): 83 (22 Feb 2023 00:00) (70 - 90)  RR: 18 (22 Feb 2023 00:30) (13 - 21)  SpO2: 100% (22 Feb 2023 00:30) (99% - 100%)    Parameters below as of 22 Feb 2023 00:30  Patient On (Oxygen Delivery Method): room air      I&O's Summary    21 Feb 2023 07:01  -  22 Feb 2023 02:58  --------------------------------------------------------  IN: 375 mL / OUT: 0 mL / NET: 375 mL      I&O's Detail    21 Feb 2023 07:01  -  22 Feb 2023 02:58  --------------------------------------------------------  IN:    IV PiggyBack: 50 mL    Lactated Ringers: 225 mL    Oral Fluid: 100 mL  Total IN: 375 mL    OUT:    Voided (mL): 0 mL  Total OUT: 0 mL    Total NET: 375 mL      Labs:                        8.2    16.42 )-----------( 396      ( 21 Feb 2023 22:00 )             27.4     02-21    130<L>  |  98  |  8   ----------------------------<  112<H>  5.2   |  24  |  0.91    Ca    9.0      21 Feb 2023 22:00  Phos  5.1     02-21  Mg     1.80     02-21      PT/INR - ( 21 Feb 2023 04:35 )   PT: 17.3 sec;   INR: 1.49 ratio         PTT - ( 21 Feb 2023 04:35 )  PTT:29.1 sec      MEDICATIONS  (STANDING):  chlorhexidine 2% Cloths 1 Application(s) Topical daily  doxycycline monohydrate Capsule 100 milliGRAM(s) Oral every 12 hours  HYDROmorphone PCA (1 mG/mL) 30 milliLiter(s) PCA Continuous PCA Continuous  influenza   Vaccine 0.5 milliLiter(s) IntraMuscular once  lactated ringers. 1000 milliLiter(s) (75 mL/Hr) IV Continuous <Continuous>  piperacillin/tazobactam IVPB.. 3.375 Gram(s) IV Intermittent every 8 hours    MEDICATIONS  (PRN):  acetaminophen     Tablet .. 650 milliGRAM(s) Oral every 6 hours PRN Moderate Pain (4 - 6)  HYDROmorphone  Injectable 0.5 milliGRAM(s) IV Push every 10 minutes PRN Moderate Pain (4 - 6)  HYDROmorphone PCA (1 mG/mL) Rescue Clinician Bolus 0.5 milliGRAM(s) IV Push every 15 minutes PRN for Pain Scale GREATER THAN 6  naloxone Injectable 0.1 milliGRAM(s) IV Push every 3 minutes PRN For ANY of the following changes in patient status:  A. RR LESS THAN 10 breaths per minute, B. Oxygen saturation LESS THAN 90%, C. Sedation score of 6  ondansetron Injectable 4 milliGRAM(s) IV Push once PRN Nausea and/or Vomiting  oxyCODONE    IR 5 milliGRAM(s) Oral once PRN Moderate Pain (4 - 6)      Physical Exam:  General: well developed, well nourished, NAD  Respiratory: respirations non labored  Cardiac: appears regular rate & rhythm  Gastrointestinal: soft, nontender, nondistended  Extremities: Kerlix wrap in R inner thigh mildly saturated w/ serosanguinous fluid. Posterior dressing over R/L open gluteal wounds is c/d/i.       Assessment/Plan: 32y Male s/p surgical resection of multiple hidradenitis suppurativa lesions on bilateral buttocks (more extensive on right side) and right inner thigh. Patient recovering appropriately, pain surprisingly well controlled.     - Diet: regular  - Activity: bedrest, off load pressure from right side for tonight  - Labs: pacu labs WNL, f/u am labs  - Pain medication: Dilaudid PCA   - daily dressing changes by surgery (packed gauze & kerlix wrap in R inner thigh; Adaptic, gauze, abd pad taped in place over buttocks)  - wound care consult   - consider PT consult   - surgery will assume care as primary team under Dr. Marie    Team B, [id number] Surgery Post op Note    Procedure: resection of hidradenitis suppurative lesions on bilateral buttocks and R inner thigh    Subjective: Patient seen and evaluated at the bedside. Not currently endorsing any pain. States that he gave himself a push from the PCA only once, which was corroborated by interrogation of the PCA. Has not had anything to eat or drink but is hungry and requesting a sandwich. Has not passed gas yet. States that throat is a little sore, likely from intubation.     Objective:   Vital Signs Last 24 Hrs  T(C): 36.3 (22 Feb 2023 00:30), Max: 37 (21 Feb 2023 08:36)  T(F): 97.3 (22 Feb 2023 00:30), Max: 98.6 (21 Feb 2023 08:36)  HR: 65 (22 Feb 2023 00:30) (65 - 100)  BP: 117/75 (22 Feb 2023 00:30) (103/60 - 129/86)  BP(mean): 83 (22 Feb 2023 00:00) (70 - 90)  RR: 18 (22 Feb 2023 00:30) (13 - 21)  SpO2: 100% (22 Feb 2023 00:30) (99% - 100%)    Parameters below as of 22 Feb 2023 00:30  Patient On (Oxygen Delivery Method): room air      I&O's Summary    21 Feb 2023 07:01  -  22 Feb 2023 02:58  --------------------------------------------------------  IN: 375 mL / OUT: 0 mL / NET: 375 mL      I&O's Detail    21 Feb 2023 07:01  -  22 Feb 2023 02:58  --------------------------------------------------------  IN:    IV PiggyBack: 50 mL    Lactated Ringers: 225 mL    Oral Fluid: 100 mL  Total IN: 375 mL    OUT:    Voided (mL): 0 mL  Total OUT: 0 mL    Total NET: 375 mL      Labs:                        8.2    16.42 )-----------( 396      ( 21 Feb 2023 22:00 )             27.4     02-21    130<L>  |  98  |  8   ----------------------------<  112<H>  5.2   |  24  |  0.91    Ca    9.0      21 Feb 2023 22:00  Phos  5.1     02-21  Mg     1.80     02-21      PT/INR - ( 21 Feb 2023 04:35 )   PT: 17.3 sec;   INR: 1.49 ratio         PTT - ( 21 Feb 2023 04:35 )  PTT:29.1 sec      MEDICATIONS  (STANDING):  chlorhexidine 2% Cloths 1 Application(s) Topical daily  doxycycline monohydrate Capsule 100 milliGRAM(s) Oral every 12 hours  HYDROmorphone PCA (1 mG/mL) 30 milliLiter(s) PCA Continuous PCA Continuous  influenza   Vaccine 0.5 milliLiter(s) IntraMuscular once  lactated ringers. 1000 milliLiter(s) (75 mL/Hr) IV Continuous <Continuous>  piperacillin/tazobactam IVPB.. 3.375 Gram(s) IV Intermittent every 8 hours    MEDICATIONS  (PRN):  acetaminophen     Tablet .. 650 milliGRAM(s) Oral every 6 hours PRN Moderate Pain (4 - 6)  HYDROmorphone  Injectable 0.5 milliGRAM(s) IV Push every 10 minutes PRN Moderate Pain (4 - 6)  HYDROmorphone PCA (1 mG/mL) Rescue Clinician Bolus 0.5 milliGRAM(s) IV Push every 15 minutes PRN for Pain Scale GREATER THAN 6  naloxone Injectable 0.1 milliGRAM(s) IV Push every 3 minutes PRN For ANY of the following changes in patient status:  A. RR LESS THAN 10 breaths per minute, B. Oxygen saturation LESS THAN 90%, C. Sedation score of 6  ondansetron Injectable 4 milliGRAM(s) IV Push once PRN Nausea and/or Vomiting  oxyCODONE    IR 5 milliGRAM(s) Oral once PRN Moderate Pain (4 - 6)      Physical Exam:  General: well developed, well nourished, NAD  Respiratory: respirations non labored  Cardiac: appears regular rate & rhythm  Gastrointestinal: soft, nontender, nondistended  Extremities: Kerlix wrap in R inner thigh mildly saturated w/ serosanguinous fluid. Posterior dressing over R/L open gluteal wounds is c/d/i.       Assessment/Plan: 32y Male s/p surgical resection of multiple hidradenitis suppurativa lesions on bilateral buttocks (more extensive on right side) and right inner thigh. Patient recovering appropriately, pain surprisingly well controlled.     - Diet: regular  - Activity: bedrest, off load pressure from right side for tonight  - Labs: pacu labs WNL, f/u am labs  - Pain medication: Dilaudid PCA   - daily dressing changes by surgery (packed gauze & kerlix wrap in R inner thigh; Adaptic, gauze, abd pad taped in place over buttocks)  - wound care consult   - consider PT consult   - DVT ppx  - surgery will assume care as primary team under Dr. Marie    Team B, [id number]

## 2023-02-23 LAB
ANION GAP SERPL CALC-SCNC: 7 MMOL/L — SIGNIFICANT CHANGE UP (ref 7–14)
BUN SERPL-MCNC: 10 MG/DL — SIGNIFICANT CHANGE UP (ref 7–23)
CALCIUM SERPL-MCNC: 8.6 MG/DL — SIGNIFICANT CHANGE UP (ref 8.4–10.5)
CHLORIDE SERPL-SCNC: 100 MMOL/L — SIGNIFICANT CHANGE UP (ref 98–107)
CO2 SERPL-SCNC: 26 MMOL/L — SIGNIFICANT CHANGE UP (ref 22–31)
CREAT SERPL-MCNC: 0.67 MG/DL — SIGNIFICANT CHANGE UP (ref 0.5–1.3)
EGFR: 127 ML/MIN/1.73M2 — SIGNIFICANT CHANGE UP
GLUCOSE SERPL-MCNC: 87 MG/DL — SIGNIFICANT CHANGE UP (ref 70–99)
HCT VFR BLD CALC: 25.3 % — LOW (ref 39–50)
HGB BLD-MCNC: 7.9 G/DL — LOW (ref 13–17)
MAGNESIUM SERPL-MCNC: 1.9 MG/DL — SIGNIFICANT CHANGE UP (ref 1.6–2.6)
MCHC RBC-ENTMCNC: 25.1 PG — LOW (ref 27–34)
MCHC RBC-ENTMCNC: 31.2 GM/DL — LOW (ref 32–36)
MCV RBC AUTO: 80.3 FL — SIGNIFICANT CHANGE UP (ref 80–100)
NRBC # BLD: 0 /100 WBCS — SIGNIFICANT CHANGE UP (ref 0–0)
NRBC # FLD: 0 K/UL — SIGNIFICANT CHANGE UP (ref 0–0)
PHOSPHATE SERPL-MCNC: 3.3 MG/DL — SIGNIFICANT CHANGE UP (ref 2.5–4.5)
PLATELET # BLD AUTO: 444 K/UL — HIGH (ref 150–400)
POTASSIUM SERPL-MCNC: 3.8 MMOL/L — SIGNIFICANT CHANGE UP (ref 3.5–5.3)
POTASSIUM SERPL-SCNC: 3.8 MMOL/L — SIGNIFICANT CHANGE UP (ref 3.5–5.3)
RBC # BLD: 3.15 M/UL — LOW (ref 4.2–5.8)
RBC # FLD: 17.9 % — HIGH (ref 10.3–14.5)
SODIUM SERPL-SCNC: 133 MMOL/L — LOW (ref 135–145)
WBC # BLD: 12.88 K/UL — HIGH (ref 3.8–10.5)
WBC # FLD AUTO: 12.88 K/UL — HIGH (ref 3.8–10.5)

## 2023-02-23 RX ADMIN — ENOXAPARIN SODIUM 40 MILLIGRAM(S): 100 INJECTION SUBCUTANEOUS at 05:15

## 2023-02-23 RX ADMIN — Medication 100 MILLIGRAM(S): at 17:42

## 2023-02-23 RX ADMIN — Medication 975 MILLIGRAM(S): at 12:32

## 2023-02-23 RX ADMIN — Medication 975 MILLIGRAM(S): at 18:12

## 2023-02-23 RX ADMIN — Medication 975 MILLIGRAM(S): at 17:42

## 2023-02-23 RX ADMIN — OXYCODONE HYDROCHLORIDE 5 MILLIGRAM(S): 5 TABLET ORAL at 10:13

## 2023-02-23 RX ADMIN — AMPICILLIN SODIUM AND SULBACTAM SODIUM 200 GRAM(S): 250; 125 INJECTION, POWDER, FOR SUSPENSION INTRAMUSCULAR; INTRAVENOUS at 05:13

## 2023-02-23 RX ADMIN — AMPICILLIN SODIUM AND SULBACTAM SODIUM 200 GRAM(S): 250; 125 INJECTION, POWDER, FOR SUSPENSION INTRAMUSCULAR; INTRAVENOUS at 12:32

## 2023-02-23 RX ADMIN — AMPICILLIN SODIUM AND SULBACTAM SODIUM 200 GRAM(S): 250; 125 INJECTION, POWDER, FOR SUSPENSION INTRAMUSCULAR; INTRAVENOUS at 17:41

## 2023-02-23 RX ADMIN — CHLORHEXIDINE GLUCONATE 1 APPLICATION(S): 213 SOLUTION TOPICAL at 17:42

## 2023-02-23 RX ADMIN — Medication 100 MILLIGRAM(S): at 05:13

## 2023-02-23 RX ADMIN — AMPICILLIN SODIUM AND SULBACTAM SODIUM 200 GRAM(S): 250; 125 INJECTION, POWDER, FOR SUSPENSION INTRAMUSCULAR; INTRAVENOUS at 23:33

## 2023-02-23 RX ADMIN — OXYCODONE HYDROCHLORIDE 5 MILLIGRAM(S): 5 TABLET ORAL at 09:43

## 2023-02-23 RX ADMIN — Medication 975 MILLIGRAM(S): at 23:32

## 2023-02-23 RX ADMIN — Medication 975 MILLIGRAM(S): at 14:30

## 2023-02-23 NOTE — PROGRESS NOTE ADULT - ASSESSMENT
32y Male s/p surgical resection of multiple hidradenitis suppurativa lesions on bilateral buttocks (more extensive on right side) and right inner thigh on 2/21.      - Diet: regular  - Activity: bedrest, off load pressure from right side  - Pain medication: tylenol, oxy  - daily dressing changes (packed gauze & kerlix wrap in R inner thigh; Adaptic, gauze, abd pad taped in place over buttocks)  - f/u wound care  - c/w abx  - consider PT consult   - DVT ppx    Team B, [id number].

## 2023-02-23 NOTE — ADVANCED PRACTICE NURSE CONSULT - REASON FOR CONSULT
Patient seen on skin care rounds after wound care referral received for assessment of skin impairment and recommendations of topical management. Chart reviewed: WBC 12.88, H/H 7.9/25.3, platelets 444, INR 1.49, BMI 22.2,  Bubba 19, CT abdomen/pelvis, patient interviewed stating he has had hidradentitis since 2017. Patient H/O of hidradenitis suppurativa on Humira, who presents with bilateral ankle pain and increased discharge from hidradenitis, found to be septic likely due to buttock superficial abscesses s/p surgical resection of multiple hidradenitis suppurativa lesions on bilateral buttocks with surgical team. Patient followed by infectious disease currently on IV anbx.

## 2023-02-23 NOTE — PROGRESS NOTE ADULT - SUBJECTIVE AND OBJECTIVE BOX
Surgery Progress Note       Subjective:  Patient seen and examined. Reports minimal pain.      Vital Signs:  Vital Signs Last 24 Hrs  T(C): 36.7 (23 Feb 2023 05:30), Max: 36.9 (22 Feb 2023 14:00)  T(F): 98 (23 Feb 2023 05:30), Max: 98.5 (22 Feb 2023 14:00)  HR: 100 (23 Feb 2023 05:30) (64 - 100)  BP: 107/66 (23 Feb 2023 05:30) (107/66 - 158/75)  BP(mean): --  RR: 18 (23 Feb 2023 05:30) (15 - 18)  SpO2: 98% (23 Feb 2023 05:30) (98% - 101%)    Parameters below as of 23 Feb 2023 05:30  Patient On (Oxygen Delivery Method): room air        CAPILLARY BLOOD GLUCOSE          I&O's Detail    22 Feb 2023 07:01  -  23 Feb 2023 07:00  --------------------------------------------------------  IN:    IV PiggyBack: 100 mL    Oral Fluid: 770 mL    sodium chloride 0.9%: 450 mL  Total IN: 1320 mL    OUT:    Voided (mL): 2500 mL  Total OUT: 2500 mL    Total NET: -1180 mL            Physical Exam:  General: A&Ox3, NAD  Respiratory: Equal bilateral expansion  Abdominal: Soft. NTND.  Extremities: Kerlix wrap in R inner thigh, wound underneath is packed loosely with plain packing (replaced at bedside). R/L open gluteal wounds clean, mild oozing present, dressing changed      Labs:    02-22    131<L>  |  97<L>  |  11  ----------------------------<  136<H>  4.4   |  27  |  0.70    Ca    8.9      22 Feb 2023 06:01  Phos  5.3     02-22  Mg     1.90     02-22                              7.9    12.88 )-----------( 444      ( 23 Feb 2023 07:33 )             25.3

## 2023-02-23 NOTE — ADVANCED PRACTICE NURSE CONSULT - RECOMMEDATIONS
Surgical team following    Topical recommendations:     Right buttock extending to left buttock and to perianal area- Cleanse with NS, pat dry. Apply Liquid barrier film to periwound skin (allow to dry). Apply silicone contact layer, cover with Aquacel hydrofiber, cover with gauze, abdominal pad, secure with disposable brief. Change daily or PRN if compromised.       Right medial upper thigh- Cleanse with NS, pat dry. Apply Liquid barrier film to periwound skin (allow to dry). Loosely pack cavity with hydrofiber (Aquacel), cover with silicone foam with border. Change daily.     Left axila- Cleanse with NS, pat dry. Apply Liquid barrier film to periwound skin (allow to dry). Apply Aquacel hydrofiber, cover with silicone foam with border. Change daily.    Surgical team following    Topical recommendations:     Right buttock extending to left buttock and to perianal area- Cleanse with NS, pat dry. Apply Liquid barrier film to periwound skin (allow to dry). Apply silicone contact layer, cover with Aquacel hydrofiber, cover with gauze, abdominal pad, secure with disposable brief. Change daily or PRN if compromised.     Right medial upper thigh- Cleanse with NS, pat dry. Apply Liquid barrier film to periwound skin (allow to dry). Loosely pack cavity with hydrofiber (Aquacel), cover with silicone foam with border. Change daily.     Left axila- Cleanse with NS, pat dry. Apply Liquid barrier film to periwound skin (allow to dry). Apply Aquacel hydrofiber, cover with silicone foam with border. Change daily.     Recommendations discussed with surgical team.     Please contact Wound/Ostomy Care Service Line if we can be of further assistance (ext [phone]).

## 2023-02-23 NOTE — ADVANCED PRACTICE NURSE CONSULT - ASSESSMENT
General: A&Ox4, ambulates independently, continent of urine and stool. Skin warm, dry, adequate skin turgor, scattered areas of hyperpigmentation and hypopigmentation with scar tissue to bilateral axila, and Bilateral inner thighs.     Right buttock extending to left buttock and to perianal area- hidradenitis suppurativa s/p surgical resection measuring [id number] exposing 100% adipose tissue. Undermining noted 2colck extending 2.5cm and 5oclock extending 1cm. Dressing changed with surgical team at bedside. Upon dressing removal active bleed noted to left buttock controlled with silver nitrate. Base moist with small serosanguinous drainage, no odor. Periwound skin with induration to left buttock from 6-8oclock extending 13cm, otherwise periwound skin intact.  Goals of care: Moist wound healing, IV anbx, protect periwound skin. (Of note: NPWT option discussed to help with management, holding of on vac at this time wound within border of anal opening- topical wound care recommended.     Right medial upper thigh hidradenitis s/p I&D measuring [id number] exposing 100% red, moist adipose. No dead space. Small-moderate serosanguinous drainage, no odor. Periwound skin with induration from 7-1oclock extending 1cm, remaining skin with hyperpigmentation. Goals of care: Moist wound healing, protect periwound skin.      Left axilla- hidradenitis stage 3 with pinpoint openings draining a moderate-large amount of serosanguinous drainage. Entire area measuring [id number]. No complaints of pain. Goals of care: Manage/absorb drainage, protect periwound skin.

## 2023-02-24 LAB
ANION GAP SERPL CALC-SCNC: 7 MMOL/L — SIGNIFICANT CHANGE UP (ref 7–14)
BUN SERPL-MCNC: 7 MG/DL — SIGNIFICANT CHANGE UP (ref 7–23)
CALCIUM SERPL-MCNC: 9 MG/DL — SIGNIFICANT CHANGE UP (ref 8.4–10.5)
CHLORIDE SERPL-SCNC: 101 MMOL/L — SIGNIFICANT CHANGE UP (ref 98–107)
CO2 SERPL-SCNC: 27 MMOL/L — SIGNIFICANT CHANGE UP (ref 22–31)
CREAT SERPL-MCNC: 0.61 MG/DL — SIGNIFICANT CHANGE UP (ref 0.5–1.3)
CULTURE RESULTS: SIGNIFICANT CHANGE UP
CULTURE RESULTS: SIGNIFICANT CHANGE UP
EGFR: 131 ML/MIN/1.73M2 — SIGNIFICANT CHANGE UP
GLUCOSE SERPL-MCNC: 100 MG/DL — HIGH (ref 70–99)
HCT VFR BLD CALC: 25.8 % — LOW (ref 39–50)
HGB BLD-MCNC: 7.8 G/DL — LOW (ref 13–17)
MAGNESIUM SERPL-MCNC: 1.8 MG/DL — SIGNIFICANT CHANGE UP (ref 1.6–2.6)
MCHC RBC-ENTMCNC: 24.1 PG — LOW (ref 27–34)
MCHC RBC-ENTMCNC: 30.2 GM/DL — LOW (ref 32–36)
MCV RBC AUTO: 79.6 FL — LOW (ref 80–100)
NRBC # BLD: 0 /100 WBCS — SIGNIFICANT CHANGE UP (ref 0–0)
NRBC # FLD: 0 K/UL — SIGNIFICANT CHANGE UP (ref 0–0)
PHOSPHATE SERPL-MCNC: 3.9 MG/DL — SIGNIFICANT CHANGE UP (ref 2.5–4.5)
PLATELET # BLD AUTO: 462 K/UL — HIGH (ref 150–400)
POTASSIUM SERPL-MCNC: 4 MMOL/L — SIGNIFICANT CHANGE UP (ref 3.5–5.3)
POTASSIUM SERPL-SCNC: 4 MMOL/L — SIGNIFICANT CHANGE UP (ref 3.5–5.3)
RBC # BLD: 3.24 M/UL — LOW (ref 4.2–5.8)
RBC # FLD: 18 % — HIGH (ref 10.3–14.5)
SODIUM SERPL-SCNC: 135 MMOL/L — SIGNIFICANT CHANGE UP (ref 135–145)
SPECIMEN SOURCE: SIGNIFICANT CHANGE UP
SPECIMEN SOURCE: SIGNIFICANT CHANGE UP
WBC # BLD: 11.81 K/UL — HIGH (ref 3.8–10.5)
WBC # FLD AUTO: 11.81 K/UL — HIGH (ref 3.8–10.5)

## 2023-02-24 PROCEDURE — 99232 SBSQ HOSP IP/OBS MODERATE 35: CPT

## 2023-02-24 RX ORDER — MAGNESIUM SULFATE 500 MG/ML
1 VIAL (ML) INJECTION ONCE
Refills: 0 | Status: COMPLETED | OUTPATIENT
Start: 2023-02-24 | End: 2023-02-24

## 2023-02-24 RX ADMIN — AMPICILLIN SODIUM AND SULBACTAM SODIUM 200 GRAM(S): 250; 125 INJECTION, POWDER, FOR SUSPENSION INTRAMUSCULAR; INTRAVENOUS at 05:16

## 2023-02-24 RX ADMIN — Medication 100 GRAM(S): at 10:35

## 2023-02-24 RX ADMIN — AMPICILLIN SODIUM AND SULBACTAM SODIUM 200 GRAM(S): 250; 125 INJECTION, POWDER, FOR SUSPENSION INTRAMUSCULAR; INTRAVENOUS at 13:31

## 2023-02-24 RX ADMIN — AMPICILLIN SODIUM AND SULBACTAM SODIUM 200 GRAM(S): 250; 125 INJECTION, POWDER, FOR SUSPENSION INTRAMUSCULAR; INTRAVENOUS at 23:32

## 2023-02-24 RX ADMIN — Medication 100 MILLIGRAM(S): at 05:17

## 2023-02-24 RX ADMIN — CHLORHEXIDINE GLUCONATE 1 APPLICATION(S): 213 SOLUTION TOPICAL at 13:31

## 2023-02-24 RX ADMIN — Medication 975 MILLIGRAM(S): at 05:17

## 2023-02-24 RX ADMIN — Medication 975 MILLIGRAM(S): at 05:47

## 2023-02-24 RX ADMIN — Medication 100 MILLIGRAM(S): at 18:30

## 2023-02-24 RX ADMIN — AMPICILLIN SODIUM AND SULBACTAM SODIUM 200 GRAM(S): 250; 125 INJECTION, POWDER, FOR SUSPENSION INTRAMUSCULAR; INTRAVENOUS at 18:29

## 2023-02-24 RX ADMIN — Medication 975 MILLIGRAM(S): at 00:02

## 2023-02-24 RX ADMIN — ENOXAPARIN SODIUM 40 MILLIGRAM(S): 100 INJECTION SUBCUTANEOUS at 05:17

## 2023-02-24 RX ADMIN — OXYCODONE HYDROCHLORIDE 5 MILLIGRAM(S): 5 TABLET ORAL at 11:10

## 2023-02-24 RX ADMIN — OXYCODONE HYDROCHLORIDE 5 MILLIGRAM(S): 5 TABLET ORAL at 11:40

## 2023-02-24 NOTE — PROGRESS NOTE ADULT - ASSESSMENT
32 y,o with hidradenitis suppurativa on Humira, who presents with bilateral ankle pain and increased discharge from hidradenitis. Patient report started to have bilateral ankle pain on Monday, denies any trauma, falls or twisted ankle. Pain is ache, able to move and walk, pain improved with tylenol. Also endorses few days of worsening discharge from buttocks lesions, discharge described as yellow and dark. He denies any pain, states area is not as swollen as other flare episodes. He denies any fever, chills, sick contacts, nausea/vomiting/diarrhea. Has been on Humira per OP Dermatology, 40 mg weekly, missed last week dose and was due for it tomorrow 2/20 (Dr. Mobley OP derm). In the Emergency room patient found to be febrile to 101.9, , leukocytosis of 22, x-ray of ankle without fracture or acute pathology, CT abdomen showed extensive skin thickening/soft tissue inflammation laterally along the gluteal fold. Small areas of thin hypodense collection with variable degrees of internal gas may represent superficial abscesses. Patient received vanc, clinda and zosyn and ID consulted.    WORKUP  CXR, RVP and UA negative  CRP: 188.0 and  mm/hr (02-18   CT Pelvis w/ IV Cont (02.18.): Extensive skin thickening/soft tissue inflammation laterally along the gluteal fold. Small areas of thin hypodense collection with variable  degrees of internal gas may represent superficial abscesses. Presence of subcutaneous air in this patient may be secondary to recent  instrumentation/procedure, however gas-forming bacterial infection cannot  be excluded on the basis of this study alone. Bulky, rounded lymph nodes, similar in appearance to that seen 2017.   x-ray of ankle without fracture or acute pathology,       DIAGNOSIS and IMPRESSION  Gluteal abscess with SubQ air, now s/p sx   immunosuppressed state   Ankle Pain  fever, leucocytosis,          RECOMMENDATIONS  Blood cx in lab -> NTD   Gluteal abscess with polymicrobial growth.   c/w unasyn, now pt s/p source control   s/p I&D; f/u OR cx, prelim with strep anginosus   c/w Doxy 100mg BID PO  trend cbc for leucocytosis, downtrended   ankle pain improved.   c/w wound care.   plan for 7 days of therapy post sx, given adequate source control, can transition to po augmentin on discharge 875 mg q12h.         Dwight Mike  Please contact through MS Teams   If no response or past 5 pm/weekend call [phone].

## 2023-02-24 NOTE — PROGRESS NOTE ADULT - SUBJECTIVE AND OBJECTIVE BOX
Surgery Progress Note       Subjective:  Patient seen and examined. Reports minimal pain.       Physical Exam:  General: A&Ox3, NAD  Respiratory: Equal bilateral expansion  Abdominal: Soft. NTND.  Extremities: Kerlix wrap in R inner thigh, wound underneath is packed loosely with plain packing (replaced at bedside). R/L open gluteal wounds clean, mild oozing present, dressing changed    OBJECTIVE  T(C): 36.6 (02-24-23 @ 06:00), Max: 36.9 (02-23-23 @ 18:00)  HR: 90 (02-24-23 @ 06:00) (80 - 90)  BP: 106/68 (02-24-23 @ 06:00) (106/68 - 124/74)  RR: 18 (02-24-23 @ 06:00) (16 - 18)  SpO2: 100% (02-24-23 @ 06:00) (100% - 100%)  I&O's Summary    23 Feb 2023 07:01  -  24 Feb 2023 07:00  --------------------------------------------------------  IN: 5310 mL / OUT: 2200 mL / NET: 3110 mL      I&O's Detail    23 Feb 2023 07:01  -  24 Feb 2023 07:00  --------------------------------------------------------  IN:    IV PiggyBack: 200 mL    Oral Fluid: 5110 mL  Total IN: 5310 mL    OUT:    Voided (mL): 2200 mL  Total OUT: 2200 mL    Total NET: 3110 mL        LABS                        7.8    11.81 )-----------( 462      ( 24 Feb 2023 06:49 )             25.8     02-24    135  |  101  |  7   ----------------------------<  100<H>  4.0   |  27  |  0.61    Ca    9.0      24 Feb 2023 06:49  Phos  3.9     02-24  Mg     1.80     02-24          ORDERS/MEDICATIONS  MEDICATIONS  (STANDING):  acetaminophen     Tablet .. 975 milliGRAM(s) Oral every 6 hours  ampicillin/sulbactam  IVPB 3 Gram(s) IV Intermittent every 6 hours  chlorhexidine 2% Cloths 1 Application(s) Topical daily  doxycycline monohydrate Capsule 100 milliGRAM(s) Oral every 12 hours  enoxaparin Injectable 40 milliGRAM(s) SubCutaneous every 24 hours  influenza   Vaccine 0.5 milliLiter(s) IntraMuscular once    MEDICATIONS  (PRN):  naloxone Injectable 0.1 milliGRAM(s) IV Push every 3 minutes PRN For ANY of the following changes in patient status:  A. RR LESS THAN 10 breaths per minute, B. Oxygen saturation LESS THAN 90%, C. Sedation score of 6  oxyCODONE    IR 5 milliGRAM(s) Oral every 3 hours PRN Moderate- Severe Pain (4 - 10)

## 2023-02-24 NOTE — PROGRESS NOTE ADULT - ASSESSMENT
32y Male s/p surgical resection of multiple hidradenitis suppurativa lesions on bilateral buttocks (more extensive on right side) and right inner thigh on 2/21.      - Diet: regular  - Activity: bedrest, off load pressure from right side  - Pain medication: tylenol, oxy  - daily dressing changes (packed gauze & kerlix wrap in R inner thigh; Adaptic, gauze, abd pad taped in place over buttocks)  - f/u wound care  - c/w abx  - consider PT consult   - DVT ppx    Team B, [id number].

## 2023-02-24 NOTE — PROGRESS NOTE ADULT - SUBJECTIVE AND OBJECTIVE BOX
32yPatient is a 32y old  Male who presents with a chief complaint of sepsis (24 Feb 2023 10:43)      Interval history:  Afebrile, pain improved.       Allergies:   No Known Allergies      Antimicrobials:  ampicillin/sulbactam  IVPB 3 Gram(s) IV Intermittent every 6 hours  doxycycline monohydrate Capsule 100 milliGRAM(s) Oral every 12 hours      REVIEW OF SYSTEMS:  No chest pain   No SOB  No abdominal pain  No rash.       Vital Signs Last 24 Hrs  T(C): 36.7 (02-24-23 @ 18:30), Max: 36.7 (02-23-23 @ 22:00)  T(F): 98.1 (02-24-23 @ 18:30), Max: 98.1 (02-24-23 @ 18:30)  HR: 102 (02-24-23 @ 18:30) (72 - 102)  BP: 117/70 (02-24-23 @ 18:30) (105/66 - 124/74)  BP(mean): --  RR: 18 (02-24-23 @ 18:30) (14 - 18)  SpO2: 100% (02-24-23 @ 18:30) (100% - 100%)      PHYSICAL EXAM:  Pt in no acute distress, alert, awake.   breathing comfortably   non distended abdomen  large area of ulcer now with clean base  no edema LE   no phlebitis                             7.8    11.81 )-----------( 462      ( 24 Feb 2023 06:49 )             25.8   02-24    135  |  101  |  7   ----------------------------<  100<H>  4.0   |  27  |  0.61    Ca    9.0      24 Feb 2023 06:49  Phos  3.9     02-24  Mg     1.80     02-24      Culture - Fungal, Other (collected 21 Feb 2023 19:14)  Source: .Other RIGHT BUTTOCK  Preliminary Report (22 Feb 2023 07:16):    Testing in progress    Culture - Abscess with Gram Stain (collected 21 Feb 2023 19:14)  Source: .Abscess RIGHT BUTTOCK  Preliminary Report (24 Feb 2023 12:10):    Rare Streptococcus anginosus "Susceptibilities not performed"

## 2023-02-25 LAB
ANION GAP SERPL CALC-SCNC: 8 MMOL/L — SIGNIFICANT CHANGE UP (ref 7–14)
BUN SERPL-MCNC: 11 MG/DL — SIGNIFICANT CHANGE UP (ref 7–23)
CALCIUM SERPL-MCNC: 9.1 MG/DL — SIGNIFICANT CHANGE UP (ref 8.4–10.5)
CHLORIDE SERPL-SCNC: 102 MMOL/L — SIGNIFICANT CHANGE UP (ref 98–107)
CO2 SERPL-SCNC: 26 MMOL/L — SIGNIFICANT CHANGE UP (ref 22–31)
CREAT SERPL-MCNC: 0.62 MG/DL — SIGNIFICANT CHANGE UP (ref 0.5–1.3)
EGFR: 130 ML/MIN/1.73M2 — SIGNIFICANT CHANGE UP
GLUCOSE SERPL-MCNC: 88 MG/DL — SIGNIFICANT CHANGE UP (ref 70–99)
HCT VFR BLD CALC: 26.6 % — LOW (ref 39–50)
HGB BLD-MCNC: 7.9 G/DL — LOW (ref 13–17)
MAGNESIUM SERPL-MCNC: 1.9 MG/DL — SIGNIFICANT CHANGE UP (ref 1.6–2.6)
MCHC RBC-ENTMCNC: 24.2 PG — LOW (ref 27–34)
MCHC RBC-ENTMCNC: 29.7 GM/DL — LOW (ref 32–36)
MCV RBC AUTO: 81.6 FL — SIGNIFICANT CHANGE UP (ref 80–100)
NRBC # BLD: 0 /100 WBCS — SIGNIFICANT CHANGE UP (ref 0–0)
NRBC # FLD: 0 K/UL — SIGNIFICANT CHANGE UP (ref 0–0)
PHOSPHATE SERPL-MCNC: 3.9 MG/DL — SIGNIFICANT CHANGE UP (ref 2.5–4.5)
PLATELET # BLD AUTO: 497 K/UL — HIGH (ref 150–400)
POTASSIUM SERPL-MCNC: 4 MMOL/L — SIGNIFICANT CHANGE UP (ref 3.5–5.3)
POTASSIUM SERPL-SCNC: 4 MMOL/L — SIGNIFICANT CHANGE UP (ref 3.5–5.3)
RBC # BLD: 3.26 M/UL — LOW (ref 4.2–5.8)
RBC # FLD: 18.6 % — HIGH (ref 10.3–14.5)
SODIUM SERPL-SCNC: 136 MMOL/L — SIGNIFICANT CHANGE UP (ref 135–145)
WBC # BLD: 13.53 K/UL — HIGH (ref 3.8–10.5)
WBC # FLD AUTO: 13.53 K/UL — HIGH (ref 3.8–10.5)

## 2023-02-25 RX ORDER — ACETAMINOPHEN 500 MG
650 TABLET ORAL EVERY 6 HOURS
Refills: 0 | Status: DISCONTINUED | OUTPATIENT
Start: 2023-02-25 | End: 2023-02-28

## 2023-02-25 RX ORDER — MAGNESIUM HYDROXIDE 400 MG/1
30 TABLET, CHEWABLE ORAL EVERY 6 HOURS
Refills: 0 | Status: DISCONTINUED | OUTPATIENT
Start: 2023-02-25 | End: 2023-03-08

## 2023-02-25 RX ADMIN — MAGNESIUM HYDROXIDE 30 MILLILITER(S): 400 TABLET, CHEWABLE ORAL at 11:22

## 2023-02-25 RX ADMIN — AMPICILLIN SODIUM AND SULBACTAM SODIUM 200 GRAM(S): 250; 125 INJECTION, POWDER, FOR SUSPENSION INTRAMUSCULAR; INTRAVENOUS at 11:23

## 2023-02-25 RX ADMIN — AMPICILLIN SODIUM AND SULBACTAM SODIUM 200 GRAM(S): 250; 125 INJECTION, POWDER, FOR SUSPENSION INTRAMUSCULAR; INTRAVENOUS at 05:39

## 2023-02-25 RX ADMIN — CHLORHEXIDINE GLUCONATE 1 APPLICATION(S): 213 SOLUTION TOPICAL at 11:22

## 2023-02-25 RX ADMIN — ENOXAPARIN SODIUM 40 MILLIGRAM(S): 100 INJECTION SUBCUTANEOUS at 05:40

## 2023-02-25 RX ADMIN — Medication 100 MILLIGRAM(S): at 18:06

## 2023-02-25 RX ADMIN — OXYCODONE HYDROCHLORIDE 5 MILLIGRAM(S): 5 TABLET ORAL at 14:18

## 2023-02-25 RX ADMIN — OXYCODONE HYDROCHLORIDE 5 MILLIGRAM(S): 5 TABLET ORAL at 13:48

## 2023-02-25 RX ADMIN — Medication 100 MILLIGRAM(S): at 05:38

## 2023-02-25 RX ADMIN — AMPICILLIN SODIUM AND SULBACTAM SODIUM 200 GRAM(S): 250; 125 INJECTION, POWDER, FOR SUSPENSION INTRAMUSCULAR; INTRAVENOUS at 18:06

## 2023-02-25 NOTE — PROGRESS NOTE ADULT - ASSESSMENT
HS    Will plan for further debridement this week to allow for reconstruction in a setting where patient is less likely to have a short interval recurrence.  PRS team will coordinate with primary team. Plan for service transfer following additional debridement.

## 2023-02-25 NOTE — PROGRESS NOTE ADULT - SUBJECTIVE AND OBJECTIVE BOX
Surgery Progress Note     Subjective:  Patient seen and examined.       Vital Signs:  Vital Signs Last 24 Hrs  T(C): 36.7 (25 Feb 2023 10:00), Max: 36.9 (24 Feb 2023 23:30)  T(F): 98.1 (25 Feb 2023 10:00), Max: 98.4 (24 Feb 2023 23:30)  HR: 97 (25 Feb 2023 10:00) (72 - 102)  BP: 107/61 (25 Feb 2023 10:00) (105/66 - 120/81)  BP(mean): --  RR: 17 (25 Feb 2023 10:00) (17 - 18)  SpO2: 100% (25 Feb 2023 10:00) (100% - 100%)    Parameters below as of 25 Feb 2023 10:00  Patient On (Oxygen Delivery Method): room air        CAPILLARY BLOOD GLUCOSE          I&O's Detail    24 Feb 2023 07:01  -  25 Feb 2023 07:00  --------------------------------------------------------  IN:    IV PiggyBack: 400 mL    Oral Fluid: 1500 mL  Total IN: 1900 mL    OUT:    Voided (mL): 2925 mL  Total OUT: 2925 mL    Total NET: -1025 mL      25 Feb 2023 07:01  -  25 Feb 2023 11:44  --------------------------------------------------------  IN:    Oral Fluid: 300 mL  Total IN: 300 mL    OUT:    Voided (mL): 400 mL  Total OUT: 400 mL    Total NET: -100 mL            Physical Exam:  General: A&Ox3, NAD  Respiratory: Equal bilateral expansion  Abdominal: Soft. NTND.  Extremities: Kerlix wrap in R inner thigh, wound underneath is packed loosely with plain packing (replaced at bedside) w/ allevyn over packing. R/L open gluteal wounds clean, mild oozing present, dressing changed      Labs:    02-25    136  |  102  |  11  ----------------------------<  88  4.0   |  26  |  0.62    Ca    9.1      25 Feb 2023 05:53  Phos  3.9     02-25  Mg     1.90     02-25                              7.9    13.53 )-----------( 497      ( 25 Feb 2023 05:53 )             26.6

## 2023-02-25 NOTE — PROGRESS NOTE ADULT - SUBJECTIVE AND OBJECTIVE BOX
Pt underwent debridement for HS several days ago. Now continue wound care until ready for formal reconstruction.    Examined at bedside. Active/scarred area of HS still present in left juve-anal area. Otherwise wound clean.

## 2023-02-25 NOTE — PROGRESS NOTE ADULT - ASSESSMENT
32y Male s/p surgical resection of multiple hidradenitis suppurativa lesions on bilateral buttocks (more extensive on right side) and right inner thigh on 2/21.      - Diet: regular  - Activity: bedrest, off load pressure from right side  - Pain medication: tylenol, oxy  - daily dressing changes (packed gauze & kerlix wrap in R inner thigh; Adaptic, gauze, abd pad taped in place over buttocks)  - c/w abx  - will start 30mL milk of magnesia q6 until patient has a bowel movement since he hasn't since before surgery    Team B, [id number].

## 2023-02-26 ENCOUNTER — TRANSCRIPTION ENCOUNTER (OUTPATIENT)
Age: 33
End: 2023-02-26

## 2023-02-26 LAB
ANION GAP SERPL CALC-SCNC: 9 MMOL/L — SIGNIFICANT CHANGE UP (ref 7–14)
BUN SERPL-MCNC: 11 MG/DL — SIGNIFICANT CHANGE UP (ref 7–23)
CALCIUM SERPL-MCNC: 9.3 MG/DL — SIGNIFICANT CHANGE UP (ref 8.4–10.5)
CHLORIDE SERPL-SCNC: 98 MMOL/L — SIGNIFICANT CHANGE UP (ref 98–107)
CO2 SERPL-SCNC: 25 MMOL/L — SIGNIFICANT CHANGE UP (ref 22–31)
CREAT SERPL-MCNC: 0.66 MG/DL — SIGNIFICANT CHANGE UP (ref 0.5–1.3)
EGFR: 128 ML/MIN/1.73M2 — SIGNIFICANT CHANGE UP
GLUCOSE SERPL-MCNC: 86 MG/DL — SIGNIFICANT CHANGE UP (ref 70–99)
HCT VFR BLD CALC: 27.4 % — LOW (ref 39–50)
HGB BLD-MCNC: 8.1 G/DL — LOW (ref 13–17)
MAGNESIUM SERPL-MCNC: 1.8 MG/DL — SIGNIFICANT CHANGE UP (ref 1.6–2.6)
MCHC RBC-ENTMCNC: 24.5 PG — LOW (ref 27–34)
MCHC RBC-ENTMCNC: 29.6 GM/DL — LOW (ref 32–36)
MCV RBC AUTO: 82.8 FL — SIGNIFICANT CHANGE UP (ref 80–100)
NRBC # BLD: 0 /100 WBCS — SIGNIFICANT CHANGE UP (ref 0–0)
NRBC # FLD: 0 K/UL — SIGNIFICANT CHANGE UP (ref 0–0)
PHOSPHATE SERPL-MCNC: 4.5 MG/DL — SIGNIFICANT CHANGE UP (ref 2.5–4.5)
PLATELET # BLD AUTO: 543 K/UL — HIGH (ref 150–400)
POTASSIUM SERPL-MCNC: 4.1 MMOL/L — SIGNIFICANT CHANGE UP (ref 3.5–5.3)
POTASSIUM SERPL-SCNC: 4.1 MMOL/L — SIGNIFICANT CHANGE UP (ref 3.5–5.3)
RBC # BLD: 3.31 M/UL — LOW (ref 4.2–5.8)
RBC # FLD: 19.4 % — HIGH (ref 10.3–14.5)
SARS-COV-2 RNA SPEC QL NAA+PROBE: SIGNIFICANT CHANGE UP
SODIUM SERPL-SCNC: 132 MMOL/L — LOW (ref 135–145)
WBC # BLD: 13.9 K/UL — HIGH (ref 3.8–10.5)
WBC # FLD AUTO: 13.9 K/UL — HIGH (ref 3.8–10.5)

## 2023-02-26 RX ORDER — MAGNESIUM SULFATE 500 MG/ML
1 VIAL (ML) INJECTION ONCE
Refills: 0 | Status: COMPLETED | OUTPATIENT
Start: 2023-02-26 | End: 2023-02-26

## 2023-02-26 RX ADMIN — AMPICILLIN SODIUM AND SULBACTAM SODIUM 200 GRAM(S): 250; 125 INJECTION, POWDER, FOR SUSPENSION INTRAMUSCULAR; INTRAVENOUS at 00:39

## 2023-02-26 RX ADMIN — Medication 100 MILLIGRAM(S): at 17:42

## 2023-02-26 RX ADMIN — AMPICILLIN SODIUM AND SULBACTAM SODIUM 200 GRAM(S): 250; 125 INJECTION, POWDER, FOR SUSPENSION INTRAMUSCULAR; INTRAVENOUS at 17:43

## 2023-02-26 RX ADMIN — OXYCODONE HYDROCHLORIDE 5 MILLIGRAM(S): 5 TABLET ORAL at 13:35

## 2023-02-26 RX ADMIN — Medication 100 GRAM(S): at 10:07

## 2023-02-26 RX ADMIN — CHLORHEXIDINE GLUCONATE 1 APPLICATION(S): 213 SOLUTION TOPICAL at 12:55

## 2023-02-26 RX ADMIN — Medication 100 MILLIGRAM(S): at 05:07

## 2023-02-26 RX ADMIN — AMPICILLIN SODIUM AND SULBACTAM SODIUM 200 GRAM(S): 250; 125 INJECTION, POWDER, FOR SUSPENSION INTRAMUSCULAR; INTRAVENOUS at 12:55

## 2023-02-26 RX ADMIN — OXYCODONE HYDROCHLORIDE 5 MILLIGRAM(S): 5 TABLET ORAL at 14:05

## 2023-02-26 RX ADMIN — ENOXAPARIN SODIUM 40 MILLIGRAM(S): 100 INJECTION SUBCUTANEOUS at 05:51

## 2023-02-26 RX ADMIN — AMPICILLIN SODIUM AND SULBACTAM SODIUM 200 GRAM(S): 250; 125 INJECTION, POWDER, FOR SUSPENSION INTRAMUSCULAR; INTRAVENOUS at 05:07

## 2023-02-26 RX ADMIN — MAGNESIUM HYDROXIDE 30 MILLILITER(S): 400 TABLET, CHEWABLE ORAL at 00:39

## 2023-02-26 NOTE — PROGRESS NOTE ADULT - ASSESSMENT
32y Male s/p surgical resection of multiple hidradenitis suppurativa lesions on bilateral buttocks (more extensive on right side) and right inner thigh on 2/21.      - Diet: regular, NPO at midnight  - Activity: bedrest, off load pressure from right side  - Pain medication: tylenol, oxy  - daily dressing changes (packed gauze & kerlix wrap in R inner thigh; Adaptic, gauze, abd pad taped in place over buttocks)  - c/w abx  - plastic surgery to take patient to the OR tomorrow for further debridement    Team B, [id number].

## 2023-02-26 NOTE — PROGRESS NOTE ADULT - ASSESSMENT
A: Jack is a 33yo M with HS who is s/p debridement of buttock wound. Will plan for return to OR 2/27 as add on for further debridement with Dr. Tariq.     Plan:  -Patient consented; will add on OR schedule this PM  -Please preop tonight: NPO, CBC, BMP, coags, COVID, type and screen  -Will plan to transfer to [facility] service s/p debridement   -Appreciate care per primary team    [facility] [place]  Plastic Surgery  #[phone] [facility] pager  [phone] [facility] pager  Available on teams

## 2023-02-26 NOTE — PROGRESS NOTE ADULT - SUBJECTIVE AND OBJECTIVE BOX
Surgery Progress Note     24hour Events:     Subjective:  Patient seen and examined.       Vital Signs:  Vital Signs Last 24 Hrs  T(C): 36.7 (26 Feb 2023 09:16), Max: 37.1 (25 Feb 2023 20:07)  T(F): 98.1 (26 Feb 2023 09:16), Max: 98.8 (25 Feb 2023 20:07)  HR: 108 (26 Feb 2023 09:16) (93 - 109)  BP: 114/74 (26 Feb 2023 09:16) (111/71 - 131/80)  BP(mean): --  RR: 18 (26 Feb 2023 09:16) (16 - 18)  SpO2: 100% (26 Feb 2023 09:16) (98% - 100%)    Parameters below as of 26 Feb 2023 09:16  Patient On (Oxygen Delivery Method): room air        CAPILLARY BLOOD GLUCOSE          I&O's Detail    25 Feb 2023 07:01  -  26 Feb 2023 07:00  --------------------------------------------------------  IN:    IV PiggyBack: 400 mL    Oral Fluid: 1920 mL  Total IN: 2320 mL    OUT:    Voided (mL): 2500 mL  Total OUT: 2500 mL    Total NET: -180 mL            Physical Exam:  General: NAD, resting comfortably in bed  HEENT: Normocephalic atraumatic  Respiratory: Nonlabored respirations  Cardio: pulse present  Abdomen: softly distended, appropriately tender, surgical incisions are c/d/i. NGT/OGT*  Vascular: extremities are warm and well perfused.       Labs:    02-26    132<L>  |  98  |  11  ----------------------------<  86  4.1   |  25  |  0.66    Ca    9.3      26 Feb 2023 07:00  Phos  4.5     02-26  Mg     1.80     02-26                              8.1    13.90 )-----------( 543      ( 26 Feb 2023 07:00 )             27.4          Surgery Progress Note     Subjective:  Patient seen and examined. Pt doing well, has minimal pain unless surgical site is being touched. He still has not had a bowel movement.      Vital Signs:  Vital Signs Last 24 Hrs  T(C): 36.7 (26 Feb 2023 09:16), Max: 37.1 (25 Feb 2023 20:07)  T(F): 98.1 (26 Feb 2023 09:16), Max: 98.8 (25 Feb 2023 20:07)  HR: 108 (26 Feb 2023 09:16) (93 - 109)  BP: 114/74 (26 Feb 2023 09:16) (111/71 - 131/80)  BP(mean): --  RR: 18 (26 Feb 2023 09:16) (16 - 18)  SpO2: 100% (26 Feb 2023 09:16) (98% - 100%)    Parameters below as of 26 Feb 2023 09:16  Patient On (Oxygen Delivery Method): room air        CAPILLARY BLOOD GLUCOSE          I&O's Detail    25 Feb 2023 07:01  -  26 Feb 2023 07:00  --------------------------------------------------------  IN:    IV PiggyBack: 400 mL    Oral Fluid: 1920 mL  Total IN: 2320 mL    OUT:    Voided (mL): 2500 mL  Total OUT: 2500 mL    Total NET: -180 mL            Physical Exam:  General: A&Ox3, NAD  Respiratory: Equal bilateral expansion  Abdominal: Soft. NTND.  Extremities: Kerlix wrap in R inner thigh, wound underneath is packed loosely with plain packing (replaced at bedside) w/ allevyn over packing. R/L open gluteal wounds w/ fibrinous exudate vs purulent drainage, dressing changed      Labs:    02-26    132<L>  |  98  |  11  ----------------------------<  86  4.1   |  25  |  0.66    Ca    9.3      26 Feb 2023 07:00  Phos  4.5     02-26  Mg     1.80     02-26                              8.1    13.90 )-----------( 543      ( 26 Feb 2023 07:00 )             27.4

## 2023-02-27 ENCOUNTER — RESULT REVIEW (OUTPATIENT)
Age: 33
End: 2023-02-27

## 2023-02-27 LAB
ANION GAP SERPL CALC-SCNC: 9 MMOL/L — SIGNIFICANT CHANGE UP (ref 7–14)
APTT BLD: 30.4 SEC — SIGNIFICANT CHANGE UP (ref 27–36.3)
BLD GP AB SCN SERPL QL: NEGATIVE — SIGNIFICANT CHANGE UP
BUN SERPL-MCNC: 14 MG/DL — SIGNIFICANT CHANGE UP (ref 7–23)
CALCIUM SERPL-MCNC: 9.3 MG/DL — SIGNIFICANT CHANGE UP (ref 8.4–10.5)
CHLORIDE SERPL-SCNC: 99 MMOL/L — SIGNIFICANT CHANGE UP (ref 98–107)
CO2 SERPL-SCNC: 27 MMOL/L — SIGNIFICANT CHANGE UP (ref 22–31)
CREAT SERPL-MCNC: 0.74 MG/DL — SIGNIFICANT CHANGE UP (ref 0.5–1.3)
CULTURE RESULTS: SIGNIFICANT CHANGE UP
EGFR: 123 ML/MIN/1.73M2 — SIGNIFICANT CHANGE UP
GLUCOSE SERPL-MCNC: 102 MG/DL — HIGH (ref 70–99)
GRAM STN FLD: SIGNIFICANT CHANGE UP
HCT VFR BLD CALC: 28.9 % — LOW (ref 39–50)
HGB BLD-MCNC: 8.7 G/DL — LOW (ref 13–17)
INR BLD: 1.31 RATIO — HIGH (ref 0.88–1.16)
MAGNESIUM SERPL-MCNC: 2 MG/DL — SIGNIFICANT CHANGE UP (ref 1.6–2.6)
MCHC RBC-ENTMCNC: 24.7 PG — LOW (ref 27–34)
MCHC RBC-ENTMCNC: 30.1 GM/DL — LOW (ref 32–36)
MCV RBC AUTO: 82.1 FL — SIGNIFICANT CHANGE UP (ref 80–100)
NRBC # BLD: 0 /100 WBCS — SIGNIFICANT CHANGE UP (ref 0–0)
NRBC # FLD: 0 K/UL — SIGNIFICANT CHANGE UP (ref 0–0)
PHOSPHATE SERPL-MCNC: 4.1 MG/DL — SIGNIFICANT CHANGE UP (ref 2.5–4.5)
PLATELET # BLD AUTO: 574 K/UL — HIGH (ref 150–400)
POTASSIUM SERPL-MCNC: 3.9 MMOL/L — SIGNIFICANT CHANGE UP (ref 3.5–5.3)
POTASSIUM SERPL-SCNC: 3.9 MMOL/L — SIGNIFICANT CHANGE UP (ref 3.5–5.3)
PROTHROM AB SERPL-ACNC: 15.2 SEC — HIGH (ref 10.5–13.4)
RBC # BLD: 3.52 M/UL — LOW (ref 4.2–5.8)
RBC # FLD: 19.6 % — HIGH (ref 10.3–14.5)
RH IG SCN BLD-IMP: POSITIVE — SIGNIFICANT CHANGE UP
SODIUM SERPL-SCNC: 135 MMOL/L — SIGNIFICANT CHANGE UP (ref 135–145)
SPECIMEN SOURCE: SIGNIFICANT CHANGE UP
SPECIMEN SOURCE: SIGNIFICANT CHANGE UP
WBC # BLD: 12.88 K/UL — HIGH (ref 3.8–10.5)
WBC # FLD AUTO: 12.88 K/UL — HIGH (ref 3.8–10.5)

## 2023-02-27 PROCEDURE — 11470 EXC SKN H/P/P/U SMPL/NTRM: CPT

## 2023-02-27 PROCEDURE — 88305 TISSUE EXAM BY PATHOLOGIST: CPT | Mod: 26

## 2023-02-27 PROCEDURE — 11045 DBRDMT SUBQ TISS EACH ADDL: CPT | Mod: 59

## 2023-02-27 PROCEDURE — 11042 DBRDMT SUBQ TIS 1ST 20SQCM/<: CPT | Mod: 59

## 2023-02-27 RX ORDER — HYDROMORPHONE HYDROCHLORIDE 2 MG/ML
30 INJECTION INTRAMUSCULAR; INTRAVENOUS; SUBCUTANEOUS
Refills: 0 | Status: DISCONTINUED | OUTPATIENT
Start: 2023-02-27 | End: 2023-02-28

## 2023-02-27 RX ORDER — ONDANSETRON 8 MG/1
4 TABLET, FILM COATED ORAL ONCE
Refills: 0 | Status: DISCONTINUED | OUTPATIENT
Start: 2023-02-27 | End: 2023-02-27

## 2023-02-27 RX ORDER — HYDROMORPHONE HYDROCHLORIDE 2 MG/ML
0.5 INJECTION INTRAMUSCULAR; INTRAVENOUS; SUBCUTANEOUS
Refills: 0 | Status: DISCONTINUED | OUTPATIENT
Start: 2023-02-27 | End: 2023-02-27

## 2023-02-27 RX ORDER — SODIUM CHLORIDE 9 MG/ML
1000 INJECTION, SOLUTION INTRAVENOUS
Refills: 0 | Status: DISCONTINUED | OUTPATIENT
Start: 2023-02-27 | End: 2023-02-28

## 2023-02-27 RX ADMIN — AMPICILLIN SODIUM AND SULBACTAM SODIUM 200 GRAM(S): 250; 125 INJECTION, POWDER, FOR SUSPENSION INTRAMUSCULAR; INTRAVENOUS at 00:39

## 2023-02-27 RX ADMIN — Medication 100 MILLIGRAM(S): at 18:44

## 2023-02-27 RX ADMIN — SODIUM CHLORIDE 115 MILLILITER(S): 9 INJECTION, SOLUTION INTRAVENOUS at 15:40

## 2023-02-27 RX ADMIN — SODIUM CHLORIDE 115 MILLILITER(S): 9 INJECTION, SOLUTION INTRAVENOUS at 07:54

## 2023-02-27 RX ADMIN — Medication 100 MILLIGRAM(S): at 07:12

## 2023-02-27 RX ADMIN — HYDROMORPHONE HYDROCHLORIDE 30 MILLILITER(S): 2 INJECTION INTRAMUSCULAR; INTRAVENOUS; SUBCUTANEOUS at 20:14

## 2023-02-27 RX ADMIN — AMPICILLIN SODIUM AND SULBACTAM SODIUM 200 GRAM(S): 250; 125 INJECTION, POWDER, FOR SUSPENSION INTRAMUSCULAR; INTRAVENOUS at 07:12

## 2023-02-27 RX ADMIN — HYDROMORPHONE HYDROCHLORIDE 30 MILLILITER(S): 2 INJECTION INTRAMUSCULAR; INTRAVENOUS; SUBCUTANEOUS at 18:30

## 2023-02-27 RX ADMIN — CHLORHEXIDINE GLUCONATE 1 APPLICATION(S): 213 SOLUTION TOPICAL at 11:48

## 2023-02-27 RX ADMIN — ENOXAPARIN SODIUM 40 MILLIGRAM(S): 100 INJECTION SUBCUTANEOUS at 07:12

## 2023-02-27 RX ADMIN — HYDROMORPHONE HYDROCHLORIDE 30 MILLILITER(S): 2 INJECTION INTRAMUSCULAR; INTRAVENOUS; SUBCUTANEOUS at 16:00

## 2023-02-27 RX ADMIN — SODIUM CHLORIDE 115 MILLILITER(S): 9 INJECTION, SOLUTION INTRAVENOUS at 18:44

## 2023-02-27 NOTE — DIETITIAN INITIAL EVALUATION ADULT - NS FNS DIET ORDER
Diet, NPO after Midnight:      NPO Start Date: 26-Feb-2023,   NPO Start Time: 23:59 (02-26-23 @ 11:28)  Diet, Regular (02-20-23 @ 17:52)

## 2023-02-27 NOTE — BRIEF OPERATIVE NOTE - NSICDXBRIEFPROCEDURE_GEN_ALL_CORE_FT
PROCEDURES:  Surgical removal of skin and subcutaneous tissue of perianal region with simple or intermediate repair for hidradenitis 21-Feb-2023 22:10:45  Amanda Sánchez  
PROCEDURES:  Surgical removal of skin and subcutaneous tissue of perianal region with simple or intermediate repair for hidradenitis 21-Feb-2023 22:10:45  Amanda Sánchez

## 2023-02-27 NOTE — DIETITIAN INITIAL EVALUATION ADULT - NSFNSPHYEXAMSKINFT_GEN_A_CORE
Right and left buttock, right inner thigh, right medial thigh, armpit abscess wound noted per nursing flowsheet.

## 2023-02-27 NOTE — PROGRESS NOTE ADULT - SUBJECTIVE AND OBJECTIVE BOX
GENERAL SURGERY PROGRESS NOTE    SUBJECTIVE  NAEO. Seen and examined by surgery team during morning rounds. Feeling well this morning.     10-point review of systems completed and negative except as noted above.      OBJECTIVE    MEDICATIONS  acetaminophen     Tablet .. 650 milliGRAM(s) Oral every 6 hours PRN  ampicillin/sulbactam  IVPB 3 Gram(s) IV Intermittent every 6 hours  chlorhexidine 2% Cloths 1 Application(s) Topical daily  doxycycline monohydrate Capsule 100 milliGRAM(s) Oral every 12 hours  enoxaparin Injectable 40 milliGRAM(s) SubCutaneous every 24 hours  influenza   Vaccine 0.5 milliLiter(s) IntraMuscular once  lactated ringers. 1000 milliLiter(s) IV Continuous <Continuous>  magnesium hydroxide Suspension 30 milliLiter(s) Oral every 6 hours  naloxone Injectable 0.1 milliGRAM(s) IV Push every 3 minutes PRN  oxyCODONE    IR 5 milliGRAM(s) Oral every 3 hours PRN      PHYSICAL EXAM  T(C): 37.1 (02-27-23 @ 06:00), Max: 37.1 (02-26-23 @ 21:34)  HR: 97 (02-27-23 @ 06:00) (95 - 114)  BP: 107/75 (02-27-23 @ 06:00) (100/65 - 123/88)  RR: 18 (02-27-23 @ 06:00) (18 - 20)  SpO2: 100% (02-27-23 @ 06:00) (98% - 100%)    02-26-23 @ 07:01  -  02-27-23 @ 07:00  --------------------------------------------------------  IN: 565 mL / OUT: 2450 mL / NET: -1885 mL        General: A&Ox3, NAD  Respiratory: Equal bilateral expansion  Abdominal: Soft. NTND.  Extremities: Kerlix wrap in R inner thigh, wound underneath is packed loosely with plain packing (replaced at bedside) w/ allevyn over packing. R/L open gluteal wounds w/ fibrinous exudate vs purulent drainage, dressing changed      LABS                        8.7    12.88 )-----------( 574      ( 27 Feb 2023 08:00 )             28.9     02-27    135  |  99  |  14  ----------------------------<  102<H>  3.9   |  27  |  0.74    Ca    9.3      27 Feb 2023 08:00  Phos  4.1     02-27  Mg     2.00     02-27      PT/INR - ( 27 Feb 2023 08:00 )   PT: 15.2 sec;   INR: 1.31 ratio         PTT - ( 27 Feb 2023 08:00 )  PTT:30.4 sec      RADIOLOGY & ADDITIONAL STUDIES

## 2023-02-27 NOTE — DIETITIAN INITIAL EVALUATION ADULT - PERTINENT MEDS FT
MEDICATIONS  (STANDING):  ampicillin/sulbactam  IVPB 3 Gram(s) IV Intermittent every 6 hours  chlorhexidine 2% Cloths 1 Application(s) Topical daily  doxycycline monohydrate Capsule 100 milliGRAM(s) Oral every 12 hours  enoxaparin Injectable 40 milliGRAM(s) SubCutaneous every 24 hours  influenza   Vaccine 0.5 milliLiter(s) IntraMuscular once  lactated ringers. 1000 milliLiter(s) (115 mL/Hr) IV Continuous <Continuous>  magnesium hydroxide Suspension 30 milliLiter(s) Oral every 6 hours    MEDICATIONS  (PRN):  acetaminophen     Tablet .. 650 milliGRAM(s) Oral every 6 hours PRN Temp greater or equal to 38.5C (101.3F), Mild Pain (1 - 3), Moderate Pain (4 - 6)  HYDROmorphone  Injectable 0.5 milliGRAM(s) IV Push every 10 minutes PRN Moderate Pain (4 - 6)  naloxone Injectable 0.1 milliGRAM(s) IV Push every 3 minutes PRN For ANY of the following changes in patient status:  A. RR LESS THAN 10 breaths per minute, B. Oxygen saturation LESS THAN 90%, C. Sedation score of 6  ondansetron Injectable 4 milliGRAM(s) IV Push once PRN Nausea and/or Vomiting  oxyCODONE    IR 5 milliGRAM(s) Oral every 3 hours PRN Moderate- Severe Pain (4 - 10)

## 2023-02-27 NOTE — DIETITIAN INITIAL EVALUATION ADULT - ORAL INTAKE PTA/DIET HISTORY
Patient reports good po intake and po intake usually but started to gradually decline over last 1 month. He also endorsed weight loss during this time period. Usual weight reported to be 190lbs and now around 160lbs.   Per [facility] HIE: 88.4kg/194lbs (8/25/22), 75.3kg/165.6lbs (1/28/23), 74.2kg/163lbs (2/27/23).  This is suggestive of significant 31lbs/16% weight loss over last 6 months.    Patient reports good po intake and po intake usually but started to gradually decline over >1 month. He also endorsed weight loss during this time period. Usual weight reported to be 190lbs and now around 160lbs.   Per [facility] HIE: 88.4kg/194lbs (8/25/22), 75.3kg/165.6lbs (1/28/23), 74.2kg/163lbs (2/27/23).  This is suggestive of significant 31lbs/16% weight loss over last 6 months.

## 2023-02-27 NOTE — DIETITIAN NUTRITION RISK NOTIFICATION - TREATMENT: THE FOLLOWING DIET HAS BEEN RECOMMENDED
Diet, NPO after Midnight:      NPO Start Date: 26-Feb-2023,   NPO Start Time: 23:59 (02-26-23 @ 11:28) [Active]  Diet, Regular (02-20-23 @ 17:52) [Active]

## 2023-02-27 NOTE — PROGRESS NOTE ADULT - SUBJECTIVE AND OBJECTIVE BOX
Plastic Surgery Progress Note    Subjective:     Patient seen and examined at bedside. Patient without complaints this AM. Denies N/V/F/C.     OBJECTIVE:     T(C): 36.7 (02-27-23 @ 02:00), Max: 37.1 (02-26-23 @ 21:34)  HR: 95 (02-27-23 @ 02:00) (95 - 114)  BP: 123/83 (02-27-23 @ 02:00) (100/65 - 123/88)  RR: 18 (02-27-23 @ 02:00) (18 - 20)  SpO2: 100% (02-27-23 @ 02:00) (98% - 100%)  Wt(kg): --    I&O's Detail    25 Feb 2023 07:01  -  26 Feb 2023 07:00  --------------------------------------------------------  IN:    IV PiggyBack: 400 mL    Oral Fluid: 1920 mL  Total IN: 2320 mL    OUT:    Voided (mL): 2500 mL  Total OUT: 2500 mL    Total NET: -180 mL      26 Feb 2023 07:01  -  27 Feb 2023 06:54  --------------------------------------------------------  IN:    IV PiggyBack: 300 mL  Total IN: 300 mL    OUT:    Oral Fluid: 0 mL    Voided (mL): 1600 mL  Total OUT: 1600 mL    Total NET: -1300 mL          PHYSICAL EXAM:  General: A&Ox3, NAD  Respiratory: Equal bilateral expansion  Abdominal: Soft. NTND.  Extremities: Kerlix wrap in R inner thigh, wound underneath is packed loosely with plain packing (replaced at bedside) w/ allevyn over packing. R/L open gluteal wounds w/ fibrinous exudate vs purulent drainage, dressing changed    MEDICATIONS  (STANDING):  ampicillin/sulbactam  IVPB 3 Gram(s) IV Intermittent every 6 hours  chlorhexidine 2% Cloths 1 Application(s) Topical daily  doxycycline monohydrate Capsule 100 milliGRAM(s) Oral every 12 hours  enoxaparin Injectable 40 milliGRAM(s) SubCutaneous every 24 hours  influenza   Vaccine 0.5 milliLiter(s) IntraMuscular once  lactated ringers. 1000 milliLiter(s) (115 mL/Hr) IV Continuous <Continuous>  magnesium hydroxide Suspension 30 milliLiter(s) Oral every 6 hours    MEDICATIONS  (PRN):  acetaminophen     Tablet .. 650 milliGRAM(s) Oral every 6 hours PRN Temp greater or equal to 38.5C (101.3F), Mild Pain (1 - 3), Moderate Pain (4 - 6)  naloxone Injectable 0.1 milliGRAM(s) IV Push every 3 minutes PRN For ANY of the following changes in patient status:  A. RR LESS THAN 10 breaths per minute, B. Oxygen saturation LESS THAN 90%, C. Sedation score of 6  oxyCODONE    IR 5 milliGRAM(s) Oral every 3 hours PRN Moderate- Severe Pain (4 - 10)      LABS:                          8.1    13.90 )-----------( 543      ( 26 Feb 2023 07:00 )             27.4     02-26    132<L>  |  98  |  11  ----------------------------<  86  4.1   |  25  |  0.66    Ca    9.3      26 Feb 2023 07:00  Phos  4.5     02-26  Mg     1.80     02-26

## 2023-02-27 NOTE — BRIEF OPERATIVE NOTE - OPERATION/FINDINGS
Surgical removal of moderate to severe hidradenitis suppurativa lesions on R buttock, partial L buttock adjacent to gluteal cleft, and R inner thigh.     R inner thigh wound packed with gauze and wrapped in kerlix. Gluteal incisions covered in adaptic, gauze, abd pad and secured with tape.
Active HS lesions around the previously debrided area dressed with aquacel, abd and foam tape, unroofed lesions on left hip, closed with chromic.

## 2023-02-27 NOTE — PROGRESS NOTE ADULT - ASSESSMENT
32-year-old male s/p surgical resection of multiple hidradenitis suppurativa lesions on bilateral buttocks (more extensive on right side) and right inner thigh on 2/21.      Plan:  - Diet: regular, NPO at midnight  - Activity: bedrest, off load pressure from right side  - Pain medication: tylenol, oxy  - daily dressing changes (packed gauze & kerlix wrap in R inner thigh; Adaptic, gauze, abd pad taped in place over buttocks)  - c/w abx  - plastic surgery to take patient to the OR today for further debridement, and assume care of patient    Team B, [id number].

## 2023-02-27 NOTE — BRIEF OPERATIVE NOTE - NSICDXBRIEFPOSTOP_GEN_ALL_CORE_FT
POST-OP DIAGNOSIS:  Hidradenitis suppurativa 21-Feb-2023 22:05:35  Amanda Sánchez  
POST-OP DIAGNOSIS:  Hidradenitis suppurativa 21-Feb-2023 22:05:35  Amanda Sánchez

## 2023-02-27 NOTE — BRIEF OPERATIVE NOTE - NSICDXBRIEFPREOP_GEN_ALL_CORE_FT
PRE-OP DIAGNOSIS:  Hidradenitis suppurativa 21-Feb-2023 22:05:24  Amanda Sánchez  
PRE-OP DIAGNOSIS:  Hidradenitis suppurativa 21-Feb-2023 22:05:24  Amanda Sánchez

## 2023-02-27 NOTE — DIETITIAN INITIAL EVALUATION ADULT - OTHER INFO
Patient reports appetite and PO intake improving, consuming >75% of meals now. Family is bringing him PO supplement Ensure Plus from home. Patient denies any nausea/vomiting/diarrhea/constipation or difficulty chewing and swallowing. NKFA. Continue good po intake of nutrient and protein dense foods suggested. Offer PO supplement Ensure plus HP in-house which he was amenable to.

## 2023-02-27 NOTE — ASU PREOP CHECKLIST - SELECT TESTS ORDERED
BMP/CBC/PT/PTT/INR/Type and Screen/EKG/COVID-19 BMP/CBC/PT/PTT/INR/Type and Cross/Type and Screen/EKG/COVID-19 CBC/CMP/PT/PTT/INR/Type and Cross/Type and Screen/EKG/COVID-19

## 2023-02-27 NOTE — DIETITIAN INITIAL EVALUATION ADULT - ORAL NUTRITION SUPPLEMENTS
Recommend add Ensure Enlive 240mls 3x daily (1050kcal, 60g protein) for added calories and protein.

## 2023-02-27 NOTE — DIETITIAN INITIAL EVALUATION ADULT - REASON FOR ADMISSION
Fever due to unspecified condition    32-year-old male s/p surgical resection of multiple hidradenitis suppurativa lesions on bilateral buttocks (more extensive on right side) and right inner thigh on 2/21.  NPO today for further debridement.

## 2023-02-27 NOTE — DIETITIAN INITIAL EVALUATION ADULT - PERTINENT LABORATORY DATA
02-27    135  |  99  |  14  ----------------------------<  102<H>  3.9   |  27  |  0.74    Ca    9.3      27 Feb 2023 08:00  Phos  4.1     02-27  Mg     2.00     02-27

## 2023-02-27 NOTE — PROGRESS NOTE ADULT - ASSESSMENT
32y Male s/p surgical resection of multiple hidradenitis suppurativa lesions on bilateral buttocks (more extensive on right side) and right inner thigh on 2/21.      Plan:  - Diet: regular, NPO at midnight for debridement today  - Activity: bedrest, off load pressure from right side  - Pain medication: tylenol, oxy  - c/w abx      Plastic Surgery  [id number]

## 2023-02-28 LAB
NIGHT BLUE STAIN TISS: SIGNIFICANT CHANGE UP
SPECIMEN SOURCE: SIGNIFICANT CHANGE UP

## 2023-02-28 PROCEDURE — 99232 SBSQ HOSP IP/OBS MODERATE 35: CPT

## 2023-02-28 RX ORDER — OXYCODONE HYDROCHLORIDE 5 MG/1
5 TABLET ORAL
Refills: 0 | Status: DISCONTINUED | OUTPATIENT
Start: 2023-02-28 | End: 2023-03-02

## 2023-02-28 RX ORDER — IBUPROFEN 200 MG
400 TABLET ORAL EVERY 6 HOURS
Refills: 0 | Status: DISCONTINUED | OUTPATIENT
Start: 2023-02-28 | End: 2023-03-08

## 2023-02-28 RX ORDER — HYDROMORPHONE HYDROCHLORIDE 2 MG/ML
0.3 INJECTION INTRAMUSCULAR; INTRAVENOUS; SUBCUTANEOUS
Refills: 0 | Status: DISCONTINUED | OUTPATIENT
Start: 2023-02-28 | End: 2023-03-07

## 2023-02-28 RX ORDER — ACETAMINOPHEN 500 MG
975 TABLET ORAL EVERY 6 HOURS
Refills: 0 | Status: DISCONTINUED | OUTPATIENT
Start: 2023-02-28 | End: 2023-03-08

## 2023-02-28 RX ORDER — ENOXAPARIN SODIUM 100 MG/ML
40 INJECTION SUBCUTANEOUS EVERY 24 HOURS
Refills: 0 | Status: DISCONTINUED | OUTPATIENT
Start: 2023-02-28 | End: 2023-03-08

## 2023-02-28 RX ADMIN — Medication 1 TABLET(S): at 22:06

## 2023-02-28 RX ADMIN — Medication 100 MILLIGRAM(S): at 17:40

## 2023-02-28 RX ADMIN — HYDROMORPHONE HYDROCHLORIDE 0.3 MILLIGRAM(S): 2 INJECTION INTRAMUSCULAR; INTRAVENOUS; SUBCUTANEOUS at 18:15

## 2023-02-28 RX ADMIN — CHLORHEXIDINE GLUCONATE 1 APPLICATION(S): 213 SOLUTION TOPICAL at 12:49

## 2023-02-28 RX ADMIN — HYDROMORPHONE HYDROCHLORIDE 30 MILLILITER(S): 2 INJECTION INTRAMUSCULAR; INTRAVENOUS; SUBCUTANEOUS at 08:45

## 2023-02-28 RX ADMIN — SODIUM CHLORIDE 75 MILLILITER(S): 9 INJECTION, SOLUTION INTRAVENOUS at 06:45

## 2023-02-28 RX ADMIN — Medication 100 MILLIGRAM(S): at 05:21

## 2023-02-28 RX ADMIN — HYDROMORPHONE HYDROCHLORIDE 0.3 MILLIGRAM(S): 2 INJECTION INTRAMUSCULAR; INTRAVENOUS; SUBCUTANEOUS at 17:43

## 2023-02-28 NOTE — PROGRESS NOTE ADULT - ATTENDING COMMENTS
I agree with excellent note from resident
Above noted. agree with the assessment and planned treatment.

## 2023-02-28 NOTE — PROGRESS NOTE ADULT - SUBJECTIVE AND OBJECTIVE BOX
32yPatient is a 32y old  Male who presents with a chief complaint of sepsis (28 Feb 2023 11:01)      Interval history:  Afebrile, s/p repeat debridement, no new complains.     Allergies:   No Known Allergies    Antimicrobials:    amoxicillin  875 milliGRAM(s)/clavulanate 1 Tablet(s) Oral every 12 hours  doxycycline monohydrate Capsule 100 milliGRAM(s) Oral every 12 hours    REVIEW OF SYSTEMS:  No chest pain   No abdominal pain  No dysuria   No rash.       Vital Signs Last 24 Hrs  T(C): 36.5 (02-28-23 @ 22:00), Max: 36.9 (02-28-23 @ 09:56)  T(F): 97.7 (02-28-23 @ 22:00), Max: 98.4 (02-28-23 @ 09:56)  HR: 80 (02-28-23 @ 22:00) (80 - 99)  BP: 115/74 (02-28-23 @ 22:00) (112/67 - 128/81)  BP(mean): --  RR: 16 (02-28-23 @ 22:00) (16 - 18)  SpO2: 100% (02-28-23 @ 22:00) (99% - 100%)      PHYSICAL EXAM:  Pt in no acute distress, alert, awake.   breathing comfortably   non distended abdomen  large area of ulcer now with clean base  no edema LE   no phlebitis                             8.7    12.88 )-----------( 574      ( 27 Feb 2023 08:00 )             28.9   02-27    135  |  99  |  14  ----------------------------<  102<H>  3.9   |  27  |  0.74    Ca    9.3      27 Feb 2023 08:00  Phos  4.1     02-27  Mg     2.00     02-27        Culture - Acid Fast - Other w/Smear (collected 27 Feb 2023 17:28)  Source: .Other LEFT BUTTOCK    Culture - Fungal, Other (collected 27 Feb 2023 17:28)  Source: .Other LEFT BUTTOCK  Preliminary Report (28 Feb 2023 07:21):    Testing in progress    Culture - Abscess with Gram Stain (collected 27 Feb 2023 17:28)  Source: .Abscess LEFT BUTTOCK  Preliminary Report (28 Feb 2023 15:53):    No growth

## 2023-02-28 NOTE — PROGRESS NOTE ADULT - ASSESSMENT
32 y,o with hidradenitis suppurativa on Humira, who presents with bilateral ankle pain and increased discharge from hidradenitis. Patient report started to have bilateral ankle pain on Monday, denies any trauma, falls or twisted ankle. Pain is ache, able to move and walk, pain improved with tylenol. Also endorses few days of worsening discharge from buttocks lesions, discharge described as yellow and dark. He denies any pain, states area is not as swollen as other flare episodes. He denies any fever, chills, sick contacts, nausea/vomiting/diarrhea. Has been on Humira per OP Dermatology, 40 mg weekly, missed last week dose and was due for it tomorrow 2/20 (Dr. Mobley OP derm). In the Emergency room patient found to be febrile to 101.9, , leukocytosis of 22, x-ray of ankle without fracture or acute pathology, CT abdomen showed extensive skin thickening/soft tissue inflammation laterally along the gluteal fold. Small areas of thin hypodense collection with variable degrees of internal gas may represent superficial abscesses. Patient received vanc, clinda and zosyn and ID consulted.    WORKUP  CXR, RVP and UA negative  CRP: 188.0 and  mm/hr (02-18   CT Pelvis w/ IV Cont (02.18.): Extensive skin thickening/soft tissue inflammation laterally along the gluteal fold. Small areas of thin hypodense collection with variable  degrees of internal gas may represent superficial abscesses. Presence of subcutaneous air in this patient may be secondary to recent  instrumentation/procedure, however gas-forming bacterial infection cannot  be excluded on the basis of this study alone. Bulky, rounded lymph nodes, similar in appearance to that seen 2017.   x-ray of ankle without fracture or acute pathology,       DIAGNOSIS and IMPRESSION  Gluteal abscess now s/p sx   immunosuppressed state   fever, leucocytosis, resolved           RECOMMENDATIONS  Blood cx in lab -> NTD   Gluteal abscess with polymicrobial growth.   now pt s/p source control   s/p I&D x 2; f/u OR cx, prelim NTD    c/w Doxy 100mg BID PO and augmentin   trend cbc for leucocytosis, downtrended   c/w wound care.   plan for 7 days of therapy post sx.    Will sign off, please call with questions.     Dwight Mike  Please contact through MS Teams   If no response or past 5 pm/weekend call [phone].

## 2023-02-28 NOTE — PROGRESS NOTE ADULT - ASSESSMENT
32y Male s/p surgical resection of multiple hidradenitis suppurativa lesions on bilateral buttocks (more extensive on right side) and right inner thigh on 2/21.      Plan:  - Diet: regular   - Activity: bedrest, off load pressure from right side - will plan for ambulation starting tomorrow 3/1/23  - dressing change after bowel movement   - Pain medication: tylenol, PCA  - f/u OR culture   - c/w abx      Plastic Surgery  [id number]

## 2023-02-28 NOTE — PROGRESS NOTE ADULT - SUBJECTIVE AND OBJECTIVE BOX
Anesthesia Pain Management Service    SUBJECTIVE: Patient is doing well with IV PCA and no significant problems reported. Pt comfortable, tolerating diet.    Pain Scale Score	At rest: 4/10	With Activity: ___ 	[X ] Refer to charted pain scores    THERAPY:    [ ] IV PCA Morphine		[ ] 5 mg/mL	[ ] 1 mg/mL  [X ] IV PCA Hydromorphone	[ ] 5 mg/mL	[X ] 1 mg/mL  [ ] IV PCA Fentanyl		[ ] 50 micrograms/mL    Demand dose __0.2_ lockout __6_ (minutes) Continuous Rate _0__ Total: 1 mg used (in past 24 hrs)      MEDICATIONS  (STANDING):  acetaminophen     Tablet .. 975 milliGRAM(s) Oral every 6 hours  chlorhexidine 2% Cloths 1 Application(s) Topical daily  doxycycline monohydrate Capsule 100 milliGRAM(s) Oral every 12 hours  enoxaparin Injectable 40 milliGRAM(s) SubCutaneous every 24 hours  ibuprofen  Tablet. 400 milliGRAM(s) Oral every 6 hours  influenza   Vaccine 0.5 milliLiter(s) IntraMuscular once  magnesium hydroxide Suspension 30 milliLiter(s) Oral every 6 hours    MEDICATIONS  (PRN):  naloxone Injectable 0.1 milliGRAM(s) IV Push every 3 minutes PRN For ANY of the following changes in patient status:  A. RR LESS THAN 10 breaths per minute, B. Oxygen saturation LESS THAN 90%, C. Sedation score of 6      OBJECTIVE: Pt appears comfortable, resting in bed.    Sedation Score:	[ X] Alert	[ ] Drowsy 	[ ] Arousable	[ ] Asleep	[ ] Unresponsive    Side Effects:	[X ] None	[ ] Nausea	[ ] Vomiting	[ ] Pruritus  		[ ] Other:    Vital Signs Last 24 Hrs  T(C): 36.9 (28 Feb 2023 09:56), Max: 37 (27 Feb 2023 11:05)  T(F): 98.4 (28 Feb 2023 09:56), Max: 98.6 (27 Feb 2023 11:05)  HR: 83 (28 Feb 2023 09:56) (80 - 126)  BP: 120/74 (28 Feb 2023 09:56) (107/79 - 135/90)  BP(mean): 100 (27 Feb 2023 17:00) (85 - 100)  RR: 18 (28 Feb 2023 09:56) (11 - 21)  SpO2: 100% (28 Feb 2023 09:56) (98% - 100%)    Parameters below as of 28 Feb 2023 09:56  Patient On (Oxygen Delivery Method): room air        ASSESSMENT/ PLAN    Therapy to  be:	[ ] Continue   [ X] Discontinued   [X ] Change to prn Analgesics    Documentation and Verification of current medications:   [X] Done	[ ] Not done, not elligible    Comments: PRN Oral/IV opioids and/or Adjuvant non-opioid medication to be ordered at this point.  - Recommend d/c IV PCA and transition to PRN.  - Pain service signing off, please reconsult as needed.    Progress Note written now but Patient was seen earlier.

## 2023-02-28 NOTE — PROGRESS NOTE ADULT - SUBJECTIVE AND OBJECTIVE BOX
Plastic Surgery Progress Note    Subjective:     Patient seen and examined at bedside. Patient without complaints this AM. Denies N/V/F/C. Recovering from debridement yesterday.     OBJECTIVE:       T(C): 36.7 (02-28-23 @ 06:00), Max: 37 (02-27-23 @ 11:05)  T(F): 98 (02-28-23 @ 06:00), Max: 98.6 (02-27-23 @ 11:05)  HR: 84 (02-28-23 @ 06:00) (80 - 126)  BP: 128/81 (02-28-23 @ 06:00) (107/79 - 135/90)  RR: 18 (02-28-23 @ 06:00) (11 - 21)  SpO2: 100% (02-28-23 @ 06:00) (98% - 100%)      27 Feb 2023 07:01  -  28 Feb 2023 07:00  --------------------------------------------------------  IN:    Lactated Ringers: 1365 mL    Oral Fluid: 50 mL  Total IN: 1415 mL    OUT:    Blood Loss (mL): 0 mL    Voided (mL): 2200 mL  Total OUT: 2200 mL    Total NET: -785 mL      PHYSICAL EXAM:  General: A&Ox3, NAD  Respiratory: Equal bilateral expansion  Abdominal: Soft. NTND.  Extremities: Dressing over the thighs and gluteal region intact.     MEDICATIONS  (STANDING):  ampicillin/sulbactam  IVPB 3 Gram(s) IV Intermittent every 6 hours  chlorhexidine 2% Cloths 1 Application(s) Topical daily  doxycycline monohydrate Capsule 100 milliGRAM(s) Oral every 12 hours  enoxaparin Injectable 40 milliGRAM(s) SubCutaneous every 24 hours  influenza   Vaccine 0.5 milliLiter(s) IntraMuscular once  lactated ringers. 1000 milliLiter(s) (115 mL/Hr) IV Continuous <Continuous>  magnesium hydroxide Suspension 30 milliLiter(s) Oral every 6 hours    MEDICATIONS  (PRN):  acetaminophen     Tablet .. 650 milliGRAM(s) Oral every 6 hours PRN Temp greater or equal to 38.5C (101.3F), Mild Pain (1 - 3), Moderate Pain (4 - 6)  naloxone Injectable 0.1 milliGRAM(s) IV Push every 3 minutes PRN For ANY of the following changes in patient status:  A. RR LESS THAN 10 breaths per minute, B. Oxygen saturation LESS THAN 90%, C. Sedation score of 6  oxyCODONE    IR 5 milliGRAM(s) Oral every 3 hours PRN Moderate- Severe Pain (4 - 10)      LABS:                                CAPILLARY BLOOD GLUCOSE

## 2023-03-01 LAB — SURGICAL PATHOLOGY STUDY: SIGNIFICANT CHANGE UP

## 2023-03-01 RX ADMIN — MAGNESIUM HYDROXIDE 30 MILLILITER(S): 400 TABLET, CHEWABLE ORAL at 12:55

## 2023-03-01 RX ADMIN — Medication 1 TABLET(S): at 21:45

## 2023-03-01 RX ADMIN — Medication 1 TABLET(S): at 10:12

## 2023-03-01 RX ADMIN — Medication 400 MILLIGRAM(S): at 10:11

## 2023-03-01 RX ADMIN — ENOXAPARIN SODIUM 40 MILLIGRAM(S): 100 INJECTION SUBCUTANEOUS at 05:18

## 2023-03-01 RX ADMIN — Medication 400 MILLIGRAM(S): at 22:15

## 2023-03-01 RX ADMIN — Medication 100 MILLIGRAM(S): at 18:12

## 2023-03-01 RX ADMIN — Medication 975 MILLIGRAM(S): at 18:12

## 2023-03-01 RX ADMIN — Medication 100 MILLIGRAM(S): at 05:19

## 2023-03-01 RX ADMIN — Medication 975 MILLIGRAM(S): at 12:55

## 2023-03-01 RX ADMIN — Medication 975 MILLIGRAM(S): at 05:18

## 2023-03-01 RX ADMIN — Medication 400 MILLIGRAM(S): at 21:45

## 2023-03-01 RX ADMIN — MAGNESIUM HYDROXIDE 30 MILLILITER(S): 400 TABLET, CHEWABLE ORAL at 18:12

## 2023-03-01 RX ADMIN — Medication 975 MILLIGRAM(S): at 14:00

## 2023-03-01 RX ADMIN — CHLORHEXIDINE GLUCONATE 1 APPLICATION(S): 213 SOLUTION TOPICAL at 18:13

## 2023-03-01 RX ADMIN — Medication 975 MILLIGRAM(S): at 05:48

## 2023-03-01 RX ADMIN — Medication 400 MILLIGRAM(S): at 11:15

## 2023-03-01 NOTE — PROGRESS NOTE ADULT - NUTRITIONAL ASSESSMENT
This patient has been assessed with a concern for Malnutrition and has been determined to have a diagnosis/diagnoses of Severe protein-calorie malnutrition.    This patient is being managed with:   Diet Regular-  Entered: Feb 20 2023 10:35AM

## 2023-03-01 NOTE — PROGRESS NOTE ADULT - SUBJECTIVE AND OBJECTIVE BOX
Plastic Surgery Progress Note (pg [facility]: [id number], [facility]: [phone])    SUBJECTIVE  The patient was seen and examined. No acute events overnight.    OBJECTIVE  ___________________________________________________  VITAL SIGNS / I&O's   Vital Signs Last 24 Hrs  T(C): 36.7 (01 Mar 2023 06:00), Max: 36.9 (28 Feb 2023 09:56)  T(F): 98 (01 Mar 2023 06:00), Max: 98.4 (28 Feb 2023 09:56)  HR: 86 (01 Mar 2023 06:00) (80 - 99)  BP: 100/60 (01 Mar 2023 06:00) (100/60 - 120/74)  BP(mean): --  RR: 17 (01 Mar 2023 06:00) (16 - 18)  SpO2: 100% (01 Mar 2023 06:00) (99% - 100%)    Parameters below as of 01 Mar 2023 06:00  Patient On (Oxygen Delivery Method): room air      28 Feb 2023 07:01  -  01 Mar 2023 07:00  --------------------------------------------------------  IN:    Oral Fluid: 1560 mL  Total IN: 1560 mL    OUT:    Voided (mL): 3190 mL  Total OUT: 3190 mL    Total NET: -1630 mL  ___________________________________________________  PHYSICAL EXAM:  General: A&Ox3, NAD  Respiratory: Equal bilateral expansion  Abdominal: Soft. NTND.  Extremities: Dressing over the thighs and gluteal region intact.     ___________________________________________________  LABS                        8.7    12.88 )-----------( 574      ( 27 Feb 2023 08:00 )             28.9     27 Feb 2023 08:00    135    |  99     |  14     ----------------------------<  102    3.9     |  27     |  0.74     Ca    9.3        27 Feb 2023 08:00  Phos  4.1       27 Feb 2023 08:00  Mg     2.00      27 Feb 2023 08:00      PT/INR - ( 27 Feb 2023 08:00 )   PT: 15.2 sec;   INR: 1.31 ratio         PTT - ( 27 Feb 2023 08:00 )  PTT:30.4 sec  CAPILLARY BLOOD GLUCOSE  ___________________________________________________  MICRO  Recent Cultures:  Specimen Source: .Abscess LEFT BUTTOCK, 02-27 @ 17:28; Results   No growth; Gram Stain:   Few polymorphonuclear leukocytes per low power field  No organisms seen per oil power field; Organism: --    ___________________________________________________  MEDICATIONS  (STANDING):  acetaminophen     Tablet .. 975 milliGRAM(s) Oral every 6 hours  amoxicillin  875 milliGRAM(s)/clavulanate 1 Tablet(s) Oral every 12 hours  chlorhexidine 2% Cloths 1 Application(s) Topical daily  doxycycline monohydrate Capsule 100 milliGRAM(s) Oral every 12 hours  enoxaparin Injectable 40 milliGRAM(s) SubCutaneous every 24 hours  ibuprofen  Tablet. 400 milliGRAM(s) Oral every 6 hours  influenza   Vaccine 0.5 milliLiter(s) IntraMuscular once  magnesium hydroxide Suspension 30 milliLiter(s) Oral every 6 hours    MEDICATIONS  (PRN):  HYDROmorphone  Injectable 0.3 milliGRAM(s) IV Push every 3 hours PRN Severe Breakthough Pain (7 - 10)  naloxone Injectable 0.1 milliGRAM(s) IV Push every 3 minutes PRN For ANY of the following changes in patient status:  A. RR LESS THAN 10 breaths per minute, B. Oxygen saturation LESS THAN 90%, C. Sedation score of 6  oxyCODONE    IR 5 milliGRAM(s) Oral every 3 hours PRN Moderate- Severe Pain (4 - 10)

## 2023-03-02 RX ADMIN — ENOXAPARIN SODIUM 40 MILLIGRAM(S): 100 INJECTION SUBCUTANEOUS at 05:31

## 2023-03-02 RX ADMIN — CHLORHEXIDINE GLUCONATE 1 APPLICATION(S): 213 SOLUTION TOPICAL at 14:57

## 2023-03-02 RX ADMIN — MAGNESIUM HYDROXIDE 30 MILLILITER(S): 400 TABLET, CHEWABLE ORAL at 05:31

## 2023-03-02 RX ADMIN — Medication 400 MILLIGRAM(S): at 22:36

## 2023-03-02 RX ADMIN — Medication 975 MILLIGRAM(S): at 00:04

## 2023-03-02 RX ADMIN — Medication 100 MILLIGRAM(S): at 05:31

## 2023-03-02 RX ADMIN — Medication 975 MILLIGRAM(S): at 15:21

## 2023-03-02 RX ADMIN — Medication 975 MILLIGRAM(S): at 06:02

## 2023-03-02 RX ADMIN — Medication 1 TABLET(S): at 14:56

## 2023-03-02 RX ADMIN — Medication 975 MILLIGRAM(S): at 05:32

## 2023-03-02 RX ADMIN — Medication 1 TABLET(S): at 22:35

## 2023-03-02 RX ADMIN — Medication 975 MILLIGRAM(S): at 00:34

## 2023-03-02 RX ADMIN — OXYCODONE HYDROCHLORIDE 5 MILLIGRAM(S): 5 TABLET ORAL at 07:05

## 2023-03-02 RX ADMIN — OXYCODONE HYDROCHLORIDE 5 MILLIGRAM(S): 5 TABLET ORAL at 07:35

## 2023-03-02 RX ADMIN — Medication 400 MILLIGRAM(S): at 23:06

## 2023-03-02 RX ADMIN — Medication 975 MILLIGRAM(S): at 14:51

## 2023-03-02 NOTE — PROGRESS NOTE ADULT - SUBJECTIVE AND OBJECTIVE BOX
Plastic Surgery Progress Note    Subjective:     Patient seen and examined at bedside. Patient without complaints this AM. Denies N/V/F/C.     OBJECTIVE:     T(C): 36.4 (03-02-23 @ 06:00), Max: 37.1 (03-01-23 @ 15:00)  HR: 79 (03-02-23 @ 06:00) (72 - 90)  BP: 125/80 (03-02-23 @ 06:00) (108/65 - 133/85)  RR: 17 (03-02-23 @ 06:00) (16 - 21)  SpO2: 100% (03-02-23 @ 06:00) (98% - 100%)  Wt(kg): --    I&O's Detail    01 Mar 2023 07:01  -  02 Mar 2023 07:00  --------------------------------------------------------  IN:    Oral Fluid: 480 mL  Total IN: 480 mL    OUT:    Voided (mL): 1950 mL  Total OUT: 1950 mL    Total NET: -1470 mL    PHYSICAL EXAM:  GENERAL: NAD, lying in bed comfortably  HEAD:  Atraumatic, Normocephalic  ENT: Moist mucous membranes  CHEST/LUNG: Unlabored respirations  ABDOMEN: Soft, Nontender, Nondistended.   NERVOUS SYSTEM:  Alert & Oriented X3, speech clear.   SKIN: No rashes or lesions  EXT: Dressing over the thighs and gluteal region intact.     MEDICATIONS  (STANDING):  acetaminophen     Tablet .. 975 milliGRAM(s) Oral every 6 hours  amoxicillin  875 milliGRAM(s)/clavulanate 1 Tablet(s) Oral every 12 hours  chlorhexidine 2% Cloths 1 Application(s) Topical daily  doxycycline monohydrate Capsule 100 milliGRAM(s) Oral every 12 hours  enoxaparin Injectable 40 milliGRAM(s) SubCutaneous every 24 hours  ibuprofen  Tablet. 400 milliGRAM(s) Oral every 6 hours  influenza   Vaccine 0.5 milliLiter(s) IntraMuscular once  magnesium hydroxide Suspension 30 milliLiter(s) Oral every 6 hours    MEDICATIONS  (PRN):  HYDROmorphone  Injectable 0.3 milliGRAM(s) IV Push every 3 hours PRN Severe Breakthough Pain (7 - 10)  naloxone Injectable 0.1 milliGRAM(s) IV Push every 3 minutes PRN For ANY of the following changes in patient status:  A. RR LESS THAN 10 breaths per minute, B. Oxygen saturation LESS THAN 90%, C. Sedation score of 6  oxyCODONE    IR 5 milliGRAM(s) Oral every 3 hours PRN Moderate- Severe Pain (4 - 10)      LABS:

## 2023-03-02 NOTE — PHYSICAL THERAPY INITIAL EVALUATION ADULT - ADDITIONAL COMMENTS
Pt lives in an apartment with 1 step to enter and elevator access.  Pt has been ambulating with a cane for the past two weeks, previously an independent ambulator.  Pt is independent with ADLs.  Pt bathes using a tub.  Pt has not had any recent falls. Pt lives in an apartment with 1 step to enter and elevator access.  Pt has been ambulating with a cane for the past two weeks, previously an independent ambulator.  Pt is independent with ADLs.  Pt bathes using a tub.  Per chart review, pt noted to have event today where pt was lowered down to the floor, denied injury or trauma. Per provider, pt cleared for PT eval.  Pt denies any other recent falls.

## 2023-03-02 NOTE — PHYSICAL THERAPY INITIAL EVALUATION ADULT - PERTINENT HX OF CURRENT PROBLEM, REHAB EVAL
Pt is a 32 year old male presenting with bilateral ankle pain and increased discharge from buttocks lesion secondary to hidradenitis suppurativa.  Pt admitted for sepsis likely due to buttock superficial abscesses.   CT abdomen showed extensive skin thickening/soft tissue inflammation laterally along the gluteal fold.  Chest X-ray negative.  PMH listed below. Pt is a 32 year old male presenting with bilateral ankle pain and increased discharge from buttocks lesion secondary to hidradenitis suppurativa.  Pt admitted for sepsis likely due to buttock superficial abscesses.   Pt s/p Surgical removal of skin and subcutaneous tissue of perianal region with simple or intermediate repair for hidradenitis.  CT abdomen showed extensive skin thickening/soft tissue inflammation laterally along the gluteal fold.  Chest X-ray negative.  PMH listed below. Pt is a 32 year old male presenting with bilateral ankle pain and increased discharge from buttocks lesion secondary to hidradenitis suppurativa.  Pt admitted for sepsis likely due to buttock superficial abscesses.   CT abdomen showed extensive skin thickening/soft tissue inflammation laterally along the gluteal fold.  Pt s/p Surgical removal of skin and subcutaneous tissue of perianal region with simple or intermediate repair for hidradenitis.  Chest X-ray negative.  PMH listed below.

## 2023-03-02 NOTE — PHYSICAL THERAPY INITIAL EVALUATION ADULT - BED MOBILITY LIMITATIONS, REHAB EVAL
Unable to assume sitting due to buttocks dressing./impaired ability to control trunk for mobility Unable to assume sitting due to fear of onset of pain./impaired ability to control trunk for mobility

## 2023-03-02 NOTE — PHYSICAL THERAPY INITIAL EVALUATION ADULT - LEVEL OF INDEPENDENCE: SIT/SUPINE, REHAB EVAL
Pt transferred stand to supine, to avoid WB on buttocks.  Pt assumed standing, lowered self down on to elevated bed to sidelying using bedrails, and raised bilateral LE to bed./contact guard Pt transferred stand to prone, to avoid WB on buttocks.  Pt assumed standing, lowered self down on to elevated bed to sidelying using bedrails, and raised bilateral LE to bed./contact guard Pt transferred stand to prone, to avoid WB on buttocks:  Pt started standing next to bed, lowered self down facing bed, then raised bilateral LE onto bed./contact guard

## 2023-03-02 NOTE — PHYSICAL THERAPY INITIAL EVALUATION ADULT - LEVEL OF INDEPENDENCE: SUPINE/SIT, REHAB EVAL
Pt performed supine to stand transfer, to avoid WB on buttocks.  Pt transferred to sidelying, used bilateral UE to push off of bed rails and shift weight to extended LE dangling off side to bed to assume standing./contact guard Pt performed supine to stand transfer, avoiding as much WB on buttocks as possible:  Pt first transferred to sidelying, then prone and used bilateral UE to push off of bed posteriorly to assume standing./contact guard

## 2023-03-02 NOTE — PHYSICAL THERAPY INITIAL EVALUATION ADULT - GENERAL OBSERVATIONS, REHAB EVAL
Pt received semi-supine in bed, in NAD.  Pt A&Ox4.  Pt agreeable to participate in PT evaluation.  Pt left side-lying in bed, all lines/tube intact, call bell within reach.

## 2023-03-02 NOTE — PROGRESS NOTE ADULT - ASSESSMENT
32y Male s/p surgical resection of multiple hidradenitis suppurativa lesions on bilateral buttocks (more extensive on right side) and right inner thigh on 2/21.      Plan:  - Diet: regular   - Activity: ambulate with assistance  - dressing change after bowel movement and Shower by RN  - Patient must shower every day  - Pain medication: tylenol, PCA  - f/u OR culture   - c/w abx  - Dispo planning    Plastic Surgery  [id number]

## 2023-03-02 NOTE — PHYSICAL THERAPY INITIAL EVALUATION ADULT - PATIENT PROFILE REVIEW, REHAB EVAL
PT evaluate and treat orders received.  Activity Orders: Ambulate as Tolerated.  Consulted with ROYAL Floyd, pt cleared for PT evaluation./yes PT evaluate and treat orders received.  Activity Orders: Ambulate as Tolerated.  Consulted with ROYAL Floyd and Karen Cobos (Plastics), pt cleared for PT evaluation./yes

## 2023-03-02 NOTE — CHART NOTE - NSCHARTNOTEFT_GEN_A_CORE
While walking to the bathroom, patient stated he felt weak and needed to sit down however, he was not near his bed, so PCA and patient slowly lowered patient to the ground. Patient was then able to be brought to his bed. Pt and PCA both deny any uncontrolled fall, headstrike or other trauma. Patient denies any headache, dizziness, or lightheadedness. Patient states he hasn't been out of bed a lot since his second procedure.     EXAM:  General: NAD  NEURO: A&Ox4  EXT: No signs of trauma, swelling      Plan:  continue to work to regain strength after lengthy hospitalization While walking to the bathroom, patient stated he felt weak and needed to sit down however, he was not near his bed, so PCA and patient slowly lowered patient to the ground. Patient was then able to be brought to his bed. Pt and PCA both deny any uncontrolled fall, headstrike or other trauma. Patient denies any headache, dizziness, or lightheadedness. Patient states he hasn't been out of bed a lot since his second procedure.     Hemodynamically stable    EXAM:  General: NAD  NEURO: A&Ox4  EXT: No signs of trauma, swelling      Plan:  continue to work to regain strength after lengthy hospitalization

## 2023-03-02 NOTE — PHYSICAL THERAPY INITIAL EVALUATION ADULT - DID THE PATIENT HAVE SURGERY?
n/a 2/21/23 Surgical removal of skin and subcutaneous tissue of perianal region with simple or intermediate repair for hidradenitis.  2/27/23: Active HS lesions around the previously debrided area dressed with aquacel, abd and foam tape, unroofed lesions on left hip, closed with chromic/yes

## 2023-03-03 RX ADMIN — Medication 1 TABLET(S): at 10:47

## 2023-03-03 RX ADMIN — Medication 400 MILLIGRAM(S): at 23:17

## 2023-03-03 RX ADMIN — Medication 400 MILLIGRAM(S): at 23:47

## 2023-03-03 RX ADMIN — Medication 400 MILLIGRAM(S): at 11:15

## 2023-03-03 RX ADMIN — Medication 975 MILLIGRAM(S): at 06:43

## 2023-03-03 RX ADMIN — Medication 100 MILLIGRAM(S): at 18:30

## 2023-03-03 RX ADMIN — Medication 975 MILLIGRAM(S): at 01:23

## 2023-03-03 RX ADMIN — Medication 100 MILLIGRAM(S): at 06:13

## 2023-03-03 RX ADMIN — Medication 400 MILLIGRAM(S): at 10:45

## 2023-03-03 RX ADMIN — Medication 975 MILLIGRAM(S): at 06:13

## 2023-03-03 RX ADMIN — MAGNESIUM HYDROXIDE 30 MILLILITER(S): 400 TABLET, CHEWABLE ORAL at 06:13

## 2023-03-03 RX ADMIN — Medication 1 TABLET(S): at 23:17

## 2023-03-03 RX ADMIN — ENOXAPARIN SODIUM 40 MILLIGRAM(S): 100 INJECTION SUBCUTANEOUS at 06:13

## 2023-03-03 RX ADMIN — Medication 975 MILLIGRAM(S): at 00:53

## 2023-03-03 NOTE — CHART NOTE - NSCHARTNOTEFT_GEN_A_CORE
Patient's humira injection will be held in the immediate outpatient period due to planned return to OR in 2-3 weeks for skin grafting. Patient will not be given a prescription for medical THC from this facility, Patient is able to ambulate as tolerated and does not have any seating precautions, however, should not sit on his buttock if experiencing discomfort.    Karen Cobos MD  Plastic Surgery  [id number]

## 2023-03-03 NOTE — PROVIDER CONTACT NOTE (OTHER) - BACKGROUND
admit: Fever due to unspecified condition; PMH: Hidradenitis Suppurativa
Surgical resection of multiple hidradenitis suppurativa lesions on bilateral buttocks (more extensive on right side) and right inner thigh.

## 2023-03-03 NOTE — PROGRESS NOTE ADULT - SUBJECTIVE AND OBJECTIVE BOX
Plastic Surgery Progress Note    Overnight: Pt had another episode of "feeling weak" however, continued to deny headache, dizziness or lightheadedness. Patient worked with PT yesterday without issues or complains, patient has been hemodynamically stable    Subjective:     Patient seen and examined at bedside. Patient without complaints this AM. Denies N/V/F/C.     OBJECTIVE:     T(C): 36.7 (03-03-23 @ 06:00), Max: 36.9 (03-02-23 @ 18:45)  HR: 92 (03-03-23 @ 06:00) (85 - 111)  BP: 103/67 (03-03-23 @ 06:00) (103/65 - 130/70)  RR: 18 (03-03-23 @ 06:00) (18 - 20)  SpO2: 100% (03-03-23 @ 06:00) (98% - 100%)  Wt(kg): --    I&O's Detail    02 Mar 2023 07:01  -  03 Mar 2023 07:00  --------------------------------------------------------  IN:    Oral Fluid: 1220 mL  Total IN: 1220 mL    OUT:    Voided (mL): 1700 mL    Voided (mL): 450 mL  Total OUT: 2150 mL    Total NET: -930 mL    PHYSICAL EXAM:  GENERAL: NAD, lying in bed comfortably  HEAD:  Atraumatic, Normocephalic  ENT: Moist mucous membranes  CHEST/LUNG: Unlabored respirations  ABDOMEN: Soft, Nontender, Nondistended.   NERVOUS SYSTEM:  Alert & Oriented X3, speech clear.   SKIN: No rashes or lesions  EXT: Dressing over the thighs and gluteal region intact.      NERVOUS SYSTEM:  Alert & Oriented X3, speech clear.   SKIN: No rashes or lesions    MEDICATIONS  (STANDING):  acetaminophen     Tablet .. 975 milliGRAM(s) Oral every 6 hours  amoxicillin  875 milliGRAM(s)/clavulanate 1 Tablet(s) Oral every 12 hours  chlorhexidine 2% Cloths 1 Application(s) Topical daily  doxycycline monohydrate Capsule 100 milliGRAM(s) Oral every 12 hours  enoxaparin Injectable 40 milliGRAM(s) SubCutaneous every 24 hours  ibuprofen  Tablet. 400 milliGRAM(s) Oral every 6 hours  influenza   Vaccine 0.5 milliLiter(s) IntraMuscular once  magnesium hydroxide Suspension 30 milliLiter(s) Oral every 6 hours    MEDICATIONS  (PRN):  HYDROmorphone  Injectable 0.3 milliGRAM(s) IV Push every 3 hours PRN Severe Breakthough Pain (7 - 10)  naloxone Injectable 0.1 milliGRAM(s) IV Push every 3 minutes PRN For ANY of the following changes in patient status:  A. RR LESS THAN 10 breaths per minute, B. Oxygen saturation LESS THAN 90%, C. Sedation score of 6  oxyCODONE    IR 5 milliGRAM(s) Oral every 3 hours PRN Moderate- Severe Pain (4 - 10)      LABS:

## 2023-03-03 NOTE — PROGRESS NOTE ADULT - ASSESSMENT
32y Male s/p surgical resection of multiple hidradenitis suppurativa lesions on bilateral buttocks (more extensive on right side) and right inner thigh on 2/21.      Plan:  - Diet: regular   - Activity: ambulate with assistance  - dressing change after bowel movement and Shower by RN  - Patient must shower every day  - Pain medication: tylenol, PCA  - f/u OR culture   - c/w abx  - Dispo planning    Plastic Surgery  [id number]

## 2023-03-04 LAB
CULTURE RESULTS: NO GROWTH — SIGNIFICANT CHANGE UP
SPECIMEN SOURCE: SIGNIFICANT CHANGE UP

## 2023-03-04 RX ADMIN — Medication 400 MILLIGRAM(S): at 17:39

## 2023-03-04 RX ADMIN — Medication 400 MILLIGRAM(S): at 05:57

## 2023-03-04 RX ADMIN — Medication 100 MILLIGRAM(S): at 05:57

## 2023-03-04 RX ADMIN — MAGNESIUM HYDROXIDE 30 MILLILITER(S): 400 TABLET, CHEWABLE ORAL at 13:41

## 2023-03-04 RX ADMIN — Medication 1 TABLET(S): at 22:27

## 2023-03-04 RX ADMIN — Medication 400 MILLIGRAM(S): at 06:15

## 2023-03-04 RX ADMIN — Medication 400 MILLIGRAM(S): at 18:09

## 2023-03-04 RX ADMIN — Medication 975 MILLIGRAM(S): at 21:08

## 2023-03-04 RX ADMIN — Medication 100 MILLIGRAM(S): at 17:39

## 2023-03-04 RX ADMIN — MAGNESIUM HYDROXIDE 30 MILLILITER(S): 400 TABLET, CHEWABLE ORAL at 17:40

## 2023-03-04 RX ADMIN — Medication 400 MILLIGRAM(S): at 22:27

## 2023-03-04 RX ADMIN — Medication 1 TABLET(S): at 10:26

## 2023-03-04 RX ADMIN — ENOXAPARIN SODIUM 40 MILLIGRAM(S): 100 INJECTION SUBCUTANEOUS at 05:58

## 2023-03-04 NOTE — PROGRESS NOTE ADULT - SUBJECTIVE AND OBJECTIVE BOX
Plastic Surgery Progress Note (pg [facility]: [id number], [facility]: [phone])    SUBJECTIVE:  Patient seen and examined at bedside this AM. No acute events overnight. AF/VSS. Pain well controlled. Dressing changed this AM by nursing.     OBJECTIVE:     ** VITAL SIGNS / I&O's **    Vital Signs Last 24 Hrs  T(C): 36.6 (04 Mar 2023 06:04), Max: 37.1 (03 Mar 2023 18:00)  T(F): 97.9 (04 Mar 2023 06:04), Max: 98.8 (03 Mar 2023 18:00)  HR: 100 (04 Mar 2023 06:04) (95 - 119)  BP: 116/73 (04 Mar 2023 06:04) (106/69 - 135/88)  BP(mean): --  RR: 16 (04 Mar 2023 06:04) (16 - 18)  SpO2: 97% (04 Mar 2023 06:04) (97% - 100%)    Parameters below as of 04 Mar 2023 06:04  Patient On (Oxygen Delivery Method): room air          03 Mar 2023 07:01  -  04 Mar 2023 07:00  --------------------------------------------------------  IN:    Oral Fluid: 820 mL  Total IN: 820 mL    OUT:    Voided (mL): 750 mL  Total OUT: 750 mL    Total NET: 70 mL          PHYSICAL EXAM:  GENERAL: NAD, lying in bed comfortably  HEAD:  Atraumatic, Normocephalic  ENT: Moist mucous membranes  CHEST/LUNG: Unlabored respirations  ABDOMEN: Soft, Nontender, Nondistended.   NERVOUS SYSTEM:  Alert & Oriented X3, speech clear.   SKIN: No rashes or lesions  EXT: Dressing over the thighs and gluteal region intact.   NERVOUS SYSTEM:  Alert & Oriented X3, speech clear.   SKIN: No rashes or lesions        **MEDS**  acetaminophen     Tablet .. 975 milliGRAM(s) Oral every 6 hours  amoxicillin  875 milliGRAM(s)/clavulanate 1 Tablet(s) Oral every 12 hours  chlorhexidine 2% Cloths 1 Application(s) Topical daily  doxycycline monohydrate Capsule 100 milliGRAM(s) Oral every 12 hours  enoxaparin Injectable 40 milliGRAM(s) SubCutaneous every 24 hours  HYDROmorphone  Injectable 0.3 milliGRAM(s) IV Push every 3 hours PRN  ibuprofen  Tablet. 400 milliGRAM(s) Oral every 6 hours  influenza   Vaccine 0.5 milliLiter(s) IntraMuscular once  magnesium hydroxide Suspension 30 milliLiter(s) Oral every 6 hours  naloxone Injectable 0.1 milliGRAM(s) IV Push every 3 minutes PRN  oxyCODONE    IR 5 milliGRAM(s) Oral every 3 hours PRN      ** LABS **              CAPILLARY BLOOD GLUCOSE

## 2023-03-04 NOTE — PROGRESS NOTE ADULT - ASSESSMENT
32y Male s/p surgical resection of multiple hidradenitis suppurativa lesions on bilateral buttocks (more extensive on right side) and right inner thigh on 2/21.      Plan:  - Diet: regular   - Activity: ambulate with assistance  - dressing change after bowel movement and Shower by RN  - Patient must shower every day  - Pain medication: tylenol, PCA  - f/u OR culture   - c/w abx  - Dispo planning    Plastic Surgery  [id number]

## 2023-03-04 NOTE — PROGRESS NOTE ADULT - ASSESSMENT
32y Male s/p surgical resection of multiple hidradenitis suppurativa lesions on bilateral buttocks (more extensive on right side) and right inner thigh on 2/21:     Plan:  - Diet: regular   - Activity: ambulate with assistance  - dressing change after bowel movement and Shower by RN  - Patient must shower every day  - Pain medication: tylenol, PCA  - f/u OR culture   - c/w abx  - Dispo planning    Plastic Surgery  [id number]

## 2023-03-04 NOTE — PROGRESS NOTE ADULT - SUBJECTIVE AND OBJECTIVE BOX
JANNA CYR  [id number]    Subjective:    Patient seen and examined, doing well. No complaints.        Objective:  T(C): 36.6 (03-04-23 @ 06:04), Max: 37.1 (03-03-23 @ 18:00)  HR: 100 (03-04-23 @ 06:04) (95 - 119)  BP: 116/73 (03-04-23 @ 06:04) (106/69 - 135/88)  RR: 16 (03-04-23 @ 06:04) (16 - 18)  SpO2: 97% (03-04-23 @ 06:04) (97% - 100%)  Wt(kg): --           03-03 @ 07:01  -  03-04 @ 07:00  --------------------------------------------------------  IN: 820 mL / OUT: 750 mL / NET: 70 mL      PHYSICAL EXAM:    General:             MEDICATIONS  (STANDING):  acetaminophen     Tablet .. 975 milliGRAM(s) Oral every 6 hours  amoxicillin  875 milliGRAM(s)/clavulanate 1 Tablet(s) Oral every 12 hours  chlorhexidine 2% Cloths 1 Application(s) Topical daily  doxycycline monohydrate Capsule 100 milliGRAM(s) Oral every 12 hours  enoxaparin Injectable 40 milliGRAM(s) SubCutaneous every 24 hours  ibuprofen  Tablet. 400 milliGRAM(s) Oral every 6 hours  influenza   Vaccine 0.5 milliLiter(s) IntraMuscular once  magnesium hydroxide Suspension 30 milliLiter(s) Oral every 6 hours    MEDICATIONS  (PRN):  HYDROmorphone  Injectable 0.3 milliGRAM(s) IV Push every 3 hours PRN Severe Breakthough Pain (7 - 10)  naloxone Injectable 0.1 milliGRAM(s) IV Push every 3 minutes PRN For ANY of the following changes in patient status:  A. RR LESS THAN 10 breaths per minute, B. Oxygen saturation LESS THAN 90%, C. Sedation score of 6  oxyCODONE    IR 5 milliGRAM(s) Oral every 3 hours PRN Moderate- Severe Pain (4 - 10)

## 2023-03-05 RX ADMIN — MAGNESIUM HYDROXIDE 30 MILLILITER(S): 400 TABLET, CHEWABLE ORAL at 05:43

## 2023-03-05 RX ADMIN — Medication 100 MILLIGRAM(S): at 05:43

## 2023-03-05 RX ADMIN — Medication 400 MILLIGRAM(S): at 22:36

## 2023-03-05 RX ADMIN — CHLORHEXIDINE GLUCONATE 1 APPLICATION(S): 213 SOLUTION TOPICAL at 11:54

## 2023-03-05 RX ADMIN — ENOXAPARIN SODIUM 40 MILLIGRAM(S): 100 INJECTION SUBCUTANEOUS at 05:43

## 2023-03-05 RX ADMIN — Medication 1 TABLET(S): at 12:18

## 2023-03-05 RX ADMIN — Medication 1 TABLET(S): at 22:37

## 2023-03-05 RX ADMIN — Medication 100 MILLIGRAM(S): at 18:05

## 2023-03-05 RX ADMIN — Medication 400 MILLIGRAM(S): at 23:06

## 2023-03-05 RX ADMIN — Medication 400 MILLIGRAM(S): at 05:43

## 2023-03-05 NOTE — PROGRESS NOTE ADULT - SUBJECTIVE AND OBJECTIVE BOX
Plastic Surgery Progress Note    Subjective:     Patient seen and examined at bedside. Patient without complaints this AM. Denies N/V/F/C.     OBJECTIVE:     T(C): 36.4 (03-05-23 @ 05:40), Max: 36.8 (03-04-23 @ 14:00)  HR: 83 (03-05-23 @ 05:40) (83 - 103)  BP: 118/69 (03-05-23 @ 05:40) (100/62 - 123/76)  RR: 16 (03-05-23 @ 05:40) (16 - 18)  SpO2: 100% (03-05-23 @ 05:40) (99% - 100%)  Wt(kg): --    I&O's Detail    04 Mar 2023 07:01  -  05 Mar 2023 07:00  --------------------------------------------------------  IN:    Oral Fluid: 1080 mL  Total IN: 1080 mL    OUT:    Voided (mL): 1250 mL  Total OUT: 1250 mL    Total NET: -170 mL          PHYSICAL EXAM:  GENERAL: NAD, lying in bed comfortably  HEAD:  Atraumatic, Normocephalic  ENT: Moist mucous membranes  CHEST/LUNG: Unlabored respirations  ABDOMEN: Soft, Nontender, Nondistended.   NERVOUS SYSTEM:  Alert & Oriented X3, speech clear.   SKIN: No rashes or lesions  EXT: Dressing over the thighs and gluteal region intact.       MEDICATIONS  (STANDING):  acetaminophen     Tablet .. 975 milliGRAM(s) Oral every 6 hours  amoxicillin  875 milliGRAM(s)/clavulanate 1 Tablet(s) Oral every 12 hours  chlorhexidine 2% Cloths 1 Application(s) Topical daily  doxycycline monohydrate Capsule 100 milliGRAM(s) Oral every 12 hours  enoxaparin Injectable 40 milliGRAM(s) SubCutaneous every 24 hours  ibuprofen  Tablet. 400 milliGRAM(s) Oral every 6 hours  influenza   Vaccine 0.5 milliLiter(s) IntraMuscular once  magnesium hydroxide Suspension 30 milliLiter(s) Oral every 6 hours    MEDICATIONS  (PRN):  HYDROmorphone  Injectable 0.3 milliGRAM(s) IV Push every 3 hours PRN Severe Breakthough Pain (7 - 10)  naloxone Injectable 0.1 milliGRAM(s) IV Push every 3 minutes PRN For ANY of the following changes in patient status:  A. RR LESS THAN 10 breaths per minute, B. Oxygen saturation LESS THAN 90%, C. Sedation score of 6  oxyCODONE    IR 5 milliGRAM(s) Oral every 3 hours PRN Moderate- Severe Pain (4 - 10)      LABS:

## 2023-03-05 NOTE — PROGRESS NOTE ADULT - ASSESSMENT
32y Male s/p surgical resection of multiple hidradenitis suppurativa lesions on bilateral buttocks (more extensive on right side) and right inner thigh on 2/21.      Plan:  - Diet: regular   - Activity: ambulate with assistance  - dressing change after bowel movement and Shower by RN  - Patient must shower every day  - Pain medication: tylenol, oxy, dilaudid  - f/u OR culture: NGTD  - c/w abx  - Dispo planning    Plastic Surgery  [id number]

## 2023-03-06 RX ADMIN — Medication 400 MILLIGRAM(S): at 10:50

## 2023-03-06 RX ADMIN — Medication 975 MILLIGRAM(S): at 22:08

## 2023-03-06 RX ADMIN — Medication 400 MILLIGRAM(S): at 17:47

## 2023-03-06 RX ADMIN — Medication 1 TABLET(S): at 10:50

## 2023-03-06 RX ADMIN — CHLORHEXIDINE GLUCONATE 1 APPLICATION(S): 213 SOLUTION TOPICAL at 12:45

## 2023-03-06 RX ADMIN — Medication 975 MILLIGRAM(S): at 22:38

## 2023-03-06 RX ADMIN — MAGNESIUM HYDROXIDE 30 MILLILITER(S): 400 TABLET, CHEWABLE ORAL at 12:45

## 2023-03-06 RX ADMIN — Medication 100 MILLIGRAM(S): at 05:49

## 2023-03-06 RX ADMIN — Medication 100 MILLIGRAM(S): at 17:47

## 2023-03-06 RX ADMIN — ENOXAPARIN SODIUM 40 MILLIGRAM(S): 100 INJECTION SUBCUTANEOUS at 05:49

## 2023-03-06 RX ADMIN — Medication 400 MILLIGRAM(S): at 11:20

## 2023-03-06 RX ADMIN — Medication 400 MILLIGRAM(S): at 05:49

## 2023-03-06 RX ADMIN — Medication 400 MILLIGRAM(S): at 06:19

## 2023-03-06 RX ADMIN — Medication 400 MILLIGRAM(S): at 18:17

## 2023-03-06 RX ADMIN — Medication 1 TABLET(S): at 22:09

## 2023-03-06 NOTE — PROGRESS NOTE ADULT - ASSESSMENT
32y Male s/p surgical resection of multiple hidradenitis suppurativa lesions on bilateral buttocks (more extensive on right side) and right inner thigh on 2/21.      Plan:  - Diet: regular   - Activity: ambulate with assistance  - dressing change after bowel movement and Shower by RN  - Patient must shower every day  - Pain medication: tylenol, oxy, dilaudid  - f/u OR culture: NGTD  - c/w abx  - Dispo planning    Plastic Surgery  [id number]

## 2023-03-06 NOTE — PROGRESS NOTE ADULT - SUBJECTIVE AND OBJECTIVE BOX
Plastic Surgery Progress Note    Subjective:     Patient seen and examined at bedside. Patient without complaints this AM. Denies N/V/F/C.     OBJECTIVE:     T(C): 36.4 (03-06-23 @ 06:00), Max: 37 (03-05-23 @ 10:00)  HR: 80 (03-06-23 @ 06:00) (80 - 98)  BP: 119/78 (03-06-23 @ 06:00) (101/60 - 119/78)  RR: 18 (03-06-23 @ 06:00) (16 - 18)  SpO2: 100% (03-06-23 @ 06:00) (100% - 100%)  Wt(kg): --    I&O's Detail    05 Mar 2023 07:01  -  06 Mar 2023 07:00  --------------------------------------------------------  IN:    Oral Fluid: 970 mL  Total IN: 970 mL    OUT:    Voided (mL): 500 mL  Total OUT: 500 mL    Total NET: 470 mL          PHYSICAL EXAM:  GENERAL: NAD, lying in bed comfortably  HEAD:  Atraumatic, Normocephalic  ENT: Moist mucous membranes  CHEST/LUNG: Unlabored respirations  ABDOMEN: Soft, Nontender, Nondistended.   NERVOUS SYSTEM:  Alert & Oriented X3, speech clear.   SKIN: No rashes or lesions  EXT: Dressing over the thighs and gluteal region intact.       MEDICATIONS  (STANDING):  acetaminophen     Tablet .. 975 milliGRAM(s) Oral every 6 hours  amoxicillin  875 milliGRAM(s)/clavulanate 1 Tablet(s) Oral every 12 hours  chlorhexidine 2% Cloths 1 Application(s) Topical daily  doxycycline monohydrate Capsule 100 milliGRAM(s) Oral every 12 hours  enoxaparin Injectable 40 milliGRAM(s) SubCutaneous every 24 hours  ibuprofen  Tablet. 400 milliGRAM(s) Oral every 6 hours  influenza   Vaccine 0.5 milliLiter(s) IntraMuscular once  magnesium hydroxide Suspension 30 milliLiter(s) Oral every 6 hours    MEDICATIONS  (PRN):  HYDROmorphone  Injectable 0.3 milliGRAM(s) IV Push every 3 hours PRN Severe Breakthough Pain (7 - 10)  naloxone Injectable 0.1 milliGRAM(s) IV Push every 3 minutes PRN For ANY of the following changes in patient status:  A. RR LESS THAN 10 breaths per minute, B. Oxygen saturation LESS THAN 90%, C. Sedation score of 6  oxyCODONE    IR 5 milliGRAM(s) Oral every 3 hours PRN Moderate- Severe Pain (4 - 10)      LABS:

## 2023-03-07 RX ADMIN — ENOXAPARIN SODIUM 40 MILLIGRAM(S): 100 INJECTION SUBCUTANEOUS at 05:10

## 2023-03-07 RX ADMIN — Medication 400 MILLIGRAM(S): at 05:10

## 2023-03-07 RX ADMIN — Medication 975 MILLIGRAM(S): at 23:28

## 2023-03-07 RX ADMIN — CHLORHEXIDINE GLUCONATE 1 APPLICATION(S): 213 SOLUTION TOPICAL at 11:12

## 2023-03-07 RX ADMIN — Medication 1 TABLET(S): at 09:18

## 2023-03-07 RX ADMIN — Medication 100 MILLIGRAM(S): at 05:13

## 2023-03-07 RX ADMIN — Medication 975 MILLIGRAM(S): at 23:58

## 2023-03-07 RX ADMIN — Medication 1 TABLET(S): at 23:28

## 2023-03-07 RX ADMIN — Medication 400 MILLIGRAM(S): at 05:40

## 2023-03-07 NOTE — PROGRESS NOTE ADULT - SUBJECTIVE AND OBJECTIVE BOX
Plastic Surgery Progress Note    Subjective:     Patient seen and examined at bedside. Patient without complaints this AM. Denies N/V/F/C.     OBJECTIVE:     T(C): 36.6 (03-07-23 @ 06:00), Max: 37 (03-06-23 @ 22:16)  HR: 81 (03-07-23 @ 06:00) (75 - 105)  BP: 130/71 (03-07-23 @ 06:00) (106/66 - 131/61)  RR: 18 (03-07-23 @ 06:00) (16 - 18)  SpO2: 100% (03-07-23 @ 06:00) (100% - 100%)  Wt(kg): --    I&O's Detail    06 Mar 2023 07:01  -  07 Mar 2023 07:00  --------------------------------------------------------  IN:    Oral Fluid: 1070 mL  Total IN: 1070 mL    OUT:    Voided (mL): 2800 mL  Total OUT: 2800 mL    Total NET: -1730 mL    PHYSICAL EXAM:  GENERAL: NAD, lying in bed comfortably  HEAD:  Atraumatic, Normocephalic  ENT: Moist mucous membranes  CHEST/LUNG: Unlabored respirations  ABDOMEN: Soft, Nontender, Nondistended.   NERVOUS SYSTEM:  Alert & Oriented X3, speech clear.   SKIN: No rashes or lesions  EXT: Dressing over the thighs and gluteal region intact.       MEDICATIONS  (STANDING):  acetaminophen     Tablet .. 975 milliGRAM(s) Oral every 6 hours  amoxicillin  875 milliGRAM(s)/clavulanate 1 Tablet(s) Oral every 12 hours  chlorhexidine 2% Cloths 1 Application(s) Topical daily  doxycycline monohydrate Capsule 100 milliGRAM(s) Oral every 12 hours  enoxaparin Injectable 40 milliGRAM(s) SubCutaneous every 24 hours  ibuprofen  Tablet. 400 milliGRAM(s) Oral every 6 hours  influenza   Vaccine 0.5 milliLiter(s) IntraMuscular once  magnesium hydroxide Suspension 30 milliLiter(s) Oral every 6 hours    MEDICATIONS  (PRN):  HYDROmorphone  Injectable 0.3 milliGRAM(s) IV Push every 3 hours PRN Severe Breakthough Pain (7 - 10)  naloxone Injectable 0.1 milliGRAM(s) IV Push every 3 minutes PRN For ANY of the following changes in patient status:  A. RR LESS THAN 10 breaths per minute, B. Oxygen saturation LESS THAN 90%, C. Sedation score of 6  oxyCODONE    IR 5 milliGRAM(s) Oral every 3 hours PRN Moderate- Severe Pain (4 - 10)      LABS:

## 2023-03-07 NOTE — PROGRESS NOTE ADULT - ASSESSMENT
32y Male s/p surgical resection of multiple hidradenitis suppurativa lesions on bilateral buttocks (more extensive on right side) and right inner thigh on 2/21.      Plan:  - Diet: regular   - Activity: ambulate with assistance  - dressing change after bowel movement and Shower by RN  - Patient must shower every day  - Pain medication: tylenol, oxy, dilaudid  - f/u OR culture: NGTD  - c/w abx  - Dispo planning    Plastic Surgery  [id number]   32y Male s/p surgical resection of multiple hidradenitis suppurativa lesions on bilateral buttocks (more extensive on right side) and right inner thigh on 2/21.      Plan:  - Diet: regular   - Activity: ambulate with assistance  - dressing change after bowel movement and Shower by RN  - Patient must shower every day  - Pain medication: tylenol, oxy  - f/u OR culture: NGTD  - c/w abx  - Dispo planning    Plastic Surgery  [id number]

## 2023-03-07 NOTE — CHART NOTE - NSCHARTNOTEFT_GEN_A_CORE
Source: Patient [X ]    Family [ ]     other [ X] electronic chart, RN, plastic    Diet : Diet, Regular (02-20-23 @ 17:52)    Nutrition Follow-up Note. Per chart review, 32y Male s/p surgical resection of multiple hidradenitis suppurativa lesions on bilateral buttocks (more extensive on right side) and right inner thigh on 2/21.      Met with patient at bedside. Endorses good appetite and PO intake at this time. Observed 100% of lunch completed. However, variable po intake % per nursing flowsheet during the course of admission recorded. Patient denies any nausea/vomiting/diarrhea/constipation or difficulty chewing and swallowing. Continue good po intake of nutrient and protein dense foods encouraged. Reviewed High Biological Value Protein sources (i.e. chicken, turkey, beef, fish, eggs, etc.) and to have with each meals to promote wound healing. Family has been bringing PO supplement Ensure plus. RD requested provider to add PO supplement Ensure Plus HP in-house for added calories and protein.      Weight: 74kg-2/27     74.2kg- 2/21     74.2kg-2/19      Pertinent Medications: acetaminophen     Tablet ..  ibuprofen  Tablet.  magnesium hydroxide Suspension    Pertinent Labs:        Skin:  armpit abscess, b/l buttocks and right medical wound per nursing flowsheet.     Estimated Needs:   [ X] no change since previous assessment  [ ] recalculated:       Previous Nutrition Diagnosis:     [ X] Malnutrition, Severe     Nutrition Diagnosis is [X ] ongoing  [ ] resolved [ ] not applicable       Additional Recommendations:     1. Continue current diet order, which remains appropriate at this time.   2. Monitor weights, labs, BM's, skin integrity, p.o. intake.   3. Please document % PO intake in nursing flowsheet.   4. Please Encourage po intake, assist with meals and menu selections, provide alternatives PRN.   5. Honor food and beverage preferences within diet restriction of patient in an effort to maximize level of nutrient intake.

## 2023-03-08 ENCOUNTER — TRANSCRIPTION ENCOUNTER (OUTPATIENT)
Age: 33
End: 2023-03-08

## 2023-03-08 VITALS
HEART RATE: 81 BPM | RESPIRATION RATE: 18 BRPM | SYSTOLIC BLOOD PRESSURE: 120 MMHG | TEMPERATURE: 98 F | DIASTOLIC BLOOD PRESSURE: 75 MMHG | OXYGEN SATURATION: 100 %

## 2023-03-08 LAB — SARS-COV-2 RNA SPEC QL NAA+PROBE: SIGNIFICANT CHANGE UP

## 2023-03-08 RX ORDER — IBUPROFEN 200 MG
1 TABLET ORAL
Qty: 0 | Refills: 0 | DISCHARGE
Start: 2023-03-08

## 2023-03-08 RX ORDER — ACETAMINOPHEN 500 MG
3 TABLET ORAL
Qty: 0 | Refills: 0 | DISCHARGE
Start: 2023-03-08

## 2023-03-08 RX ORDER — ADALIMUMAB 40MG/0.8ML
1 KIT SUBCUTANEOUS
Qty: 0 | Refills: 0 | DISCHARGE

## 2023-03-08 RX ADMIN — CHLORHEXIDINE GLUCONATE 1 APPLICATION(S): 213 SOLUTION TOPICAL at 11:09

## 2023-03-08 RX ADMIN — ENOXAPARIN SODIUM 40 MILLIGRAM(S): 100 INJECTION SUBCUTANEOUS at 07:01

## 2023-03-08 RX ADMIN — Medication 975 MILLIGRAM(S): at 07:01

## 2023-03-08 RX ADMIN — Medication 1 TABLET(S): at 11:09

## 2023-03-08 NOTE — PROGRESS NOTE ADULT - PROVIDER SPECIALTY LIST ADULT
Infectious Disease
Pain Medicine
Anesthesia
Infectious Disease
Infectious Disease
Plastic Surgery
Surgery
Infectious Disease
Pain Medicine
Pain Medicine
Plastic Surgery
Surgery
Plastic Surgery
Hospitalist
Hospitalist

## 2023-03-08 NOTE — CHART NOTE - NSCHARTNOTESELECT_GEN_ALL_CORE
Dispo Planning
Postoperative Check
Sergio Day 1
ACP NP/Event Note
Event Note
Event Note
Nutrition Follow-up Note/Nutrition Services

## 2023-03-08 NOTE — DISCHARGE NOTE NURSING/CASE MANAGEMENT/SOCIAL WORK - PATIENT PORTAL LINK FT
You can access the FollowMyHealth Patient Portal offered by [facility] by registering at the following website: [email or web address]. By joining [facility]’s FollowMyHealth portal, you will also be able to view your health information using other applications (apps) compatible with our system.

## 2023-03-08 NOTE — DISCHARGE NOTE PROVIDER - NSDCMRMEDTOKEN_GEN_ALL_CORE_FT
acetaminophen 325 mg oral tablet: 3 tab(s) orally every 6 hours  amoxicillin-clavulanate 875 mg-125 mg oral tablet: 1 tab(s) orally every 12 hours  ibuprofen 400 mg oral tablet: 1 tab(s) orally every 6 hours

## 2023-03-08 NOTE — DISCHARGE NOTE PROVIDER - NSDCFUSCHEDAPPT_GEN_ALL_CORE_FT
[facility]  Scheduled Appointment: 04/10/2023    [facility]  [facility] [address]  Scheduled Appointment: 04/13/2023    [facility]  DER[facility] [address]  Scheduled Appointment: 04/25/2023

## 2023-03-08 NOTE — CHART NOTE - NSCHARTNOTEFT_GEN_A_CORE
AM: Dangle protocol day first 15min dangle completed, no signs of venous congestion, good cap refill, good cook signal      PM:    Plastic Surgery  [id number]

## 2023-03-08 NOTE — DISCHARGE NOTE PROVIDER - NSDCFUADDINST_GEN_ALL_CORE_FT
WOUND CARE:  Please keep incisions clean and dry. Please do not Scrub or rub incisions. Do not use lotion or powder on incisions. Must have daily shower with dressing change afterward  BATHING: Please do not submerge wound underwater.  ACTIVITY: No heavy lifting or straining. Otherwise, you may return to your usual level of physical activity. If you are taking narcotic pain medication (such as Percocet) DO NOT drive a car, operate machinery or make important decisions.  DIET: Return to your usual diet.  NOTIFY YOUR SURGEON IF: You have any bleeding that does not stop, any pus draining from your wound(s), any fever (over 100.4 F) or chills, persistent nausea/vomiting, persistent diarrhea, or if your pain is not controlled on your discharge pain medications.  FOLLOW-UP: Please follow-up with your surgeon, within 2-3 weeks following discharge- please call to schedule an appointment if you don't already have an appointment WOUND CARE:  Please keep incisions clean and dry. Please do not Scrub or rub incisions. Do not use lotion or powder on incisions. Must have daily shower with copious irrigation of buttock area with dressing change afterward-- Dressing change: Aquacel Extra, Abd pads, foam tape without covering anus   BATHING: Please do not submerge wound underwater.  ACTIVITY: No heavy lifting or straining. Otherwise, you may return to your usual level of physical activity. If you are taking narcotic pain medication (such as Percocet) DO NOT drive a car, operate machinery or make important decisions.  DIET: Return to your usual diet.  NOTIFY YOUR SURGEON IF: You have any bleeding that does not stop, any pus draining from your wound(s), any fever (over 100.4 F) or chills, persistent nausea/vomiting, persistent diarrhea, or if your pain is not controlled on your discharge pain medications.  FOLLOW-UP: Please follow-up with your surgeon, within 2-3 weeks following discharge- please call to schedule an appointment if you don't already have an appointment

## 2023-03-08 NOTE — DISCHARGE NOTE PROVIDER - NSDCACTIVITY_GEN_ALL_CORE
The Service to Physiatry in workqueue [id number] requested on 7/30/2020 has been cancelled as, unable to contact. Ordering provider has been notified.      Please contact patient, if further communication is needed.  
Showering allowed/No heavy lifting/straining

## 2023-03-08 NOTE — DISCHARGE NOTE PROVIDER - CARE PROVIDER_API CALL
Margarito Tariq)  Surgery  PlasticReconstruct  [address], [address]  [place] [address]  Phone: [phone]  Fax: [phone]  Follow Up Time: 2 weeks

## 2023-03-08 NOTE — DISCHARGE NOTE PROVIDER - NSDCCPTREATMENT_GEN_ALL_CORE_FT
PRINCIPAL PROCEDURE  Procedure: Surgical removal of skin and subcutaneous tissue of perianal region with simple or intermediate repair for hidradenitis  Findings and Treatment:

## 2023-03-08 NOTE — PROGRESS NOTE ADULT - REASON FOR ADMISSION
s/p hidradenitis
sepsis
s/p hidradenitis
sepsis
HS
sepsis

## 2023-03-08 NOTE — DISCHARGE NOTE PROVIDER - HOSPITAL COURSE
27 y.o. male with PMHx of Hidradenitis suppurative presenting after a fall. The patient state he was trying to standup from his chair felt lightheaded and fell on to his left sided. Denies any headache or vision changes prior to the fall and no LOC, tremors, bladder incontinence, tongue bitting after the fall. The patient's mother came after he had fell and helped him back into the chair. The patient stated he felt light headed. States he had 2-3 episode of emesis for the last two days after eating dinner two days ago that has upset his stomach. Last time he had vomited was the morning of the fall. The patient stated he has been having decreased PO intake for the last few months and has lost up to 30lbs over the last 3 months. The patient also endorsed left shoulder pain from the fall, and chronic pain in the right ankle and knee. Was previously assessed for the ankle and knee pain in March 2017, found to have a small effusion in the right knee, which the patient opted no to have tapped. Was also diagnosed with hidradenitis suppurative at that ED visit and was started on a course of clindamycin. The patient's PMD was contacted and stated the patient has been chronically leukocytotic and lab showed hypergammaglobulinemia. Suggested patient be admitted for further work for leukemia/lymphoma.      Patient was taken to the OR for debridement by general surgery team on 2/21/23 and was recovering appropriately. Due to the extensiveness of his lesions, he was taken back to the OR by Plastic surgery for further debridement on 2/27/23. Patient has been hemodynamically stable and recovering well on the floor. Pt's last dose of humira was 2/13 and has been held for optimization of wound healing. Patient requires daily dressing changes with aquacel extra, abd pads, and foam tape on his buttock after daily bowel movements and shower.    Patient's condition was discussed with attending and deemed medically optimized for discharge.

## 2023-03-08 NOTE — DISCHARGE NOTE NURSING/CASE MANAGEMENT/SOCIAL WORK - NSDCPEFALRISK_GEN_ALL_CORE
For information on Fall & Injury Prevention, visit: [email or web address] OR  [email or web address] OR  https://www.cdc.gov/steadi/patient.html

## 2023-03-08 NOTE — PROGRESS NOTE ADULT - SUBJECTIVE AND OBJECTIVE BOX
Plastic Surgery Progress Note    Subjective:     Patient seen and examined at bedside. Patient without complaints this AM. Denies N/V/F/C.     OBJECTIVE:     T(C): 36.7 (03-08-23 @ 07:10), Max: 37.3 (03-07-23 @ 22:00)  HR: 77 (03-08-23 @ 07:10) (76 - 89)  BP: 114/73 (03-08-23 @ 07:10) (101/52 - 133/78)  RR: 16 (03-08-23 @ 07:10) (16 - 18)  SpO2: 100% (03-08-23 @ 07:10) (95% - 100%)  Wt(kg): --    I&O's Detail    07 Mar 2023 07:01  -  08 Mar 2023 07:00  --------------------------------------------------------  IN:    Oral Fluid: 460 mL  Total IN: 460 mL    OUT:    Voided (mL): 1750 mL  Total OUT: 1750 mL    Total NET: -1290 mL          PHYSICAL EXAM:  GENERAL: NAD, lying in bed comfortably  HEAD:  Atraumatic, Normocephalic  ENT: Moist mucous membranes  CHEST/LUNG: Unlabored respirations  ABDOMEN: Soft, Nontender, Nondistended.   NERVOUS SYSTEM:  Alert & Oriented X3, speech clear.   SKIN: No rashes or lesions  EXT: Dressing over the thighs and gluteal region intact.     MEDICATIONS  (STANDING):  acetaminophen     Tablet .. 975 milliGRAM(s) Oral every 6 hours  amoxicillin  875 milliGRAM(s)/clavulanate 1 Tablet(s) Oral every 12 hours  chlorhexidine 2% Cloths 1 Application(s) Topical daily  enoxaparin Injectable 40 milliGRAM(s) SubCutaneous every 24 hours  ibuprofen  Tablet. 400 milliGRAM(s) Oral every 6 hours  influenza   Vaccine 0.5 milliLiter(s) IntraMuscular once  magnesium hydroxide Suspension 30 milliLiter(s) Oral every 6 hours    MEDICATIONS  (PRN):  naloxone Injectable 0.1 milliGRAM(s) IV Push every 3 minutes PRN For ANY of the following changes in patient status:  A. RR LESS THAN 10 breaths per minute, B. Oxygen saturation LESS THAN 90%, C. Sedation score of 6      LABS:

## 2023-03-09 ENCOUNTER — NON-APPOINTMENT (OUTPATIENT)
Age: 33
End: 2023-03-09

## 2023-03-14 LAB — SURGICAL PATHOLOGY STUDY: SIGNIFICANT CHANGE UP

## 2023-03-17 ENCOUNTER — APPOINTMENT (OUTPATIENT)
Dept: PLASTIC SURGERY | Facility: CLINIC | Age: 33
End: 2023-03-17

## 2023-03-22 ENCOUNTER — TRANSCRIPTION ENCOUNTER (OUTPATIENT)
Age: 33
End: 2023-03-22

## 2023-03-22 LAB
CULTURE RESULTS: SIGNIFICANT CHANGE UP
SPECIMEN SOURCE: SIGNIFICANT CHANGE UP

## 2023-03-23 ENCOUNTER — APPOINTMENT (OUTPATIENT)
Dept: PLASTIC SURGERY | Facility: HOSPITAL | Age: 33
End: 2023-03-23

## 2023-03-23 ENCOUNTER — INPATIENT (INPATIENT)
Facility: HOSPITAL | Age: 33
LOS: 7 days | Discharge: HOME HEALTH | DRG: 578 | End: 2023-03-31
Attending: SURGERY | Admitting: SURGERY
Payer: MEDICAID

## 2023-03-23 VITALS
TEMPERATURE: 98 F | HEART RATE: 120 BPM | HEIGHT: 72 IN | OXYGEN SATURATION: 99 % | DIASTOLIC BLOOD PRESSURE: 84 MMHG | WEIGHT: 160.06 LBS | RESPIRATION RATE: 18 BRPM | SYSTOLIC BLOOD PRESSURE: 117 MMHG

## 2023-03-23 DIAGNOSIS — Z98.890 OTHER SPECIFIED POSTPROCEDURAL STATES: Chronic | ICD-10-CM

## 2023-03-23 DIAGNOSIS — L73.2 HIDRADENITIS SUPPURATIVA: ICD-10-CM

## 2023-03-23 DIAGNOSIS — S31.001A: ICD-10-CM

## 2023-03-23 LAB
BASOPHILS # BLD AUTO: 0.09 K/UL — SIGNIFICANT CHANGE UP (ref 0–0.2)
BASOPHILS NFR BLD AUTO: 0.7 % — SIGNIFICANT CHANGE UP (ref 0–2)
BLD GP AB SCN SERPL QL: NEGATIVE — SIGNIFICANT CHANGE UP
EOSINOPHIL # BLD AUTO: 0.23 K/UL — SIGNIFICANT CHANGE UP (ref 0–0.5)
EOSINOPHIL NFR BLD AUTO: 1.8 % — SIGNIFICANT CHANGE UP (ref 0–6)
HCT VFR BLD CALC: 35 % — LOW (ref 39–50)
HGB BLD-MCNC: 10.7 G/DL — LOW (ref 13–17)
IMM GRANULOCYTES NFR BLD AUTO: 0.3 % — SIGNIFICANT CHANGE UP (ref 0–0.9)
LYMPHOCYTES # BLD AUTO: 26.5 % — SIGNIFICANT CHANGE UP (ref 13–44)
LYMPHOCYTES # BLD AUTO: 3.47 K/UL — HIGH (ref 1–3.3)
MCHC RBC-ENTMCNC: 25.8 PG — LOW (ref 27–34)
MCHC RBC-ENTMCNC: 30.6 GM/DL — LOW (ref 32–36)
MCV RBC AUTO: 84.3 FL — SIGNIFICANT CHANGE UP (ref 80–100)
MONOCYTES # BLD AUTO: 0.91 K/UL — HIGH (ref 0–0.9)
MONOCYTES NFR BLD AUTO: 6.9 % — SIGNIFICANT CHANGE UP (ref 2–14)
NEUTROPHILS # BLD AUTO: 8.36 K/UL — HIGH (ref 1.8–7.4)
NEUTROPHILS NFR BLD AUTO: 63.8 % — SIGNIFICANT CHANGE UP (ref 43–77)
NRBC # BLD: 0 /100 WBCS — SIGNIFICANT CHANGE UP (ref 0–0)
PLATELET # BLD AUTO: 494 K/UL — HIGH (ref 150–400)
RBC # BLD: 4.15 M/UL — LOW (ref 4.2–5.8)
RBC # FLD: 18.5 % — HIGH (ref 10.3–14.5)
RH IG SCN BLD-IMP: POSITIVE — SIGNIFICANT CHANGE UP
RH IG SCN BLD-IMP: POSITIVE — SIGNIFICANT CHANGE UP
WBC # BLD: 13.1 K/UL — HIGH (ref 3.8–10.5)
WBC # FLD AUTO: 13.1 K/UL — HIGH (ref 3.8–10.5)

## 2023-03-23 PROCEDURE — 15100 SPLT AGRFT T/A/L 1ST 100SQCM: CPT | Mod: 58

## 2023-03-23 PROCEDURE — 15002 WOUND PREP TRK/ARM/LEG: CPT | Mod: 58

## 2023-03-23 PROCEDURE — 15101 SPLT AGRFT T/A/L EA ADDL 100: CPT

## 2023-03-23 PROCEDURE — 15003 WOUND PREP ADDL 100 CM: CPT

## 2023-03-23 RX ORDER — DIPHENHYDRAMINE HCL 50 MG
25 CAPSULE ORAL EVERY 4 HOURS
Refills: 0 | Status: DISCONTINUED | OUTPATIENT
Start: 2023-03-23 | End: 2023-03-31

## 2023-03-23 RX ORDER — CALCIUM CARBONATE 500(1250)
3 TABLET ORAL EVERY 6 HOURS
Refills: 0 | Status: DISCONTINUED | OUTPATIENT
Start: 2023-03-23 | End: 2023-03-31

## 2023-03-23 RX ORDER — LANOLIN ALCOHOL/MO/W.PET/CERES
3 CREAM (GRAM) TOPICAL AT BEDTIME
Refills: 0 | Status: DISCONTINUED | OUTPATIENT
Start: 2023-03-23 | End: 2023-03-31

## 2023-03-23 RX ORDER — ONDANSETRON 8 MG/1
4 TABLET, FILM COATED ORAL ONCE
Refills: 0 | Status: DISCONTINUED | OUTPATIENT
Start: 2023-03-23 | End: 2023-03-23

## 2023-03-23 RX ORDER — HYDROMORPHONE HYDROCHLORIDE 2 MG/ML
0.5 INJECTION INTRAMUSCULAR; INTRAVENOUS; SUBCUTANEOUS
Refills: 0 | Status: DISCONTINUED | OUTPATIENT
Start: 2023-03-23 | End: 2023-03-26

## 2023-03-23 RX ORDER — METOCLOPRAMIDE HCL 10 MG
10 TABLET ORAL EVERY 6 HOURS
Refills: 0 | Status: DISCONTINUED | OUTPATIENT
Start: 2023-03-23 | End: 2023-03-31

## 2023-03-23 RX ORDER — CEFAZOLIN SODIUM 1 G
2000 VIAL (EA) INJECTION EVERY 8 HOURS
Refills: 0 | Status: COMPLETED | OUTPATIENT
Start: 2023-03-23 | End: 2023-03-28

## 2023-03-23 RX ORDER — SODIUM CHLORIDE 9 MG/ML
3 INJECTION INTRAMUSCULAR; INTRAVENOUS; SUBCUTANEOUS EVERY 8 HOURS
Refills: 0 | Status: DISCONTINUED | OUTPATIENT
Start: 2023-03-23 | End: 2023-03-23

## 2023-03-23 RX ORDER — ONDANSETRON 8 MG/1
4 TABLET, FILM COATED ORAL EVERY 6 HOURS
Refills: 0 | Status: DISCONTINUED | OUTPATIENT
Start: 2023-03-23 | End: 2023-03-26

## 2023-03-23 RX ORDER — DIPHENHYDRAMINE HCL 50 MG
25 CAPSULE ORAL EVERY 4 HOURS
Refills: 0 | Status: DISCONTINUED | OUTPATIENT
Start: 2023-03-23 | End: 2023-03-26

## 2023-03-23 RX ORDER — NALOXONE HYDROCHLORIDE 4 MG/.1ML
0.1 SPRAY NASAL
Refills: 0 | Status: DISCONTINUED | OUTPATIENT
Start: 2023-03-23 | End: 2023-03-26

## 2023-03-23 RX ORDER — ONDANSETRON 8 MG/1
4 TABLET, FILM COATED ORAL EVERY 6 HOURS
Refills: 0 | Status: DISCONTINUED | OUTPATIENT
Start: 2023-03-23 | End: 2023-03-31

## 2023-03-23 RX ORDER — SODIUM CHLORIDE 9 MG/ML
1000 INJECTION, SOLUTION INTRAVENOUS
Refills: 0 | Status: DISCONTINUED | OUTPATIENT
Start: 2023-03-23 | End: 2023-03-29

## 2023-03-23 RX ORDER — HYDROMORPHONE HYDROCHLORIDE 2 MG/ML
0.5 INJECTION INTRAMUSCULAR; INTRAVENOUS; SUBCUTANEOUS
Refills: 0 | Status: DISCONTINUED | OUTPATIENT
Start: 2023-03-23 | End: 2023-03-23

## 2023-03-23 RX ORDER — CEFAZOLIN SODIUM 1 G
2000 VIAL (EA) INJECTION EVERY 8 HOURS
Refills: 0 | Status: DISCONTINUED | OUTPATIENT
Start: 2023-03-23 | End: 2023-03-23

## 2023-03-23 RX ORDER — ACETAMINOPHEN 500 MG
650 TABLET ORAL EVERY 6 HOURS
Refills: 0 | Status: DISCONTINUED | OUTPATIENT
Start: 2023-03-23 | End: 2023-03-31

## 2023-03-23 RX ORDER — INFLUENZA VIRUS VACCINE 15; 15; 15; 15 UG/.5ML; UG/.5ML; UG/.5ML; UG/.5ML
0.5 SUSPENSION INTRAMUSCULAR ONCE
Refills: 0 | Status: DISCONTINUED | OUTPATIENT
Start: 2023-03-23 | End: 2023-03-31

## 2023-03-23 RX ORDER — HYDROMORPHONE HYDROCHLORIDE 2 MG/ML
30 INJECTION INTRAMUSCULAR; INTRAVENOUS; SUBCUTANEOUS
Refills: 0 | Status: DISCONTINUED | OUTPATIENT
Start: 2023-03-23 | End: 2023-03-26

## 2023-03-23 RX ADMIN — HYDROMORPHONE HYDROCHLORIDE 30 MILLILITER(S): 2 INJECTION INTRAMUSCULAR; INTRAVENOUS; SUBCUTANEOUS at 19:55

## 2023-03-23 RX ADMIN — Medication 100 MILLIGRAM(S): at 22:45

## 2023-03-23 RX ADMIN — HYDROMORPHONE HYDROCHLORIDE 30 MILLILITER(S): 2 INJECTION INTRAMUSCULAR; INTRAVENOUS; SUBCUTANEOUS at 17:25

## 2023-03-23 RX ADMIN — SODIUM CHLORIDE 75 MILLILITER(S): 9 INJECTION, SOLUTION INTRAVENOUS at 17:22

## 2023-03-23 NOTE — BRIEF OPERATIVE NOTE - OPERATION/FINDINGS
Patient intubated and placed prone. Prior wound excess granulation tissue sharply debrided with currette. Split thickness skin graft taken with dermatome from back and meshed with 3:1 mesher. STSG placed over bilateral buttock wound and tacked in place with 4-0 chromic. Wound dressed with baci, xeroform, abd and foam tape over donor and recipient site.

## 2023-03-23 NOTE — H&P ADULT - NSICDXPASTSURGICALHX_GEN_ALL_CORE_FT
PAST SURGICAL HISTORY:  No significant past surgical history      PAST SURGICAL HISTORY:  S/P excisional debridement

## 2023-03-23 NOTE — BRIEF OPERATIVE NOTE - NSICDXBRIEFPROCEDURE_GEN_ALL_CORE_FT
PROCEDURES:  Application, graft, skin, split-thickness, to perineum 23-Mar-2023 15:57:15  Estefany Mayen

## 2023-03-23 NOTE — H&P ADULT - ASSESSMENT
32M with longstanding history of hidrarenitis suppurativa presenting today for debridement and STSG reconstruction. Previously takes Humira- d/c'ed prior to surgery 32M with longstanding history of hidrarenitis suppurativa presenting today for same day admission for planned debridement and STSG reconstruction of bilateral buttock wound.   - NPO  - discontinued Humira   - planned SDA   - consent in chart

## 2023-03-23 NOTE — H&P ADULT - NSHPPHYSICALEXAM_GEN_ALL_CORE
Physical Exam:    General: No acute distress.  HEENT: Normocephalic, atraumatic  Cardiac: S1, S2, RRR.   Respiratory: Bilateral breath sounds  Abdomen: Soft, non-distended  Skin: Right upper buttocks with large 25x15 cm areas of induration. Small left perianal involvement and left buttock debridement. No drainage.

## 2023-03-23 NOTE — PATIENT PROFILE ADULT - FALL HARM RISK - RISK INTERVENTIONS

## 2023-03-23 NOTE — PRE-ANESTHESIA EVALUATION ADULT - NSANTHPMHFT_GEN_ALL_CORE
chart reviewed. pt presents from rehab, apparently 2/2 hidradenitis - denies other sig pmh. ambulates at baseline without cp/sob

## 2023-03-23 NOTE — H&P ADULT - HISTORY OF PRESENT ILLNESS
32M with longstanding history of hidrarenitis suppurativa. He is currently managed by dermatology with Humira. His areas of greatest concern include right and left buttocks. He is also concerned about bilateral axilla. He denies prior surgical procedure. He denies using nictotine products. He presents today for stage 2 debridement and skin graft reconstruction  32M with longstanding history of hidrarenitis suppurativa. He is currently managed by dermatology with Humira and prior surgical debridements (last 2/27/23). His areas of greatest concern include right and left buttocks. He is also concerned about bilateral axilla. He denies using nicotine products. He presents today for stage 2 debridement and skin graft reconstruction. Denies anything to eat or drink since before midnight. Has been off of Humira since last admission 4 weeks ago.

## 2023-03-23 NOTE — H&P ADULT - NSHPREVIEWOFSYSTEMS_GEN_ALL_CORE
REVIEW OF SYSTEMS:    CONSTITUTIONAL: No weakness, fevers or chills  EYES/ENT: No visual changes.  NECK: No pain or stiffness  RESPIRATORY: No cough, wheezing, hemoptysis; No shortness of breath  CARDIOVASCULAR: No chest pain or palpitations  GASTROINTESTINAL: No abdominal pain. No nausea, vomiting, or hematemesis; No diarrhea or constipation.   GENITOURINARY: No dysuria  NEUROLOGICAL: No weakness  SKIN: Right upper buttocks erythema and induration; L groin and buttocks extension  All other review of systems is negative unless indicated above.

## 2023-03-24 LAB
ANION GAP SERPL CALC-SCNC: 11 MMOL/L — SIGNIFICANT CHANGE UP (ref 5–17)
BUN SERPL-MCNC: 14 MG/DL — SIGNIFICANT CHANGE UP (ref 7–23)
CALCIUM SERPL-MCNC: 8.7 MG/DL — SIGNIFICANT CHANGE UP (ref 8.4–10.5)
CHLORIDE SERPL-SCNC: 99 MMOL/L — SIGNIFICANT CHANGE UP (ref 96–108)
CO2 SERPL-SCNC: 26 MMOL/L — SIGNIFICANT CHANGE UP (ref 22–31)
CREAT SERPL-MCNC: 0.65 MG/DL — SIGNIFICANT CHANGE UP (ref 0.5–1.3)
EGFR: 128 ML/MIN/1.73M2 — SIGNIFICANT CHANGE UP
GLUCOSE SERPL-MCNC: 101 MG/DL — HIGH (ref 70–99)
HCT VFR BLD CALC: 28.9 % — LOW (ref 39–50)
HGB BLD-MCNC: 8.6 G/DL — LOW (ref 13–17)
MAGNESIUM SERPL-MCNC: 2 MG/DL — SIGNIFICANT CHANGE UP (ref 1.6–2.6)
MCHC RBC-ENTMCNC: 25.5 PG — LOW (ref 27–34)
MCHC RBC-ENTMCNC: 29.8 GM/DL — LOW (ref 32–36)
MCV RBC AUTO: 85.8 FL — SIGNIFICANT CHANGE UP (ref 80–100)
NRBC # BLD: 0 /100 WBCS — SIGNIFICANT CHANGE UP (ref 0–0)
PHOSPHATE SERPL-MCNC: 3.5 MG/DL — SIGNIFICANT CHANGE UP (ref 2.5–4.5)
PLATELET # BLD AUTO: 419 K/UL — HIGH (ref 150–400)
POTASSIUM SERPL-MCNC: 4 MMOL/L — SIGNIFICANT CHANGE UP (ref 3.5–5.3)
POTASSIUM SERPL-SCNC: 4 MMOL/L — SIGNIFICANT CHANGE UP (ref 3.5–5.3)
RBC # BLD: 3.37 M/UL — LOW (ref 4.2–5.8)
RBC # FLD: 18.4 % — HIGH (ref 10.3–14.5)
SODIUM SERPL-SCNC: 136 MMOL/L — SIGNIFICANT CHANGE UP (ref 135–145)
WBC # BLD: 19.18 K/UL — HIGH (ref 3.8–10.5)
WBC # FLD AUTO: 19.18 K/UL — HIGH (ref 3.8–10.5)

## 2023-03-24 PROCEDURE — 99222 1ST HOSP IP/OBS MODERATE 55: CPT

## 2023-03-24 RX ORDER — ENOXAPARIN SODIUM 100 MG/ML
40 INJECTION SUBCUTANEOUS EVERY 24 HOURS
Refills: 0 | Status: DISCONTINUED | OUTPATIENT
Start: 2023-03-24 | End: 2023-03-31

## 2023-03-24 RX ORDER — ENOXAPARIN SODIUM 100 MG/ML
40 INJECTION SUBCUTANEOUS EVERY 12 HOURS
Refills: 0 | Status: DISCONTINUED | OUTPATIENT
Start: 2023-03-24 | End: 2023-03-24

## 2023-03-24 RX ADMIN — SODIUM CHLORIDE 75 MILLILITER(S): 9 INJECTION, SOLUTION INTRAVENOUS at 07:42

## 2023-03-24 RX ADMIN — HYDROMORPHONE HYDROCHLORIDE 30 MILLILITER(S): 2 INJECTION INTRAMUSCULAR; INTRAVENOUS; SUBCUTANEOUS at 19:57

## 2023-03-24 RX ADMIN — SODIUM CHLORIDE 75 MILLILITER(S): 9 INJECTION, SOLUTION INTRAVENOUS at 20:00

## 2023-03-24 RX ADMIN — Medication 100 MILLIGRAM(S): at 05:08

## 2023-03-24 RX ADMIN — HYDROMORPHONE HYDROCHLORIDE 30 MILLILITER(S): 2 INJECTION INTRAMUSCULAR; INTRAVENOUS; SUBCUTANEOUS at 07:41

## 2023-03-24 RX ADMIN — Medication 100 MILLIGRAM(S): at 21:24

## 2023-03-24 RX ADMIN — ENOXAPARIN SODIUM 40 MILLIGRAM(S): 100 INJECTION SUBCUTANEOUS at 14:55

## 2023-03-24 RX ADMIN — Medication 100 MILLIGRAM(S): at 14:55

## 2023-03-24 NOTE — PROGRESS NOTE ADULT - ASSESSMENT
32M with longstanding history of hidradenitis suppurativa presenting today for same day admission for planned debridement and STSG reconstruction of bilateral buttock wound. S/P B/L skin graft of buttocks from back donor site.   32M with longstanding history of hidradenitis suppurativa presenting today for same day admission for planned debridement and STSG reconstruction of bilateral buttock wound. S/P B/L skin graft of buttocks from back donor site.    Plan:  - Pain control PRN  - LRD  - DVT ppx w/ SCDs and LVX  - Strict prone bedrest    Plastic Surgery   32M with longstanding history of hidradenitis suppurativa presenting today for same day admission for planned debridement and STSG reconstruction of bilateral buttock wound. S/P B/L skin graft of buttocks from back donor site.    Plan:  - Pain control PRN  - LRD  - DVT ppx w/ SCDs and LVX  - Strict prone bedrest  - Dressing down POD5    Plastic Surgery

## 2023-03-24 NOTE — CONSULT NOTE ADULT - SUBJECTIVE AND OBJECTIVE BOX
Wound SURGERY CONSULT NOTE    HPI:  32M with longstanding history of hidrarenitis suppurativa. He is currently managed by dermatology with Humira and prior surgical debridements (last 2/27/23). Humira has been helpful in quieting his flares, but no resolution.  His areas of greatest concern include right and left buttocks. He is also concerned about bilateral axilla. He denies using nicotine products. He presents today for stage 2 debridement and skin graft reconstruction. Has been off of Humira since last admission 4 weeks ago.  Wound consult requested by team to assist w/ management of axillary wounds.  Pt c/o pain, drainage, currently no odor, redness, or worsening swelling.  Pt has been using dove soap and guaze dressings, but not helpful with a flare up. Offloading and pericare initiated upon admission as pt sedentary 2/2 to post op protocol.   No h/o bites, scratches, falls, trauma.  Appetite good w/o  weight loss. All questions asked and answered to pt's expressed understanding and satisfaction.    Current Diet: Diet, Low Fiber (03-23-23 @ 15:32)      PAST MEDICAL & SURGICAL HISTORY:  Hidradenitis suppurativa    S/P excisional debridement        REVIEW OF SYSTEMS: General/ Skin: see HPI  All other systems negative    MEDICATIONS  (STANDING):  ceFAZolin IVPB 2000 milliGRAM(s) IV Intermittent every 8 hours  enoxaparin Injectable 40 milliGRAM(s) SubCutaneous every 24 hours  HYDROmorphone PCA (1 mG/mL) 30 milliLiter(s) PCA Continuous PCA Continuous  influenza   Vaccine 0.5 milliLiter(s) IntraMuscular once  lactated ringers. 1000 milliLiter(s) (75 mL/Hr) IV Continuous <Continuous>    MEDICATIONS  (PRN):  acetaminophen Tablet 650 milliGRAM(s) Oral every 6 hours PRN Mild Pain (1 - 3)  calcium carbonate  500 mG (Tums) Chewable 3 Tablet(s) Chew every 6 hours PRN Dyspepsia  diphenhydrAMINE 25 milliGRAM(s) Oral every 4 hours PRN Rash and/or Itching  diphenhydrAMINE Injectable 25 milliGRAM(s) IV Push every 4 hours PRN Pruritus  HYDROmorphone PCA (1 mG/mL) Rescue Clinician Bolus 0.5 milliGRAM(s) IV Push every 15 minutes PRN for Pain Scale GREATER THAN 6  melatonin 3 milliGRAM(s) Oral at bedtime PRN Insomnia  metoclopramide Injectable 10 milliGRAM(s) IV Push every 6 hours PRN Nausea and/or Vomiting  naloxone Injectable 0.1 milliGRAM(s) IV Push every 3 minutes PRN For ANY of the following changes in patient status:  A. RR LESS THAN 10 breaths per minute, B. Oxygen saturation LESS THAN 90%, C. Sedation score of 6  ondansetron Injectable 4 milliGRAM(s) IV Push every 6 hours PRN Nausea  ondansetron Injectable 4 milliGRAM(s) IV Push every 6 hours PRN Nausea    No Known Allergies      SOCIAL HISTORY:  single; lives w/ mom in SNF; (+)marijuana, Former smoker, No ETOH or other drugs    FAMILY HISTORY:  no h/o PVD or wound healing or skin/ significant problems     (+) FH: hypertension (Mother)    PHYSICAL EXAM:  Vital Signs Last 24 Hrs  T(C): 36.7 (24 Mar 2023 09:01), Max: 36.8 (23 Mar 2023 20:28)  T(F): 98 (24 Mar 2023 09:01), Max: 98.3 (23 Mar 2023 20:28)  HR: 73 (24 Mar 2023 09:01) (70 - 90)  BP: 107/61 (24 Mar 2023 09:01) (88/50 - 121/69)  BP(mean): 70 (23 Mar 2023 20:00) (63 - 77)  RR: 18 (24 Mar 2023 09:01) (12 - 18)  SpO2: 100% (24 Mar 2023 09:01) (98% - 100%)    Parameters below as of 24 Mar 2023 09:01  Patient On (Oxygen Delivery Method): room air    NAD,   A&Ox3, WD/ WN/ WG  Versa Care P500 bed  HEENT:  NC/AT, EOMI, sclera clear, mucosa moist, throat clear, trachea midline, neck supple  Respiratory: nonlabored w/ equal chest rise  Gastrointestinal: soft NT/ND   : (+)alaniz  Neurology:strength & sensation grossly intact  Psych: calm/ appropriate  Musculoskeletal:  FROM, no deformities/ contractures  Vascular: BLE equally warm,  no cyanosis, clubbing, edema, nor acute ischemia  Skin:  moist w/ good turgor  Lower Back/ Bilateral buttocks/thigh dressing c/d/i as per PRS    Bilateral Axilla      L>R induration w/ scarred skin w/ scattered shallow sinuses     no blistering      (+) seromucoid drainage Lt>Rt    (+)TTP  No odor, erythema, increased warmth,  fluctuance, nor crepitus    LABS/ CULTURES/ RADIOLOGY:                        8.6    19.18 )-----------( 419      ( 24 Mar 2023 07:10 )             28.9       136  |  99  |  14  ----------------------------<  101      [03-24-23 @ 07:12]  4.0   |  26  |  0.65        Ca     8.7     [03-24-23 @ 07:12]      Mg     2.0     [03-24-23 @ 07:12]      Phos  3.5     [03-24-23 @ 07:12]

## 2023-03-24 NOTE — CONSULT NOTE ADULT - ASSESSMENT
A/P: 32M with longstanding history of hidradenitis suppurativa presenting today for same day admission for planned debridement and STSG reconstruction of bilateral buttock wound. S/P B/L skin graft of buttocks from back donor site.      Wound Consult requested to assist w/ management of Bilateral Axillary Hydradenitis Suprativa    Consider washing  w/ Baby soap for maintenance (J&J) care     Can use Vashe cleanser or Dakin's 1/4 strength solution for acute flare ups and while in the hospital  Consider using Aquacel (Ag) + Gauze dressing securing w/ paper tape vs cling  Abx per PRS  Moisturize intact skin w/ SWEEN cream BID  Nutrition Consult for optimization        encourage high quality protein, clark/ prosource, MVI & Vit C to promote wound healing  Continue turning and positioning w/ offloading assistive devices as per protocol  ontinue w/ attends under pads and Pericare w/ alaniz maintenance as per protocol  Waffle Cushion to chair when oob to chair  Continue w/ low air loss pressure redistribution bed surface   ROHO cushion for wheel chair upon discharge home  Care as per medicine,  remain available as requested  Upon discharge f/u as outpatient at Wound Center [address] [place], [place] [phone]  D/w team, attng & RN  Thank you for this consult  Nely Rivas PA-C CWS [id number]  Nights/ Weekends/ Holidays please call:  General Surgery Consult pager ([phone]) for emergencies  Wound PT for multilayer leg wrapping or VAC issues (x [phone])   I spent 55minutes face to face w/ this pt of which more than 50% of the time was spent counseling & coordinating care of this pt.  A/P: 32M with longstanding history of hidradenitis suppurativa presenting today for same day admission for planned debridement and STSG reconstruction of bilateral buttock wound. S/P B/L skin graft of buttocks from back donor site.      Wound Consult requested to assist w/ management of Bilateral Axillary Hydradenitis Suprativa    Consider washing  w/ Baby soap for maintenance (J&J) care     Can use Vashe cleanser or Dakin's 1/4 strength solution for acute flare ups and while in the hospital  Consider using Aquacel (Ag) + Gauze dressing securing w/ paper tape vs cling  Abx per PRS  Moisturize intact skin w/ SWEEN cream BID  Nutrition Consult for optimization        encourage high quality protein, clark/ prosource, MVI & Vit C to promote wound healing  Continue turning and positioning w/ offloading assistive devices as per protocol  ontinue w/ attends under pads and Pericare w/ alaniz maintenance as per protocol  Waffle Cushion to chair when oob to chair  Continue w/ low air loss pressure redistribution bed surface   ROHO cushion for wheel chair upon discharge home  Care as per PRS,  remain available as requested  Upon discharge f/u as outpatient at Wound Center [address] [place], [place] [phone]  D/w team, attng & RN  Thank you for this consult  Nely Rivas PA-C CWS [id number]  Nights/ Weekends/ Holidays please call:  General Surgery Consult pager ([phone]) for emergencies  Wound PT for multilayer leg wrapping or VAC issues (x [phone])   I spent 55minutes face to face w/ this pt of which more than 50% of the time was spent counseling & coordinating care of this pt.

## 2023-03-25 LAB
ANION GAP SERPL CALC-SCNC: 10 MMOL/L — SIGNIFICANT CHANGE UP (ref 5–17)
BUN SERPL-MCNC: 12 MG/DL — SIGNIFICANT CHANGE UP (ref 7–23)
CALCIUM SERPL-MCNC: 8.7 MG/DL — SIGNIFICANT CHANGE UP (ref 8.4–10.5)
CHLORIDE SERPL-SCNC: 102 MMOL/L — SIGNIFICANT CHANGE UP (ref 96–108)
CO2 SERPL-SCNC: 27 MMOL/L — SIGNIFICANT CHANGE UP (ref 22–31)
CREAT SERPL-MCNC: 0.66 MG/DL — SIGNIFICANT CHANGE UP (ref 0.5–1.3)
EGFR: 128 ML/MIN/1.73M2 — SIGNIFICANT CHANGE UP
GLUCOSE SERPL-MCNC: 85 MG/DL — SIGNIFICANT CHANGE UP (ref 70–99)
HCT VFR BLD CALC: 28.7 % — LOW (ref 39–50)
HGB BLD-MCNC: 8.7 G/DL — LOW (ref 13–17)
MAGNESIUM SERPL-MCNC: 1.8 MG/DL — SIGNIFICANT CHANGE UP (ref 1.6–2.6)
MCHC RBC-ENTMCNC: 25.4 PG — LOW (ref 27–34)
MCHC RBC-ENTMCNC: 30.3 GM/DL — LOW (ref 32–36)
MCV RBC AUTO: 83.7 FL — SIGNIFICANT CHANGE UP (ref 80–100)
NRBC # BLD: 0 /100 WBCS — SIGNIFICANT CHANGE UP (ref 0–0)
PHOSPHATE SERPL-MCNC: 3.1 MG/DL — SIGNIFICANT CHANGE UP (ref 2.5–4.5)
PLATELET # BLD AUTO: 427 K/UL — HIGH (ref 150–400)
POTASSIUM SERPL-MCNC: 3.8 MMOL/L — SIGNIFICANT CHANGE UP (ref 3.5–5.3)
POTASSIUM SERPL-SCNC: 3.8 MMOL/L — SIGNIFICANT CHANGE UP (ref 3.5–5.3)
RBC # BLD: 3.43 M/UL — LOW (ref 4.2–5.8)
RBC # FLD: 18.4 % — HIGH (ref 10.3–14.5)
SODIUM SERPL-SCNC: 139 MMOL/L — SIGNIFICANT CHANGE UP (ref 135–145)
WBC # BLD: 10.55 K/UL — HIGH (ref 3.8–10.5)
WBC # FLD AUTO: 10.55 K/UL — HIGH (ref 3.8–10.5)

## 2023-03-25 RX ADMIN — Medication 100 MILLIGRAM(S): at 06:00

## 2023-03-25 RX ADMIN — Medication 100 MILLIGRAM(S): at 21:16

## 2023-03-25 RX ADMIN — Medication 100 MILLIGRAM(S): at 14:32

## 2023-03-25 RX ADMIN — SODIUM CHLORIDE 75 MILLILITER(S): 9 INJECTION, SOLUTION INTRAVENOUS at 07:00

## 2023-03-25 RX ADMIN — ENOXAPARIN SODIUM 40 MILLIGRAM(S): 100 INJECTION SUBCUTANEOUS at 14:32

## 2023-03-25 NOTE — PROGRESS NOTE ADULT - ASSESSMENT
32M with longstanding history of hidradenitis suppurativa presenting today for same day admission for planned debridement and STSG reconstruction of bilateral buttock wound. S/P B/L skin graft of buttocks from back donor site.    Plan:  - Pain control PRN  - LRD  - DVT ppx w/ SCDs and LVX  - Strict prone bedrest  - Dressing down POD5    Plastic Surgery

## 2023-03-26 LAB
ANION GAP SERPL CALC-SCNC: 8 MMOL/L — SIGNIFICANT CHANGE UP (ref 5–17)
BUN SERPL-MCNC: 12 MG/DL — SIGNIFICANT CHANGE UP (ref 7–23)
CALCIUM SERPL-MCNC: 9.1 MG/DL — SIGNIFICANT CHANGE UP (ref 8.4–10.5)
CHLORIDE SERPL-SCNC: 101 MMOL/L — SIGNIFICANT CHANGE UP (ref 96–108)
CO2 SERPL-SCNC: 28 MMOL/L — SIGNIFICANT CHANGE UP (ref 22–31)
CREAT SERPL-MCNC: 0.59 MG/DL — SIGNIFICANT CHANGE UP (ref 0.5–1.3)
EGFR: 132 ML/MIN/1.73M2 — SIGNIFICANT CHANGE UP
GLUCOSE SERPL-MCNC: 82 MG/DL — SIGNIFICANT CHANGE UP (ref 70–99)
HCT VFR BLD CALC: 29.4 % — LOW (ref 39–50)
HGB BLD-MCNC: 9.2 G/DL — LOW (ref 13–17)
MAGNESIUM SERPL-MCNC: 1.8 MG/DL — SIGNIFICANT CHANGE UP (ref 1.6–2.6)
MCHC RBC-ENTMCNC: 25.9 PG — LOW (ref 27–34)
MCHC RBC-ENTMCNC: 31.3 GM/DL — LOW (ref 32–36)
MCV RBC AUTO: 82.8 FL — SIGNIFICANT CHANGE UP (ref 80–100)
NRBC # BLD: 0 /100 WBCS — SIGNIFICANT CHANGE UP (ref 0–0)
PHOSPHATE SERPL-MCNC: 4 MG/DL — SIGNIFICANT CHANGE UP (ref 2.5–4.5)
PLATELET # BLD AUTO: 433 K/UL — HIGH (ref 150–400)
POTASSIUM SERPL-MCNC: 3.8 MMOL/L — SIGNIFICANT CHANGE UP (ref 3.5–5.3)
POTASSIUM SERPL-SCNC: 3.8 MMOL/L — SIGNIFICANT CHANGE UP (ref 3.5–5.3)
RBC # BLD: 3.55 M/UL — LOW (ref 4.2–5.8)
RBC # FLD: 18.2 % — HIGH (ref 10.3–14.5)
SODIUM SERPL-SCNC: 137 MMOL/L — SIGNIFICANT CHANGE UP (ref 135–145)
WBC # BLD: 9.51 K/UL — SIGNIFICANT CHANGE UP (ref 3.8–10.5)
WBC # FLD AUTO: 9.51 K/UL — SIGNIFICANT CHANGE UP (ref 3.8–10.5)

## 2023-03-26 RX ORDER — OXYCODONE HYDROCHLORIDE 5 MG/1
5 TABLET ORAL EVERY 4 HOURS
Refills: 0 | Status: DISCONTINUED | OUTPATIENT
Start: 2023-03-26 | End: 2023-03-31

## 2023-03-26 RX ORDER — OXYCODONE HYDROCHLORIDE 5 MG/1
5 TABLET ORAL EVERY 4 HOURS
Refills: 0 | Status: DISCONTINUED | OUTPATIENT
Start: 2023-03-26 | End: 2023-03-26

## 2023-03-26 RX ORDER — OXYCODONE HYDROCHLORIDE 5 MG/1
10 TABLET ORAL EVERY 4 HOURS
Refills: 0 | Status: DISCONTINUED | OUTPATIENT
Start: 2023-03-26 | End: 2023-03-31

## 2023-03-26 RX ADMIN — Medication 100 MILLIGRAM(S): at 21:06

## 2023-03-26 RX ADMIN — HYDROMORPHONE HYDROCHLORIDE 30 MILLILITER(S): 2 INJECTION INTRAMUSCULAR; INTRAVENOUS; SUBCUTANEOUS at 08:17

## 2023-03-26 RX ADMIN — Medication 100 MILLIGRAM(S): at 06:00

## 2023-03-26 RX ADMIN — ENOXAPARIN SODIUM 40 MILLIGRAM(S): 100 INJECTION SUBCUTANEOUS at 13:36

## 2023-03-26 RX ADMIN — Medication 100 MILLIGRAM(S): at 13:36

## 2023-03-26 NOTE — PROGRESS NOTE ADULT - ASSESSMENT
Assessment & Plan  32M with longstanding history of hidradenitis suppurativa presenting today for same day admission for planned debridement and STSG reconstruction of bilateral buttock wound. S/P B/L skin graft of buttocks from back donor site.    Plan:  - Pain control PRN  - LRD  - DVT ppx w/ SCDs and LVX  - Strict prone bedrest  - Dressing down POD5  - wound care to axilla per recs    #[id number]    Estefany Mayen  Plastic & Reconstructive Surgery, PGY-1

## 2023-03-27 RX ADMIN — Medication 100 MILLIGRAM(S): at 21:26

## 2023-03-27 RX ADMIN — Medication 100 MILLIGRAM(S): at 05:06

## 2023-03-27 RX ADMIN — ENOXAPARIN SODIUM 40 MILLIGRAM(S): 100 INJECTION SUBCUTANEOUS at 14:23

## 2023-03-27 RX ADMIN — Medication 100 MILLIGRAM(S): at 14:23

## 2023-03-27 NOTE — PROGRESS NOTE ADULT - ASSESSMENT
32M with longstanding history of hidradenitis suppurativa presenting today for same day admission for planned debridement and STSG reconstruction of bilateral buttock wound. S/P B/L skin graft of buttocks from back donor site.    Plan:  - Pain control PRN  - LRD  - DVT ppx w/ SCDs and LVX  - Strict prone bedrest  - Dressing down POD5  - wound care to axilla per recs

## 2023-03-28 ENCOUNTER — TRANSCRIPTION ENCOUNTER (OUTPATIENT)
Age: 33
End: 2023-03-28

## 2023-03-28 RX ORDER — POLYETHYLENE GLYCOL 3350 17 G/17G
17 POWDER, FOR SOLUTION ORAL DAILY
Refills: 0 | Status: DISCONTINUED | OUTPATIENT
Start: 2023-03-28 | End: 2023-03-31

## 2023-03-28 RX ORDER — MAGNESIUM HYDROXIDE 400 MG/1
30 TABLET, CHEWABLE ORAL ONCE
Refills: 0 | Status: COMPLETED | OUTPATIENT
Start: 2023-03-28 | End: 2023-03-28

## 2023-03-28 RX ADMIN — Medication 100 MILLIGRAM(S): at 05:52

## 2023-03-28 RX ADMIN — Medication 100 MILLIGRAM(S): at 13:18

## 2023-03-28 RX ADMIN — ENOXAPARIN SODIUM 40 MILLIGRAM(S): 100 INJECTION SUBCUTANEOUS at 13:24

## 2023-03-28 RX ADMIN — OXYCODONE HYDROCHLORIDE 10 MILLIGRAM(S): 5 TABLET ORAL at 07:38

## 2023-03-28 RX ADMIN — SODIUM CHLORIDE 75 MILLILITER(S): 9 INJECTION, SOLUTION INTRAVENOUS at 13:17

## 2023-03-28 RX ADMIN — OXYCODONE HYDROCHLORIDE 10 MILLIGRAM(S): 5 TABLET ORAL at 06:47

## 2023-03-28 RX ADMIN — MAGNESIUM HYDROXIDE 30 MILLILITER(S): 400 TABLET, CHEWABLE ORAL at 17:14

## 2023-03-28 RX ADMIN — POLYETHYLENE GLYCOL 3350 17 GRAM(S): 17 POWDER, FOR SOLUTION ORAL at 20:11

## 2023-03-28 NOTE — DISCHARGE NOTE PROVIDER - PROVIDER TOKENS
PROVIDER:[TOKEN:[[id number]],FOLLOWUP:[1 week]] PROVIDER:[TOKEN:[[id number]],FOLLOWUP:[2 weeks]]

## 2023-03-28 NOTE — PHYSICAL THERAPY INITIAL EVALUATION ADULT - PERTINENT HX OF CURRENT PROBLEM, REHAB EVAL
32M with longstanding history of hidradenitis suppurativa presenting today for same day admission for planned debridement and STSG reconstruction of bilateral buttock wound. S/P B/L skin graft of buttocks from back donor site.

## 2023-03-28 NOTE — PHYSICAL THERAPY INITIAL EVALUATION ADULT - ADDITIONAL COMMENTS
Pt lives in an apartment with 1 step to enter and elevator access.  Pt has been ambulating with a cane for the past two weeks, previously an independent ambulator.  Pt is independent with ADLs.  Pt bathes using a tub.  Per chart review, pt noted to have event today where pt was lowered down to the floor, denied injury or trauma. Per provider, pt cleared for PT eval.  Pt denies any other recent falls.

## 2023-03-28 NOTE — PROGRESS NOTE ADULT - ASSESSMENT
Assessment & Plan  32M with longstanding history of hidradenitis suppurativa presenting today for same day admission for planned debridement and STSG reconstruction of bilateral buttock wound. S/P B/L skin graft of buttocks from back donor site.    Plan:  - Pain control PRN  - LRD  - DVT ppx w/ SCDs and LVX  - Strict prone bedrest  - Dressing down, xeroform to donor site, aquacel extra to recipient site.  - wound care to axilla per suman Mayen  Plastic & Reconstructive Surgery, PGY-1  #[id number]

## 2023-03-28 NOTE — DISCHARGE NOTE PROVIDER - CARE PROVIDER_API CALL
Margarito Tariq)  Surgery  PlasticReconstruct  [address], [address]  [place] [address]  Phone: [phone]  Fax: [phone]  Follow Up Time: 1 week   Margarito Tariq)  Surgery  PlasticReconstruct  [address], [address]  [place] [address]  Phone: [phone]  Fax: [phone]  Follow Up Time: 2 weeks

## 2023-03-28 NOTE — DISCHARGE NOTE PROVIDER - NSDCFUADDINST_GEN_ALL_CORE_FT
You may shower. Do not scrub the affected area. Allow warm soapy water to run down affected area and gently pat dry. Redress areas as needed.  Do not sit directly on skin graft. You may walk or lay prone (face-down).  Continue daily dressing changes: xeroform to donor site and aquacel extra to recipient site.  You may take over the counter pain medication as needed. You may shower. Do not scrub the affected area. Allow warm soapy water to run down affected area and gently pat dry. Redress areas as needed.  You may sit on skin graft.  Continue daily dressing changes daily. Keep xeroform in place on back. If it comes off, replace as needed. Gently apply bacitracin daily to skin graft and cover with abd pads.  You may take over the counter pain medication as needed.  Follow up in 2 weeks

## 2023-03-28 NOTE — DISCHARGE NOTE PROVIDER - HOSPITAL COURSE
32y Male was admitted to [facility] on 03-23-23. The patient has a PMHx of Hidradenitis suppuritiva and underwent bilateral buttock debridement and wound reconstruction with STSG taken from the back in the OR. Postoperatively the patient recovered in the PACU, was hemodynamically stable, and was sent to the floor. The patient remained prone for 5 days on strict bedrest with frequent turning and pressure offloading. After 5 days, pressure dressing was removed. The skin graft was found to have good take;the donor site was dressed with xeroform and the recipient site dressed with aquacel extra. The patient's pain was controlled by PCA pump and was transitioned to PO medication. The patient was advanced to regular diet and tolerated it well. The patient worked with physical therapy who recommended OLGA. The patient was hemodynamically stable and placed on home medications. The patient was told to follow up with Dr. DEBBIE Tariq in 1-2 weeks and had no other issues. 32y Male was admitted to [facility] on 03-23-23. The patient has a PMHx of Hidradenitis suppuritiva and underwent bilateral buttock debridement and wound reconstruction with STSG taken from the back in the OR. Postoperatively the patient recovered in the PACU, was hemodynamically stable, and was sent to the floor. The patient remained prone for 5 days on strict bedrest with frequent turning and pressure offloading. After 5 days, pressure dressing was removed. The skin graft was found to have good take;the donor site was dressed with xeroform and the recipient site dressed with bacitracin and abd pads. The patient's pain was controlled by PCA pump and was transitioned to PO medication. The patient was advanced to regular diet and tolerated it well. The patient worked with physical therapy who recommended OLGA. The patient was hemodynamically stable and placed on home medications. The patient was told to follow up with Dr. DEBBIE Tariq in 1-2 weeks and had no other issues.

## 2023-03-28 NOTE — DISCHARGE NOTE PROVIDER - NSDCCPCAREPLAN_GEN_ALL_CORE_FT
PRINCIPAL DISCHARGE DIAGNOSIS  Diagnosis: Hidradenitis suppurativa  Assessment and Plan of Treatment:

## 2023-03-29 LAB
CULTURE RESULTS: SIGNIFICANT CHANGE UP
SPECIMEN SOURCE: SIGNIFICANT CHANGE UP

## 2023-03-29 RX ADMIN — POLYETHYLENE GLYCOL 3350 17 GRAM(S): 17 POWDER, FOR SOLUTION ORAL at 12:15

## 2023-03-29 RX ADMIN — ENOXAPARIN SODIUM 40 MILLIGRAM(S): 100 INJECTION SUBCUTANEOUS at 12:17

## 2023-03-29 NOTE — PROGRESS NOTE ADULT - ASSESSMENT
Assessment & Plan  32M with longstanding history of hidradenitis suppurativa presenting today for same day admission for planned debridement and STSG reconstruction of bilateral buttock wound. S/P B/L skin graft of buttocks from back donor site.    Plan:  - Pain control PRN  - red diet  - DVT ppx w/ SCDs and LVX  - Positioning: prone  - Miralax daily  - Dressing down, xeroform to donor site, aquacel extra to recipient site.  - wound care to axilla per suman Mayen  Plastic & Reconstructive Surgery, PGY-1  #[id number]

## 2023-03-30 LAB — SARS-COV-2 RNA SPEC QL NAA+PROBE: SIGNIFICANT CHANGE UP

## 2023-03-30 RX ADMIN — ENOXAPARIN SODIUM 40 MILLIGRAM(S): 100 INJECTION SUBCUTANEOUS at 14:21

## 2023-03-30 RX ADMIN — POLYETHYLENE GLYCOL 3350 17 GRAM(S): 17 POWDER, FOR SOLUTION ORAL at 12:23

## 2023-03-30 NOTE — PROGRESS NOTE ADULT - ASSESSMENT
Assessment & Plan  32M with longstanding history of hidradenitis suppurativa presenting today for same day admission for planned debridement and STSG reconstruction of bilateral buttock wound. S/P B/L skin graft of buttocks from back donor site.    Plan:  - Pain control PRN  - reg diet  - DVT ppx w/ SCDs and LVX  - Positioning: prone  - Miralax daily  - Dressing down, xeroform to donor site, bacitracin and abd pads to graft site  - Shower daily  - wound care to axilla per recs  - PT recs OLGA, RW when ambulating

## 2023-03-31 ENCOUNTER — NON-APPOINTMENT (OUTPATIENT)
Age: 33
End: 2023-03-31

## 2023-03-31 ENCOUNTER — TRANSCRIPTION ENCOUNTER (OUTPATIENT)
Age: 33
End: 2023-03-31

## 2023-03-31 VITALS
OXYGEN SATURATION: 100 % | HEART RATE: 90 BPM | RESPIRATION RATE: 18 BRPM | SYSTOLIC BLOOD PRESSURE: 115 MMHG | TEMPERATURE: 98 F | DIASTOLIC BLOOD PRESSURE: 69 MMHG

## 2023-03-31 PROCEDURE — 85025 COMPLETE CBC W/AUTO DIFF WBC: CPT

## 2023-03-31 PROCEDURE — 86850 RBC ANTIBODY SCREEN: CPT

## 2023-03-31 PROCEDURE — 86900 BLOOD TYPING SEROLOGIC ABO: CPT

## 2023-03-31 PROCEDURE — U0005: CPT

## 2023-03-31 PROCEDURE — 97162 PT EVAL MOD COMPLEX 30 MIN: CPT

## 2023-03-31 PROCEDURE — 84100 ASSAY OF PHOSPHORUS: CPT

## 2023-03-31 PROCEDURE — 97116 GAIT TRAINING THERAPY: CPT

## 2023-03-31 PROCEDURE — 36415 COLL VENOUS BLD VENIPUNCTURE: CPT

## 2023-03-31 PROCEDURE — U0003: CPT

## 2023-03-31 PROCEDURE — 86901 BLOOD TYPING SEROLOGIC RH(D): CPT

## 2023-03-31 PROCEDURE — 80048 BASIC METABOLIC PNL TOTAL CA: CPT

## 2023-03-31 PROCEDURE — 85027 COMPLETE CBC AUTOMATED: CPT

## 2023-03-31 PROCEDURE — 97110 THERAPEUTIC EXERCISES: CPT

## 2023-03-31 PROCEDURE — 83735 ASSAY OF MAGNESIUM: CPT

## 2023-03-31 PROCEDURE — C9399: CPT

## 2023-03-31 NOTE — PROGRESS NOTE ADULT - REASON FOR ADMISSION
Buttock wound recon with skin graft

## 2023-03-31 NOTE — PROGRESS NOTE ADULT - SUBJECTIVE AND OBJECTIVE BOX
JANNA CYR  [id number]    Subjective:  Patient is a 32y old  Male who presents with a chief complaint of Buttock wound recon with skin graft (23 Mar 2023 08:32)     Patient seen and examined at bedside. Patient with no new concerns. Denies fevers, chills, CP, dyspnea, dizziness, nausea, vomiting.    Objective:  T(C): 36.7 (03-24-23 @ 05:08), Max: 36.8 (03-23-23 @ 20:28)  HR: 70 (03-24-23 @ 05:08) (70 - 120)  BP: 110/64 (03-24-23 @ 05:08) (88/50 - 121/69)  RR: 18 (03-24-23 @ 05:08) (12 - 18)  SpO2: 100% (03-24-23 @ 05:08) (98% - 100%)  Wt(kg): --                             10.7   13.10 )-----------( 494      ( 23 Mar 2023 11:07 )             35.0       03-23 @ 07:01  -  03-24 @ 07:00  --------------------------------------------------------  IN: 1395 mL / OUT: 1500 mL / NET: -105 mL      PHYSICAL EXAM:    General:   General: No acute distress.  HEENT: Normocephalic, atraumatic  Respiratory: Bilateral breath sounds  Abdomen: Soft, non-distended  Skin: Donor site x2 covered in Abd pads on back,  Bilateral buttocks w/ STSG, Dressing C/D/I             MEDICATIONS  (STANDING):  ceFAZolin   IVPB 2000 milliGRAM(s) IV Intermittent every 8 hours  enoxaparin Injectable 40 milliGRAM(s) SubCutaneous every 24 hours  HYDROmorphone PCA (1 mG/mL) 30 milliLiter(s) PCA Continuous PCA Continuous  influenza   Vaccine 0.5 milliLiter(s) IntraMuscular once  lactated ringers. 1000 milliLiter(s) (75 mL/Hr) IV Continuous <Continuous>    MEDICATIONS  (PRN):  acetaminophen     Tablet .. 650 milliGRAM(s) Oral every 6 hours PRN Mild Pain (1 - 3)  calcium carbonate    500 mG (Tums) Chewable 3 Tablet(s) Chew every 6 hours PRN Dyspepsia  diphenhydrAMINE 25 milliGRAM(s) Oral every 4 hours PRN Rash and/or Itching  diphenhydrAMINE Injectable 25 milliGRAM(s) IV Push every 4 hours PRN Pruritus  HYDROmorphone PCA (1 mG/mL) Rescue Clinician Bolus 0.5 milliGRAM(s) IV Push every 15 minutes PRN for Pain Scale GREATER THAN 6  melatonin 3 milliGRAM(s) Oral at bedtime PRN Insomnia  metoclopramide Injectable 10 milliGRAM(s) IV Push every 6 hours PRN Nausea and/or Vomiting  naloxone Injectable 0.1 milliGRAM(s) IV Push every 3 minutes PRN For ANY of the following changes in patient status:  A. RR LESS THAN 10 breaths per minute, B. Oxygen saturation LESS THAN 90%, C. Sedation score of 6  ondansetron Injectable 4 milliGRAM(s) IV Push every 6 hours PRN Nausea  ondansetron Injectable 4 milliGRAM(s) IV Push every 6 hours PRN Nausea          
JANNA CYR  [id number]    Subjective:  Patient is a 32y old  Male who presents with a chief complaint of Buttock wound recon with skin graft (24 Mar 2023 11:56)     Patient seen and examined at bedside. Patient with no new concerns. Denies fevers, chills, CP, dyspnea, dizziness, nausea, vomiting.    Objective:  T(C): 36.8 (03-24-23 @ 21:04), Max: 36.8 (03-24-23 @ 21:04)  HR: 86 (03-24-23 @ 21:04) (74 - 88)  BP: 123/69 (03-24-23 @ 21:04) (105/68 - 123/69)  RR: 18 (03-24-23 @ 21:04) (18 - 18)  SpO2: 99% (03-24-23 @ 21:04) (98% - 100%)  Wt(kg): --   03-25    139  |  102  |  12  ----------------------------<  85  3.8   |  27  |  0.66    Ca    8.7      25 Mar 2023 07:10  Phos  3.1     03-25  Mg     1.8     03-25                          8.7    10.55 )-----------( 427      ( 25 Mar 2023 07:19 )             28.7       03-24 @ 07:01  -  03-25 @ 07:00  --------------------------------------------------------  IN: 1820 mL / OUT: 2250 mL / NET: -430 mL      PHYSICAL EXAM:    General:  General: No acute distress.  HEENT: Normocephalic, atraumatic  Respiratory: Bilateral breath sounds  Skin: Donor site x2 covered in Abd pads on back,  Bilateral buttocks w/ STSG, Dressing C/D/I               MEDICATIONS  (STANDING):  ceFAZolin   IVPB 2000 milliGRAM(s) IV Intermittent every 8 hours  enoxaparin Injectable 40 milliGRAM(s) SubCutaneous every 24 hours  HYDROmorphone PCA (1 mG/mL) 30 milliLiter(s) PCA Continuous PCA Continuous  influenza   Vaccine 0.5 milliLiter(s) IntraMuscular once  lactated ringers. 1000 milliLiter(s) (75 mL/Hr) IV Continuous <Continuous>    MEDICATIONS  (PRN):  acetaminophen     Tablet .. 650 milliGRAM(s) Oral every 6 hours PRN Mild Pain (1 - 3)  calcium carbonate    500 mG (Tums) Chewable 3 Tablet(s) Chew every 6 hours PRN Dyspepsia  diphenhydrAMINE 25 milliGRAM(s) Oral every 4 hours PRN Rash and/or Itching  diphenhydrAMINE Injectable 25 milliGRAM(s) IV Push every 4 hours PRN Pruritus  HYDROmorphone PCA (1 mG/mL) Rescue Clinician Bolus 0.5 milliGRAM(s) IV Push every 15 minutes PRN for Pain Scale GREATER THAN 6  melatonin 3 milliGRAM(s) Oral at bedtime PRN Insomnia  metoclopramide Injectable 10 milliGRAM(s) IV Push every 6 hours PRN Nausea and/or Vomiting  naloxone Injectable 0.1 milliGRAM(s) IV Push every 3 minutes PRN For ANY of the following changes in patient status:  A. RR LESS THAN 10 breaths per minute, B. Oxygen saturation LESS THAN 90%, C. Sedation score of 6  ondansetron Injectable 4 milliGRAM(s) IV Push every 6 hours PRN Nausea  ondansetron Injectable 4 milliGRAM(s) IV Push every 6 hours PRN Nausea          
Plastic Surgery Progress Note (pg [facility]: [id number], [facility]: [phone])    SUBJECTIVE  The patient was seen and examined.     OBJECTIVE  ___________________________________________________  VITAL SIGNS / I&O's   Vital Signs Last 24 Hrs  T(C): 36.8 (31 Mar 2023 05:19), Max: 37.1 (30 Mar 2023 21:38)  T(F): 98.2 (31 Mar 2023 05:19), Max: 98.8 (30 Mar 2023 21:38)  HR: 92 (31 Mar 2023 05:19) (92 - 105)  BP: 111/76 (31 Mar 2023 05:19) (106/70 - 128/70)  BP(mean): --  RR: 18 (31 Mar 2023 05:19) (18 - 18)  SpO2: 96% (31 Mar 2023 05:19) (96% - 100%)    Parameters below as of 31 Mar 2023 05:19  Patient On (Oxygen Delivery Method): room air          29 Mar 2023 07:01  -  30 Mar 2023 07:00  --------------------------------------------------------  IN:    Oral Fluid: 420 mL  Total IN: 420 mL    OUT:    Voided (mL): 2150 mL  Total OUT: 2150 mL    Total NET: -1730 mL      30 Mar 2023 07:01  -  31 Mar 2023 06:35  --------------------------------------------------------  IN:    Oral Fluid: 720 mL  Total IN: 720 mL    OUT:    Voided (mL): 1840 mL  Total OUT: 1840 mL    Total NET: -1120 mL        ___________________________________________________  PHYSICAL EXAM    General: No acute distress.  HEENT: Normocephalic, atraumatic  Respiratory: Bilateral breath sounds  Skin: Skin graft to B/L buttocks with good take, healthy granulation tissue unerneath; donor site with mild oozing. No surrounding errythema/induration      ___________________________________________________  LABS        ___________________________________________________  MEDICATIONS  (STANDING):  enoxaparin Injectable 40 milliGRAM(s) SubCutaneous every 24 hours  influenza   Vaccine 0.5 milliLiter(s) IntraMuscular once  polyethylene glycol 3350 17 Gram(s) Oral daily    MEDICATIONS  (PRN):  acetaminophen     Tablet .. 650 milliGRAM(s) Oral every 6 hours PRN Mild Pain (1 - 3)  calcium carbonate    500 mG (Tums) Chewable 3 Tablet(s) Chew every 6 hours PRN Dyspepsia  diphenhydrAMINE 25 milliGRAM(s) Oral every 4 hours PRN Rash and/or Itching  melatonin 3 milliGRAM(s) Oral at bedtime PRN Insomnia  metoclopramide Injectable 10 milliGRAM(s) IV Push every 6 hours PRN Nausea and/or Vomiting  ondansetron Injectable 4 milliGRAM(s) IV Push every 6 hours PRN Nausea  oxyCODONE    IR 5 milliGRAM(s) Oral every 4 hours PRN Moderate Pain (4 - 6)  oxyCODONE    IR 10 milliGRAM(s) Oral every 4 hours PRN Severe Pain (7 - 10)        
Day _3_ of Anesthesia Pain Management Service    SUBJECTIVE: Patient is doing well with IV PCA    Pain Scale Score:	[X] Refer to charted pain scores    THERAPY:    [ ] IV PCA Morphine		[ ] 5 mg/mL	[ ] 1 mg/mL  [X] IV PCA Hydromorphone	[ ] 5 mg/mL	[X] 1 mg/mL  [ ] IV PCA Fentanyl		[ ] 50 micrograms/mL    Demand dose: 0.2 mg     Lockout: 6 minutes   Continuous Rate: 0 mg/hr  4 Hour Limit: 4 mg    MEDICATIONS  (STANDING):  ceFAZolin   IVPB 2000 milliGRAM(s) IV Intermittent every 8 hours  enoxaparin Injectable 40 milliGRAM(s) SubCutaneous every 24 hours  HYDROmorphone PCA (1 mG/mL) 30 milliLiter(s) PCA Continuous PCA Continuous  influenza   Vaccine 0.5 milliLiter(s) IntraMuscular once  lactated ringers. 1000 milliLiter(s) (75 mL/Hr) IV Continuous <Continuous>    MEDICATIONS  (PRN):  acetaminophen     Tablet .. 650 milliGRAM(s) Oral every 6 hours PRN Mild Pain (1 - 3)  calcium carbonate    500 mG (Tums) Chewable 3 Tablet(s) Chew every 6 hours PRN Dyspepsia  diphenhydrAMINE 25 milliGRAM(s) Oral every 4 hours PRN Rash and/or Itching  diphenhydrAMINE Injectable 25 milliGRAM(s) IV Push every 4 hours PRN Pruritus  HYDROmorphone PCA (1 mG/mL) Rescue Clinician Bolus 0.5 milliGRAM(s) IV Push every 15 minutes PRN for Pain Scale GREATER THAN 6  melatonin 3 milliGRAM(s) Oral at bedtime PRN Insomnia  metoclopramide Injectable 10 milliGRAM(s) IV Push every 6 hours PRN Nausea and/or Vomiting  naloxone Injectable 0.1 milliGRAM(s) IV Push every 3 minutes PRN For ANY of the following changes in patient status:  A. RR LESS THAN 10 breaths per minute, B. Oxygen saturation LESS THAN 90%, C. Sedation score of 6  ondansetron Injectable 4 milliGRAM(s) IV Push every 6 hours PRN Nausea  ondansetron Injectable 4 milliGRAM(s) IV Push every 6 hours PRN Nausea      OBJECTIVE:    Sedation Score:	[ X] Alert	[ ] Drowsy 	[ ] Arousable	[ ] Asleep	[ ] Unresponsive    Side Effects:	[X ] None	[ ] Nausea	[ ] Vomiting	[ ] Pruritus  		[ ] Other:    Vital Signs Last 24 Hrs  T(C): 37.4 (26 Mar 2023 10:29), Max: 37.4 (26 Mar 2023 10:29)  T(F): 99.4 (26 Mar 2023 10:29), Max: 99.4 (26 Mar 2023 10:29)  HR: 82 (26 Mar 2023 10:29) (82 - 93)  BP: 120/72 (26 Mar 2023 10:29) (107/68 - 120/72)  BP(mean): --  RR: 18 (26 Mar 2023 10:29) (18 - 18)  SpO2: 99% (26 Mar 2023 10:29) (97% - 99%)    Parameters below as of 26 Mar 2023 10:29  Patient On (Oxygen Delivery Method): room air        ASSESSMENT/ PLAN    Therapy to  be:               [] Continued   [ x] Discontinued   [x ] Changed to PRN Analgesics    Documentation and Verification of current medications:   [X] Done	[ ] Not done, not eligible    Comments:
Plastic Surgery Progress Note (pg [facility]: [id number], [facility]: [phone])    SUBJECTIVE  The patient was seen and examined. Patient denies CP/SOB/N/V/D. chills, fevers. Endorses tolerable pain relieved by pain medication. No BM.    OBJECTIVE  ___________________________________________________  VITAL SIGNS / I&O's   Vital Signs Last 24 Hrs  T(C): 36.8 (28 Mar 2023 05:45), Max: 37.4 (27 Mar 2023 21:34)  T(F): 98.2 (28 Mar 2023 05:45), Max: 99.4 (27 Mar 2023 21:34)  HR: 96 (28 Mar 2023 05:45) (96 - 102)  BP: 110/76 (28 Mar 2023 05:45) (110/76 - 124/75)  BP(mean): --  RR: 18 (28 Mar 2023 05:45) (18 - 18)  SpO2: 98% (28 Mar 2023 05:45) (97% - 98%)    Parameters below as of 28 Mar 2023 05:45  Patient On (Oxygen Delivery Method): room air          27 Mar 2023 07:01  -  28 Mar 2023 07:00  --------------------------------------------------------  IN:    IV PiggyBack: 50 mL    Lactated Ringers: 900 mL    Oral Fluid: 840 mL  Total IN: 1790 mL    OUT:    Voided (mL): 2700 mL  Total OUT: 2700 mL    Total NET: -910 mL        ___________________________________________________  PHYSICAL EXAM    General: No acute distress.  HEENT: Normocephalic, atraumatic  Respiratory: Bilateral breath sounds  Skin: Skin graft to B/L buttocks with good take, healthy granulation tissue unerneath; donor site with mild oozing. No surrounding errythema/induration    ___________________________________________________  LABS                        9.2    9.51  )-----------( 433      ( 26 Mar 2023 07:34 )             29.4     26 Mar 2023 07:37    137    |  101    |  12     ----------------------------<  82     3.8     |  28     |  0.59     Ca    9.1        26 Mar 2023 07:37  Phos  4.0       26 Mar 2023 07:37  Mg     1.8       26 Mar 2023 07:37        CAPILLARY BLOOD GLUCOSE            ___________________________________________________  MEDICATIONS  (STANDING):  ceFAZolin   IVPB 2000 milliGRAM(s) IV Intermittent every 8 hours  enoxaparin Injectable 40 milliGRAM(s) SubCutaneous every 24 hours  influenza   Vaccine 0.5 milliLiter(s) IntraMuscular once  lactated ringers. 1000 milliLiter(s) (75 mL/Hr) IV Continuous <Continuous>    MEDICATIONS  (PRN):  acetaminophen     Tablet .. 650 milliGRAM(s) Oral every 6 hours PRN Mild Pain (1 - 3)  calcium carbonate    500 mG (Tums) Chewable 3 Tablet(s) Chew every 6 hours PRN Dyspepsia  diphenhydrAMINE 25 milliGRAM(s) Oral every 4 hours PRN Rash and/or Itching  melatonin 3 milliGRAM(s) Oral at bedtime PRN Insomnia  metoclopramide Injectable 10 milliGRAM(s) IV Push every 6 hours PRN Nausea and/or Vomiting  ondansetron Injectable 4 milliGRAM(s) IV Push every 6 hours PRN Nausea  oxyCODONE    IR 5 milliGRAM(s) Oral every 4 hours PRN Moderate Pain (4 - 6)  oxyCODONE    IR 10 milliGRAM(s) Oral every 4 hours PRN Severe Pain (7 - 10)      
Day 1 of Anesthesia Pain Management Service    SUBJECTIVE: I'm doing ok    Pain Scale Score:	[X] Refer to charted pain scores    THERAPY:    [ ] IV PCA Morphine		[ ] 5 mg/mL	[ ] 1 mg/mL  [X] IV PCA Hydromorphone	[ ] 5 mg/mL	[X] 1 mg/mL  [ ] IV PCA Fentanyl		[ ] 50 micrograms/mL    Demand dose: 0.2 mg     Lockout: 6 minutes   Continuous Rate: 0 mg/hr  4 Hour Limit: 4 mg    MEDICATIONS  (STANDING):  ceFAZolin   IVPB 2000 milliGRAM(s) IV Intermittent every 8 hours  enoxaparin Injectable 40 milliGRAM(s) SubCutaneous every 24 hours  HYDROmorphone PCA (1 mG/mL) 30 milliLiter(s) PCA Continuous PCA Continuous  influenza   Vaccine 0.5 milliLiter(s) IntraMuscular once  lactated ringers. 1000 milliLiter(s) (75 mL/Hr) IV Continuous <Continuous>    MEDICATIONS  (PRN):  acetaminophen     Tablet .. 650 milliGRAM(s) Oral every 6 hours PRN Mild Pain (1 - 3)  calcium carbonate    500 mG (Tums) Chewable 3 Tablet(s) Chew every 6 hours PRN Dyspepsia  diphenhydrAMINE 25 milliGRAM(s) Oral every 4 hours PRN Rash and/or Itching  diphenhydrAMINE Injectable 25 milliGRAM(s) IV Push every 4 hours PRN Pruritus  HYDROmorphone PCA (1 mG/mL) Rescue Clinician Bolus 0.5 milliGRAM(s) IV Push every 15 minutes PRN for Pain Scale GREATER THAN 6  melatonin 3 milliGRAM(s) Oral at bedtime PRN Insomnia  metoclopramide Injectable 10 milliGRAM(s) IV Push every 6 hours PRN Nausea and/or Vomiting  naloxone Injectable 0.1 milliGRAM(s) IV Push every 3 minutes PRN For ANY of the following changes in patient status:  A. RR LESS THAN 10 breaths per minute, B. Oxygen saturation LESS THAN 90%, C. Sedation score of 6  ondansetron Injectable 4 milliGRAM(s) IV Push every 6 hours PRN Nausea  ondansetron Injectable 4 milliGRAM(s) IV Push every 6 hours PRN Nausea      OBJECTIVE:    Sedation Score:	[ X] Alert 	[ ] Drowsy 	[ ] Arousable	[ ] Asleep	[ ] Unresponsive    Side Effects:	[X ] None	[ ] Nausea	[ ] Vomiting	[ ] Pruritus  		[ ] Other:    Vital Signs Last 24 Hrs  T(C): 36.7 (24 Mar 2023 09:01), Max: 36.8 (23 Mar 2023 20:28)  T(F): 98 (24 Mar 2023 09:01), Max: 98.3 (23 Mar 2023 20:28)  HR: 73 (24 Mar 2023 09:01) (70 - 120)  BP: 107/61 (24 Mar 2023 09:01) (88/50 - 121/69)  BP(mean): 70 (23 Mar 2023 20:00) (63 - 77)  RR: 18 (24 Mar 2023 09:01) (12 - 18)  SpO2: 100% (24 Mar 2023 09:01) (98% - 100%)    Parameters below as of 24 Mar 2023 09:01  Patient On (Oxygen Delivery Method): room air        ASSESSMENT/ PLAN    Therapy to  be:               [X] Continued   [ ] Discontinued   [ ] Changed to PRN Analgesics    Documentation and Verification of current medications:   [X] Done	[ ] Not done, not eligible    Comments: Total PCA use 0.8mg / 16 hours. Bedrest. Continue PCA.
Day 2 of Anesthesia Pain Management Service    SUBJECTIVE: Patient is doing well with IV PCA    Pain Scale Score:	[X] Refer to charted pain scores    THERAPY:    [ ] IV PCA Morphine		[ ] 5 mg/mL	[ ] 1 mg/mL  [X] IV PCA Hydromorphone	[ ] 5 mg/mL	[X] 1 mg/mL  [ ] IV PCA Fentanyl		[ ] 50 micrograms/mL    Demand dose: 0.2 mg     Lockout: 6 minutes   Continuous Rate: 0 mg/hr  4 Hour Limit: 4 mg    MEDICATIONS  (STANDING):  ceFAZolin   IVPB 2000 milliGRAM(s) IV Intermittent every 8 hours  enoxaparin Injectable 40 milliGRAM(s) SubCutaneous every 24 hours  HYDROmorphone PCA (1 mG/mL) 30 milliLiter(s) PCA Continuous PCA Continuous  influenza   Vaccine 0.5 milliLiter(s) IntraMuscular once  lactated ringers. 1000 milliLiter(s) (75 mL/Hr) IV Continuous <Continuous>    MEDICATIONS  (PRN):  acetaminophen     Tablet .. 650 milliGRAM(s) Oral every 6 hours PRN Mild Pain (1 - 3)  calcium carbonate    500 mG (Tums) Chewable 3 Tablet(s) Chew every 6 hours PRN Dyspepsia  diphenhydrAMINE 25 milliGRAM(s) Oral every 4 hours PRN Rash and/or Itching  diphenhydrAMINE Injectable 25 milliGRAM(s) IV Push every 4 hours PRN Pruritus  HYDROmorphone PCA (1 mG/mL) Rescue Clinician Bolus 0.5 milliGRAM(s) IV Push every 15 minutes PRN for Pain Scale GREATER THAN 6  melatonin 3 milliGRAM(s) Oral at bedtime PRN Insomnia  metoclopramide Injectable 10 milliGRAM(s) IV Push every 6 hours PRN Nausea and/or Vomiting  naloxone Injectable 0.1 milliGRAM(s) IV Push every 3 minutes PRN For ANY of the following changes in patient status:  A. RR LESS THAN 10 breaths per minute, B. Oxygen saturation LESS THAN 90%, C. Sedation score of 6  ondansetron Injectable 4 milliGRAM(s) IV Push every 6 hours PRN Nausea  ondansetron Injectable 4 milliGRAM(s) IV Push every 6 hours PRN Nausea      OBJECTIVE:    Sedation Score:	[ X] Alert	[ ] Drowsy 	[ ] Arousable	[ ] Asleep	[ ] Unresponsive    Side Effects:	[X ] None	[ ] Nausea	[ ] Vomiting	[ ] Pruritus  		[ ] Other:    Vital Signs Last 24 Hrs  T(C): 36.8 (24 Mar 2023 21:04), Max: 36.8 (24 Mar 2023 21:04)  T(F): 98.2 (24 Mar 2023 21:04), Max: 98.2 (24 Mar 2023 21:04)  HR: 86 (24 Mar 2023 21:04) (74 - 88)  BP: 123/69 (24 Mar 2023 21:04) (105/68 - 123/69)  BP(mean): --  RR: 18 (24 Mar 2023 21:04) (18 - 18)  SpO2: 99% (24 Mar 2023 21:04) (98% - 100%)    Parameters below as of 24 Mar 2023 21:04  Patient On (Oxygen Delivery Method): room air        ASSESSMENT/ PLAN    Therapy to  be:               [X] Continued   [ ] Discontinued   [ ] Changed to PRN Analgesics    Documentation and Verification of current medications:   [X] Done	[ ] Not done, not eligible    Comments:
Pain Management Attending Addendum    SUBJECTIVE: Patient doing well with IV PCA    Therapy:    [X] IV PCA         [ ] PRN Analgesics    OBJECTIVE:   [X] Pain appropriately controlled    [ ] Other:    Side Effects:  [X] None	             [ ] Nausea              [ ] Pruritis                	[ ] Other:    ASSESSMENT/PLAN: Continue current therapy    Comments:
Plastic Surgery Progress Note (pg [facility]: [id number], [facility]: [phone])    SUBJECTIVE  The patient was seen and examined. Denies pain/CP/NVD. + BM yesterday.    OBJECTIVE  ___________________________________________________  VITAL SIGNS / I&O's   Vital Signs Last 24 Hrs  T(C): 36.8 (29 Mar 2023 05:40), Max: 37.1 (29 Mar 2023 01:41)  T(F): 98.3 (29 Mar 2023 05:40), Max: 98.7 (29 Mar 2023 01:41)  HR: 102 (29 Mar 2023 05:40) (90 - 102)  BP: 113/78 (29 Mar 2023 05:40) (108/71 - 121/76)  BP(mean): --  RR: 18 (29 Mar 2023 05:40) (18 - 18)  SpO2: 100% (29 Mar 2023 05:40) (97% - 100%)    Parameters below as of 29 Mar 2023 05:40  Patient On (Oxygen Delivery Method): room air          28 Mar 2023 07:01  -  29 Mar 2023 07:00  --------------------------------------------------------  IN:    Lactated Ringers: 900 mL    Oral Fluid: 820 mL  Total IN: 1720 mL    OUT:    Voided (mL): 1750 mL  Total OUT: 1750 mL    Total NET: -30 mL        ___________________________________________________  PHYSICAL EXAM    General: No acute distress.  HEENT: Normocephalic, atraumatic  Respiratory: Bilateral breath sounds  Skin: Skin graft to B/L buttocks with good take, healthy granulation tissue unerneath; donor site with mild oozing. No surrounding errythema/induration    ___________________________________________________  LABS            CAPILLARY BLOOD GLUCOSE              ___________________________________________________  MEDICATIONS  (STANDING):  enoxaparin Injectable 40 milliGRAM(s) SubCutaneous every 24 hours  influenza   Vaccine 0.5 milliLiter(s) IntraMuscular once  lactated ringers. 1000 milliLiter(s) (75 mL/Hr) IV Continuous <Continuous>  polyethylene glycol 3350 17 Gram(s) Oral daily    MEDICATIONS  (PRN):  acetaminophen     Tablet .. 650 milliGRAM(s) Oral every 6 hours PRN Mild Pain (1 - 3)  calcium carbonate    500 mG (Tums) Chewable 3 Tablet(s) Chew every 6 hours PRN Dyspepsia  diphenhydrAMINE 25 milliGRAM(s) Oral every 4 hours PRN Rash and/or Itching  melatonin 3 milliGRAM(s) Oral at bedtime PRN Insomnia  metoclopramide Injectable 10 milliGRAM(s) IV Push every 6 hours PRN Nausea and/or Vomiting  ondansetron Injectable 4 milliGRAM(s) IV Push every 6 hours PRN Nausea  oxyCODONE    IR 5 milliGRAM(s) Oral every 4 hours PRN Moderate Pain (4 - 6)  oxyCODONE    IR 10 milliGRAM(s) Oral every 4 hours PRN Severe Pain (7 - 10)          
Plastic Surgery Progress Note (pg [facility]: [id number], [facility]: [phone])    SUBJECTIVE  The patient was seen and examined.     OBJECTIVE  ___________________________________________________  VITAL SIGNS / I&O's   Vital Signs Last 24 Hrs  T(C): 36.4 (26 Mar 2023 05:32), Max: 37.2 (25 Mar 2023 21:02)  T(F): 97.6 (26 Mar 2023 05:32), Max: 98.9 (25 Mar 2023 21:02)  HR: 82 (26 Mar 2023 05:32) (82 - 93)  BP: 112/70 (26 Mar 2023 05:32) (107/68 - 125/70)  BP(mean): --  RR: 18 (26 Mar 2023 05:32) (18 - 18)  SpO2: 99% (26 Mar 2023 05:32) (97% - 99%)    Parameters below as of 26 Mar 2023 05:32  Patient On (Oxygen Delivery Method): room air          25 Mar 2023 07:01  -  26 Mar 2023 07:00  --------------------------------------------------------  IN:    IV PiggyBack: 150 mL    Lactated Ringers: 450 mL    Oral Fluid: 760 mL  Total IN: 1360 mL    OUT:    Voided (mL): 3050 mL  Total OUT: 3050 mL    Total NET: -1690 mL        ___________________________________________________  PHYSICAL EXAM    General:  General: No acute distress.  HEENT: Normocephalic, atraumatic  Respiratory: Bilateral breath sounds  Skin: Donor site x2 covered in Abd pads on back,  Bilateral buttocks w/ STSG, Dressing C/D/I with foam tape.    ___________________________________________________  LABS                        9.2    9.51  )-----------( 433      ( 26 Mar 2023 07:34 )             29.4     26 Mar 2023 07:37    137    |  101    |  12     ----------------------------<  82     3.8     |  28     |  0.59     Ca    9.1        26 Mar 2023 07:37  Phos  4.0       26 Mar 2023 07:37  Mg     1.8       26 Mar 2023 07:37        CAPILLARY BLOOD GLUCOSE          ___________________________________________________  MEDICATIONS  (STANDING):  ceFAZolin   IVPB 2000 milliGRAM(s) IV Intermittent every 8 hours  enoxaparin Injectable 40 milliGRAM(s) SubCutaneous every 24 hours  HYDROmorphone PCA (1 mG/mL) 30 milliLiter(s) PCA Continuous PCA Continuous  influenza   Vaccine 0.5 milliLiter(s) IntraMuscular once  lactated ringers. 1000 milliLiter(s) (75 mL/Hr) IV Continuous <Continuous>    MEDICATIONS  (PRN):  acetaminophen     Tablet .. 650 milliGRAM(s) Oral every 6 hours PRN Mild Pain (1 - 3)  calcium carbonate    500 mG (Tums) Chewable 3 Tablet(s) Chew every 6 hours PRN Dyspepsia  diphenhydrAMINE 25 milliGRAM(s) Oral every 4 hours PRN Rash and/or Itching  diphenhydrAMINE Injectable 25 milliGRAM(s) IV Push every 4 hours PRN Pruritus  HYDROmorphone PCA (1 mG/mL) Rescue Clinician Bolus 0.5 milliGRAM(s) IV Push every 15 minutes PRN for Pain Scale GREATER THAN 6  melatonin 3 milliGRAM(s) Oral at bedtime PRN Insomnia  metoclopramide Injectable 10 milliGRAM(s) IV Push every 6 hours PRN Nausea and/or Vomiting  naloxone Injectable 0.1 milliGRAM(s) IV Push every 3 minutes PRN For ANY of the following changes in patient status:  A. RR LESS THAN 10 breaths per minute, B. Oxygen saturation LESS THAN 90%, C. Sedation score of 6  ondansetron Injectable 4 milliGRAM(s) IV Push every 6 hours PRN Nausea  ondansetron Injectable 4 milliGRAM(s) IV Push every 6 hours PRN Nausea          
JANNA CYR  [id number]    Subjective:  Patient is a 32y old  Male who presents with a chief complaint of Buttock wound recon with skin graft (29 Mar 2023 07:50)     Patient seen and examined at bedside. Patient with no new concerns. Denies fevers, chills, CP, dyspnea, dizziness, nausea, vomiting.    Objective:  T(C): 36.8 (03-30-23 @ 05:47), Max: 36.8 (03-29-23 @ 09:08)  HR: 109 (03-30-23 @ 05:47) (91 - 109)  BP: 123/76 (03-30-23 @ 05:47) (106/72 - 126/76)  RR: 18 (03-30-23 @ 05:47) (18 - 20)  SpO2: 99% (03-30-23 @ 05:47) (96% - 100%)  Wt(kg): --           03-29 @ 07:01  -  03-30 @ 07:00  --------------------------------------------------------  IN: 420 mL / OUT: 2150 mL / NET: -1730 mL      PHYSICAL EXAM:      General: No acute distress.  HEENT: Normocephalic, atraumatic  Respiratory: Bilateral breath sounds  Skin: Skin graft to B/L buttocks with good take, healthy granulation tissue unerneath; donor site with mild oozing. No surrounding errythema/induration              MEDICATIONS  (STANDING):  enoxaparin Injectable 40 milliGRAM(s) SubCutaneous every 24 hours  influenza   Vaccine 0.5 milliLiter(s) IntraMuscular once  polyethylene glycol 3350 17 Gram(s) Oral daily    MEDICATIONS  (PRN):  acetaminophen     Tablet .. 650 milliGRAM(s) Oral every 6 hours PRN Mild Pain (1 - 3)  calcium carbonate    500 mG (Tums) Chewable 3 Tablet(s) Chew every 6 hours PRN Dyspepsia  diphenhydrAMINE 25 milliGRAM(s) Oral every 4 hours PRN Rash and/or Itching  melatonin 3 milliGRAM(s) Oral at bedtime PRN Insomnia  metoclopramide Injectable 10 milliGRAM(s) IV Push every 6 hours PRN Nausea and/or Vomiting  ondansetron Injectable 4 milliGRAM(s) IV Push every 6 hours PRN Nausea  oxyCODONE    IR 5 milliGRAM(s) Oral every 4 hours PRN Moderate Pain (4 - 6)  oxyCODONE    IR 10 milliGRAM(s) Oral every 4 hours PRN Severe Pain (7 - 10)          
JANNA CYR  [id number]    Subjective:  Patient is a 32y old  Male who presents with a chief complaint of Buttock wound recon with skin graft (26 Mar 2023 10:48)     Patient seen and examined at bedside. Patient with no new concerns. Denies fevers, chills, CP, dyspnea, dizziness, nausea, vomiting.    Objective:  T(C): 36.9 (03-27-23 @ 05:36), Max: 37.4 (03-26-23 @ 10:29)  HR: 93 (03-27-23 @ 05:36) (82 - 98)  BP: 119/82 (03-27-23 @ 05:36) (115/75 - 130/74)  RR: 18 (03-27-23 @ 05:36) (18 - 18)  SpO2: 99% (03-27-23 @ 05:36) (98% - 99%)  Wt(kg): --   03-26    137  |  101  |  12  ----------------------------<  82  3.8   |  28  |  0.59    Ca    9.1      26 Mar 2023 07:37  Phos  4.0     03-26  Mg     1.8     03-26                          9.2    9.51  )-----------( 433      ( 26 Mar 2023 07:34 )             29.4       03-26 @ 07:01  -  03-27 @ 07:00  --------------------------------------------------------  IN: 2210 mL / OUT: 4475 mL / NET: -2265 mL      PHYSICAL EXAM:    General: No acute distress.  HEENT: Normocephalic, atraumatic  Respiratory: Bilateral breath sounds  Skin: Donor site x2 covered in Abd pads on back,  Bilateral buttocks w/ STSG, Dressing C/D/I with foam tape.            MEDICATIONS  (STANDING):  ceFAZolin   IVPB 2000 milliGRAM(s) IV Intermittent every 8 hours  enoxaparin Injectable 40 milliGRAM(s) SubCutaneous every 24 hours  influenza   Vaccine 0.5 milliLiter(s) IntraMuscular once  lactated ringers. 1000 milliLiter(s) (75 mL/Hr) IV Continuous <Continuous>    MEDICATIONS  (PRN):  acetaminophen     Tablet .. 650 milliGRAM(s) Oral every 6 hours PRN Mild Pain (1 - 3)  calcium carbonate    500 mG (Tums) Chewable 3 Tablet(s) Chew every 6 hours PRN Dyspepsia  diphenhydrAMINE 25 milliGRAM(s) Oral every 4 hours PRN Rash and/or Itching  melatonin 3 milliGRAM(s) Oral at bedtime PRN Insomnia  metoclopramide Injectable 10 milliGRAM(s) IV Push every 6 hours PRN Nausea and/or Vomiting  ondansetron Injectable 4 milliGRAM(s) IV Push every 6 hours PRN Nausea  oxyCODONE    IR 5 milliGRAM(s) Oral every 4 hours PRN Moderate Pain (4 - 6)  oxyCODONE    IR 10 milliGRAM(s) Oral every 4 hours PRN Severe Pain (7 - 10)

## 2023-03-31 NOTE — PROGRESS NOTE ADULT - PROVIDER SPECIALTY LIST ADULT
Anesthesia
Plastic Surgery

## 2023-03-31 NOTE — DISCHARGE NOTE NURSING/CASE MANAGEMENT/SOCIAL WORK - PATIENT PORTAL LINK FT
You can access the FollowMyHealth Patient Portal offered by [facility] by registering at the following website: [email or web address]. By joining [employer]’s FollowMyHealth portal, you will also be able to view your health information using other applications (apps) compatible with our system.

## 2023-03-31 NOTE — DISCHARGE NOTE NURSING/CASE MANAGEMENT/SOCIAL WORK - NSDCPEFALRISK_GEN_ALL_CORE
For information on Fall & Injury Prevention, visit: [email or web address] OR  [email or web address] OR  https://www.cdc.gov/steadi/patient.html

## 2023-03-31 NOTE — PROGRESS NOTE ADULT - ASSESSMENT
Assessment & Plan  32M with longstanding history of hidradenitis suppurativa presenting today for same day admission for planned debridement and STSG reconstruction of bilateral buttock wound. S/P B/L skin graft of buttocks from back donor site.    Plan:  - Pain control PRN  - reg diet  - DVT ppx w/ SCDs and LVX  - Positioning: prone  - Miralax daily  - Dressing down, xeroform to donor site, bacitracin and abd pads to graft site  - Shower daily  - wound care to axilla per recs  - PT recs OLGA, RW when ambulating      Estefany Mayen  Plastic & Reconstructive Surgery, PGY-1  #[id number]

## 2023-04-12 ENCOUNTER — APPOINTMENT (OUTPATIENT)
Dept: PLASTIC SURGERY | Facility: CLINIC | Age: 33
End: 2023-04-12
Payer: MEDICAID

## 2023-04-12 PROCEDURE — 99024 POSTOP FOLLOW-UP VISIT: CPT

## 2023-04-13 ENCOUNTER — APPOINTMENT (OUTPATIENT)
Dept: PLASTIC SURGERY | Facility: HOSPITAL | Age: 33
End: 2023-04-13

## 2023-04-14 NOTE — REASON FOR VISIT
[Post Op: _________] : a [unfilled] post op visit [FreeTextEntry1] : DOS 03/23/23 STSG B/L buttocks following HS excision\par

## 2023-04-14 NOTE — PHYSICAL EXAM
[de-identified] : [place]atham stage III hidradenitis of the bilateral axillae. [de-identified] : Excellent graft take with minimal granulation around perianal area.  Granulation tissue was addressed with silver nitrate.\par \par Donor site with some remaining adherent Xeroform.

## 2023-04-14 NOTE — HISTORY OF PRESENT ILLNESS
[FreeTextEntry1] : The patient returns following skin grafting and bilateral buttock hidradenitis.  He states he has been doing well at home.  He denies significant discomfort.

## 2023-04-14 NOTE — ASSESSMENT
[FreeTextEntry1] : The patient is healing well from excision and grafting the bilateral buttocks.  He is interested in proceeding with staged excision and reconstruction of the axillae.  He is interested in proceeding with a left axilla first.

## 2023-04-15 LAB
CULTURE RESULTS: SIGNIFICANT CHANGE UP
SPECIMEN SOURCE: SIGNIFICANT CHANGE UP

## 2023-04-19 ENCOUNTER — APPOINTMENT (OUTPATIENT)
Dept: PLASTIC SURGERY | Facility: CLINIC | Age: 33
End: 2023-04-19
Payer: MEDICAID

## 2023-04-19 PROCEDURE — 99024 POSTOP FOLLOW-UP VISIT: CPT

## 2023-04-21 ENCOUNTER — OUTPATIENT (OUTPATIENT)
Dept: OUTPATIENT SERVICES | Facility: HOSPITAL | Age: 33
LOS: 1 days | End: 2023-04-21
Payer: MEDICAID

## 2023-04-21 VITALS
SYSTOLIC BLOOD PRESSURE: 117 MMHG | HEIGHT: 72 IN | DIASTOLIC BLOOD PRESSURE: 81 MMHG | WEIGHT: 169.09 LBS | TEMPERATURE: 99 F | RESPIRATION RATE: 18 BRPM | OXYGEN SATURATION: 98 % | HEART RATE: 112 BPM

## 2023-04-21 DIAGNOSIS — Z98.890 OTHER SPECIFIED POSTPROCEDURAL STATES: Chronic | ICD-10-CM

## 2023-04-21 DIAGNOSIS — L73.2 HIDRADENITIS SUPPURATIVA: ICD-10-CM

## 2023-04-21 DIAGNOSIS — Z01.818 ENCOUNTER FOR OTHER PREPROCEDURAL EXAMINATION: ICD-10-CM

## 2023-04-21 LAB
ANION GAP SERPL CALC-SCNC: 10 MMOL/L — SIGNIFICANT CHANGE UP (ref 5–17)
BUN SERPL-MCNC: 15 MG/DL — SIGNIFICANT CHANGE UP (ref 7–23)
CALCIUM SERPL-MCNC: 9.6 MG/DL — SIGNIFICANT CHANGE UP (ref 8.4–10.5)
CHLORIDE SERPL-SCNC: 98 MMOL/L — SIGNIFICANT CHANGE UP (ref 96–108)
CO2 SERPL-SCNC: 26 MMOL/L — SIGNIFICANT CHANGE UP (ref 22–31)
CREAT SERPL-MCNC: 0.76 MG/DL — SIGNIFICANT CHANGE UP (ref 0.5–1.3)
EGFR: 122 ML/MIN/1.73M2 — SIGNIFICANT CHANGE UP
GLUCOSE SERPL-MCNC: 115 MG/DL — HIGH (ref 70–99)
HCT VFR BLD CALC: 32.6 % — LOW (ref 39–50)
HGB BLD-MCNC: 10 G/DL — LOW (ref 13–17)
MCHC RBC-ENTMCNC: 24.4 PG — LOW (ref 27–34)
MCHC RBC-ENTMCNC: 30.7 GM/DL — LOW (ref 32–36)
MCV RBC AUTO: 79.5 FL — LOW (ref 80–100)
NRBC # BLD: 0 /100 WBCS — SIGNIFICANT CHANGE UP (ref 0–0)
PLATELET # BLD AUTO: 483 K/UL — HIGH (ref 150–400)
POTASSIUM SERPL-MCNC: 3.9 MMOL/L — SIGNIFICANT CHANGE UP (ref 3.5–5.3)
POTASSIUM SERPL-SCNC: 3.9 MMOL/L — SIGNIFICANT CHANGE UP (ref 3.5–5.3)
RBC # BLD: 4.1 M/UL — LOW (ref 4.2–5.8)
RBC # FLD: 17.3 % — HIGH (ref 10.3–14.5)
SODIUM SERPL-SCNC: 134 MMOL/L — LOW (ref 135–145)
WBC # BLD: 16.66 K/UL — HIGH (ref 3.8–10.5)
WBC # FLD AUTO: 16.66 K/UL — HIGH (ref 3.8–10.5)

## 2023-04-21 PROCEDURE — 93010 ELECTROCARDIOGRAM REPORT: CPT

## 2023-04-21 PROCEDURE — 36415 COLL VENOUS BLD VENIPUNCTURE: CPT

## 2023-04-21 PROCEDURE — 93005 ELECTROCARDIOGRAM TRACING: CPT

## 2023-04-21 PROCEDURE — G0463: CPT

## 2023-04-21 PROCEDURE — 85027 COMPLETE CBC AUTOMATED: CPT

## 2023-04-21 PROCEDURE — 80048 BASIC METABOLIC PNL TOTAL CA: CPT

## 2023-04-21 NOTE — PHYSICAL EXAM
[de-identified] : Exam performed with an MA chaperone present (LF).  Back donor site healing appropriately.  Bilateral buttock wound with small amount of residual granulation gluteal cleft is an in perianal area.  These areas were addressed with silver nitrate.

## 2023-04-21 NOTE — H&P PST ADULT - LYMPHATIC
No lymphadedenopathy Rotation Flap Text: The defect edges were debeveled with a #15 scalpel blade.  Given the location of the defect, shape of the defect and the proximity to free margins a rotation flap was deemed most appropriate.  Using a sterile surgical marker, an appropriate rotation flap was drawn incorporating the defect and placing the expected incisions within the relaxed skin tension lines where possible.    The area thus outlined was incised deep to adipose tissue with a #15 scalpel blade.  The skin margins were undermined to an appropriate distance in all directions utilizing iris scissors.

## 2023-04-21 NOTE — REASON FOR VISIT
[Post Op: _________] : a [unfilled] post op visit [FreeTextEntry1] : DOS 03/23/23 STSG B/L buttocks following HS excision

## 2023-04-21 NOTE — H&P PST ADULT - NSICDXPASTMEDICALHX_GEN_ALL_CORE_FT
PAST MEDICAL HISTORY:  Hidradenitis suppurativa      PAST MEDICAL HISTORY:  Hidradenitis suppurativa     RBBB

## 2023-04-21 NOTE — H&P PST ADULT - MUSCULOSKELETAL
normal/ROM intact/decreased ROM due to pain/normal gait/strength 5/5 bilateral upper extremities negative

## 2023-04-21 NOTE — H&P PST ADULT - ASSESSMENT
DASI score DASI score 5.72, able to walk more than 2 blocks and can climb at least one flight of stairs. No loose, broken or removable teeth

## 2023-04-21 NOTE — HISTORY OF PRESENT ILLNESS
[FreeTextEntry1] : The patient returns for follow-up for bilateral buttock wound reconstruction with split-thickness skin graft.  He denies any significant issues.  He is interested in proceeding with excision of the left axilla hidradenitis.

## 2023-04-21 NOTE — H&P PST ADULT - HISTORY OF PRESENT ILLNESS
This is a 31 y/o male PMH hidradenitis suppurativa, developed bilateral ankle swelling for which he presented to [facility] 3/2023, S/P excision of hidradenitis of the buttocks, was D/Charly to [facility] for 2 weeks for wound care and PT.  Presents today for excision of left axillary hidradenitis.    No H/O COVID infection     This is a 33 y/o male PMH RBBB, hidradenitis suppurativa, was on Humira, developed increased discharge from suppurativa of the buttocks, bilateral ankle pain and swelling for which he presented to [facility] 2/2023, was found to be septic, treated with zosyn, vanco and clindamycin, S/P excision of hidradenitis of the buttocks, was D/Charly to [facility] for 2 weeks for wound care and PT.  Presents today for excision of left axillary hidradenitis.    No H/O COVID infection

## 2023-04-24 NOTE — H&P ADULT - PROBLEM SELECTOR PLAN 4
Heart regular lungs clear abdomen soft nontender no inguinal swelling no thigh swelling no calf swelling no tenderness to palpation.  Tenderness and pain is reproduced with palpation to right inner groin.  No bruising no rash.
- IMPROVE score of 0   - No VTE PPX needed

## 2023-04-25 ENCOUNTER — APPOINTMENT (OUTPATIENT)
Dept: DERMATOLOGY | Facility: CLINIC | Age: 33
End: 2023-04-25

## 2023-04-26 ENCOUNTER — TRANSCRIPTION ENCOUNTER (OUTPATIENT)
Age: 33
End: 2023-04-26

## 2023-04-27 ENCOUNTER — RESULT REVIEW (OUTPATIENT)
Age: 33
End: 2023-04-27

## 2023-04-27 ENCOUNTER — APPOINTMENT (OUTPATIENT)
Dept: PLASTIC SURGERY | Facility: HOSPITAL | Age: 33
End: 2023-04-27

## 2023-04-27 ENCOUNTER — INPATIENT (INPATIENT)
Facility: HOSPITAL | Age: 33
LOS: 2 days | Discharge: HOME HEALTH | DRG: 571 | End: 2023-04-30
Attending: SURGERY | Admitting: SURGERY
Payer: MEDICAID

## 2023-04-27 ENCOUNTER — TRANSCRIPTION ENCOUNTER (OUTPATIENT)
Age: 33
End: 2023-04-27

## 2023-04-27 VITALS
TEMPERATURE: 98 F | SYSTOLIC BLOOD PRESSURE: 111 MMHG | WEIGHT: 169.09 LBS | OXYGEN SATURATION: 96 % | HEIGHT: 72 IN | RESPIRATION RATE: 17 BRPM | DIASTOLIC BLOOD PRESSURE: 81 MMHG | HEART RATE: 113 BPM

## 2023-04-27 DIAGNOSIS — Z98.890 OTHER SPECIFIED POSTPROCEDURAL STATES: Chronic | ICD-10-CM

## 2023-04-27 DIAGNOSIS — L73.2 HIDRADENITIS SUPPURATIVA: ICD-10-CM

## 2023-04-27 LAB
HCT VFR BLD CALC: 27.2 % — LOW (ref 39–50)
HGB BLD-MCNC: 8.6 G/DL — LOW (ref 13–17)
MCHC RBC-ENTMCNC: 24.9 PG — LOW (ref 27–34)
MCHC RBC-ENTMCNC: 31.6 GM/DL — LOW (ref 32–36)
MCV RBC AUTO: 78.8 FL — LOW (ref 80–100)
NRBC # BLD: 0 /100 WBCS — SIGNIFICANT CHANGE UP (ref 0–0)
PLATELET # BLD AUTO: 386 K/UL — SIGNIFICANT CHANGE UP (ref 150–400)
RBC # BLD: 3.45 M/UL — LOW (ref 4.2–5.8)
RBC # FLD: 17.3 % — HIGH (ref 10.3–14.5)
WBC # BLD: 12.05 K/UL — HIGH (ref 3.8–10.5)
WBC # FLD AUTO: 12.05 K/UL — HIGH (ref 3.8–10.5)

## 2023-04-27 PROCEDURE — 11450 EXC SKN HDRDNT AX SMPL/NTRM: CPT | Mod: 79

## 2023-04-27 PROCEDURE — 88305 TISSUE EXAM BY PATHOLOGIST: CPT | Mod: 26

## 2023-04-27 RX ORDER — LIDOCAINE HCL 20 MG/ML
0.2 VIAL (ML) INJECTION ONCE
Refills: 0 | Status: DISCONTINUED | OUTPATIENT
Start: 2023-04-27 | End: 2023-04-27

## 2023-04-27 RX ORDER — HYDROMORPHONE HYDROCHLORIDE 2 MG/ML
0.5 INJECTION INTRAMUSCULAR; INTRAVENOUS; SUBCUTANEOUS
Refills: 0 | Status: DISCONTINUED | OUTPATIENT
Start: 2023-04-27 | End: 2023-04-30

## 2023-04-27 RX ORDER — IBUPROFEN 200 MG
400 TABLET ORAL EVERY 6 HOURS
Refills: 0 | Status: DISCONTINUED | OUTPATIENT
Start: 2023-04-27 | End: 2023-04-30

## 2023-04-27 RX ORDER — CHLORHEXIDINE GLUCONATE 213 G/1000ML
1 SOLUTION TOPICAL ONCE
Refills: 0 | Status: DISCONTINUED | OUTPATIENT
Start: 2023-04-27 | End: 2023-04-27

## 2023-04-27 RX ORDER — HYDROMORPHONE HYDROCHLORIDE 2 MG/ML
1 INJECTION INTRAMUSCULAR; INTRAVENOUS; SUBCUTANEOUS
Refills: 0 | Status: DISCONTINUED | OUTPATIENT
Start: 2023-04-27 | End: 2023-04-27

## 2023-04-27 RX ORDER — OXYCODONE HYDROCHLORIDE 5 MG/1
2.5 TABLET ORAL EVERY 4 HOURS
Refills: 0 | Status: DISCONTINUED | OUTPATIENT
Start: 2023-04-27 | End: 2023-04-30

## 2023-04-27 RX ORDER — CEFAZOLIN SODIUM 1 G
2000 VIAL (EA) INJECTION ONCE
Refills: 0 | Status: COMPLETED | OUTPATIENT
Start: 2023-04-27 | End: 2023-04-27

## 2023-04-27 RX ORDER — FENTANYL CITRATE 50 UG/ML
50 INJECTION INTRAVENOUS
Refills: 0 | Status: DISCONTINUED | OUTPATIENT
Start: 2023-04-27 | End: 2023-04-27

## 2023-04-27 RX ORDER — HYDROMORPHONE HYDROCHLORIDE 2 MG/ML
0.5 INJECTION INTRAMUSCULAR; INTRAVENOUS; SUBCUTANEOUS ONCE
Refills: 0 | Status: DISCONTINUED | OUTPATIENT
Start: 2023-04-27 | End: 2023-04-27

## 2023-04-27 RX ORDER — SODIUM CHLORIDE 9 MG/ML
3 INJECTION INTRAMUSCULAR; INTRAVENOUS; SUBCUTANEOUS EVERY 8 HOURS
Refills: 0 | Status: DISCONTINUED | OUTPATIENT
Start: 2023-04-27 | End: 2023-04-27

## 2023-04-27 RX ORDER — ACETAMINOPHEN 500 MG
975 TABLET ORAL EVERY 6 HOURS
Refills: 0 | Status: DISCONTINUED | OUTPATIENT
Start: 2023-04-27 | End: 2023-04-30

## 2023-04-27 RX ORDER — ONDANSETRON 8 MG/1
4 TABLET, FILM COATED ORAL ONCE
Refills: 0 | Status: DISCONTINUED | OUTPATIENT
Start: 2023-04-27 | End: 2023-04-27

## 2023-04-27 RX ORDER — LIDOCAINE HCL 20 MG/ML
20 VIAL (ML) INJECTION ONCE
Refills: 0 | Status: COMPLETED | OUTPATIENT
Start: 2023-04-27 | End: 2023-04-27

## 2023-04-27 RX ORDER — LIDOCAINE HCL 20 MG/ML
1 VIAL (ML) INJECTION ONCE
Refills: 0 | Status: DISCONTINUED | OUTPATIENT
Start: 2023-04-27 | End: 2023-04-27

## 2023-04-27 RX ORDER — HYDROMORPHONE HYDROCHLORIDE 2 MG/ML
0.5 INJECTION INTRAMUSCULAR; INTRAVENOUS; SUBCUTANEOUS
Refills: 0 | Status: DISCONTINUED | OUTPATIENT
Start: 2023-04-27 | End: 2023-04-27

## 2023-04-27 RX ORDER — OXYCODONE HYDROCHLORIDE 5 MG/1
5 TABLET ORAL EVERY 4 HOURS
Refills: 0 | Status: DISCONTINUED | OUTPATIENT
Start: 2023-04-27 | End: 2023-04-30

## 2023-04-27 RX ORDER — ENOXAPARIN SODIUM 100 MG/ML
40 INJECTION SUBCUTANEOUS EVERY 24 HOURS
Refills: 0 | Status: DISCONTINUED | OUTPATIENT
Start: 2023-04-28 | End: 2023-04-30

## 2023-04-27 RX ADMIN — HYDROMORPHONE HYDROCHLORIDE 0.5 MILLIGRAM(S): 2 INJECTION INTRAMUSCULAR; INTRAVENOUS; SUBCUTANEOUS at 15:00

## 2023-04-27 RX ADMIN — Medication 400 MILLIGRAM(S): at 13:35

## 2023-04-27 RX ADMIN — Medication 400 MILLIGRAM(S): at 17:42

## 2023-04-27 RX ADMIN — HYDROMORPHONE HYDROCHLORIDE 0.5 MILLIGRAM(S): 2 INJECTION INTRAMUSCULAR; INTRAVENOUS; SUBCUTANEOUS at 14:36

## 2023-04-27 RX ADMIN — Medication 20 MILLILITER(S): at 14:34

## 2023-04-27 RX ADMIN — Medication 400 MILLIGRAM(S): at 13:00

## 2023-04-27 RX ADMIN — HYDROMORPHONE HYDROCHLORIDE 0.5 MILLIGRAM(S): 2 INJECTION INTRAMUSCULAR; INTRAVENOUS; SUBCUTANEOUS at 14:24

## 2023-04-27 NOTE — DISCHARGE NOTE PROVIDER - HOSPITAL COURSE
Jack is a 31yo M diagnosed with hidradenitis suppurativa who underwent excision of HS in left axilla in the OR on 4/27/23. The patient tolerated the procedure well. Post-operatively the patient was sent to the PACU. The patient was hemodynamically stable and was transferred to a surgical floor. Patient tolerated operation well and there were no post operative complications identified. The patient's pain was controlled by IV pain medications and then by PO pain medications. The patient was advanced to a regular diet and tolerated it well. The patient was placed on home medications. At the time of discharge, the patient was hemodynamically stable, was tolerating PO diet, was voiding urine and passing stool, was ambulating, and was comfortable with adequate pain control. The patient was instructed to follow up with Dr. Tariq within 1 week after discharge from the hospital. The patient/family felt comfortable with discharge. The patient was discharged home with a prescription for _. The patient had no other issues. Jack is a 31yo M diagnosed with hidradenitis suppurativa who underwent excision of HS in left axilla in the OR on 4/27/23. The patient tolerated the procedure well. Post-operatively the patient was sent to the PACU. The patient was hemodynamically stable and was transferred to a surgical floor. ON POD0, patient noted to have bleeding vessel while on the floor. Hemostasis was achieved by placement of a 2-0 vicryl stitch. Overall, patient tolerated operation well and there were no other post operative complications identified. The patient's pain was controlled by IV pain medications and then by PO pain medications. The patient was advanced to a regular diet and tolerated it well. The patient was placed on home medications. At the time of discharge, the patient was hemodynamically stable, was tolerating PO diet, was voiding urine and passing stool, was ambulating, and was comfortable with adequate pain control. The patient was instructed to follow up with Dr. Tariq within 1 week after discharge from the hospital. The patient/family felt comfortable with discharge. The patient was discharged home with a prescription for _. The patient had no other issues. Jack is a 33yo M diagnosed with hidradenitis suppurativa who underwent excision of HS in left axilla in the OR on 4/27/23. The patient tolerated the procedure well. Post-operatively the patient was sent to the PACU. The patient was hemodynamically stable and was transferred to a surgical floor. ON POD0, patient noted to have bleeding vessel while on the floor. Hemostasis was achieved by placement of a 2-0 vicryl stitch. Overall, patient tolerated operation well and there were no other post operative complications identified. The patient's pain was controlled by IV pain medications and then by PO pain medications. The patient was advanced to a regular diet and tolerated it well. The patient was placed on home medications. At the time of discharge, the patient was hemodynamically stable, was tolerating PO diet, was voiding urine and passing stool, was ambulating, and was comfortable with adequate pain control. The patient was instructed to follow up with Dr. Tariq within 1 week after discharge from the hospital. The patient/family felt comfortable with discharge. The patient was discharged home with a prescription for oxycodone. The patient had no other issues.

## 2023-04-27 NOTE — CHART NOTE - NSCHARTNOTEFT_GEN_A_CORE
Provider paged and called to bedside for concern of bleeding from left axillary opeative site. Upon examination, patient found to have single arterial bleeder. Pressure applied for 15 minutes without being able to achieve hemostasis. A 2-0 vicryl figure of eight stitch was placed around exposed vessel to control bleeding. Hemostasis achieved with this stitch. Throughout this event, patient vitals stable 110-120 systolic range. Patient reassured and reports no symptoms of lightheadedness, syncope, or tachycardia. Axilla was cleaned and further examined to ensure hemostasis. No further bleeding vessels were identified. Pressure dressing replaced. STAT CBC ordered to monitor for blood loss.     [facility]  Plastic Surgery  #[id number] [facility] pager  [phone] [facility] pager  Available on teams

## 2023-04-27 NOTE — DISCHARGE NOTE PROVIDER - NSDCCPTREATMENT_GEN_ALL_CORE_FT
PRINCIPAL PROCEDURE  Procedure: Complex excision of lesion of sweat glands of axilla  Findings and Treatment:

## 2023-04-27 NOTE — DISCHARGE NOTE PROVIDER - NSDCMRMEDTOKEN_GEN_ALL_CORE_FT
acetaminophen 325 mg oral tablet: 3 tab(s) orally every 6 hours  ibuprofen 400 mg oral tablet: 1 tab(s) orally every 6 hours   acetaminophen 325 mg oral tablet: 3 tab(s) orally every 6 hours  ibuprofen 400 mg oral tablet: 1 tab(s) orally every 6 hours  oxyCODONE 5 mg oral tablet: 1 tab(s) orally once a day as needed for 30 minutes before dressing changes. MDD: 2  petrolatum topical ointment: 1 Apply topically to affected area once a day

## 2023-04-27 NOTE — DISCHARGE NOTE PROVIDER - NSDCFUADDINST_GEN_ALL_CORE_FT
WOUND CARE:  Please keep incisions clean and dry. Please do not Scrub or rub incisions. Do not use lotion or powder on incisions. Please change your wound dressings daily. You will have a visiting nurse come to help you with these changes.   BATHING: You may shower and/or sponge bathe. You may use warm soapy water in the shower and rinse, pat dry.  ACTIVITY: No heavy lifting or straining. Otherwise, you may return to your usual level of physical activity. If you are taking narcotic pain medication DO NOT drive a car, operate machinery or make important decisions.  DIET: Return to your usual diet.  NOTIFY YOUR SURGEON IF YOU HAVE: any bleeding that does not stop, any pus draining from your wound(s), any fever (over 100.4 F) persistent nausea/vomiting, or if your pain is not controlled on your discharge pain medications, unable to urinate.  Please follow up with your surgeon, Dr. Tariq WOUND CARE:  Please keep incisions clean and dry. Please do not Scrub or rub incisions. Do not use lotion or powder on incisions. Please change your wound dressings daily. You will have a visiting nurse come to help you with these changes.     Dressing instructions:  Wound care instructions for DC:  -Dressing: Aquacel extra base layer with 4x4 gauze fluffed up, covered with abd pads, and foam tape/tape to secure in place  -To be changed daily  BATHING: You may shower and/or sponge bathe. You may use warm soapy water in the shower and rinse, pat dry.  ACTIVITY: No heavy lifting or straining. Otherwise, you may return to your usual level of physical activity. If you are taking narcotic pain medication DO NOT drive a car, operate machinery or make important decisions.  DIET: Return to your usual diet.  NOTIFY YOUR SURGEON IF YOU HAVE: any bleeding that does not stop, any pus draining from your wound(s), any fever (over 100.4 F) persistent nausea/vomiting, or if your pain is not controlled on your discharge pain medications, unable to urinate.  Please follow up with your surgeon, Dr. Tariq WOUND CARE:  Please keep incisions clean and dry. Please do not Scrub or rub incisions. Do not use lotion or powder on incisions. Please change your wound dressings daily. You will have a visiting nurse come to help you with these changes.     Dressing instructions:  Wound care instructions for DC:  -Dressing: Aquacel extra base layer with 4x4 gauze fluffed up, covered with abd pads, and foam tape/tape to secure in place  -To be changed daily  BATHING: You may sponge bathe. You may use warm soapy water in the shower and rinse, pat dry.  ACTIVITY: No heavy lifting or straining. Otherwise, you may return to your usual level of physical activity. If you are taking narcotic pain medication DO NOT drive a car, operate machinery or make important decisions.  DIET: Return to your usual diet.  NOTIFY YOUR SURGEON IF YOU HAVE: any bleeding that does not stop, any pus draining from your wound(s), any fever (over 100.4 F) persistent nausea/vomiting, or if your pain is not controlled on your discharge pain medications, unable to urinate.  Please follow up with your surgeon, Dr. Tariq WOUND CARE:  Please keep incisions clean and dry. Please do not Scrub or rub incisions. Do not use lotion or powder on incisions. Please change your wound dressings daily. You will have a visiting nurse come to help you with these changes.     Dressing instructions:  Wound care instructions for DC:  -Dressing: Aquacel extra base layer with 4x4 gauze fluffed up, covered with abd pads, and foam tape/tape to secure in place  -To be changed daily  BATHING: You may sponge bathe. You may use warm soapy water in the shower and rinse, pat dry.  ACTIVITY: No heavy lifting or straining. Otherwise, you may return to your usual level of physical activity. If you are taking narcotic pain medication DO NOT drive a car, operate machinery or make important decisions.  DIET: Return to your usual diet.  NOTIFY YOUR SURGEON IF YOU HAVE: any bleeding that does not stop, any pus draining from your wound(s), any fever (over 100.4 F) persistent nausea/vomiting, or if your pain is not controlled on your discharge pain medications, unable to urinate.  Please follow up with your surgeon, Dr. Tariq    Please take Tylenol and Motrin for pain around the clock, regardless of your pain level at that time. Alternate medications so you are taking Tylenol, then Motrin 3 hours later, then Tylenol 3 hours after that, then Motrin 3 hours after, and so on. For example, 8:00AM - Tylenol 1000mg, 11:00- Motrin 600mg, 2:00pm - Tylenol 1000mg, 5:00PM: Motrin 600mg, 8:00PM: Tylenol 1000mg, and onwards.  Please only take prescribed Oxycodone between doses if this pain regimen is not controlling your pain at the time, or 30 minutes prior to dressing changes.

## 2023-04-27 NOTE — DISCHARGE NOTE PROVIDER - NSDCCPCAREPLAN_GEN_ALL_CORE_FT
PRINCIPAL DISCHARGE DIAGNOSIS  Diagnosis: Hidradenitis suppurativa  Assessment and Plan of Treatment:       SECONDARY DISCHARGE DIAGNOSES  Diagnosis: Hidradenitis suppurativa  Assessment and Plan of Treatment:

## 2023-04-27 NOTE — PATIENT PROFILE ADULT - FALL HARM RISK - UNIVERSAL INTERVENTIONS
none
Bed in lowest position, wheels locked, appropriate side rails in place/Call bell, personal items and telephone in reach/Instruct patient to call for assistance before getting out of bed or chair/Non-slip footwear when patient is out of bed/[place] to call system/Physically safe environment - no spills, clutter or unnecessary equipment/Purposeful Proactive Rounding/Room/bathroom lighting operational, light cord in reach

## 2023-04-27 NOTE — PRE-ANESTHESIA EVALUATION ADULT - NSANTHPEFT_GEN_ALL_CORE
Gen: WNWD, NAD   Neuro: AAOx4, moves all four extremities spontaneously  Resp: Unlabored breathing on room air, speaks in full sentences   CV: RRR  Ext: WWP  Left axilla: purulent hidradenitis

## 2023-04-27 NOTE — PRE-ANESTHESIA EVALUATION ADULT - NSANTHPMHFT_GEN_ALL_CORE
32M with history of hidradenitis suppurativa c/b sepsis of the buttocks s/p excision of buttock hidradenitis, daily marijuana smoker, sinus tachycardia.

## 2023-04-27 NOTE — DISCHARGE NOTE PROVIDER - CARE PROVIDER_API CALL
Margarito Tariq)  Surgery  PlasticReconstruct  [address] [facility], [address]  [place] [address]  Phone: [phone]  Fax: [phone]  Follow Up Time: 1 week

## 2023-04-27 NOTE — BRIEF OPERATIVE NOTE - OPERATION/FINDINGS
Healthy skin identified and area marked around diseased region. Incision w scalpel, skin and underlying dermal tissue and glands excised. Hemostasis achieved. Dressing with aquacel extra, fluffed guaze, abd pads, and foam tape.

## 2023-04-28 ENCOUNTER — TRANSCRIPTION ENCOUNTER (OUTPATIENT)
Age: 33
End: 2023-04-28

## 2023-04-28 LAB
ANION GAP SERPL CALC-SCNC: 10 MMOL/L — SIGNIFICANT CHANGE UP (ref 5–17)
BUN SERPL-MCNC: 11 MG/DL — SIGNIFICANT CHANGE UP (ref 7–23)
CALCIUM SERPL-MCNC: 8.6 MG/DL — SIGNIFICANT CHANGE UP (ref 8.4–10.5)
CHLORIDE SERPL-SCNC: 97 MMOL/L — SIGNIFICANT CHANGE UP (ref 96–108)
CO2 SERPL-SCNC: 26 MMOL/L — SIGNIFICANT CHANGE UP (ref 22–31)
CREAT SERPL-MCNC: 0.66 MG/DL — SIGNIFICANT CHANGE UP (ref 0.5–1.3)
EGFR: 128 ML/MIN/1.73M2 — SIGNIFICANT CHANGE UP
GLUCOSE SERPL-MCNC: 117 MG/DL — HIGH (ref 70–99)
HCT VFR BLD CALC: 25.9 % — LOW (ref 39–50)
HGB BLD-MCNC: 7.9 G/DL — LOW (ref 13–17)
MAGNESIUM SERPL-MCNC: 1.8 MG/DL — SIGNIFICANT CHANGE UP (ref 1.6–2.6)
MCHC RBC-ENTMCNC: 23.9 PG — LOW (ref 27–34)
MCHC RBC-ENTMCNC: 30.5 GM/DL — LOW (ref 32–36)
MCV RBC AUTO: 78.2 FL — LOW (ref 80–100)
NRBC # BLD: 0 /100 WBCS — SIGNIFICANT CHANGE UP (ref 0–0)
PHOSPHATE SERPL-MCNC: 3.7 MG/DL — SIGNIFICANT CHANGE UP (ref 2.5–4.5)
PLATELET # BLD AUTO: 411 K/UL — HIGH (ref 150–400)
POTASSIUM SERPL-MCNC: 4.1 MMOL/L — SIGNIFICANT CHANGE UP (ref 3.5–5.3)
POTASSIUM SERPL-SCNC: 4.1 MMOL/L — SIGNIFICANT CHANGE UP (ref 3.5–5.3)
RBC # BLD: 3.31 M/UL — LOW (ref 4.2–5.8)
RBC # FLD: 17.6 % — HIGH (ref 10.3–14.5)
SODIUM SERPL-SCNC: 133 MMOL/L — LOW (ref 135–145)
WBC # BLD: 13.89 K/UL — HIGH (ref 3.8–10.5)
WBC # FLD AUTO: 13.89 K/UL — HIGH (ref 3.8–10.5)

## 2023-04-28 RX ORDER — DIPHENHYDRAMINE HCL 50 MG
25 CAPSULE ORAL EVERY 4 HOURS
Refills: 0 | Status: DISCONTINUED | OUTPATIENT
Start: 2023-04-28 | End: 2023-04-30

## 2023-04-28 RX ORDER — SIMETHICONE 80 MG/1
80 TABLET, CHEWABLE ORAL EVERY 4 HOURS
Refills: 0 | Status: DISCONTINUED | OUTPATIENT
Start: 2023-04-28 | End: 2023-04-30

## 2023-04-28 RX ORDER — ONDANSETRON 8 MG/1
4 TABLET, FILM COATED ORAL EVERY 4 HOURS
Refills: 0 | Status: DISCONTINUED | OUTPATIENT
Start: 2023-04-28 | End: 2023-04-30

## 2023-04-28 RX ORDER — CALCIUM CARBONATE 500(1250)
1 TABLET ORAL EVERY 4 HOURS
Refills: 0 | Status: DISCONTINUED | OUTPATIENT
Start: 2023-04-28 | End: 2023-04-30

## 2023-04-28 RX ORDER — LANOLIN ALCOHOL/MO/W.PET/CERES
3 CREAM (GRAM) TOPICAL AT BEDTIME
Refills: 0 | Status: DISCONTINUED | OUTPATIENT
Start: 2023-04-28 | End: 2023-04-30

## 2023-04-28 RX ORDER — POLYETHYLENE GLYCOL 3350 17 G/17G
17 POWDER, FOR SOLUTION ORAL EVERY 12 HOURS
Refills: 0 | Status: DISCONTINUED | OUTPATIENT
Start: 2023-04-28 | End: 2023-04-30

## 2023-04-28 RX ORDER — PETROLATUM,WHITE
1 JELLY (GRAM) TOPICAL DAILY
Refills: 0 | Status: DISCONTINUED | OUTPATIENT
Start: 2023-04-28 | End: 2023-04-30

## 2023-04-28 RX ORDER — METOCLOPRAMIDE HCL 10 MG
10 TABLET ORAL EVERY 4 HOURS
Refills: 0 | Status: DISCONTINUED | OUTPATIENT
Start: 2023-04-28 | End: 2023-04-30

## 2023-04-28 RX ADMIN — Medication 400 MILLIGRAM(S): at 06:30

## 2023-04-28 RX ADMIN — Medication 400 MILLIGRAM(S): at 18:04

## 2023-04-28 RX ADMIN — OXYCODONE HYDROCHLORIDE 5 MILLIGRAM(S): 5 TABLET ORAL at 13:46

## 2023-04-28 RX ADMIN — ENOXAPARIN SODIUM 40 MILLIGRAM(S): 100 INJECTION SUBCUTANEOUS at 05:36

## 2023-04-28 RX ADMIN — OXYCODONE HYDROCHLORIDE 5 MILLIGRAM(S): 5 TABLET ORAL at 13:16

## 2023-04-28 RX ADMIN — Medication 400 MILLIGRAM(S): at 05:36

## 2023-04-28 RX ADMIN — Medication 400 MILLIGRAM(S): at 17:34

## 2023-04-28 NOTE — DISCHARGE NOTE NURSING/CASE MANAGEMENT/SOCIAL WORK - NSDCPEFALRISK_GEN_ALL_CORE
For information on Fall & Injury Prevention, visit: [email or web address] OR  [email or web address] OR  https://www.cdc.gov/steadi/patient.html

## 2023-04-28 NOTE — DISCHARGE NOTE NURSING/CASE MANAGEMENT/SOCIAL WORK - NSSCTYPOFSERV_GEN_ALL_CORE
Visiting nurse to teach patient & mother daily left axilla wound care Visiting nurse to teach patient & mother daily left axilla wound care - start of care 5/1. Rn will contact you to schedule a visit time.

## 2023-04-28 NOTE — OCCUPATIONAL THERAPY INITIAL EVALUATION ADULT - ADDITIONAL COMMENTS
pt reports lives in apt with mother, [address], elevator entrance, tub shower. Prior to admission Ind with ADLs/ambulating with cane. pt L hand dominant however is ambidextrous.

## 2023-04-28 NOTE — OCCUPATIONAL THERAPY INITIAL EVALUATION ADULT - NSBATHINGEQUIP_GEN_A_OT
grab bar

## 2023-04-28 NOTE — DISCHARGE NOTE NURSING/CASE MANAGEMENT/SOCIAL WORK - PATIENT PORTAL LINK FT
You can access the FollowMyHealth Patient Portal offered by [facility] by registering at the following website: [email or web address]. By joining [facility]’s FollowMyHealth portal, you will also be able to view your health information using other applications (apps) compatible with our system.

## 2023-04-28 NOTE — OCCUPATIONAL THERAPY INITIAL EVALUATION ADULT - ADL RETRAINING, OT EVAL
pt will perform UB dressing with I in 4 weeks using adaptive techniques / pt will perform LB dressing task with I in 4 weeks using adaptive techniques / pt will perform toileting task with I in 4 weeks

## 2023-04-28 NOTE — OCCUPATIONAL THERAPY INITIAL EVALUATION ADULT - PERTINENT HX OF CURRENT PROBLEM, REHAB EVAL
This is a 33 y/o male PMH RBBB, hidradenitis suppurativa, was on Humira, developed increased discharge from suppurativa of the buttocks, bilateral ankle pain and swelling for which he presented to [facility] 2/2023, was found to be septic, treated with zosyn, vanco and clindamycin, S/P excision of hidradenitis of the buttocks, was D/Charly to [facility] for 2 weeks for wound care and PT. Presents today for excision of left axillary hidradenitis. now s/p 4/27 Complex excision of lesion of sweat glands of axilla. required stitch 4/27 at bedside for bleeding vessel. Patient now stable and without any lightheadness or other symptoms of blood loss.

## 2023-04-29 LAB
ANION GAP SERPL CALC-SCNC: 6 MMOL/L — SIGNIFICANT CHANGE UP (ref 5–17)
BUN SERPL-MCNC: 9 MG/DL — SIGNIFICANT CHANGE UP (ref 7–23)
CALCIUM SERPL-MCNC: 8.7 MG/DL — SIGNIFICANT CHANGE UP (ref 8.4–10.5)
CHLORIDE SERPL-SCNC: 99 MMOL/L — SIGNIFICANT CHANGE UP (ref 96–108)
CO2 SERPL-SCNC: 28 MMOL/L — SIGNIFICANT CHANGE UP (ref 22–31)
CREAT SERPL-MCNC: 0.7 MG/DL — SIGNIFICANT CHANGE UP (ref 0.5–1.3)
CULTURE RESULTS: SIGNIFICANT CHANGE UP
EGFR: 126 ML/MIN/1.73M2 — SIGNIFICANT CHANGE UP
GLUCOSE SERPL-MCNC: 104 MG/DL — HIGH (ref 70–99)
HCT VFR BLD CALC: 24.6 % — LOW (ref 39–50)
HGB BLD-MCNC: 7.3 G/DL — LOW (ref 13–17)
MCHC RBC-ENTMCNC: 23.5 PG — LOW (ref 27–34)
MCHC RBC-ENTMCNC: 29.7 GM/DL — LOW (ref 32–36)
MCV RBC AUTO: 79.4 FL — LOW (ref 80–100)
NRBC # BLD: 0 /100 WBCS — SIGNIFICANT CHANGE UP (ref 0–0)
PLATELET # BLD AUTO: 414 K/UL — HIGH (ref 150–400)
POTASSIUM SERPL-MCNC: 4 MMOL/L — SIGNIFICANT CHANGE UP (ref 3.5–5.3)
POTASSIUM SERPL-SCNC: 4 MMOL/L — SIGNIFICANT CHANGE UP (ref 3.5–5.3)
RBC # BLD: 3.1 M/UL — LOW (ref 4.2–5.8)
RBC # FLD: 17.8 % — HIGH (ref 10.3–14.5)
SODIUM SERPL-SCNC: 133 MMOL/L — LOW (ref 135–145)
SPECIMEN SOURCE: SIGNIFICANT CHANGE UP
WBC # BLD: 13.25 K/UL — HIGH (ref 3.8–10.5)
WBC # FLD AUTO: 13.25 K/UL — HIGH (ref 3.8–10.5)

## 2023-04-29 RX ADMIN — Medication 1 APPLICATION(S): at 13:54

## 2023-04-29 RX ADMIN — ENOXAPARIN SODIUM 40 MILLIGRAM(S): 100 INJECTION SUBCUTANEOUS at 05:58

## 2023-04-29 RX ADMIN — Medication 400 MILLIGRAM(S): at 13:52

## 2023-04-29 RX ADMIN — Medication 400 MILLIGRAM(S): at 14:19

## 2023-04-30 VITALS
SYSTOLIC BLOOD PRESSURE: 122 MMHG | DIASTOLIC BLOOD PRESSURE: 76 MMHG | HEART RATE: 100 BPM | TEMPERATURE: 98 F | RESPIRATION RATE: 18 BRPM | OXYGEN SATURATION: 98 %

## 2023-04-30 PROCEDURE — 86900 BLOOD TYPING SEROLOGIC ABO: CPT

## 2023-04-30 PROCEDURE — 84100 ASSAY OF PHOSPHORUS: CPT

## 2023-04-30 PROCEDURE — 88305 TISSUE EXAM BY PATHOLOGIST: CPT

## 2023-04-30 PROCEDURE — 87070 CULTURE OTHR SPECIMN AEROBIC: CPT

## 2023-04-30 PROCEDURE — 85027 COMPLETE CBC AUTOMATED: CPT

## 2023-04-30 PROCEDURE — 36415 COLL VENOUS BLD VENIPUNCTURE: CPT

## 2023-04-30 PROCEDURE — 87077 CULTURE AEROBIC IDENTIFY: CPT

## 2023-04-30 PROCEDURE — 97166 OT EVAL MOD COMPLEX 45 MIN: CPT

## 2023-04-30 PROCEDURE — 80048 BASIC METABOLIC PNL TOTAL CA: CPT

## 2023-04-30 PROCEDURE — 86850 RBC ANTIBODY SCREEN: CPT

## 2023-04-30 PROCEDURE — C9399: CPT

## 2023-04-30 PROCEDURE — 86901 BLOOD TYPING SEROLOGIC RH(D): CPT

## 2023-04-30 PROCEDURE — 83735 ASSAY OF MAGNESIUM: CPT

## 2023-04-30 RX ORDER — PETROLATUM,WHITE
1 JELLY (GRAM) TOPICAL
Qty: 0 | Refills: 0 | DISCHARGE
Start: 2023-04-30

## 2023-04-30 RX ORDER — OXYCODONE HYDROCHLORIDE 5 MG/1
1 TABLET ORAL
Qty: 5 | Refills: 0
Start: 2023-04-30 | End: 2023-05-04

## 2023-04-30 RX ADMIN — Medication 975 MILLIGRAM(S): at 08:12

## 2023-04-30 RX ADMIN — HYDROMORPHONE HYDROCHLORIDE 0.5 MILLIGRAM(S): 2 INJECTION INTRAMUSCULAR; INTRAVENOUS; SUBCUTANEOUS at 08:12

## 2023-04-30 RX ADMIN — Medication 400 MILLIGRAM(S): at 05:26

## 2023-04-30 RX ADMIN — ENOXAPARIN SODIUM 40 MILLIGRAM(S): 100 INJECTION SUBCUTANEOUS at 05:26

## 2023-04-30 RX ADMIN — Medication 400 MILLIGRAM(S): at 06:26

## 2023-04-30 RX ADMIN — Medication 400 MILLIGRAM(S): at 11:45

## 2023-04-30 NOTE — PROGRESS NOTE ADULT - SUBJECTIVE AND OBJECTIVE BOX
Plastic Surgery Progress Note (pg [facility]: [id number], [facility]: [phone])    SUBJECTIVE:  Patient seen and examined at bedside this AM. No acute events overnight. AF/VSS. Pain well controlled.     OBJECTIVE:     ** VITAL SIGNS / I&O's **    Vital Signs Last 24 Hrs  T(C): 36.9 (29 Apr 2023 09:58), Max: 37.1 (28 Apr 2023 13:03)  T(F): 98.4 (29 Apr 2023 09:58), Max: 98.8 (28 Apr 2023 13:03)  HR: 96 (29 Apr 2023 09:58) (90 - 106)  BP: 113/69 (29 Apr 2023 09:58) (107/69 - 114/66)  BP(mean): --  RR: 18 (29 Apr 2023 09:58) (17 - 18)  SpO2: 100% (29 Apr 2023 09:58) (97% - 100%)    Parameters below as of 29 Apr 2023 09:58  Patient On (Oxygen Delivery Method): room air          28 Apr 2023 07:01  -  29 Apr 2023 07:00  --------------------------------------------------------  IN:    Oral Fluid: 1342 mL  Total IN: 1342 mL    OUT:    Voided (mL): 1400 mL  Total OUT: 1400 mL    Total NET: -58 mL      29 Apr 2023 07:01  -  29 Apr 2023 10:45  --------------------------------------------------------  IN:    Oral Fluid: 200 mL  Total IN: 200 mL    OUT:    Voided (mL): 0 mL  Total OUT: 0 mL    Total NET: 200 mL          ** PHYSICAL EXAM **    -- CONSTITUTIONAL: NAD.   -- HEENT:  -- NECK:   -- CHEST:  -- ABDOMEN:   -- EXTREMITIES:       **MEDS**  acetaminophen     Tablet .. 975 milliGRAM(s) Oral every 6 hours  AQUAPHOR (petrolatum Ointment) 1 Application(s) Topical daily  artificial tears (preservative free) Ophthalmic Solution 1 Drop(s) Both EYES every 2 hours PRN  calcium carbonate    500 mG (Tums) Chewable 1 Tablet(s) Chew every 4 hours PRN  diphenhydrAMINE Injectable 25 milliGRAM(s) IV Push every 4 hours PRN  enoxaparin Injectable 40 milliGRAM(s) SubCutaneous every 24 hours  HYDROmorphone  Injectable 0.5 milliGRAM(s) IV Push every 3 hours PRN  ibuprofen  Tablet. 400 milliGRAM(s) Oral every 6 hours  melatonin 3 milliGRAM(s) Oral at bedtime PRN  metoclopramide Injectable 10 milliGRAM(s) IV Push every 4 hours PRN  ondansetron Injectable 4 milliGRAM(s) IV Push every 4 hours PRN  oxyCODONE    IR 2.5 milliGRAM(s) Oral every 4 hours PRN  oxyCODONE    IR 5 milliGRAM(s) Oral every 4 hours PRN  polyethylene glycol 3350 17 Gram(s) Oral every 12 hours PRN  simethicone 80 milliGRAM(s) Chew every 4 hours PRN      ** LABS **                          7.3    13.25 )-----------( 414      ( 29 Apr 2023 07:22 )             24.6     29 Apr 2023 07:20    133    |  99     |  9      ----------------------------<  104    4.0     |  28     |  0.70     Ca    8.7        29 Apr 2023 07:20  Phos  3.7       28 Apr 2023 07:18  Mg     1.8       28 Apr 2023 07:18        CAPILLARY BLOOD GLUCOSE          
Plastic Surgery Daily Progress Note  =====================================================    SUBJECTIVE: Patient seen and examined at bedside on AM rounds. Patient reports that they're feeling well. Yesterday, required stitch at bedside for bleeding vessel. Patient now stable and without any lightheadness or other symptoms of blood loss.       ALLERGIES:  No Known Allergies      --------------------------------------------------------------------------------------    MEDICATIONS:    Neurologic Medications  acetaminophen     Tablet .. 975 milliGRAM(s) Oral every 6 hours  HYDROmorphone  Injectable 0.5 milliGRAM(s) IV Push every 3 hours PRN Breakthrough pain  ibuprofen  Tablet. 400 milliGRAM(s) Oral every 6 hours  oxyCODONE    IR 5 milliGRAM(s) Oral every 4 hours PRN Severe Pain (7 - 10)  oxyCODONE    IR 2.5 milliGRAM(s) Oral every 4 hours PRN Moderate Pain (4 - 6)    Respiratory Medications    Cardiovascular Medications    Gastrointestinal Medications    Genitourinary Medications    Hematologic/Oncologic Medications  enoxaparin Injectable 40 milliGRAM(s) SubCutaneous every 24 hours    Antimicrobial/Immunologic Medications    Endocrine/Metabolic Medications    Topical/Other Medications    --------------------------------------------------------------------------------------    VITAL SIGNS:  Vital Signs Last 24 Hrs  T(C): 36.7 (28 Apr 2023 05:33), Max: 36.7 (27 Apr 2023 18:33)  T(F): 98 (28 Apr 2023 05:33), Max: 98 (27 Apr 2023 18:33)  HR: 96 (28 Apr 2023 05:33) (86 - 121)  BP: 109/75 (28 Apr 2023 05:33) (93/54 - 150/69)  BP(mean): 97 (27 Apr 2023 12:06) (86 - 108)  ABP: --  ABP(mean): --  RR: 18 (28 Apr 2023 05:33) (17 - 22)  SpO2: 98% (28 Apr 2023 05:33) (96% - 100%)    O2 Parameters below as of 28 Apr 2023 05:33  Patient On (Oxygen Delivery Method): room air    --------------------------------------------------------------------------------------    EXAM    General: NAD, resting in bed comfortably.  Respiratory: Nonlabored respirations, normal cw expansion.  Extremities:  LUE - Dressing in place with ACE wrap over shoulder. Arm soft, warm, perfused and without collections. Dressing cdi.    --------------------------------------------------------------------------------------    LABS                          7.9    13.89 )-----------( 411      ( 28 Apr 2023 07:20 )             25.9       04-28    133<L>  |  97  |  11  ----------------------------<  117<H>  4.1   |  26  |  0.66    Ca    8.6      28 Apr 2023 07:18  Phos  3.7     04-28  Mg     1.8     04-28        --------------------------------------------------------------------------------------    INS AND OUTS:    I&O's Detail    27 Apr 2023 07:01  -  28 Apr 2023 07:00  --------------------------------------------------------  IN:    Oral Fluid: 780 mL  Total IN: 780 mL    OUT:    Voided (mL): 1050 mL  Total OUT: 1050 mL    Total NET: -270 mL      28 Apr 2023 07:01  -  28 Apr 2023 09:44  --------------------------------------------------------  IN:    Oral Fluid: 240 mL  Total IN: 240 mL    OUT:  Total OUT: 0 mL    Total NET: 240 mL    --------------------------------------------------------------------------------------
Plastic Surgery Progress Note (pg [facility]: [id number], [facility]: [phone])    SUBJECTIVE:  Patient seen and examined at bedside this AM. No acute events overnight. AF/VSS. Pain well controlled.     OBJECTIVE:     -- CONSTITUTIONAL: NAD.   -- EXTREMITIES: excision area c/d/i, no evidence of bleeding.     OBJECTIVE  T(C): 37 (04-30-23 @ 05:30), Max: 37.1 (04-29-23 @ 14:18)  HR: 97 (04-30-23 @ 05:30) (82 - 100)  BP: 124/74 (04-30-23 @ 05:30) (113/69 - 133/67)  RR: 18 (04-30-23 @ 05:30) (18 - 18)  SpO2: 99% (04-30-23 @ 05:30) (99% - 100%)  I&O's Summary    29 Apr 2023 07:01  -  30 Apr 2023 07:00  --------------------------------------------------------  IN: 1270 mL / OUT: 2050 mL / NET: -780 mL      I&O's Detail    29 Apr 2023 07:01  -  30 Apr 2023 07:00  --------------------------------------------------------  IN:    Oral Fluid: 1270 mL  Total IN: 1270 mL    OUT:    Voided (mL): 2050 mL  Total OUT: 2050 mL    Total NET: -780 mL        LABS                        7.3    13.25 )-----------( 414      ( 29 Apr 2023 07:22 )             24.6     04-29    133<L>  |  99  |  9   ----------------------------<  104<H>  4.0   |  28  |  0.70    Ca    8.7      29 Apr 2023 07:20          ORDERS/MEDICATIONS  MEDICATIONS  (STANDING):  acetaminophen     Tablet .. 975 milliGRAM(s) Oral every 6 hours  AQUAPHOR (petrolatum Ointment) 1 Application(s) Topical daily  enoxaparin Injectable 40 milliGRAM(s) SubCutaneous every 24 hours  ibuprofen  Tablet. 400 milliGRAM(s) Oral every 6 hours    MEDICATIONS  (PRN):  artificial tears (preservative free) Ophthalmic Solution 1 Drop(s) Both EYES every 2 hours PRN Dry Eyes  calcium carbonate    500 mG (Tums) Chewable 1 Tablet(s) Chew every 4 hours PRN Heartburn  diphenhydrAMINE Injectable 25 milliGRAM(s) IV Push every 4 hours PRN Rash and/or Itching  HYDROmorphone  Injectable 0.5 milliGRAM(s) IV Push every 3 hours PRN Breakthrough pain  melatonin 3 milliGRAM(s) Oral at bedtime PRN Sleep  metoclopramide Injectable 10 milliGRAM(s) IV Push every 4 hours PRN nausea/vomiting  ondansetron Injectable 4 milliGRAM(s) IV Push every 4 hours PRN Nausea and/or Vomiting  oxyCODONE    IR 5 milliGRAM(s) Oral every 4 hours PRN Severe Pain (7 - 10)  oxyCODONE    IR 2.5 milliGRAM(s) Oral every 4 hours PRN Moderate Pain (4 - 6)  polyethylene glycol 3350 17 Gram(s) Oral every 12 hours PRN Constipation  simethicone 80 milliGRAM(s) Chew every 4 hours PRN Gas

## 2023-04-30 NOTE — PROGRESS NOTE ADULT - ASSESSMENT
A: Jack is a 32M who is now s/p left axillary excision of hidradinitis suppurativa on 4/27. Patient recovering well.     Plan:  -Dressing to remain in place until Sunday  -Will plan to set up VNS for home wound care  -NWB LUE  -AM labs stable - will continue to monitor for need for transfusion  -Pain control PRN    Wound care instructions for DC:  -Dressing: Aquacel extra base layer with 4x4 gauze fluffed up, covered with abd pads, and foam tape/tape to secure in place  -To be changed daily    Donnie Powderly  Plastic Surgery  #[phone] [facility] pager  [phone] [facility] pager  Available on teams 
A: Jack is a 32M who is now s/p left axillary excision of hidradinitis suppurativa on 4/27. Patient recovering well. Dressing changed this AM, wound clear/dry/intact    Plan:  -Dressing changed, mother instructed on woundcare and dressing changes.   -VNS set up for home wound care starting Monday  -NWB LUE  - No AM labs  -Pain control PRN    Wound care instructions for DC:  -Dressing: Aquacel extra base layer with 4x4 gauze fluffed up, covered with abd pads, and foam tape/tape to secure in place  -To be changed daily  
A: Jack is a 32M who is now s/p left axillary excision of hidradinitis suppurativa on 4/27. Patient recovering well.     Plan:  -Dressing to remain in place until Sunday, will change with Mom at bedside   -VNS set up for home wound care starting Monday  -NWB LUE  - No AM labs  -Pain control PRN    Wound care instructions for DC:  -Dressing: Aquacel extra base layer with 4x4 gauze fluffed up, covered with abd pads, and foam tape/tape to secure in place  -To be changed daily

## 2023-05-01 ENCOUNTER — NON-APPOINTMENT (OUTPATIENT)
Age: 33
End: 2023-05-01

## 2023-05-03 PROBLEM — I45.10 UNSPECIFIED RIGHT BUNDLE-BRANCH BLOCK: Chronic | Status: ACTIVE | Noted: 2023-04-21

## 2023-05-08 ENCOUNTER — OUTPATIENT (OUTPATIENT)
Dept: OUTPATIENT SERVICES | Facility: HOSPITAL | Age: 33
LOS: 1 days | End: 2023-05-08
Payer: MEDICAID

## 2023-05-08 VITALS
OXYGEN SATURATION: 100 % | HEART RATE: 102 BPM | HEIGHT: 72 IN | WEIGHT: 169.98 LBS | TEMPERATURE: 98 F | SYSTOLIC BLOOD PRESSURE: 116 MMHG | DIASTOLIC BLOOD PRESSURE: 79 MMHG | RESPIRATION RATE: 18 BRPM

## 2023-05-08 DIAGNOSIS — L73.2 HIDRADENITIS SUPPURATIVA: ICD-10-CM

## 2023-05-08 DIAGNOSIS — Z98.890 OTHER SPECIFIED POSTPROCEDURAL STATES: Chronic | ICD-10-CM

## 2023-05-08 DIAGNOSIS — Z01.818 ENCOUNTER FOR OTHER PREPROCEDURAL EXAMINATION: ICD-10-CM

## 2023-05-08 DIAGNOSIS — Z29.9 ENCOUNTER FOR PROPHYLACTIC MEASURES, UNSPECIFIED: ICD-10-CM

## 2023-05-08 LAB
ANION GAP SERPL CALC-SCNC: 11 MMOL/L — SIGNIFICANT CHANGE UP (ref 5–17)
BLD GP AB SCN SERPL QL: NEGATIVE — SIGNIFICANT CHANGE UP
BUN SERPL-MCNC: 16 MG/DL — SIGNIFICANT CHANGE UP (ref 7–23)
CALCIUM SERPL-MCNC: 9.6 MG/DL — SIGNIFICANT CHANGE UP (ref 8.4–10.5)
CHLORIDE SERPL-SCNC: 98 MMOL/L — SIGNIFICANT CHANGE UP (ref 96–108)
CO2 SERPL-SCNC: 26 MMOL/L — SIGNIFICANT CHANGE UP (ref 22–31)
CREAT SERPL-MCNC: 0.8 MG/DL — SIGNIFICANT CHANGE UP (ref 0.5–1.3)
EGFR: 121 ML/MIN/1.73M2 — SIGNIFICANT CHANGE UP
GLUCOSE SERPL-MCNC: 120 MG/DL — HIGH (ref 70–99)
HCT VFR BLD CALC: 24.5 % — LOW (ref 39–50)
HGB BLD-MCNC: 7.5 G/DL — LOW (ref 13–17)
MCHC RBC-ENTMCNC: 23.1 PG — LOW (ref 27–34)
MCHC RBC-ENTMCNC: 30.6 GM/DL — LOW (ref 32–36)
MCV RBC AUTO: 75.6 FL — LOW (ref 80–100)
NRBC # BLD: 0 /100 WBCS — SIGNIFICANT CHANGE UP (ref 0–0)
PLATELET # BLD AUTO: 627 K/UL — HIGH (ref 150–400)
POTASSIUM SERPL-MCNC: 4 MMOL/L — SIGNIFICANT CHANGE UP (ref 3.5–5.3)
POTASSIUM SERPL-SCNC: 4 MMOL/L — SIGNIFICANT CHANGE UP (ref 3.5–5.3)
RBC # BLD: 3.24 M/UL — LOW (ref 4.2–5.8)
RBC # FLD: 17.9 % — HIGH (ref 10.3–14.5)
RH IG SCN BLD-IMP: POSITIVE — SIGNIFICANT CHANGE UP
SODIUM SERPL-SCNC: 135 MMOL/L — SIGNIFICANT CHANGE UP (ref 135–145)
SURGICAL PATHOLOGY STUDY: SIGNIFICANT CHANGE UP
WBC # BLD: 15.93 K/UL — HIGH (ref 3.8–10.5)
WBC # FLD AUTO: 15.93 K/UL — HIGH (ref 3.8–10.5)

## 2023-05-08 PROCEDURE — 86901 BLOOD TYPING SEROLOGIC RH(D): CPT

## 2023-05-08 PROCEDURE — 86850 RBC ANTIBODY SCREEN: CPT

## 2023-05-08 PROCEDURE — 80048 BASIC METABOLIC PNL TOTAL CA: CPT

## 2023-05-08 PROCEDURE — 85027 COMPLETE CBC AUTOMATED: CPT

## 2023-05-08 PROCEDURE — 86900 BLOOD TYPING SEROLOGIC ABO: CPT

## 2023-05-08 NOTE — H&P PST ADULT - HISTORY OF PRESENT ILLNESS
32  year male PMhx RBBB, hidradenitis suppurativa, was on Humira, developed increased discharge from suppurativa of the buttocks and bilateral armpits S/P excision of hidradenitis of the buttocks with skin graft reconstruction (2/2023) s/p excision of left axillary (4/2023).  Now presents to [facility] prior to scheduled Left Axillary Wound Reconstruction with Thoracodorsal Artery  Flap, possible skin graft from any site with Dr. Villareal on 5/25/23. Denies any chest pain, palpitations, SOB, N/V, fever or chills.     No H/O COVID infection    Of note: pt was recently admitted to [facility] on 2/2023, was found to be septic, treated with zosyn, vanco and clindamycin, S/P excision of hidradenitis of the buttocks, was D/Charly to [facility] for 2 weeks for wound care and PT.

## 2023-05-08 NOTE — H&P PST ADULT - PROBLEM SELECTOR PLAN 1
pt scheduled for Left Axillary Wound Reconstruction with Thoracodorsal Artery  Flap, possible skin graft from any site with Dr. Villareal on 5/25/23.  Pre-op instructions given, all questions answered.  Surgical Soap given.  Labs: CBC, BMP, T&S   *instructed last dose of Nsaid's 5/17/23

## 2023-05-08 NOTE — H&P PST ADULT - MUSCULOSKELETAL COMMENTS
left hip pain for the past 2 weeks,10 /10 pt states he has had left hip pain for the past 2 weeks 7-10/10 at times.

## 2023-05-10 ENCOUNTER — APPOINTMENT (OUTPATIENT)
Dept: PLASTIC SURGERY | Facility: CLINIC | Age: 33
End: 2023-05-10

## 2023-05-10 ENCOUNTER — INPATIENT (INPATIENT)
Facility: HOSPITAL | Age: 33
LOS: 9 days | Discharge: HOME | DRG: 812 | End: 2023-05-20
Attending: HOSPITALIST | Admitting: STUDENT IN AN ORGANIZED HEALTH CARE EDUCATION/TRAINING PROGRAM
Payer: MEDICAID

## 2023-05-10 VITALS
WEIGHT: 169.98 LBS | HEIGHT: 72 IN | DIASTOLIC BLOOD PRESSURE: 80 MMHG | HEART RATE: 101 BPM | RESPIRATION RATE: 20 BRPM | TEMPERATURE: 98 F | OXYGEN SATURATION: 100 % | SYSTOLIC BLOOD PRESSURE: 123 MMHG

## 2023-05-10 DIAGNOSIS — D63.8 ANEMIA IN OTHER CHRONIC DISEASES CLASSIFIED ELSEWHERE: ICD-10-CM

## 2023-05-10 DIAGNOSIS — Z29.9 ENCOUNTER FOR PROPHYLACTIC MEASURES, UNSPECIFIED: ICD-10-CM

## 2023-05-10 DIAGNOSIS — D72.829 ELEVATED WHITE BLOOD CELL COUNT, UNSPECIFIED: ICD-10-CM

## 2023-05-10 DIAGNOSIS — D64.9 ANEMIA, UNSPECIFIED: ICD-10-CM

## 2023-05-10 DIAGNOSIS — L73.2 HIDRADENITIS SUPPURATIVA: ICD-10-CM

## 2023-05-10 DIAGNOSIS — Z98.890 OTHER SPECIFIED POSTPROCEDURAL STATES: Chronic | ICD-10-CM

## 2023-05-10 DIAGNOSIS — M25.552 PAIN IN LEFT HIP: ICD-10-CM

## 2023-05-10 PROBLEM — Z86.19 PERSONAL HISTORY OF OTHER INFECTIOUS AND PARASITIC DISEASES: Chronic | Status: ACTIVE | Noted: 2023-05-08

## 2023-05-10 LAB
ALBUMIN SERPL ELPH-MCNC: 3.1 G/DL — LOW (ref 3.3–5)
ALP SERPL-CCNC: 86 U/L — SIGNIFICANT CHANGE UP (ref 40–120)
ALT FLD-CCNC: 12 U/L — SIGNIFICANT CHANGE UP (ref 10–45)
ANION GAP SERPL CALC-SCNC: 10 MMOL/L — SIGNIFICANT CHANGE UP (ref 5–17)
ANISOCYTOSIS BLD QL: SLIGHT — SIGNIFICANT CHANGE UP
APPEARANCE UR: CLEAR — SIGNIFICANT CHANGE UP
APTT BLD: 26.1 SEC — LOW (ref 27.5–35.5)
AST SERPL-CCNC: 9 U/L — LOW (ref 10–40)
BACTERIA # UR AUTO: NEGATIVE — SIGNIFICANT CHANGE UP
BASE EXCESS BLDV CALC-SCNC: 2.4 MMOL/L — SIGNIFICANT CHANGE UP (ref -2–3)
BASOPHILS # BLD AUTO: 0 K/UL — SIGNIFICANT CHANGE UP (ref 0–0.2)
BASOPHILS NFR BLD AUTO: 0 % — SIGNIFICANT CHANGE UP (ref 0–2)
BILIRUB SERPL-MCNC: 0.2 MG/DL — SIGNIFICANT CHANGE UP (ref 0.2–1.2)
BILIRUB UR-MCNC: NEGATIVE — SIGNIFICANT CHANGE UP
BLD GP AB SCN SERPL QL: NEGATIVE — SIGNIFICANT CHANGE UP
BLOOD GAS VENOUS - CREATININE: SIGNIFICANT CHANGE UP MG/DL (ref 0.5–1.3)
BUN SERPL-MCNC: 15 MG/DL — SIGNIFICANT CHANGE UP (ref 7–23)
CA-I SERPL-SCNC: 1.24 MMOL/L — SIGNIFICANT CHANGE UP (ref 1.15–1.33)
CALCIUM SERPL-MCNC: 9.1 MG/DL — SIGNIFICANT CHANGE UP (ref 8.4–10.5)
CHLORIDE BLDV-SCNC: 104 MMOL/L — SIGNIFICANT CHANGE UP (ref 96–108)
CHLORIDE SERPL-SCNC: 101 MMOL/L — SIGNIFICANT CHANGE UP (ref 96–108)
CO2 BLDV-SCNC: 29 MMOL/L — HIGH (ref 22–26)
CO2 SERPL-SCNC: 26 MMOL/L — SIGNIFICANT CHANGE UP (ref 22–31)
COLOR SPEC: SIGNIFICANT CHANGE UP
CREAT SERPL-MCNC: 0.7 MG/DL — SIGNIFICANT CHANGE UP (ref 0.5–1.3)
DACRYOCYTES BLD QL SMEAR: SLIGHT — SIGNIFICANT CHANGE UP
DIFF PNL FLD: NEGATIVE — SIGNIFICANT CHANGE UP
EGFR: 126 ML/MIN/1.73M2 — SIGNIFICANT CHANGE UP
EOSINOPHIL # BLD AUTO: 0 K/UL — SIGNIFICANT CHANGE UP (ref 0–0.5)
EOSINOPHIL NFR BLD AUTO: 0 % — SIGNIFICANT CHANGE UP (ref 0–6)
EPI CELLS # UR: 0 /HPF — SIGNIFICANT CHANGE UP
GAS PNL BLDV: 138 MMOL/L — SIGNIFICANT CHANGE UP (ref 136–145)
GAS PNL BLDV: SIGNIFICANT CHANGE UP
GAS PNL BLDV: SIGNIFICANT CHANGE UP
GLUCOSE BLDV-MCNC: 120 MG/DL — HIGH (ref 70–99)
GLUCOSE SERPL-MCNC: 127 MG/DL — HIGH (ref 70–99)
GLUCOSE UR QL: NEGATIVE — SIGNIFICANT CHANGE UP
HAPTOGLOB SERPL-MCNC: 280 MG/DL — HIGH (ref 34–200)
HCO3 BLDV-SCNC: 28 MMOL/L — SIGNIFICANT CHANGE UP (ref 22–29)
HCT VFR BLD CALC: 22.4 % — LOW (ref 39–50)
HCT VFR BLDA CALC: 21 % — CRITICAL LOW (ref 39–51)
HGB BLD CALC-MCNC: 7.1 G/DL — LOW (ref 12.6–17.4)
HGB BLD-MCNC: 6.7 G/DL — CRITICAL LOW (ref 13–17)
HYALINE CASTS # UR AUTO: 3 /LPF — HIGH (ref 0–2)
HYPOCHROMIA BLD QL: SIGNIFICANT CHANGE UP
INR BLD: 1.3 RATIO — HIGH (ref 0.88–1.16)
IRON SATN MFR SERPL: 3 % — LOW (ref 16–55)
IRON SATN MFR SERPL: 9 UG/DL — LOW (ref 45–165)
KETONES UR-MCNC: NEGATIVE — SIGNIFICANT CHANGE UP
LACTATE BLDV-MCNC: 2.4 MMOL/L — HIGH (ref 0.5–2)
LEUKOCYTE ESTERASE UR-ACNC: NEGATIVE — SIGNIFICANT CHANGE UP
LYMPHOCYTES # BLD AUTO: 1.49 K/UL — SIGNIFICANT CHANGE UP (ref 1–3.3)
LYMPHOCYTES # BLD AUTO: 10.5 % — LOW (ref 13–44)
MACROCYTES BLD QL: SLIGHT — SIGNIFICANT CHANGE UP
MANUAL SMEAR VERIFICATION: SIGNIFICANT CHANGE UP
MCHC RBC-ENTMCNC: 22.9 PG — LOW (ref 27–34)
MCHC RBC-ENTMCNC: 29.9 GM/DL — LOW (ref 32–36)
MCV RBC AUTO: 76.7 FL — LOW (ref 80–100)
MONOCYTES # BLD AUTO: 1.12 K/UL — HIGH (ref 0–0.9)
MONOCYTES NFR BLD AUTO: 7.9 % — SIGNIFICANT CHANGE UP (ref 2–14)
NEUTROPHILS # BLD AUTO: 11.6 K/UL — HIGH (ref 1.8–7.4)
NEUTROPHILS NFR BLD AUTO: 81.6 % — HIGH (ref 43–77)
NITRITE UR-MCNC: NEGATIVE — SIGNIFICANT CHANGE UP
OB PNL STL: NEGATIVE — SIGNIFICANT CHANGE UP
PCO2 BLDV: 47 MMHG — SIGNIFICANT CHANGE UP (ref 42–55)
PH BLDV: 7.38 — SIGNIFICANT CHANGE UP (ref 7.32–7.43)
PH UR: 8 — SIGNIFICANT CHANGE UP (ref 5–8)
PLAT MORPH BLD: NORMAL — SIGNIFICANT CHANGE UP
PLATELET # BLD AUTO: 542 K/UL — HIGH (ref 150–400)
PO2 BLDV: 32 MMHG — SIGNIFICANT CHANGE UP (ref 25–45)
POIKILOCYTOSIS BLD QL AUTO: SLIGHT — SIGNIFICANT CHANGE UP
POLYCHROMASIA BLD QL SMEAR: SLIGHT — SIGNIFICANT CHANGE UP
POTASSIUM BLDV-SCNC: 4.7 MMOL/L — SIGNIFICANT CHANGE UP (ref 3.5–5.1)
POTASSIUM SERPL-MCNC: 4.3 MMOL/L — SIGNIFICANT CHANGE UP (ref 3.5–5.3)
POTASSIUM SERPL-SCNC: 4.3 MMOL/L — SIGNIFICANT CHANGE UP (ref 3.5–5.3)
PROT SERPL-MCNC: 9 G/DL — HIGH (ref 6–8.3)
PROT UR-MCNC: ABNORMAL
PROTHROM AB SERPL-ACNC: 15 SEC — HIGH (ref 10.5–13.4)
RBC # BLD: 2.92 M/UL — LOW (ref 4.2–5.8)
RBC # FLD: 17.9 % — HIGH (ref 10.3–14.5)
RBC BLD AUTO: ABNORMAL
RBC CASTS # UR COMP ASSIST: 0 /HPF — SIGNIFICANT CHANGE UP (ref 0–4)
RH IG SCN BLD-IMP: POSITIVE — SIGNIFICANT CHANGE UP
SAO2 % BLDV: 47.2 % — LOW (ref 67–88)
SODIUM SERPL-SCNC: 137 MMOL/L — SIGNIFICANT CHANGE UP (ref 135–145)
SP GR SPEC: 1.05 — HIGH (ref 1.01–1.02)
TARGETS BLD QL SMEAR: SLIGHT — SIGNIFICANT CHANGE UP
TIBC SERPL-MCNC: 279 UG/DL — SIGNIFICANT CHANGE UP (ref 220–430)
UIBC SERPL-MCNC: 270 UG/DL — SIGNIFICANT CHANGE UP (ref 110–370)
UROBILINOGEN FLD QL: NEGATIVE — SIGNIFICANT CHANGE UP
WBC # BLD: 14.22 K/UL — HIGH (ref 3.8–10.5)
WBC # FLD AUTO: 14.22 K/UL — HIGH (ref 3.8–10.5)
WBC UR QL: 1 /HPF — SIGNIFICANT CHANGE UP (ref 0–5)

## 2023-05-10 PROCEDURE — 99285 EMERGENCY DEPT VISIT HI MDM: CPT

## 2023-05-10 PROCEDURE — 74177 CT ABD & PELVIS W/CONTRAST: CPT | Mod: 26,MA

## 2023-05-10 PROCEDURE — 73502 X-RAY EXAM HIP UNI 2-3 VIEWS: CPT | Mod: 26,LT

## 2023-05-10 PROCEDURE — 99223 1ST HOSP IP/OBS HIGH 75: CPT | Mod: GC

## 2023-05-10 RX ORDER — PIPERACILLIN AND TAZOBACTAM 4; .5 G/20ML; G/20ML
3.38 INJECTION, POWDER, LYOPHILIZED, FOR SOLUTION INTRAVENOUS ONCE
Refills: 0 | Status: COMPLETED | OUTPATIENT
Start: 2023-05-10 | End: 2023-05-10

## 2023-05-10 RX ORDER — MORPHINE SULFATE 50 MG/1
4 CAPSULE, EXTENDED RELEASE ORAL ONCE
Refills: 0 | Status: DISCONTINUED | OUTPATIENT
Start: 2023-05-10 | End: 2023-05-10

## 2023-05-10 RX ORDER — ACETAMINOPHEN 500 MG
1000 TABLET ORAL ONCE
Refills: 0 | Status: COMPLETED | OUTPATIENT
Start: 2023-05-10 | End: 2023-05-10

## 2023-05-10 RX ORDER — VANCOMYCIN HCL 1 G
1000 VIAL (EA) INTRAVENOUS ONCE
Refills: 0 | Status: COMPLETED | OUTPATIENT
Start: 2023-05-10 | End: 2023-05-10

## 2023-05-10 RX ORDER — OXYCODONE HYDROCHLORIDE 5 MG/1
5 TABLET ORAL EVERY 4 HOURS
Refills: 0 | Status: DISCONTINUED | OUTPATIENT
Start: 2023-05-10 | End: 2023-05-11

## 2023-05-10 RX ORDER — ENOXAPARIN SODIUM 100 MG/ML
40 INJECTION SUBCUTANEOUS EVERY 24 HOURS
Refills: 0 | Status: DISCONTINUED | OUTPATIENT
Start: 2023-05-10 | End: 2023-05-10

## 2023-05-10 RX ORDER — SODIUM CHLORIDE 9 MG/ML
1000 INJECTION INTRAMUSCULAR; INTRAVENOUS; SUBCUTANEOUS ONCE
Refills: 0 | Status: COMPLETED | OUTPATIENT
Start: 2023-05-10 | End: 2023-05-10

## 2023-05-10 RX ORDER — FERROUS SULFATE 325(65) MG
325 TABLET ORAL DAILY
Refills: 0 | Status: DISCONTINUED | OUTPATIENT
Start: 2023-05-10 | End: 2023-05-20

## 2023-05-10 RX ADMIN — MORPHINE SULFATE 4 MILLIGRAM(S): 50 CAPSULE, EXTENDED RELEASE ORAL at 15:16

## 2023-05-10 RX ADMIN — Medication 250 MILLIGRAM(S): at 18:42

## 2023-05-10 RX ADMIN — PIPERACILLIN AND TAZOBACTAM 200 GRAM(S): 4; .5 INJECTION, POWDER, LYOPHILIZED, FOR SOLUTION INTRAVENOUS at 17:51

## 2023-05-10 RX ADMIN — OXYCODONE HYDROCHLORIDE 5 MILLIGRAM(S): 5 TABLET ORAL at 22:04

## 2023-05-10 RX ADMIN — OXYCODONE HYDROCHLORIDE 5 MILLIGRAM(S): 5 TABLET ORAL at 23:04

## 2023-05-10 RX ADMIN — Medication 400 MILLIGRAM(S): at 14:18

## 2023-05-10 RX ADMIN — SODIUM CHLORIDE 1000 MILLILITER(S): 9 INJECTION INTRAMUSCULAR; INTRAVENOUS; SUBCUTANEOUS at 14:17

## 2023-05-10 NOTE — ED PROVIDER NOTE - ATTENDING APP SHARED VISIT CONTRIBUTION OF CARE
Celestine Ramachandran MD:  I personally saw the patient and performed a substantive portion of the visit including all aspects of the medical decision making.    MDM: 32-year-old female with history of hidradenitis suppurativa status post excision and skin graft reconstruction who presents with left hip/groin pain for the last 2 weeks.  Patient denies any trauma, injury, fall.  Patient states that he has been having progressively worsening of pain and difficulty walking.  Patient was seen at presurgical testing 2 days ago for left axillary wound reconstruction with skin grafts but was called regarding abnormal labs with anemia, and sent to the ED.  Patient denies any easy bleeding or bruising, rectal bleeding, hematuria, hematemesis.  Denies being on any blood thinners.    On examination patient is uncomfortable appearing, borderline rectal temperature 100.1 F.  Skin graft sites with no surrounding erythema or warmth or fluctuance or drainage.  Left hip with decreased range of motion, but no significant tenderness, no bulging.  No testicular swelling or tenderness, no penile discharge.    Will obtain CT Abd/Pelv to assess for acute intraabdominal surgical and infectious pathology.  Will obtain labs to evaluate for hematologic disorder, metabolic derangements, hepatic and renal function, and screen for infection.    Patient found to have leukocytosis, which is downtrending from 2 days ago, but hemoglobin has decreased from 7.5-6.7.  Will perform further evaluation for anemia including FOBT and anemia work-up. Consented for pRBC transfusion.    Differential includes but is not limited to: See above    Patient with new problems requiring additional work-up and treatment, following orders: see above  Discussed case with: Pending  Obtained and reviewed external records: Discharge note from 4/27/2023  Additional history obtained from: Grandfather bedside  Chronic conditions and social determinants of health affecting care: See above  Consideration of admission

## 2023-05-10 NOTE — H&P ADULT - ASSESSMENT
32M with a history of hidradenitis suppurativa and  with many recent admissions wound excisions and skin grafts (most recently for left axillary excision 04/2023) who presents to the hospital with 1 week of left hip pain of uncertain etiology, and incidentally found to have anemia of uncertain etiology.

## 2023-05-10 NOTE — H&P ADULT - PROBLEM SELECTOR PLAN 2
Hb 15-16 => 7-8 => 6.7 today  Likely mostly related to AoCD, also has a history of folate deficiency, and also was on adalimumab for years which is known to cause hematologic abnormalities  Previously seen by hematology in 2017 when Hb was 10's - at the time b12 wnl, folate  low normal, was recommended he start folate supplements; hb electrophoresis was normal, too  - f/u iron studies, folate, b12, haptoglobin   - consider BM biopsy if anemia remains unexplained despite above work up Hb 15-16 => 7-8 => 6.7 today  Likely mostly multifactorial - AoCD, also has a history of folate deficiency, labs here with severe iron deficiency  Previously seen by hematology in 2017 when Hb was 10's - at the time b12 wnl, folate  low normal, was recommended he start folate supplements; hb electrophoresis was normal, too  -   - f/u folate, b12, haptoglobin   - consider BM biopsy if anemia remains unexplained despite above work up

## 2023-05-10 NOTE — H&P ADULT - HISTORY OF PRESENT ILLNESS
32M with a history of hidradenitis suppurativa and  with many recent admissions wound excisions and skin grafts (most recently for left axillary excision 04/2023) who presents to the hospital with 1 week of left hip pain. States the pain began shortly after discharge from the hospital in later April. Pain is described as aching and stabbing "nerve pain", occurring intermittently throughout the day. There was no preceding trauma and he has never been operated on in that region. The pain progressed such that he as barely able to stand or walk (at baseline, he can walk a bit around his house with a cane). He hasn't had any fevers, chills, SOB, CP at home. No bloody or black stools. Bowel movements are normal and regular. Urinating normally.     ED course was notable for stable vital signs, labs with baseline leukocytosis, anemic to Hb of 6.7 (baseline 14-15 in August 2022 => 7's-8's in 2023). Plain film of the hip negative for fracture. 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

## 2023-05-10 NOTE — H&P ADULT - ATTENDING COMMENTS
31yo M w/ PMH of hidradenitis suppurativa w/ many recent admissions wound excisions and skin grafts (most recently for left axillary excision 04/2023) pw 1wk of left hip pain of uncertain etiology, and incidentally found to have anemia of uncertain etiology.  # Hip Pain  - f/u MR to r/o underlying pathology  - PT  - Fall precautions    # Microcytic anemia  - Fe supplementation  - heme c/s  - f/u post-transfusion CBC    Rest of plan as above

## 2023-05-10 NOTE — ED ADULT TRIAGE NOTE - CHIEF COMPLAINT QUOTE
left hip pain x 2 weeks. Difficulty walking due to pain. Denies injury. left hip pain x 2 weeks. Difficulty walking due to pain. Denies injury. Patient added that he has abnormal results from 5/8 pre-surgical testing for left shoulder. Noted a low hgb & patient appears pale.

## 2023-05-10 NOTE — PATIENT PROFILE ADULT - FALL HARM RISK - FACTORS NURSING JUDGEMENT
Subjective:      Liliana Pina is a 84 y.o. male who presents with Rash        CC: Patient here same day visit mainly for complaints of pruritic rash.    HPI Liliana Pina      1. Rash  Patient apparently has had problems with this rash on and off for years.  It looks like he typically will go on triamcinolone for the pruritus.  It affects mostly his back and lower legs bilaterally.  He states symptoms are often worse after showering.  Reports no vesicles or change in the rash.    2. Mild intermittent asthma without complication  Patient has history of asthma and states he does still have problems with wheezing on occasion and albuterol seems to help.  He has not felt he needed a preventative inhaler.  He denies fever or chills.  Social History   Substance Use Topics   • Smoking status: Never Smoker   • Smokeless tobacco: Never Used   • Alcohol use No     Family History   Problem Relation Age of Onset   • Heart Disease Mother    • Respiratory Disease Mother    • Cancer Sister         breast   • Heart Disease Brother    • Hypertension Brother    • No Known Problems Father      Current Outpatient Prescriptions   Medication Sig Dispense Refill   • triamcinolone acetonide (KENALOG) 0.1 % Cream APPLY  CREAM EXTERNALLY TO AFFECTED AREA(S) TWICE DAILY (APPLY TO LEGS AS DIRECTED) 60 g 0   • fluticasone (FLOVENT HFA) 110 MCG/ACT Aerosol Inhale 2 Puffs by mouth 2 times a day. 1 Inhaler 3   • tamsulosin (FLOMAX) 0.4 MG capsule Take 1 Cap by mouth ONE-HALF HOUR AFTER BREAKFAST. 90 Cap 1   • albuterol 108 (90 Base) MCG/ACT Aero Soln inhalation aerosol Inhale 2 Puffs by mouth every 6 hours as needed for Shortness of Breath. 8.5 g 11   • oxybutynin (DITROPAN) 5 MG Tab TAKE ONE TABLET BY MOUTH TWICE DAILY AS NEEDED 60 Tab 0     No current facility-administered medications for this visit.      Past Medical History:   Diagnosis Date   • Asthma    • DJD (degenerative joint disease) 3/8/2013   • Incontinence 3/8/2013       Review of  "Systems   Respiratory: Positive for cough.    Skin: Positive for itching and rash.   All other systems reviewed and are negative.         Objective:     /72 (BP Location: Right arm, Patient Position: Sitting, BP Cuff Size: Adult)   Pulse 82   Temp 36.3 °C (97.4 °F) (Temporal)   Resp 16   Ht 1.778 m (5' 10\")   Wt 59.9 kg (132 lb)   SpO2 96%   BMI 18.94 kg/m²      Physical Exam   Constitutional: He is oriented to person, place, and time. He appears well-developed and well-nourished. No distress.   HENT:   Head: Normocephalic and atraumatic.   Right Ear: External ear normal.   Left Ear: External ear normal.   Nose: Nose normal.   Mouth/Throat: Oropharynx is clear and moist.   Eyes: Conjunctivae are normal. Right eye exhibits no discharge. Left eye exhibits no discharge.   Neck: Normal range of motion. Neck supple. No tracheal deviation present. No thyromegaly present.   Cardiovascular: Normal rate, regular rhythm and normal heart sounds.    No murmur heard.  Pulmonary/Chest: Effort normal and breath sounds normal. No respiratory distress. He has no wheezes. He has no rales.   Lymphadenopathy:     He has no cervical adenopathy.   Neurological: He is alert and oriented to person, place, and time. Coordination normal.   Skin: Skin is warm and dry. Rash noted. He is not diaphoretic. No erythema.   There are excoriations on the upper back and lower legs bilaterally.   Psychiatric: He has a normal mood and affect. His behavior is normal. Judgment and thought content normal.   Patient is mentally alert and able to remember having seen me at another clinic over 12 years ago.   Nursing note and vitals reviewed.              Assessment/Plan:     1. Rash  I suspect where the rash is present and has been scratch that as well as the chronicity of the problem that this is a neurotic excoriation.  He seems to do well with steroid creams although I advised him not to use them consistently and to take a break for at least " a week or 2 between usage.  I advised him to not use too much soap in the shower or extremely hot water.  He also was advised to try an antihistamine such as Allegra.  - triamcinolone acetonide (KENALOG) 0.1 % Cream; APPLY  CREAM EXTERNALLY TO AFFECTED AREA(S) TWICE DAILY (APPLY TO LEGS AS DIRECTED)  Dispense: 60 g; Refill: 0    2. Mild intermittent asthma without complication  Patient does not feel he needs a chest x-ray or other inhalers.  He declined immunizations.       No

## 2023-05-10 NOTE — ED ADULT NURSE NOTE - CHIEF COMPLAINT QUOTE
left hip pain x 2 weeks. Difficulty walking due to pain. Denies injury. Patient added that he has abnormal results from 5/8 pre-surgical testing for left shoulder. Noted a low hgb & patient appears pale.

## 2023-05-10 NOTE — H&P ADULT - PROBLEM SELECTOR PLAN 4
Chronic, stable  - monitor Chronic, stable  - wound care nursing and MD team consulted for L axillary dressing changes (normally changed daily, has visiting nurse MWCARTER)  - monitor

## 2023-05-10 NOTE — H&P ADULT - PROBLEM SELECTOR PLAN 3
WBC elevated to low teens, which is chronic and previously was seen by hematology for this but was lost to follow up  Seen by hematology - on flow, had a population large granular lymphocytic cells without e/o LGL leukemia, and adalimumab can increase incidence of such heme disorders; was supposed to return to heme but was lost to follow up  - ensure patient returns to hematology as an outpatient    #Protein gap  Previousl SPEP reveals polyclonal gammaglobulins, likely reactive from chronic hidradenitis  - monitor      Patient WBC normal at 8.6 however there is a subset of cells on flow cytometry that are polytypic but do appear to be related to a large granular lymphocytosis without evidence of LGL leukemia. INformed patient path this was not previously detected, but this is something which should be followed up on. Patient is on immune therapy with the weekly humira, which can increase the incidence of hematologic disorder's like this. Will follow with him periodically for a while longer. Will have checkout staff call him to arrange follow up in 6 months.     did elevated IgG but SPEP showed POLYCLONAL which would be expected given this chronic infection that he has WBC elevated to low teens, which is chronic and previously was seen by hematology for this but was lost to follow up  Seen by hematology - on flow, had a population large granular lymphocytic cells without e/o LGL leukemia, and adalimumab can increase incidence of such heme disorders; was supposed to return to heme but was lost to follow up  - ensure patient returns to hematology as an outpatient    #Protein gap  Previousl SPEP reveals polyclonal gammaglobulins, likely reactive from chronic hidradenitis  - monitor

## 2023-05-10 NOTE — ED PROVIDER NOTE - PROGRESS NOTE DETAILS
Attempted to admit pt to medicine. Hospitalist requested that I contact patients surgeon prior to admission. Call placed to patients plastic surgeon Dr. Margarito Tariq. Pending call back. Chantel Porras PA-C Spoke to general surgery. They will come eval pt in ED. Spoke to plastic surgery resident. They will speak to Dr. Tariq and give me call back with reccs. Chantel Porras PA-C Received call back from plastic surgery resident. He spoke to Dr. Tariq and stated that there is no need for urgent consult at this time. He will come see patient in the AM. Patient can be admitted to medicine floor. Chantel Porras PA-C

## 2023-05-10 NOTE — ED ADULT NURSE REASSESSMENT NOTE - NS ED NURSE REASSESS COMMENT FT1
Report received from ROYAL Be. Pt a&ox4, no acute distress. Patient safety and comfort measures maintained. Report received from ROYAL Be. Pt a&ox4, in no acute distress. Patient safety and comfort measures maintained.

## 2023-05-10 NOTE — ED PROVIDER NOTE - OBJECTIVE STATEMENT
33 y/o male with PMHx of hidradenitis suppurativa S/P excision of hidradenitis of the buttocks with skin graft reconstruction (2/2023) s/p excision of left axillary (4/2023) presents to the ED complaining of left hip pain x 2 weeks. Patient denies any trauma or injury. Pain radiates to the left groin and to the buttocks. Pain is worse with movement. Patient states that the pain has been progressively worsening. Today he was unable to walk due to pain. Patient had pre-surgical testing on May 8th for "Left Axillary Wound Reconstruction with Thoracodorsal Artery  Flap with skin graft" with Dr. Villareal on 5/25/23. He states that he got a phone call this AM that he had abnormal labs and needed to see his PCP. He is unsure what was abnormal with his labs. He did not take anything for pain pta. Denies any headache, fever, chills, chest pain, cough, n/v/d.

## 2023-05-10 NOTE — H&P ADULT - NSHPREVIEWOFSYSTEMS_GEN_ALL_CORE
REVIEW OF SYSTEMS:    CONSTITUTIONAL: No weakness, fevers or chills  EYES: No visual changes; no sclera icterics, no pain or drainage  ENT:  No vertigo or throat pain   NECK: No pain or stiffness  RESPIRATORY: No cough, wheezing, hemoptysis; No shortness of breath  CARDIOVASCULAR: No chest pain or palpitations  GASTROINTESTINAL: No abdominal or epigastric pain. No nausea, vomiting, or hematemesis; No diarrhea or constipation. No melena or hematochezia.  GENITOURINARY: No dysuria, frequency or hematuria  NEUROLOGICAL: No numbness or weakness  SKIN: +baseline purulent drainage at site of left axillary surgical procedure  Psych: No anxiety or depression CONSTITUTIONAL: No weakness, fevers or chills  EYES: No visual changes; no sclera icterics, no pain or drainage  ENT:  No vertigo or throat pain   NECK: No pain or stiffness  RESPIRATORY: No cough, wheezing, hemoptysis; No shortness of breath  CARDIOVASCULAR: No chest pain or palpitations  GASTROINTESTINAL: No abdominal or epigastric pain. No nausea, vomiting, or hematemesis; No diarrhea or constipation. No melena or hematochezia.  GENITOURINARY: No dysuria, frequency or hematuria  NEUROLOGICAL: No numbness or weakness  SKIN: +baseline purulent drainage at site of left axillary surgical procedure  Psych: No anxiety or depression

## 2023-05-10 NOTE — ED PROVIDER NOTE - PHYSICAL EXAMINATION
CONSTITUTIONAL: Patient is awake, alert and oriented x 3. Patient is uncomfortable due to pain;   HEAD: NCAT  ENT: Airway patent, Nasal mucosa clear.   NECK: Supple,   LUNGS: CTA B/L,   HEART: RRR.+S1S2   ABDOMEN: Soft, non-tender to palpation throughout all four quadrants,    MSK: Pain with flexion/extension of left hip due to pain, no ttp to left hip joint,   SKIN: skin graft to buttocks without any evidence of surrounding cellulitis or purulent drainage;   NEURO: No focal deficits,

## 2023-05-10 NOTE — H&P ADULT - NSHPPHYSICALEXAM_GEN_ALL_CORE
LOS:     VITALS:   T(C): 36.7 (05-10-23 @ 19:59), Max: 36.7 (05-10-23 @ 13:23)  HR: 76 (05-10-23 @ 19:59) (76 - 101)  BP: 116/73 (05-10-23 @ 19:59) (112/67 - 123/80)  RR: 18 (05-10-23 @ 19:59) (16 - 20)  SpO2: 100% (05-10-23 @ 19:59) (100% - 100%)    GENERAL: NAD, lying in bed comfortably  HEAD:  Atraumatic, Normocephalic  EYES: EOMI, PERRLA, conjunctiva and sclera clear  ENT: Moist mucous membranes  NECK: Supple, No JVD  CHEST/LUNG: Clear to auscultation bilaterally; No rales, rhonchi, wheezing, or rubs. Unlabored respirations  HEART: Regular rate and rhythm; No murmurs, rubs, or gallops  ABDOMEN: BSx4; Soft, nontender, nondistended  EXTREMITIES:  2+ Peripheral Pulses, brisk capillary refill. No clubbing, cyanosis, or edema; on the L hip, no pain moving side to side but significant pain moving anteriorly and posteriorly   NERVOUS SYSTEM:  A&Ox3, no focal deficits   SKIN: +purulent drainage at site of left axillary dressing; scarring at site of left gluteal skin grafts VITALS:   T(C): 36.7 (05-10-23 @ 19:59), Max: 36.7 (05-10-23 @ 13:23)  HR: 76 (05-10-23 @ 19:59) (76 - 101)  BP: 116/73 (05-10-23 @ 19:59) (112/67 - 123/80)  RR: 18 (05-10-23 @ 19:59) (16 - 20)  SpO2: 100% (05-10-23 @ 19:59) (100% - 100%)    GENERAL: NAD, lying in bed comfortably  HEAD:  Atraumatic, Normocephalic  EYES: EOMI, PERRLA, conjunctiva and sclera clear  ENT: Moist mucous membranes  NECK: Supple, No JVD  CHEST/LUNG: Clear to auscultation bilaterally; No rales, rhonchi, wheezing, or rubs. Unlabored respirations  HEART: Regular rate and rhythm; No murmurs, rubs, or gallops  ABDOMEN: BSx4; Soft, nontender, nondistended  EXTREMITIES:  2+ Peripheral Pulses, brisk capillary refill. No clubbing, cyanosis, or edema; on the L hip, no pain moving side to side but significant pain moving anteriorly and posteriorly   NERVOUS SYSTEM:  A&Ox3, no focal deficits   SKIN: +purulent drainage at site of left axillary dressing; scarring at site of left gluteal skin grafts

## 2023-05-10 NOTE — H&P ADULT - PROBLEM SELECTOR PLAN 5
DVT ppx: pharmacologic ppx with enoxaparin, as he is relatively immobile  Diet: regular  Dispo: pending PT  Code status: full DVT ppx: SCDs, if Hb stable consider enoxaparin as he's immobile  Diet: regular  Dispo: pending PT  Code status: full

## 2023-05-10 NOTE — ED PROVIDER NOTE - NS ED MD DISPO DIVISION
----- Message from Patient Portal,  sent at 4/10/2019 12:03 PM CDT -----     Thank you so much for getting in earlier for the EMG and for the medication! Â My hands just seem to be getting worse and sleeping is becoming very limited. Â I appreciate your quick follow up.    Thanks again,   Grant Farrell [facility]

## 2023-05-10 NOTE — ED ADULT NURSE NOTE - NSFALLRISKINTERV_ED_ALL_ED

## 2023-05-10 NOTE — H&P ADULT - NSHPSOCIALHISTORY_GEN_ALL_CORE
Lives at home, mostly bed bound due to severe hidradenitis  Doesn't smoke cigarettes, smokes ~1 joint per day and takes THC edibles with medical Rx, doesn't drink or do other illicit drugs  Born and raised in [place] [place], wanted to be a  and passed exams but didn't have any college credits so was unable to work these jobs

## 2023-05-10 NOTE — H&P ADULT - NSHPLABSRESULTS_GEN_ALL_CORE
LABS:                          6.7    14.22 )-----------( 542      ( 10 May 2023 14:31 )             22.4     05-10    137  |  101  |  15  ----------------------------<  127<H>  4.3   |  26  |  0.70    Ca    9.1      10 May 2023 14:31    TPro  9.0<H>  /  Alb  3.1<L>  /  TBili  0.2  /  DBili  x   /  AST  9<L>  /  ALT  12  /  AlkPhos  86  05-10    LIVER FUNCTIONS - ( 10 May 2023 14:31 )  Alb: 3.1 g/dL / Pro: 9.0 g/dL / ALK PHOS: 86 U/L / ALT: 12 U/L / AST: 9 U/L / GGT: x           PT/INR - ( 10 May 2023 14:31 )   PT: 15.0 sec;   INR: 1.30 ratio         PTT - ( 10 May 2023 14:31 )  PTT:26.1 sec  Urinalysis Basic - ( 10 May 2023 16:47 )    Color: Light Yellow / Appearance: Clear / S.048 / pH: x  Gluc: x / Ketone: Negative  / Bili: Negative / Urobili: Negative   Blood: x / Protein: Trace / Nitrite: Negative   Leuk Esterase: Negative / RBC: 0 /hpf / WBC 1 /HPF   Sq Epi: x / Non Sq Epi: x / Bacteria: Negative    < from: CT Abdomen and Pelvis w/ IV Cont (05.10.23 @ 15:09) >    INTERPRETATION:  CLINICAL INFORMATION: Left hip/buttock pain,   hidradenitis suppurativa S/P excision of hidradenitis of the buttocks   with skin graft reconstruction (2023)    COMPARISON: CT abdomen pelvis 2017RETROPERITONEUM/LYMPH NODES: No lymphadenopathy. Decreased size of   previously seen right inguinal node. Slight hazing of the perirectal fat   (2-110).  ABDOMINAL WALL: Increased density of the right perianal soft tissue   extending cephalad to the levator muscle and posterior to the gluteal   cleft. Removal of right gluteal thickened skin. Slight fat stranding at   the presumed right gluteal skin graft site.  BONES: Within normal limits.    IMPRESSION:  No drainable collection.    Proctitis.    Findings suspicious for perianal fistula. Consider MRI for more complete   characterization.    < end of copied text >    < from: Xray Hip w/ Pelvis 2 or 3 Views, Left (05.10.23 @ 14:51) >    XR HIP WITH PELV 2-3V LT   ORDERED BY: AVINASH COPELAND IMPRESSION: No fracture or dislocation. Mild straightening enthesopathy   bilaterally.    < end of copied text > LABS:             6.7    14.22 )-----------( 542      ( 10 May 2023 14:31 )             22.4     05-10    137  |  101  |  15  ----------------------------<  127<H>  4.3   |  26  |  0.70    Ca    9.1      10 May 2023 14:31    TPro  9.0<H>  /  Alb  3.1<L>  /  TBili  0.2  /  DBili  x   /  AST  9<L>  /  ALT  12  /  AlkPhos  86  05-10    LIVER FUNCTIONS - ( 10 May 2023 14:31 )  Alb: 3.1 g/dL / Pro: 9.0 g/dL / ALK PHOS: 86 U/L / ALT: 12 U/L / AST: 9 U/L / GGT: x           PT/INR - ( 10 May 2023 14:31 )   PT: 15.0 sec;   INR: 1.30 ratio    PTT - ( 10 May 2023 14:31 )  PTT:26.1 sec    Urinalysis Basic - ( 10 May 2023 16:47 )  Color: Light Yellow / Appearance: Clear / S.048 / pH: x  Gluc: x / Ketone: Negative  / Bili: Negative / Urobili: Negative   Blood: x / Protein: Trace / Nitrite: Negative   Leuk Esterase: Negative / RBC: 0 /hpf / WBC 1 /HPF   Sq Epi: x / Non Sq Epi: x / Bacteria: Negative    IMAGING:  CT Abdomen and Pelvis w/ IV Cont (05.10.23 @ 15:09)  IMPRESSION:  - No drainable collection.  - Proctitis.  - Findings suspicious for perianal fistula. Consider MRI for more complete characterization.    Xray Hip w/ Pelvis 2 or 3 Views, Left (05.10.23 @ 14:51)  IMPRESSION:   - No fracture or dislocation.   - Mild straightening enthesopathy bilaterally.    [X] Imaging personally reviewed by me-   [X] ECG personally reviewed by me- None on this admission, last ECG NSR w/out acute ischemic changes

## 2023-05-10 NOTE — ED ADULT NURSE NOTE - OBJECTIVE STATEMENT
31 y/o M with PMH of hidradenitis suppravita removal presents to ED complaining of left hip pain. Pt was recently discharged from hospital on 4/30 after hidradenitis suppravita removal and shortly after pt started experiencing left hip pain. Pt reports difficulty walking due to pain. Denies headache, dizziness, vision changes, chest pain, shortness of breath, abdominal pain, nausea, vomiting, diarrhea, fevers, chills, dysuria, hematuria, recent illness travel or fall. 31 y/o M with PMH of hidradenitis suppravita removal, s/p skin grafts (2/2023) presents to ED complaining of left hip pain. Pt was recently discharged from hospital on 4/30 after hidradenitis suppravita removal and shortly after pt started experiencing left hip pain. radiating to left groin and buttocks Pt reports difficulty walking due to pain. Pt has recent blood work drawn for preop testing and called pt today with abnormal lab results and pt was sent to ED. Denies headache, dizziness, vision changes, chest pain, shortness of breath, abdominal pain, nausea, vomiting, diarrhea, fevers, chills, dysuria, hematuria, recent illness travel or fall.

## 2023-05-11 LAB
ALBUMIN SERPL ELPH-MCNC: 3 G/DL — LOW (ref 3.3–5)
ALP SERPL-CCNC: 82 U/L — SIGNIFICANT CHANGE UP (ref 40–120)
ALT FLD-CCNC: 11 U/L — SIGNIFICANT CHANGE UP (ref 10–45)
ANION GAP SERPL CALC-SCNC: 11 MMOL/L — SIGNIFICANT CHANGE UP (ref 5–17)
AST SERPL-CCNC: 9 U/L — LOW (ref 10–40)
BASOPHILS # BLD AUTO: 0.08 K/UL — SIGNIFICANT CHANGE UP (ref 0–0.2)
BASOPHILS NFR BLD AUTO: 0.7 % — SIGNIFICANT CHANGE UP (ref 0–2)
BILIRUB SERPL-MCNC: 0.3 MG/DL — SIGNIFICANT CHANGE UP (ref 0.2–1.2)
BUN SERPL-MCNC: 8 MG/DL — SIGNIFICANT CHANGE UP (ref 7–23)
CALCIUM SERPL-MCNC: 8.9 MG/DL — SIGNIFICANT CHANGE UP (ref 8.4–10.5)
CHLORIDE SERPL-SCNC: 100 MMOL/L — SIGNIFICANT CHANGE UP (ref 96–108)
CO2 SERPL-SCNC: 25 MMOL/L — SIGNIFICANT CHANGE UP (ref 22–31)
CREAT SERPL-MCNC: 0.68 MG/DL — SIGNIFICANT CHANGE UP (ref 0.5–1.3)
CULTURE RESULTS: SIGNIFICANT CHANGE UP
EGFR: 127 ML/MIN/1.73M2 — SIGNIFICANT CHANGE UP
EOSINOPHIL # BLD AUTO: 0.25 K/UL — SIGNIFICANT CHANGE UP (ref 0–0.5)
EOSINOPHIL NFR BLD AUTO: 2.2 % — SIGNIFICANT CHANGE UP (ref 0–6)
FERRITIN SERPL-MCNC: 31 NG/ML — SIGNIFICANT CHANGE UP (ref 30–400)
FOLATE SERPL-MCNC: 17.9 NG/ML — SIGNIFICANT CHANGE UP
GLUCOSE SERPL-MCNC: 93 MG/DL — SIGNIFICANT CHANGE UP (ref 70–99)
HCT VFR BLD CALC: 24.5 % — LOW (ref 39–50)
HGB BLD-MCNC: 7.6 G/DL — LOW (ref 13–17)
IMM GRANULOCYTES NFR BLD AUTO: 0.4 % — SIGNIFICANT CHANGE UP (ref 0–0.9)
LYMPHOCYTES # BLD AUTO: 2.78 K/UL — SIGNIFICANT CHANGE UP (ref 1–3.3)
LYMPHOCYTES # BLD AUTO: 24.3 % — SIGNIFICANT CHANGE UP (ref 13–44)
MAGNESIUM SERPL-MCNC: 1.9 MG/DL — SIGNIFICANT CHANGE UP (ref 1.6–2.6)
MCHC RBC-ENTMCNC: 23.7 PG — LOW (ref 27–34)
MCHC RBC-ENTMCNC: 31 GM/DL — LOW (ref 32–36)
MCV RBC AUTO: 76.3 FL — LOW (ref 80–100)
MONOCYTES # BLD AUTO: 0.88 K/UL — SIGNIFICANT CHANGE UP (ref 0–0.9)
MONOCYTES NFR BLD AUTO: 7.7 % — SIGNIFICANT CHANGE UP (ref 2–14)
NEUTROPHILS # BLD AUTO: 7.41 K/UL — HIGH (ref 1.8–7.4)
NEUTROPHILS NFR BLD AUTO: 64.7 % — SIGNIFICANT CHANGE UP (ref 43–77)
NRBC # BLD: 0 /100 WBCS — SIGNIFICANT CHANGE UP (ref 0–0)
PHOSPHATE SERPL-MCNC: 4.9 MG/DL — HIGH (ref 2.5–4.5)
PLATELET # BLD AUTO: 556 K/UL — HIGH (ref 150–400)
POTASSIUM SERPL-MCNC: 4 MMOL/L — SIGNIFICANT CHANGE UP (ref 3.5–5.3)
POTASSIUM SERPL-SCNC: 4 MMOL/L — SIGNIFICANT CHANGE UP (ref 3.5–5.3)
PROT SERPL-MCNC: 8.7 G/DL — HIGH (ref 6–8.3)
RBC # BLD: 3.21 M/UL — LOW (ref 4.2–5.8)
RBC # FLD: 17.7 % — HIGH (ref 10.3–14.5)
SODIUM SERPL-SCNC: 136 MMOL/L — SIGNIFICANT CHANGE UP (ref 135–145)
SPECIMEN SOURCE: SIGNIFICANT CHANGE UP
VIT B12 SERPL-MCNC: 584 PG/ML — SIGNIFICANT CHANGE UP (ref 232–1245)
WBC # BLD: 11.45 K/UL — HIGH (ref 3.8–10.5)
WBC # FLD AUTO: 11.45 K/UL — HIGH (ref 3.8–10.5)

## 2023-05-11 PROCEDURE — 99232 SBSQ HOSP IP/OBS MODERATE 35: CPT

## 2023-05-11 PROCEDURE — 99221 1ST HOSP IP/OBS SF/LOW 40: CPT | Mod: 24

## 2023-05-11 RX ORDER — OXYCODONE HYDROCHLORIDE 5 MG/1
10 TABLET ORAL EVERY 4 HOURS
Refills: 0 | Status: DISCONTINUED | OUTPATIENT
Start: 2023-05-11 | End: 2023-05-13

## 2023-05-11 RX ORDER — IRON SUCROSE 20 MG/ML
200 INJECTION, SOLUTION INTRAVENOUS EVERY 24 HOURS
Refills: 0 | Status: COMPLETED | OUTPATIENT
Start: 2023-05-11 | End: 2023-05-13

## 2023-05-11 RX ADMIN — IRON SUCROSE 110 MILLIGRAM(S): 20 INJECTION, SOLUTION INTRAVENOUS at 12:18

## 2023-05-11 RX ADMIN — OXYCODONE HYDROCHLORIDE 5 MILLIGRAM(S): 5 TABLET ORAL at 07:30

## 2023-05-11 RX ADMIN — OXYCODONE HYDROCHLORIDE 5 MILLIGRAM(S): 5 TABLET ORAL at 06:50

## 2023-05-11 RX ADMIN — OXYCODONE HYDROCHLORIDE 10 MILLIGRAM(S): 5 TABLET ORAL at 18:10

## 2023-05-11 RX ADMIN — Medication 325 MILLIGRAM(S): at 12:17

## 2023-05-11 RX ADMIN — OXYCODONE HYDROCHLORIDE 10 MILLIGRAM(S): 5 TABLET ORAL at 17:29

## 2023-05-11 NOTE — CONSULT NOTE ADULT - SUBJECTIVE AND OBJECTIVE BOX
Plastic Surgery Consult Note  (pg [facility]: [id number], [facility]: [phone])    HPI:  32M with a history of hidradenitis suppurativa and  with many recent admissions wound excisions and skin grafts (most recently for left axillary excision 2023) who presents to the hospital with 1 week of left hip pain. States the pain began shortly after discharge from the hospital in later April. Pain is described as aching and stabbing "nerve pain", occurring intermittently throughout the day. There was no preceding trauma and he has never been operated on in that region. The pain progressed such that he as barely able to stand or walk (at baseline, he can walk a bit around his house with a cane). He hasn't had any fevers, chills, SOB, CP at home. No bloody or black stools. Bowel movements are normal and regular. Urinating normally.     ED course was notable for stable vital signs, labs with baseline leukocytosis, anemic to Hb of 6.7 (baseline 14-15 in 2022 => 7's-8's in ). Plain film of the hip negative for fracture. 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    Patient most recently had PRS recon for hidradenitis suppurativa in 2023, states that he has been recovering well at home, denying pain over skin graft or recipient sites.     PAST MEDICAL & SURGICAL HISTORY:  Hidradenitis suppurativa      RBBB      H/O sepsis      S/P excisional debridement        Allergies    No Known Allergies    Intolerances      Home Medications:  medical cannabis:  (10 May 2023 20:39)    MEDICATIONS  (STANDING):  ferrous    sulfate 325 milliGRAM(s) Oral daily      SOCIAL HISTORY:  FAMILY HISTORY:  FH: hypertension (Mother)        ___________________________________________  OBJECTIVE:  Vital Signs Last 24 Hrs  T(C): 37 (11 May 2023 08:48), Max: 37.1 (11 May 2023 04:29)  T(F): 98.6 (11 May 2023 08:48), Max: 98.8 (11 May 2023 04:29)  HR: 80 (11 May 2023 08:48) (76 - 101)  BP: 114/76 (11 May 2023 08:48) (109/72 - 123/80)  BP(mean): --  RR: 18 (11 May 2023 08:48) (16 - 20)  SpO2: 98% (11 May 2023 08:48) (98% - 100%)    Parameters below as of 11 May 2023 08:48  Patient On (Oxygen Delivery Method): room air    CAPILLARY BLOOD GLUCOSE        I&O's Detail    10 May 2023 07:01  -  11 May 2023 07:00  --------------------------------------------------------  IN:  Total IN: 0 mL    OUT:    Voided (mL): 1000 mL  Total OUT: 1000 mL    Total NET: -1000 mL        General: Well developed, well nourished, NAD  Neuro: Alert and oriented, no focal deficits, moves all extremities spontaneously  Respiratory: Airway patent, respirations unlabored  Extremities: buttock skin graft recipient sites with good take, no edema, erythema or collections on exam. Donor site well healed.   Skin: Warm, dry, appropriate color  ____________________________________________  LABS:  CBC Full  -  ( 11 May 2023 06:29 )  WBC Count : 11.45 K/uL  RBC Count : 3.21 M/uL  Hemoglobin : 7.6 g/dL  Hematocrit : 24.5 %  Platelet Count - Automated : 556 K/uL  Mean Cell Volume : 76.3 fl  Mean Cell Hemoglobin : 23.7 pg  Mean Cell Hemoglobin Concentration : 31.0 gm/dL  Auto Neutrophil # : 7.41 K/uL  Auto Lymphocyte # : 2.78 K/uL  Auto Monocyte # : 0.88 K/uL  Auto Eosinophil # : 0.25 K/uL  Auto Basophil # : 0.08 K/uL  Auto Neutrophil % : 64.7 %  Auto Lymphocyte % : 24.3 %  Auto Monocyte % : 7.7 %  Auto Eosinophil % : 2.2 %  Auto Basophil % : 0.7 %        136  |  100  |  8   ----------------------------<  93  4.0   |  25  |  0.68    Ca    8.9      11 May 2023 06:29  Phos  4.9     -  Mg     1.9         TPro  8.7<H>  /  Alb  3.0<L>  /  TBili  0.3  /  DBili  x   /  AST  9<L>  /  ALT  11  /  AlkPhos  82  05-11    LIVER FUNCTIONS - ( 11 May 2023 06:29 )  Alb: 3.0 g/dL / Pro: 8.7 g/dL / ALK PHOS: 82 U/L / ALT: 11 U/L / AST: 9 U/L / GGT: x           PT/INR - ( 10 May 2023 14:31 )   PT: 15.0 sec;   INR: 1.30 ratio         PTT - ( 10 May 2023 14:31 )  PTT:26.1 sec  Urinalysis Basic - ( 10 May 2023 16:47 )    Color: Light Yellow / Appearance: Clear / S.048 / pH: x  Gluc: x / Ketone: Negative  / Bili: Negative / Urobili: Negative   Blood: x / Protein: Trace / Nitrite: Negative   Leuk Esterase: Negative / RBC: 0 /hpf / WBC 1 /HPF   Sq Epi: x / Non Sq Epi: x / Bacteria: Negative            ____________________________________________  MICRO:  RECENT CULTURES:    ____________________________________________  RADIOLOGY:   Plastic Surgery Consult Note  (pg [facility]: [id number], [facility]: [phone])    HPI:  32M with a history of hidradenitis suppurativa and  with many recent admissions wound excisions and skin grafts (most recently for left axillary excision 2023) who presents to the hospital with 1 week of left hip pain. States the pain began shortly after discharge from the hospital in later April. Pain is described as aching and stabbing "nerve pain", occurring intermittently throughout the day. There was no preceding trauma and he has never been operated on in that region. The pain progressed such that he as barely able to stand or walk (at baseline, he can walk a bit around his house with a cane). He hasn't had any fevers, chills, SOB, CP at home. No bloody or black stools. Bowel movements are normal and regular. Urinating normally.     ED course was notable for stable vital signs, labs with baseline leukocytosis, anemic to Hb of 6.7 (baseline 14-15 in 2022 => 7's-8's in ). Plain film of the hip negative for fracture. 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    Patient most recently had PRS recon for hidradenitis suppurativa in 2023, states that he has been recovering well at home, denying pain over skin graft or recipient sites.     PAST MEDICAL & SURGICAL HISTORY:  Hidradenitis suppurativa      RBBB      H/O sepsis      S/P excisional debridement        Allergies    No Known Allergies    Intolerances      Home Medications:  medical cannabis:  (10 May 2023 20:39)    MEDICATIONS  (STANDING):  ferrous    sulfate 325 milliGRAM(s) Oral daily      SOCIAL HISTORY:  FAMILY HISTORY:  FH: hypertension (Mother)        ___________________________________________  OBJECTIVE:  Vital Signs Last 24 Hrs  T(C): 37 (11 May 2023 08:48), Max: 37.1 (11 May 2023 04:29)  T(F): 98.6 (11 May 2023 08:48), Max: 98.8 (11 May 2023 04:29)  HR: 80 (11 May 2023 08:48) (76 - 101)  BP: 114/76 (11 May 2023 08:48) (109/72 - 123/80)  BP(mean): --  RR: 18 (11 May 2023 08:48) (16 - 20)  SpO2: 98% (11 May 2023 08:48) (98% - 100%)    Parameters below as of 11 May 2023 08:48  Patient On (Oxygen Delivery Method): room air    CAPILLARY BLOOD GLUCOSE        I&O's Detail    10 May 2023 07:01  -  11 May 2023 07:00  --------------------------------------------------------  IN:  Total IN: 0 mL    OUT:    Voided (mL): 1000 mL  Total OUT: 1000 mL    Total NET: -1000 mL        General: Well developed, well nourished, NAD  Neuro: Alert and oriented, no focal deficits, moves all extremities spontaneously  Respiratory: Airway patent, respirations unlabored  Extremities: buttock skin graft recipient sites with good take, no edema, erythema or collections on exam. Donor site well healed.   Skin: L axilla open wound c/d/i, no drainage or purulence  ____________________________________________  LABS:  CBC Full  -  ( 11 May 2023 06:29 )  WBC Count : 11.45 K/uL  RBC Count : 3.21 M/uL  Hemoglobin : 7.6 g/dL  Hematocrit : 24.5 %  Platelet Count - Automated : 556 K/uL  Mean Cell Volume : 76.3 fl  Mean Cell Hemoglobin : 23.7 pg  Mean Cell Hemoglobin Concentration : 31.0 gm/dL  Auto Neutrophil # : 7.41 K/uL  Auto Lymphocyte # : 2.78 K/uL  Auto Monocyte # : 0.88 K/uL  Auto Eosinophil # : 0.25 K/uL  Auto Basophil # : 0.08 K/uL  Auto Neutrophil % : 64.7 %  Auto Lymphocyte % : 24.3 %  Auto Monocyte % : 7.7 %  Auto Eosinophil % : 2.2 %  Auto Basophil % : 0.7 %        136  |  100  |  8   ----------------------------<  93  4.0   |  25  |  0.68    Ca    8.9      11 May 2023 06:29  Phos  4.9       Mg     1.9         TPro  8.7<H>  /  Alb  3.0<L>  /  TBili  0.3  /  DBili  x   /  AST  9<L>  /  ALT  11  /  AlkPhos  82      LIVER FUNCTIONS - ( 11 May 2023 06:29 )  Alb: 3.0 g/dL / Pro: 8.7 g/dL / ALK PHOS: 82 U/L / ALT: 11 U/L / AST: 9 U/L / GGT: x           PT/INR - ( 10 May 2023 14:31 )   PT: 15.0 sec;   INR: 1.30 ratio         PTT - ( 10 May 2023 14:31 )  PTT:26.1 sec  Urinalysis Basic - ( 10 May 2023 16:47 )    Color: Light Yellow / Appearance: Clear / S.048 / pH: x  Gluc: x / Ketone: Negative  / Bili: Negative / Urobili: Negative   Blood: x / Protein: Trace / Nitrite: Negative   Leuk Esterase: Negative / RBC: 0 /hpf / WBC 1 /HPF   Sq Epi: x / Non Sq Epi: x / Bacteria: Negative            ____________________________________________  MICRO:  RECENT CULTURES:    ____________________________________________  RADIOLOGY:

## 2023-05-11 NOTE — PROGRESS NOTE ADULT - SUBJECTIVE AND OBJECTIVE BOX
Luke Krishna MD  Division of Hospital Medicine  Available on MS teams until 7pm  If no response or off-hours, page [phone]  -------------------------------------    Patient is a 32y old  Male who presents with a chief complaint of Anemia (11 May 2023 09:25)    SUBJECTIVE / OVERNIGHT EVENTS: none acute  ADDITIONAL REVIEW OF SYSTEMS: pt with ongoing L internal groin pain, feels like a nerve being hit in certain positions. No fevers/chills, no sob/cough, no recent trauma to the area. No pain on defecation.     MEDICATIONS  (STANDING):  ferrous    sulfate 325 milliGRAM(s) Oral daily  iron sucrose IVPB 200 milliGRAM(s) IV Intermittent every 24 hours    MEDICATIONS  (PRN):  oxyCODONE    IR 5 milliGRAM(s) Oral every 4 hours PRN Severe Pain (7 - 10)      CAPILLARY BLOOD GLUCOSE        I&O's Summary    10 May 2023 07:01  -  11 May 2023 07:00  --------------------------------------------------------  IN: 0 mL / OUT: 1000 mL / NET: -1000 mL        PHYSICAL EXAM:  Vital Signs Last 24 Hrs  T(C): 36.9 (11 May 2023 12:08), Max: 37.1 (11 May 2023 04:29)  T(F): 98.4 (11 May 2023 12:08), Max: 98.8 (11 May 2023 04:29)  HR: 84 (11 May 2023 12:08) (76 - 84)  BP: 100/65 (11 May 2023 12:08) (100/65 - 120/81)  BP(mean): --  RR: 18 (11 May 2023 12:08) (18 - 18)  SpO2: 99% (11 May 2023 12:08) (98% - 100%)    Parameters below as of 11 May 2023 12:08  Patient On (Oxygen Delivery Method): room air      CONSTITUTIONAL: NAD  EYES: PERRLA; conjunctiva and sclera clear  ENMT: MMM  NECK: Supple  RESPIRATORY: Normal respiratory effort; CTAB  CARDIOVASCULAR: RRR, no JVD, no peripheral edema   ABDOMEN: Nontender to palpation, normoactive BS, no guarding/rigidity  MUSCLOSKELETAL:  no clubbing/cyanosis, no joint swelling or tenderness to palpation  PSYCH: A+O x 3, affect normal  NEUROLOGY: CN 2-12 are intact and symmetric; no gross sensory or motor deficits  SKIN: No rashes; no palpable lesions    LABS:                        7.6    11.45 )-----------( 556      ( 11 May 2023 06:29 )             24.5     05-11    136  |  100  |  8   ----------------------------<  93  4.0   |  25  |  0.68    Ca    8.9      11 May 2023 06:29  Phos  4.9     05-  Mg     1.9     05-    TPro  8.7<H>  /  Alb  3.0<L>  /  TBili  0.3  /  DBili  x   /  AST  9<L>  /  ALT  11  /  AlkPhos  82  05-11    PT/INR - ( 10 May 2023 14:31 )   PT: 15.0 sec;   INR: 1.30 ratio         PTT - ( 10 May 2023 14:31 )  PTT:26.1 sec      Urinalysis Basic - ( 10 May 2023 16:47 )    Color: Light Yellow / Appearance: Clear / S.048 / pH: x  Gluc: x / Ketone: Negative  / Bili: Negative / Urobili: Negative   Blood: x / Protein: Trace / Nitrite: Negative   Leuk Esterase: Negative / RBC: 0 /hpf / WBC 1 /HPF   Sq Epi: x / Non Sq Epi: x / Bacteria: Negative          RADIOLOGY & ADDITIONAL TESTS:  Results Reviewed:   Imaging Personally Reviewed:  Electrocardiogram Personally Reviewed:    COORDINATION OF CARE:  Care Discussed with Consultants/Other Providers [Y/N]:  Prior or Outpatient Records Reviewed [Y/N]:

## 2023-05-11 NOTE — CONSULT NOTE ADULT - ASSESSMENT
32M with a history of hidradenitis suppurativa and  with many recent admissions wound excisions and skin grafts (most recently for left axillary excision 04/2023) who presents to the hospital with 1 week of left hip pain. Incidentally found to have anemia of uncertain etiology. 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    Recommendations:  -No acute PRS surgical intervention  -Continue local wound care  -Appreciate colorectal surgery recs  -F/u MRI  -Anemia workup  -Appreciate care per primary team      Linus Padilla  Plastic Surgery Resident PGY-1  [facility]: [phone]  [facility]: [id number]  Available on [employer] Teams   32M with a history of hidradenitis suppurativa and  with many recent admissions wound excisions and skin grafts (most recently for left axillary excision 04/2023) who presents to the hospital with 1 week of left hip pain. Incidentally found to have anemia of uncertain etiology. 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    Recommendations:  -No acute PRS surgical intervention  -Daily L axilla dressing changes - aquacel, gauze, abd pad, ace  -Appreciate colorectal surgery recs  -F/u MRI  -Anemia workup  -Appreciate care per primary team      Linus Padilla  Plastic Surgery Resident PGY-1  [facility]: [phone]  [facility]: [id number]  Available on Microsoft Teams

## 2023-05-11 NOTE — PHYSICAL THERAPY INITIAL EVALUATION ADULT - MANUAL MUSCLE TESTING RESULTS, REHAB EVAL
greater than or equal to 3 except unable to accurately assess L shoulder flexion due to pain (s/p left axillary excision last month)

## 2023-05-11 NOTE — PHYSICAL THERAPY INITIAL EVALUATION ADULT - ADDITIONAL COMMENTS
lives in apartment with mother, 1 step to enter but the building also has a ramp, left hand dominant

## 2023-05-11 NOTE — CONSULT NOTE ADULT - SUBJECTIVE AND OBJECTIVE BOX
Colorectal Surgery Consult Note  Attending: Dr. Rodriges  Service: Red Team  [id number]    HPI: 32M w/ PMHx of extensive hydradenitis suppurativa s/p excision and skin graft in perianal and sacral region, patient now presenting with anemia of unknown etiology admitted to medicine for further work-up. CTAP revealed findings concerning for perianal fistula, however no signs of infection or drainable fluid collection. Colorectal surgery consulted for evaluation.    PAST MEDICAL & SURGICAL HISTORY:  Hidradenitis suppurativa  RBBB  H/O sepsis  S/P excisional debridement    ALLERGIES:  No Known Allergies    FAMILY HISTORY:  FH: hypertension (Mother)    PHYSICAL EXAM:  General: NAD, resting comfortably  HEENT: NC/AT, EOMI, normal hearing, no oral lesions, no LAD, neck supple  Pulmonary: normal resp effort, CTA-B  Cardiovascular: NSR, no murmurs  Abdominal: soft, ND/NT, no organomegaly  Extremities: WWP, normal strength, no clubbing/cyanosis/edema  Anal: perianal/sacral skin graft well healed. posterior perianal fistula opening noted with scant clear output. no fluctuance or pus drainage  Pulses: palpable distal pulses    VITAL SIGNS:  Vital Signs Last 24 Hrs  T(C): 37.1 (11 May 2023 04:29), Max: 37.1 (11 May 2023 04:29)  T(F): 98.8 (11 May 2023 04:29), Max: 98.8 (11 May 2023 04:29)  HR: 83 (11 May 2023 04:29) (76 - 101)  BP: 109/72 (11 May 2023 04:29) (109/72 - 123/80)  BP(mean): --  RR: 18 (11 May 2023 04:29) (16 - 20)  SpO2: 99% (11 May 2023 04:29) (99% - 100%)    Parameters below as of 11 May 2023 04:29  Patient On (Oxygen Delivery Method): room air        I&O's Summary    10 May 2023 07:01  -  11 May 2023 05:36  --------------------------------------------------------  IN: 0 mL / OUT: 300 mL / NET: -300 mL    LABS:                        6.7    14.22 )-----------( 542      ( 10 May 2023 14:31 )             22.4     05-10    137  |  101  |  15  ----------------------------<  127<H>  4.3   |  26  |  0.70    Ca    9.1      10 May 2023 14:31    TPro  9.0<H>  /  Alb  3.1<L>  /  TBili  0.2  /  DBili  x   /  AST  9<L>  /  ALT  12  /  AlkPhos  86  05-10    PT/INR - ( 10 May 2023 14:31 )   PT: 15.0 sec;   INR: 1.30 ratio         PTT - ( 10 May 2023 14:31 )  PTT:26.1 sec  Urinalysis Basic - ( 10 May 2023 16:47 )    Color: Light Yellow / Appearance: Clear / S.048 / pH: x  Gluc: x / Ketone: Negative  / Bili: Negative / Urobili: Negative   Blood: x / Protein: Trace / Nitrite: Negative   Leuk Esterase: Negative / RBC: 0 /hpf / WBC 1 /HPF   Sq Epi: x / Non Sq Epi: x / Bacteria: Negative      CAPILLARY BLOOD GLUCOSE        LIVER FUNCTIONS - ( 10 May 2023 14:31 )  Alb: 3.1 g/dL / Pro: 9.0 g/dL / ALK PHOS: 86 U/L / ALT: 12 U/L / AST: 9 U/L / GGT: x           RADIOLOGY & ADDITIONAL STUDIES:    CT Abdomen and Pelvis w/ IV Cont (05.10.23 @ 15:09)    FINDINGS:  LOWER CHEST: Mild dependent atelectasis.    LIVER: Within normal limits.  BILE DUCTS: Normal caliber.  GALLBLADDER: Within normal limits.  SPLEEN: Within normal limits.  PANCREAS: Within normal limits.  ADRENALS: Within normal limits.  KIDNEYS/URETERS: Within normal limits.    BLADDER: Within normal limits.  REPRODUCTIVE ORGANS: Prostate within normal limits.    BOWEL: No bowel obstruction. Appendix is normal (2-87).  PERITONEUM: No ascites.  VESSELS: Within normal limits.  RETROPERITONEUM/LYMPH NODES: No lymphadenopathy. Decreased size of   previously seen right inguinal node. Slight hazing of the perirectal fat   (2-110).  ABDOMINAL WALL: Increased density of the right perianal soft tissue   extending cephalad to the levator muscle and posterior to the gluteal   cleft. Removal of right gluteal thickened skin. Slight fat stranding at   the presumed right gluteal skin graft site.  BONES: Within normal limits.    IMPRESSION:  No drainable collection.    Proctitis.    Findings suspicious for perianal fistula. Consider MRI for more complete   characterization.

## 2023-05-11 NOTE — PHYSICAL THERAPY INITIAL EVALUATION ADULT - PERTINENT HX OF CURRENT PROBLEM, REHAB EVAL
Pt is 32M admitted 5/10/23 PMHx hidradenitis suppurativa and  with many recent admissions wound excisions and skin grafts (most recently for left axillary excision 04/2023) who presents to the hospital with 1 week of left hip pain. States the pain began shortly after discharge from the hospital in later April. Pain is described as aching and stabbing "nerve pain", occurring intermittently throughout the day. There was no preceding trauma and he has never been operated on in that region. The pain progressed such that he as barely able to stand or walk (at baseline, he can walk a bit around his house with a cane).     ED course was notable for stable vital signs, labs with baseline leukocytosis, anemic to Hb of 6.7 (baseline 14-15 in August 2022 => 7's-8's in 2023). Plain film of the hip negative for fracture. 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      CT ABDOMEN & PELVIS: No drainable collection. Proctitis. Findings suspicious for perianal fistula. Consider MRI for more complete characterization. XRAY HIP/PELVIS: No fracture or dislocation. Mild straightening enthesopathy bilaterally. Pt is 32M admitted 5/10/23 PMHx hidradenitis suppurativa and  with many recent admissions wound excisions and skin grafts (most recently for left axillary excision 04/2023) who presents to the hospital with 1 week of left hip pain. States the pain began shortly after discharge from the hospital in later April. Pain is described as aching and stabbing "nerve pain", occurring intermittently throughout the day. There was no preceding trauma and he has never been operated on in that region. The pain progressed such that he as barely able to stand or walk (at baseline, he can walk a bit around his house with a cane).     ED course was notable for stable vital signs, labs with baseline leukocytosis, anemic to Hb of 6.7 (baseline 14-15 in August 2022 => 7's-8's in 2023). Plain film of the hip negative for fracture. 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 incidentally found to have anemia of uncertain etiology. MRI: L sacroiliitis, L iliopsoas bursitis, L greater trochanteric bursitis & partial-thickness nondisplaced L anteriorsuperior acetabular labrum tear.    CT ABDOMEN & PELVIS: No drainable collection. Proctitis. Findings suspicious for perianal fistula. XRAY HIP/PELVIS: No fracture or dislocation. Mild straightening enthesopathy bilaterally.    MRI L hip 5/12: Nonspecific subarticular edema along the iliac margin of the left sacroiliac joint. This may be related to an underlying inflammatory etiology/sacroiliitis, correlate with clinical history and serological markers as clinically indicated. Correlate with clinical scenario to exclude the possibility of infection. Mild edema and enhancement along the left iliopsoas bursa suggestive of nonspecific bursitis. Edema and enhancement within the left greater trochanteric bursa is also suggestive of nonspecific bursitis. Partial-thickness nondisplaced tear of the left anterosuperior acetabular labrum.

## 2023-05-11 NOTE — CONSULT NOTE ADULT - ASSESSMENT
32M w/ hx of sacral/perianal hydradenitis suppurativa s/p excision and skin grafting. 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    Recommendations:  - No acute colorectal intervention.  - Posterior perianal fistula with no signs of infection or clinical symptoms.  - Patient can follow-up in the office with Dr. Rodriges for fistula management.    Discussed with attending surgeon Dr. Stefan Rodriges.    RITA Holland, PGY-3   [facility]   Red Team Surgery   [id number]

## 2023-05-11 NOTE — PHYSICAL THERAPY INITIAL EVALUATION ADULT - ACTIVE RANGE OF MOTION EXAMINATION, REHAB EVAL
except L shoulder flexion ~30deg limited by pain (pt s/p left axillary excision last month)/bilateral upper extremity Active ROM was WFL (within functional limits)/bilateral  lower extremity Active ROM was WFL (within functional limits)

## 2023-05-12 LAB
ANION GAP SERPL CALC-SCNC: 10 MMOL/L — SIGNIFICANT CHANGE UP (ref 5–17)
BUN SERPL-MCNC: 11 MG/DL — SIGNIFICANT CHANGE UP (ref 7–23)
CALCIUM SERPL-MCNC: 9.3 MG/DL — SIGNIFICANT CHANGE UP (ref 8.4–10.5)
CHLORIDE SERPL-SCNC: 100 MMOL/L — SIGNIFICANT CHANGE UP (ref 96–108)
CO2 SERPL-SCNC: 25 MMOL/L — SIGNIFICANT CHANGE UP (ref 22–31)
CREAT SERPL-MCNC: 0.7 MG/DL — SIGNIFICANT CHANGE UP (ref 0.5–1.3)
EGFR: 126 ML/MIN/1.73M2 — SIGNIFICANT CHANGE UP
GLUCOSE SERPL-MCNC: 96 MG/DL — SIGNIFICANT CHANGE UP (ref 70–99)
HCT VFR BLD CALC: 26.5 % — LOW (ref 39–50)
HGB BLD-MCNC: 8.1 G/DL — LOW (ref 13–17)
MCHC RBC-ENTMCNC: 23.1 PG — LOW (ref 27–34)
MCHC RBC-ENTMCNC: 30.6 GM/DL — LOW (ref 32–36)
MCV RBC AUTO: 75.7 FL — LOW (ref 80–100)
NRBC # BLD: 0 /100 WBCS — SIGNIFICANT CHANGE UP (ref 0–0)
PHOSPHATE SERPL-MCNC: 4.1 MG/DL — SIGNIFICANT CHANGE UP (ref 2.5–4.5)
PLATELET # BLD AUTO: 589 K/UL — HIGH (ref 150–400)
POTASSIUM SERPL-MCNC: 4 MMOL/L — SIGNIFICANT CHANGE UP (ref 3.5–5.3)
POTASSIUM SERPL-SCNC: 4 MMOL/L — SIGNIFICANT CHANGE UP (ref 3.5–5.3)
RBC # BLD: 3.5 M/UL — LOW (ref 4.2–5.8)
RBC # FLD: 17.9 % — HIGH (ref 10.3–14.5)
SODIUM SERPL-SCNC: 135 MMOL/L — SIGNIFICANT CHANGE UP (ref 135–145)
WBC # BLD: 14.86 K/UL — HIGH (ref 3.8–10.5)
WBC # FLD AUTO: 14.86 K/UL — HIGH (ref 3.8–10.5)

## 2023-05-12 PROCEDURE — 99232 SBSQ HOSP IP/OBS MODERATE 35: CPT

## 2023-05-12 PROCEDURE — 73723 MRI JOINT LWR EXTR W/O&W/DYE: CPT | Mod: 26,LT

## 2023-05-12 PROCEDURE — 72197 MRI PELVIS W/O & W/DYE: CPT | Mod: 26

## 2023-05-12 RX ADMIN — IRON SUCROSE 110 MILLIGRAM(S): 20 INJECTION, SOLUTION INTRAVENOUS at 10:51

## 2023-05-12 RX ADMIN — OXYCODONE HYDROCHLORIDE 10 MILLIGRAM(S): 5 TABLET ORAL at 06:26

## 2023-05-12 RX ADMIN — OXYCODONE HYDROCHLORIDE 10 MILLIGRAM(S): 5 TABLET ORAL at 20:32

## 2023-05-12 RX ADMIN — Medication 325 MILLIGRAM(S): at 11:43

## 2023-05-12 RX ADMIN — OXYCODONE HYDROCHLORIDE 10 MILLIGRAM(S): 5 TABLET ORAL at 05:40

## 2023-05-12 RX ADMIN — OXYCODONE HYDROCHLORIDE 10 MILLIGRAM(S): 5 TABLET ORAL at 19:34

## 2023-05-12 NOTE — PROGRESS NOTE ADULT - SUBJECTIVE AND OBJECTIVE BOX
Luke Krishna MD  Division of Hospital Medicine  Available on MS teams until 7pm  If no response or off-hours, page [phone]  -------------------------------------    Patient is a 32y old  Male who presents with a chief complaint of Anemia (11 May 2023 15:55)      SUBJECTIVE / OVERNIGHT EVENTS: none acute  ADDITIONAL REVIEW OF SYSTEMS: pain still present but somewhat controlled. No fevers/chills, awaiting MRI.     MEDICATIONS  (STANDING):  ferrous    sulfate 325 milliGRAM(s) Oral daily  iron sucrose IVPB 200 milliGRAM(s) IV Intermittent every 24 hours    MEDICATIONS  (PRN):  oxyCODONE    IR 10 milliGRAM(s) Oral every 4 hours PRN Severe Pain (7 - 10)      CAPILLARY BLOOD GLUCOSE        I&O's Summary    11 May 2023 07:01  -  12 May 2023 07:00  --------------------------------------------------------  IN: 1060 mL / OUT: 1990 mL / NET: -930 mL    12 May 2023 07:01  -  12 May 2023 14:18  --------------------------------------------------------  IN: 0 mL / OUT: 600 mL / NET: -600 mL        PHYSICAL EXAM:  Vital Signs Last 24 Hrs  T(C): 36.9 (12 May 2023 11:38), Max: 37 (11 May 2023 20:19)  T(F): 98.4 (12 May 2023 11:38), Max: 98.6 (11 May 2023 20:19)  HR: 75 (12 May 2023 11:38) (75 - 85)  BP: 108/68 (12 May 2023 11:38) (108/68 - 120/78)  BP(mean): 82 (12 May 2023 08:24) (82 - 82)  RR: 18 (12 May 2023 11:38) (18 - 18)  SpO2: 99% (12 May 2023 11:38) (97% - 100%)    Parameters below as of 12 May 2023 11:38  Patient On (Oxygen Delivery Method): room air      CONSTITUTIONAL: NAD  EYES: PERRLA; conjunctiva and sclera clear  ENMT: MMM  NECK: Supple  RESPIRATORY: Normal respiratory effort; CTAB  CARDIOVASCULAR: RRR, no JVD, no peripheral edema   ABDOMEN: Nontender to palpation, normoactive BS, no guarding/rigidity  MUSCLOSKELETAL:  no clubbing/cyanosis, no joint swelling or tenderness to palpation  PSYCH: A+O x 3, affect normal  NEUROLOGY: CN 2-12 are intact and symmetric; no gross sensory or motor deficits  SKIN: No rashes; no palpable lesions    LABS:                        8.1    14.86 )-----------( 589      ( 12 May 2023 06:16 )             26.5     05-12    135  |  100  |  11  ----------------------------<  96  4.0   |  25  |  0.70    Ca    9.3      12 May 2023 06:16  Phos  4.1     05-12  Mg     1.9     05-11    TPro  8.7<H>  /  Alb  3.0<L>  /  TBili  0.3  /  DBili  x   /  AST  9<L>  /  ALT  11  /  AlkPhos  82  05-11    PT/INR - ( 10 May 2023 14:31 )   PT: 15.0 sec;   INR: 1.30 ratio         PTT - ( 10 May 2023 14:31 )  PTT:26.1 sec      Urinalysis Basic - ( 10 May 2023 16:47 )    Color: Light Yellow / Appearance: Clear / S.048 / pH: x  Gluc: x / Ketone: Negative  / Bili: Negative / Urobili: Negative   Blood: x / Protein: Trace / Nitrite: Negative   Leuk Esterase: Negative / RBC: 0 /hpf / WBC 1 /HPF   Sq Epi: x / Non Sq Epi: x / Bacteria: Negative        Culture - Blood (collected 10 May 2023 17:36)  Source: .Blood Blood-Peripheral  Preliminary Report (11 May 2023 22:03):    No growth to date.    Culture - Blood (collected 10 May 2023 17:15)  Source: .Blood Blood-Peripheral  Preliminary Report (11 May 2023 22:03):    No growth to date.    Culture - Urine (collected 10 May 2023 16:47)  Source: Clean Catch Clean Catch (Midstream)  Final Report (11 May 2023 21:26):    <10,000 CFU/mL Normal Urogenital Leigh        RADIOLOGY & ADDITIONAL TESTS:  Results Reviewed:   Imaging Personally Reviewed:  Electrocardiogram Personally Reviewed:    COORDINATION OF CARE:  Care Discussed with Consultants/Other Providers [Y/N]:  Prior or Outpatient Records Reviewed [Y/N]:

## 2023-05-13 LAB
ANION GAP SERPL CALC-SCNC: 12 MMOL/L — SIGNIFICANT CHANGE UP (ref 5–17)
BUN SERPL-MCNC: 10 MG/DL — SIGNIFICANT CHANGE UP (ref 7–23)
CALCIUM SERPL-MCNC: 9.5 MG/DL — SIGNIFICANT CHANGE UP (ref 8.4–10.5)
CHLORIDE SERPL-SCNC: 96 MMOL/L — SIGNIFICANT CHANGE UP (ref 96–108)
CO2 SERPL-SCNC: 26 MMOL/L — SIGNIFICANT CHANGE UP (ref 22–31)
CREAT SERPL-MCNC: 0.7 MG/DL — SIGNIFICANT CHANGE UP (ref 0.5–1.3)
EGFR: 126 ML/MIN/1.73M2 — SIGNIFICANT CHANGE UP
GLUCOSE SERPL-MCNC: 136 MG/DL — HIGH (ref 70–99)
HCT VFR BLD CALC: 30.1 % — LOW (ref 39–50)
HGB BLD-MCNC: 9.2 G/DL — LOW (ref 13–17)
MCHC RBC-ENTMCNC: 23.5 PG — LOW (ref 27–34)
MCHC RBC-ENTMCNC: 30.6 GM/DL — LOW (ref 32–36)
MCV RBC AUTO: 76.8 FL — LOW (ref 80–100)
NRBC # BLD: 0 /100 WBCS — SIGNIFICANT CHANGE UP (ref 0–0)
PLATELET # BLD AUTO: 606 K/UL — HIGH (ref 150–400)
POTASSIUM SERPL-MCNC: 3.8 MMOL/L — SIGNIFICANT CHANGE UP (ref 3.5–5.3)
POTASSIUM SERPL-SCNC: 3.8 MMOL/L — SIGNIFICANT CHANGE UP (ref 3.5–5.3)
RBC # BLD: 3.92 M/UL — LOW (ref 4.2–5.8)
RBC # FLD: 18.6 % — HIGH (ref 10.3–14.5)
SODIUM SERPL-SCNC: 134 MMOL/L — LOW (ref 135–145)
WBC # BLD: 14.15 K/UL — HIGH (ref 3.8–10.5)
WBC # FLD AUTO: 14.15 K/UL — HIGH (ref 3.8–10.5)

## 2023-05-13 PROCEDURE — 99232 SBSQ HOSP IP/OBS MODERATE 35: CPT

## 2023-05-13 RX ORDER — KETOROLAC TROMETHAMINE 30 MG/ML
15 SYRINGE (ML) INJECTION ONCE
Refills: 0 | Status: DISCONTINUED | OUTPATIENT
Start: 2023-05-13 | End: 2023-05-13

## 2023-05-13 RX ADMIN — Medication 15 MILLIGRAM(S): at 13:34

## 2023-05-13 RX ADMIN — IRON SUCROSE 110 MILLIGRAM(S): 20 INJECTION, SOLUTION INTRAVENOUS at 11:28

## 2023-05-13 RX ADMIN — Medication 325 MILLIGRAM(S): at 11:28

## 2023-05-13 NOTE — PROGRESS NOTE ADULT - SUBJECTIVE AND OBJECTIVE BOX
Luke Krishna MD  Division of Hospital Medicine  Available on MS teams until 7pm  If no response or off-hours, page [phone]  -------------------------------------    Patient is a 32y old  Male who presents with a chief complaint of Anemia (12 May 2023 14:18)      SUBJECTIVE / OVERNIGHT EVENTS: none acute  ADDITIONAL REVIEW OF SYSTEMS: pain is relatively well controlled on oxy, no fevers/chills. Pt denies hx of diarrhea/IBD or family history of such disorders.     MEDICATIONS  (STANDING):  ferrous    sulfate 325 milliGRAM(s) Oral daily  ketorolac   Injectable 15 milliGRAM(s) IV Push once    MEDICATIONS  (PRN):      CAPILLARY BLOOD GLUCOSE        I&O's Summary    12 May 2023 07:01  -  13 May 2023 07:00  --------------------------------------------------------  IN: 0 mL / OUT: 1200 mL / NET: -1200 mL    13 May 2023 07:01  -  13 May 2023 13:19  --------------------------------------------------------  IN: 360 mL / OUT: 400 mL / NET: -40 mL        PHYSICAL EXAM:  Vital Signs Last 24 Hrs  T(C): 36.9 (13 May 2023 11:12), Max: 37.4 (12 May 2023 20:36)  T(F): 98.5 (13 May 2023 11:12), Max: 99.4 (12 May 2023 20:36)  HR: 104 (13 May 2023 11:12) (90 - 104)  BP: 116/78 (13 May 2023 11:12) (110/71 - 118/84)  BP(mean): --  RR: 18 (13 May 2023 11:12) (18 - 18)  SpO2: 98% (13 May 2023 11:12) (97% - 100%)    Parameters below as of 13 May 2023 11:12  Patient On (Oxygen Delivery Method): room air      CONSTITUTIONAL: NAD  EYES: PERRLA; conjunctiva and sclera clear  ENMT: MMM  NECK: Supple  RESPIRATORY: Normal respiratory effort; CTAB  CARDIOVASCULAR: RRR, no JVD, no peripheral edema   ABDOMEN: Nontender to palpation, normoactive BS, no guarding/rigidity  MUSCLOSKELETAL:  R hip limited ROM 2/2 pain  PSYCH: A+O x 3, affect normal  NEUROLOGY: CN 2-12 are intact and symmetric; no gross sensory or motor deficits  SKIN: No rashes; no palpable lesions    LABS:                        9.2    14.15 )-----------( 606      ( 13 May 2023 10:25 )             30.1     05-13    134<L>  |  96  |  10  ----------------------------<  136<H>  3.8   |  26  |  0.70    Ca    9.5      13 May 2023 10:25  Phos  4.1     05-12                Culture - Blood (collected 10 May 2023 17:36)  Source: .Blood Blood-Peripheral  Preliminary Report (11 May 2023 22:03):    No growth to date.    Culture - Blood (collected 10 May 2023 17:15)  Source: .Blood Blood-Peripheral  Preliminary Report (11 May 2023 22:03):    No growth to date.    Culture - Urine (collected 10 May 2023 16:47)  Source: Clean Catch Clean Catch (Midstream)  Final Report (11 May 2023 21:26):    <10,000 CFU/mL Normal Urogenital Leigh        RADIOLOGY & ADDITIONAL TESTS:  Results Reviewed: MRI: +L iliopsoas bursitis and L sacroiliitis  Imaging Personally Reviewed:  Electrocardiogram Personally Reviewed:    COORDINATION OF CARE:  Care Discussed with Consultants/Other Providers [Y/N]:  Prior or Outpatient Records Reviewed [Y/N]:

## 2023-05-14 LAB
ANION GAP SERPL CALC-SCNC: 11 MMOL/L — SIGNIFICANT CHANGE UP (ref 5–17)
BUN SERPL-MCNC: 12 MG/DL — SIGNIFICANT CHANGE UP (ref 7–23)
CALCIUM SERPL-MCNC: 9.3 MG/DL — SIGNIFICANT CHANGE UP (ref 8.4–10.5)
CHLORIDE SERPL-SCNC: 98 MMOL/L — SIGNIFICANT CHANGE UP (ref 96–108)
CO2 SERPL-SCNC: 25 MMOL/L — SIGNIFICANT CHANGE UP (ref 22–31)
CREAT SERPL-MCNC: 0.76 MG/DL — SIGNIFICANT CHANGE UP (ref 0.5–1.3)
EGFR: 122 ML/MIN/1.73M2 — SIGNIFICANT CHANGE UP
GLUCOSE SERPL-MCNC: 91 MG/DL — SIGNIFICANT CHANGE UP (ref 70–99)
HCT VFR BLD CALC: 28.3 % — LOW (ref 39–50)
HGB BLD-MCNC: 8.7 G/DL — LOW (ref 13–17)
MAGNESIUM SERPL-MCNC: 2 MG/DL — SIGNIFICANT CHANGE UP (ref 1.6–2.6)
MCHC RBC-ENTMCNC: 23.7 PG — LOW (ref 27–34)
MCHC RBC-ENTMCNC: 30.7 GM/DL — LOW (ref 32–36)
MCV RBC AUTO: 77.1 FL — LOW (ref 80–100)
NRBC # BLD: 0 /100 WBCS — SIGNIFICANT CHANGE UP (ref 0–0)
PHOSPHATE SERPL-MCNC: 4 MG/DL — SIGNIFICANT CHANGE UP (ref 2.5–4.5)
PLATELET # BLD AUTO: 586 K/UL — HIGH (ref 150–400)
POTASSIUM SERPL-MCNC: 4 MMOL/L — SIGNIFICANT CHANGE UP (ref 3.5–5.3)
POTASSIUM SERPL-SCNC: 4 MMOL/L — SIGNIFICANT CHANGE UP (ref 3.5–5.3)
RBC # BLD: 3.67 M/UL — LOW (ref 4.2–5.8)
RBC # FLD: 18.9 % — HIGH (ref 10.3–14.5)
SODIUM SERPL-SCNC: 134 MMOL/L — LOW (ref 135–145)
WBC # BLD: 14.51 K/UL — HIGH (ref 3.8–10.5)
WBC # FLD AUTO: 14.51 K/UL — HIGH (ref 3.8–10.5)

## 2023-05-14 PROCEDURE — 99232 SBSQ HOSP IP/OBS MODERATE 35: CPT

## 2023-05-14 RX ORDER — DICLOFENAC SODIUM 30 MG/G
4 GEL TOPICAL
Refills: 0 | Status: DISCONTINUED | OUTPATIENT
Start: 2023-05-14 | End: 2023-05-20

## 2023-05-14 RX ORDER — SENNA PLUS 8.6 MG/1
2 TABLET ORAL AT BEDTIME
Refills: 0 | Status: DISCONTINUED | OUTPATIENT
Start: 2023-05-14 | End: 2023-05-20

## 2023-05-14 RX ORDER — ACETAMINOPHEN 500 MG
650 TABLET ORAL ONCE
Refills: 0 | Status: COMPLETED | OUTPATIENT
Start: 2023-05-14 | End: 2023-05-14

## 2023-05-14 RX ORDER — ACETAMINOPHEN 500 MG
1000 TABLET ORAL ONCE
Refills: 0 | Status: COMPLETED | OUTPATIENT
Start: 2023-05-14 | End: 2023-05-14

## 2023-05-14 RX ORDER — POLYETHYLENE GLYCOL 3350 17 G/17G
17 POWDER, FOR SOLUTION ORAL DAILY
Refills: 0 | Status: DISCONTINUED | OUTPATIENT
Start: 2023-05-14 | End: 2023-05-20

## 2023-05-14 RX ORDER — DICLOFENAC SODIUM 30 MG/G
4 GEL TOPICAL
Refills: 0 | Status: DISCONTINUED | OUTPATIENT
Start: 2023-05-14 | End: 2023-05-14

## 2023-05-14 RX ORDER — LIDOCAINE 4 G/100G
1 CREAM TOPICAL EVERY 24 HOURS
Refills: 0 | Status: DISCONTINUED | OUTPATIENT
Start: 2023-05-14 | End: 2023-05-20

## 2023-05-14 RX ADMIN — Medication 650 MILLIGRAM(S): at 13:54

## 2023-05-14 RX ADMIN — POLYETHYLENE GLYCOL 3350 17 GRAM(S): 17 POWDER, FOR SOLUTION ORAL at 12:21

## 2023-05-14 RX ADMIN — LIDOCAINE 1 PATCH: 4 CREAM TOPICAL at 16:17

## 2023-05-14 RX ADMIN — SENNA PLUS 2 TABLET(S): 8.6 TABLET ORAL at 21:54

## 2023-05-14 RX ADMIN — LIDOCAINE 1 PATCH: 4 CREAM TOPICAL at 19:18

## 2023-05-14 RX ADMIN — Medication 650 MILLIGRAM(S): at 12:21

## 2023-05-14 RX ADMIN — Medication 400 MILLIGRAM(S): at 07:00

## 2023-05-14 RX ADMIN — Medication 325 MILLIGRAM(S): at 12:21

## 2023-05-14 NOTE — PROGRESS NOTE ADULT - SUBJECTIVE AND OBJECTIVE BOX
[facility] Division of Hospital Medicine  Xiomy Naylor MD  Pager (M-F, 8A-5P): [phone]  Available on MS teams until 7pm  -------------------------------------    Patient is a 32y old  Male who presents with a chief complaint of Anemia (14 May 2023)      SUBJECTIVE / OVERNIGHT EVENTS: no acute events  ADDITIONAL REVIEW OF SYSTEMS: L hip pain, otherwise negative    MEDICATIONS  (STANDING):  ferrous    sulfate 325 milliGRAM(s) Oral daily  ketorolac   Injectable 15 milliGRAM(s) IV Push once    PHYSICAL EXAM:  T(C): 37.3 (05-14-23 @ 12:11), Max: 37.3 (05-14-23 @ 12:11)  T(F): 99.2 (05-14-23 @ 12:11), Max: 99.2 (05-14-23 @ 12:11)  HR: 85 (05-14-23 @ 12:11) (85 - 101)  BP: 123/75 (05-14-23 @ 12:11) (111/69 - 123/75)  RR: 18 (05-14-23 @ 12:11) (18 - 18)  SpO2: 99% (05-14-23 @ 12:11) (99% - 99%)  Wt(kg): --    CONSTITUTIONAL: NAD  EYES: PERRLA; conjunctiva and sclera clear  ENMT: MMM  NECK: Supple  RESPIRATORY: Normal respiratory effort; CTAB  CARDIOVASCULAR: RRR, no JVD, no peripheral edema   ABDOMEN: Nontender to palpation, normoactive BS, no guarding/rigidity  MUSCULOSKELETAL  R hip limited ROM 2/2 pain  PSYCH: A+O x 3, affect normal  NEUROLOGY: CN 2-12 are intact and symmetric; no gross sensory or motor deficits  SKIN: No rashes; no palpable lesions    LABS: reviewed                                 8.7    14.51 )-----------( 586      ( 14 May 2023 06:51 )             28.3       05-14    134<L>  |  98  |  12  ----------------------------<  91  4.0   |  25  |  0.76    Ca    9.3      14 May 2023 06:51  Phos  4.0     05-14  Mg     2.0     05-14                              CAPILLARY BLOOD GLUCOSE

## 2023-05-15 PROCEDURE — 99222 1ST HOSP IP/OBS MODERATE 55: CPT

## 2023-05-15 PROCEDURE — 99233 SBSQ HOSP IP/OBS HIGH 50: CPT

## 2023-05-15 RX ORDER — ACETAMINOPHEN 500 MG
975 TABLET ORAL EVERY 12 HOURS
Refills: 0 | Status: DISCONTINUED | OUTPATIENT
Start: 2023-05-15 | End: 2023-05-20

## 2023-05-15 RX ADMIN — SENNA PLUS 2 TABLET(S): 8.6 TABLET ORAL at 21:18

## 2023-05-15 RX ADMIN — LIDOCAINE 1 PATCH: 4 CREAM TOPICAL at 06:46

## 2023-05-15 RX ADMIN — Medication 500 MILLIGRAM(S): at 23:31

## 2023-05-15 RX ADMIN — Medication 975 MILLIGRAM(S): at 21:42

## 2023-05-15 RX ADMIN — Medication 975 MILLIGRAM(S): at 20:42

## 2023-05-15 RX ADMIN — Medication 500 MILLIGRAM(S): at 22:31

## 2023-05-15 RX ADMIN — Medication 325 MILLIGRAM(S): at 13:13

## 2023-05-15 RX ADMIN — LIDOCAINE 1 PATCH: 4 CREAM TOPICAL at 19:21

## 2023-05-15 RX ADMIN — POLYETHYLENE GLYCOL 3350 17 GRAM(S): 17 POWDER, FOR SOLUTION ORAL at 13:12

## 2023-05-15 RX ADMIN — LIDOCAINE 1 PATCH: 4 CREAM TOPICAL at 13:20

## 2023-05-15 NOTE — CONSULT NOTE ADULT - SUBJECTIVE AND OBJECTIVE BOX
***consult has been received. note is in progress and incomplete without attending attestation***    Tha Michael MD, PGY-4  Rheumatology Fellow  Reachable on TEAMS    JANNA CYR  [id number]    HISTORY OF PRESENT ILLNESS:  32M with a history of hidradenitis suppurativa and  with many recent admissions wound excisions and skin grafts (most recently for left axillary excision 04/2023) who presents to the hospital with 1 week of left hip pain. States the pain began shortly after discharge from the hospital in later April. Pain is described as aching and stabbing "nerve pain", occurring intermittently throughout the day. There was no preceding trauma and he has never been operated on in that region. The pain progressed such that he as barely able to stand or walk (at baseline, he can walk a bit around his house with a cane). He hasn't had any fevers, chills, SOB, CP at home. No bloody or black stools. Bowel movements are normal and regular. Urinating normally.     ED course was notable for stable vital signs, labs with baseline leukocytosis, anemic to Hb of 6.7 (baseline 14-15 in August 2022 => 7's-8's in 2023). Plain film of the hip negative for fracture. 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 (10 May 2023 20:27)    Rheumatology consulted for HS.     Rheum ROS    Denies fevers/chills/weight loss/night sweats/alopecia/sinus disease/asthma history/oral ulcers/dysphagia/vision changes/Raynauds/VTE/miscarraiges/sicca symptoms/urinary changes/edema/SOB/joint pain/swelling/jaw claudication/vision changes/rash/photosensitivity/morning stiffness    Endorses fevers/chills/weight loss/night sweats/alopecia/sinus disease/asthma history/oral ulcers/dysphagia/vision changes/Raynauds/VTE/miscarraiges/sicca symptoms/urinary changes/edema/SOB/joint pain/swelling/jaw claudication/vision changes/rash/photosensitivity/morning stiffness      MEDICATIONS  (STANDING):  acetaminophen     Tablet .. 975 milliGRAM(s) Oral every 12 hours  ferrous    sulfate 325 milliGRAM(s) Oral daily  lidocaine   4% Patch 1 Patch Transdermal every 24 hours  polyethylene glycol 3350 17 Gram(s) Oral daily  senna 2 Tablet(s) Oral at bedtime    MEDICATIONS  (PRN):  diclofenac sodium 1% Gel 4 Gram(s) Topical <User Schedule> PRN pain      Allergies    No Known Allergies    Intolerances        PERTINENT MEDICATION HISTORY:    SOCIAL HISTORY:  OCCUPATION:  TRAVEL HISTORY:    FAMILY HISTORY:  FH: hypertension (Mother)        Vital Signs Last 24 Hrs  T(C): 37.1 (15 May 2023 12:31), Max: 37.2 (14 May 2023 19:11)  T(F): 98.8 (15 May 2023 12:31), Max: 99 (14 May 2023 19:11)  HR: 97 (15 May 2023 12:31) (78 - 98)  BP: 112/80 (15 May 2023 12:31) (109/71 - 131/76)  BP(mean): --  RR: 18 (15 May 2023 12:31) (18 - 18)  SpO2: 98% (15 May 2023 12:31) (95% - 99%)    Parameters below as of 15 May 2023 06:05  Patient On (Oxygen Delivery Method): room air        Physical Exam:  General: No apparent distress  HEENT: EOMI, MMM  CVS: +S1/S2, RRR, no murmurs/rubs/gallops  Resp: CTA b/l. No crackles/wheezing  GI: Soft, NT/ND +BS  MSK: no swelling/warmth/erythema of the joints of the UE/LE  Neuro: AAOx3  Skin: no visible rashes    LABS:                        8.7    14.51 )-----------( 586      ( 14 May 2023 06:51 )             28.3     05-14    134<L>  |  98  |  12  ----------------------------<  91  4.0   |  25  |  0.76    Ca    9.3      14 May 2023 06:51  Phos  4.0     05-14  Mg     2.0     05-14        Rheumatology Work Up    Sedimentation Rate, Erythrocyte: 124 mm/hr *H* [1 - 15] (02-18-23 @ 17:50)  C-Reactive Protein, Serum: 188.0 mg/L *H* (02-18-23 @ 17:50)  Sedimentation Rate, Erythrocyte: 25 mm/hr *H* [0 - 15] (07-09-20 @ 11:30)  Sedimentation Rate, Erythrocyte: 102 mm/hr *H* [1 - 15] (07-22-17 @ 06:00)        RADIOLOGY & ADDITIONAL STUDIES:  < from: MR Hip w/wo IV Cont, Left (05.12.23 @ 23:47) >    ACC: [id number] EXAM:  MR HIP WAW IC LT   ORDERED BY: FE CELIS     PROCEDURE DATE:  05/12/2023          INTERPRETATION:  Clinical indication: Acute left hip pain. Hidradenitis   suppuritiva status post multiple skin grafts.    Multiplanar multisequence MRI of the left hip was performed with and   without intravenous contrast.    7.5 cc of Gadavist was administered intravenously. 0 cc was discarded.    FINDINGS:    ACETABULAR LABRUM AND HYALINE CARTILAGE: There is degeneration of the   acetabular labrum with a shallow partial-thickness nondisplaced tear   along the chondral labral junction. The articular surfaces are preserved   without subchondral signal abnormality.    MARROW: There is partially imaged nonspecific subarticular edema and mild   patchy enhancement along the iliac margins of the left sacroiliac joint.   Correlate for any underlying inflammatory etiology/sacroiliitis. There is   no joint effusion. There is no acute fracture or osteonecrosis.    SYNOVIUM: There is no hip joint effusion.    MUSCLES AND TENDONS: The origin of the hamstring tendons are intact.   There is mild edema and enhancement within the left iliopsoas bursa at   the level of the acetabulum and extending distally to its insertion on   the lesser trochanter. This is suggestive of iliopsoas bursitis.   Iliopsoas tendon is otherwise intact. The gluteus minimus and medius   tendinous insertions are intact. There is mild edema within the greater   trochanteric bursa with associated enhancement suggestive of bursitis.   The adductor tendinous insertions and origins are maintained.    NERVES: Left sciatic nerve is normal in course, signal, and morphology.    INTRAPELVIC STRUCTURES: There is prominence of the left inguinal and left   iliac chain lymphnodes.    SUBCUTANEOUS TISSUES: No peripherally enhancing fluid collection in the   imaged field of view.    IMPRESSION:    Nonspecific subarticular edema along the iliac margin of the left   sacroiliac joint. This may be related to an underlying inflammatory   etiology/sacroiliitis, correlate with clinical history and serological   markers as clinically indicated. Correlate with clinical scenario to   exclude the possibility of infection.    Mild edema and enhancement along the left iliopsoas bursa suggestive of   nonspecific bursitis.    Edema and enhancement within the left greater trochanteric bursa is also   suggestive of nonspecific bursitis.    Partial-thickness nondisplaced tear of the left anterosuperior acetabular   labrum.    < end of copied text >    < from: CT Abdomen and Pelvis w/ IV Cont (05.10.23 @ 15:09) >    ACC: [id number] EXAM:  CT ABDOMEN AND PELVIS IC   ORDERED BY: AVINASH COPELAND     PROCEDURE DATE:  05/10/2023          INTERPRETATION:  CLINICAL INFORMATION: Left hip/buttock pain,   hidradenitis suppurativa S/P excision of hidradenitis of the buttocks   with skin graft reconstruction (2/2023)    COMPARISON: CT abdomen pelvis 11/8/2017    CONTRAST/COMPLICATIONS:  IV Contrast: Omnipaque 350  90 cc administered   10 cc discarded  Oral Contrast: NONE  Complications: None reported at time of study completion    PROCEDURE:  CT of the Abdomen and Pelvis was performed.  Sagittal and coronal reformats were performed.    FINDINGS:  LOWER CHEST: Mild dependent atelectasis.    LIVER: Within normal limits.  BILE DUCTS: Normal caliber.  GALLBLADDER: Within normal limits.  SPLEEN: Within normal limits.  PANCREAS: Within normal limits.  ADRENALS: Within normal limits.  KIDNEYS/URETERS: Within normal limits.    BLADDER: Within normal limits.  REPRODUCTIVE ORGANS: Prostate within normal limits.    BOWEL: No bowel obstruction. Appendix is normal (2-87).  PERITONEUM: No ascites.  VESSELS: Within normal limits.  RETROPERITONEUM/LYMPH NODES: No lymphadenopathy. Decreased size of   previously seen right inguinal node. Slight hazing of the perirectal fat   (2-110).  ABDOMINAL WALL: Increased density of the right perianal soft tissue   extending cephalad to the levator muscle and posterior to the gluteal   cleft. Removal of right gluteal thickened skin. Slight fat stranding at   the presumed right gluteal skin graft site.  BONES: Within normal limits.    IMPRESSION:  No drainable collection.    Proctitis.    Findings suspicious for perianal fistula. Consider MRI for more complete   characterization.    < end of copied text >     Tha Michael MD, PGY-4  Rheumatology Fellow  Reachable on TEAMS    JANNA CYR  [id number]    HISTORY OF PRESENT ILLNESS:  32M with a history of hidradenitis suppurativa and  with many recent admissions wound excisions and skin grafts (most recently for left axillary excision 04/2023) who presents to the hospital with 1 week of left hip pain. States the pain began shortly after discharge from the hospital in later April. Pain is described as aching and stabbing "nerve pain", occurring intermittently throughout the day. There was no preceding trauma and he has never been operated on in that region. The pain progressed such that he as barely able to stand or walk (at baseline, he can walk a bit around his house with a cane). He hasn't had any fevers, chills, SOB, CP at home. No bloody or black stools. Bowel movements are normal and regular. Urinating normally.     ED course was notable for stable vital signs, labs with baseline leukocytosis, anemic to Hb of 6.7 (baseline 14-15 in August 2022 => 7's-8's in 2023). Plain film of the hip negative for fracture. 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 (10 May 2023 20:27)    Rheumatology consulted for HS history and newly found left sided sacroiliitis and bursitis. Is followed by Dermatology for HS. Has been on Humira since 2018. Has been having perianal fistula. Has had 1 week of left hip pain. Denies and IBD symptoms. Endorses constipation. No reports of ocular symptoms or other dermatological problems other than HS    MEDICATIONS  (STANDING):  acetaminophen     Tablet .. 975 milliGRAM(s) Oral every 12 hours  ferrous    sulfate 325 milliGRAM(s) Oral daily  lidocaine   4% Patch 1 Patch Transdermal every 24 hours  polyethylene glycol 3350 17 Gram(s) Oral daily  senna 2 Tablet(s) Oral at bedtime    MEDICATIONS  (PRN):  diclofenac sodium 1% Gel 4 Gram(s) Topical <User Schedule> PRN pain      Allergies    No Known Allergies    Intolerances      FAMILY HISTORY:  FH: hypertension (Mother)    Vital Signs Last 24 Hrs  T(C): 37.1 (15 May 2023 12:31), Max: 37.2 (14 May 2023 19:11)  T(F): 98.8 (15 May 2023 12:31), Max: 99 (14 May 2023 19:11)  HR: 97 (15 May 2023 12:31) (78 - 98)  BP: 112/80 (15 May 2023 12:31) (109/71 - 131/76)  BP(mean): --  RR: 18 (15 May 2023 12:31) (18 - 18)  SpO2: 98% (15 May 2023 12:31) (95% - 99%)    Parameters below as of 15 May 2023 06:05  Patient On (Oxygen Delivery Method): room air        Physical Exam:  General: No apparent distress  HEENT: EOMI, MMM  Resp: no respiratory distress  GI: Soft, NT/ND +BS  MSK: no swelling/warmth/erythema of the joints of the UE/LE. pain in LLE with movement  Neuro: AAOx3  Skin: no visible rashes    LABS:                        8.7    14.51 )-----------( 586      ( 14 May 2023 06:51 )             28.3     05-14    134<L>  |  98  |  12  ----------------------------<  91  4.0   |  25  |  0.76    Ca    9.3      14 May 2023 06:51  Phos  4.0     05-14  Mg     2.0     05-14        Rheumatology Work Up    Sedimentation Rate, Erythrocyte: 124 mm/hr *H* [1 - 15] (02-18-23 @ 17:50)  C-Reactive Protein, Serum: 188.0 mg/L *H* (02-18-23 @ 17:50)  Sedimentation Rate, Erythrocyte: 25 mm/hr *H* [0 - 15] (07-09-20 @ 11:30)  Sedimentation Rate, Erythrocyte: 102 mm/hr *H* [1 - 15] (07-22-17 @ 06:00)        RADIOLOGY & ADDITIONAL STUDIES:  < from: MR Hip w/wo IV Cont, Left (05.12.23 @ 23:47) >    ACC: [id number] EXAM:  MR HIP WAW IC LT   ORDERED BY: FE CELIS     PROCEDURE DATE:  05/12/2023          INTERPRETATION:  Clinical indication: Acute left hip pain. Hidradenitis   suppuritiva status post multiple skin grafts.    Multiplanar multisequence MRI of the left hip was performed with and   without intravenous contrast.    7.5 cc of Gadavist was administered intravenously. 0 cc was discarded.    FINDINGS:    ACETABULAR LABRUM AND HYALINE CARTILAGE: There is degeneration of the   acetabular labrum with a shallow partial-thickness nondisplaced tear   along the chondral labral junction. The articular surfaces are preserved   without subchondral signal abnormality.    MARROW: There is partially imaged nonspecific subarticular edema and mild   patchy enhancement along the iliac margins of the left sacroiliac joint.   Correlate for any underlying inflammatory etiology/sacroiliitis. There is   no joint effusion. There is no acute fracture or osteonecrosis.    SYNOVIUM: There is no hip joint effusion.    MUSCLES AND TENDONS: The origin of the hamstring tendons are intact.   There is mild edema and enhancement within the left iliopsoas bursa at   the level of the acetabulum and extending distally to its insertion on   the lesser trochanter. This is suggestive of iliopsoas bursitis.   Iliopsoas tendon is otherwise intact. The gluteus minimus and medius   tendinous insertions are intact. There is mild edema within the greater   trochanteric bursa with associated enhancement suggestive of bursitis.   The adductor tendinous insertions and origins are maintained.    NERVES: Left sciatic nerve is normal in course, signal, and morphology.    INTRAPELVIC STRUCTURES: There is prominence of the left inguinal and left   iliac chain lymphnodes.    SUBCUTANEOUS TISSUES: No peripherally enhancing fluid collection in the   imaged field of view.    IMPRESSION:    Nonspecific subarticular edema along the iliac margin of the left   sacroiliac joint. This may be related to an underlying inflammatory   etiology/sacroiliitis, correlate with clinical history and serological   markers as clinically indicated. Correlate with clinical scenario to   exclude the possibility of infection.    Mild edema and enhancement along the left iliopsoas bursa suggestive of   nonspecific bursitis.    Edema and enhancement within the left greater trochanteric bursa is also   suggestive of nonspecific bursitis.    Partial-thickness nondisplaced tear of the left anterosuperior acetabular   labrum.    < end of copied text >    < from: CT Abdomen and Pelvis w/ IV Cont (05.10.23 @ 15:09) >    ACC: [id number] EXAM:  CT ABDOMEN AND PELVIS IC   ORDERED BY: AVINASH COPELAND     PROCEDURE DATE:  05/10/2023          INTERPRETATION:  CLINICAL INFORMATION: Left hip/buttock pain,   hidradenitis suppurativa S/P excision of hidradenitis of the buttocks   with skin graft reconstruction (2/2023)    COMPARISON: CT abdomen pelvis 11/8/2017    CONTRAST/COMPLICATIONS:  IV Contrast: Omnipaque 350  90 cc administered   10 cc discarded  Oral Contrast: NONE  Complications: None reported at time of study completion    PROCEDURE:  CT of the Abdomen and Pelvis was performed.  Sagittal and coronal reformats were performed.    FINDINGS:  LOWER CHEST: Mild dependent atelectasis.    LIVER: Within normal limits.  BILE DUCTS: Normal caliber.  GALLBLADDER: Within normal limits.  SPLEEN: Within normal limits.  PANCREAS: Within normal limits.  ADRENALS: Within normal limits.  KIDNEYS/URETERS: Within normal limits.    BLADDER: Within normal limits.  REPRODUCTIVE ORGANS: Prostate within normal limits.    BOWEL: No bowel obstruction. Appendix is normal (2-87).  PERITONEUM: No ascites.  VESSELS: Within normal limits.  RETROPERITONEUM/LYMPH NODES: No lymphadenopathy. Decreased size of   previously seen right inguinal node. Slight hazing of the perirectal fat   (2-110).  ABDOMINAL WALL: Increased density of the right perianal soft tissue   extending cephalad to the levator muscle and posterior to the gluteal   cleft. Removal of right gluteal thickened skin. Slight fat stranding at   the presumed right gluteal skin graft site.  BONES: Within normal limits.    IMPRESSION:  No drainable collection.    Proctitis.    Findings suspicious for perianal fistula. Consider MRI for more complete   characterization.    < end of copied text >

## 2023-05-15 NOTE — CONSULT NOTE ADULT - ATTENDING COMMENTS
Left hip pain; unrelated to prior surgery. Perianal pathology likely related to prior HS and extensive hemorrhoids. Perianal HS treated and grafted; defer to general surgery regarding any other perianal pathology.    Left axilla s/p excision, plan for flap coverage or grafting in coming weeks. No concern for ongoing bleeding from this site.    Appreciate evaluation of hip pain. Will follow.
per above

## 2023-05-15 NOTE — PROGRESS NOTE ADULT - SUBJECTIVE AND OBJECTIVE BOX
Patient is a 32y old  Male who presents with a chief complaint of Anemia (14 May 2023 13:04)      SUBJECTIVE / OVERNIGHT EVENTS: reports he has pain in his left him on movement, no cp, sob, abd pain, chills    MEDICATIONS  (STANDING):  acetaminophen     Tablet .. 975 milliGRAM(s) Oral every 12 hours  ferrous    sulfate 325 milliGRAM(s) Oral daily  lidocaine   4% Patch 1 Patch Transdermal every 24 hours  polyethylene glycol 3350 17 Gram(s) Oral daily  senna 2 Tablet(s) Oral at bedtime    MEDICATIONS  (PRN):  diclofenac sodium 1% Gel 4 Gram(s) Topical <User Schedule> PRN pain        CAPILLARY BLOOD GLUCOSE        I&O's Summary    14 May 2023 07:01  -  15 May 2023 07:00  --------------------------------------------------------  IN: 960 mL / OUT: 1100 mL / NET: -140 mL    15 May 2023 07:01  -  15 May 2023 14:07  --------------------------------------------------------  IN: 0 mL / OUT: 700 mL / NET: -700 mL        PHYSICAL EXAM:  GENERAL: NAD, well-developed  HEAD:  Atraumatic, Normocephalic  EYES: EOMI, PERRLA, conjunctiva and sclera clear  NECK: Supple, No JVD  CHEST/LUNG: Clear to auscultation bilaterally; No wheeze  HEART: Regular rate and rhythm; No murmurs, rubs, or gallops  ABDOMEN: Soft, Nontender, Nondistended; Bowel sounds present  EXTREMITIES:  2+ Peripheral Pulses, No clubbing, cyanosis, or edema  PSYCH: AAOx3  NEUROLOGY: non-focal  SKIN: No rashes or lesions    LABS:                        8.7    14.51 )-----------( 586      ( 14 May 2023 06:51 )             28.3     05-14    134<L>  |  98  |  12  ----------------------------<  91  4.0   |  25  |  0.76    Ca    9.3      14 May 2023 06:51  Phos  4.0     05-14  Mg     2.0     05-14                RADIOLOGY & ADDITIONAL TESTS:    Imaging Personally Reviewed:    Consultant(s) Notes Reviewed:      Care Discussed with Consultants/Other Providers:

## 2023-05-15 NOTE — CONSULT NOTE ADULT - ASSESSMENT
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 as well as subarticular edema on the left SI joint and non-specific bursitis in left iliopsoas/trochanteric bursa    ##left sided sacroilitis    ##left sided bursitis  -likely in setting of lying on one side due to perianal fistula on the RIGHT side  - 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 as well as subarticular edema on the left SI joint and non-specific bursitis in left iliopsoas/trochanteric bursa    ##left sided sacroilitis/bursitis  Infection vs Autoimmune Disease  -appreciate on MR  -however MR pelvis without any comment of Sacroilliac joints  -if patient has bilateral sacroillitis would be more concerning for autoimmune process  -however, if patient has unilateral sacroiliitis would be more concerned for infection  -will need to clarify with radiology if they see bilateral sacroiliitis   -the treatment for infection vs new/worsening underlying autoimmune disease are naturally at odds with one another    PLAN  -will check HLA B-27 (ordered)  -given chronic Humira will check Hepatitis studies and Quantiferon (ordered)  -will attempt to speak to radiology regarding unilateral vs bilateral sacroiliitis  -will start naproxen 500mg BID for pain (ordered)    case d/w Dr. Rosas

## 2023-05-16 LAB
ANION GAP SERPL CALC-SCNC: 11 MMOL/L — SIGNIFICANT CHANGE UP (ref 5–17)
BUN SERPL-MCNC: 18 MG/DL — SIGNIFICANT CHANGE UP (ref 7–23)
CALCIUM SERPL-MCNC: 10 MG/DL — SIGNIFICANT CHANGE UP (ref 8.4–10.5)
CHLORIDE SERPL-SCNC: 98 MMOL/L — SIGNIFICANT CHANGE UP (ref 96–108)
CO2 SERPL-SCNC: 25 MMOL/L — SIGNIFICANT CHANGE UP (ref 22–31)
CREAT SERPL-MCNC: 0.79 MG/DL — SIGNIFICANT CHANGE UP (ref 0.5–1.3)
EGFR: 121 ML/MIN/1.73M2 — SIGNIFICANT CHANGE UP
GLUCOSE SERPL-MCNC: 78 MG/DL — SIGNIFICANT CHANGE UP (ref 70–99)
HAV IGM SER-ACNC: SIGNIFICANT CHANGE UP
HBV CORE AB SER-ACNC: SIGNIFICANT CHANGE UP
HBV CORE IGM SER-ACNC: SIGNIFICANT CHANGE UP
HBV SURFACE AG SER-ACNC: SIGNIFICANT CHANGE UP
HCT VFR BLD CALC: 31 % — LOW (ref 39–50)
HCV AB S/CO SERPL IA: 0.23 S/CO — SIGNIFICANT CHANGE UP (ref 0–0.99)
HCV AB SERPL-IMP: SIGNIFICANT CHANGE UP
HGB BLD-MCNC: 9.3 G/DL — LOW (ref 13–17)
MCHC RBC-ENTMCNC: 23.4 PG — LOW (ref 27–34)
MCHC RBC-ENTMCNC: 30 GM/DL — LOW (ref 32–36)
MCV RBC AUTO: 78.1 FL — LOW (ref 80–100)
NRBC # BLD: 0 /100 WBCS — SIGNIFICANT CHANGE UP (ref 0–0)
PLATELET # BLD AUTO: 537 K/UL — HIGH (ref 150–400)
POTASSIUM SERPL-MCNC: 4.1 MMOL/L — SIGNIFICANT CHANGE UP (ref 3.5–5.3)
POTASSIUM SERPL-SCNC: 4.1 MMOL/L — SIGNIFICANT CHANGE UP (ref 3.5–5.3)
RBC # BLD: 3.97 M/UL — LOW (ref 4.2–5.8)
RBC # FLD: 19.7 % — HIGH (ref 10.3–14.5)
SODIUM SERPL-SCNC: 134 MMOL/L — LOW (ref 135–145)
WBC # BLD: 13.33 K/UL — HIGH (ref 3.8–10.5)
WBC # FLD AUTO: 13.33 K/UL — HIGH (ref 3.8–10.5)

## 2023-05-16 PROCEDURE — 99233 SBSQ HOSP IP/OBS HIGH 50: CPT

## 2023-05-16 PROCEDURE — G0452: CPT | Mod: 26

## 2023-05-16 RX ADMIN — Medication 500 MILLIGRAM(S): at 06:23

## 2023-05-16 RX ADMIN — POLYETHYLENE GLYCOL 3350 17 GRAM(S): 17 POWDER, FOR SOLUTION ORAL at 18:04

## 2023-05-16 RX ADMIN — Medication 325 MILLIGRAM(S): at 13:17

## 2023-05-16 RX ADMIN — Medication 500 MILLIGRAM(S): at 05:28

## 2023-05-16 RX ADMIN — Medication 975 MILLIGRAM(S): at 06:22

## 2023-05-16 RX ADMIN — LIDOCAINE 1 PATCH: 4 CREAM TOPICAL at 06:22

## 2023-05-16 RX ADMIN — Medication 975 MILLIGRAM(S): at 18:03

## 2023-05-16 RX ADMIN — Medication 975 MILLIGRAM(S): at 05:27

## 2023-05-16 RX ADMIN — Medication 500 MILLIGRAM(S): at 18:03

## 2023-05-16 RX ADMIN — SENNA PLUS 2 TABLET(S): 8.6 TABLET ORAL at 21:33

## 2023-05-16 NOTE — PROGRESS NOTE ADULT - SUBJECTIVE AND OBJECTIVE BOX
Patient is a 32y old  Male who presents with a chief complaint of Anemia (15 May 2023 14:18)      SUBJECTIVE / OVERNIGHT EVENTS: reports pain in left hip is better, no cp, sob, had bm     MEDICATIONS  (STANDING):  acetaminophen     Tablet .. 975 milliGRAM(s) Oral every 12 hours  ferrous    sulfate 325 milliGRAM(s) Oral daily  lidocaine   4% Patch 1 Patch Transdermal every 24 hours  naproxen 500 milliGRAM(s) Oral two times a day  polyethylene glycol 3350 17 Gram(s) Oral daily  senna 2 Tablet(s) Oral at bedtime    MEDICATIONS  (PRN):  diclofenac sodium 1% Gel 4 Gram(s) Topical <User Schedule> PRN pain        CAPILLARY BLOOD GLUCOSE        I&O's Summary    15 May 2023 07:01  -  16 May 2023 07:00  --------------------------------------------------------  IN: 0 mL / OUT: 700 mL / NET: -700 mL    16 May 2023 07:01  -  16 May 2023 12:12  --------------------------------------------------------  IN: 240 mL / OUT: 0 mL / NET: 240 mL        PHYSICAL EXAM:  GENERAL: NAD, well-developed  HEAD:  Atraumatic, Normocephalic  EYES: EOMI, PERRLA, conjunctiva and sclera clear  NECK: Supple, No JVD  CHEST/LUNG: Clear to auscultation bilaterally; No wheeze  HEART: Regular rate and rhythm; No murmurs, rubs, or gallops  ABDOMEN: Soft, Nontender, Nondistended; Bowel sounds present  EXTREMITIES:  2+ Peripheral Pulses, No clubbing, cyanosis, or edema, +left hip ttp  PSYCH: AAOx3  NEUROLOGY: non-focal  SKIN: No rashes or lesions    LABS:                        9.3    13.33 )-----------( 537      ( 16 May 2023 07:20 )             31.0     05-16    134<L>  |  98  |  18  ----------------------------<  78  4.1   |  25  |  0.79    Ca    10.0      16 May 2023 07:20                RADIOLOGY & ADDITIONAL TESTS:    Imaging Personally Reviewed:    Consultant(s) Notes Reviewed:      Care Discussed with Consultants/Other Providers:

## 2023-05-17 LAB
ANION GAP SERPL CALC-SCNC: 12 MMOL/L — SIGNIFICANT CHANGE UP (ref 5–17)
BUN SERPL-MCNC: 19 MG/DL — SIGNIFICANT CHANGE UP (ref 7–23)
CALCIUM SERPL-MCNC: 9 MG/DL — SIGNIFICANT CHANGE UP (ref 8.4–10.5)
CHLORIDE SERPL-SCNC: 100 MMOL/L — SIGNIFICANT CHANGE UP (ref 96–108)
CO2 SERPL-SCNC: 22 MMOL/L — SIGNIFICANT CHANGE UP (ref 22–31)
CREAT SERPL-MCNC: 0.67 MG/DL — SIGNIFICANT CHANGE UP (ref 0.5–1.3)
EGFR: 126 ML/MIN/1.73M2 — SIGNIFICANT CHANGE UP
GAMMA INTERFERON BACKGROUND BLD IA-ACNC: 0.02 IU/ML — SIGNIFICANT CHANGE UP
GLUCOSE SERPL-MCNC: 85 MG/DL — SIGNIFICANT CHANGE UP (ref 70–99)
HCT VFR BLD CALC: 29.4 % — LOW (ref 39–50)
HGB BLD-MCNC: 8.9 G/DL — LOW (ref 13–17)
M TB IFN-G BLD-IMP: NEGATIVE — SIGNIFICANT CHANGE UP
M TB IFN-G CD4+ BCKGRND COR BLD-ACNC: -0.01 IU/ML — SIGNIFICANT CHANGE UP
M TB IFN-G CD4+CD8+ BCKGRND COR BLD-ACNC: -0.01 IU/ML — SIGNIFICANT CHANGE UP
MCHC RBC-ENTMCNC: 23.6 PG — LOW (ref 27–34)
MCHC RBC-ENTMCNC: 30.3 GM/DL — LOW (ref 32–36)
MCV RBC AUTO: 78 FL — LOW (ref 80–100)
NRBC # BLD: 0 /100 WBCS — SIGNIFICANT CHANGE UP (ref 0–0)
PLATELET # BLD AUTO: 530 K/UL — HIGH (ref 150–400)
POTASSIUM SERPL-MCNC: 4.1 MMOL/L — SIGNIFICANT CHANGE UP (ref 3.5–5.3)
POTASSIUM SERPL-SCNC: 4.1 MMOL/L — SIGNIFICANT CHANGE UP (ref 3.5–5.3)
QUANT TB PLUS MITOGEN MINUS NIL: >10 IU/ML — SIGNIFICANT CHANGE UP
RBC # BLD: 3.77 M/UL — LOW (ref 4.2–5.8)
RBC # FLD: 20.1 % — HIGH (ref 10.3–14.5)
SODIUM SERPL-SCNC: 134 MMOL/L — LOW (ref 135–145)
WBC # BLD: 13.19 K/UL — HIGH (ref 3.8–10.5)
WBC # FLD AUTO: 13.19 K/UL — HIGH (ref 3.8–10.5)

## 2023-05-17 PROCEDURE — 99233 SBSQ HOSP IP/OBS HIGH 50: CPT

## 2023-05-17 RX ADMIN — Medication 975 MILLIGRAM(S): at 17:34

## 2023-05-17 RX ADMIN — Medication 500 MILLIGRAM(S): at 17:34

## 2023-05-17 RX ADMIN — Medication 975 MILLIGRAM(S): at 06:12

## 2023-05-17 RX ADMIN — Medication 325 MILLIGRAM(S): at 11:52

## 2023-05-17 RX ADMIN — LIDOCAINE 1 PATCH: 4 CREAM TOPICAL at 17:33

## 2023-05-17 RX ADMIN — LIDOCAINE 1 PATCH: 4 CREAM TOPICAL at 19:45

## 2023-05-17 RX ADMIN — Medication 500 MILLIGRAM(S): at 06:12

## 2023-05-17 NOTE — PROGRESS NOTE ADULT - SUBJECTIVE AND OBJECTIVE BOX
Patient is a 33y old  Male who presents with a chief complaint of Anemia (16 May 2023 12:11)      SUBJECTIVE / OVERNIGHT EVENTS: pt feels ok, hip pain is better, no cp, sob, abd pain     MEDICATIONS  (STANDING):  acetaminophen     Tablet .. 975 milliGRAM(s) Oral every 12 hours  ferrous    sulfate 325 milliGRAM(s) Oral daily  lidocaine   4% Patch 1 Patch Transdermal every 24 hours  naproxen 500 milliGRAM(s) Oral two times a day  polyethylene glycol 3350 17 Gram(s) Oral daily  senna 2 Tablet(s) Oral at bedtime    MEDICATIONS  (PRN):  diclofenac sodium 1% Gel 4 Gram(s) Topical <User Schedule> PRN pain        CAPILLARY BLOOD GLUCOSE        I&O's Summary    16 May 2023 07:01  -  17 May 2023 07:00  --------------------------------------------------------  IN: 720 mL / OUT: 1300 mL / NET: -580 mL    17 May 2023 07:01  -  17 May 2023 10:40  --------------------------------------------------------  IN: 240 mL / OUT: 0 mL / NET: 240 mL        PHYSICAL EXAM:  GENERAL: NAD, well-developed  HEAD:  Atraumatic, Normocephalic  EYES: EOMI, PERRLA, conjunctiva and sclera clear  NECK: Supple, No JVD  CHEST/LUNG: Clear to auscultation bilaterally; No wheeze  HEART: Regular rate and rhythm; No murmurs, rubs, or gallops  ABDOMEN: Soft, Nontender, Nondistended; Bowel sounds present  EXTREMITIES:  2+ Peripheral Pulses, No clubbing, cyanosis, or edema, +left hip ttp  PSYCH: AAOx3  NEUROLOGY: non-focal  SKIN: No rashes or lesions    LABS:                        8.9    13.19 )-----------( 530      ( 17 May 2023 07:04 )             29.4     05-17    134<L>  |  100  |  19  ----------------------------<  85  4.1   |  22  |  0.67    Ca    9.0      17 May 2023 07:05                RADIOLOGY & ADDITIONAL TESTS:    Imaging Personally Reviewed:    Consultant(s) Notes Reviewed:      Care Discussed with Consultants/Other Providers:

## 2023-05-18 LAB
HCT VFR BLD CALC: 29.8 % — LOW (ref 39–50)
HGB BLD-MCNC: 8.8 G/DL — LOW (ref 13–17)
MCHC RBC-ENTMCNC: 23.3 PG — LOW (ref 27–34)
MCHC RBC-ENTMCNC: 29.5 GM/DL — LOW (ref 32–36)
MCV RBC AUTO: 78.8 FL — LOW (ref 80–100)
NRBC # BLD: 0 /100 WBCS — SIGNIFICANT CHANGE UP (ref 0–0)
PLATELET # BLD AUTO: 447 K/UL — HIGH (ref 150–400)
RBC # BLD: 3.78 M/UL — LOW (ref 4.2–5.8)
RBC # FLD: 19.6 % — HIGH (ref 10.3–14.5)
WBC # BLD: 12.1 K/UL — HIGH (ref 3.8–10.5)
WBC # FLD AUTO: 12.1 K/UL — HIGH (ref 3.8–10.5)

## 2023-05-18 PROCEDURE — 99232 SBSQ HOSP IP/OBS MODERATE 35: CPT

## 2023-05-18 PROCEDURE — 99233 SBSQ HOSP IP/OBS HIGH 50: CPT | Mod: GC

## 2023-05-18 PROCEDURE — 72197 MRI PELVIS W/O & W/DYE: CPT | Mod: 26

## 2023-05-18 RX ADMIN — Medication 1 APPLICATION(S): at 14:21

## 2023-05-18 RX ADMIN — Medication 975 MILLIGRAM(S): at 05:33

## 2023-05-18 RX ADMIN — Medication 500 MILLIGRAM(S): at 17:11

## 2023-05-18 RX ADMIN — LIDOCAINE 1 PATCH: 4 CREAM TOPICAL at 20:45

## 2023-05-18 RX ADMIN — Medication 500 MILLIGRAM(S): at 05:33

## 2023-05-18 RX ADMIN — Medication 325 MILLIGRAM(S): at 11:26

## 2023-05-18 RX ADMIN — Medication 975 MILLIGRAM(S): at 06:29

## 2023-05-18 RX ADMIN — LIDOCAINE 1 PATCH: 4 CREAM TOPICAL at 06:30

## 2023-05-18 RX ADMIN — Medication 500 MILLIGRAM(S): at 06:29

## 2023-05-18 RX ADMIN — Medication 975 MILLIGRAM(S): at 17:11

## 2023-05-18 NOTE — PROGRESS NOTE ADULT - SUBJECTIVE AND OBJECTIVE BOX
Patient is a 33y old  Male who presents with a chief complaint of Anemia (17 May 2023 10:39)      SUBJECTIVE / OVERNIGHT EVENTS: no events overnight     MEDICATIONS  (STANDING):  acetaminophen     Tablet .. 975 milliGRAM(s) Oral every 12 hours  ferrous    sulfate 325 milliGRAM(s) Oral daily  lidocaine   4% Patch 1 Patch Transdermal every 24 hours  naproxen 500 milliGRAM(s) Oral two times a day  polyethylene glycol 3350 17 Gram(s) Oral daily  senna 2 Tablet(s) Oral at bedtime    MEDICATIONS  (PRN):  diclofenac sodium 1% Gel 4 Gram(s) Topical <User Schedule> PRN pain        CAPILLARY BLOOD GLUCOSE        I&O's Summary    17 May 2023 07:01  -  18 May 2023 07:00  --------------------------------------------------------  IN: 960 mL / OUT: 1350 mL / NET: -390 mL        PHYSICAL EXAM:  GENERAL: NAD, well-developed  HEAD:  Atraumatic, Normocephalic  EYES: EOMI, PERRLA, conjunctiva and sclera clear  NECK: Supple, No JVD  CHEST/LUNG: Clear to auscultation bilaterally; No wheeze  HEART: Regular rate and rhythm; No murmurs, rubs, or gallops  ABDOMEN: Soft, Nontender, Nondistended; Bowel sounds present  EXTREMITIES:  2+ Peripheral Pulses, No clubbing, cyanosis, or edema, +left hip ttp  PSYCH: AAOx3  NEUROLOGY: non-focal  SKIN: No rashes or lesions      LABS:                        8.8    12.10 )-----------( 447      ( 18 May 2023 06:49 )             29.8     05-17    134<L>  |  100  |  19  ----------------------------<  85  4.1   |  22  |  0.67    Ca    9.0      17 May 2023 07:05                RADIOLOGY & ADDITIONAL TESTS:    Imaging Personally Reviewed:    Consultant(s) Notes Reviewed:      Care Discussed with Consultants/Other Providers:   Patient is a 33y old  Male who presents with a chief complaint of Anemia (17 May 2023 10:39)      SUBJECTIVE / OVERNIGHT EVENTS: no events overnight     MEDICATIONS  (STANDING):  acetaminophen     Tablet .. 975 milliGRAM(s) Oral every 12 hours  ferrous    sulfate 325 milliGRAM(s) Oral daily  lidocaine   4% Patch 1 Patch Transdermal every 24 hours  naproxen 500 milliGRAM(s) Oral two times a day  polyethylene glycol 3350 17 Gram(s) Oral daily  senna 2 Tablet(s) Oral at bedtime    MEDICATIONS  (PRN):  diclofenac sodium 1% Gel 4 Gram(s) Topical <User Schedule> PRN pain        CAPILLARY BLOOD GLUCOSE        I&O's Summary    17 May 2023 07:01  -  18 May 2023 07:00  --------------------------------------------------------  IN: 960 mL / OUT: 1350 mL / NET: -390 mL        PHYSICAL EXAM:  GENERAL: NAD, well-developed  HEAD:  Atraumatic, Normocephalic  EYES: EOMI, PERRLA, conjunctiva and sclera clear  NECK: Supple, No JVD  CHEST/LUNG: Clear to auscultation bilaterally; No wheeze  HEART: Regular rate and rhythm; No murmurs, rubs, or gallops  ABDOMEN: Soft, Nontender, Nondistended; Bowel sounds present  EXTREMITIES:  2+ Peripheral Pulses, No clubbing, cyanosis, or edema, +left hip ttp, + sacral wound with mild bleeding   PSYCH: AAOx3  NEUROLOGY: non-focal  SKIN: No rashes or lesions      LABS:                        8.8    12.10 )-----------( 447      ( 18 May 2023 06:49 )             29.8     05-17    134<L>  |  100  |  19  ----------------------------<  85  4.1   |  22  |  0.67    Ca    9.0      17 May 2023 07:05                RADIOLOGY & ADDITIONAL TESTS:    Imaging Personally Reviewed:    Consultant(s) Notes Reviewed:      Care Discussed with Consultants/Other Providers:

## 2023-05-18 NOTE — PROGRESS NOTE ADULT - SUBJECTIVE AND OBJECTIVE BOX
INTERVAL HPI/OVERNIGHT EVENTS:  Patient lying in pain. Left hip pain resolved. Reports not being on Humira since February.     MEDICATIONS  (STANDING):  acetaminophen     Tablet .. 975 milliGRAM(s) Oral every 12 hours  ferrous    sulfate 325 milliGRAM(s) Oral daily  lidocaine   4% Patch 1 Patch Transdermal every 24 hours  naproxen 500 milliGRAM(s) Oral two times a day  polyethylene glycol 3350 17 Gram(s) Oral daily  senna 2 Tablet(s) Oral at bedtime  silver nitrate Applicator 1 Application(s) Topical once    MEDICATIONS  (PRN):  diclofenac sodium 1% Gel 4 Gram(s) Topical <User Schedule> PRN pain        Vital Signs Last 24 Hrs  T(C): 36.6 (18 May 2023 13:13), Max: 37.3 (18 May 2023 05:43)  T(F): 97.8 (18 May 2023 13:13), Max: 99.1 (18 May 2023 05:43)  HR: 79 (18 May 2023 13:13) (79 - 98)  BP: 118/69 (18 May 2023 13:13) (118/69 - 123/76)  BP(mean): --  RR: 18 (18 May 2023 13:13) (18 - 18)  SpO2: 100% (18 May 2023 13:13) (97% - 100%)    Parameters below as of 18 May 2023 13:13  Patient On (Oxygen Delivery Method): room air        PHYSICAL EXAMINATION:  General: No apparent distress  HEENT: EOMI, MMM  Resp: no respiratory distress  GI: Soft, NT/ND +BS  MSK: no synovitis or effusions  Neuro: AAOx3  Skin: no visible signs of cellulitis in groin/pelvis area      LABS:                        8.8    12.10 )-----------( 447      ( 18 May 2023 06:49 )             29.8     05-17    134<L>  |  100  |  19  ----------------------------<  85  4.1   |  22  |  0.67    Ca    9.0      17 May 2023 07:05              RADIOLOGY & ADDITIONAL TESTS:  < from: MR Pelvis Bony Only w/wo IV Cont (05.18.23 @ 11:16) >  ACC: [id number] EXAM:  MR PELVIS BONY ONLY WAW IC   ORDERED BY: SAUL OJEDA     PROCEDURE DATE:  05/18/2023          INTERPRETATION:  MR PELVIS AND SACROILIAC JOINTS WITHOUT AND WITH CONTRAST    HISTORY: Infection. Evaluate for sacroiliitis.    TECHNIQUE:  Multiplanar MRI of the pelvis was performed without and with   7.5cc cc administered Gadavist intravenous contrast.    COMPARISON:  MRI of the pelvis dated 5/12/2023.    FINDINGS:    OSSEOUS STRUCTURES    Fractures/Stress Reactions:  None.    PELVIC JOINTS    Symphysis Pubis:  Preserved.    Sacroiliac Joints:  Mild focal subchondral edematous changes are seen   involving the right sacroiliac joint with mild associated enhancement.   There is moderate subcortical edema of the left sacroiliac joint with   associated enhancement and slight adjacent synovial   enhancement/synovitis. Osseous involvement is similar to the prior. The   synovitis appears improved from the prior. There are no sizable joint   effusions.    RIGHT HIP JOINT    Morphology:  Normal.    Avascular Necrosis:  None.    Articular Cartilage:  Grossly preserved given the large field of view.    Effusion/Synovitis:  None.    RIGHT HIP TENDONS    Gluteus Minimus Tendon:  Intact.    Gluteus Medius Tendon:  Intact.    Iliopsoas Tendon:  Intact.    Common Hamstring Tendon:  Intact.    Bursa:  None.    LEFT HIP JOINT    Morphology:  Normal.    Avascular Necrosis:  None.    Articular Cartilage:  Grossly preserved given the large field of view.    Effusion/Synovitis:  None.    LEFT HIP TENDONS    Gluteus Minimus Tendon:  Intact.    Gluteus Medius Tendon:  Intact.    Iliopsoas Tendon:  Intact.    Common Hamstring Tendon:  Intact.    Bursa:  Slight fluid and enhancement involves the iliopsoas bursa.    VISUALIZED SPINE  Unremarkable.    SOFT TISSUES    Pelvis/Abdomen:  Prominent presacral edema is present with right   perirectal abscess again seen with posterior cutaneous fistula. The fluid   collection measures approximately 2.3 x 1.3 x 2.0 cm in size. Mild   bilateral inguinal adenopathy is noted.    Musculature:  Preserved.    Subcutaneous Tissues:  There is scattered enhancing posterior   subcutaneous edema with right-sided predominance. Peripherally enhancing   fluid collection is now seen within the leftmons pubis measuring   approximately 2.8 x 1.8 x 1.5 cm.    NEUROVASCULAR STRUCTURES    Sacral Neuroforamina:  Widely patent.    Neural Plexuses:  Maintained.    IMPRESSION:  1.  Slight right and mild left sacroiliitis is again seen with mild   improvement of the left-sided synovitis. Osseous involvement is similar   to the prior. Changes may be inflammatory or infectious in nature. There   are no sizable joint effusions.  2.  Right perirectal abscess is again seen.  3.  Left mons pubis abscess now appears to be present measuring 2.8 x 1.8   x 1.5 cm.  4.  Enhancing posterior subcutaneous edema with right-sided predominance   suggests cellulitis or other process.    --- End of Report ---    < end of copied text >

## 2023-05-19 ENCOUNTER — APPOINTMENT (OUTPATIENT)
Dept: INTERNAL MEDICINE | Facility: CLINIC | Age: 33
End: 2023-05-19

## 2023-05-19 LAB
ANTIBODIES TO ADALIMUMAB (ATA) CONCENTRATION: < 1.7 U/ML — SIGNIFICANT CHANGE UP
HCT VFR BLD CALC: 27.2 % — LOW (ref 39–50)
HGB BLD-MCNC: 8.3 G/DL — LOW (ref 13–17)
HLA-B27 QL NAA+PROBE: SIGNIFICANT CHANGE UP
Lab: SIGNIFICANT CHANGE UP
MCHC RBC-ENTMCNC: 23.5 PG — LOW (ref 27–34)
MCHC RBC-ENTMCNC: 30.5 GM/DL — LOW (ref 32–36)
MCV RBC AUTO: 77.1 FL — LOW (ref 80–100)
NRBC # BLD: 0 /100 WBCS — SIGNIFICANT CHANGE UP (ref 0–0)
PLATELET # BLD AUTO: 463 K/UL — HIGH (ref 150–400)
PROMETHEUS LABORATORY FOOTER: SIGNIFICANT CHANGE UP
RBC # BLD: 3.53 M/UL — LOW (ref 4.2–5.8)
RBC # FLD: 19.4 % — HIGH (ref 10.3–14.5)
SERUM ADALIMUMAB (ADA) CONCENTRATION: < 1.6 UG/ML — SIGNIFICANT CHANGE UP
WBC # BLD: 11.33 K/UL — HIGH (ref 3.8–10.5)
WBC # FLD AUTO: 11.33 K/UL — HIGH (ref 3.8–10.5)

## 2023-05-19 PROCEDURE — 99223 1ST HOSP IP/OBS HIGH 75: CPT

## 2023-05-19 PROCEDURE — 99233 SBSQ HOSP IP/OBS HIGH 50: CPT

## 2023-05-19 RX ADMIN — Medication 500 MILLIGRAM(S): at 05:08

## 2023-05-19 RX ADMIN — Medication 975 MILLIGRAM(S): at 05:08

## 2023-05-19 RX ADMIN — Medication 500 MILLIGRAM(S): at 17:23

## 2023-05-19 RX ADMIN — Medication 325 MILLIGRAM(S): at 11:49

## 2023-05-19 RX ADMIN — LIDOCAINE 1 PATCH: 4 CREAM TOPICAL at 07:00

## 2023-05-19 RX ADMIN — Medication 1 TABLET(S): at 17:22

## 2023-05-19 RX ADMIN — POLYETHYLENE GLYCOL 3350 17 GRAM(S): 17 POWDER, FOR SOLUTION ORAL at 11:48

## 2023-05-19 RX ADMIN — Medication 975 MILLIGRAM(S): at 17:23

## 2023-05-19 NOTE — PROGRESS NOTE ADULT - SUBJECTIVE AND OBJECTIVE BOX
Patient is a 33y old  Male who presents with a chief complaint of Anemia (19 May 2023 11:41)      SUBJECTIVE / OVERNIGHT EVENTS:    MEDICATIONS  (STANDING):  acetaminophen     Tablet .. 975 milliGRAM(s) Oral every 12 hours  amoxicillin  875 milliGRAM(s)/clavulanate 1 Tablet(s) Oral every 12 hours  ferrous    sulfate 325 milliGRAM(s) Oral daily  lidocaine   4% Patch 1 Patch Transdermal every 24 hours  naproxen 500 milliGRAM(s) Oral two times a day  polyethylene glycol 3350 17 Gram(s) Oral daily  senna 2 Tablet(s) Oral at bedtime    MEDICATIONS  (PRN):  diclofenac sodium 1% Gel 4 Gram(s) Topical <User Schedule> PRN pain        CAPILLARY BLOOD GLUCOSE        I&O's Summary    18 May 2023 07:01  -  19 May 2023 07:00  --------------------------------------------------------  IN: 240 mL / OUT: 0 mL / NET: 240 mL    19 May 2023 07:01  -  19 May 2023 12:46  --------------------------------------------------------  IN: 240 mL / OUT: 0 mL / NET: 240 mL        PHYSICAL EXAM:  GENERAL: NAD, well-developed  HEAD:  Atraumatic, Normocephalic  EYES: EOMI, PERRLA, conjunctiva and sclera clear  NECK: Supple, No JVD  CHEST/LUNG: Clear to auscultation bilaterally; No wheeze  HEART: Regular rate and rhythm; No murmurs, rubs, or gallops  ABDOMEN: Soft, Nontender, Nondistended; Bowel sounds present  EXTREMITIES:  2+ Peripheral Pulses, No clubbing, cyanosis, or edema, +left hip ttp, + sacral wound with mild bleeding   PSYCH: AAOx3  NEUROLOGY: non-focal  SKIN: No rashes or lesions    LABS:                        8.3    11.33 )-----------( 463      ( 19 May 2023 07:02 )             27.2                     RADIOLOGY & ADDITIONAL TESTS:    Imaging Personally Reviewed:    Consultant(s) Notes Reviewed:      Care Discussed with Consultants/Other Providers:

## 2023-05-19 NOTE — CONSULT NOTE ADULT - ASSESSMENT
32 yr  old with   1, Hidradenitis  recent surgery and grafting that has helped wds healed  2 Hip pain that has improved with antiinflammatory drugs  Has underlying ankylosing spondylitis no signs on exam of hip infection  rom good pain is likely elated to  sacroiliitis  3. perirectal infection  usually treated with I and d by surgery and course o antibiotics either po or IV  ( AUGMENTIN  )  no signs of systemic infection  would get surgery evaluation for the latter. Likely related to underlying hidradenitis

## 2023-05-19 NOTE — CONSULT NOTE ADULT - CONSULT REASON
History of HS. Bursitis and sacroilitis
perirectal abscess
Posterior perianal fistula
recent plastic surgery reconstruction

## 2023-05-19 NOTE — PROGRESS NOTE ADULT - NSPROGADDITIONALINFOA_GEN_ALL_CORE
D/w DONNELL Pham
D/w DONNELL Carmen
D/w ACP Zeta
D/w DONNELL Pham
D/w DONNELL Carmen
The necessity of the time spent during the encounter on this date of service was due to:   - Ordering, reviewing, and interpreting labs, testing, and imaging.  - Independently obtaining a review of systems and performing a physical exam  - Reviewing prior hospitalization and where necessary, outpatient records.  - Counselling and educating patient and family regarding interpretation of aforementioned items and plan of care.    Time-based billing (NON-critical care). Total minutes spent: 45
The necessity of the time spent during the encounter on this date of service was due to:   - Ordering, reviewing, and interpreting labs, testing, and imaging.  - Independently obtaining a review of systems and performing a physical exam  - Reviewing prior hospitalization and where necessary, outpatient records.  - Counselling and educating patient and family regarding interpretation of aforementioned items and plan of care.    Time-based billing (NON-critical care). Total minutes spent: 45

## 2023-05-19 NOTE — CONSULT NOTE ADULT - SUBJECTIVE AND OBJECTIVE BOX
Patient is a 33y old  Male who presents with a chief complaint of Anemia (18 May 2023 15:29)    HPI:  32M with a history of hidradenitis suppurativa and  with many recent admissions wound excisions and skin grafts (most recently for left axillary excision 04/2023) who presents to the hospital with 1 week of left hip pain. States the pain began shortly after discharge from the hospital in later April. Pain is described as aching and stabbing "nerve pain", occurring intermittently throughout the day. There was no preceding trauma and he has never been operated on in that region. The pain progressed such that he as barely able to stand or walk (at baseline, he can walk a bit around his house with a cane). He hasn't had any fevers, chills, SOB, CP at home. No bloody or black stools. Bowel movements are normal and regular. Urinating normally.     ED course was notable for stable vital signs, labs with baseline leukocytosis, anemic to Hb of 6.7 (baseline 14-15 in August 2022 => 7's-8's in 2023). Plain film of the hip negative for fracture. 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 (10 May 2023 20:27)      PAST MEDICAL & SURGICAL HISTORY:  Hidradenitis suppurativa      RBBB      H/O sepsis      S/P excisional debridement          Social history:    FAMILY HISTORY:  FH: hypertension (Mother)      REVIEW OF SYSTEMS  General:	Denies any malaise fatigue or chills. Fevers absent    Skin:No rash  	  Ophthalmologic:Denies any visual complaints,discharge redness or photophobia  	  ENMT:No nasal discharge,headache,sinus congestion or throat pain.No dental complaints    Respiratory and Thorax:No cough,sputum or chest pain.Denies shortness of breath  	  Cardiovascular:	No chest pain,palpitaions or dizziness    Gastrointestinal:	NO nausea,abdominal pain or diarrhea.    Genitourinary:	No dysuria,frequency. No flank pain    Musculoskeletal:	No joint swelling or pain.No weakness    Neurological:No confusion,diziness.No extremity weakness.No bladder or bowel incontinence	    Psychiatric:No delusions or hallucinations	    Hematology/Lymphatics:	No LN swelling.No gum bleeding     Endocrine:	No recent weight gain or loss.No abnormal heat/cold intolerance    Allergic/Immunologic:	No hives or rash   Allergies    No Known Allergies    Intolerances        Antimicrobials:          Vital Signs Last 24 Hrs  T(C): 36.4 (19 May 2023 04:08), Max: 37.1 (18 May 2023 19:53)  T(F): 97.5 (19 May 2023 04:08), Max: 98.7 (18 May 2023 19:53)  HR: 89 (19 May 2023 04:08) (79 - 95)  BP: 114/70 (19 May 2023 04:08) (114/70 - 134/78)  BP(mean): --  RR: 18 (19 May 2023 04:08) (18 - 18)  SpO2: 100% (19 May 2023 04:08) (100% - 100%)    Parameters below as of 19 May 2023 04:08  Patient On (Oxygen Delivery Method): room air        PHYSICAL EXAM:Pleasant patient in no acute distress.      Constitutional:Comfortable.Awake and alert  No cachexia     Eyes:PERRL EOMI.NO discharge or conjunctival injection    ENMT:No sinus tenderness.No thrush.No pharyngeal exudate or erythema.Fair dental hygiene    Neck:Supple,No LN,no JVD      Respiratory:Good air entry bilaterally,CTA    Cardiovascular:S1 S2 wnl, No murmurs,rub or gallops    Gastrointestinal:Soft BS(+) no tenderness no masses ,No rebound or guarding    Genitourinary:No CVA tendereness               Skin:No rash     Lymph Nodes:No palpable LNs    Musculoskeletal:No joint swelling or LOM     wd in back  healed                                8.3    11.33 )-----------( 463      ( 19 May 2023 07:02 )             27.2                 RECENT CULTURES:      MICROBIOLOGY:          Radiology:      Assessment:        Recommendations and Plan:    Pager [phone]  After 5 pm/weekends or if no response :[phone]

## 2023-05-20 ENCOUNTER — TRANSCRIPTION ENCOUNTER (OUTPATIENT)
Age: 33
End: 2023-05-20

## 2023-05-20 VITALS
OXYGEN SATURATION: 100 % | HEART RATE: 77 BPM | SYSTOLIC BLOOD PRESSURE: 122 MMHG | RESPIRATION RATE: 18 BRPM | DIASTOLIC BLOOD PRESSURE: 75 MMHG | TEMPERATURE: 98 F

## 2023-05-20 LAB
ANION GAP SERPL CALC-SCNC: 8 MMOL/L — SIGNIFICANT CHANGE UP (ref 5–17)
BUN SERPL-MCNC: 18 MG/DL — SIGNIFICANT CHANGE UP (ref 7–23)
CALCIUM SERPL-MCNC: 8.7 MG/DL — SIGNIFICANT CHANGE UP (ref 8.4–10.5)
CHLORIDE SERPL-SCNC: 102 MMOL/L — SIGNIFICANT CHANGE UP (ref 96–108)
CO2 SERPL-SCNC: 25 MMOL/L — SIGNIFICANT CHANGE UP (ref 22–31)
CREAT SERPL-MCNC: 0.67 MG/DL — SIGNIFICANT CHANGE UP (ref 0.5–1.3)
EGFR: 126 ML/MIN/1.73M2 — SIGNIFICANT CHANGE UP
GLUCOSE SERPL-MCNC: 92 MG/DL — SIGNIFICANT CHANGE UP (ref 70–99)
HCT VFR BLD CALC: 26.2 % — LOW (ref 39–50)
HGB BLD-MCNC: 7.8 G/DL — LOW (ref 13–17)
MAGNESIUM SERPL-MCNC: 1.8 MG/DL — SIGNIFICANT CHANGE UP (ref 1.6–2.6)
MCHC RBC-ENTMCNC: 22.9 PG — LOW (ref 27–34)
MCHC RBC-ENTMCNC: 29.8 GM/DL — LOW (ref 32–36)
MCV RBC AUTO: 76.8 FL — LOW (ref 80–100)
NRBC # BLD: 0 /100 WBCS — SIGNIFICANT CHANGE UP (ref 0–0)
PHOSPHATE SERPL-MCNC: 3 MG/DL — SIGNIFICANT CHANGE UP (ref 2.5–4.5)
PLATELET # BLD AUTO: 469 K/UL — HIGH (ref 150–400)
POTASSIUM SERPL-MCNC: 4.1 MMOL/L — SIGNIFICANT CHANGE UP (ref 3.5–5.3)
POTASSIUM SERPL-SCNC: 4.1 MMOL/L — SIGNIFICANT CHANGE UP (ref 3.5–5.3)
RBC # BLD: 3.41 M/UL — LOW (ref 4.2–5.8)
RBC # FLD: 19.3 % — HIGH (ref 10.3–14.5)
SODIUM SERPL-SCNC: 135 MMOL/L — SIGNIFICANT CHANGE UP (ref 135–145)
WBC # BLD: 11.51 K/UL — HIGH (ref 3.8–10.5)
WBC # FLD AUTO: 11.51 K/UL — HIGH (ref 3.8–10.5)

## 2023-05-20 PROCEDURE — A9585: CPT

## 2023-05-20 PROCEDURE — 85027 COMPLETE CBC AUTOMATED: CPT

## 2023-05-20 PROCEDURE — 83615 LACTATE (LD) (LDH) ENZYME: CPT

## 2023-05-20 PROCEDURE — 82728 ASSAY OF FERRITIN: CPT

## 2023-05-20 PROCEDURE — 81001 URINALYSIS AUTO W/SCOPE: CPT

## 2023-05-20 PROCEDURE — 80145 DRUG ASSAY ADALIMUMAB: CPT

## 2023-05-20 PROCEDURE — 82746 ASSAY OF FOLIC ACID SERUM: CPT

## 2023-05-20 PROCEDURE — 86901 BLOOD TYPING SEROLOGIC RH(D): CPT

## 2023-05-20 PROCEDURE — 86480 TB TEST CELL IMMUN MEASURE: CPT

## 2023-05-20 PROCEDURE — 82947 ASSAY GLUCOSE BLOOD QUANT: CPT

## 2023-05-20 PROCEDURE — 85610 PROTHROMBIN TIME: CPT

## 2023-05-20 PROCEDURE — 84295 ASSAY OF SERUM SODIUM: CPT

## 2023-05-20 PROCEDURE — P9016: CPT

## 2023-05-20 PROCEDURE — 81374 HLA I TYPING 1 ANTIGEN LR: CPT

## 2023-05-20 PROCEDURE — 74177 CT ABD & PELVIS W/CONTRAST: CPT | Mod: MA

## 2023-05-20 PROCEDURE — 82330 ASSAY OF CALCIUM: CPT

## 2023-05-20 PROCEDURE — 84132 ASSAY OF SERUM POTASSIUM: CPT

## 2023-05-20 PROCEDURE — 72195 MRI PELVIS W/O DYE: CPT

## 2023-05-20 PROCEDURE — 99232 SBSQ HOSP IP/OBS MODERATE 35: CPT

## 2023-05-20 PROCEDURE — 82542 COL CHROMOTOGRAPHY QUAL/QUAN: CPT

## 2023-05-20 PROCEDURE — 83605 ASSAY OF LACTIC ACID: CPT

## 2023-05-20 PROCEDURE — 86923 COMPATIBILITY TEST ELECTRIC: CPT

## 2023-05-20 PROCEDURE — 73502 X-RAY EXAM HIP UNI 2-3 VIEWS: CPT

## 2023-05-20 PROCEDURE — 86900 BLOOD TYPING SEROLOGIC ABO: CPT

## 2023-05-20 PROCEDURE — 99285 EMERGENCY DEPT VISIT HI MDM: CPT

## 2023-05-20 PROCEDURE — 86850 RBC ANTIBODY SCREEN: CPT

## 2023-05-20 PROCEDURE — 82803 BLOOD GASES ANY COMBINATION: CPT

## 2023-05-20 PROCEDURE — 83540 ASSAY OF IRON: CPT

## 2023-05-20 PROCEDURE — 85025 COMPLETE CBC W/AUTO DIFF WBC: CPT

## 2023-05-20 PROCEDURE — 86704 HEP B CORE ANTIBODY TOTAL: CPT

## 2023-05-20 PROCEDURE — 85045 AUTOMATED RETICULOCYTE COUNT: CPT

## 2023-05-20 PROCEDURE — 83735 ASSAY OF MAGNESIUM: CPT

## 2023-05-20 PROCEDURE — 96375 TX/PRO/DX INJ NEW DRUG ADDON: CPT

## 2023-05-20 PROCEDURE — 83010 ASSAY OF HAPTOGLOBIN QUANT: CPT

## 2023-05-20 PROCEDURE — 73723 MRI JOINT LWR EXTR W/O&W/DYE: CPT

## 2023-05-20 PROCEDURE — 80074 ACUTE HEPATITIS PANEL: CPT

## 2023-05-20 PROCEDURE — 87086 URINE CULTURE/COLONY COUNT: CPT

## 2023-05-20 PROCEDURE — 96374 THER/PROPH/DIAG INJ IV PUSH: CPT

## 2023-05-20 PROCEDURE — 72197 MRI PELVIS W/O & W/DYE: CPT

## 2023-05-20 PROCEDURE — 85018 HEMOGLOBIN: CPT

## 2023-05-20 PROCEDURE — 97116 GAIT TRAINING THERAPY: CPT

## 2023-05-20 PROCEDURE — 82607 VITAMIN B-12: CPT

## 2023-05-20 PROCEDURE — 82435 ASSAY OF BLOOD CHLORIDE: CPT

## 2023-05-20 PROCEDURE — 87040 BLOOD CULTURE FOR BACTERIA: CPT

## 2023-05-20 PROCEDURE — 82565 ASSAY OF CREATININE: CPT

## 2023-05-20 PROCEDURE — 36430 TRANSFUSION BLD/BLD COMPNT: CPT

## 2023-05-20 PROCEDURE — 83550 IRON BINDING TEST: CPT

## 2023-05-20 PROCEDURE — 84100 ASSAY OF PHOSPHORUS: CPT

## 2023-05-20 PROCEDURE — 85730 THROMBOPLASTIN TIME PARTIAL: CPT

## 2023-05-20 PROCEDURE — 80048 BASIC METABOLIC PNL TOTAL CA: CPT

## 2023-05-20 PROCEDURE — 80053 COMPREHEN METABOLIC PANEL: CPT

## 2023-05-20 PROCEDURE — 82272 OCCULT BLD FECES 1-3 TESTS: CPT

## 2023-05-20 PROCEDURE — 36415 COLL VENOUS BLD VENIPUNCTURE: CPT

## 2023-05-20 PROCEDURE — 97161 PT EVAL LOW COMPLEX 20 MIN: CPT

## 2023-05-20 PROCEDURE — 97530 THERAPEUTIC ACTIVITIES: CPT

## 2023-05-20 PROCEDURE — 85014 HEMATOCRIT: CPT

## 2023-05-20 RX ORDER — L.ACIDOPH/B.ANIMALIS/B.LONGUM 15B CELL
1 CAPSULE ORAL
Qty: 7 | Refills: 0
Start: 2023-05-20 | End: 2023-05-26

## 2023-05-20 RX ORDER — FERROUS SULFATE 325(65) MG
1 TABLET ORAL
Qty: 30 | Refills: 0
Start: 2023-05-20 | End: 2023-06-18

## 2023-05-20 RX ORDER — DICLOFENAC SODIUM 30 MG/G
1 GEL TOPICAL
Qty: 30 | Refills: 0
Start: 2023-05-20 | End: 2023-05-29

## 2023-05-20 RX ORDER — LIDOCAINE 4 G/100G
1 CREAM TOPICAL
Qty: 3 | Refills: 0
Start: 2023-05-20 | End: 2023-06-03

## 2023-05-20 RX ORDER — POLYETHYLENE GLYCOL 3350 17 G/17G
17 POWDER, FOR SOLUTION ORAL
Qty: 255 | Refills: 0
Start: 2023-05-20 | End: 2023-06-03

## 2023-05-20 RX ORDER — SENNA PLUS 8.6 MG/1
2 TABLET ORAL
Qty: 60 | Refills: 0
Start: 2023-05-20 | End: 2023-06-18

## 2023-05-20 RX ORDER — ACETAMINOPHEN 500 MG
3 TABLET ORAL
Qty: 0 | Refills: 0 | DISCHARGE
Start: 2023-05-20

## 2023-05-20 RX ADMIN — Medication 500 MILLIGRAM(S): at 05:30

## 2023-05-20 RX ADMIN — Medication 1 TABLET(S): at 05:30

## 2023-05-20 RX ADMIN — Medication 325 MILLIGRAM(S): at 12:03

## 2023-05-20 RX ADMIN — Medication 975 MILLIGRAM(S): at 05:30

## 2023-05-20 RX ADMIN — Medication 500 MILLIGRAM(S): at 06:35

## 2023-05-20 RX ADMIN — Medication 975 MILLIGRAM(S): at 06:35

## 2023-05-20 RX ADMIN — POLYETHYLENE GLYCOL 3350 17 GRAM(S): 17 POWDER, FOR SOLUTION ORAL at 12:03

## 2023-05-20 NOTE — DISCHARGE NOTE PROVIDER - NSDCFUADDAPPT_GEN_ALL_CORE_FT
Follow up with plastic surgery outpatient.  -f/u with Dr. Rosas, Rheumatology outpatient for management of likely spondyloarthropathy.    APPTS ARE READY TO BE MADE: [ ] YES    Best Family or Patient Contact (if needed):    Additional Information about above appointments (if needed):    1:   2:   3:     Other comments or requests:    Follow up with plastic surgery outpatient.  -f/u with Dr. Rosas, Rheumatology outpatient for management of likely spondyloarthropathy.    APPTS ARE READY TO BE MADE: [ ] YES    Best Family or Patient Contact (if needed): Pt    Additional Information about above appointments (if needed):    1:   2:   3:     Other comments or requests:

## 2023-05-20 NOTE — PROGRESS NOTE ADULT - SUBJECTIVE AND OBJECTIVE BOX
Patient is a 33y old  Male who presents with a chief complaint of Anemia (19 May 2023 11:41)      SUBJECTIVE / OVERNIGHT EVENTS: no complaints ,appears comfortable    Vital Signs Last 24 Hrs  T(C): 36.7 (20 May 2023 12:06), Max: 36.7 (19 May 2023 12:33)  T(F): 98 (20 May 2023 12:06), Max: 98.1 (19 May 2023 19:35)  HR: 77 (20 May 2023 12:06) (77 - 102)  BP: 122/75 (20 May 2023 12:06) (111/80 - 125/72)  BP(mean): --  RR: 18 (20 May 2023 12:06) (18 - 18)  SpO2: 100% (20 May 2023 12:06) (100% - 100%)    Parameters below as of 20 May 2023 12:06  Patient On (Oxygen Delivery Method): room air      PHYSICAL EXAM:  CONSTITUTIONAL: NAD, well-developed, well-groomed  EYES: conjunctiva and sclera clear  ENMT: normal  RESPIRATORY: normal respiratory effort; lungs are clear to auscultation bilaterally  CARDIOVASCULAR: S1S2, RRR  ABDOMEN: +BS, NTND  PSYCH: affect appropriate  NEUROLOGY: grossly normal  SKIN: no jaundice      LABS:                                   7.8    11.51 )-----------( 469      ( 20 May 2023 07:07 )             26.2     05-20    135  |  102  |  18  ----------------------------<  92  4.1   |  25  |  0.67    Ca    8.7      20 May 2023 07:08  Phos  3.0     05-20  Mg     1.8     05-20      RADIOLOGY & ADDITIONAL TESTS:    Imaging Personally Reviewed:    Consultant(s) Notes Reviewed:      Care Discussed with Consultants/Other Providers:

## 2023-05-20 NOTE — DISCHARGE NOTE NURSING/CASE MANAGEMENT/SOCIAL WORK - NSDCPEFALRISK_GEN_ALL_CORE
For information on Fall & Injury Prevention, visit: [email or web address] OR  [email or web address] OR  https://www.cdc.gov/steadi/patient.html

## 2023-05-20 NOTE — PROGRESS NOTE ADULT - PROBLEM SELECTOR PLAN 4
- Chronic, stable  - plastics follow up   - normally changed daily, has visiting nurse MWF  - monitor
Chronic, stable  - wound care nursing and MD team consulted for L axillary dressing changes (normally changed daily, has visiting nurse MWCARTER)  - monitor
Chronic, stable  - wound care nursing and MD team consulted for L axillary dressing changes (normally changed daily, has visiting nurse MWCARTER)  - monitor
- Chronic, stable  - wound care nursing and MD team consulted for L axillary dressing changes (normally changed daily, has visiting nurse MWCARTER)  - monitor
Chronic, stable  - wound care nursing and MD team consulted for L axillary dressing changes (normally changed daily, has visiting nurse MWCARTER)  - monitor
Chronic, stable  - wound care nursing and MD team consulted for L axillary dressing changes (normally changed daily, has visiting nurse MWCARTER)  - monitor
- Chronic, stable  - plastics follow up   - normally changed daily, has visiting nurse MWF  - monitor
- Chronic, stable  - wound care nursing and MD team consulted for L axillary dressing changes (normally changed daily, has visiting nurse MWCARTER)  - monitor
- Chronic, stable  - wound care nursing and MD team consulted for L axillary dressing changes (normally changed daily, has visiting nurse MWCARTER)  - monitor
Chronic, stable  - wound care nursing and MD team consulted for L axillary dressing changes (normally changed daily, has visiting nurse MWCARTER)  - monitor

## 2023-05-20 NOTE — PROGRESS NOTE ADULT - PROBLEM SELECTOR PLAN 5
Diet: regular  Dispo: outpt PT    Pending sx follow up, if no intervention or if I&D dc planning after on po abx as above  ______________  Eriberto Omalley MD  [facility]  [phone]
DVT ppx: SCDs  Diet: regular  Dispo: outpt PT
DVT ppx: SCDs  Diet: regular  Dispo: outpt PT
DVT ppx: SCDs  Diet: regular  Dispo: pending PT and pain control.
DVT ppx: SCDs, if Hb stable consider enoxaparin as he's immobile  Diet: regular  Dispo: pending PT and pain control  Code status: full
DVT ppx: SCDs, if Hb stable consider enoxaparin as he's immobile  Diet: regular  Dispo: pending PT and pain control and MRI  Code status: full
DVT ppx: SCDs, if Hb stable consider enoxaparin as he's immobile  Diet: regular  Dispo: pending PT and pain control.    Recommend calling pt's PCP Dr. Gregg Reina on monday for warm handoff and need for OP rheum eval
DVT ppx: SCDs  Diet: regular  Dispo: outpt PT
DVT ppx: SCDs  Diet: regular  Dispo: pending PT and pain control.
Diet: regular  Dispo: outpt PT    Pending sx follow up, if no intervention or if I&D dc planning after on po abx as above

## 2023-05-20 NOTE — PROGRESS NOTE ADULT - PROVIDER SPECIALTY LIST ADULT
Hospitalist
Rheumatology
Hospitalist

## 2023-05-20 NOTE — PROGRESS NOTE ADULT - ASSESSMENT
32M with a history of hidradenitis suppurativa and  with many recent admissions wound excisions and skin grafts (most recently for left axillary excision 04/2023) who presents to the hospital with 1 week of left hip pain found to have L sacroiliitis and L iliopsoas bursitis on MRI, also incidentally found to have iron deficiency anemia and L perianal fistula. No hx of IBD, but likely warrants some rheum/autoimmune evaluation which can be done as outpatient. 
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 Rosas, Rheumatology outpatient for management of likely spondyloarthropathy     Discussed with Dr. Rosas, Attending    Rheumatology signing off; call back with questions    Julio César Olivia MD  Rheumatology Fellow, PGY-5  Available on TEAMS    
32M with a history of hidradenitis suppurativa and  with many recent admissions wound excisions and skin grafts (most recently for left axillary excision 04/2023) who presents to the hospital with 1 week of left hip pain found to have L sacroiliitis and L iliopsoas bursitis on MRI, also incidentally found to have iron deficiency anemia and L perianal fistula. No hx of IBD, but likely warrants some rheum/autoimmune evaluation which can be done as outpatient. 
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  
32M with a history of hidradenitis suppurativa and  with many recent admissions wound excisions and skin grafts (most recently for left axillary excision 04/2023) who presents to the hospital with 1 week of left hip pain found to have L sacroiliitis and L iliopsoas bursitis on MRI, also incidentally found to have iron deficiency anemia and L perianal fistula. No hx of IBD, but likely warrants some rheum/autoimmune evaluation
32M with a history of hidradenitis suppurativa and  with many recent admissions wound excisions and skin grafts (most recently for left axillary excision 04/2023) who presents to the hospital with 1 week of left hip pain found to have L sacroiliitis and L iliopsoas bursitis on MRI, also incidentally found to have iron deficiency anemia and L perianal fistula. No hx of IBD, but likely warrants some rheum/autoimmune evaluation
32M with a history of hidradenitis suppurativa and  with many recent admissions wound excisions and skin grafts (most recently for left axillary excision 04/2023) who presents to the hospital with 1 week of left hip pain of uncertain etiology, and incidentally found to have anemia of uncertain etiology.
32M with a history of hidradenitis suppurativa and  with many recent admissions wound excisions and skin grafts (most recently for left axillary excision 04/2023) who presents to the hospital with 1 week of left hip pain found to have L sacroiliitis and L iliopsoas bursitis on MRI, also incidentally found to have iron deficiency anemia and L perianal fistula. No hx of IBD, but likely warrants some rheum/autoimmune evaluation which can be done as outpatient. 
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  
32M with a history of hidradenitis suppurativa and  with many recent admissions wound excisions and skin grafts (most recently for left axillary excision 04/2023) who presents to the hospital with 1 week of left hip pain found to have L sacroiliitis and L iliopsoas bursitis on MRI, also incidentally found to have iron deficiency anemia and L perianal fistula. No hx of IBD, but likely warrants some rheum/autoimmune evaluation
32M with a history of hidradenitis suppurativa and  with many recent admissions wound excisions and skin grafts (most recently for left axillary excision 04/2023) who presents to the hospital with 1 week of left hip pain of uncertain etiology, and incidentally found to have anemia of uncertain etiology.

## 2023-05-20 NOTE — PROGRESS NOTE ADULT - PROBLEM SELECTOR PROBLEM 1
Hip pain, left

## 2023-05-20 NOTE — DISCHARGE NOTE PROVIDER - HOSPITAL COURSE
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 as well as subarticular edema on the left SI joint and non-specific bursitis in left iliopsoas/trochanteric bursa, found to have sacroiliitis/iliopsoas bursitis on MRI hip   - MRI bony pelvis requested by rheum showing sacroiliitis, Right perirectal abscess, Left mons pubis abscess  - D/w ID, started augmentin for total of 5 days till 5/23, surgery was consulted for abscesses ?I&D   - Rheum consulted ,ordered HLA B-27,quant negative , hep panel negative    - pain control with tylenol 975mg q12, naproxen 500mg BID, diclofenac gel and lidocaine patch  - folate, b12- WNL,iron sucrose 200mg iv q 3 days for anemia of chronic disease and c/w ferrous sulfate.   WBC elevated to low teens, which is chronic and previously was seen by hematology for this but was lost to follow up- white count is still elevated  - Seen by hematology - on flow, had a population large granular lymphocytic cells without e/o LGL leukemia, and adalimumab can increase incidence of such heme disorders; was supposed to return to heme but was lost to follow up, ensured patient returns to hematology as an outpatient.  Previous SPEP reveals polyclonal gammaglobulins, likely reactive from chronic hidradenitis and could be related to high white count  Patient has Hidradenitis suppurativa. Seen by plastic surgery, has visiting nurse MWF      Medically cleared for discharge by medical attending with follow up as advised.   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 I&D. No Inpatient surgical intervention per Surgery.    - Rheum consulted ,ordered HLA B-27,quant negative , hep panel negative    - pain control with tylenol 975mg q12, naproxen 500mg BID, diclofenac gel and lidocaine patch  - folate, b12- WNL,iron sucrose 200mg iv q 3 days for anemia of chronic disease and c/w ferrous sulfate.   WBC elevated to low teens, which is chronic and previously was seen by hematology for this but was lost to follow up- white count is still elevated  - Seen by hematology - on flow, had a population large granular lymphocytic cells without e/o LGL leukemia, and adalimumab can increase incidence of such heme disorders; was supposed to return to heme but was lost to follow up, ensured patient returns to hematology as an outpatient.  Previous SPEP reveals polyclonal gammaglobulins, likely reactive from chronic hidradenitis and could be related to high white count  Patient has Hidradenitis suppurativa. Seen by plastic surgery, has visiting nurse MWF. To follow up with Plastic Surgery on 5/25.       Medically cleared for discharge by medical attending with follow up as advised.

## 2023-05-20 NOTE — DISCHARGE NOTE PROVIDER - CARE PROVIDERS DIRECT ADDRESSES
[email or web address]_Unknown,[email or web address]

## 2023-05-20 NOTE — DISCHARGE NOTE NURSING/CASE MANAGEMENT/SOCIAL WORK - NSDCFUADDAPPT_GEN_ALL_CORE_FT
Follow up with plastic surgery outpatient.  -f/u with Dr. Rosas, Rheumatology outpatient for management of likely spondyloarthropathy.    APPTS ARE READY TO BE MADE: [ ] YES    Best Family or Patient Contact (if needed):    Additional Information about above appointments (if needed):    1:   2:   3:     Other comments or requests:

## 2023-05-20 NOTE — DISCHARGE NOTE PROVIDER - NSDCCPCAREPLAN_GEN_ALL_CORE_FT
PRINCIPAL DISCHARGE DIAGNOSIS  Diagnosis: Hip pain, left  Assessment and Plan of Treatment: - found to have sacroiliitis/iliopsoas bursitis on MRI hip   - MRI bony pelvis requested by rheum showing sacroiliitis, Right perirectal abscess, Left mons pubis abscess  - D/w ID, start augmentin for total of 5 days till 5/23  - Surgery was consulted   - pain control with tylenol 975mg q12, naproxen 500mg BID, diclofenac gel and lidocaine patch  -Continue on augmentin till 5/23 with outpt follow up.  - apply silver nitrate to open area  - local wound care  - patient to follow up with Dr. Tariq in office        SECONDARY DISCHARGE DIAGNOSES  Diagnosis: Anemia of chronic disease  Assessment and Plan of Treatment: Symptoms to report, bleeding, palpitations, fatigue, pale skin, cold skin, dizziness. Take medications as ordered by PCP.  Continue with Ferrous sulphate as advised.     PRINCIPAL DISCHARGE DIAGNOSIS  Diagnosis: Hip pain, left  Assessment and Plan of Treatment: - found to have sacroiliitis/iliopsoas bursitis on MRI hip   - MRI bony pelvis requested by rheum showing sacroiliitis, Right perirectal abscess, Left mons pubis abscess  - D/w ID, start augmentin for total of 5 days till 5/23  - Surgery was consulted   - pain control with tylenol 975mg q12, naproxen 500mg BID, diclofenac gel and lidocaine patch  -Continue on augmentin till 5/23 with outpt follow up.  - apply silver nitrate to open area  - local wound care  - patient to follow up with Dr. Tariq in office        SECONDARY DISCHARGE DIAGNOSES  Diagnosis: Hidradenitis suppurativa  Assessment and Plan of Treatment: Stable   - Dressing changed daily - has [facility] Nurse MWF.  -  Keep your PlasticSurgery  follow up on 5/25 as instructed.    Diagnosis: Anemia of chronic disease  Assessment and Plan of Treatment: Symptoms to report, bleeding, palpitations, fatigue, pale skin, cold skin, dizziness.   Take medications as prescribed.   Continue with Ferrous sulphate as advised.

## 2023-05-20 NOTE — PROGRESS NOTE ADULT - PROBLEM SELECTOR PLAN 1
Subacute hip pain, found to have sacroiliitis/iliopsoas bursitis on MRI  - unclear etiology to these findings as the pain woke patient from sleep and there was no repetitive movements/injury/trauma  - would consider rheumatologic/autoimmune etiology given pt's recalcitrant hidradenitis and perianal fistula- crohn's disease is a fistulizing IBD that is associated with extraintestinal arthropathies however pt has no hx of diarrhea/IBD  - can check fecal calprotectin  - if pain not amenable to toradol and if pt unable to work with PT, would consult rheum for possible joint injection and further diagnostic/management recs
- Subacute hip pain, found to have sacroiliitis/iliopsoas bursitis on MRI hip   - MRI bony pelvis requested by rheum showing sacroiliitis, Right perirectal abscess, Left mons pubis abscess  - D/w ID, start augmentin for total of 5 days till 5/23  - Peding sx follow up regarding above recs for abscesses ?I&D   - rheum eval appreciated   - follow up HLA B-27  - quant negative   - hep panel negative    - pain control with tylenol 975mg q12, naproxen 500mg BID, diclofenac gel and lidocaine patch  - If no surgical intervention or if I&D, dc pt on augmentin till 5/23 with outpt follow up
- Subacute hip pain, found to have sacroiliitis/iliopsoas bursitis on MRI  - no indication for antibiotics at this point given inflammatory nature  - rheum eval appreciated   - pending MRI pelvis   - follow up HLA B-27, Quant  - hep panel negative    - pain control with tylenol 975mg q12, naproxen 500mg BID, diclofenac gel and lidocaine patch
Subacute hip pain, found to have sacroiliitis/iliopsoas bursitis on MRI  - no indication for antibiotics at this point given inflammatory nature  - add diclofenac gel and lidocaine patch for pain management  - PT pending  - rheum consult for possible injection/further workup
Subacute hip pain, unclear etiology, worsening  Sounds like MSK pain, perhaps a strain from being relatively immobile; less likely OM given non-toxic appearing, no fevers, WBC count at his baseline  - will order MR of left hip for further evaluation (can also evaluate whether perianal fistula with lumbosacral MR)
Subacute hip pain, unclear etiology, worsening  Sounds like MSK pain, perhaps a strain from being relatively immobile; less likely OM given non-toxic appearing, no fevers, WBC count at his baseline  - will order MR of left hip for further evaluation (can also evaluate whether perianal fistula with lumbosacral MR)- planned for 5/12
- Subacute hip pain, found to have sacroiliitis/iliopsoas bursitis on MRI hip   - MRI bony pelvis requested by rheum showing sacroiliitis, Right perirectal abscess, Left mons pubis abscess  - D/w ID, start augmentin for total of 5 days till 5/23  - Peding sx follow up regarding above recs for abscesses ?I&D   - rheum eval appreciated   - follow up HLA B-27  - quant negative   - hep panel negative    - pain control with tylenol 975mg q12, naproxen 500mg BID, diclofenac gel and lidocaine patch  - If no surgical intervention or if I&D, dc pt on augmentin till 5/23 with outpt follow up
- Subacute hip pain, found to have sacroiliitis/iliopsoas bursitis on MRI  - no indication for antibiotics at this point given inflammatory nature  - rheum eval appreciated   - pending MRI pelvis   - follow up HLA B-27  - quant negative   - hep panel negative    - pain control with tylenol 975mg q12, naproxen 500mg BID, diclofenac gel and lidocaine patch
Subacute hip pain, found to have sacroiliitis/iliopsoas bursitis on MRI  - no indication for antibiotics at this point given inflammatory nature  - add diclofenac gel and lidocaine patch for pain management  - PT pending  - rheum consult for possible injection/further workup
- Subacute hip pain, found to have sacroiliitis/iliopsoas bursitis on MRI  - no indication for antibiotics at this point given inflammatory nature  - rheum salomón appreciated will discuss with radiology regarding unilateral vs bilateral sacroiliitis  - follow up HLA B-27, Hep panel, Quant   - pain control with tylenol 975mg q12, naproxen 500mg BID, diclofenac gel and lidocaine patch

## 2023-05-20 NOTE — DISCHARGE NOTE PROVIDER - PROVIDER TOKENS
PROVIDER:[TOKEN:[[id number]]],PROVIDER:[TOKEN:[[id number]]] PROVIDER:[TOKEN:[[id number]],SCHEDULEDAPPT:[05/25/2023],SCHEDULEDAPPTTIME:[10:30 PM],ESTABLISHEDPATIENT:[T]],PROVIDER:[TOKEN:[[id number]]],PROVIDER:[TOKEN:[[id number]],FOLLOWUP:[1 week]]

## 2023-05-20 NOTE — DISCHARGE NOTE PROVIDER - NSDCMRMEDTOKEN_GEN_ALL_CORE_FT
medical cannabis:   Rolling walker: Use as directed. JESSICA 99, ICD10: M70.72   acetaminophen 325 mg oral tablet: 3 tab(s) orally every 12 hours  amoxicillin-clavulanate 875 mg-125 mg oral tablet: 1 tab(s) orally every 12 hours  diclofenac 1% topical gel: Apply topically to affected area every 8 hours  ferrous sulfate 325 mg (65 mg elemental iron) oral tablet: 1 tab(s) orally once a day  medical cannabis:   naproxen 500 mg oral tablet: 1 tab(s) orally 2 times a day as needed for  moderate pain  polyethylene glycol 3350 oral powder for reconstitution: 17 gram(s) orally once a day as needed for  constipation  Probiotic 10 Ultra Strength oral capsule: 1 cap(s) orally once a day  Rolling walker: Use as directed. JESSICA 99, ICD10: M70.72  Salonpas Flex Patch 4% topical film: Apply topically to affected area once a day  senna leaf extract oral tablet: 2 tab(s) orally once a day (at bedtime)

## 2023-05-20 NOTE — DISCHARGE NOTE PROVIDER - NSDCFUSCHEDAPPT_GEN_ALL_CORE_FT
Margarito Tariq  [facility] Physician [facility]  [address]  Scheduled Appointment: 05/25/2023    [facility]  [address]  Scheduled Appointment: 05/25/2023    Edgar Villareal  [facility] PreAdmits  Scheduled Appointment: 05/25/2023

## 2023-05-20 NOTE — DISCHARGE NOTE NURSING/CASE MANAGEMENT/SOCIAL WORK - PATIENT PORTAL LINK FT
You can access the FollowMyHealth Patient Portal offered by [facility] by registering at the following website: [email or web address]. By joining [facility]’s FollowMyHealth portal, you will also be able to view your health information using other applications (apps) compatible with our system.

## 2023-05-20 NOTE — CHART NOTE - NSCHARTNOTESELECT_GEN_ALL_CORE
Plastic Surgery/Event Note
Wound Care Team Note:/Event Note
Off Service Note
Rheum Chart Note/Event Note
Rolling Walker
Wound Care Team Note:/Event Note
wound surgery/Event Note

## 2023-05-20 NOTE — PROGRESS NOTE ADULT - PROBLEM SELECTOR PLAN 2
Likely 2/2 anemia of chronic disease  - folate, b12- WNL  - iron sucrose 200mg iv q 3 days  - transition eventually to PO iron + vit C on DC
- Likely 2/2 anemia of chronic disease  - folate, b12- WNL  - iron sucrose 200mg iv q 3 days  - transition eventually to PO iron + vit C on DC
Hb 15-16 => 7-8 => 6.7  Likely mostly multifactorial - AoCD, also has a history of folate deficiency, labs here with severe iron deficiency  Previously seen by hematology in 2017 when Hb was 10's - at the time b12 wnl, folate  low normal, was recommended he start folate supplements; hb electrophoresis was normal, too  - f/u folate, b12- WNL  - pt with severe [place]- unclear etiology, will treat with iron sucrose 200mg iv q 3 days  - transition eventually to PO iron + vit C
- Likely 2/2 anemia of chronic disease  - folate, b12- WNL  - iron sucrose 200mg iv q 3 days  - transition eventually to PO iron + vit C on DC
Hb 15-16 => 7-8 => 6.7  Likely mostly multifactorial - AoCD, also has a history of folate deficiency, labs here with severe iron deficiency  Previously seen by hematology in 2017 when Hb was 10's - at the time b12 wnl, folate  low normal, was recommended he start folate supplements; hb electrophoresis was normal, too  - f/u folate, b12- WNL  - pt with severe [place]- unclear etiology, will treat with iron sucrose 200mg iv q 3 days  - transition eventually to PO iron + vit C
- Likely 2/2 anemia of chronic disease  - folate, b12- WNL  - iron sucrose 200mg iv q 3 days  - c/w ferrous sulfate
Likely 2/2 anemia of chronic disease  - folate, b12- WNL  - iron sucrose 200mg iv q 3 days  - transition eventually to PO iron + vit C on DC
- Likely 2/2 anemia of chronic disease  - folate, b12- WNL  - iron sucrose 200mg iv q 3 days  - c/w ferrous sulfate
- Likely 2/2 anemia of chronic disease  - folate, b12- WNL  - iron sucrose 200mg iv q 3 days  - transition eventually to PO iron + vit C on DC
Hb 15-16 => 7-8 => 6.7 today  Likely mostly multifactorial - AoCD, also has a history of folate deficiency, labs here with severe iron deficiency  Previously seen by hematology in 2017 when Hb was 10's - at the time b12 wnl, folate  low normal, was recommended he start folate supplements; hb electrophoresis was normal, too  - f/u folate, b12- WNL  - consider BM biopsy if anemia remains unexplained despite above work up

## 2023-05-20 NOTE — DISCHARGE NOTE PROVIDER - CARE PROVIDER_API CALL
Margarito Tariq)  Surgery  PlasticReconstruct  [address], [address]  [place] [address]  Phone: [phone]  Fax: [phone]  Follow Up Time:     Juan Rosas)  Internal Medicine; Rheumatology  [address]  [place] [address]  Phone: [phone]  Fax: [phone]  Follow Up Time:    Margarito Tariq)  Surgery  PlasticReconstruct  [address], [address]  [place] [address]  Phone: [phone]  Fax: [phone]  Established Patient  Scheduled Appointment: 05/25/2023 10:30 PM    Juan Rosas)  Internal Medicine; Rheumatology  [address], [address]  [place] [address]  Phone: [phone]  Fax: [phone]  Follow Up Time:     Gregg Lincoln)  Internal Medicine  [address], [address]  [place] [address]  Phone: [phone]  Fax: [phone]  Follow Up Time: 1 week

## 2023-05-20 NOTE — PROGRESS NOTE ADULT - PROBLEM SELECTOR PROBLEM 2
Anemia of chronic disease

## 2023-05-20 NOTE — CHART NOTE - NSCHARTNOTEFT_GEN_A_CORE
Assessment:  32M with a history of hidradenitis suppurativa and  with many recent admissions wound excisions and skin grafts (most recently for left axillary excision 04/2023 and perianal/buttock excision 03/3023) who presents to the hospital with 1 week of left hip pain found to have L sacroiliitis and L iliopsoas bursitis on MRI, also incidentally found to have iron deficiency anemia and L perianal fistula. Surgery consulted for outpatient abx vs. I&D inpatient    Plan:  - MRI reviewed, 2x2cm abscess  - No fluctuance or superficial abscess noted on physical exam  - No acute surgical intervention  - Recommend outpatient antibiotic plan per ID  - local care per plastics  - global care per primary team  - surgery to sign off, please call back for concerns    [facility]  [id number]      < from: MR Pelvis Bony Only w/wo IV Cont (05.18.23 @ 11:16) >    IMPRESSION:  1.  Slight right and mild left sacroiliitis is again seen with mild   improvement of the left-sided synovitis. Osseous involvement is similar   to the prior. Changes may be inflammatory or infectious in nature. There   are no sizable joint effusions.  2.  Right perirectal abscess is again seen.  3.  Left mons pubis abscess now appears to be present measuring 2.8 x 1.8   x 1.5 cm.  4.  Enhancing posterior subcutaneous edema with right-sided predominance   suggests cellulitis or other process.    < end of copied text >        Vital Signs Last 24 Hrs  T(C): 36.7 (20 May 2023 12:06), Max: 36.7 (19 May 2023 12:33)  T(F): 98 (20 May 2023 12:06), Max: 98.1 (19 May 2023 19:35)  HR: 77 (20 May 2023 12:06) (77 - 102)  BP: 122/75 (20 May 2023 12:06) (111/80 - 125/72)  BP(mean): --  RR: 18 (20 May 2023 12:06) (18 - 18)  SpO2: 100% (20 May 2023 12:06) (100% - 100%)    Parameters below as of 20 May 2023 12:06  Patient On (Oxygen Delivery Method): room air
Discussed MR pelvis results with radiology. From MR pelvis findings for b/l sacroiliitis noted with findings L>R. However, MR pelvis protocol maximized for scanning of for the perianal fistula. After discussion will order MR bony pelvis to evaluate more completely for sacroiliitis as well as to help monitor progression as an outpatient while on therapy    Tha Michael MD, PGY-4  Rheumatology Fellow  Reachable on TEAMS
Patient requires a rolling walker for d/c to home due to diagnosis of : ileopsoas bursitis / sacroilitis
Wound Care Team Note:    Request for wound care follow up received from nursing. Patient is s/p debridement with Plastic surgery on 5/11/23. Please refer any questions or concerns to plastic surgery. Discuss same with clinical nurse.    Elaine Stephens NP-C, CWOCN via TEAMS
consulted to see pt w/ Axillary wound,  pt s/p debridement 4/27 by PRS.  d/w PRS & team who is agreeable to defer to PRS.  remain available as requested.
32M with a history of hidradenitis suppurativa and  with many recent admissions wound excisions and skin grafts (most recently for left axillary excision 04/2023 and perianal/buttock excision 03/3023) who presents to the hospital with 1 week of left hip pain found to have L sacroiliitis and L iliopsoas bursitis on MRI, also incidentally found to have iron deficiency anemia and L perianal fistula. Plastic surgery was re-consulted for bleeding from buttock wound.    Patient reports that he noticed bleeding from buttock area while changing the dressing this AM. Denies pain or any drainage before this morning. Denies fevers, chills, nausea, vomiting. Had a BM yesterday evening.    Physical Exam:  General: NAD, Lying in bed comfortably  Neuro: Alert and answering questions appropriately   HEENT: Grossly normal, EOMI  Cardio: No peripheral edema  Resp: Good effort, no signs of respiratory distress  Buttock/Perianal: well healed surgical site, superior midline nodule of raw, granulomatous tissue w/ no drainage, erythema, tenderness or bleeding, no expressible drainage or evidence of fistula, multiple perianal raw areas     Plan:  - will apply silver nitrate to open area  - local wound care  - patient to follow up with Dr. Tariq in office      Elaine Melgar, PGY-1  [facility]   [phone]
Wound Care Team Note:    Request for wound care follow up received from nursing. Patient is s/p debridement with Plastic surgery on 5/11/23 and seen for follow up 5/18.23. Please refer any questions or concerns to plastic surgery. Discussed same with clinical nurse.    Elaine Stephens NP-C, CWOCN via TEAMS.

## 2023-05-20 NOTE — PROGRESS NOTE ADULT - PROBLEM SELECTOR PLAN 3
WBC elevated to low teens, which is chronic and previously was seen by hematology for this but was lost to follow up- white count is still elevated  Seen by hematology - on flow, had a population large granular lymphocytic cells without e/o LGL leukemia, and adalimumab can increase incidence of such heme disorders; was supposed to return to heme but was lost to follow up  - ensure patient returns to hematology as an outpatient    #Protein gap  Previousl SPEP reveals polyclonal gammaglobulins, likely reactive from chronic hidradenitis and could be related to high white count  - monitor
WBC elevated to low teens, which is chronic and previously was seen by hematology for this but was lost to follow up- white count is still elevated  Seen by hematology - on flow, had a population large granular lymphocytic cells without e/o LGL leukemia, and adalimumab can increase incidence of such heme disorders; was supposed to return to heme but was lost to follow up  - ensure patient returns to hematology as an outpatient    #Protein gap  Previous SPEP reveals polyclonal gammaglobulins, likely reactive from chronic hidradenitis and could be related to high white count  - monitor
- WBC elevated to low teens, which is chronic and previously was seen by hematology for this but was lost to follow up- white count is still elevated  - Seen by hematology - on flow, had a population large granular lymphocytic cells without e/o LGL leukemia, and adalimumab can increase incidence of such heme disorders; was supposed to return to heme but was lost to follow up  - ensure patient returns to hematology as an outpatient    #Protein gap  Previous SPEP reveals polyclonal gammaglobulins, likely reactive from chronic hidradenitis and could be related to high white count  - monitor
WBC elevated to low teens, which is chronic and previously was seen by hematology for this but was lost to follow up- white count is still elevated  Seen by hematology - on flow, had a population large granular lymphocytic cells without e/o LGL leukemia, and adalimumab can increase incidence of such heme disorders; was supposed to return to heme but was lost to follow up  - ensure patient returns to hematology as an outpatient    #Protein gap  Previous SPEP reveals polyclonal gammaglobulins, likely reactive from chronic hidradenitis and could be related to high white count  - monitor
- WBC elevated to low teens, which is chronic and previously was seen by hematology for this but was lost to follow up- white count is still elevated  - Seen by hematology - on flow, had a population large granular lymphocytic cells without e/o LGL leukemia, and adalimumab can increase incidence of such heme disorders; was supposed to return to heme but was lost to follow up  - ensure patient returns to hematology as an outpatient    #Protein gap  Previous SPEP reveals polyclonal gammaglobulins, likely reactive from chronic hidradenitis and could be related to high white count  - monitor
- WBC elevated to low teens, which is chronic and previously was seen by hematology for this but was lost to follow up- white count is still elevated  - Seen by hematology - on flow, had a population large granular lymphocytic cells without e/o LGL leukemia, and adalimumab can increase incidence of such heme disorders; was supposed to return to heme but was lost to follow up  - ensure patient returns to hematology as an outpatient    #Protein gap  Previous SPEP reveals polyclonal gammaglobulins, likely reactive from chronic hidradenitis and could be related to high white count  - monitor
WBC elevated to low teens, which is chronic and previously was seen by hematology for this but was lost to follow up  Seen by hematology - on flow, had a population large granular lymphocytic cells without e/o LGL leukemia, and adalimumab can increase incidence of such heme disorders; was supposed to return to heme but was lost to follow up  - ensure patient returns to hematology as an outpatient    #Protein gap  Previousl SPEP reveals polyclonal gammaglobulins, likely reactive from chronic hidradenitis  - monitor
WBC elevated to low teens, which is chronic and previously was seen by hematology for this but was lost to follow up  Seen by hematology - on flow, had a population large granular lymphocytic cells without e/o LGL leukemia, and adalimumab can increase incidence of such heme disorders; was supposed to return to heme but was lost to follow up  - ensure patient returns to hematology as an outpatient    #Protein gap  Previousl SPEP reveals polyclonal gammaglobulins, likely reactive from chronic hidradenitis  - monitor
- WBC elevated to low teens, which is chronic and previously was seen by hematology for this but was lost to follow up- white count is still elevated  - Seen by hematology - on flow, had a population large granular lymphocytic cells without e/o LGL leukemia, and adalimumab can increase incidence of such heme disorders; was supposed to return to heme but was lost to follow up  - ensure patient returns to hematology as an outpatient    #Protein gap  Previous SPEP reveals polyclonal gammaglobulins, likely reactive from chronic hidradenitis and could be related to high white count  - monitor
- WBC elevated to low teens, which is chronic and previously was seen by hematology for this but was lost to follow up- white count is still elevated  - Seen by hematology - on flow, had a population large granular lymphocytic cells without e/o LGL leukemia, and adalimumab can increase incidence of such heme disorders; was supposed to return to heme but was lost to follow up  - ensure patient returns to hematology as an outpatient    #Protein gap  Previous SPEP reveals polyclonal gammaglobulins, likely reactive from chronic hidradenitis and could be related to high white count  - monitor

## 2023-05-21 ENCOUNTER — NON-APPOINTMENT (OUTPATIENT)
Age: 33
End: 2023-05-21

## 2023-05-23 ENCOUNTER — NON-APPOINTMENT (OUTPATIENT)
Age: 33
End: 2023-05-23

## 2023-05-24 ENCOUNTER — TRANSCRIPTION ENCOUNTER (OUTPATIENT)
Age: 33
End: 2023-05-24

## 2023-05-25 ENCOUNTER — INPATIENT (INPATIENT)
Facility: HOSPITAL | Age: 33
LOS: 5 days | Discharge: HOME HEALTH | DRG: 578 | End: 2023-05-31
Attending: SURGERY | Admitting: SURGERY
Payer: MEDICAID

## 2023-05-25 ENCOUNTER — APPOINTMENT (OUTPATIENT)
Dept: PLASTIC SURGERY | Facility: HOSPITAL | Age: 33
End: 2023-05-25
Payer: MEDICAID

## 2023-05-25 VITALS
RESPIRATION RATE: 16 BRPM | HEART RATE: 83 BPM | OXYGEN SATURATION: 100 % | SYSTOLIC BLOOD PRESSURE: 117 MMHG | WEIGHT: 169.98 LBS | DIASTOLIC BLOOD PRESSURE: 74 MMHG | TEMPERATURE: 98 F | HEIGHT: 72 IN

## 2023-05-25 DIAGNOSIS — L73.2 HIDRADENITIS SUPPURATIVA: ICD-10-CM

## 2023-05-25 DIAGNOSIS — Z98.890 OTHER SPECIFIED POSTPROCEDURAL STATES: Chronic | ICD-10-CM

## 2023-05-25 PROCEDURE — 15734 MUSCLE-SKIN GRAFT TRUNK: CPT | Mod: 82,58

## 2023-05-25 PROCEDURE — 15002 WOUND PREP TRK/ARM/LEG: CPT | Mod: 58

## 2023-05-25 PROCEDURE — 15734 MUSCLE-SKIN GRAFT TRUNK: CPT | Mod: 58

## 2023-05-25 PROCEDURE — 15100 SPLT AGRFT T/A/L 1ST 100SQCM: CPT | Mod: 58

## 2023-05-25 PROCEDURE — 15003 WOUND PREP ADDL 100 CM: CPT

## 2023-05-25 DEVICE — CARTRIDGE MICROCLIP 30: Type: IMPLANTABLE DEVICE | Site: LEFT | Status: FUNCTIONAL

## 2023-05-25 RX ORDER — FERROUS SULFATE 325(65) MG
325 TABLET ORAL DAILY
Refills: 0 | Status: DISCONTINUED | OUTPATIENT
Start: 2023-05-25 | End: 2023-05-31

## 2023-05-25 RX ORDER — ONDANSETRON 8 MG/1
4 TABLET, FILM COATED ORAL EVERY 6 HOURS
Refills: 0 | Status: DISCONTINUED | OUTPATIENT
Start: 2023-05-25 | End: 2023-05-28

## 2023-05-25 RX ORDER — CHLORHEXIDINE GLUCONATE 213 G/1000ML
1 SOLUTION TOPICAL ONCE
Refills: 0 | Status: DISCONTINUED | OUTPATIENT
Start: 2023-05-25 | End: 2023-05-25

## 2023-05-25 RX ORDER — HYDROMORPHONE HYDROCHLORIDE 2 MG/ML
0.5 INJECTION INTRAMUSCULAR; INTRAVENOUS; SUBCUTANEOUS
Refills: 0 | Status: DISCONTINUED | OUTPATIENT
Start: 2023-05-25 | End: 2023-05-25

## 2023-05-25 RX ORDER — ONDANSETRON 8 MG/1
4 TABLET, FILM COATED ORAL ONCE
Refills: 0 | Status: DISCONTINUED | OUTPATIENT
Start: 2023-05-25 | End: 2023-05-25

## 2023-05-25 RX ORDER — POLYETHYLENE GLYCOL 3350 17 G/17G
17 POWDER, FOR SOLUTION ORAL DAILY
Refills: 0 | Status: DISCONTINUED | OUTPATIENT
Start: 2023-05-25 | End: 2023-05-31

## 2023-05-25 RX ORDER — HYDROMORPHONE HYDROCHLORIDE 2 MG/ML
30 INJECTION INTRAMUSCULAR; INTRAVENOUS; SUBCUTANEOUS
Refills: 0 | Status: DISCONTINUED | OUTPATIENT
Start: 2023-05-25 | End: 2023-05-28

## 2023-05-25 RX ORDER — HYDROMORPHONE HYDROCHLORIDE 2 MG/ML
0.5 INJECTION INTRAMUSCULAR; INTRAVENOUS; SUBCUTANEOUS
Refills: 0 | Status: DISCONTINUED | OUTPATIENT
Start: 2023-05-25 | End: 2023-05-28

## 2023-05-25 RX ORDER — BACITRACIN ZINC 500 UNIT/G
1 OINTMENT IN PACKET (EA) TOPICAL DAILY
Refills: 0 | Status: DISCONTINUED | OUTPATIENT
Start: 2023-05-25 | End: 2023-05-31

## 2023-05-25 RX ORDER — ACETAMINOPHEN 500 MG
975 TABLET ORAL EVERY 6 HOURS
Refills: 0 | Status: DISCONTINUED | OUTPATIENT
Start: 2023-05-25 | End: 2023-05-31

## 2023-05-25 RX ORDER — SODIUM CHLORIDE 9 MG/ML
3 INJECTION INTRAMUSCULAR; INTRAVENOUS; SUBCUTANEOUS EVERY 8 HOURS
Refills: 0 | Status: DISCONTINUED | OUTPATIENT
Start: 2023-05-25 | End: 2023-05-25

## 2023-05-25 RX ORDER — SODIUM CHLORIDE 9 MG/ML
1000 INJECTION, SOLUTION INTRAVENOUS
Refills: 0 | Status: DISCONTINUED | OUTPATIENT
Start: 2023-05-25 | End: 2023-05-25

## 2023-05-25 RX ORDER — SODIUM CHLORIDE 9 MG/ML
1000 INJECTION, SOLUTION INTRAVENOUS
Refills: 0 | Status: DISCONTINUED | OUTPATIENT
Start: 2023-05-25 | End: 2023-05-28

## 2023-05-25 RX ORDER — CEFAZOLIN SODIUM 1 G
2000 VIAL (EA) INJECTION ONCE
Refills: 0 | Status: COMPLETED | OUTPATIENT
Start: 2023-05-25 | End: 2023-05-25

## 2023-05-25 RX ORDER — ENOXAPARIN SODIUM 100 MG/ML
40 INJECTION SUBCUTANEOUS EVERY 24 HOURS
Refills: 0 | Status: DISCONTINUED | OUTPATIENT
Start: 2023-05-25 | End: 2023-05-26

## 2023-05-25 RX ORDER — LIDOCAINE HCL 20 MG/ML
0.2 VIAL (ML) INJECTION ONCE
Refills: 0 | Status: DISCONTINUED | OUTPATIENT
Start: 2023-05-25 | End: 2023-05-25

## 2023-05-25 RX ORDER — SENNA PLUS 8.6 MG/1
2 TABLET ORAL AT BEDTIME
Refills: 0 | Status: DISCONTINUED | OUTPATIENT
Start: 2023-05-25 | End: 2023-05-31

## 2023-05-25 RX ORDER — NALOXONE HYDROCHLORIDE 4 MG/.1ML
0.1 SPRAY NASAL
Refills: 0 | Status: DISCONTINUED | OUTPATIENT
Start: 2023-05-25 | End: 2023-05-28

## 2023-05-25 RX ADMIN — SODIUM CHLORIDE 75 MILLILITER(S): 9 INJECTION, SOLUTION INTRAVENOUS at 21:29

## 2023-05-25 RX ADMIN — HYDROMORPHONE HYDROCHLORIDE 0.5 MILLIGRAM(S): 2 INJECTION INTRAMUSCULAR; INTRAVENOUS; SUBCUTANEOUS at 17:37

## 2023-05-25 RX ADMIN — HYDROMORPHONE HYDROCHLORIDE 0.5 MILLIGRAM(S): 2 INJECTION INTRAMUSCULAR; INTRAVENOUS; SUBCUTANEOUS at 18:00

## 2023-05-25 RX ADMIN — HYDROMORPHONE HYDROCHLORIDE 30 MILLILITER(S): 2 INJECTION INTRAMUSCULAR; INTRAVENOUS; SUBCUTANEOUS at 21:29

## 2023-05-25 RX ADMIN — SODIUM CHLORIDE 30 MILLILITER(S): 9 INJECTION, SOLUTION INTRAVENOUS at 22:51

## 2023-05-25 RX ADMIN — HYDROMORPHONE HYDROCHLORIDE 30 MILLILITER(S): 2 INJECTION INTRAMUSCULAR; INTRAVENOUS; SUBCUTANEOUS at 18:39

## 2023-05-25 RX ADMIN — HYDROMORPHONE HYDROCHLORIDE 30 MILLILITER(S): 2 INJECTION INTRAMUSCULAR; INTRAVENOUS; SUBCUTANEOUS at 20:13

## 2023-05-25 RX ADMIN — Medication 975 MILLIGRAM(S): at 19:09

## 2023-05-25 NOTE — PATIENT PROFILE ADULT - FALL HARM RISK - HARM RISK INTERVENTIONS

## 2023-05-25 NOTE — PRE-OP CHECKLIST - 1.
Emotional support and pre op teaching provided to patient and grandfather Ed Asher at bedside with verbalized understanding.

## 2023-05-26 PROCEDURE — 99222 1ST HOSP IP/OBS MODERATE 55: CPT

## 2023-05-26 RX ORDER — ENOXAPARIN SODIUM 100 MG/ML
40 INJECTION SUBCUTANEOUS EVERY 24 HOURS
Refills: 0 | Status: DISCONTINUED | OUTPATIENT
Start: 2023-05-26 | End: 2023-05-31

## 2023-05-26 RX ADMIN — HYDROMORPHONE HYDROCHLORIDE 30 MILLILITER(S): 2 INJECTION INTRAMUSCULAR; INTRAVENOUS; SUBCUTANEOUS at 07:16

## 2023-05-26 RX ADMIN — SENNA PLUS 2 TABLET(S): 8.6 TABLET ORAL at 21:23

## 2023-05-26 RX ADMIN — SODIUM CHLORIDE 30 MILLILITER(S): 9 INJECTION, SOLUTION INTRAVENOUS at 21:23

## 2023-05-26 RX ADMIN — HYDROMORPHONE HYDROCHLORIDE 30 MILLILITER(S): 2 INJECTION INTRAMUSCULAR; INTRAVENOUS; SUBCUTANEOUS at 19:25

## 2023-05-26 RX ADMIN — Medication 325 MILLIGRAM(S): at 11:28

## 2023-05-26 RX ADMIN — ENOXAPARIN SODIUM 40 MILLIGRAM(S): 100 INJECTION SUBCUTANEOUS at 11:30

## 2023-05-26 NOTE — PROGRESS NOTE ADULT - ATTENDING COMMENTS
Pt seen at bedside in a.m. Pain controlled.    Flap soft, vac with good seal. back donor site without fullness. JPs serosanguinous.    PoD#1 from left TDAP & STSG reconstruction of left axilla; flap healthy-appearing  -left shoulder abduction to 60 degrees at rest  -left axillary wound vac day 1 of 5  -transition to PO pain meds  -regular diet  -Appreciate wound care eval of right axilla for I&D and/or nursing wound care recs.

## 2023-05-26 NOTE — PROGRESS NOTE ADULT - SUBJECTIVE AND OBJECTIVE BOX
Day 1 of Anesthesia Pain Management Service    SUBJECTIVE: I'm doing ok    Pain Scale Score:	[X] Refer to charted pain scores    THERAPY:    [ ] IV PCA Morphine		[ ] 5 mg/mL	[ ] 1 mg/mL  [X] IV PCA Hydromorphone	[ ] 5 mg/mL	[X] 1 mg/mL  [ ] IV PCA Fentanyl		[ ] 50 micrograms/mL    Demand dose: 0.2 mg     Lockout: 6 minutes   Continuous Rate: 0 mg/hr  4 Hour Limit: 4 mg    MEDICATIONS  (STANDING):  acetaminophen     Tablet .. 975 milliGRAM(s) Oral every 6 hours  bacitracin   Ointment 1 Application(s) Topical daily  enoxaparin Injectable 40 milliGRAM(s) SubCutaneous every 24 hours  ferrous    sulfate 325 milliGRAM(s) Oral daily  HYDROmorphone PCA (1 mG/mL) 30 milliLiter(s) PCA Continuous PCA Continuous  lactated ringers. 1000 milliLiter(s) (30 mL/Hr) IV Continuous <Continuous>  senna 2 Tablet(s) Oral at bedtime    MEDICATIONS  (PRN):  HYDROmorphone PCA (1 mG/mL) Rescue Clinician Bolus 0.5 milliGRAM(s) IV Push every 15 minutes PRN for Pain Scale GREATER THAN 6  naloxone Injectable 0.1 milliGRAM(s) IV Push every 3 minutes PRN For ANY of the following changes in patient status:  A. RR LESS THAN 10 breaths per minute, B. Oxygen saturation LESS THAN 90%, C. Sedation score of 6  ondansetron Injectable 4 milliGRAM(s) IV Push every 6 hours PRN Nausea  polyethylene glycol 3350 17 Gram(s) Oral daily PRN for constipation      OBJECTIVE:    Sedation Score:	[ X] Alert 	[ ] Drowsy 	[ ] Arousable	[ ] Asleep	[ ] Unresponsive    Side Effects:	[X ] None	[ ] Nausea	[ ] Vomiting	[ ] Pruritus  		[ ] Other:    Vital Signs Last 24 Hrs  T(C): 36.6 (26 May 2023 08:56), Max: 36.7 (25 May 2023 12:40)  T(F): 97.8 (26 May 2023 08:56), Max: 97.9 (26 May 2023 05:32)  HR: 78 (26 May 2023 08:56) (68 - 94)  BP: 122/65 (26 May 2023 08:56) (116/55 - 135/68)  BP(mean): 86 (25 May 2023 20:00) (80 - 95)  RR: 18 (26 May 2023 08:56) (13 - 18)  SpO2: 98% (26 May 2023 08:56) (97% - 100%)    Parameters below as of 26 May 2023 08:56  Patient On (Oxygen Delivery Method): room air        ASSESSMENT/ PLAN    Therapy to  be:               [X] Continued   [ ] Discontinued   [ ] Changed to PRN Analgesics    Documentation and Verification of current medications:   [X] Done	[ ] Not done, not eligible    Comments: Pain controlled with PCA. Total 2.6mg / 14 hours. Reeducated to use.

## 2023-05-26 NOTE — CONSULT NOTE ADULT - SUBJECTIVE AND OBJECTIVE BOX
Wound Surgery Consult Note:  HPI:  32  year male PMhx RBBB, hidradenitis suppurativa, was on Humira, developed increased discharge from suppurativa of the buttocks and bilateral armpits S/P excision of hidradenitis of the buttocks with skin graft reconstruction (2/2023) s/p excision of left axillary (4/2023).  Now presents to [facility] prior to scheduled Left Axillary Wound Reconstruction with Thoracodorsal Artery  Flap, possible skin graft from any site with Dr. Villareal on 5/25/23. Denies any chest pain, palpitations, SOB, N/V, fever or chills.     No H/O COVID infection    Of note: pt was recently admitted to [facility] on 2/2023, was found to be septic, treated with zosyn, vanco and clindamycin, S/P excision of hidradenitis of the buttocks, was D/[facility] to [facility] for 2 weeks for wound care and PT.     (08 May 2023 15:45)    Request for assistance with management of HS of Right axilla by PRS. Right axilla with draining tracts and no identified non draining abscesses.     PAST MEDICAL & SURGICAL HISTORY:  Hidradenitis suppurativa  RBBB  H/O sepsis  S/P excisional debridement    REVIEW OF SYSTEMS:    Constitutional:     [x ] negative [ ] fevers [ ] chills [ ] weight loss [ ] weight gain  [ ] fatigue  HEENT:                  [x ] negative [ ] dry eyes [ ] eye irritation [ ] postnasal drip [ ] nasal congestion   CV:                         [ x] negative  [ ] chest pain [ ] orthopnea [ ] palpitations [ ] tachycardia  Resp:                     [ x] negative [ ] cough [ ] shortness of breath [ ] dyspnea [ ] wheezing [ ] sputum [ ] hemoptysis  GI:                          [x ] negative [ ] nausea [ ] vomiting [ ] diarrhea [ ] constipation [ ] abd pain [ ] dysphagia [ ] incontinent of bowel  :                        [x ] negative [ ] dysuria [ ] nocturia [ ] hematuria [ ] increased urinary frequency [ ] incontinent of urine  Musculoskeletal:     [ ] negative [ ] back pain [ ] myalgias [ ] arthralgias [ ] fracture [x ] ambulatory  [ ] non-ambulatory  Skin:                       [ ] negative [ ] rash [ ] itch [ x] wound [ ] skin discoloration  Neurological:        [x ] negative [ ] headache [ ] dizziness [ ] syncope [ ] weakness [ ] numbness  Psychiatric:           [x ] negative [ ] anxiety [ ] depression  Endocrine:            [ ] negative [ ] diabetes [ ] thyroid problem  Heme/Lymph:      [ ] negative [x ] anemia [ ] bleeding problem  Allergic/Immune: [x ] negative [ ] itchy eyes [ ] nasal discharge [ ] hives [ ] angioedema    [x ] All other systems negative    MEDICATIONS  (STANDING):  acetaminophen     Tablet .. 975 milliGRAM(s) Oral every 6 hours  bacitracin   Ointment 1 Application(s) Topical daily  enoxaparin Injectable 40 milliGRAM(s) SubCutaneous every 24 hours  ferrous    sulfate 325 milliGRAM(s) Oral daily  HYDROmorphone PCA (1 mG/mL) 30 milliLiter(s) PCA Continuous PCA Continuous  lactated ringers. 1000 milliLiter(s) (30 mL/Hr) IV Continuous <Continuous>  senna 2 Tablet(s) Oral at bedtime    MEDICATIONS  (PRN):  HYDROmorphone PCA (1 mG/mL) Rescue Clinician Bolus 0.5 milliGRAM(s) IV Push every 15 minutes PRN for Pain Scale GREATER THAN 6  naloxone Injectable 0.1 milliGRAM(s) IV Push every 3 minutes PRN For ANY of the following changes in patient status:  A. RR LESS THAN 10 breaths per minute, B. Oxygen saturation LESS THAN 90%, C. Sedation score of 6  ondansetron Injectable 4 milliGRAM(s) IV Push every 6 hours PRN Nausea  polyethylene glycol 3350 17 Gram(s) Oral daily PRN for constipation    Allergies    No Known Allergies    Intolerances    SOCIAL HISTORY:  single, Denies smoking, ETOH, drugs    FAMILY HISTORY:  FH: hypertension (Mother)    Vital Signs Last 24 Hrs  T(C): 36.6 (26 May 2023 08:56), Max: 36.6 (25 May 2023 22:00)  T(F): 97.8 (26 May 2023 08:56), Max: 97.9 (26 May 2023 05:32)  HR: 78 (26 May 2023 08:56) (68 - 94)  BP: 122/65 (26 May 2023 08:56) (116/55 - 135/68)  BP(mean): 86 (25 May 2023 20:00) (80 - 95)  RR: 18 (26 May 2023 08:56) (13 - 18)  SpO2: 98% (26 May 2023 08:56) (97% - 100%)    Parameters below as of 26 May 2023 08:56  Patient On (Oxygen Delivery Method): room air    Physical Exam:  General: Alert, WN  Ophthamology: sclera clear  ENMT: moist mucous membranes, trachea midline  Respiratory: equal chest rise with respirations  Gastrointestinal: soft NT/ND  Neurology: verbal, following commands  Psych: calm, appropriate  Musculoskeletal: no contractures  Vascular: BLE edema equal  Skin:  Right axilla with lesions including deep-seated draining tracts, and fibrotic scars over the total area of the axilla with 3 wounds each L 0.5cm x W 0.5cm x D 0.5cm, and a more distal wound also L 0.5cm x W 0.5cm x D 0,5cm with tunneling at 6oclock to 3cm, + foul odor, large amount of serosanguinous drainage, wound bases not visible, + Tenderness with exploration of distal wound  No odor, erythema, increased warmth, induration, fluctuance    LABS:                   7.8  11.51 )-----------( 469      ( 20 May 2023 07:07 )             26.2     05-20     135  |  102  |  18  ----------------------------<  92  4.1   |  25  |  0.67     Ca    8.7      20 May 2023 07:08  Phos  3.0     05-20  Mg     1.8     05-20       RADIOLOGY & ADDITIONAL STUDIES:    EXAM:  MR PELVIS BONY ONLY WAW IC   ORDERED BY: SAUL   LYNETTE     PROCEDURE DATE:  05/18/2023          INTERPRETATION:  MR PELVIS AND SACROILIAC JOINTS WITHOUT AND WITH CONTRAST    HISTORY: Infection. Evaluate for sacroiliitis.    TECHNIQUE:  Multiplanar MRI of the pelvis was performed without and with   7.5cc cc administered Gadavist intravenous contrast.    COMPARISON:  MRI of the pelvis dated 5/12/2023.    FINDINGS:    OSSEOUS STRUCTURES    Fractures/Stress Reactions:  None.    PELVIC JOINTS    Symphysis Pubis:  Preserved.    Sacroiliac Joints:  Mild focal subchondral edematous changes are seen   involving the right sacroiliac joint with mild associated enhancement.   There is moderate subcortical edema of the left sacroiliac joint with   associated enhancement and slight adjacent synovial   enhancement/synovitis. Osseous involvement is similar to the prior. The   synovitis appears improved from the prior. There are no sizable joint   effusions.    RIGHT HIP JOINT    Morphology:  Normal.    Avascular Necrosis:  None.    Articular Cartilage:  Grossly preserved given the large field of view.    Effusion/Synovitis:  None.    RIGHT HIP TENDONS    Gluteus Minimus Tendon:  Intact.    Gluteus Medius Tendon:  Intact.    Iliopsoas Tendon:  Intact.    Common Hamstring Tendon:  Intact.    Bursa:  None.    LEFT HIP JOINT    Morphology:  Normal.    Avascular Necrosis:  None.    Articular Cartilage:  Grossly preserved given the large field of view.    Effusion/Synovitis:  None.    LEFT HIP TENDONS    Gluteus Minimus Tendon:  Intact.    Gluteus Medius Tendon:  Intact.    Iliopsoas Tendon:  Intact.    Common Hamstring Tendon:  Intact.    Bursa:  Slight fluid and enhancement involves the iliopsoas bursa.    VISUALIZED SPINE  Unremarkable.    SOFT TISSUES    Pelvis/Abdomen:  Prominent presacral edema is present with right   perirectal abscess again seen with posterior cutaneous fistula. The fluid   collection measures approximately 2.3 x 1.3 x 2.0 cm in size. Mild   bilateral inguinal adenopathy is noted.    Musculature:  Preserved.    Subcutaneous Tissues:  There is scattered enhancing posterior   subcutaneous edema with right-sided predominance. Peripherally enhancing   fluid collection is now seen within the left mons pubis measuring   approximately 2.8 x 1.8 x 1.5 cm.    NEUROVASCULAR STRUCTURES    Sacral Neuroforamina:  Widely patent.    Neural Plexuses:  Maintained.    IMPRESSION:  1.  Slight right and mild left sacroiliitis is again seen with mild   improvement of the left-sided synovitis. Osseous involvement is similar   to the prior. Changes may be inflammatory or infectious in nature. There   are no sizable joint effusions.  2.  Right perirectal abscess is again seen.  3.  Left mons pubis abscess now appears to be present measuring 2.8 x 1.8   x 1.5 cm.  4.  Enhancing posterior subcutaneous edema with right-sided predominance   suggests cellulitis or other process.    Accession:                             [id number]    Collected Date/Time:                   4/27/2023 09:00 EDT  Received Date/Time:                    4/27/2023 21:29 EDT    Surgical Pathology Report - Auth (Verified)    Specimen(s) Submitted  1. Left axillary hidradenitis    Final Diagnosis  1. Left axillary hidradenitis excisional biopsy:  - Squamous sinus tract, granulation tissue, chronic inflammation,  focal abscess formation and associated dermal fibrosis, compatible with  hidradenitis suppurativa

## 2023-05-26 NOTE — PROGRESS NOTE ADULT - SUBJECTIVE AND OBJECTIVE BOX
Plastic Surgery Progress Note (pg [facility]: [id number], NS: [phone])    SUBJECTIVE  The patient was seen and examined. No acute events overnight. Pain is controlled w/ PCA.    OBJECTIVE  ___________________________________________________  VITAL SIGNS / I&O's   Vital Signs Last 24 Hrs  T(C): 36.6 (26 May 2023 05:32), Max: 36.7 (25 May 2023 09:48)  T(F): 97.9 (26 May 2023 05:32), Max: 98.1 (25 May 2023 09:48)  HR: 68 (26 May 2023 05:32) (68 - 94)  BP: 117/70 (26 May 2023 05:32) (116/55 - 135/68)  BP(mean): 86 (25 May 2023 20:00) (80 - 95)  RR: 18 (26 May 2023 05:32) (13 - 18)  SpO2: 97% (26 May 2023 05:32) (97% - 100%)    Parameters below as of 26 May 2023 05:32  Patient On (Oxygen Delivery Method): room air          25 May 2023 07:01  -  26 May 2023 07:00  --------------------------------------------------------  IN:    Oral Fluid: 400 mL  Total IN: 400 mL    OUT:    Bulb (mL): 30 mL    Bulb (mL): 30 mL    Voided (mL): 1800 mL  Total OUT: 1860 mL    Total NET: -1460 mL        ___________________________________________________  PHYSICAL EXAM    General: NAD, Lying in bed comfortably  Neuro: Alert and answering questions appropriately   HEENT: Grossly normal, EOMI  Cardio: regular rate  Resp: unlabored breathing  Extremities:   - L axilla skin graft intact, soft, flat, no collections, incision c/d/i, +doppler signal, wound vac to suction, JPx2 S/S  Skin: L thigh donor site w/ aquacel and tegaderm    ___________________________________________________  LABS            CAPILLARY BLOOD GLUCOSE              ___________________________________________________  MICRO  Recent Cultures:    ___________________________________________________  MEDICATIONS  (STANDING):  acetaminophen     Tablet .. 975 milliGRAM(s) Oral every 6 hours  bacitracin   Ointment 1 Application(s) Topical daily  enoxaparin Injectable 40 milliGRAM(s) SubCutaneous every 24 hours  ferrous    sulfate 325 milliGRAM(s) Oral daily  HYDROmorphone PCA (1 mG/mL) 30 milliLiter(s) PCA Continuous PCA Continuous  lactated ringers. 1000 milliLiter(s) (30 mL/Hr) IV Continuous <Continuous>  senna 2 Tablet(s) Oral at bedtime    MEDICATIONS  (PRN):  HYDROmorphone PCA (1 mG/mL) Rescue Clinician Bolus 0.5 milliGRAM(s) IV Push every 15 minutes PRN for Pain Scale GREATER THAN 6  naloxone Injectable 0.1 milliGRAM(s) IV Push every 3 minutes PRN For ANY of the following changes in patient status:  A. RR LESS THAN 10 breaths per minute, B. Oxygen saturation LESS THAN 90%, C. Sedation score of 6  ondansetron Injectable 4 milliGRAM(s) IV Push every 6 hours PRN Nausea  polyethylene glycol 3350 17 Gram(s) Oral daily PRN for constipation

## 2023-05-26 NOTE — CONSULT NOTE ADULT - ASSESSMENT
A/P: 32M with longstanding history of hidradenitis suppurativa presenting today for same day admission for planned Left axilla staged plastics procedure.      Wound Consult requested to assist w/ management of right Axillary Hydradenitis Suprativa    Consider washing  w/ Baby soap for maintenance (J&J) care     Can use Vashe cleanser or Dakin's 1/4 strength solution for acute flare ups and while in the hospital  Consider using Allevyn foam dressings on the RIght axilla after cleansing with Vashe wound cleanser daily  Abx per PRS  Moisturize intact skin w/ SWEEN cream BID  Nutrition Consult for optimization        encourage high quality protein, clark/ prosource, MVI & Vit C to promote wound healing  Continue turning and positioning w/ offloading assistive devices as per protocol  ontinue w/ attends under pads and Pericare w/ alaniz maintenance as per protocol  Waffle Cushion to chair when oob to chair  Continue w/ low air loss pressure redistribution bed surface       Upon discharge [facility] at [facility] [address] [place] [address] [phone]  Seen with Dr. Jamie Stephens, NP-C, CWOCN via TEAMS

## 2023-05-26 NOTE — PROGRESS NOTE ADULT - ASSESSMENT
32M w/ hx hidradenitis suppurativa s/p excision of buttocks hidradenitis w/ STSG recon 2/23 and L axillary excision 4/23 now s/p L thoracodorsal flap to L axilla and STSG 5/25.    Plan:  - monitor doppler signal on flap  - pain control w/ PCA  - arm needs to be abducted 90 degrees and flexed 90 degrees at the elbow to avoid tension on flap  - baci to incision  - aquacel to be taken down POD5  - regular diet  - DVT ppx  - OOB/encourage ambulation    [facility] PRS   [phone] 32M w/ hx hidradenitis suppurativa s/p excision of buttocks hidradenitis w/ STSG recon 2/23 and L axillary excision 4/23 now s/p L thoracodorsal flap to L axilla and STSG 5/25.    Plan:  - monitor doppler signal on flap  - pain control w/ PCA  - arm needs to be abducted 90 degrees and flexed 90 degrees at the elbow to avoid tension on flap  - baci to incision  - plan to take down L axilla vac tuesday, 5/30  - aquacel to be taken down POD5  - wound care consult for R axilla hidradenitis  - regular diet  - DVT ppx  - OOB/encourage ambulation    [facility] PRS   [phone] 32M w/ hx hidradenitis suppurativa s/p excision of buttocks hidradenitis w/ STSG recon 2/23 and L axillary excision 4/23 now s/p L thoracodorsal flap to L axilla and STSG 5/25.    Plan:  - monitor doppler signal on flap  - pain control w/ PCA  - arm needs to be abducted 90 degrees and flexed 90 degrees at the elbow to avoid tension on flap  - baci to incision  - plan to take down L axilla vac tuesday, 5/30  - aquacel to be taken down POD5  - maintain aquacel/tegaderm over donor site  - wound care consult for R axilla hidradenitis  - regular diet  - DVT ppx  - OOB/encourage ambulation    [facility] PRS   [phone]

## 2023-05-27 RX ADMIN — Medication 975 MILLIGRAM(S): at 18:44

## 2023-05-27 RX ADMIN — SODIUM CHLORIDE 30 MILLILITER(S): 9 INJECTION, SOLUTION INTRAVENOUS at 18:44

## 2023-05-27 RX ADMIN — ENOXAPARIN SODIUM 40 MILLIGRAM(S): 100 INJECTION SUBCUTANEOUS at 12:21

## 2023-05-27 RX ADMIN — Medication 975 MILLIGRAM(S): at 12:21

## 2023-05-27 RX ADMIN — HYDROMORPHONE HYDROCHLORIDE 30 MILLILITER(S): 2 INJECTION INTRAMUSCULAR; INTRAVENOUS; SUBCUTANEOUS at 07:18

## 2023-05-27 RX ADMIN — Medication 325 MILLIGRAM(S): at 12:22

## 2023-05-27 RX ADMIN — Medication 1 APPLICATION(S): at 12:21

## 2023-05-27 RX ADMIN — HYDROMORPHONE HYDROCHLORIDE 30 MILLILITER(S): 2 INJECTION INTRAMUSCULAR; INTRAVENOUS; SUBCUTANEOUS at 19:20

## 2023-05-27 NOTE — PROGRESS NOTE ADULT - SUBJECTIVE AND OBJECTIVE BOX
Plastic Surgery Progress Note (pg [facility]: [id number], NS: [phone])    SUBJECTIVE  The patient was seen and examined. No acute events overnight. Pain is controlled w/ PCA. Was able to sit in chair yesterday.    OBJECTIVE  ___________________________________________________  VITAL SIGNS / I&O's   Vital Signs Last 24 Hrs  T(C): 36.6 (27 May 2023 05:37), Max: 36.7 (26 May 2023 13:45)  T(F): 97.9 (27 May 2023 05:37), Max: 98.1 (26 May 2023 13:45)  HR: 73 (27 May 2023 05:37) (73 - 89)  BP: 122/75 (27 May 2023 05:37) (113/68 - 130/82)  BP(mean): --  ABP: --  ABP(mean): --  RR: 18 (27 May 2023 05:37) (18 - 18)  SpO2: 99% (27 May 2023 05:37) (98% - 99%)    O2 Parameters below as of 27 May 2023 05:37  Patient On (Oxygen Delivery Method): room air    I&O's Detail    26 May 2023 07:01  -  27 May 2023 07:00  --------------------------------------------------------  IN:    Lactated Ringers: 360 mL    Oral Fluid: 1200 mL  Total IN: 1560 mL    OUT:    Bulb (mL): 25 mL    Bulb (mL): 30 mL    Voided (mL): 3400 mL  Total OUT: 3455 mL    Total NET: -1895 mL  ___________________________________________________  PHYSICAL EXAM    General: NAD, Lying in bed comfortably  Neuro: Alert and answering questions appropriately   HEENT: Grossly normal, EOMI  Cardio: regular rate  Resp: unlabored breathing  Extremities:   - L axilla skin graft intact, soft, flat, no collections, incision c/d/i, +doppler signal, wound vac to suction, JPx2 S/S  Skin: L thigh donor site w/ aquacel and tegaderm    ___________________________________________________  LABS            CAPILLARY BLOOD GLUCOSE              ___________________________________________________  MICRO  Recent Cultures:    ___________________________________________________  MEDICATIONS  (STANDING):  acetaminophen     Tablet .. 975 milliGRAM(s) Oral every 6 hours  bacitracin   Ointment 1 Application(s) Topical daily  enoxaparin Injectable 40 milliGRAM(s) SubCutaneous every 24 hours  ferrous    sulfate 325 milliGRAM(s) Oral daily  HYDROmorphone PCA (1 mG/mL) 30 milliLiter(s) PCA Continuous PCA Continuous  lactated ringers. 1000 milliLiter(s) (30 mL/Hr) IV Continuous <Continuous>  senna 2 Tablet(s) Oral at bedtime    MEDICATIONS  (PRN):  HYDROmorphone PCA (1 mG/mL) Rescue Clinician Bolus 0.5 milliGRAM(s) IV Push every 15 minutes PRN for Pain Scale GREATER THAN 6  naloxone Injectable 0.1 milliGRAM(s) IV Push every 3 minutes PRN For ANY of the following changes in patient status:  A. RR LESS THAN 10 breaths per minute, B. Oxygen saturation LESS THAN 90%, C. Sedation score of 6  ondansetron Injectable 4 milliGRAM(s) IV Push every 6 hours PRN Nausea  polyethylene glycol 3350 17 Gram(s) Oral daily PRN for constipation

## 2023-05-27 NOTE — PROGRESS NOTE ADULT - SUBJECTIVE AND OBJECTIVE BOX
Day __ of Anesthesia Pain Management Service    SUBJECTIVE: Patient is doing well with IV PCA    Pain Scale Score:	[X] Refer to charted pain scores    THERAPY:    [ ] IV PCA Morphine		[ ] 5 mg/mL	[ ] 1 mg/mL  [X] IV PCA Hydromorphone	[ ] 5 mg/mL	[X] 1 mg/mL  [ ] IV PCA Fentanyl		[ ] 50 micrograms/mL    Demand dose: 0.2 mg     Lockout: 6 minutes   Continuous Rate: 0 mg/hr  4 Hour Limit: 4 mg    MEDICATIONS  (STANDING):  acetaminophen     Tablet .. 975 milliGRAM(s) Oral every 6 hours  bacitracin   Ointment 1 Application(s) Topical daily  enoxaparin Injectable 40 milliGRAM(s) SubCutaneous every 24 hours  ferrous    sulfate 325 milliGRAM(s) Oral daily  HYDROmorphone PCA (1 mG/mL) 30 milliLiter(s) PCA Continuous PCA Continuous  lactated ringers. 1000 milliLiter(s) (30 mL/Hr) IV Continuous <Continuous>  senna 2 Tablet(s) Oral at bedtime    MEDICATIONS  (PRN):  HYDROmorphone PCA (1 mG/mL) Rescue Clinician Bolus 0.5 milliGRAM(s) IV Push every 15 minutes PRN for Pain Scale GREATER THAN 6  naloxone Injectable 0.1 milliGRAM(s) IV Push every 3 minutes PRN For ANY of the following changes in patient status:  A. RR LESS THAN 10 breaths per minute, B. Oxygen saturation LESS THAN 90%, C. Sedation score of 6  ondansetron Injectable 4 milliGRAM(s) IV Push every 6 hours PRN Nausea  polyethylene glycol 3350 17 Gram(s) Oral daily PRN for constipation      OBJECTIVE:    Sedation Score:	[ X] Alert	[ ] Drowsy 	[ ] Arousable	[ ] Asleep	[ ] Unresponsive    Side Effects:	[X ] None	[ ] Nausea	[ ] Vomiting	[ ] Pruritus  		[ ] Other:    Vital Signs Last 24 Hrs  T(C): 36.6 (27 May 2023 05:37), Max: 36.7 (26 May 2023 13:45)  T(F): 97.9 (27 May 2023 05:37), Max: 98.1 (26 May 2023 13:45)  HR: 73 (27 May 2023 05:37) (73 - 89)  BP: 122/75 (27 May 2023 05:37) (113/68 - 130/82)  BP(mean): --  RR: 18 (27 May 2023 05:37) (18 - 18)  SpO2: 99% (27 May 2023 05:37) (98% - 99%)    Parameters below as of 27 May 2023 05:37  Patient On (Oxygen Delivery Method): room air        ASSESSMENT/ PLAN    Therapy to  be:               [X] Continued   [ ] Discontinued   [ ] Changed to PRN Analgesics    Documentation and Verification of current medications:   [X] Done	[ ] Not done, not eligible    Comments:

## 2023-05-27 NOTE — PROGRESS NOTE ADULT - ASSESSMENT
32M w/ hx hidradenitis suppurativa s/p excision of buttocks hidradenitis w/ STSG recon 2/23 and L axillary excision 4/23 now s/p L thoracodorsal flap to L axilla and STSG 5/25.    Plan:  - monitor doppler signal on flap per team  - pain control w/ PCA  - arm needs to be abducted 90 degrees and flexed 90 degrees at the elbow to avoid tension on flap  - baci to incision  - plan to take down L axilla vac tuesday, 5/30  - aquacel to be taken down POD5  - maintain aquacel/tegaderm over donor site  - wound care consult for R axilla hidradenitis -- appreciate recs  - regular diet  - DVT ppx  - OOB/encourage ambulation, must keep arm flexed & abducted at 90 degrees as above    Donnie Denver  Plastic Surgery  #[id number] [facility] pager  [phone] [facility] pager  Available on teams

## 2023-05-28 RX ORDER — OXYCODONE HYDROCHLORIDE 5 MG/1
5 TABLET ORAL EVERY 4 HOURS
Refills: 0 | Status: DISCONTINUED | OUTPATIENT
Start: 2023-05-28 | End: 2023-05-31

## 2023-05-28 RX ORDER — DIPHENHYDRAMINE HCL 50 MG
25 CAPSULE ORAL EVERY 4 HOURS
Refills: 0 | Status: DISCONTINUED | OUTPATIENT
Start: 2023-05-28 | End: 2023-05-31

## 2023-05-28 RX ORDER — LANOLIN ALCOHOL/MO/W.PET/CERES
3 CREAM (GRAM) TOPICAL AT BEDTIME
Refills: 0 | Status: DISCONTINUED | OUTPATIENT
Start: 2023-05-28 | End: 2023-05-31

## 2023-05-28 RX ORDER — ONDANSETRON 8 MG/1
4 TABLET, FILM COATED ORAL EVERY 4 HOURS
Refills: 0 | Status: DISCONTINUED | OUTPATIENT
Start: 2023-05-28 | End: 2023-05-31

## 2023-05-28 RX ORDER — OXYCODONE HYDROCHLORIDE 5 MG/1
10 TABLET ORAL EVERY 4 HOURS
Refills: 0 | Status: DISCONTINUED | OUTPATIENT
Start: 2023-05-28 | End: 2023-05-31

## 2023-05-28 RX ORDER — CALCIUM CARBONATE 500(1250)
1 TABLET ORAL EVERY 4 HOURS
Refills: 0 | Status: DISCONTINUED | OUTPATIENT
Start: 2023-05-28 | End: 2023-05-31

## 2023-05-28 RX ADMIN — ENOXAPARIN SODIUM 40 MILLIGRAM(S): 100 INJECTION SUBCUTANEOUS at 11:46

## 2023-05-28 RX ADMIN — OXYCODONE HYDROCHLORIDE 5 MILLIGRAM(S): 5 TABLET ORAL at 21:30

## 2023-05-28 RX ADMIN — Medication 325 MILLIGRAM(S): at 11:47

## 2023-05-28 RX ADMIN — OXYCODONE HYDROCHLORIDE 5 MILLIGRAM(S): 5 TABLET ORAL at 20:34

## 2023-05-28 RX ADMIN — HYDROMORPHONE HYDROCHLORIDE 30 MILLILITER(S): 2 INJECTION INTRAMUSCULAR; INTRAVENOUS; SUBCUTANEOUS at 07:13

## 2023-05-28 RX ADMIN — Medication 1 APPLICATION(S): at 11:47

## 2023-05-28 RX ADMIN — Medication 975 MILLIGRAM(S): at 11:46

## 2023-05-28 RX ADMIN — Medication 975 MILLIGRAM(S): at 17:09

## 2023-05-28 RX ADMIN — Medication 975 MILLIGRAM(S): at 12:30

## 2023-05-28 NOTE — PROGRESS NOTE ADULT - SUBJECTIVE AND OBJECTIVE BOX
Plastic Surgery Progress Note (pg [facility]: [id number], NS: [phone])    SUBJECTIVE  The patient was seen and examined. No acute events overnight. Pain is controlled, patient has no concerns.    OBJECTIVE  ___________________________________________________  VITAL SIGNS / I&O's   Vital Signs Last 24 Hrs  T(C): 36.7 (28 May 2023 04:36), Max: 37.1 (27 May 2023 13:04)  T(F): 98.1 (28 May 2023 04:36), Max: 98.7 (27 May 2023 13:04)  HR: 82 (28 May 2023 04:36) (75 - 82)  BP: 130/75 (28 May 2023 04:36) (116/76 - 130/75)  BP(mean): --  RR: 18 (28 May 2023 04:36) (18 - 18)  SpO2: 97% (28 May 2023 04:36) (97% - 99%)    Parameters below as of 28 May 2023 04:36  Patient On (Oxygen Delivery Method): room air          27 May 2023 07:01  -  28 May 2023 07:00  --------------------------------------------------------  IN:    Oral Fluid: 840 mL  Total IN: 840 mL    OUT:    Bulb (mL): 15 mL    Bulb (mL): 15 mL    Voided (mL): 2500 mL  Total OUT: 2530 mL    Total NET: -1690 mL        ___________________________________________________  PHYSICAL EXAM    General: NAD, Lying in bed comfortably  Neuro: Alert and answering questions appropriately   HEENT: Grossly normal, EOMI  Cardio: regular rate  Resp: unlabored breathing  Extremities:   - L axilla skin graft intact, soft, flat, no collections, incision c/d/i, +doppler signal, wound vac to suction, JPx2 S/S  Skin: L thigh donor site w/ aquacel and tegaderm    ___________________________________________________  LABS            CAPILLARY BLOOD GLUCOSE              ___________________________________________________  MICRO  Recent Cultures:    ___________________________________________________  MEDICATIONS  (STANDING):  acetaminophen     Tablet .. 975 milliGRAM(s) Oral every 6 hours  bacitracin   Ointment 1 Application(s) Topical daily  enoxaparin Injectable 40 milliGRAM(s) SubCutaneous every 24 hours  ferrous    sulfate 325 milliGRAM(s) Oral daily  HYDROmorphone PCA (1 mG/mL) 30 milliLiter(s) PCA Continuous PCA Continuous  lactated ringers. 1000 milliLiter(s) (30 mL/Hr) IV Continuous <Continuous>  senna 2 Tablet(s) Oral at bedtime    MEDICATIONS  (PRN):  HYDROmorphone PCA (1 mG/mL) Rescue Clinician Bolus 0.5 milliGRAM(s) IV Push every 15 minutes PRN for Pain Scale GREATER THAN 6  naloxone Injectable 0.1 milliGRAM(s) IV Push every 3 minutes PRN For ANY of the following changes in patient status:  A. RR LESS THAN 10 breaths per minute, B. Oxygen saturation LESS THAN 90%, C. Sedation score of 6  ondansetron Injectable 4 milliGRAM(s) IV Push every 6 hours PRN Nausea  polyethylene glycol 3350 17 Gram(s) Oral daily PRN for constipation   Plastic Surgery Progress Note (pg [facility]: [id number], NS: [phone])    SUBJECTIVE  The patient was seen and examined. No acute events overnight. Pain is controlled, patient has no concerns.    OBJECTIVE  ___________________________________________________  VITAL SIGNS / I&O's   Vital Signs Last 24 Hrs  T(C): 36.7 (28 May 2023 04:36), Max: 37.1 (27 May 2023 13:04)  T(F): 98.1 (28 May 2023 04:36), Max: 98.7 (27 May 2023 13:04)  HR: 82 (28 May 2023 04:36) (75 - 82)  BP: 130/75 (28 May 2023 04:36) (116/76 - 130/75)  BP(mean): --  RR: 18 (28 May 2023 04:36) (18 - 18)  SpO2: 97% (28 May 2023 04:36) (97% - 99%)    Parameters below as of 28 May 2023 04:36  Patient On (Oxygen Delivery Method): room air          27 May 2023 07:01  -  28 May 2023 07:00  --------------------------------------------------------  IN:    Oral Fluid: 840 mL  Total IN: 840 mL    OUT:    Bulb (mL): 15 mL    Bulb (mL): 15 mL    Voided (mL): 2500 mL  Total OUT: 2530 mL    Total NET: -1690 mL        ___________________________________________________  PHYSICAL EXAM    General: NAD, Lying in bed comfortably  Neuro: Alert and answering questions appropriately   HEENT: Grossly normal, EOMI  Cardio: regular rate  Resp: unlabored breathing  Extremities:   - L axilla skin graft intact, soft, flat, no collections, incision c/d/i, small opening along inferior posterior incision, +doppler signal, wound vac to suction, JPx2 S/S  Skin: L thigh donor site w/ aquacel and tegaderm    ___________________________________________________  LABS            CAPILLARY BLOOD GLUCOSE              ___________________________________________________  MICRO  Recent Cultures:    ___________________________________________________  MEDICATIONS  (STANDING):  acetaminophen     Tablet .. 975 milliGRAM(s) Oral every 6 hours  bacitracin   Ointment 1 Application(s) Topical daily  enoxaparin Injectable 40 milliGRAM(s) SubCutaneous every 24 hours  ferrous    sulfate 325 milliGRAM(s) Oral daily  HYDROmorphone PCA (1 mG/mL) 30 milliLiter(s) PCA Continuous PCA Continuous  lactated ringers. 1000 milliLiter(s) (30 mL/Hr) IV Continuous <Continuous>  senna 2 Tablet(s) Oral at bedtime    MEDICATIONS  (PRN):  HYDROmorphone PCA (1 mG/mL) Rescue Clinician Bolus 0.5 milliGRAM(s) IV Push every 15 minutes PRN for Pain Scale GREATER THAN 6  naloxone Injectable 0.1 milliGRAM(s) IV Push every 3 minutes PRN For ANY of the following changes in patient status:  A. RR LESS THAN 10 breaths per minute, B. Oxygen saturation LESS THAN 90%, C. Sedation score of 6  ondansetron Injectable 4 milliGRAM(s) IV Push every 6 hours PRN Nausea  polyethylene glycol 3350 17 Gram(s) Oral daily PRN for constipation

## 2023-05-28 NOTE — PROGRESS NOTE ADULT - ASSESSMENT
32M w/ hx hidradenitis suppurativa s/p excision of buttocks hidradenitis w/ STSG recon 2/23 and L axillary excision 4/23 now s/p L thoracodorsal flap to L axilla and STSG 5/25.    Plan:  - monitor doppler signal on flap per team  - dc PCA today, transition to po pain control  - arm needs to be abducted 90 degrees and flexed 90 degrees at the elbow to avoid tension on flap  - baci to incision  - plan to take down L axilla vac tuesday, 5/30  - aquacel to be taken down POD5  - maintain aquacel/tegaderm over donor site  - wound care consult for R axilla hidradenitis -- appreciate recs  - regular diet  - DVT ppx  - OOB/encourage ambulation, must keep arm flexed & abducted at 90 degrees as above      [facility] PRS   [phone]

## 2023-05-29 RX ADMIN — Medication 975 MILLIGRAM(S): at 17:57

## 2023-05-29 RX ADMIN — Medication 975 MILLIGRAM(S): at 13:24

## 2023-05-29 RX ADMIN — Medication 1 APPLICATION(S): at 13:30

## 2023-05-29 RX ADMIN — Medication 3 MILLIGRAM(S): at 22:23

## 2023-05-29 RX ADMIN — Medication 975 MILLIGRAM(S): at 14:00

## 2023-05-29 RX ADMIN — ENOXAPARIN SODIUM 40 MILLIGRAM(S): 100 INJECTION SUBCUTANEOUS at 13:24

## 2023-05-29 RX ADMIN — Medication 975 MILLIGRAM(S): at 03:44

## 2023-05-29 RX ADMIN — Medication 325 MILLIGRAM(S): at 13:23

## 2023-05-29 RX ADMIN — OXYCODONE HYDROCHLORIDE 10 MILLIGRAM(S): 5 TABLET ORAL at 22:23

## 2023-05-29 RX ADMIN — OXYCODONE HYDROCHLORIDE 10 MILLIGRAM(S): 5 TABLET ORAL at 23:27

## 2023-05-29 RX ADMIN — Medication 975 MILLIGRAM(S): at 18:48

## 2023-05-29 RX ADMIN — Medication 975 MILLIGRAM(S): at 04:50

## 2023-05-29 NOTE — PROGRESS NOTE ADULT - SUBJECTIVE AND OBJECTIVE BOX
Plastic Surgery Progress Note (pg [facility]: [id number], NS: [phone])    SUBJECTIVE  The patient was seen and examined. No acute events overnight. Pain is controlled on PO meds, patient has no concerns.    OBJECTIVE  ___________________________________________________  VITAL SIGNS / I&O's   Vital Signs Last 24 Hrs  T(C): 36.6 (29 May 2023 04:51), Max: 37.1 (28 May 2023 09:32)  T(F): 97.9 (29 May 2023 04:51), Max: 98.7 (28 May 2023 09:32)  HR: 70 (29 May 2023 04:51) (70 - 88)  BP: 115/75 (29 May 2023 04:51) (114/71 - 130/80)  BP(mean): --  ABP: --  ABP(mean): --  RR: 18 (29 May 2023 04:51) (18 - 18)  SpO2: 100% (29 May 2023 04:51) (97% - 100%)    O2 Parameters below as of 29 May 2023 04:51  Patient On (Oxygen Delivery Method): room air      I&O's Detail    28 May 2023 07:01  -  29 May 2023 07:00  --------------------------------------------------------  IN:    Oral Fluid: 1440 mL  Total IN: 1440 mL    OUT:    Bulb (mL): 15 mL    Bulb (mL): 10 mL    Voided (mL): 3500 mL  Total OUT: 3525 mL    Total NET: -2085 mL    ___________________________________________________  PHYSICAL EXAM    General: NAD, Lying in bed comfortably  Neuro: Alert and answering questions appropriately   HEENT: Grossly normal, EOMI  Cardio: regular rate  Resp: unlabored breathing  Extremities:   - L axilla skin graft intact, soft, flat, no collections, incision c/d/i, small opening along inferior posterior incision, +doppler signal, wound vac to suction, JPx2 S/S  Skin: L thigh donor site w/ aquacel and tegaderm reinforced with ABD pad    ___________________________________________________  LABS            CAPILLARY BLOOD GLUCOSE              ___________________________________________________  MICRO  Recent Cultures:    ___________________________________________________  MEDICATIONS  (STANDING):  acetaminophen     Tablet .. 975 milliGRAM(s) Oral every 6 hours  bacitracin   Ointment 1 Application(s) Topical daily  enoxaparin Injectable 40 milliGRAM(s) SubCutaneous every 24 hours  ferrous    sulfate 325 milliGRAM(s) Oral daily  HYDROmorphone PCA (1 mG/mL) 30 milliLiter(s) PCA Continuous PCA Continuous  lactated ringers. 1000 milliLiter(s) (30 mL/Hr) IV Continuous <Continuous>  senna 2 Tablet(s) Oral at bedtime    MEDICATIONS  (PRN):  HYDROmorphone PCA (1 mG/mL) Rescue Clinician Bolus 0.5 milliGRAM(s) IV Push every 15 minutes PRN for Pain Scale GREATER THAN 6  naloxone Injectable 0.1 milliGRAM(s) IV Push every 3 minutes PRN For ANY of the following changes in patient status:  A. RR LESS THAN 10 breaths per minute, B. Oxygen saturation LESS THAN 90%, C. Sedation score of 6  ondansetron Injectable 4 milliGRAM(s) IV Push every 6 hours PRN Nausea  polyethylene glycol 3350 17 Gram(s) Oral daily PRN for constipation

## 2023-05-29 NOTE — PROGRESS NOTE ADULT - ASSESSMENT
32M w/ hx hidradenitis suppurativa s/p excision of buttocks hidradenitis w/ STSG recon 2/23 and L axillary excision 4/23 now s/p L thoracodorsal flap to L axilla and STSG 5/25.    Plan:  - monitor doppler signal on flap per team  - Pain: PO medications, well tolerated to this point  - arm needs to be abducted 90 degrees and flexed 90 degrees at the elbow to avoid tension on flap  - baci to incision  - plan to take down L axilla vac tuesday, 5/30  - aquacel to be taken down POD5  - maintain aquacel/tegaderm over donor site - can reinforce with ABD pad as needed  - wound care consult for R axilla hidradenitis -- appreciate recs  - regular diet  - DVT ppx  - OOB/encourage ambulation, must keep arm flexed & abducted at 90 degrees as above    Donnie Wilkeson  Plastic Surgery  #[id number] [facility] pager  [phone] [facility] pager  Available on teams

## 2023-05-30 ENCOUNTER — TRANSCRIPTION ENCOUNTER (OUTPATIENT)
Age: 33
End: 2023-05-30

## 2023-05-30 RX ADMIN — Medication 975 MILLIGRAM(S): at 12:51

## 2023-05-30 RX ADMIN — OXYCODONE HYDROCHLORIDE 10 MILLIGRAM(S): 5 TABLET ORAL at 06:38

## 2023-05-30 RX ADMIN — Medication 325 MILLIGRAM(S): at 12:21

## 2023-05-30 RX ADMIN — Medication 975 MILLIGRAM(S): at 05:32

## 2023-05-30 RX ADMIN — Medication 1 APPLICATION(S): at 12:21

## 2023-05-30 RX ADMIN — OXYCODONE HYDROCHLORIDE 10 MILLIGRAM(S): 5 TABLET ORAL at 16:55

## 2023-05-30 RX ADMIN — ENOXAPARIN SODIUM 40 MILLIGRAM(S): 100 INJECTION SUBCUTANEOUS at 12:21

## 2023-05-30 RX ADMIN — Medication 975 MILLIGRAM(S): at 12:21

## 2023-05-30 RX ADMIN — Medication 975 MILLIGRAM(S): at 06:32

## 2023-05-30 NOTE — DISCHARGE NOTE PROVIDER - PROVIDER TOKENS
PROVIDER:[TOKEN:[[id number]],FOLLOWUP:[1 week]],PROVIDER:[TOKEN:[[id number]],FOLLOWUP:[1 week]] PROVIDER:[TOKEN:[[id number]],FOLLOWUP:[1 week]],PROVIDER:[TOKEN:[[id number]],FOLLOWUP:[1 week]],PROVIDER:[TOKEN:[[id number]]] PROVIDER:[TOKEN:[[id number]],FOLLOWUP:[1 week]],PROVIDER:[TOKEN:[[id number]]]

## 2023-05-30 NOTE — DISCHARGE NOTE PROVIDER - HOSPITAL COURSE
32  year male PMhx RBBB, hidradenitis suppurativa, was on Humira, S/P excision of hidradenitis of the buttocks with skin graft reconstruction (2/2023) s/p excision of left axillary (4/2023) who presented for and underwent left thoracodorsal flap to left axilla and left thigh skin graft to left axilla 5/25. Patient tolerated operation well and there were no post operative complications identified. The patient's pain was controlled by PCA pump and then by PO pain medications after the pump was d/c'd on POD3. The wound vac over the L axillary skin graft was taken down on POD5. 32  year male PMhx RBBB, hidradenitis suppurativa, was on Humira, S/P excision of hidradenitis of the buttocks with skin graft reconstruction (2/2023) s/p excision of left axillary (4/2023) who presented for and underwent left thoracodorsal flap to left axilla and left thigh skin graft to left axilla 5/25. Patient tolerated operation well and there were no post operative complications identified. The patient's pain was controlled by PCA pump and then by PO pain medications after the pump was d/c'd on POD3. The wound vac over the L axillary skin graft was taken down on POD5 and covered with xeroform dressing. On day of discharge, the patient was hemodynamically stable, tolerating diet, ambulating well and pain controlled. The patient felt comfortable with discharge and was discharged to home with VNS for dressing changes and CJ care. 32  year male PMhx RBBB, hidradenitis suppurativa, was on Humira, S/P excision of hidradenitis of the buttocks with skin graft reconstruction (2/2023) s/p excision of left axillary (4/2023) who presented for and underwent left thoracodorsal flap to left axilla and left thigh skin graft to left axilla 5/25. Patient tolerated operation well and there were no post operative complications identified. The patient's pain was controlled by PCA pump and then by PO pain medications after the pump was d/c'd on POD3. The wound vac over the L axillary skin graft was taken down on POD5 and covered with xeroform dressing. On day of discharge, the patient was hemodynamically stable, tolerating diet, ambulating well and pain controlled. The patient felt comfortable with discharge, was instructed to follow up with Dr. Tariq in 1 week and was discharged to home with VNS for dressing changes and CJ care.

## 2023-05-30 NOTE — DISCHARGE NOTE PROVIDER - NSDCFUADDINST_GEN_ALL_CORE_FT
WOUND CARE: L thigh - please let the xeroform gauze fall off over time, you can trim the edges as needed for comfort.  L axilla - apply xeroform gauze and abd pad with tape, change daily  BATHING: Please do not submerge wound underwater. You may shower from the waist down and/or sponge bathe.  ACTIVITY: No heavy lifting anything more than 10-15lbs or straining. Please keep left arm out at 90 degrees with elbow flexed down at 90 degrees  NOTIFY YOUR SURGEON IF: You have any excessive bleeding or pus draining from your wound, any fever (over 100.4 F) or chills, persistent nausea/vomiting with inability to tolerate food or liquids, persistent diarrhea, or if your pain is not controlled on your discharge pain medications.  FOLLOW-UP:  1. Please call to make a follow-up appointment in 1-2 weeks upon discharge from the hospital - Please call immediately upon discharge to schedule the appointment  2. Please follow up with your primary care physician in one week regarding your hospitalization.   4. Please follow up with colorectal surgery, Dr. Rodriges for fistula management. WOUND CARE: L thigh - please let the xeroform gauze fall off over time, you can trim the edges as needed for comfort.  L axilla - apply xeroform, gauze and abd pad with tape, change daily  BATHING: Please do not submerge wound underwater. You may shower from the waist down and/or sponge bathe.  ACTIVITY: No heavy lifting anything more than 10-15lbs or straining. Please keep left arm out at 90 degrees with elbow flexed down at 90 degrees  NOTIFY YOUR SURGEON IF: You have any excessive bleeding or pus draining from your wound, any fever (over 100.4 F) or chills, persistent nausea/vomiting with inability to tolerate food or liquids, persistent diarrhea, or if your pain is not controlled on your discharge pain medications.  FOLLOW-UP:  1. Please call to make a follow-up appointment in 1-2 weeks upon discharge from the hospital - Please call immediately upon discharge to schedule the appointment  2. Please follow up with your primary care physician in one week regarding your hospitalization.   4. Please follow up with colorectal surgery, Dr. Rodriges for fistula management. DRAIN CARE: Ace Montoya drain (CJ Drain)- If you go home with a CJ drain it is important that you measure and record the fluid that is in it (output) on the output record sheet. Please bring the output record sheet to your follow-up appointment. Keep the drain secured to your clothing with a safety pin at all times to avoid accidental displacement. Monitor the insertion site for redness, pain, swelling, or purulent drainage.  You may shower with the drain. Wash around the drain with soap and water, pat dry, and reapply dressing. If you noticed a sudden change in the color or amount of fluid in the drain, please contact your surgeon’s office.  Your drain will be removed at an outpatient follow up appointment.    WOUND CARE: L thigh - please let the xeroform gauze fall off over time, you can trim the edges as needed for comfort.  L axilla - apply xeroform, gauze and abd pad with tape, change daily  BATHING: Please do not submerge wound underwater. You may shower from the waist down and/or sponge bathe.  ACTIVITY: No heavy lifting anything more than 10-15lbs or straining. Please keep left arm out at 90 degrees with elbow flexed down at 90 degrees  NOTIFY YOUR SURGEON IF: You have any excessive bleeding or pus draining from your wound, any fever (over 100.4 F) or chills, persistent nausea/vomiting with inability to tolerate food or liquids, persistent diarrhea, or if your pain is not controlled on your discharge pain medications.  FOLLOW-UP:  1. Please call to make a follow-up appointment with Dr. Tariq in 1-2 weeks upon discharge from the hospital - Please call immediately upon discharge to schedule the appointment  2. Please follow up with your primary care physician in one week regarding your hospitalization.   4. Please follow up with colorectal surgery, Dr. Rodriges for fistula management. DRAIN CARE: Ace Montoya drain (CJ Drain)- If you go home with a CJ drain it is important that you measure and record the fluid that is in it (output) on the output record sheet. Please bring the output record sheet to your follow-up appointment. Keep the drain secured to your clothing with a safety pin at all times to avoid accidental displacement. Monitor the insertion site for redness, pain, swelling, or purulent drainage.  You may shower with the drain. Wash around the drain with soap and water, pat dry, and reapply dressing. If you noticed a sudden change in the color or amount of fluid in the drain, please contact your surgeon’s office.  Your drain will be removed at an outpatient follow up appointment.    WOUND CARE: L thigh - please let the xeroform gauze fall off over time, you can trim the edges as needed for comfort.  L axilla - apply xeroform, gauze and abd pad with tape, change daily  BATHING: Please do not submerge wound underwater. You may shower from the waist down and/or sponge bathe and can wipe L axilla with dressing changes.  ACTIVITY: No heavy lifting anything more than 10-15lbs or straining. Please keep left arm out at 90 degrees with elbow flexed down at 90 degrees  NOTIFY YOUR SURGEON IF: You have any excessive bleeding or pus draining from your wound, any fever (over 100.4 F) or chills, persistent nausea/vomiting with inability to tolerate food or liquids, persistent diarrhea, or if your pain is not controlled on your discharge pain medications.  FOLLOW-UP:  1. Please call to make a follow-up appointment with Dr. Tariq in 1-2 weeks upon discharge from the hospital - Please call immediately upon discharge to schedule the appointment  2. Please follow up with your primary care physician in one week regarding your hospitalization.   4. Please follow up with colorectal surgery, Dr. Rodriges for fistula management. DRAIN CARE: Ace Montoya drain (CJ Drain)- If you go home with a CJ drain it is important that you measure and record the fluid that is in it (output) on the output record sheet. Please bring the output record sheet to your follow-up appointment. Keep the drain secured to your clothing with a safety pin at all times to avoid accidental displacement. Monitor the insertion site for redness, pain, swelling, or purulent drainage.  You may shower with the drain. Wash around the drain with soap and water, pat dry, and reapply dressing. If you noticed a sudden change in the color or amount of fluid in the drain, please contact your surgeon’s office.  Your drain will be removed at an outpatient follow up appointment.    WOUND CARE: L thigh - please let the xeroform gauze fall off over time, you can trim the edges as needed for comfort.  L axilla - apply xeroform, gauze and abd pad with tape, change daily  R axilla-  wash w/ Baby soap for maintenance (J&J) care     Can use Vashe cleanser for acute flare ups          use Aquacel +ABD pad w/ Spandage Stretch netting to hold in place  BATHING: Please do not submerge wound underwater. You may shower from the waist down and/or sponge bathe and can wipe L axilla with dressing changes.  ACTIVITY: No heavy lifting anything more than 10-15lbs or straining. Please keep left arm out at 90 degrees with elbow flexed down at 90 degrees  NOTIFY YOUR SURGEON IF: You have any excessive bleeding or pus draining from your wound, any fever (over 100.4 F) or chills, persistent nausea/vomiting with inability to tolerate food or liquids, persistent diarrhea, or if your pain is not controlled on your discharge pain medications.  FOLLOW-UP:  1. Please call to make a follow-up appointment with Dr. Tariq in 1-2 weeks upon discharge from the hospital - Please call immediately upon discharge to schedule the appointment  2. Please follow up with your primary care physician in one week regarding your hospitalization.   4. Please follow up with colorectal surgery, Dr. Rodriges for fistula management.

## 2023-05-30 NOTE — DISCHARGE NOTE PROVIDER - CARE PROVIDER_API CALL
Margarito Tariq  Plastic Surgery  [address], [address]  [place] [address]  Phone: [phone]  Fax: [phone]  Follow Up Time: 1 week    Edgar Villareal  Plastic Surgery  [address] [place] [address], [address]  [place] [address]  Phone: [phone]  Fax: [phone]  Follow Up Time: 1 week   Margarito Tariq  Plastic Surgery  [address] [facility], [address]  [place] [address]  Phone: [phone]  Fax: [phone]  Follow Up Time: 1 week    Edgar Villareal  Plastic Surgery  [address], [address]  [place] [address]  Phone: [phone]  Fax: [phone]  Follow Up Time: 1 week    Stefan Rodriges  Colon/Rectal Surgery  [address], [address]  [place] [address]  Phone: [phone]  Fax: [phone]  Follow Up Time:    Margarito Tariq  Plastic Surgery  [address] [facility] [address] [facility], [address]  [place] [address]  Phone: [phone]  Fax: [phone]  Follow Up Time: 1 week    Stefan Rodriges  Colon/Rectal Surgery  [address] [place] [address], [address]  [place] [address]  Phone: [phone]  Fax: [phone]  Follow Up Time:

## 2023-05-30 NOTE — PROGRESS NOTE ADULT - ASSESSMENT
32M w/ hx hidradenitis suppurativa s/p excision of buttocks hidradenitis w/ STSG recon 2/23 and L axillary excision 4/23 now s/p L thoracodorsal flap to L axilla and STSG 5/25.    Plan:  - monitor doppler signal on flap per team  - Pain: PO medications, well tolerated to this point  - arm needs to be abducted 90 degrees and flexed 90 degrees at the elbow to avoid tension on flap  - baci to incision  - L axilla vac d/c'd  - Aquacel d/c'd  - maintain aquacel/tegaderm over donor site - can reinforce with ABD pad as needed  - wound care consult for R axilla hidradenitis -- appreciate recs  - regular diet  - DVT ppx  - OOB/encourage ambulation, must keep arm flexed & abducted at 90 degrees as above  - Dispo planning    Linus Padilla  Plastic Surgery Resident PGY-1  [facility]: [phone]  [facility]: [id number]  Available on [employer] Teams

## 2023-05-30 NOTE — PROGRESS NOTE ADULT - SUBJECTIVE AND OBJECTIVE BOX
Plastic Surgery Progress Note (pg [facility]: [id number], [facility]: [phone])    SUBJECTIVE  The patient was seen and examined. No acute events overnight.    OBJECTIVE  ___________________________________________________  VITAL SIGNS / I&O's   Vital Signs Last 24 Hrs  T(C): 36.7 (30 May 2023 04:34), Max: 37.1 (29 May 2023 12:55)  T(F): 98.1 (30 May 2023 04:34), Max: 98.7 (29 May 2023 12:55)  HR: 80 (30 May 2023 04:34) (70 - 90)  BP: 122/77 (30 May 2023 04:34) (116/71 - 126/71)  BP(mean): --  RR: 18 (30 May 2023 04:34) (18 - 18)  SpO2: 98% (30 May 2023 04:34) (98% - 100%)    Parameters below as of 30 May 2023 04:34  Patient On (Oxygen Delivery Method): room air          29 May 2023 07:01  -  30 May 2023 07:00  --------------------------------------------------------  IN:    Oral Fluid: 840 mL  Total IN: 840 mL    OUT:    Bulb (mL): 30 mL    Bulb (mL): 25 mL    Voided (mL): 1400 mL  Total OUT: 1455 mL    Total NET: -615 mL        ___________________________________________________  PHYSICAL EXAM  General: NAD, Lying in bed comfortably  Neuro: Alert and answering questions appropriately   HEENT: Grossly normal, EOMI  Cardio: regular rate  Resp: unlabored breathing  Extremities:   - L axilla skin graft intact, soft, flat, no collections, incision c/d/i, small opening along inferior posterior incision, +doppler signal, wound vac d/c'd during rounds with healthy underlying skin graft, JPx2 S/S  Skin: L thigh donor site w/ aquacel and tegaderm reinforced with ABD pad      ___________________________________________________  LABS            CAPILLARY BLOOD GLUCOSE              ___________________________________________________  MICRO  Recent Cultures:    ___________________________________________________  MEDICATIONS  (STANDING):  acetaminophen     Tablet .. 975 milliGRAM(s) Oral every 6 hours  bacitracin   Ointment 1 Application(s) Topical daily  enoxaparin Injectable 40 milliGRAM(s) SubCutaneous every 24 hours  ferrous    sulfate 325 milliGRAM(s) Oral daily  senna 2 Tablet(s) Oral at bedtime    MEDICATIONS  (PRN):  artificial tears (preservative free) Ophthalmic Solution 1 Drop(s) Both EYES every 2 hours PRN Dry Eyes  calcium carbonate    500 mG (Tums) Chewable 1 Tablet(s) Chew every 4 hours PRN Heartburn  diphenhydrAMINE Injectable 25 milliGRAM(s) IV Push every 4 hours PRN Rash and/or Itching  melatonin 3 milliGRAM(s) Oral at bedtime PRN Insomnia  ondansetron Injectable 4 milliGRAM(s) IV Push every 4 hours PRN Nausea and/or Vomiting  oxyCODONE    IR 5 milliGRAM(s) Oral every 4 hours PRN Moderate Pain (4 - 6)  oxyCODONE    IR 10 milliGRAM(s) Oral every 4 hours PRN Severe Pain (7 - 10)  polyethylene glycol 3350 17 Gram(s) Oral daily PRN for constipation

## 2023-05-30 NOTE — DISCHARGE NOTE PROVIDER - NSDCMRMEDTOKEN_GEN_ALL_CORE_FT
acetaminophen 325 mg oral tablet: 3 tab(s) orally every 12 hours  amoxicillin-clavulanate 875 mg-125 mg oral tablet: 1 tab(s) orally every 12 hours  ferrous sulfate 325 mg (65 mg elemental iron) oral tablet: 1 tab(s) orally once a day  medical cannabis:   naproxen 500 mg oral tablet: 1 tab(s) orally 2 times a day as needed for  moderate pain  polyethylene glycol 3350 oral powder for reconstitution: 17 gram(s) orally once a day as needed for  constipation  Probiotic 10 Ultra Strength oral capsule: 1 cap(s) orally once a day  Rolling walker: Use as directed. JESSICA 99, ICD10: M70.72  senna leaf extract oral tablet: 2 tab(s) orally once a day (at bedtime)   acetaminophen 325 mg oral tablet: 3 tab(s) orally every 12 hours  bacitracin 500 units/g topical ointment: 1 Apply topically to affected area once a day  ferrous sulfate 325 mg (65 mg elemental iron) oral tablet: 1 tab(s) orally once a day  medical cannabis:   naproxen 500 mg oral tablet: 1 tab(s) orally 2 times a day as needed for  moderate pain  polyethylene glycol 3350 oral powder for reconstitution: 17 gram(s) orally once a day as needed for  constipation  Probiotic 10 Ultra Strength oral capsule: 1 cap(s) orally once a day  Rolling walker: Use as directed. JESSICA 99, ICD10: M70.72  senna leaf extract oral tablet: 2 tab(s) orally once a day (at bedtime)   acetaminophen 325 mg oral tablet: 3 tab(s) orally every 12 hours  bacitracin 500 units/g topical ointment: Apply topically to affected area once a day Apply to incisions once daily  ferrous sulfate 325 mg (65 mg elemental iron) oral tablet: 1 tab(s) orally once a day  medical cannabis:   naproxen 500 mg oral tablet: 1 tab(s) orally 2 times a day as needed for  moderate pain  oxyCODONE 5 mg oral tablet: 1 tab(s) orally every 6 hours as needed for  breakthrough pain MDD: 4  polyethylene glycol 3350 oral powder for reconstitution: 17 gram(s) orally once a day as needed for  constipation  Probiotic 10 Ultra Strength oral capsule: 1 cap(s) orally once a day  Rolling walker: Use as directed. JESSICA 99, ICD10: M70.72  senna leaf extract oral tablet: 2 tab(s) orally once a day (at bedtime)

## 2023-05-31 ENCOUNTER — TRANSCRIPTION ENCOUNTER (OUTPATIENT)
Age: 33
End: 2023-05-31

## 2023-05-31 VITALS
TEMPERATURE: 99 F | HEART RATE: 102 BPM | OXYGEN SATURATION: 98 % | RESPIRATION RATE: 18 BRPM | SYSTOLIC BLOOD PRESSURE: 131 MMHG | DIASTOLIC BLOOD PRESSURE: 79 MMHG

## 2023-05-31 PROCEDURE — C1889: CPT

## 2023-05-31 PROCEDURE — 86850 RBC ANTIBODY SCREEN: CPT

## 2023-05-31 PROCEDURE — 86901 BLOOD TYPING SEROLOGIC RH(D): CPT

## 2023-05-31 PROCEDURE — C9399: CPT

## 2023-05-31 PROCEDURE — 86900 BLOOD TYPING SEROLOGIC ABO: CPT

## 2023-05-31 PROCEDURE — 99232 SBSQ HOSP IP/OBS MODERATE 35: CPT

## 2023-05-31 RX ORDER — OXYCODONE HYDROCHLORIDE 5 MG/1
1 TABLET ORAL
Qty: 15 | Refills: 0
Start: 2023-05-31

## 2023-05-31 RX ORDER — BACITRACIN ZINC 500 UNIT/G
1 OINTMENT IN PACKET (EA) TOPICAL
Qty: 0 | Refills: 0 | DISCHARGE
Start: 2023-05-31

## 2023-05-31 RX ADMIN — Medication 975 MILLIGRAM(S): at 05:57

## 2023-05-31 RX ADMIN — OXYCODONE HYDROCHLORIDE 10 MILLIGRAM(S): 5 TABLET ORAL at 00:21

## 2023-05-31 RX ADMIN — Medication 1 APPLICATION(S): at 12:50

## 2023-05-31 RX ADMIN — ENOXAPARIN SODIUM 40 MILLIGRAM(S): 100 INJECTION SUBCUTANEOUS at 17:49

## 2023-05-31 RX ADMIN — OXYCODONE HYDROCHLORIDE 10 MILLIGRAM(S): 5 TABLET ORAL at 01:30

## 2023-05-31 RX ADMIN — Medication 975 MILLIGRAM(S): at 17:48

## 2023-05-31 RX ADMIN — Medication 325 MILLIGRAM(S): at 17:49

## 2023-05-31 RX ADMIN — Medication 975 MILLIGRAM(S): at 05:50

## 2023-05-31 NOTE — DISCHARGE NOTE NURSING/CASE MANAGEMENT/SOCIAL WORK - NSDCPNINST_GEN_ALL_CORE
call MD // go to ER:  temperature, nausea, vomiting, temperature, any changes to wounds:  bleeding, increased drainage with foul odor

## 2023-05-31 NOTE — PROGRESS NOTE ADULT - SUBJECTIVE AND OBJECTIVE BOX
[facility]-- WOUND TEAM -- FOLLOW UP NOTE  --------------------------------------------------------------------------------    24 hour events/subjective:    Afebrile  tolerating po w/o n/v  Tolerating dressings     no odor, less drainage, no pain     foam sometimes falls off  dc planning      Diet:  Diet, Regular (05-25-23 @ 17:55)      ROS: General/ SKIN/ MSK see HPI  all other systems negative      ALLERGIES & MEDICATIONS  --------------------------------------------------------------------------------  No Known Allergies      STANDING INPATIENT MEDICATIONS  acetaminophen Tablet 975 milliGRAM(s) Oral every 6 hours  bacitracin Ointment 1 Application(s) Topical daily  enoxaparin Injectable 40 milliGRAM(s) SubCutaneous every 24 hours  ferrous sulfate 325 milliGRAM(s) Oral daily  senna 2 Tablet(s) Oral at bedtime      PRN INPATIENT MEDICATION  artificial tears (preservative free) Ophthalmic Solution 1 Drop(s) Both EYES every 2 hours PRN  calcium carbonate 500 mG (Tums) Chewable 1 Tablet(s) Chew every 4 hours PRN  melatonin 3 milliGRAM(s) Oral at bedtime PRN  ondansetron Injectable 4 milliGRAM(s) IV Push every 4 hours PRN  oxyCODONE IR 5 milliGRAM(s) Oral every 4 hours PRN  oxyCODONE IR 10 milliGRAM(s) Oral every 4 hours PRN  polyethylene glycol 3350 17 Gram(s) Oral daily PRN        VITALS/PHYSICAL EXAM  --------------------------------------------------------------------------------  T(C): 36.7 (05-31-23 @ 13:17), Max: 37.1 (05-31-23 @ 09:06)  HR: 85 (05-31-23 @ 13:17) (70 - 104)  BP: 120/75 (05-31-23 @ 13:17) (109/75 - 127/78)  RR: 18 (05-31-23 @ 13:17) (18 - 18)  SpO2: 97% (05-31-23 @ 13:17) (97% - 99%)  Wt(kg): --      NAD,   A&Ox3, WD/ WN/ WG  Versa Care P500 bed  HEENT:  NC/AT, EOMI, sclera clear, mucosa moist, throat clear, trachea midline, neck supple  Neurology:strength & sensation grossly intact  Psych: calm/ appropriate  Musculoskeletal:  FROM, no deformities/ contractures  Vascular: BLE equally warm,  no cyanosis, clubbing, edema, nor acute ischemia  Skin:  moist w/ good turgor  Lt thigh donor site w/ Xeroform in place  Lt Axilla dressing C/D/I as per PRS    Rt Axilla     induration w/thickened scarred skin w/ scattered deep sinus tracts    3 wounds each 0.5cm x 0.5cm x 0.5cm, and       a 4th distal wound also  0.5cm x 0.5cm x 0.5cm with tunneling at 6:00 of 3cm,    no blistering      (+) seromucoid drainage     (+)TTP  No odor, erythema, increased warmth,  fluctuance, nor crepitus

## 2023-05-31 NOTE — DISCHARGE NOTE NURSING/CASE MANAGEMENT/SOCIAL WORK - PATIENT PORTAL LINK FT
You can access the FollowMyHealth Patient Portal offered by [facility] by registering at the following website: [email or web address]. By joining [facility]’s FollowMyHealth portal, you will also be able to view your health information using other applications (apps) compatible with our system.

## 2023-05-31 NOTE — PROGRESS NOTE ADULT - ASSESSMENT
32M w/ hx hidradenitis suppurativa s/p excision of buttocks hidradenitis w/ STSG recon 2/23 and L axillary excision 4/23 now s/p L thoracodorsal flap to L axilla and STSG 5/25.    Plan:  - monitor doppler signal on flap per team  - Pain: PO medications, well tolerated to this point  - arm needs to be abducted 90 degrees and flexed 90 degrees at the elbow to avoid tension on flap  - baci to incision  - maintain xeroform open to air over donor site  - wound care consult for R axilla hidradenitis -- appreciate recs  - regular diet  - DVT ppx  - OOB/encourage ambulation, must keep arm flexed & abducted at 90 degrees as above  - Dispo planning w/ VNS    [facility]   [phone]

## 2023-05-31 NOTE — PROGRESS NOTE ADULT - ASSESSMENT
32M w/ hx hidradenitis suppurativa s/p excision of buttocks hidradenitis w/ STSG recon 2/23 and L axillary excision 4/23 now s/p L thoracodorsal flap to L axilla and STSG 5/25.      Wound Consult requested to assist w/ management of Rt Axillary Hydradenitis Suprativa    rt axilla washing  w/ Baby soap for maintenance (J&J) care     Can use Vashe cleanser or Dakin's 1/4 strength solution for acute flare ups and while in the hospital  Consider using Aquacel (Ag) +ABD pad w/ Spandage Stretch netting to hold in place       as Foam dressing doesn't stay in place     pt given Vashe and other supplies to get started with at home  Abx per PRS  Moisturize intact skin w/ SWEEN cream BID  Nutrition Consult for optimization        encourage high quality protein, clark/ prosource, MVI & Vit C to promote wound healing  Continue turning and positioning w/ offloading assistive devices as per protocol  ontinue w/ attends under pads and Pericare w/ alaniz maintenance as per protocol  Waffle Cushion to chair when oob to chair  Continue w/ low air loss pressure redistribution bed surface   ROHO cushion for wheel chair upon discharge home  Care as per PRS,  remain available as requested  Upon discharge f/u as outpatient at [facility] [address] [phone]  D/w team, attmichelle & RN  Nely Rivas PA-C CWS [id number]  Nights/ Weekends/ Holidays please call:  General Surgery Consult pager ([phone]) for emergencies  Wound PT for multilayer leg wrapping or VAC issues (x [phone])   I spent 35minutes face to face w/ this pt of which more than 50% of the time was spent counseling & coordinating care of this pt.  32M w/ hx hidradenitis suppurativa s/p excision of buttocks hidradenitis w/ STSG recon 2/23 and L axillary excision 4/23 now s/p L thoracodorsal flap to L axilla and STSG 5/25.      Wound Consult requested to assist w/ management of Rt Axillary Hydradenitis Suprativa    rt axilla washing  w/ Baby soap for maintenance (J&J) care     Can use Vashe cleanser  for acute flare ups and while in the hospital  Consider using Aquacel +ABD pad w/ Spandage Stretch netting to hold in place       as Foam dressing doesn't stay in place     pt given Vashe and other supplies to get started with at home  Abx per PRS  Moisturize intact skin w/ SWEEN cream BID  Nutrition Consult for optimization        encourage high quality protein, clark/ prosource, MVI & Vit C to promote wound healing  Continue turning and positioning w/ offloading assistive devices as per protocol  ontinue w/ attends under pads and Pericare w/ alanzi maintenance as per protocol  Waffle Cushion to chair when oob to chair  Continue w/ low air loss pressure redistribution bed surface   ROHO cushion for wheel chair upon discharge home  Care as per PRS,  remain available as requested  Upon discharge f/u as outpatient at [facility] [address] [facility] [phone]  D/w team, attmichelle & RN  Nely Rivas PA-C CWS [id number]  Nights/ Weekends/ Holidays please call:  General Surgery Consult pager ([phone]) for emergencies  Wound PT for multilayer leg wrapping or VAC issues (x [phone])   I spent 35minutes face to face w/ this pt of which more than 50% of the time was spent counseling & coordinating care of this pt.

## 2023-05-31 NOTE — DISCHARGE NOTE NURSING/CASE MANAGEMENT/SOCIAL WORK - NSDCPEFALRISK_GEN_ALL_CORE
For information on Fall & Injury Prevention, visit: [email or web address] OR  [email or web address] OR  https://www.cdc.gov/steadi/patient.html

## 2023-05-31 NOTE — PROGRESS NOTE ADULT - PROVIDER SPECIALTY LIST ADULT
Anesthesia
Anesthesia
Plastic Surgery
Anesthesia
Plastic Surgery
Plastic Surgery
Wound Care
Plastic Surgery

## 2023-05-31 NOTE — PROGRESS NOTE ADULT - SUBJECTIVE AND OBJECTIVE BOX
Plastic Surgery Progress Note (pg [facility]: [id number], [facility]: [phone])    SUBJECTIVE  The patient was seen and examined. No acute events overnight. Pain is well controlled.    OBJECTIVE  ___________________________________________________  VITAL SIGNS / I&O's   Vital Signs Last 24 Hrs  T(C): 36.7 (31 May 2023 05:32), Max: 37 (30 May 2023 16:26)  T(F): 98.1 (31 May 2023 05:32), Max: 98.6 (30 May 2023 16:26)  HR: 96 (31 May 2023 05:32) (70 - 104)  BP: 109/75 (31 May 2023 05:32) (109/75 - 128/80)  BP(mean): --  RR: 18 (31 May 2023 05:32) (18 - 18)  SpO2: 99% (31 May 2023 05:32) (97% - 99%)    Parameters below as of 31 May 2023 05:32  Patient On (Oxygen Delivery Method): room air          30 May 2023 07:01  -  31 May 2023 07:00  --------------------------------------------------------  IN:    Oral Fluid: 1440 mL  Total IN: 1440 mL    OUT:    Blood Loss (mL): 0 mL    Bulb (mL): 5 mL    Bulb (mL): 15 mL    Voided (mL): 3300 mL  Total OUT: 3320 mL    Total NET: -1880 mL      31 May 2023 07:01  -  31 May 2023 08:07  --------------------------------------------------------  IN:    Oral Fluid: 240 mL  Total IN: 240 mL    OUT:  Total OUT: 0 mL    Total NET: 240 mL        ___________________________________________________  PHYSICAL EXAM    General: NAD, Lying in bed comfortably  Neuro: Alert and answering questions appropriately   HEENT: Grossly normal, EOMI  Cardio: regular rate  Resp: unlabored breathing  Extremities:   - L axilla skin graft intact, soft, flat, no collections, incision c/d/i, healing small opening along inferior posterior incision, +doppler signal, JPx2 S/S  Skin: L thigh donor site w/ xeroform in place    ___________________________________________________  LABS            CAPILLARY BLOOD GLUCOSE              ___________________________________________________  MICRO  Recent Cultures:    ___________________________________________________  MEDICATIONS  (STANDING):  acetaminophen     Tablet .. 975 milliGRAM(s) Oral every 6 hours  bacitracin   Ointment 1 Application(s) Topical daily  enoxaparin Injectable 40 milliGRAM(s) SubCutaneous every 24 hours  ferrous    sulfate 325 milliGRAM(s) Oral daily  senna 2 Tablet(s) Oral at bedtime    MEDICATIONS  (PRN):  artificial tears (preservative free) Ophthalmic Solution 1 Drop(s) Both EYES every 2 hours PRN Dry Eyes  calcium carbonate    500 mG (Tums) Chewable 1 Tablet(s) Chew every 4 hours PRN Heartburn  diphenhydrAMINE Injectable 25 milliGRAM(s) IV Push every 4 hours PRN Rash and/or Itching  melatonin 3 milliGRAM(s) Oral at bedtime PRN Insomnia  ondansetron Injectable 4 milliGRAM(s) IV Push every 4 hours PRN Nausea and/or Vomiting  oxyCODONE    IR 5 milliGRAM(s) Oral every 4 hours PRN Moderate Pain (4 - 6)  oxyCODONE    IR 10 milliGRAM(s) Oral every 4 hours PRN Severe Pain (7 - 10)  polyethylene glycol 3350 17 Gram(s) Oral daily PRN for constipation

## 2023-06-02 ENCOUNTER — NON-APPOINTMENT (OUTPATIENT)
Age: 33
End: 2023-06-02

## 2023-06-06 ENCOUNTER — APPOINTMENT (OUTPATIENT)
Dept: INTERNAL MEDICINE | Facility: CLINIC | Age: 33
End: 2023-06-06
Payer: MEDICAID

## 2023-06-06 VITALS
BODY MASS INDEX: 23.3 KG/M2 | WEIGHT: 172 LBS | TEMPERATURE: 97.6 F | SYSTOLIC BLOOD PRESSURE: 117 MMHG | DIASTOLIC BLOOD PRESSURE: 77 MMHG | HEIGHT: 72 IN | HEART RATE: 98 BPM | OXYGEN SATURATION: 99 %

## 2023-06-06 PROCEDURE — 99496 TRANSJ CARE MGMT HIGH F2F 7D: CPT

## 2023-06-06 NOTE — PHYSICAL EXAM
[No Acute Distress] : no acute distress [No JVD] : no jugular venous distention [Clear to Auscultation] : lungs were clear to auscultation bilaterally [Regular Rhythm] : with a regular rhythm [No Edema] : there was no peripheral edema [Soft] : abdomen soft [No Focal Deficits] : no focal deficits [Alert and Oriented x3] : oriented to person, place, and time [de-identified] : dressing intact Laxillary skin region

## 2023-06-06 NOTE — HISTORY OF PRESENT ILLNESS
[Admitted on: ___] : The patient was admitted on [unfilled] [Discharge Med List] : discharge medication list [Discharged on ___] : discharged on [unfilled] [FreeTextEntry2] : patient was their for skin graft L axilla skin\par will be seen by surgery tomorrow for evaluation of wound\par no current complaints\par stopped HUMARA as per dermatology \par

## 2023-06-06 NOTE — PLAN
[FreeTextEntry1] : continue current treatment to L axillary surgical site\par to see surgery in AM\par BLOOD sent for routine labs

## 2023-06-07 ENCOUNTER — APPOINTMENT (OUTPATIENT)
Dept: PLASTIC SURGERY | Facility: CLINIC | Age: 33
End: 2023-06-07
Payer: MEDICAID

## 2023-06-07 PROCEDURE — 99024 POSTOP FOLLOW-UP VISIT: CPT

## 2023-06-08 NOTE — PHYSICAL EXAM
[de-identified] : Flap healthy.  Minimal separation between flap and skin graft laterally.  Drain removed completely.  Incisions otherwise intact.  No erythema.  Minimal tenderness.\par \par Right axilla with Cheatham stage 3 hidradenitis.

## 2023-06-08 NOTE — HISTORY OF PRESENT ILLNESS
[FreeTextEntry1] : The patient returns 2 weeks following left axillary reconstruction with TDAP flap and STSG.  Drain output has been less than 10 cc a day.  He denies significant pain.

## 2023-06-14 ENCOUNTER — APPOINTMENT (OUTPATIENT)
Dept: PLASTIC SURGERY | Facility: CLINIC | Age: 33
End: 2023-06-14
Payer: MEDICAID

## 2023-06-14 PROCEDURE — 99024 POSTOP FOLLOW-UP VISIT: CPT

## 2023-06-14 PROCEDURE — 17250 CHEM CAUT OF GRANLTJ TISSUE: CPT | Mod: 58

## 2023-06-14 NOTE — PHYSICAL EXAM
[de-identified] : Flap healthy appearing.  Small areas of wound separation without significant erythema or drainage.  There is a tract of granulation tissue between the flap and the skin graft which was ablated with silver nitrate.

## 2023-06-14 NOTE — ASSESSMENT
[FreeTextEntry1] : Healing appropriately.  Continue wound care.  Follow-up in 1 week for reassessment.  Plan for excision of contralateral side at around 6 weeks following the prior operation.

## 2023-06-21 ENCOUNTER — APPOINTMENT (OUTPATIENT)
Dept: PLASTIC SURGERY | Facility: CLINIC | Age: 33
End: 2023-06-21
Payer: MEDICAID

## 2023-06-21 PROCEDURE — 99024 POSTOP FOLLOW-UP VISIT: CPT

## 2023-06-21 NOTE — HISTORY OF PRESENT ILLNESS
[FreeTextEntry1] : The patient returns 4 weeks following left axillary wound reconstruction with Tdap flap and split-thickness skin graft.  The patient denies issues at the site.  He continues to have visiting nurse follow-up.

## 2023-06-21 NOTE — ASSESSMENT
[FreeTextEntry1] : No evidence of infection.  Wound care and activity restrictions were reviewed.  Follow-up in 2 weeks for reassessment

## 2023-06-21 NOTE — PHYSICAL EXAM
[de-identified] : Flap healthy, adherent.  Skin graft healing appropriately.  Intervening area of granulation between flap and graft stable.  There has been central dehiscence of the back wound, healthy appearing base.  Visible sutures removed.

## 2023-06-21 NOTE — REASON FOR VISIT
[Post Op: _________] : a [unfilled] post op visit [FreeTextEntry1] : Dos: 5/25/23 S/P: Left axillary reconstruction with Tdap flap and STSG

## 2023-07-05 ENCOUNTER — APPOINTMENT (OUTPATIENT)
Dept: PLASTIC SURGERY | Facility: CLINIC | Age: 33
End: 2023-07-05
Payer: MEDICAID

## 2023-07-05 DIAGNOSIS — M25.619 STIFFNESS OF UNSPECIFIED SHOULDER, NOT ELSEWHERE CLASSIFIED: ICD-10-CM

## 2023-07-05 PROCEDURE — 99024 POSTOP FOLLOW-UP VISIT: CPT

## 2023-07-05 NOTE — REASON FOR VISIT
[Post Op: _________] : a [unfilled] post op visit [FreeTextEntry1] : Dos: 5/25/23 S/P: left axillary wound reconstruction with thoracodorsal artery  flap and stsg

## 2023-07-05 NOTE — ASSESSMENT
[FreeTextEntry1] : No evidence of infection.  Recommend discontinuation of Xeroform and instead applying Aquacel extra to the area of left axillary granulation.  Routine wound care for back.  We will plan for excision of the contralateral side.  Recommend PT initiation for left shoulder stiffness.

## 2023-07-05 NOTE — PHYSICAL EXAM
[de-identified] : Back wound with stable area of separation.  No erythema or drainage. [de-identified] : Left axilla with area of granulation between flap and skin graft.  This was treated with silver nitrate.  Otherwise healing appropriately.

## 2023-07-05 NOTE — HISTORY OF PRESENT ILLNESS
[FreeTextEntry1] : The patient returns almost 6 weeks from left axillary wound reconstruction with Tdap flap and skin graft.  He has been receiving regular wound care.  He denies any significant issues.

## 2023-07-19 ENCOUNTER — APPOINTMENT (OUTPATIENT)
Dept: PLASTIC SURGERY | Facility: CLINIC | Age: 33
End: 2023-07-19
Payer: MEDICAID

## 2023-07-19 PROCEDURE — 99024 POSTOP FOLLOW-UP VISIT: CPT

## 2023-07-20 NOTE — REASON FOR VISIT
[FreeTextEntry1] : Dos: 5/25/23 S/P: left axillary wound reconstruction with thoracodorsal artery  flap and stsg.

## 2023-07-20 NOTE — PHYSICAL EXAM
[de-identified] : Left axilla with slightly smaller area of granulation between skin graft and flap.  Back wound with significant progress in closure.  Right axilla with decreased active inflammation.

## 2023-07-20 NOTE — ASSESSMENT
[FreeTextEntry1] : Wound care reviewed.  Plan for excision of right side in 1 month.  Follow-up in 2 weeks for reassessment.  Requested that visiting nurse contact me by teams or email directly.

## 2023-07-20 NOTE — HISTORY OF PRESENT ILLNESS
[FreeTextEntry1] : The patient returns now 8 weeks following left axillary wound reconstruction with Tdap flap and skin graft.  He states the visiting nurse has not yet been placing Aquacel extra in the area of granulation of the left axilla, despite previous clear phone communication with the visiting nurse.

## 2023-08-02 ENCOUNTER — APPOINTMENT (OUTPATIENT)
Dept: PLASTIC SURGERY | Facility: CLINIC | Age: 33
End: 2023-08-02
Payer: MEDICAID

## 2023-08-02 PROCEDURE — 99024 POSTOP FOLLOW-UP VISIT: CPT

## 2023-08-03 NOTE — REASON FOR VISIT
[Post Op: _________] : a [unfilled] post op visit [FreeTextEntry1] : Dos: 5/25/23 S/P: left axillary wound reconstruction with thoracodorsal artery  flap and stsg.

## 2023-08-03 NOTE — PHYSICAL EXAM
[de-identified] : Right axilla without significant change. [de-identified] : Back wound mostly closed.  Left axilla with small open area approximately 1 cm x 3 cm with healthy granulation.  Area addressed with silver nitrate and dressing applied.

## 2023-08-03 NOTE — HISTORY OF PRESENT ILLNESS
[FreeTextEntry1] : The patient denies any significant issues.  He is eager to proceed with excision of the right side.  Continuing wound care on left.

## 2023-08-08 ENCOUNTER — OUTPATIENT (OUTPATIENT)
Dept: OUTPATIENT SERVICES | Facility: HOSPITAL | Age: 33
LOS: 1 days | End: 2023-08-08
Payer: MEDICAID

## 2023-08-08 VITALS
DIASTOLIC BLOOD PRESSURE: 75 MMHG | HEIGHT: 72 IN | SYSTOLIC BLOOD PRESSURE: 113 MMHG | WEIGHT: 192.02 LBS | TEMPERATURE: 98 F | RESPIRATION RATE: 14 BRPM | OXYGEN SATURATION: 100 % | HEART RATE: 95 BPM

## 2023-08-08 DIAGNOSIS — L73.2 HIDRADENITIS SUPPURATIVA: Chronic | ICD-10-CM

## 2023-08-08 DIAGNOSIS — Z98.890 OTHER SPECIFIED POSTPROCEDURAL STATES: Chronic | ICD-10-CM

## 2023-08-08 DIAGNOSIS — L73.2 HIDRADENITIS SUPPURATIVA: ICD-10-CM

## 2023-08-08 LAB
ANION GAP SERPL CALC-SCNC: 10 MMOL/L — SIGNIFICANT CHANGE UP (ref 7–14)
BUN SERPL-MCNC: 14 MG/DL — SIGNIFICANT CHANGE UP (ref 7–23)
CALCIUM SERPL-MCNC: 8.9 MG/DL — SIGNIFICANT CHANGE UP (ref 8.4–10.5)
CHLORIDE SERPL-SCNC: 100 MMOL/L — SIGNIFICANT CHANGE UP (ref 98–107)
CO2 SERPL-SCNC: 26 MMOL/L — SIGNIFICANT CHANGE UP (ref 22–31)
CREAT SERPL-MCNC: 0.68 MG/DL — SIGNIFICANT CHANGE UP (ref 0.5–1.3)
EGFR: 126 ML/MIN/1.73M2 — SIGNIFICANT CHANGE UP
GLUCOSE SERPL-MCNC: 79 MG/DL — SIGNIFICANT CHANGE UP (ref 70–99)
HCT VFR BLD CALC: 30.2 % — LOW (ref 39–50)
HGB BLD-MCNC: 9.1 G/DL — LOW (ref 13–17)
MCHC RBC-ENTMCNC: 21 PG — LOW (ref 27–34)
MCHC RBC-ENTMCNC: 30.1 GM/DL — LOW (ref 32–36)
MCV RBC AUTO: 69.6 FL — LOW (ref 80–100)
NRBC # BLD: 0 /100 WBCS — SIGNIFICANT CHANGE UP (ref 0–0)
NRBC # FLD: 0 K/UL — SIGNIFICANT CHANGE UP (ref 0–0)
PLATELET # BLD AUTO: 496 K/UL — HIGH (ref 150–400)
POTASSIUM SERPL-MCNC: 4 MMOL/L — SIGNIFICANT CHANGE UP (ref 3.5–5.3)
POTASSIUM SERPL-SCNC: 4 MMOL/L — SIGNIFICANT CHANGE UP (ref 3.5–5.3)
RBC # BLD: 4.34 M/UL — SIGNIFICANT CHANGE UP (ref 4.2–5.8)
RBC # FLD: 19.3 % — HIGH (ref 10.3–14.5)
SODIUM SERPL-SCNC: 136 MMOL/L — SIGNIFICANT CHANGE UP (ref 135–145)
WBC # BLD: 14.04 K/UL — HIGH (ref 3.8–10.5)
WBC # FLD AUTO: 14.04 K/UL — HIGH (ref 3.8–10.5)

## 2023-08-08 PROCEDURE — 93010 ELECTROCARDIOGRAM REPORT: CPT

## 2023-08-08 NOTE — H&P PST ADULT - NEUROLOGICAL
normal/cranial nerves II-XII intact/sensation intact negative cranial nerves II-XII intact/sensation intact

## 2023-08-08 NOTE — H&P PST ADULT - SKIN/BREAST COMMENTS
buttock wound, scars from suppurativa along jaw and axillae s/p excision of left axillary (4/2023).  Underwent Left Axillary Wound Reconstruction with Thoracodorsal Artery  Flap, graft from left thigh 5/2023. s/p excision of left axillary (4/2023).  Underwent Left Axillary Wound Reconstruction with Thoracodorsal Artery  Flap, graft from left thigh 5/2023., now having right hidradenitis removed

## 2023-08-08 NOTE — H&P PST ADULT - GASTROINTESTINAL
normal/soft/nontender/nondistended/normal active bowel sounds details… soft/nontender/nondistended/normal active bowel sounds/no guarding/no rigidity/no organomegaly/no palpable marco antonio/no masses palpable

## 2023-08-08 NOTE — H&P PST ADULT - CARDIOVASCULAR
normal/regular rate and rhythm details… regular rate and rhythm/S1 S2 present/no gallops/no rub/no murmur

## 2023-08-08 NOTE — H&P PST ADULT - RESPIRATORY
normal/clear to auscultation bilaterally/no wheezes/no rales/no rhonchi clear to auscultation bilaterally/no wheezes/no rales/no rhonchi/no respiratory distress/no use of accessory muscles/no subcutaneous emphysema/airway patent/breath sounds equal/good air movement/respirations non-labored/no intercostal retractions

## 2023-08-08 NOTE — H&P PST ADULT - PROBLEM SELECTOR PLAN 1
Pt scheduled for excision of right axillary hidradenitis on 8/24/2023.  labs done results pending, ekg done.  Preop teaching done, pt able to verbalize understanding.    medication day of procedure-  pepcid.   Right great toe pain and swelling-  pt will f/u with urgent care this evening for further evaluation.

## 2023-08-08 NOTE — H&P PST ADULT - HISTORY OF PRESENT ILLNESS
32y/o male scheduled for excision of right axillary hidradenitis on 8/24/2023.  Pt states, "hx hidradenitis suppurativa , S/P excision of hidradenitis of the buttocks with skin graft reconstruction (2/2023) s/p excision of left axillary (4/2023).  Underwent Left Axillary Wound Reconstruction with Thoracodorsal Artery  Flap, graft from left thigh 5/2023.  Now having right axillary hidradenitis removed."           34y/o male scheduled for excision of right axillary hidradenitis on 8/24/2023.  Pt states, "hx hidradenitis suppurativa , S/P excision of hidradenitis of the buttocks with skin graft reconstruction (2/2023) s/p excision of left axillary hidradenitis  (4/2023).  Underwent Left Axillary Wound Reconstruction with Thoracodorsal Artery  Flap, graft from left thigh 5/2023.  Now having right axillary hidradenitis removed."

## 2023-08-08 NOTE — H&P PST ADULT - MUSCULOSKELETAL COMMENTS
pt states he has had left hip pain for the past 2 weeks 7-10/10 at times. left hip pain for the past 2 weeks,10 /10 right great toe swelling and pain full walking with cane for balance right great toe pain and swelling  for the past week, possible due to wearing flip flops from 5/2023 "right great toe pain and swelling  for the past week, possible due to wearing flip flops from 5/2023"

## 2023-08-08 NOTE — H&P PST ADULT - NEGATIVE ENMT SYMPTOMS
no hearing difficulty/no ear pain/no tinnitus/no vertigo no hearing difficulty/no dry mouth/no throat pain/no dysphagia

## 2023-08-08 NOTE — H&P PST ADULT - NEGATIVE GENERAL GENITOURINARY SYMPTOMS
no hematuria/no renal colic/no flank pain L/no flank pain R no hematuria/no flank pain L/no flank pain R/no bladder infections/no urinary hesitancy/normal urinary frequency

## 2023-08-08 NOTE — H&P PST ADULT - SKIN COMMENTS
left axillary hidradenitis with drsg, s/p right hidradenitis grafting skin dry and scaly in right axillary and mid back area Isotretinoin Pregnancy And Lactation Text: This medication is Pregnancy Category X and is considered extremely dangerous during pregnancy. It is unknown if it is excreted in breast milk.

## 2023-08-08 NOTE — H&P PST ADULT - NSICDXPASTSURGICALHX_GEN_ALL_CORE_FT
PAST SURGICAL HISTORY:  S/P excisional debridement      PAST SURGICAL HISTORY:  Left axillary hidradenitis     S/P excisional debridement

## 2023-08-16 ENCOUNTER — APPOINTMENT (OUTPATIENT)
Dept: INTERNAL MEDICINE | Facility: CLINIC | Age: 33
End: 2023-08-16
Payer: MEDICAID

## 2023-08-16 VITALS
HEIGHT: 72 IN | DIASTOLIC BLOOD PRESSURE: 82 MMHG | HEART RATE: 102 BPM | TEMPERATURE: 97.6 F | SYSTOLIC BLOOD PRESSURE: 117 MMHG | BODY MASS INDEX: 25.06 KG/M2 | OXYGEN SATURATION: 97 % | WEIGHT: 185 LBS

## 2023-08-16 PROCEDURE — 99212 OFFICE O/P EST SF 10 MIN: CPT

## 2023-08-18 NOTE — HISTORY OF PRESENT ILLNESS
[No Pertinent Cardiac History] : no history of aortic stenosis, atrial fibrillation, coronary artery disease, recent myocardial infarction, or implantable device/pacemaker [No Pertinent Pulmonary History] : no history of asthma, COPD, sleep apnea, or smoking [No Adverse Anesthesia Reaction] : no adverse anesthesia reaction in self or family member [(Patient denies any chest pain, claudication, dyspnea on exertion, orthopnea, palpitations or syncope)] : Patient denies any chest pain, claudication, dyspnea on exertion, orthopnea, palpitations or syncope [Good (7-10 METs)] : Good (7-10 METs) [Chronic Anticoagulation] : no chronic anticoagulation [Chronic Kidney Disease] : no chronic kidney disease [Diabetes] : no diabetes [FreeTextEntry2] : 08/24/2023 [FreeTextEntry1] : Excision of right Axillary side-HIDRADENITIS SUPPURATIVA  [FreeTextEntry3] : Dr. Tariq  [FreeTextEntry7] : EKG nsr normal [FreeTextEntry4] : no current acute complaints needs evaluation prior plastic surgical procedure R axilla

## 2023-08-18 NOTE — REVIEW OF SYSTEMS
[Joint Pain] : joint pain [Fever] : no fever [Shortness Of Breath] : no shortness of breath [Dysuria] : no dysuria [FreeTextEntry9] : L ankle pain

## 2023-08-18 NOTE — ASSESSMENT
[Patient NOT optimized for Surgery at this time] : Patient not optimized for surgery at this time [FreeTextEntry4] : stable but needs repeat CBC for evaluation of leukocytosis and anemia

## 2023-08-18 NOTE — PHYSICAL EXAM
[No Acute Distress] : no acute distress [Normal Rate] : normal rate  [No Edema] : there was no peripheral edema [Soft] : abdomen soft [No Joint Swelling] : no joint swelling [No Focal Deficits] : no focal deficits [Alert and Oriented x3] : oriented to person, place, and time [de-identified] : R axillary induration noted previously

## 2023-08-23 ENCOUNTER — NON-APPOINTMENT (OUTPATIENT)
Age: 33
End: 2023-08-23

## 2023-08-24 ENCOUNTER — APPOINTMENT (OUTPATIENT)
Dept: PLASTIC SURGERY | Facility: HOSPITAL | Age: 33
End: 2023-08-24

## 2023-08-24 ENCOUNTER — APPOINTMENT (OUTPATIENT)
Dept: INTERNAL MEDICINE | Facility: CLINIC | Age: 33
End: 2023-08-24
Payer: MEDICAID

## 2023-08-24 ENCOUNTER — LABORATORY RESULT (OUTPATIENT)
Age: 33
End: 2023-08-24

## 2023-08-24 PROCEDURE — 36415 COLL VENOUS BLD VENIPUNCTURE: CPT

## 2023-08-25 ENCOUNTER — APPOINTMENT (OUTPATIENT)
Dept: INTERNAL MEDICINE | Facility: CLINIC | Age: 33
End: 2023-08-25
Payer: MEDICAID

## 2023-08-25 PROCEDURE — ZZZZZ: CPT

## 2023-08-28 LAB
FOLATE SERPL-MCNC: 13.7 NG/ML
IRON SATN MFR SERPL: 5 %
IRON SERPL-MCNC: 12 UG/DL
TIBC SERPL-MCNC: 256 UG/DL
UIBC SERPL-MCNC: 244 UG/DL
VIT B12 SERPL-MCNC: 781 PG/ML

## 2023-09-09 ENCOUNTER — INPATIENT (INPATIENT)
Facility: HOSPITAL | Age: 33
LOS: 8 days | Discharge: HOME HEALTH | End: 2023-09-18
Attending: STUDENT IN AN ORGANIZED HEALTH CARE EDUCATION/TRAINING PROGRAM | Admitting: STUDENT IN AN ORGANIZED HEALTH CARE EDUCATION/TRAINING PROGRAM
Payer: MEDICAID

## 2023-09-09 VITALS
RESPIRATION RATE: 18 BRPM | HEART RATE: 120 BPM | TEMPERATURE: 98 F | HEIGHT: 72 IN | SYSTOLIC BLOOD PRESSURE: 108 MMHG | DIASTOLIC BLOOD PRESSURE: 70 MMHG | OXYGEN SATURATION: 100 %

## 2023-09-09 DIAGNOSIS — Z98.890 OTHER SPECIFIED POSTPROCEDURAL STATES: Chronic | ICD-10-CM

## 2023-09-09 DIAGNOSIS — L73.2 HIDRADENITIS SUPPURATIVA: Chronic | ICD-10-CM

## 2023-09-09 LAB — LUPUS ANTICOAGULANT PROFILE RESULT: SIGNIFICANT CHANGE UP

## 2023-09-09 PROCEDURE — 71045 X-RAY EXAM CHEST 1 VIEW: CPT | Mod: 26

## 2023-09-09 PROCEDURE — 99285 EMERGENCY DEPT VISIT HI MDM: CPT

## 2023-09-09 RX ORDER — KETOROLAC TROMETHAMINE 30 MG/ML
15 SYRINGE (ML) INJECTION ONCE
Refills: 0 | Status: DISCONTINUED | OUTPATIENT
Start: 2023-09-09 | End: 2023-09-09

## 2023-09-09 RX ADMIN — Medication 20 MILLIGRAM(S): at 23:35

## 2023-09-09 RX ADMIN — Medication 15 MILLIGRAM(S): at 23:35

## 2023-09-09 NOTE — ED PROVIDER NOTE - PROGRESS NOTE DETAILS
Estefany Kramer MD, PGY2:  On chart review, patient has a history of spondyloarthropathy and has been off Humira. Estefany Kramer MD, PGY2:   On chart review, patient has a history of spondyloarthropathy and has been off Humira.  Patient has elevated inflammatory markers on work-up.  Discussed admission with patient for management of patient's pain as patient is unable to walk due to the severity of this pain.  Patient consents to admission. Estefany Kramer MD, PGY2:   On chart review, patient has a history of spondyloarthropathy and has been off Humira. Pt states he was on humira for his HS but stopped it per the recommendation of one of his doctors, pt states he was unaware that he was diagnosed with spondyloarthropathy on prior admission.   Patient has elevated inflammatory markers on work-up.  Discussed admission with patient for management of patient's pain as patient is unable to walk due to the severity of this pain.  Patient consents to admission.

## 2023-09-09 NOTE — ED PROVIDER NOTE - OBJECTIVE STATEMENT
33-year-old male history of hidradenitis suppurativa presenting for evaluation of pain and swelling in all of his joints onset 1 week ago that has been progressively worsening.  Patient denies fever, exposure to tick bites, new lesions that are different from his hidradenitis, abdominal pain, vomiting, diarrhea, general, shortness of breath, discharge from his penis, dysuria.  Patient states that he has never had this before, reports that he has difficulty walking secondary to the pain.

## 2023-09-09 NOTE — ED PROVIDER NOTE - PHYSICAL EXAMINATION
GENERAL: appears in pain, non-toxic appearing  HEAD: normocephalic, atraumatic  HEENT: normal conjunctiva, oral mucosa moist,   CARDIAC: regular rate and rhythm, no appreciable murmurs, 2+ pulses in UE/LE b/l  PULM: normal breath sounds, clear to ascultation bilaterally, no rales, rhonchi, wheezing  GI: abdomen nondistended, soft, nontender, no guarding, rebound tenderness  NEURO: AAOx3  MSK:   SKIN: well-perfused, extremities warm  PSYCH: appropriate mood and affect

## 2023-09-09 NOTE — ED PROVIDER NOTE - ATTENDING CONTRIBUTION TO CARE
I performed a face-to-face evaluation of the patient and performed a history and physical examination. I agree with the history and physical examination. If this was a PA visit, I personally saw the patient with the PA and performed a substantive portion of the visit including all aspects of the medical decision making.    H/o hidradenitis suppurativa. C/o diffuse arthralgias. No F. No rash. Consider RA or other rheum condition. Check labs. Pain control.

## 2023-09-09 NOTE — ED ADULT TRIAGE NOTE - CHIEF COMPLAINT QUOTE
c/o body pain x1week. to wrists, ankles, knees. "took 2 Alieve's at 6pm" hx hidradenitis c/o body pain x1week. to wrists, ankles, knees. "took 2 Alieve's at 6pm" difficulty ambulating, arrives with cane. hx hidradenitis

## 2023-09-09 NOTE — ED PROVIDER NOTE - CLINICAL SUMMARY MEDICAL DECISION MAKING FREE TEXT BOX
Zacarias: H/o hidradenitis suppurativa. C/o diffuse arthralgias. No F. No rash. Consider RA or other rheum condition. Check labs. Pain control.

## 2023-09-10 DIAGNOSIS — L73.2 HIDRADENITIS SUPPURATIVA: ICD-10-CM

## 2023-09-10 DIAGNOSIS — M25.50 PAIN IN UNSPECIFIED JOINT: ICD-10-CM

## 2023-09-10 DIAGNOSIS — M25.579 PAIN IN UNSPECIFIED ANKLE AND JOINTS OF UNSPECIFIED FOOT: ICD-10-CM

## 2023-09-10 DIAGNOSIS — D64.9 ANEMIA, UNSPECIFIED: ICD-10-CM

## 2023-09-10 DIAGNOSIS — Z29.9 ENCOUNTER FOR PROPHYLACTIC MEASURES, UNSPECIFIED: ICD-10-CM

## 2023-09-10 DIAGNOSIS — D72.829 ELEVATED WHITE BLOOD CELL COUNT, UNSPECIFIED: ICD-10-CM

## 2023-09-10 LAB
ALBUMIN SERPL ELPH-MCNC: 3.1 G/DL — LOW (ref 3.3–5)
ALP SERPL-CCNC: 112 U/L — SIGNIFICANT CHANGE UP (ref 40–120)
ALT FLD-CCNC: 7 U/L — SIGNIFICANT CHANGE UP (ref 4–41)
ANION GAP SERPL CALC-SCNC: 13 MMOL/L — SIGNIFICANT CHANGE UP (ref 7–14)
ASO AB SER QL: 60 IU/ML — SIGNIFICANT CHANGE UP (ref 20–200)
AST SERPL-CCNC: 12 U/L — SIGNIFICANT CHANGE UP (ref 4–40)
B PERT DNA SPEC QL NAA+PROBE: SIGNIFICANT CHANGE UP
B PERT+PARAPERT DNA PNL SPEC NAA+PROBE: SIGNIFICANT CHANGE UP
BASOPHILS # BLD AUTO: 0.08 K/UL — SIGNIFICANT CHANGE UP (ref 0–0.2)
BASOPHILS NFR BLD AUTO: 0.5 % — SIGNIFICANT CHANGE UP (ref 0–2)
BILIRUB SERPL-MCNC: 0.4 MG/DL — SIGNIFICANT CHANGE UP (ref 0.2–1.2)
BORDETELLA PARAPERTUSSIS (RAPRVP): SIGNIFICANT CHANGE UP
BUN SERPL-MCNC: 14 MG/DL — SIGNIFICANT CHANGE UP (ref 7–23)
C PNEUM DNA SPEC QL NAA+PROBE: SIGNIFICANT CHANGE UP
CALCIUM SERPL-MCNC: 9.5 MG/DL — SIGNIFICANT CHANGE UP (ref 8.4–10.5)
CHLORIDE SERPL-SCNC: 98 MMOL/L — SIGNIFICANT CHANGE UP (ref 98–107)
CK SERPL-CCNC: 41 U/L — SIGNIFICANT CHANGE UP (ref 30–200)
CO2 SERPL-SCNC: 25 MMOL/L — SIGNIFICANT CHANGE UP (ref 22–31)
CREAT SERPL-MCNC: 0.64 MG/DL — SIGNIFICANT CHANGE UP (ref 0.5–1.3)
CRP SERPL-MCNC: 227.4 MG/L — HIGH
EGFR: 128 ML/MIN/1.73M2 — SIGNIFICANT CHANGE UP
EOSINOPHIL # BLD AUTO: 0.25 K/UL — SIGNIFICANT CHANGE UP (ref 0–0.5)
EOSINOPHIL NFR BLD AUTO: 1.4 % — SIGNIFICANT CHANGE UP (ref 0–6)
ERYTHROCYTE [SEDIMENTATION RATE] IN BLOOD: 100 MM/HR — HIGH (ref 1–15)
FLUAV SUBTYP SPEC NAA+PROBE: SIGNIFICANT CHANGE UP
FLUBV RNA SPEC QL NAA+PROBE: SIGNIFICANT CHANGE UP
GLUCOSE SERPL-MCNC: 112 MG/DL — HIGH (ref 70–99)
HADV DNA SPEC QL NAA+PROBE: SIGNIFICANT CHANGE UP
HCOV 229E RNA SPEC QL NAA+PROBE: SIGNIFICANT CHANGE UP
HCOV HKU1 RNA SPEC QL NAA+PROBE: SIGNIFICANT CHANGE UP
HCOV NL63 RNA SPEC QL NAA+PROBE: SIGNIFICANT CHANGE UP
HCOV OC43 RNA SPEC QL NAA+PROBE: SIGNIFICANT CHANGE UP
HCT VFR BLD CALC: 30.9 % — LOW (ref 39–50)
HGB BLD-MCNC: 9.3 G/DL — LOW (ref 13–17)
HMPV RNA SPEC QL NAA+PROBE: SIGNIFICANT CHANGE UP
HPIV1 RNA SPEC QL NAA+PROBE: SIGNIFICANT CHANGE UP
HPIV2 RNA SPEC QL NAA+PROBE: SIGNIFICANT CHANGE UP
HPIV3 RNA SPEC QL NAA+PROBE: SIGNIFICANT CHANGE UP
HPIV4 RNA SPEC QL NAA+PROBE: SIGNIFICANT CHANGE UP
IANC: 13.11 K/UL — HIGH (ref 1.8–7.4)
IMM GRANULOCYTES NFR BLD AUTO: 0.5 % — SIGNIFICANT CHANGE UP (ref 0–0.9)
LYMPHOCYTES # BLD AUTO: 15.8 % — SIGNIFICANT CHANGE UP (ref 13–44)
LYMPHOCYTES # BLD AUTO: 2.79 K/UL — SIGNIFICANT CHANGE UP (ref 1–3.3)
M PNEUMO DNA SPEC QL NAA+PROBE: SIGNIFICANT CHANGE UP
MCHC RBC-ENTMCNC: 20.1 PG — LOW (ref 27–34)
MCHC RBC-ENTMCNC: 30.1 GM/DL — LOW (ref 32–36)
MCV RBC AUTO: 66.7 FL — LOW (ref 80–100)
MONOCYTES # BLD AUTO: 1.35 K/UL — HIGH (ref 0–0.9)
MONOCYTES NFR BLD AUTO: 7.6 % — SIGNIFICANT CHANGE UP (ref 2–14)
MRSA PCR RESULT.: SIGNIFICANT CHANGE UP
NEUTROPHILS # BLD AUTO: 13.11 K/UL — HIGH (ref 1.8–7.4)
NEUTROPHILS NFR BLD AUTO: 74.2 % — SIGNIFICANT CHANGE UP (ref 43–77)
NRBC # BLD: 0 /100 WBCS — SIGNIFICANT CHANGE UP (ref 0–0)
NRBC # FLD: 0 K/UL — SIGNIFICANT CHANGE UP (ref 0–0)
PLATELET # BLD AUTO: 466 K/UL — HIGH (ref 150–400)
POTASSIUM SERPL-MCNC: 3.6 MMOL/L — SIGNIFICANT CHANGE UP (ref 3.5–5.3)
POTASSIUM SERPL-SCNC: 3.6 MMOL/L — SIGNIFICANT CHANGE UP (ref 3.5–5.3)
PROT SERPL-MCNC: 10 G/DL — HIGH (ref 6–8.3)
RAPID RVP RESULT: SIGNIFICANT CHANGE UP
RBC # BLD: 4.63 M/UL — SIGNIFICANT CHANGE UP (ref 4.2–5.8)
RBC # FLD: 19.7 % — HIGH (ref 10.3–14.5)
RSV RNA SPEC QL NAA+PROBE: SIGNIFICANT CHANGE UP
RV+EV RNA SPEC QL NAA+PROBE: SIGNIFICANT CHANGE UP
S AUREUS DNA NOSE QL NAA+PROBE: SIGNIFICANT CHANGE UP
SARS-COV-2 RNA SPEC QL NAA+PROBE: SIGNIFICANT CHANGE UP
SODIUM SERPL-SCNC: 136 MMOL/L — SIGNIFICANT CHANGE UP (ref 135–145)
T PALLIDUM AB TITR SER: NEGATIVE — SIGNIFICANT CHANGE UP
TROPONIN T, HIGH SENSITIVITY RESULT: <6 NG/L — SIGNIFICANT CHANGE UP
WBC # BLD: 17.66 K/UL — HIGH (ref 3.8–10.5)
WBC # FLD AUTO: 17.66 K/UL — HIGH (ref 3.8–10.5)

## 2023-09-10 PROCEDURE — 73600 X-RAY EXAM OF ANKLE: CPT | Mod: 26,50

## 2023-09-10 PROCEDURE — 99233 SBSQ HOSP IP/OBS HIGH 50: CPT

## 2023-09-10 PROCEDURE — 73660 X-RAY EXAM OF TOE(S): CPT | Mod: 26,50

## 2023-09-10 RX ORDER — FERROUS SULFATE 325(65) MG
325 TABLET ORAL DAILY
Refills: 0 | Status: DISCONTINUED | OUTPATIENT
Start: 2023-09-10 | End: 2023-09-18

## 2023-09-10 RX ORDER — ACETAMINOPHEN 500 MG
650 TABLET ORAL EVERY 6 HOURS
Refills: 0 | Status: DISCONTINUED | OUTPATIENT
Start: 2023-09-10 | End: 2023-09-18

## 2023-09-10 RX ORDER — CHLORHEXIDINE GLUCONATE 213 G/1000ML
1 SOLUTION TOPICAL DAILY
Refills: 0 | Status: DISCONTINUED | OUTPATIENT
Start: 2023-09-10 | End: 2023-09-18

## 2023-09-10 RX ORDER — KETOROLAC TROMETHAMINE 30 MG/ML
15 SYRINGE (ML) INJECTION EVERY 8 HOURS
Refills: 0 | Status: DISCONTINUED | OUTPATIENT
Start: 2023-09-10 | End: 2023-09-11

## 2023-09-10 RX ADMIN — CHLORHEXIDINE GLUCONATE 1 APPLICATION(S): 213 SOLUTION TOPICAL at 11:36

## 2023-09-10 RX ADMIN — Medication 15 MILLIGRAM(S): at 21:45

## 2023-09-10 RX ADMIN — Medication 15 MILLIGRAM(S): at 21:30

## 2023-09-10 NOTE — ED ADULT NURSE REASSESSMENT NOTE - NS ED NURSE REASSESS COMMENT FT1
PT is resting in stretcher, easily arousable to verbal stimuli. no apparent distress noted at this time. pt NSR on CM at this time. pt denies complaints . hand off will be given to primary nurse when return to area.

## 2023-09-10 NOTE — H&P ADULT - HISTORY OF PRESENT ILLNESS
33M with PMH of RBBB, hidradenitis suppurativa (previously on Humira) s/p excision of hidradenitis of buttocks with skin graft reconstruction (2/2023) s/p excision of left axillary (4/2023) with skin graft reconstruction (5/2023),  and [place] now p/w pain in his lower extremities x 1.5 weeks. Patient was hospitalized in 5/2023 with L hip pain found to left sided sacroilitis/bursitis likely 2/2 spondyloarthropathy. Patient's pain resolved with NSAIDs but reports ~10 days ago he started to have severe pain in his bilateral ankles (L>R) and milder pain in his knees (L>R) and R hip limiting his ambulation. Tylenol/ibuprofen helped minimally. He also noted swelling in his RLE 3rd digit and LLE 2nd digit. When he walks his has pain in his PIP joints of those 2 digits and in the posterior part of his ankles. Denies fevers/chills, CP, palpitations, SOB, n/v/d, abdominal pain, dysuria, penile discharge, rashes, recent travel/hikes, joint stiffness/swelling.     Of note, last month was supposed to get R axillary hidradenitis removed in August but was not cleared for surgery due to multiple reasons including leukocytosis and anemia per patient. He has been evaluated by hematology in the past and told his leukocytosis is likely a leukemoid reaction during hidradenitis flares. 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 Per chart review, patient was started on doxycycline x 10 days and iron supplementation by his PCP in August. Patient completed doxycycline a earlier this week. He says the pain/drainage in his R axillary lesions has been worsening.     In the ED, VSS. Labs notable for WBC 17.66K, H/H 9.3/30.9, elevated gamma gap, , .4. Pt given IV toradol 15mg x 1 and prednisone 20mg x 1 with significant improvement in pain.

## 2023-09-10 NOTE — H&P ADULT - PROBLEM SELECTOR PLAN 2
WBC 17k on presentation, neutrophilic predominance. 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

## 2023-09-10 NOTE — H&P ADULT - PROBLEM SELECTOR PLAN 4
H/H 9.3/30.9 on presentation, around baseline. Microcytic. History iron deficiency anemia, pt recently prescribed iron supplementation but unsure if he's taking it.   -Start iron supplementation

## 2023-09-10 NOTE — H&P ADULT - NSHPLABSRESULTS_GEN_ALL_CORE
9.3    17.66 )-----------( 466      ( 09 Sep 2023 23:40 )             30.9       09-09    136  |  98  |  14  ----------------------------<  112<H>  3.6   |  25  |  0.64    Ca    9.5      09 Sep 2023 23:40    TPro  10.0<H>  /  Alb  3.1<L>  /  TBili  0.4  /  DBili  x   /  AST  12  /  ALT  7   /  AlkPhos  112  09-09              Urinalysis Basic - ( 09 Sep 2023 23:40 )    Color: x / Appearance: x / SG: x / pH: x  Gluc: 112 mg/dL / Ketone: x  / Bili: x / Urobili: x   Blood: x / Protein: x / Nitrite: x   Leuk Esterase: x / RBC: x / WBC x   Sq Epi: x / Non Sq Epi: x / Bacteria: x        PT/INR - ( 09 Sep 2023 23:40 )   PT: 14.7 sec;   INR: 1.31 ratio         PTT - ( 09 Sep 2023 23:40 )  PTT:29.2 sec    Lactate Trend      CARDIAC MARKERS ( 09 Sep 2023 23:40 )  x     / x     / 41 U/L / x     / x            CAPILLARY BLOOD GLUCOSE

## 2023-09-10 NOTE — PATIENT PROFILE ADULT - FALL HARM RISK - HARM RISK INTERVENTIONS

## 2023-09-10 NOTE — H&P ADULT - ASSESSMENT
33M with PMH of RBBB, hidradenitis suppurativa (previously on Humira) s/p excision of hidradenitis of buttocks with skin graft reconstruction (2/2023) s/p excision of left axillary (4/2023) with skin graft reconstruction (5/2023),  and [place] now p/w pain in his lower extremities x 1.5 weeks. Admitted due to c/f peripheral spondyloarthropathy, hidradenitis flare, leukocytosis, and anemia.

## 2023-09-10 NOTE — ED ADULT NURSE NOTE - NSFALLRISKINTERV_ED_ALL_ED

## 2023-09-10 NOTE — H&P ADULT - PROBLEM SELECTOR PLAN 1
Pt hospitalized in 5/2023 with L hip pain found to left sided sacroilitis/bursitis likely 2/2 spondyloarthropathy. HLA B27 was negative at the time but he was treated with NSAIDs with resolution of sx. Now presenting with BLE asymmetric polyarthritis - severe pain in his bilateral ankles (L>R) and milder pain in his knees (L>R) and R hip limiting his ambulation.   -Exam c/f bilateral dactylitis of RLE 3rd digit and LLE 2nd digit, swelling particularly around PIP joints; also c/f enthesitis of b/l achilles tendon   -Elevated inflammatory markers - , , WBC 17k  -S/p prednisone 20mg x 1 and Toradol 15mg x 1 in ED  -C/w toradol 15 q8H for 2 days as he was not responding to po NSAIDs at home  -F/u rheumatologic workup sent by ED  -XR b/l ankles and toes ordered   -Rheum consult in AM

## 2023-09-10 NOTE — ED ADULT NURSE NOTE - OBJECTIVE STATEMENT
Pt presents to the ED for c/o joint pains, Hx of hidradenitis ambulates with cane. A&OX4 and in no acute distress. Pt tachy at triage, placed on cardiac monitoring as per order. Denies n/v/d. All safety initiated and maintained. Plan of care ongoing.

## 2023-09-10 NOTE — H&P ADULT - PROBLEM SELECTOR PLAN 3
Hidradenitis suppurativa (previously on Humira) s/p excision of hidradenitis of buttocks with skin graft reconstruction (2/2023) s/p excision of left axillary (4/2023) with skin graft reconstruction (5/2023). Pending right axillary hidradenitis removal, was cancelled in 8/2023 due to leukocytosis and anemia. S/p outpatient doxycycline x 10 days per PCP.   -Appears to be in a hidradenitis flare with active wounds in axilla, painful/draining, and elevated inflammatory markers   -Wound care consult   -Continue to monitor

## 2023-09-10 NOTE — PATIENT PROFILE ADULT - HAVE YOU BEEN EATING POORLY BECAUSE OF A DECREASED APPETITE?
Impression: Type 2 diabetes mellitus with proliferative diabetic retinopathy with macular edema, left eye: M06.7739. Plan: Discussed diagnosis in detail with patient. Emphasized blood sugar control. Keep future appts with PCP. The patient will undergo I-Vit. injections for his PDR. I would recommend Avastin injection treatment ,risks and benefits were discussed, explained and understood by patient. No (0)

## 2023-09-10 NOTE — H&P ADULT - NSHPPHYSICALEXAM_GEN_ALL_CORE
Vital Signs Last 24 Hrs  T(C): 36.8 (10 Sep 2023 05:38), Max: 36.8 (09 Sep 2023 21:52)  T(F): 98.3 (10 Sep 2023 05:38), Max: 98.3 (10 Sep 2023 05:38)  HR: 84 (10 Sep 2023 05:38) (84 - 120)  BP: 130/77 (10 Sep 2023 05:38) (108/70 - 130/77)  BP(mean): --  RR: 18 (10 Sep 2023 05:38) (15 - 18)  SpO2: 100% (10 Sep 2023 05:38) (100% - 100%)    Parameters below as of 10 Sep 2023 05:38  Patient On (Oxygen Delivery Method): room air    Physical Exam:     General: No acute distress, well-appearing    Eyes: PERRL, EOMI     ENT: MMM, no oropharyngeal lesions or erythema appreciated     Pulm: No increased WOB. CTAB. No wheezing.     CV: RRR. S1&S2+. No M/R/G appreciated.     Abdomen: +BS. Soft, NTND. No organomegaly.     MSK: Nml ROM    Extremities: R knee with medial ttp, L knee with (medial >lateral) ttp. B/l ankle ttp around achilles tendon. No swelling/erythema/warmth. Swelling of RLE 3rd digit and LLE 2nd digit at PIP joints. Normal ROM.     Neuro: A&Ox3, flat affect    Skin: Warm and dry. R axillary multiple lesions, ttp, draining thin greenish discharge

## 2023-09-10 NOTE — H&P ADULT - NSHPREVIEWOFSYSTEMS_GEN_ALL_CORE
Review of Systems:     Constitutional: No weakness, fever, chills     Eyes: No blindness, no eye pain    ENT: No throat pain, no rhinorrhea     Neck: No pain or stiffness     Respiratory: No cough, no shortness of breath     Cardiovascular: No chest pain, no palpitations     Gastrointestinal: No abdominal or epigastric pain. No nausea, vomiting, or diarrhea     Genitourinary: No dysuria, no change in frequency, no hematuria     Neurological: No numbness or weakness      MSK: severe pain in his bilateral ankles (L>R) and milder pain in his knees (L>R) and R hip limiting his ambulation.  swelling in his RLE 3rd digit and LLE 2nd digit.    Skin: No itching, burning, rashes +R axillary hidradenitis lesions with yellow-green thin discharge    Psych: No depression or anxiety

## 2023-09-11 LAB
ANION GAP SERPL CALC-SCNC: 10 MMOL/L — SIGNIFICANT CHANGE UP (ref 7–14)
BUN SERPL-MCNC: 17 MG/DL — SIGNIFICANT CHANGE UP (ref 7–23)
CALCIUM SERPL-MCNC: 9 MG/DL — SIGNIFICANT CHANGE UP (ref 8.4–10.5)
CHLORIDE SERPL-SCNC: 100 MMOL/L — SIGNIFICANT CHANGE UP (ref 98–107)
CO2 SERPL-SCNC: 26 MMOL/L — SIGNIFICANT CHANGE UP (ref 22–31)
CREAT SERPL-MCNC: 0.61 MG/DL — SIGNIFICANT CHANGE UP (ref 0.5–1.3)
DSDNA AB FLD-ACNC: <0.2 AI — SIGNIFICANT CHANGE UP
EGFR: 130 ML/MIN/1.73M2 — SIGNIFICANT CHANGE UP
ENA SS-A AB FLD IA-ACNC: <0.2 AI — SIGNIFICANT CHANGE UP
FERRITIN SERPL-MCNC: 128 NG/ML — SIGNIFICANT CHANGE UP (ref 30–400)
GLUCOSE SERPL-MCNC: 97 MG/DL — SIGNIFICANT CHANGE UP (ref 70–99)
HCT VFR BLD CALC: 28.4 % — LOW (ref 39–50)
HGB BLD-MCNC: 8.6 G/DL — LOW (ref 13–17)
IRON SATN MFR SERPL: 11 % — LOW (ref 14–50)
IRON SATN MFR SERPL: 28 UG/DL — LOW (ref 45–165)
MAGNESIUM SERPL-MCNC: 1.9 MG/DL — SIGNIFICANT CHANGE UP (ref 1.6–2.6)
MCHC RBC-ENTMCNC: 20.3 PG — LOW (ref 27–34)
MCHC RBC-ENTMCNC: 30.3 GM/DL — LOW (ref 32–36)
MCV RBC AUTO: 67.1 FL — LOW (ref 80–100)
NRBC # BLD: 0 /100 WBCS — SIGNIFICANT CHANGE UP (ref 0–0)
NRBC # FLD: 0 K/UL — SIGNIFICANT CHANGE UP (ref 0–0)
PHOSPHATE SERPL-MCNC: 3.8 MG/DL — SIGNIFICANT CHANGE UP (ref 2.5–4.5)
PLATELET # BLD AUTO: 463 K/UL — HIGH (ref 150–400)
POTASSIUM SERPL-MCNC: 3.9 MMOL/L — SIGNIFICANT CHANGE UP (ref 3.5–5.3)
POTASSIUM SERPL-SCNC: 3.9 MMOL/L — SIGNIFICANT CHANGE UP (ref 3.5–5.3)
RBC # BLD: 4.23 M/UL — SIGNIFICANT CHANGE UP (ref 4.2–5.8)
RBC # FLD: 19.8 % — HIGH (ref 10.3–14.5)
SODIUM SERPL-SCNC: 136 MMOL/L — SIGNIFICANT CHANGE UP (ref 135–145)
TIBC SERPL-MCNC: 257 UG/DL — SIGNIFICANT CHANGE UP (ref 220–430)
UIBC SERPL-MCNC: 229 UG/DL — SIGNIFICANT CHANGE UP (ref 110–370)
WBC # BLD: 13.82 K/UL — HIGH (ref 3.8–10.5)
WBC # FLD AUTO: 13.82 K/UL — HIGH (ref 3.8–10.5)

## 2023-09-11 PROCEDURE — 99233 SBSQ HOSP IP/OBS HIGH 50: CPT

## 2023-09-11 PROCEDURE — 99222 1ST HOSP IP/OBS MODERATE 55: CPT

## 2023-09-11 RX ADMIN — CHLORHEXIDINE GLUCONATE 1 APPLICATION(S): 213 SOLUTION TOPICAL at 13:27

## 2023-09-11 RX ADMIN — Medication 15 MILLIGRAM(S): at 05:44

## 2023-09-11 RX ADMIN — Medication 375 MILLIGRAM(S): at 18:09

## 2023-09-11 RX ADMIN — Medication 375 MILLIGRAM(S): at 17:09

## 2023-09-11 RX ADMIN — Medication 325 MILLIGRAM(S): at 13:26

## 2023-09-11 RX ADMIN — Medication 15 MILLIGRAM(S): at 05:49

## 2023-09-11 NOTE — CONSULT NOTE ADULT - ASSESSMENT
A 32-year-old male with a history of Hidradenitis Suppurativa (HS) and multiple prior admissions for wound excisions presents with a two-week history of wrist, hand, knee, and ankle pain. No associated symptoms like fever, chills, cough, or dysuria. HS diagnosis since 2017, previously on Humira until January 2023 with no joint pain. Experienced a skin flare-up in January 2023 before armpit surgery. Recent admission in May 2023 for hip pain with sacroiliitis. Consulted Rheumatology for HS-related skin findings and joint pain.    # Perianal fistula   # Hidradenitis Suppurativa with Seronegative Arthritis   - HS patients has high risk of developing seronegative arthritis   - ,   - MRI pelvis 5/18/23 with mild b/l sacroiliitis  - Has been off Humira since February/2023, joints symptoms started 4/23   - Hep B and Quantiferon TB negative  - Ideally the patient should be started on anti-TNF for skin and joint flares     Recommendations  1. Please consult with ID for possible right armpit infection  2. Please start Naproxen 500 mg twice daily for now  3. Will consider start prednisone 40 mg IV when he is clear for infection     Rheumatology will follow the patient     D/w Dr. Ralph Pelletier MD  PGY-4  Rheumatology Fellow  Re[employer] on TEAMS  [phone]

## 2023-09-11 NOTE — CONSULT NOTE ADULT - SUBJECTIVE AND OBJECTIVE BOX
Plastic Surgery Consult Note  (pg [facility]: [id number], [facility]: [phone])    HPI:  33M with PMH of RBBB, hidradenitis suppurativa (previously on Humira) s/p excision of hidradenitis of buttocks with skin graft reconstruction (2/2023) s/p excision of left axillary (4/2023) with skin graft reconstruction (5/2023),  and [place] now p/w pain in his lower extremities x 1.5 weeks. Patient was hospitalized in 5/2023 with L hip pain found to left sided sacroilitis/bursitis likely 2/2 spondyloarthropathy. Patient's pain resolved with NSAIDs but reports ~10 days ago he started to have severe pain in his bilateral ankles (L>R) and milder pain in his knees (L>R) and R hip limiting his ambulation. Tylenol/ibuprofen helped minimally. He also noted swelling in his RLE 3rd digit and LLE 2nd digit. When he walks his has pain in his PIP joints of those 2 digits and in the posterior part of his ankles. Denies fevers/chills, CP, palpitations, SOB, n/v/d, abdominal pain, dysuria, penile discharge, rashes, recent travel/hikes, joint stiffness/swelling.     Of note, last month was supposed to get R axillary hidradenitis removed in August but was not cleared for surgery due to multiple reasons including leukocytosis and anemia per patient. He has been evaluated by hematology in the past and told his leukocytosis is likely a leukemoid reaction during hidradenitis flares. 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 Per chart review, patient was started on doxycycline x 10 days and iron supplementation by his PCP in August. Patient completed doxycycline a earlier this week. He says the pain/drainage in his R axillary lesions has been worsening.     In the ED, VSS. Labs notable for WBC 17.66K, H/H 9.3/30.9, elevated gamma gap, , .4. Pt given IV toradol 15mg x 1 and prednisone 20mg x 1 with significant improvement in pain.  (10 Sep 2023 13:28)    Previously patient has followed with plastic surgery for axillary and sacral wound treatment. Both of these sites are well healing with no signs of infection. Slight recurrence of disease present as expected, but drainage is minimal.     PAST MEDICAL & SURGICAL HISTORY:  Hidradenitis suppurativa      RBBB      H/O sepsis      S/P excisional debridement      Left axillary hidradenitis        Allergies    No Known Allergies    Intolerances      Home Medications:  Aleve 220 mg oral capsule: 1 tab(s) orally 2 times a day as needed for  mild pain last dose 8/16/2023 (10 Sep 2023 13:27)  Tylenol 500 mg oral tablet: 2 tab(s) orally every 8 hours as needed for  mild pain (10 Sep 2023 13:27)    MEDICATIONS  (STANDING):  chlorhexidine 2% Cloths 1 Application(s) Topical daily  ferrous    sulfate 325 milliGRAM(s) Oral daily      SOCIAL HISTORY:  FAMILY HISTORY:  FH: hypertension (Mother)        ___________________________________________  OBJECTIVE:  Vital Signs Last 24 Hrs  T(C): 36.9 (11 Sep 2023 14:08), Max: 36.9 (10 Sep 2023 22:14)  T(F): 98.4 (11 Sep 2023 14:08), Max: 98.4 (10 Sep 2023 22:14)  HR: 92 (11 Sep 2023 14:08) (78 - 92)  BP: 127/78 (11 Sep 2023 14:08) (125/77 - 129/82)  BP(mean): --  RR: 17 (11 Sep 2023 14:08) (17 - 18)  SpO2: 100% (11 Sep 2023 14:08) (100% - 100%)    Parameters below as of 11 Sep 2023 14:08  Patient On (Oxygen Delivery Method): room air    CAPILLARY BLOOD GLUCOSE        I&O's Detail    10 Sep 2023 07:01  -  11 Sep 2023 07:00  --------------------------------------------------------  IN:    Oral Fluid: 200 mL  Total IN: 200 mL    OUT:  Total OUT: 0 mL    Total NET: 200 mL        General: Well developed, well nourished, NAD  Neuro: Alert and oriented, no focal deficits, moves all extremities spontaneously  HEENT: NCAT, EOMI, anicteric, mucosa moist  Respiratory: Airway patent, respirations unlabored  Abdomen: Soft, nondistended  Extremities: No edema, sensation and movement limited by pain, Patient reported edema of left foot no longer present. Movement of left extremity limited by pain. Left axillary surgical site with dressings. Taken down and minimal exudate in dressing. R axilla with numerous draining wound sites and scars. Sacral wound site nondraining. Skin grafts appear well healing  Skin: Warm, dry, appropriate color  ____________________________________________  LABS:  CBC Full  -  ( 11 Sep 2023 08:13 )  WBC Count : 13.82 K/uL  RBC Count : 4.23 M/uL  Hemoglobin : 8.6 g/dL  Hematocrit : 28.4 %  Platelet Count - Automated : 463 K/uL  Mean Cell Volume : 67.1 fL  Mean Cell Hemoglobin : 20.3 pg  Mean Cell Hemoglobin Concentration : 30.3 gm/dL  Auto Neutrophil # : x  Auto Lymphocyte # : x  Auto Monocyte # : x  Auto Eosinophil # : x  Auto Basophil # : x  Auto Neutrophil % : x  Auto Lymphocyte % : x  Auto Monocyte % : x  Auto Eosinophil % : x  Auto Basophil % : x    09-11    136  |  100  |  17  ----------------------------<  97  3.9   |  26  |  0.61    Ca    9.0      11 Sep 2023 08:13  Phos  3.8     09-11  Mg     1.90     09-11    TPro  10.0<H>  /  Alb  3.1<L>  /  TBili  0.4  /  DBili  x   /  AST  12  /  ALT  7   /  AlkPhos  112  09-09    LIVER FUNCTIONS - ( 09 Sep 2023 23:40 )  Alb: 3.1 g/dL / Pro: 10.0 g/dL / ALK PHOS: 112 U/L / ALT: 7 U/L / AST: 12 U/L / GGT: x           PT/INR - ( 09 Sep 2023 23:40 )   PT: 14.7 sec;   INR: 1.31 ratio         PTT - ( 09 Sep 2023 23:40 )  PTT:29.2 sec  Urinalysis Basic - ( 11 Sep 2023 08:13 )    Color: x / Appearance: x / SG: x / pH: x  Gluc: 97 mg/dL / Ketone: x  / Bili: x / Urobili: x   Blood: x / Protein: x / Nitrite: x   Leuk Esterase: x / RBC: x / WBC x   Sq Epi: x / Non Sq Epi: x / Bacteria: x      CARDIAC MARKERS ( 09 Sep 2023 23:40 )  x     / x     / 41 U/L / x     / x            ____________________________________________  MICRO:  RECENT CULTURES:    ____________________________________________  RADIOLOGY:

## 2023-09-11 NOTE — CONSULT NOTE ADULT - SUBJECTIVE AND OBJECTIVE BOX
***consult has been received. note is in progress and incomplete without attending attestation***    eSan Pelletier MD  PGY-4  Rheumatology Fellow  [employer] on [employer]  [phone]    JANNA CYR  [id number]    HISTORY OF PRESENT ILLNESS:          Rheum ROS      MEDICATIONS  (STANDING):  chlorhexidine 2% Cloths 1 Application(s) Topical daily  ferrous    sulfate 325 milliGRAM(s) Oral daily    MEDICATIONS  (PRN):  acetaminophen     Tablet .. 650 milliGRAM(s) Oral every 6 hours PRN Temp greater or equal to 38C (100.4F), Mild Pain (1 - 3)  naproxen 375 milliGRAM(s) Oral every 12 hours PRN Moderate Pain (4 - 6)      Allergies    No Known Allergies    Intolerances        PERTINENT MEDICATION HISTORY:      Social History:  Ambulates without assistance. No tobacco, EtOH, or illicit drug use other than marijuana use. (10 Sep 2023 13:28)      PAST MEDICAL & SURGICAL HISTORY:  Hidradenitis suppurativa      RBBB      H/O sepsis      S/P excisional debridement      Left axillary hidradenitis          OCCUPATION:  [employer] HISTORY:    FAMILY HISTORY:  FH: hypertension (Mother)        Vital Signs Last 24 Hrs  T(C): 36.9 (11 Sep 2023 14:08), Max: 36.9 (10 Sep 2023 22:14)  T(F): 98.4 (11 Sep 2023 14:08), Max: 98.4 (10 Sep 2023 22:14)  HR: 92 (11 Sep 2023 14:08) (78 - 92)  BP: 127/78 (11 Sep 2023 14:08) (125/77 - 129/82)  BP(mean): --  RR: 17 (11 Sep 2023 14:08) (17 - 18)  SpO2: 100% (11 Sep 2023 14:08) (100% - 100%)    Parameters below as of 11 Sep 2023 14:08  Patient On (Oxygen Delivery Method): room air        Physical Exam:  General: No apparent distress  HEENT: EOMI, MMM  CVS: +S1/S2, RRR, no murmurs/rubs/gallops  Resp: CTA b/l. No crackles/wheezing  GI: Soft, NT/ND +BS  MSK: no swelling/warmth/erythema of the joints of the UE/LE  Neuro: AAOx3  Skin: no visible rashes    LABS:                        8.6    13.82 )-----------( 463      ( 11 Sep 2023 08:13 )             28.4     09-11    136  |  100  |  17  ----------------------------<  97  3.9   |  26  |  0.61    Ca    9.0      11 Sep 2023 08:13  Phos  3.8     09-11  Mg     1.90     09-11    TPro  10.0<H>  /  Alb  3.1<L>  /  TBili  0.4  /  DBili  x   /  AST  12  /  ALT  7   /  AlkPhos  112  09-09    PT/INR - ( 09 Sep 2023 23:40 )   PT: 14.7 sec;   INR: 1.31 ratio         PTT - ( 09 Sep 2023 23:40 )  PTT:29.2 sec  Urinalysis Basic - ( 11 Sep 2023 08:13 )    Color: x / Appearance: x / SG: x / pH: x  Gluc: 97 mg/dL / Ketone: x  / Bili: x / Urobili: x   Blood: x / Protein: x / Nitrite: x   Leuk Esterase: x / RBC: x / WBC x   Sq Epi: x / Non Sq Epi: x / Bacteria: x        Rheumatology Work Up    Sedimentation Rate, Erythrocyte: 100 mm/hr *H* [1 - 15] (09-09-23 @ 23:40)  C-Reactive Protein, Serum: 227.4 mg/L *H* (09-09-23 @ 23:40)  Creatine Kinase, Serum: 41 U/L [30 - 200] (09-09-23 @ 23:40)  Sedimentation Rate, Erythrocyte: 124 mm/hr *H* [1 - 15] (02-18-23 @ 17:50)      RADIOLOGY & ADDITIONAL STUDIES:    < from: MR Pelvis Bony Only w/wo IV Cont (05.18.23 @ 11:16) >  1.  Slight right and mild left sacroiliitis is again seen with mild   improvement of the left-sided synovitis. Osseous involvement is similar   to the prior. Changes may be inflammatory or infectious in nature. There   are no sizable joint effusions.  2.  Right perirectal abscess is again seen.  3.  Left mons pubis abscess now appears to be present measuring 2.8 x 1.8   x 1.5 cm.  4.  Enhancing posterior subcutaneous edema with right-sided predominance   suggests cellulitis or other process.      < end of copied text >   ***consult has been received. note is in progress and incomplete without attending attestation***    Sean Pelletier MD  PGY-4  Rheumatology Fellow  [employer] on TE[employer]  [phone]    JANNA CYR  [id number]    HISTORY OF PRESENT ILLNESS:    A 32-year-old male with a known history of Hidradenitis Suppurativa (HS) and a recurrent pattern of hospital admissions for wound excisions and skin grafts, most recently for a left axillary excision in April 2023, presents to the hospital with a two-week history of pain affecting his wrists, hands, knees, and ankles. The patient denies experiencing any associated symptoms such as fever, chills, diarrhea, cough, shortness of breath, or dysuria. The patient's HS diagnosis dates back to 2017, initially manifesting in the inguinal areas and subsequently occurring under the armpits and on the face. He was initiated on Humira therapy in 2018, which continued until January 2023. During this period, the patient reported being free from joint pain; however, he did experience a skin flare-up in January 2023 while still on Humira but it was discontinued in 2/23 as he underwent surgery for the left armpit lesions. His scheduled surgery for right armpit lesions had to be rescheduled, possibly due to an infection. Additionally, it is noteworthy that the patient was admitted to the hospital in May 2023 due to hip pain, and an MRI revealed slight right sacroiliitis and mild left sacroiliitis with some improvement in left-sided synovitis. Rheumatology consultation has been requested to address the patient's HS-related skin findings and the presence of multiple joint pains.      MEDICATIONS  (STANDING):  chlorhexidine 2% Cloths 1 Application(s) Topical daily  ferrous    sulfate 325 milliGRAM(s) Oral daily    MEDICATIONS  (PRN):  acetaminophen     Tablet .. 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 No tobacco, EtOH, or illicit drug use other than marijuana use. (10 Sep 2023 13:28)      PAST MEDICAL & SURGICAL HISTORY:  Hidradenitis suppurativa      RBBB      H/O sepsis      S/P excisional debridement      Left axillary hidradenitis          OCCUPATION:  [employer] HISTORY:    FAMILY HISTORY:  FH: hypertension (Mother)        Vital Signs Last 24 Hrs  T(C): 36.9 (11 Sep 2023 14:08), Max: 36.9 (10 Sep 2023 22:14)  T(F): 98.4 (11 Sep 2023 14:08), Max: 98.4 (10 Sep 2023 22:14)  HR: 92 (11 Sep 2023 14:08) (78 - 92)  BP: 127/78 (11 Sep 2023 14:08) (125/77 - 129/82)  BP(mean): --  RR: 17 (11 Sep 2023 14:08) (17 - 18)  SpO2: 100% (11 Sep 2023 14:08) (100% - 100%)    Parameters below as of 11 Sep 2023 14:08  Patient On (Oxygen Delivery Method): room air        Physical Exam:  General: No apparent distress  HEENT: EOMI, MMM  CVS: +S1/S2, RRR, no murmurs/rubs/gallops  Resp: CTA b/l. No crackles/wheezing  GI: Soft, NT/ND +BS  MSK: no swelling/warmth/erythema of the joints of the UE/LE  Neuro: AAOx3  Skin: no visible rashes    LABS:                        8.6    13.82 )-----------( 463      ( 11 Sep 2023 08:13 )             28.4     09-11    136  |  100  |  17  ----------------------------<  97  3.9   |  26  |  0.61    Ca    9.0      11 Sep 2023 08:13  Phos  3.8     09-11  Mg     1.90     09-11    TPro  10.0<H>  /  Alb  3.1<L>  /  TBili  0.4  /  DBili  x   /  AST  12  /  ALT  7   /  AlkPhos  112  09-09    PT/INR - ( 09 Sep 2023 23:40 )   PT: 14.7 sec;   INR: 1.31 ratio         PTT - ( 09 Sep 2023 23:40 )  PTT:29.2 sec  Urinalysis Basic - ( 11 Sep 2023 08:13 )    Color: x / Appearance: x / SG: x / pH: x  Gluc: 97 mg/dL / Ketone: x  / Bili: x / Urobili: x   Blood: x / Protein: x / Nitrite: x   Leuk Esterase: x / RBC: x / WBC x   Sq Epi: x / Non Sq Epi: x / Bacteria: x        Rheumatology Work Up    Sedimentation Rate, Erythrocyte: 100 mm/hr *H* [1 - 15] (09-09-23 @ 23:40)  C-Reactive Protein, Serum: 227.4 mg/L *H* (09-09-23 @ 23:40)  Creatine Kinase, Serum: 41 U/L [30 - 200] (09-09-23 @ 23:40)  Sedimentation Rate, Erythrocyte: 124 mm/hr *H* [1 - 15] (02-18-23 @ 17:50)      RADIOLOGY & ADDITIONAL STUDIES:    < from: MR Pelvis Bony Only w/wo IV Cont (05.18.23 @ 11:16) >  1.  Slight right and mild left sacroiliitis is again seen with mild   improvement of the left-sided synovitis. Osseous involvement is similar   to the prior. Changes may be inflammatory or infectious in nature. There   are no sizable joint effusions.  2.  Right perirectal abscess is again seen.  3.  Left mons pubis abscess now appears to be present measuring 2.8 x 1.8   x 1.5 cm.  4.  Enhancing posterior subcutaneous edema with right-sided predominance   suggests cellulitis or other process.      < end of copied text >

## 2023-09-11 NOTE — CONSULT NOTE ADULT - ASSESSMENT
Mr. Reyna is a 34 yo M with PMH of Hidradenitis Suppurativa presenting with asymmetric joint pain. Plastics consulted as Mr. Peña is familiar to the service. Will continue to follow. No interventions at this time. Mr. Reyna is a 32 yo M with PMH of Hidradenitis Suppurativa presenting with asymmetric joint pain. Plastics consulted as Mr. Peña is familiar to the service. Will continue to follow. No interventions at this time.    > Will discuss operation to R axilla as inpatient

## 2023-09-12 LAB
ANION GAP SERPL CALC-SCNC: 12 MMOL/L — SIGNIFICANT CHANGE UP (ref 7–14)
ANISOCYTOSIS BLD QL: SLIGHT — SIGNIFICANT CHANGE UP
BASOPHILS # BLD AUTO: 0.13 K/UL — SIGNIFICANT CHANGE UP (ref 0–0.2)
BASOPHILS NFR BLD AUTO: 1 % — SIGNIFICANT CHANGE UP (ref 0–2)
BUN SERPL-MCNC: 11 MG/DL — SIGNIFICANT CHANGE UP (ref 7–23)
CALCIUM SERPL-MCNC: 8.9 MG/DL — SIGNIFICANT CHANGE UP (ref 8.4–10.5)
CHLORIDE SERPL-SCNC: 101 MMOL/L — SIGNIFICANT CHANGE UP (ref 98–107)
CO2 SERPL-SCNC: 25 MMOL/L — SIGNIFICANT CHANGE UP (ref 22–31)
CREAT SERPL-MCNC: 0.57 MG/DL — SIGNIFICANT CHANGE UP (ref 0.5–1.3)
EGFR: 133 ML/MIN/1.73M2 — SIGNIFICANT CHANGE UP
EOSINOPHIL # BLD AUTO: 0.27 K/UL — SIGNIFICANT CHANGE UP (ref 0–0.5)
EOSINOPHIL NFR BLD AUTO: 2 % — SIGNIFICANT CHANGE UP (ref 0–6)
GIANT PLATELETS BLD QL SMEAR: PRESENT — SIGNIFICANT CHANGE UP
GLUCOSE SERPL-MCNC: 86 MG/DL — SIGNIFICANT CHANGE UP (ref 70–99)
HCT VFR BLD CALC: 28.2 % — LOW (ref 39–50)
HGB BLD-MCNC: 8.6 G/DL — LOW (ref 13–17)
IANC: 8.49 K/UL — HIGH (ref 1.8–7.4)
LYMPHOCYTES # BLD AUTO: 29 % — SIGNIFICANT CHANGE UP (ref 13–44)
LYMPHOCYTES # BLD AUTO: 3.91 K/UL — HIGH (ref 1–3.3)
MAGNESIUM SERPL-MCNC: 2 MG/DL — SIGNIFICANT CHANGE UP (ref 1.6–2.6)
MANUAL SMEAR VERIFICATION: SIGNIFICANT CHANGE UP
MCHC RBC-ENTMCNC: 20.5 PG — LOW (ref 27–34)
MCHC RBC-ENTMCNC: 30.5 GM/DL — LOW (ref 32–36)
MCV RBC AUTO: 67.1 FL — LOW (ref 80–100)
MICROCYTES BLD QL: SLIGHT — SIGNIFICANT CHANGE UP
MONOCYTES # BLD AUTO: 0.54 K/UL — SIGNIFICANT CHANGE UP (ref 0–0.9)
MONOCYTES NFR BLD AUTO: 4 % — SIGNIFICANT CHANGE UP (ref 2–14)
NEUTROPHILS # BLD AUTO: 8.62 K/UL — HIGH (ref 1.8–7.4)
NEUTROPHILS NFR BLD AUTO: 64 % — SIGNIFICANT CHANGE UP (ref 43–77)
NRBC # BLD: 0 /100 — SIGNIFICANT CHANGE UP (ref 0–0)
PHOSPHATE SERPL-MCNC: 3.7 MG/DL — SIGNIFICANT CHANGE UP (ref 2.5–4.5)
PLAT MORPH BLD: NORMAL — SIGNIFICANT CHANGE UP
PLATELET # BLD AUTO: 439 K/UL — HIGH (ref 150–400)
PLATELET COUNT - ESTIMATE: NORMAL — SIGNIFICANT CHANGE UP
POTASSIUM SERPL-MCNC: 4.5 MMOL/L — SIGNIFICANT CHANGE UP (ref 3.5–5.3)
POTASSIUM SERPL-SCNC: 4.5 MMOL/L — SIGNIFICANT CHANGE UP (ref 3.5–5.3)
RBC # BLD: 4.2 M/UL — SIGNIFICANT CHANGE UP (ref 4.2–5.8)
RBC # FLD: 19.9 % — HIGH (ref 10.3–14.5)
RBC BLD AUTO: ABNORMAL
SODIUM SERPL-SCNC: 138 MMOL/L — SIGNIFICANT CHANGE UP (ref 135–145)
TARGETS BLD QL SMEAR: SLIGHT — SIGNIFICANT CHANGE UP
WBC # BLD: 13.47 K/UL — HIGH (ref 3.8–10.5)
WBC # FLD AUTO: 13.47 K/UL — HIGH (ref 3.8–10.5)

## 2023-09-12 PROCEDURE — 99233 SBSQ HOSP IP/OBS HIGH 50: CPT

## 2023-09-12 RX ORDER — SENNA PLUS 8.6 MG/1
2 TABLET ORAL AT BEDTIME
Refills: 0 | Status: DISCONTINUED | OUTPATIENT
Start: 2023-09-12 | End: 2023-09-18

## 2023-09-12 RX ORDER — POLYETHYLENE GLYCOL 3350 17 G/17G
17 POWDER, FOR SOLUTION ORAL DAILY
Refills: 0 | Status: DISCONTINUED | OUTPATIENT
Start: 2023-09-12 | End: 2023-09-18

## 2023-09-12 RX ADMIN — CHLORHEXIDINE GLUCONATE 1 APPLICATION(S): 213 SOLUTION TOPICAL at 09:30

## 2023-09-12 RX ADMIN — Medication 500 MILLIGRAM(S): at 09:27

## 2023-09-12 RX ADMIN — Medication 325 MILLIGRAM(S): at 12:32

## 2023-09-12 RX ADMIN — POLYETHYLENE GLYCOL 3350 17 GRAM(S): 17 POWDER, FOR SOLUTION ORAL at 09:28

## 2023-09-12 RX ADMIN — Medication 5 MILLIGRAM(S): at 09:28

## 2023-09-12 RX ADMIN — Medication 500 MILLIGRAM(S): at 18:55

## 2023-09-12 NOTE — ADVANCED PRACTICE NURSE CONSULT - REASON FOR CONSULT
Patient known to [facility] service line last seen on previous admission 2/23/23, patient seen on skin care rounds after wound care referral received for assessment of skin impairment and recommendations of topical management of hidradenitis suppurativa As per H&P, patient is a 33M with PMH of RBBB, hidradenitis suppurativa (previously on Humira) s/p excision of hidradenitis of buttocks with skin graft reconstruction (2/2023) s/p excision of left axillary (4/2023) with skin graft reconstruction (5/2023),  and [place] now p/w pain in his lower extremities x 1.5 weeks. Patient was hospitalized in 5/2023 with L hip pain found to left sided sacroilitis/bursitis likely 2/2 spondyloarthropathy. Patient's pain resolved with NSAIDs but reports ~10 days ago he started to have severe pain in his bilateral ankles (L>R) and milder pain in his knees (L>R) and R hip limiting his ambulation. Tylenol/ibuprofen helped minimally. He also noted swelling in his RLE 3rd digit and LLE 2nd digit. When he walks his has pain in his PIP joints of those 2 digits and in the posterior part of his ankles. Denies fevers/chills, CP, palpitations, SOB, n/v/d, abdominal pain, dysuria, penile discharge, rashes, recent travel/hikes, joint stiffness/swelling. Of note, last month was supposed to get R axillary hidradenitis removed in August but was not cleared for surgery due to multiple reasons including leukocytosis and anemia per patient. He has been evaluated by hematology in the past and told his leukocytosis is likely a leukemoid reaction during hidradenitis flares. 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 Per chart review, patient was started on doxycycline x 10 days and iron supplementation by his PCP in August. Patient completed doxycycline a earlier this week. He says the pain/drainage in his R axillary lesions has been worsening.     Chart reviewed: WBC: 13.47, H&H: 8.6/28.2, Platelets: 439, INR: 1.31, serum albumin: 3.1, BMI: 23.5, Bubba 18.

## 2023-09-12 NOTE — ADVANCED PRACTICE NURSE CONSULT - RECOMMEDATIONS
As per patient, plastics on board awaiting attending's decision     Topical Recommendations    R axilla: Cleanse with NS, pat dry. Apply Liquid barrier film to periwound skin (allow to dry). Apply hydrofiber (aquacel) to base of wound. Cover with abdominal pad, secure with paper tape. Change twice a day and PRN if soiled.    L axilla: Cleanse with NS, pat dry. Apply Liquid barrier film to periwound skin (allow to dry). Apply hydrofiber ribbon (aquacel ribbon) to base of wound.  Cover with abdominal pad, secure with paper tape. Change twice a day and PRN if soiled.    Bilateral Groin: Cleanse with NS, pat dry. Apply Liquid barrier film to periwound skin (allow to dry).  Apply hydrofiber ribbon (aquacel ribbon) to base of wound.  Cover with  4x4 gauze, secure with mesh underwear. Change twice a day and PRN if soiled.    Gluteal cleft (sacrum): Cleanse with NS, pat dry. Apply Liquid barrier film to periwound skin (allow to dry).  Apply hydrofiber ribbon (aquacel ribbon) to base of wound.  Cover with abdominal pad, secure with mesh underwear. Change twice a day and PRN if soiled.    Continue low air loss bed therapy, continue heel elevation, continue to turn & reposition per protocol, soft pillow between bony prominences, continue moisture management with barrier creams & single breathable pad, continue measures to decrease friction/shear/pressure. Continue with nutritional support as per dietary/orders.    Plan of care discussed with primary RN and patient at bedside, all questions answered, and primary ACP Donya Dowd     Please contact Wound Care Service Line if we can be of further assistance (ext [phone]).

## 2023-09-12 NOTE — ADVANCED PRACTICE NURSE CONSULT - ASSESSMENT
General: A&Ox4, OOB w standby assistance, continent of urine and stool. Skin warm, dry with increased moisture in intertriginous folds, scattered areas of hyperpigmentation and hypopigmentation, scattered areas of ecchymosis on bilateral upper extremities. Blanchable erythema on bilateral heels.     R axilla: Stage 3 hidradenitis suppurativa as evidenced by [id number] diffuse and multiple interconnected skin tunnels and abscesses across the entire area with moderate brown purulent drainage with malodor and palpable indurated nodules.  No erythema, no edema, no temperature changes noted.     L axilla: Previous area of Stage 3 hidradenitis suppurativa, s/p surgery presenting with healed surgical scar and 1 indurated nodule with sanguinous malodorous drainage. No erythema, no edema, no temperature changes noted.     Bilateral groin: Stage 3 hidradenitis suppurativa as evidenced by diffuse and multiple interconnected skin tunnels and abscesses across the entire area with moderate brown purulent drainage with malodor and palpable indurated nodules.  No erythema, no edema, no temperature changes noted.     Sacrum: Previous area of Stage 3 hidradenitis suppurativa, s/p surgery presenting with healed surgical scar extending to bilateral buttocks with oozing indurated nodules located in gluteal cleft with malodor. No erythema, no edema, no temperature changes noted.

## 2023-09-13 LAB
ANA PAT FLD IF-IMP: ABNORMAL
ANA TITR SER: ABNORMAL
ANION GAP SERPL CALC-SCNC: 11 MMOL/L — SIGNIFICANT CHANGE UP (ref 7–14)
AUTO DIFF PNL BLD: ABNORMAL
B BURGDOR DNA SPEC QL NAA+PROBE: NEGATIVE — SIGNIFICANT CHANGE UP
BUN SERPL-MCNC: 11 MG/DL — SIGNIFICANT CHANGE UP (ref 7–23)
C-ANCA SER-ACNC: NEGATIVE — SIGNIFICANT CHANGE UP
CALCIUM SERPL-MCNC: 9 MG/DL — SIGNIFICANT CHANGE UP (ref 8.4–10.5)
CHLORIDE SERPL-SCNC: 99 MMOL/L — SIGNIFICANT CHANGE UP (ref 98–107)
CO2 SERPL-SCNC: 26 MMOL/L — SIGNIFICANT CHANGE UP (ref 22–31)
CREAT SERPL-MCNC: 0.56 MG/DL — SIGNIFICANT CHANGE UP (ref 0.5–1.3)
EGFR: 133 ML/MIN/1.73M2 — SIGNIFICANT CHANGE UP
GLUCOSE SERPL-MCNC: 99 MG/DL — SIGNIFICANT CHANGE UP (ref 70–99)
HCT VFR BLD CALC: 31.3 % — LOW (ref 39–50)
HGB BLD-MCNC: 9.4 G/DL — LOW (ref 13–17)
MAGNESIUM SERPL-MCNC: 2 MG/DL — SIGNIFICANT CHANGE UP (ref 1.6–2.6)
MCHC RBC-ENTMCNC: 20.1 PG — LOW (ref 27–34)
MCHC RBC-ENTMCNC: 30 GM/DL — LOW (ref 32–36)
MCV RBC AUTO: 66.9 FL — LOW (ref 80–100)
NRBC # BLD: 0 /100 WBCS — SIGNIFICANT CHANGE UP (ref 0–0)
NRBC # FLD: 0 K/UL — SIGNIFICANT CHANGE UP (ref 0–0)
P-ANCA SER-ACNC: NEGATIVE — SIGNIFICANT CHANGE UP
PHOSPHATE SERPL-MCNC: 4 MG/DL — SIGNIFICANT CHANGE UP (ref 2.5–4.5)
PLATELET # BLD AUTO: 451 K/UL — HIGH (ref 150–400)
POTASSIUM SERPL-MCNC: 4 MMOL/L — SIGNIFICANT CHANGE UP (ref 3.5–5.3)
POTASSIUM SERPL-SCNC: 4 MMOL/L — SIGNIFICANT CHANGE UP (ref 3.5–5.3)
RBC # BLD: 4.68 M/UL — SIGNIFICANT CHANGE UP (ref 4.2–5.8)
RBC # FLD: 20.3 % — HIGH (ref 10.3–14.5)
SODIUM SERPL-SCNC: 136 MMOL/L — SIGNIFICANT CHANGE UP (ref 135–145)
WBC # BLD: 12.51 K/UL — HIGH (ref 3.8–10.5)
WBC # FLD AUTO: 12.51 K/UL — HIGH (ref 3.8–10.5)

## 2023-09-13 PROCEDURE — 99232 SBSQ HOSP IP/OBS MODERATE 35: CPT | Mod: GC

## 2023-09-13 PROCEDURE — 99233 SBSQ HOSP IP/OBS HIGH 50: CPT

## 2023-09-13 PROCEDURE — 99222 1ST HOSP IP/OBS MODERATE 55: CPT

## 2023-09-13 RX ORDER — AMPICILLIN SODIUM AND SULBACTAM SODIUM 250; 125 MG/ML; MG/ML
3 INJECTION, POWDER, FOR SUSPENSION INTRAMUSCULAR; INTRAVENOUS EVERY 6 HOURS
Refills: 0 | Status: DISCONTINUED | OUTPATIENT
Start: 2023-09-13 | End: 2023-09-18

## 2023-09-13 RX ADMIN — Medication 500 MILLIGRAM(S): at 17:11

## 2023-09-13 RX ADMIN — AMPICILLIN SODIUM AND SULBACTAM SODIUM 200 GRAM(S): 250; 125 INJECTION, POWDER, FOR SUSPENSION INTRAMUSCULAR; INTRAVENOUS at 17:59

## 2023-09-13 RX ADMIN — Medication 325 MILLIGRAM(S): at 12:21

## 2023-09-13 RX ADMIN — Medication 500 MILLIGRAM(S): at 08:44

## 2023-09-13 RX ADMIN — SENNA PLUS 2 TABLET(S): 8.6 TABLET ORAL at 22:49

## 2023-09-13 RX ADMIN — Medication 500 MILLIGRAM(S): at 09:44

## 2023-09-13 RX ADMIN — POLYETHYLENE GLYCOL 3350 17 GRAM(S): 17 POWDER, FOR SOLUTION ORAL at 12:21

## 2023-09-13 RX ADMIN — CHLORHEXIDINE GLUCONATE 1 APPLICATION(S): 213 SOLUTION TOPICAL at 12:21

## 2023-09-13 NOTE — CONSULT NOTE ADULT - SUBJECTIVE AND OBJECTIVE BOX
HPI:  33M with PMH of RBBB, hidradenitis suppurativa (previously on Humira) s/p excision of hidradenitis of buttocks with skin graft reconstruction (2/2023) s/p excision of left axillary (4/2023) with skin graft reconstruction (5/2023),  and [place] now p/w pain in his lower extremities x 1.5 weeks. Patient was hospitalized in 5/2023 with L hip pain found to left sided sacroilitis/bursitis likely 2/2 spondyloarthropathy. Patient's pain resolved with NSAIDs but reports ~10 days ago he started to have severe pain in his bilateral ankles (L>R) and milder pain in his knees (L>R) and R hip limiting his ambulation. Tylenol/ibuprofen helped minimally. He also noted swelling in his RLE 3rd digit and LLE 2nd digit. When he walks his has pain in his PIP joints of those 2 digits and in the posterior part of his ankles. Denies fevers/chills, CP, palpitations, SOB, n/v/d, abdominal pain, dysuria, penile discharge, rashes, recent travel/hikes, joint stiffness/swelling.     Of note, last month was supposed to get R axillary hidradenitis removed in August but was not cleared for surgery due to multiple reasons including leukocytosis and anemia per patient. He has been evaluated by hematology in the past and told his leukocytosis is likely a leukemoid reaction during hidradenitis flares. 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 Per chart review, patient was started on doxycycline x 10 days and iron supplementation by his PCP in August. Patient completed doxycycline a earlier this week. He says the pain/drainage in his R axillary lesions has been worsening.     In the ED, VSS. Labs notable for WBC 17.66K, H/H 9.3/30.9, elevated gamma gap, , .4. Pt given IV toradol 15mg x 1 and prednisone 20mg x 1 with significant improvement in pain.  (10 Sep 2023 13:28)    ID- rheumatology planning steroid treatment       request ID input infection right axilla      patient states that recent doxycycline helped right axilla drainage but did not totally resolve      no fever, chills     discouraged about general medical condition - wants to be able to get back to work        PAST MEDICAL & SURGICAL HISTORY:  Hidradenitis suppurativa      RBBB      H/O sepsis      S/P excisional debridement      Left axillary hidradenitis          Allergies    No Known Allergies    Intolerances        ANTIMICROBIALS:      OTHER MEDS:  acetaminophen     Tablet .. 650 milliGRAM(s) Oral every 6 hours PRN  bisacodyl 5 milliGRAM(s) Oral every 12 hours PRN  chlorhexidine 2% Cloths 1 Application(s) Topical daily  ferrous    sulfate 325 milliGRAM(s) Oral daily  naproxen 500 milliGRAM(s) Oral every 12 hours  polyethylene glycol 3350 17 Gram(s) Oral daily  senna 2 Tablet(s) Oral at bedtime      SOCIAL HISTORY:    FAMILY HISTORY:  FH: hypertension (Mother)        REVIEW OF SYSTEMS  [  ] ROS unobtainable because:    [ x ] All other systems negative except as noted below:	    Constitutional:  [ ] fever [ ] chills  [ ] weight loss  [ ] weakness  Skin:  [ ] rash [ ] phlebitis	  Eyes: [ ] icterus [ ] pain  [ ] discharge	  ENMT: [ ] sore throat  [ ] thrush [ ] ulcers [ ] exudates  Respiratory: [ ] dyspnea [ ] hemoptysis [ ] cough [ ] sputum	  Cardiovascular:  [ ] chest pain [ ] palpitations [ ] edema	  Gastrointestinal:  [ ] nausea [ ] vomiting [ ] diarrhea [ ] constipation [ ] pain	  Genitourinary:  [ ] dysuria [ ] frequency [ ] hematuria [ ] discharge [ ] flank pain  [ ] incontinence  Musculoskeletal:  [ ] myalgias [ ] arthralgias [ ] arthritis  [ ] back pain  Neurological:  [ ] headache [ ] seizures  [ ] confusion/altered mental status  Psychiatric:  [ ] anxiety [ ] depression	  Hematology/Lymphatics:  [ ] lymphadenopathy  Endocrine:  [ ] adrenal [ ] thyroid  Allergic/Immunologic:	 [ ] transplant [ ] seasonal    PHYSICAL EXAM:  Constitutional: Not in acute distress  Eyes: No icterus.  Oral cavity: Clear, no lesions  Neck: Supple  RS: no respiratory distress   CVS: S1, S2   Abdomen: Soft. No guarding/rigidity/tenderness.  Skin: right axilla few areas of small draining wounds,  brown drainage on gauze   Neuro: Alert, oriented to time/place/person  Cranial nerves 2-12 grossly normal. No focal abnormalities    Drug Dosing Weight  Height (cm): 185.4 (10 Sep 2023 05:38)  Weight (kg): 80.9 (10 Sep 2023 05:38)  BMI (kg/m2): 23.5 (10 Sep 2023 05:38)  BSA (m2): 2.05 (10 Sep 2023 05:38)    Vital Signs Last 24 Hrs  T(F): 98 (09-13-23 @ 13:22), Max: 98.8 (09-12-23 @ 21:32)    Vital Signs Last 24 Hrs  HR: 86 (09-13-23 @ 13:22) (79 - 86)  BP: 113/66 (09-13-23 @ 13:22) (113/66 - 131/72)  RR: 16 (09-13-23 @ 13:22)  SpO2: 100% (09-13-23 @ 13:22) (96% - 100%)  Wt(kg): --                          9.4    12.51 )-----------( 451      ( 13 Sep 2023 05:34 )             31.3       09-13    136  |  99  |  11  ----------------------------<  99  4.0   |  26  |  0.56    Ca    9.0      13 Sep 2023 05:34  Phos  4.0     09-13  Mg     2.00     09-13        Urinalysis Basic - ( 13 Sep 2023 05:34 )    Color: x / Appearance: x / SG: x / pH: x  Gluc: 99 mg/dL / Ketone: x  / Bili: x / Urobili: x   Blood: x / Protein: x / Nitrite: x   Leuk Esterase: x / RBC: x / WBC x   Sq Epi: x / Non Sq Epi: x / Bacteria: x        MICROBIOLOGY:          v    Rapid RVP Result: NotDetec (09-10 @ 13:45)          RADIOLOGY:

## 2023-09-13 NOTE — CONSULT NOTE ADULT - ASSESSMENT
32 yo man with hidradenitis suppurativa previously on Humira s/p excision of hidradenitis of sacrum with skin graft reconstruction 2/2023 s/p excision of left axillary lesions 4/2023 and skin graft reconstruction 5/2023 now with hidradenitis flare right axilla.   completed outpatient course of doxycycline x 10 days   afebrile   no s/s systemic toxicity     Hidradenitis flare   previous Humira     Suggest;    Unasyn 3 gm iv q 6 h

## 2023-09-13 NOTE — PATIENT PROFILE ADULT - NSFALLSECTIONLABEL_GEN_A_CORE
. Rinvoq Pregnancy And Lactation Text: Based on animal studies, Rinvoq may cause embryo-fetal harm when administered to pregnant women.  The medication should not be used in pregnancy.  Breastfeeding is not recommended during treatment and for 6 days after the last dose.

## 2023-09-14 PROCEDURE — 99233 SBSQ HOSP IP/OBS HIGH 50: CPT

## 2023-09-14 RX ADMIN — Medication 500 MILLIGRAM(S): at 06:50

## 2023-09-14 RX ADMIN — AMPICILLIN SODIUM AND SULBACTAM SODIUM 200 GRAM(S): 250; 125 INJECTION, POWDER, FOR SUSPENSION INTRAMUSCULAR; INTRAVENOUS at 12:46

## 2023-09-14 RX ADMIN — AMPICILLIN SODIUM AND SULBACTAM SODIUM 200 GRAM(S): 250; 125 INJECTION, POWDER, FOR SUSPENSION INTRAMUSCULAR; INTRAVENOUS at 06:00

## 2023-09-14 RX ADMIN — Medication 500 MILLIGRAM(S): at 06:01

## 2023-09-14 RX ADMIN — AMPICILLIN SODIUM AND SULBACTAM SODIUM 200 GRAM(S): 250; 125 INJECTION, POWDER, FOR SUSPENSION INTRAMUSCULAR; INTRAVENOUS at 00:05

## 2023-09-14 RX ADMIN — AMPICILLIN SODIUM AND SULBACTAM SODIUM 200 GRAM(S): 250; 125 INJECTION, POWDER, FOR SUSPENSION INTRAMUSCULAR; INTRAVENOUS at 18:57

## 2023-09-14 RX ADMIN — Medication 500 MILLIGRAM(S): at 18:57

## 2023-09-15 ENCOUNTER — TRANSCRIPTION ENCOUNTER (OUTPATIENT)
Age: 33
End: 2023-09-15

## 2023-09-15 LAB
ANION GAP SERPL CALC-SCNC: 13 MMOL/L — SIGNIFICANT CHANGE UP (ref 7–14)
BUN SERPL-MCNC: 13 MG/DL — SIGNIFICANT CHANGE UP (ref 7–23)
CALCIUM SERPL-MCNC: 9.3 MG/DL — SIGNIFICANT CHANGE UP (ref 8.4–10.5)
CHLORIDE SERPL-SCNC: 96 MMOL/L — LOW (ref 98–107)
CO2 SERPL-SCNC: 25 MMOL/L — SIGNIFICANT CHANGE UP (ref 22–31)
CREAT SERPL-MCNC: 0.59 MG/DL — SIGNIFICANT CHANGE UP (ref 0.5–1.3)
EGFR: 131 ML/MIN/1.73M2 — SIGNIFICANT CHANGE UP
GLUCOSE SERPL-MCNC: 83 MG/DL — SIGNIFICANT CHANGE UP (ref 70–99)
HCT VFR BLD CALC: 31.3 % — LOW (ref 39–50)
HGB BLD-MCNC: 9.5 G/DL — LOW (ref 13–17)
MAGNESIUM SERPL-MCNC: 1.9 MG/DL — SIGNIFICANT CHANGE UP (ref 1.6–2.6)
MCHC RBC-ENTMCNC: 20.2 PG — LOW (ref 27–34)
MCHC RBC-ENTMCNC: 30.4 GM/DL — LOW (ref 32–36)
MCV RBC AUTO: 66.5 FL — LOW (ref 80–100)
NRBC # BLD: 0 /100 WBCS — SIGNIFICANT CHANGE UP (ref 0–0)
NRBC # FLD: 0 K/UL — SIGNIFICANT CHANGE UP (ref 0–0)
PHOSPHATE SERPL-MCNC: 3.9 MG/DL — SIGNIFICANT CHANGE UP (ref 2.5–4.5)
PLATELET # BLD AUTO: 423 K/UL — HIGH (ref 150–400)
POTASSIUM SERPL-MCNC: 4.2 MMOL/L — SIGNIFICANT CHANGE UP (ref 3.5–5.3)
POTASSIUM SERPL-SCNC: 4.2 MMOL/L — SIGNIFICANT CHANGE UP (ref 3.5–5.3)
RBC # BLD: 4.71 M/UL — SIGNIFICANT CHANGE UP (ref 4.2–5.8)
RBC # FLD: 20.6 % — HIGH (ref 10.3–14.5)
SODIUM SERPL-SCNC: 134 MMOL/L — LOW (ref 135–145)
WBC # BLD: 11.98 K/UL — HIGH (ref 3.8–10.5)
WBC # FLD AUTO: 11.98 K/UL — HIGH (ref 3.8–10.5)

## 2023-09-15 PROCEDURE — 99232 SBSQ HOSP IP/OBS MODERATE 35: CPT | Mod: GC

## 2023-09-15 PROCEDURE — 99232 SBSQ HOSP IP/OBS MODERATE 35: CPT

## 2023-09-15 RX ADMIN — Medication 20 MILLIGRAM(S): at 18:27

## 2023-09-15 RX ADMIN — Medication 500 MILLIGRAM(S): at 07:12

## 2023-09-15 RX ADMIN — AMPICILLIN SODIUM AND SULBACTAM SODIUM 200 GRAM(S): 250; 125 INJECTION, POWDER, FOR SUSPENSION INTRAMUSCULAR; INTRAVENOUS at 06:11

## 2023-09-15 RX ADMIN — AMPICILLIN SODIUM AND SULBACTAM SODIUM 200 GRAM(S): 250; 125 INJECTION, POWDER, FOR SUSPENSION INTRAMUSCULAR; INTRAVENOUS at 00:01

## 2023-09-15 RX ADMIN — Medication 500 MILLIGRAM(S): at 06:12

## 2023-09-15 RX ADMIN — Medication 325 MILLIGRAM(S): at 13:01

## 2023-09-15 RX ADMIN — AMPICILLIN SODIUM AND SULBACTAM SODIUM 200 GRAM(S): 250; 125 INJECTION, POWDER, FOR SUSPENSION INTRAMUSCULAR; INTRAVENOUS at 17:48

## 2023-09-15 RX ADMIN — Medication 500 MILLIGRAM(S): at 17:49

## 2023-09-15 RX ADMIN — AMPICILLIN SODIUM AND SULBACTAM SODIUM 200 GRAM(S): 250; 125 INJECTION, POWDER, FOR SUSPENSION INTRAMUSCULAR; INTRAVENOUS at 13:01

## 2023-09-15 RX ADMIN — POLYETHYLENE GLYCOL 3350 17 GRAM(S): 17 POWDER, FOR SOLUTION ORAL at 13:01

## 2023-09-15 RX ADMIN — Medication 500 MILLIGRAM(S): at 18:49

## 2023-09-15 RX ADMIN — CHLORHEXIDINE GLUCONATE 1 APPLICATION(S): 213 SOLUTION TOPICAL at 13:01

## 2023-09-15 NOTE — DISCHARGE NOTE PROVIDER - HOSPITAL COURSE
33M with PMH of RBBB, hidradenitis suppurativa (previously on Humira) s/p excision of hidradenitis of buttocks with skin graft reconstruction (2/2023) s/p excision of left axillary (4/2023) with skin graft reconstruction (5/2023),  and [place] now p/w pain in his lower extremities x 1.5 weeks. Admitted due to c/f peripheral spondyloarthropathy, hidradenitis flare, leukocytosis, and anemia.         ·  Problem: Arthralgia of multiple joints.   ·  Plan: Pt hospitalized in 5/2023 with L hip pain found to left sided sacroilitis/bursitis likely 2/2 spondyloarthropathy. HLA B27 was negative at the time but he was treated with NSAIDs with resolution of sx. Now presenting with BLE asymmetric polyarthritis - severe pain in his bilateral ankles (L>R) and milder pain in his knees (L>R) and R hip limiting his ambulation.   -Exam c/f bilateral dactylitis of RLE 3rd digit and LLE 2nd digit, swelling particularly around PIP joints; also c/f enthesitis of b/l achilles tendon   -Elevated inflammatory markers - , , WBC 17k  - Rheumatolgy consulted  - Continue Naproxen, considering prednisone. Pain is already improving on Naproxen       ·  Problem: Hidradenitis.   ·  Plan: Hidradenitis suppurativa (previously on Humira) s/p excision of hidradenitis of buttocks with skin graft reconstruction (2/2023) s/p excision of left axillary (4/2023) with skin graft reconstruction (5/2023). Pending right axillary hidradenitis removal, was cancelled in 8/2023 due to leukocytosis and anemia. S/p outpatient doxycycline x 10 days per PCP.   -Appears to be in a hidradenitis flare with active wounds in axilla, painful/draining, and elevated inflammatory markers   - Plastic surgery following, no plan for surgical intervention inpatient. Rec outpt f/u   - ID consulted  - Cont unasyn  - Wound care consulted.      ·  Problem: Anemia.   ·  Plan: H/H 9.3/30.9 on presentation, around baseline. Microcytic. History iron deficiency anemia, pt recently prescribed iron supplementation but unsure if he's taking it.   -Started iron supplementation.      ·  Problem: Leukocytosis.   ·  Plan: WBC 17k on presentation, neutrophilic predominance. 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   -RVP, MRSA PCR   - ID rec unasyn  -Elevated gamma gap noted, previous SPEP showed polyclonal gammaglobulin possibly related to Humira use.     The patient is a 42y Female complaining of flank pain. 33M with PMH of RBBB, hidradenitis suppurativa (previously on Humira) s/p excision of hidradenitis of buttocks with skin graft reconstruction (2/2023) s/p excision of left axillary (4/2023) with skin graft reconstruction (5/2023),  and [place] now p/w pain in his lower extremities x 1.5 weeks. Admitted due to c/f peripheral spondyloarthropathy, hidradenitis flare, leukocytosis, and anemia.         ·  Problem: Arthralgia of multiple joints.   ·  Plan: Pt hospitalized in 5/2023 with L hip pain found to left sided sacroilitis/bursitis likely 2/2 spondyloarthropathy. HLA B27 was negative at the time but he was treated with NSAIDs with resolution of sx. Now presenting with BLE asymmetric polyarthritis - severe pain in his bilateral ankles (L>R) and milder pain in his knees (L>R) and R hip limiting his ambulation.   -Exam c/f bilateral dactylitis of RLE 3rd digit and LLE 2nd digit, swelling particularly around PIP joints; also c/f enthesitis of b/l achilles tendon   -Elevated inflammatory markers - , , WBC 17k  - Rheumatology consulted  - Continue Naproxen, considering prednisone. Pain is already improving on Naproxen       ·  Problem: Hidradenitis.   ·  Plan: Hidradenitis suppurativa (previously on Humira) s/p excision of hidradenitis of buttocks with skin graft reconstruction (2/2023) s/p excision of left axillary (4/2023) with skin graft reconstruction (5/2023). Pending right axillary hidradenitis removal, was cancelled in 8/2023 due to leukocytosis and anemia. S/p outpatient doxycycline x 10 days per PCP.   -Appears to be in a hidradenitis flare with active wounds in axilla, painful/draining, and elevated inflammatory markers   - Plastic surgery following, no plan for surgical intervention inpatient. Rec outpt f/u   - ID consulted  - Cont unasyn  - Wound care consulted.      ·  Problem: Anemia.   ·  Plan: H/H 9.3/30.9 on presentation, around baseline. Microcytic. History iron deficiency anemia, pt recently prescribed iron supplementation but unsure if he's taking it.   -Started iron supplementation.      ·  Problem: Leukocytosis.   ·  Plan: WBC 17k on presentation, neutrophilic predominance. 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   -RVP, MRSA PCR   - ID rec unasyn  -Elevated gamma gap noted, previous SPEP showed polyclonal gammaglobulin possibly related to Humira use.

## 2023-09-15 NOTE — DISCHARGE NOTE PROVIDER - NSDCFUADDAPPT_GEN_ALL_CORE_FT
R axilla: Cleanse with NS, pat dry. Apply Liquid barrier film to periwound skin (allow to dry). Apply hydrofiber (aquacel) to base of wound. Cover with abdominal pad, secure with paper tape. Change twice a day and PRN if soiled.    L axilla: Cleanse with NS, pat dry. Apply Liquid barrier film to periwound skin (allow to dry). Apply hydrofiber ribbon (aquacel ribbon) to base of wound.  Cover with abdominal pad, secure with paper tape. Change twice a day and PRN if soiled.    Bilateral Groin: Cleanse with NS, pat dry. Apply Liquid barrier film to periwound skin (allow to dry).  Apply hydrofiber ribbon (aquacel ribbon) to base of wound.  Cover with  4x4 gauze, secure with mesh underwear. Change twice a day and PRN if soiled.    Gluteal cleft (sacrum): Cleanse with NS, pat dry. Apply Liquid barrier film to periwound skin (allow to dry).  Apply hydrofiber ribbon (aquacel ribbon) to base of wound.  Cover with abdominal pad, secure with mesh underwear. Change twice a day and PRN if soiled.

## 2023-09-15 NOTE — DISCHARGE NOTE PROVIDER - DETAILS OF MALNUTRITION DIAGNOSIS/DIAGNOSES
This patient has been assessed with a concern for Malnutrition and was treated during this hospitalization for the following Nutrition diagnosis/diagnoses:     -  09/17/2023: Severe protein-calorie malnutrition

## 2023-09-15 NOTE — DISCHARGE NOTE PROVIDER - NSDCCPCAREPLAN_GEN_ALL_CORE_FT
PRINCIPAL DISCHARGE DIAGNOSIS  Diagnosis: Pain, joint, ankle and foot  Assessment and Plan of Treatment:       SECONDARY DISCHARGE DIAGNOSES  Diagnosis: Hidradenitis  Assessment and Plan of Treatment:      PRINCIPAL DISCHARGE DIAGNOSIS  Diagnosis: Pain, joint, ankle and foot  Assessment and Plan of Treatment: Please continue to follow up with your rheumotologist regularly.      SECONDARY DISCHARGE DIAGNOSES  Diagnosis: Hidradenitis  Assessment and Plan of Treatment: Cotinue Augmentin as presribed, close outpatient follow up     PRINCIPAL DISCHARGE DIAGNOSIS  Diagnosis: Pain, joint, ankle and foot  Assessment and Plan of Treatment: Please continue to follow up with your rheumotologist regularly.  Follow up in 1 week. Contnue to take Prednisone until seen by Rheumatologist.      SECONDARY DISCHARGE DIAGNOSES  Diagnosis: Hidradenitis  Assessment and Plan of Treatment: Cotinue Augmentin as presribed, close outpatient follow up

## 2023-09-15 NOTE — DISCHARGE NOTE PROVIDER - NSDCMRMEDTOKEN_GEN_ALL_CORE_FT
Aleve 220 mg oral capsule: 1 tab(s) orally 2 times a day as needed for  mild pain last dose 8/16/2023  Tylenol 500 mg oral tablet: 2 tab(s) orally every 8 hours as needed for  mild pain   amoxicillin-clavulanate 875 mg-125 mg oral tablet: 1 tab(s) orally 2 times a day  ferrous sulfate 325 mg (65 mg elemental iron) oral tablet: 1 tab(s) orally once a day  polyethylene glycol 3350 oral powder for reconstitution: 17 gram(s) orally once a day  predniSONE 20 mg oral tablet: 1 tab(s) orally once a day  senna leaf extract oral tablet: 2 tab(s) orally once a day (at bedtime)  Tylenol 500 mg oral tablet: 2 tab(s) orally every 8 hours as needed for  mild pain   amoxicillin-clavulanate 875 mg-125 mg oral tablet: 1 tab(s) orally 2 times a day  ferrous sulfate 325 mg (65 mg elemental iron) oral tablet: 1 tab(s) orally once a day  polyethylene glycol 3350 oral powder for reconstitution: 17 gram(s) orally once a day  prednisoLONE 5 mg oral tablet: 4 tab(s) orally once a day take 20 mg 4 tabs PO Q day for 7 days. Then take 15 mg PO daily until seen by Rheumatologist  prednisoLONE 5 mg oral tablet: 4 tab(s) orally once a day take 20 mg 4 tabs PO Q day for 7 days. Then take 15 mg PO daily until seen by Rheumatologist  senna leaf extract oral tablet: 2 tab(s) orally once a day (at bedtime)  Tylenol 500 mg oral tablet: 2 tab(s) orally every 8 hours as needed for  mild pain

## 2023-09-15 NOTE — DISCHARGE NOTE PROVIDER - ATTENDING DISCHARGE PHYSICAL EXAMINATION:
CONSTITUTIONAL: NAD, pleasant  RESPIRATORY: Normal respiratory effort; no respiratory distress, CTAB  CARDIOVASCULAR: No visible JVD, No lower extremity edema; S1S2, no m,r,g  ABDOMEN: Not guarding, does not appear distended, BS+  MUSCLOSKELETAL: no clubbing or cyanosis of digits;   PSYCH: AOx3    Discussed with rheumatology fellow

## 2023-09-16 LAB
ANION GAP SERPL CALC-SCNC: 13 MMOL/L — SIGNIFICANT CHANGE UP (ref 7–14)
BUN SERPL-MCNC: 14 MG/DL — SIGNIFICANT CHANGE UP (ref 7–23)
CALCIUM SERPL-MCNC: 9.5 MG/DL — SIGNIFICANT CHANGE UP (ref 8.4–10.5)
CHLORIDE SERPL-SCNC: 96 MMOL/L — LOW (ref 98–107)
CO2 SERPL-SCNC: 23 MMOL/L — SIGNIFICANT CHANGE UP (ref 22–31)
CREAT SERPL-MCNC: 0.58 MG/DL — SIGNIFICANT CHANGE UP (ref 0.5–1.3)
EGFR: 132 ML/MIN/1.73M2 — SIGNIFICANT CHANGE UP
GLUCOSE SERPL-MCNC: 116 MG/DL — HIGH (ref 70–99)
HCT VFR BLD CALC: 32.5 % — LOW (ref 39–50)
HGB BLD-MCNC: 9.7 G/DL — LOW (ref 13–17)
MAGNESIUM SERPL-MCNC: 2.1 MG/DL — SIGNIFICANT CHANGE UP (ref 1.6–2.6)
MCHC RBC-ENTMCNC: 20.1 PG — LOW (ref 27–34)
MCHC RBC-ENTMCNC: 29.8 GM/DL — LOW (ref 32–36)
MCV RBC AUTO: 67.4 FL — LOW (ref 80–100)
NRBC # BLD: 0 /100 WBCS — SIGNIFICANT CHANGE UP (ref 0–0)
NRBC # FLD: 0 K/UL — SIGNIFICANT CHANGE UP (ref 0–0)
PHOSPHATE SERPL-MCNC: 3.4 MG/DL — SIGNIFICANT CHANGE UP (ref 2.5–4.5)
PLATELET # BLD AUTO: 526 K/UL — HIGH (ref 150–400)
POTASSIUM SERPL-MCNC: 4.3 MMOL/L — SIGNIFICANT CHANGE UP (ref 3.5–5.3)
POTASSIUM SERPL-SCNC: 4.3 MMOL/L — SIGNIFICANT CHANGE UP (ref 3.5–5.3)
RBC # BLD: 4.82 M/UL — SIGNIFICANT CHANGE UP (ref 4.2–5.8)
RBC # FLD: 20.6 % — HIGH (ref 10.3–14.5)
SODIUM SERPL-SCNC: 132 MMOL/L — LOW (ref 135–145)
WBC # BLD: 15.31 K/UL — HIGH (ref 3.8–10.5)
WBC # FLD AUTO: 15.31 K/UL — HIGH (ref 3.8–10.5)

## 2023-09-16 PROCEDURE — 99232 SBSQ HOSP IP/OBS MODERATE 35: CPT

## 2023-09-16 RX ADMIN — AMPICILLIN SODIUM AND SULBACTAM SODIUM 200 GRAM(S): 250; 125 INJECTION, POWDER, FOR SUSPENSION INTRAMUSCULAR; INTRAVENOUS at 12:17

## 2023-09-16 RX ADMIN — Medication 325 MILLIGRAM(S): at 12:17

## 2023-09-16 RX ADMIN — AMPICILLIN SODIUM AND SULBACTAM SODIUM 200 GRAM(S): 250; 125 INJECTION, POWDER, FOR SUSPENSION INTRAMUSCULAR; INTRAVENOUS at 17:57

## 2023-09-16 RX ADMIN — AMPICILLIN SODIUM AND SULBACTAM SODIUM 200 GRAM(S): 250; 125 INJECTION, POWDER, FOR SUSPENSION INTRAMUSCULAR; INTRAVENOUS at 05:46

## 2023-09-16 RX ADMIN — Medication 500 MILLIGRAM(S): at 19:20

## 2023-09-16 RX ADMIN — Medication 500 MILLIGRAM(S): at 17:57

## 2023-09-16 RX ADMIN — CHLORHEXIDINE GLUCONATE 1 APPLICATION(S): 213 SOLUTION TOPICAL at 12:17

## 2023-09-16 RX ADMIN — Medication 500 MILLIGRAM(S): at 06:43

## 2023-09-16 RX ADMIN — AMPICILLIN SODIUM AND SULBACTAM SODIUM 200 GRAM(S): 250; 125 INJECTION, POWDER, FOR SUSPENSION INTRAMUSCULAR; INTRAVENOUS at 00:12

## 2023-09-16 RX ADMIN — Medication 20 MILLIGRAM(S): at 05:43

## 2023-09-16 RX ADMIN — Medication 500 MILLIGRAM(S): at 05:43

## 2023-09-17 LAB
ANION GAP SERPL CALC-SCNC: 9 MMOL/L — SIGNIFICANT CHANGE UP (ref 7–14)
BUN SERPL-MCNC: 15 MG/DL — SIGNIFICANT CHANGE UP (ref 7–23)
CALCIUM SERPL-MCNC: 9.3 MG/DL — SIGNIFICANT CHANGE UP (ref 8.4–10.5)
CHLORIDE SERPL-SCNC: 101 MMOL/L — SIGNIFICANT CHANGE UP (ref 98–107)
CO2 SERPL-SCNC: 27 MMOL/L — SIGNIFICANT CHANGE UP (ref 22–31)
CREAT SERPL-MCNC: 0.6 MG/DL — SIGNIFICANT CHANGE UP (ref 0.5–1.3)
EGFR: 131 ML/MIN/1.73M2 — SIGNIFICANT CHANGE UP
GLUCOSE SERPL-MCNC: 82 MG/DL — SIGNIFICANT CHANGE UP (ref 70–99)
HCT VFR BLD CALC: 30.1 % — LOW (ref 39–50)
HGB BLD-MCNC: 9.1 G/DL — LOW (ref 13–17)
MAGNESIUM SERPL-MCNC: 2 MG/DL — SIGNIFICANT CHANGE UP (ref 1.6–2.6)
MCHC RBC-ENTMCNC: 20.4 PG — LOW (ref 27–34)
MCHC RBC-ENTMCNC: 30.2 GM/DL — LOW (ref 32–36)
MCV RBC AUTO: 67.6 FL — LOW (ref 80–100)
NRBC # BLD: 0 /100 WBCS — SIGNIFICANT CHANGE UP (ref 0–0)
NRBC # FLD: 0 K/UL — SIGNIFICANT CHANGE UP (ref 0–0)
PHOSPHATE SERPL-MCNC: 3.7 MG/DL — SIGNIFICANT CHANGE UP (ref 2.5–4.5)
PLATELET # BLD AUTO: 494 K/UL — HIGH (ref 150–400)
POTASSIUM SERPL-MCNC: 4.1 MMOL/L — SIGNIFICANT CHANGE UP (ref 3.5–5.3)
POTASSIUM SERPL-SCNC: 4.1 MMOL/L — SIGNIFICANT CHANGE UP (ref 3.5–5.3)
RBC # BLD: 4.45 M/UL — SIGNIFICANT CHANGE UP (ref 4.2–5.8)
RBC # FLD: 20.2 % — HIGH (ref 10.3–14.5)
SODIUM SERPL-SCNC: 137 MMOL/L — SIGNIFICANT CHANGE UP (ref 135–145)
WBC # BLD: 11.54 K/UL — HIGH (ref 3.8–10.5)
WBC # FLD AUTO: 11.54 K/UL — HIGH (ref 3.8–10.5)

## 2023-09-17 PROCEDURE — 99232 SBSQ HOSP IP/OBS MODERATE 35: CPT

## 2023-09-17 RX ADMIN — CHLORHEXIDINE GLUCONATE 1 APPLICATION(S): 213 SOLUTION TOPICAL at 11:48

## 2023-09-17 RX ADMIN — Medication 500 MILLIGRAM(S): at 17:33

## 2023-09-17 RX ADMIN — AMPICILLIN SODIUM AND SULBACTAM SODIUM 200 GRAM(S): 250; 125 INJECTION, POWDER, FOR SUSPENSION INTRAMUSCULAR; INTRAVENOUS at 11:48

## 2023-09-17 RX ADMIN — AMPICILLIN SODIUM AND SULBACTAM SODIUM 200 GRAM(S): 250; 125 INJECTION, POWDER, FOR SUSPENSION INTRAMUSCULAR; INTRAVENOUS at 06:56

## 2023-09-17 RX ADMIN — Medication 500 MILLIGRAM(S): at 06:56

## 2023-09-17 RX ADMIN — AMPICILLIN SODIUM AND SULBACTAM SODIUM 200 GRAM(S): 250; 125 INJECTION, POWDER, FOR SUSPENSION INTRAMUSCULAR; INTRAVENOUS at 17:33

## 2023-09-17 RX ADMIN — Medication 20 MILLIGRAM(S): at 06:56

## 2023-09-17 RX ADMIN — Medication 325 MILLIGRAM(S): at 11:48

## 2023-09-17 RX ADMIN — AMPICILLIN SODIUM AND SULBACTAM SODIUM 200 GRAM(S): 250; 125 INJECTION, POWDER, FOR SUSPENSION INTRAMUSCULAR; INTRAVENOUS at 00:45

## 2023-09-17 RX ADMIN — AMPICILLIN SODIUM AND SULBACTAM SODIUM 200 GRAM(S): 250; 125 INJECTION, POWDER, FOR SUSPENSION INTRAMUSCULAR; INTRAVENOUS at 21:24

## 2023-09-17 NOTE — PROGRESS NOTE ADULT - PROBLEM SELECTOR PLAN 4
WBC 17k on presentation, neutrophilic predominance. 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
H/H 9.3/30.9 on presentation, around baseline. Microcytic. History iron deficiency anemia, pt recently prescribed iron supplementation but unsure if he's taking it.   -Started  iron supplementation
WBC 17k on presentation, neutrophilic predominance. 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
WBC 17k on presentation, neutrophilic predominance. 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
WBC 17k on presentation, neutrophilic predominance. 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
WBC 17k on presentation, neutrophilic predominance. 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
WBC 17k on presentation, neutrophilic predominance. 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

## 2023-09-17 NOTE — PROGRESS NOTE ADULT - SUBJECTIVE AND OBJECTIVE BOX
interval Hx:    Patient states better after he started on Naproxen but still c/o pain in both wrists, left 4th finger, knees and ankles  No fever (cultures negative), still on Unasyn       MEDICATIONS  (STANDING):  chlorhexidine 2% Cloths 1 Application(s) Topical daily  ferrous    sulfate 325 milliGRAM(s) Oral daily    MEDICATIONS  (PRN):  acetaminophen     Tablet .. 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 No tobacco, EtOH, or illicit drug use other than marijuana use. (10 Sep 2023 13:28)      PAST MEDICAL & SURGICAL HISTORY:  Hidradenitis suppurativa      RBBB      H/O sepsis      S/P excisional debridement      Left axillary hidradenitis          OCCUPATION:  [employer] HISTORY:    FAMILY HISTORY:  FH: hypertension (Mother)        Vital Signs Last 24 Hrs  T(C): 36.9 (11 Sep 2023 14:08), Max: 36.9 (10 Sep 2023 22:14)  T(F): 98.4 (11 Sep 2023 14:08), Max: 98.4 (10 Sep 2023 22:14)  HR: 92 (11 Sep 2023 14:08) (78 - 92)  BP: 127/78 (11 Sep 2023 14:08) (125/77 - 129/82)  BP(mean): --  RR: 17 (11 Sep 2023 14:08) (17 - 18)  SpO2: 100% (11 Sep 2023 14:08) (100% - 100%)    Parameters below as of 11 Sep 2023 14:08  Patient On (Oxygen Delivery Method): room air        Physical Exam:  General: No apparent distress  MSK: Tenderness with swelling in both wrists, left 3rd and 4th PIP  Skin: Hydradenitis suppurativa in right armpit and inguinal areas (actives), graft in left armpit, and proximal legs     LABS:                        8.6    13.82 )-----------( 463      ( 11 Sep 2023 08:13 )             28.4     09-11    136  |  100  |  17  ----------------------------<  97  3.9   |  26  |  0.61    Ca    9.0      11 Sep 2023 08:13  Phos  3.8     09-11  Mg     1.90     09-11    TPro  10.0<H>  /  Alb  3.1<L>  /  TBili  0.4  /  DBili  x   /  AST  12  /  ALT  7   /  AlkPhos  112  09-09    PT/INR - ( 09 Sep 2023 23:40 )   PT: 14.7 sec;   INR: 1.31 ratio         PTT - ( 09 Sep 2023 23:40 )  PTT:29.2 sec  Urinalysis Basic - ( 11 Sep 2023 08:13 )    Color: x / Appearance: x / SG: x / pH: x  Gluc: 97 mg/dL / Ketone: x  / Bili: x / Urobili: x   Blood: x / Protein: x / Nitrite: x   Leuk Esterase: x / RBC: x / WBC x   Sq Epi: x / Non Sq Epi: x / Bacteria: x        Rheumatology Work Up    Sedimentation Rate, Erythrocyte: 100 mm/hr *H* [1 - 15] (09-09-23 @ 23:40)  C-Reactive Protein, Serum: 227.4 mg/L *H* (09-09-23 @ 23:40)  Creatine Kinase, Serum: 41 U/L [30 - 200] (09-09-23 @ 23:40)  Sedimentation Rate, Erythrocyte: 124 mm/hr *H* [1 - 15] (02-18-23 @ 17:50)      RADIOLOGY & ADDITIONAL STUDIES:    < from: MR Pelvis Bony Only w/wo IV Cont (05.18.23 @ 11:16) >  1.  Slight right and mild left sacroiliitis is again seen with mild   improvement of the left-sided synovitis. Osseous involvement is similar   to the prior. Changes may be inflammatory or infectious in nature. There   are no sizable joint effusions.  2.  Right perirectal abscess is again seen.  3.  Left mons pubis abscess now appears to be present measuring 2.8 x 1.8   x 1.5 cm.  4.  Enhancing posterior subcutaneous edema with right-sided predominance   suggests cellulitis or other process.      < end of copied text >  
  Follow Up:  hidradenitis suppurativa flare     Interval History/ROS:   feel right axilla less painful  less drainage     Allergies  No Known Allergies        ANTIMICROBIALS:  ampicillin/sulbactam  IVPB 3 every 6 hours      OTHER MEDS:  acetaminophen     Tablet .. 650 milliGRAM(s) Oral every 6 hours PRN  bisacodyl 5 milliGRAM(s) Oral every 12 hours PRN  chlorhexidine 2% Cloths 1 Application(s) Topical daily  ferrous    sulfate 325 milliGRAM(s) Oral daily  naproxen 500 milliGRAM(s) Oral every 12 hours  polyethylene glycol 3350 17 Gram(s) Oral daily  senna 2 Tablet(s) Oral at bedtime      Vital Signs Last 24 Hrs  T(C): 36.8 (15 Sep 2023 12:48), Max: 37.1 (14 Sep 2023 22:45)  T(F): 98.2 (15 Sep 2023 12:48), Max: 98.7 (14 Sep 2023 22:45)  HR: 91 (15 Sep 2023 12:48) (88 - 92)  BP: 117/65 (15 Sep 2023 12:48) (110/72 - 118/73)  BP(mean): --  RR: 17 (15 Sep 2023 12:48) (17 - 18)  SpO2: 99% (15 Sep 2023 12:48) (99% - 100%)    Parameters below as of 15 Sep 2023 12:48  Patient On (Oxygen Delivery Method): room air        PHYSICAL EXAM:  Constitutional: Not in acute distress  Eyes: No icterus.  Neck: Supple  RS: Chest clear   CVS: S1, S2  Abdomen: Soft. No guarding/rigidity/tenderness.  Skin: right axilla with wounds, draining minimal  Neuro: Alert, oriented to time/place/person  Cranial nerves 2-12 grossly normal. No focal abnormalities                          9.5    11.98 )-----------( 423      ( 15 Sep 2023 06:45 )             31.3       09-15    134<L>  |  96<L>  |  13  ----------------------------<  83  4.2   |  25  |  0.59    Ca    9.3      15 Sep 2023 06:45  Phos  3.9     09-15  Mg     1.90     09-15        Urinalysis Basic - ( 15 Sep 2023 06:45 )    Color: x / Appearance: x / SG: x / pH: x  Gluc: 83 mg/dL / Ketone: x  / Bili: x / Urobili: x   Blood: x / Protein: x / Nitrite: x   Leuk Esterase: x / RBC: x / WBC x   Sq Epi: x / Non Sq Epi: x / Bacteria: x        MICROBIOLOGY:        Rapid RVP Result: NotDetec (09-10 @ 13:45)        RADIOLOGY:    
Patient is a 33y old  Male who presents with a chief complaint of Joint pain (10 Sep 2023 13:28)      SUBJECTIVE / OVERNIGHT EVENTS: No acute events overnight. Pt reports pain improving with IV Toradol     ADDITIONAL REVIEW OF SYSTEMS:    MEDICATIONS  (STANDING):  chlorhexidine 2% Cloths 1 Application(s) Topical daily  ferrous    sulfate 325 milliGRAM(s) Oral daily    MEDICATIONS  (PRN):  acetaminophen     Tablet .. 650 milliGRAM(s) Oral every 6 hours PRN Temp greater or equal to 38C (100.4F), Mild Pain (1 - 3)  naproxen 375 milliGRAM(s) Oral every 12 hours PRN Moderate Pain (4 - 6)      CAPILLARY BLOOD GLUCOSE        I&O's Summary    10 Sep 2023 07:01  -  11 Sep 2023 07:00  --------------------------------------------------------  IN: 200 mL / OUT: 0 mL / NET: 200 mL        PHYSICAL EXAM:  Vital Signs Last 24 Hrs  T(C): 36.7 (11 Sep 2023 05:46), Max: 36.9 (10 Sep 2023 22:14)  T(F): 98.1 (11 Sep 2023 05:46), Max: 98.4 (10 Sep 2023 22:14)  HR: 78 (11 Sep 2023 05:46) (78 - 90)  BP: 129/82 (11 Sep 2023 05:46) (106/76 - 129/82)  BP(mean): --  RR: 18 (11 Sep 2023 05:46) (18 - 18)  SpO2: 100% (11 Sep 2023 05:46) (99% - 100%)    Parameters below as of 11 Sep 2023 05:46  Patient On (Oxygen Delivery Method): room air      CONSTITUTIONAL: NAD  EYES: PERRLA; conjunctiva and sclera clear  ENMT: Moist oral mucosa, no pharyngeal injection or exudates; normal dentition  NECK: Supple, no palpable masses; no thyromegaly  RESPIRATORY: Normal respiratory effort; lungs are clear to auscultation bilaterally  CARDIOVASCULAR: Regular rate and rhythm, normal S1 and S2, no murmur/rub/gallop; No lower extremity edema; Peripheral pulses are 2+ bilaterally  ABDOMEN: Nontender to palpation, normoactive bowel sounds, no rebound/guarding; No hepatosplenomegaly  MUSCULOSKELETAL:  Normal gait; no clubbing or cyanosis of digits; no joint swelling or tenderness to palpation  PSYCH: A+O to person, place, and time; affect appropriate  NEUROLOGY: CN 2-12 are intact and symmetric; no gross sensory deficits   SKIN: No rashes; no palpable lesions    LABS:                        8.6    13.82 )-----------( 463      ( 11 Sep 2023 08:13 )             28.4     09-11    136  |  100  |  17  ----------------------------<  97  3.9   |  26  |  0.61    Ca    9.0      11 Sep 2023 08:13  Phos  3.8     09-11  Mg     1.90     09-11    TPro  10.0<H>  /  Alb  3.1<L>  /  TBili  0.4  /  DBili  x   /  AST  12  /  ALT  7   /  AlkPhos  112  09-09    PT/INR - ( 09 Sep 2023 23:40 )   PT: 14.7 sec;   INR: 1.31 ratio         PTT - ( 09 Sep 2023 23:40 )  PTT:29.2 sec  CARDIAC MARKERS ( 09 Sep 2023 23:40 )  x     / x     / 41 U/L / x     / x          Urinalysis Basic - ( 11 Sep 2023 08:13 )    Color: x / Appearance: x / SG: x / pH: x  Gluc: 97 mg/dL / Ketone: x  / Bili: x / Urobili: x   Blood: x / Protein: x / Nitrite: x   Leuk Esterase: x / RBC: x / WBC x   Sq Epi: x / Non Sq Epi: x / Bacteria: x          RADIOLOGY & ADDITIONAL TESTS:  Results Reviewed:   Imaging Personally Reviewed:  Electrocardiogram Personally Reviewed:    COORDINATION OF CARE:  Care Discussed with Consultants/Other Providers [Y/N]:  Prior or Outpatient Records Reviewed [Y/N]:  
  JANNA CYR  [id number]    interval Hx:    Patient still c/o pain in both wrists, left 4th finger, knees and ankles  No fever  ID recommended Unasyn       MEDICATIONS  (STANDING):  chlorhexidine 2% Cloths 1 Application(s) Topical daily  ferrous    sulfate 325 milliGRAM(s) Oral daily    MEDICATIONS  (PRN):  acetaminophen     Tablet .. 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 No tobacco, EtOH, or illicit drug use other than marijuana use. (10 Sep 2023 13:28)      PAST MEDICAL & SURGICAL HISTORY:  Hidradenitis suppurativa      RBBB      H/O sepsis      S/P excisional debridement      Left axillary hidradenitis          OCCUPATION:  [employer] HISTORY:    FAMILY HISTORY:  FH: hypertension (Mother)        Vital Signs Last 24 Hrs  T(C): 36.9 (11 Sep 2023 14:08), Max: 36.9 (10 Sep 2023 22:14)  T(F): 98.4 (11 Sep 2023 14:08), Max: 98.4 (10 Sep 2023 22:14)  HR: 92 (11 Sep 2023 14:08) (78 - 92)  BP: 127/78 (11 Sep 2023 14:08) (125/77 - 129/82)  BP(mean): --  RR: 17 (11 Sep 2023 14:08) (17 - 18)  SpO2: 100% (11 Sep 2023 14:08) (100% - 100%)    Parameters below as of 11 Sep 2023 14:08  Patient On (Oxygen Delivery Method): room air        Physical Exam:  General: No apparent distress  HEENT: EOMI, MMM  CVS: +S1/S2, RRR, no murmurs/rubs/gallops  Resp: CTA b/l. No crackles/wheezing  GI: Soft, NT/ND +BS  MSK:   Neuro: AAOx3  Skin: no visible rashes    LABS:                        8.6    13.82 )-----------( 463      ( 11 Sep 2023 08:13 )             28.4     09-11    136  |  100  |  17  ----------------------------<  97  3.9   |  26  |  0.61    Ca    9.0      11 Sep 2023 08:13  Phos  3.8     09-11  Mg     1.90     09-11    TPro  10.0<H>  /  Alb  3.1<L>  /  TBili  0.4  /  DBili  x   /  AST  12  /  ALT  7   /  AlkPhos  112  09-09    PT/INR - ( 09 Sep 2023 23:40 )   PT: 14.7 sec;   INR: 1.31 ratio         PTT - ( 09 Sep 2023 23:40 )  PTT:29.2 sec  Urinalysis Basic - ( 11 Sep 2023 08:13 )    Color: x / Appearance: x / SG: x / pH: x  Gluc: 97 mg/dL / Ketone: x  / Bili: x / Urobili: x   Blood: x / Protein: x / Nitrite: x   Leuk Esterase: x / RBC: x / WBC x   Sq Epi: x / Non Sq Epi: x / Bacteria: x        Rheumatology Work Up    Sedimentation Rate, Erythrocyte: 100 mm/hr *H* [1 - 15] (09-09-23 @ 23:40)  C-Reactive Protein, Serum: 227.4 mg/L *H* (09-09-23 @ 23:40)  Creatine Kinase, Serum: 41 U/L [30 - 200] (09-09-23 @ 23:40)  Sedimentation Rate, Erythrocyte: 124 mm/hr *H* [1 - 15] (02-18-23 @ 17:50)      RADIOLOGY & ADDITIONAL STUDIES:    < from: MR Pelvis Bony Only w/wo IV Cont (05.18.23 @ 11:16) >  1.  Slight right and mild left sacroiliitis is again seen with mild   improvement of the left-sided synovitis. Osseous involvement is similar   to the prior. Changes may be inflammatory or infectious in nature. There   are no sizable joint effusions.  2.  Right perirectal abscess is again seen.  3.  Left mons pubis abscess now appears to be present measuring 2.8 x 1.8   x 1.5 cm.  4.  Enhancing posterior subcutaneous edema with right-sided predominance   suggests cellulitis or other process.      < end of copied text >  
[facility]  Dr. Tali Welch  Pager [phone]    Patient is a 33y old  Male who presents with a chief complaint of Joint pain (14 Sep 2023 11:43)    SUBJECTIVE / OVERNIGHT EVENTS: No acute events overnight. Pt has no new complaints. Pain in elbows improving     ADDITIONAL REVIEW OF SYSTEMS:    MEDICATIONS  (STANDING):  ampicillin/sulbactam  IVPB 3 Gram(s) IV Intermittent every 6 hours  chlorhexidine 2% Cloths 1 Application(s) Topical daily  ferrous    sulfate 325 milliGRAM(s) Oral daily  naproxen 500 milliGRAM(s) Oral every 12 hours  polyethylene glycol 3350 17 Gram(s) Oral daily  senna 2 Tablet(s) Oral at bedtime    MEDICATIONS  (PRN):  acetaminophen     Tablet .. 650 milliGRAM(s) Oral every 6 hours PRN Temp greater or equal to 38C (100.4F), Mild Pain (1 - 3)  bisacodyl 5 milliGRAM(s) Oral every 12 hours PRN Constipation      CAPILLARY BLOOD GLUCOSE    I&O's Summary        PHYSICAL EXAM:  Vital Signs Last 24 Hrs  T(C): 36.5 (16 Sep 2023 05:33), Max: 37.2 (15 Sep 2023 20:54)  T(F): 97.7 (16 Sep 2023 05:33), Max: 98.9 (15 Sep 2023 20:54)  HR: 76 (16 Sep 2023 05:33) (76 - 91)  BP: 121/76 (16 Sep 2023 05:33) (116/74 - 121/76)  BP(mean): --  RR: 18 (16 Sep 2023 05:33) (17 - 18)  SpO2: 99% (16 Sep 2023 05:33) (99% - 100%)    Parameters below as of 16 Sep 2023 05:33  Patient On (Oxygen Delivery Method): room air          General: No acute distress, well-appearing    Eyes: PERRL, EOMI     ENT: MMM, no oropharyngeal lesions or erythema appreciated     Pulm: No increased WOB. CTAB. No wheezing.     CV: RRR. S1&S2+. No M/R/G appreciated.     Abdomen: +BS. Soft, NTND. No organomegaly.     MSK: Nml ROM    Extremities: R knee with medial ttp, L knee with (medial >lateral) ttp. B/l ankle ttp around achilles tendon. No swelling/erythema/warmth. Swelling of RLE 3rd digit and LLE 2nd digit at PIP joints. Normal ROM.     Neuro: A&Ox3, flat affect    Skin: Warm and dry. R axillary multiple lesions, ttp, draining thin greenish discharge      LABS:                                   9.7    15.31 )-----------( 526      ( 16 Sep 2023 05:33 )             32.5   09-16    132<L>  |  96<L>  |  14  ----------------------------<  116<H>  4.3   |  23  |  0.58    Ca    9.5      16 Sep 2023 05:33  Phos  3.4     09-16  Mg     2.10     09-16            Urinalysis Basic - ( 15 Sep 2023 06:45 )    Color: x / Appearance: x / SG: x / pH: x  Gluc: 83 mg/dL / Ketone: x  / Bili: x / Urobili: x   Blood: x / Protein: x / Nitrite: x   Leuk Esterase: x / RBC: x / WBC x   Sq Epi: x / Non Sq Epi: x / Bacteria: x          RADIOLOGY & ADDITIONAL TESTS:  Results Reviewed:   Imaging Personally Reviewed:  Electrocardiogram Personally Reviewed:    COORDINATION OF CARE:  Care Discussed with Consultants/Other Providers [Y/N]: Notes reviewed - Rheum  Prior or Outpatient Records Reviewed [Y/N]:  
Patient is a 33y old  Male who presents with a chief complaint of Joint pain (14 Sep 2023 11:43)      SUBJECTIVE / OVERNIGHT EVENTS: No acute events overnight. Pt has no new complaints. Pain in elbows improving     ADDITIONAL REVIEW OF SYSTEMS:    MEDICATIONS  (STANDING):  ampicillin/sulbactam  IVPB 3 Gram(s) IV Intermittent every 6 hours  chlorhexidine 2% Cloths 1 Application(s) Topical daily  ferrous    sulfate 325 milliGRAM(s) Oral daily  naproxen 500 milliGRAM(s) Oral every 12 hours  polyethylene glycol 3350 17 Gram(s) Oral daily  senna 2 Tablet(s) Oral at bedtime    MEDICATIONS  (PRN):  acetaminophen     Tablet .. 650 milliGRAM(s) Oral every 6 hours PRN Temp greater or equal to 38C (100.4F), Mild Pain (1 - 3)  bisacodyl 5 milliGRAM(s) Oral every 12 hours PRN Constipation      CAPILLARY BLOOD GLUCOSE        I&O's Summary    14 Sep 2023 07:01  -  15 Sep 2023 07:00  --------------------------------------------------------  IN: 750 mL / OUT: 0 mL / NET: 750 mL        PHYSICAL EXAM:  Vital Signs Last 24 Hrs  T(C): 36.4 (15 Sep 2023 05:46), Max: 37.1 (14 Sep 2023 22:45)  T(F): 97.6 (15 Sep 2023 05:46), Max: 98.7 (14 Sep 2023 22:45)  HR: 88 (15 Sep 2023 05:46) (88 - 100)  BP: 118/73 (15 Sep 2023 05:46) (110/72 - 145/98)  BP(mean): --  RR: 18 (15 Sep 2023 05:46) (18 - 18)  SpO2: 100% (15 Sep 2023 05:46) (99% - 100%)    Parameters below as of 15 Sep 2023 05:46  Patient On (Oxygen Delivery Method): room air        General: No acute distress, well-appearing    Eyes: PERRL, EOMI     ENT: MMM, no oropharyngeal lesions or erythema appreciated     Pulm: No increased WOB. CTAB. No wheezing.     CV: RRR. S1&S2+. No M/R/G appreciated.     Abdomen: +BS. Soft, NTND. No organomegaly.     MSK: Nml ROM    Extremities: R knee with medial ttp, L knee with (medial >lateral) ttp. B/l ankle ttp around achilles tendon. No swelling/erythema/warmth. Swelling of RLE 3rd digit and LLE 2nd digit at PIP joints. Normal ROM.     Neuro: A&Ox3, flat affect    Skin: Warm and dry. R axillary multiple lesions, ttp, draining thin greenish discharge      LABS:                        9.5    11.98 )-----------( 423      ( 15 Sep 2023 06:45 )             31.3     09-15    134<L>  |  96<L>  |  13  ----------------------------<  83  4.2   |  25  |  0.59    Ca    9.3      15 Sep 2023 06:45  Phos  3.9     09-15  Mg     1.90     09-15            Urinalysis Basic - ( 15 Sep 2023 06:45 )    Color: x / Appearance: x / SG: x / pH: x  Gluc: 83 mg/dL / Ketone: x  / Bili: x / Urobili: x   Blood: x / Protein: x / Nitrite: x   Leuk Esterase: x / RBC: x / WBC x   Sq Epi: x / Non Sq Epi: x / Bacteria: x          RADIOLOGY & ADDITIONAL TESTS:  Results Reviewed:   Imaging Personally Reviewed:  Electrocardiogram Personally Reviewed:    COORDINATION OF CARE:  Care Discussed with Consultants/Other Providers [Y/N]:  Prior or Outpatient Records Reviewed [Y/N]:  
[facility]  Dr. Tali Welch  Pager [phone]    Patient is a 33y old  Male who presents with a chief complaint of Joint pain (14 Sep 2023 11:43)    SUBJECTIVE / OVERNIGHT EVENTS: No acute events overnight. Pt has no new complaints. Overall pain is better but elbow is     ADDITIONAL REVIEW OF SYSTEMS:    MEDICATIONS  (STANDING):  ampicillin/sulbactam  IVPB 3 Gram(s) IV Intermittent every 6 hours  chlorhexidine 2% Cloths 1 Application(s) Topical daily  ferrous    sulfate 325 milliGRAM(s) Oral daily  naproxen 500 milliGRAM(s) Oral every 12 hours  polyethylene glycol 3350 17 Gram(s) Oral daily  senna 2 Tablet(s) Oral at bedtime    MEDICATIONS  (PRN):  acetaminophen     Tablet .. 650 milliGRAM(s) Oral every 6 hours PRN Temp greater or equal to 38C (100.4F), Mild Pain (1 - 3)  bisacodyl 5 milliGRAM(s) Oral every 12 hours PRN Constipation      CAPILLARY BLOOD GLUCOSE    I&O's Summary        PHYSICAL EXAM:  Vital Signs Last 24 Hrs  T(C): 36.5 (17 Sep 2023 07:18), Max: 36.9 (16 Sep 2023 21:21)  T(F): 97.7 (17 Sep 2023 07:18), Max: 98.5 (16 Sep 2023 21:21)  HR: 76 (17 Sep 2023 07:18) (76 - 86)  BP: 121/76 (17 Sep 2023 07:18) (121/76 - 129/78)  BP(mean): --  RR: 17 (17 Sep 2023 07:18) (16 - 17)  SpO2: 100% (17 Sep 2023 07:18) (98% - 100%)    Parameters below as of 17 Sep 2023 07:18  Patient On (Oxygen Delivery Method): room air            General: No acute distress, well-appearing    Eyes: PERRL, EOMI     ENT: MMM, no oropharyngeal lesions or erythema appreciated     Pulm: No increased WOB. CTAB. No wheezing.     CV: RRR. S1&S2+. No M/R/G appreciated.     Abdomen: +BS. Soft, NTND. No organomegaly.     MSK: Nml ROM    Extremities: R knee with medial ttp, L knee with (medial >lateral) ttp. B/l ankle ttp around achilles tendon. No swelling/erythema/warmth. Swelling of RLE 3rd digit and LLE 2nd digit at PIP joints. Normal ROM.     Neuro: A&Ox3, flat affect    Skin: Warm and dry. R axillary multiple lesions, ttp, draining thin greenish discharge      LABS:                                   9.1    11.54 )-----------( 494      ( 17 Sep 2023 07:15 )             30.1   09-17    137  |  101  |  15  ----------------------------<  82  4.1   |  27  |  0.60    Ca    9.3      17 Sep 2023 07:15  Phos  3.7     09-17  Mg     2.00     09-17            Urinalysis Basic - ( 15 Sep 2023 06:45 )    Color: x / Appearance: x / SG: x / pH: x  Gluc: 83 mg/dL / Ketone: x  / Bili: x / Urobili: x   Blood: x / Protein: x / Nitrite: x   Leuk Esterase: x / RBC: x / WBC x   Sq Epi: x / Non Sq Epi: x / Bacteria: x          RADIOLOGY & ADDITIONAL TESTS:  Results Reviewed:   Imaging Personally Reviewed:  Electrocardiogram Personally Reviewed:    COORDINATION OF CARE:  Care Discussed with Consultants/Other Providers [Y/N]: Notes reviewed - Rheum  Prior or Outpatient Records Reviewed [Y/N]:  
Patient is a 33y old  Male who presents with a chief complaint of Joint pain (11 Sep 2023 16:13)      SUBJECTIVE / OVERNIGHT EVENTS: No acute events overnight. Pt has no new complaints     ADDITIONAL REVIEW OF SYSTEMS:    MEDICATIONS  (STANDING):  chlorhexidine 2% Cloths 1 Application(s) Topical daily  ferrous    sulfate 325 milliGRAM(s) Oral daily  naproxen 500 milliGRAM(s) Oral every 12 hours  polyethylene glycol 3350 17 Gram(s) Oral daily  senna 2 Tablet(s) Oral at bedtime    MEDICATIONS  (PRN):  acetaminophen     Tablet .. 650 milliGRAM(s) Oral every 6 hours PRN Temp greater or equal to 38C (100.4F), Mild Pain (1 - 3)  bisacodyl 5 milliGRAM(s) Oral every 12 hours PRN Constipation      CAPILLARY BLOOD GLUCOSE        I&O's Summary      PHYSICAL EXAM:  Vital Signs Last 24 Hrs  T(C): 36.8 (12 Sep 2023 07:39), Max: 37 (11 Sep 2023 17:56)  T(F): 98.2 (12 Sep 2023 07:39), Max: 98.6 (11 Sep 2023 17:56)  HR: 76 (12 Sep 2023 07:39) (76 - 92)  BP: 125/76 (12 Sep 2023 07:39) (125/76 - 142/86)  BP(mean): --  RR: 16 (12 Sep 2023 07:39) (16 - 17)  SpO2: 100% (12 Sep 2023 07:39) (100% - 100%)    Parameters below as of 12 Sep 2023 07:39  Patient On (Oxygen Delivery Method): room air        General: No acute distress, well-appearing    Eyes: PERRL, EOMI     ENT: MMM, no oropharyngeal lesions or erythema appreciated     Pulm: No increased WOB. CTAB. No wheezing.     CV: RRR. S1&S2+. No M/R/G appreciated.     Abdomen: +BS. Soft, NTND. No organomegaly.     MSK: Nml ROM    Extremities: R knee with medial ttp, L knee with (medial >lateral) ttp. B/l ankle ttp around achilles tendon. No swelling/erythema/warmth. Swelling of RLE 3rd digit and LLE 2nd digit at PIP joints. Normal ROM.     Neuro: A&Ox3, flat affect    Skin: Warm and dry. R axillary multiple lesions, ttp, draining thin greenish discharge    LABS:                        8.6    13.47 )-----------( 439      ( 12 Sep 2023 04:34 )             28.2     09-12    138  |  101  |  11  ----------------------------<  86  4.5   |  25  |  0.57    Ca    8.9      12 Sep 2023 04:34  Phos  3.7     09-12  Mg     2.00     09-12            Urinalysis Basic - ( 12 Sep 2023 04:34 )    Color: x / Appearance: x / SG: x / pH: x  Gluc: 86 mg/dL / Ketone: x  / Bili: x / Urobili: x   Blood: x / Protein: x / Nitrite: x   Leuk Esterase: x / RBC: x / WBC x   Sq Epi: x / Non Sq Epi: x / Bacteria: x          RADIOLOGY & ADDITIONAL TESTS:  Results Reviewed:   Imaging Personally Reviewed:  Electrocardiogram Personally Reviewed:    COORDINATION OF CARE:  Care Discussed with Consultants/Other Providers [Y/N]:  Prior or Outpatient Records Reviewed [Y/N]:  
Patient is a 33y old  Male who presents with a chief complaint of Joint pain (12 Sep 2023 12:23)      SUBJECTIVE / OVERNIGHT EVENTS: No acute events overnight. Pt has no new complaints    ADDITIONAL REVIEW OF SYSTEMS:    MEDICATIONS  (STANDING):  chlorhexidine 2% Cloths 1 Application(s) Topical daily  ferrous    sulfate 325 milliGRAM(s) Oral daily  naproxen 500 milliGRAM(s) Oral every 12 hours  polyethylene glycol 3350 17 Gram(s) Oral daily  senna 2 Tablet(s) Oral at bedtime    MEDICATIONS  (PRN):  acetaminophen     Tablet .. 650 milliGRAM(s) Oral every 6 hours PRN Temp greater or equal to 38C (100.4F), Mild Pain (1 - 3)  bisacodyl 5 milliGRAM(s) Oral every 12 hours PRN Constipation      CAPILLARY BLOOD GLUCOSE        I&O's Summary    13 Sep 2023 07:01  -  13 Sep 2023 13:18  --------------------------------------------------------  IN: 250 mL / OUT: 0 mL / NET: 250 mL        PHYSICAL EXAM:  Vital Signs Last 24 Hrs  T(C): 36.7 (13 Sep 2023 05:38), Max: 37.1 (12 Sep 2023 21:32)  T(F): 98.1 (13 Sep 2023 05:38), Max: 98.8 (12 Sep 2023 21:32)  HR: 79 (13 Sep 2023 05:38) (79 - 110)  BP: 131/72 (13 Sep 2023 05:38) (116/77 - 131/72)  BP(mean): --  RR: 17 (13 Sep 2023 05:38) (16 - 18)  SpO2: 96% (13 Sep 2023 05:38) (96% - 100%)    Parameters below as of 13 Sep 2023 05:38  Patient On (Oxygen Delivery Method): room air        General: No acute distress, well-appearing    Eyes: PERRL, EOMI     ENT: MMM, no oropharyngeal lesions or erythema appreciated     Pulm: No increased WOB. CTAB. No wheezing.     CV: RRR. S1&S2+. No M/R/G appreciated.     Abdomen: +BS. Soft, NTND. No organomegaly.     MSK: Nml ROM    Extremities: R knee with medial ttp, L knee with (medial >lateral) ttp. B/l ankle ttp around achilles tendon. No swelling/erythema/warmth. Swelling of RLE 3rd digit and LLE 2nd digit at PIP joints. Normal ROM.     Neuro: A&Ox3, flat affect    Skin: Warm and dry. R axillary multiple lesions, ttp, draining thin greenish discharge    LABS:                        9.4    12.51 )-----------( 451      ( 13 Sep 2023 05:34 )             31.3     09-13    136  |  99  |  11  ----------------------------<  99  4.0   |  26  |  0.56    Ca    9.0      13 Sep 2023 05:34  Phos  4.0     09-13  Mg     2.00     09-13            Urinalysis Basic - ( 13 Sep 2023 05:34 )    Color: x / Appearance: x / SG: x / pH: x  Gluc: 99 mg/dL / Ketone: x  / Bili: x / Urobili: x   Blood: x / Protein: x / Nitrite: x   Leuk Esterase: x / RBC: x / WBC x   Sq Epi: x / Non Sq Epi: x / Bacteria: x          RADIOLOGY & ADDITIONAL TESTS:  Results Reviewed:   Imaging Personally Reviewed:  Electrocardiogram Personally Reviewed:    COORDINATION OF CARE:  Care Discussed with Consultants/Other Providers [Y/N]:  Prior or Outpatient Records Reviewed [Y/N]:  
Patient is a 33y old  Male who presents with a chief complaint of Joint pain (13 Sep 2023 17:57)      SUBJECTIVE / OVERNIGHT EVENTS: No acute events overnight. Pt has no new complaints. Pain somewhat controlled with Naprosyn     ADDITIONAL REVIEW OF SYSTEMS:    MEDICATIONS  (STANDING):  ampicillin/sulbactam  IVPB 3 Gram(s) IV Intermittent every 6 hours  chlorhexidine 2% Cloths 1 Application(s) Topical daily  ferrous    sulfate 325 milliGRAM(s) Oral daily  naproxen 500 milliGRAM(s) Oral every 12 hours  polyethylene glycol 3350 17 Gram(s) Oral daily  senna 2 Tablet(s) Oral at bedtime    MEDICATIONS  (PRN):  acetaminophen     Tablet .. 650 milliGRAM(s) Oral every 6 hours PRN Temp greater or equal to 38C (100.4F), Mild Pain (1 - 3)  bisacodyl 5 milliGRAM(s) Oral every 12 hours PRN Constipation      CAPILLARY BLOOD GLUCOSE        I&O's Summary    13 Sep 2023 07:01  -  14 Sep 2023 07:00  --------------------------------------------------------  IN: 1270 mL / OUT: 290 mL / NET: 980 mL    14 Sep 2023 07:01  -  14 Sep 2023 11:43  --------------------------------------------------------  IN: 250 mL / OUT: 0 mL / NET: 250 mL        PHYSICAL EXAM:  Vital Signs Last 24 Hrs  T(C): 36.9 (14 Sep 2023 06:07), Max: 37.1 (13 Sep 2023 20:38)  T(F): 98.5 (14 Sep 2023 06:07), Max: 98.7 (13 Sep 2023 20:38)  HR: 97 (13 Sep 2023 20:38) (86 - 97)  BP: 126/84 (14 Sep 2023 06:07) (113/66 - 126/84)  BP(mean): --  RR: 17 (14 Sep 2023 06:07) (16 - 18)  SpO2: 100% (14 Sep 2023 06:07) (100% - 100%)    Parameters below as of 14 Sep 2023 06:07  Patient On (Oxygen Delivery Method): room air          General: No acute distress, well-appearing    Eyes: PERRL, EOMI     ENT: MMM, no oropharyngeal lesions or erythema appreciated     Pulm: No increased WOB. CTAB. No wheezing.     CV: RRR. S1&S2+. No M/R/G appreciated.     Abdomen: +BS. Soft, NTND. No organomegaly.     MSK: Nml ROM    Extremities: R knee with medial ttp, L knee with (medial >lateral) ttp. B/l ankle ttp around achilles tendon. No swelling/erythema/warmth. Swelling of RLE 3rd digit and LLE 2nd digit at PIP joints. Normal ROM.     Neuro: A&Ox3, flat affect    Skin: Warm and dry. R axillary multiple lesions, ttp, draining thin greenish discharge    LABS:                        9.4    12.51 )-----------( 451      ( 13 Sep 2023 05:34 )             31.3     09-13    136  |  99  |  11  ----------------------------<  99  4.0   |  26  |  0.56    Ca    9.0      13 Sep 2023 05:34  Phos  4.0     09-13  Mg     2.00     09-13            Urinalysis Basic - ( 13 Sep 2023 05:34 )    Color: x / Appearance: x / SG: x / pH: x  Gluc: 99 mg/dL / Ketone: x  / Bili: x / Urobili: x   Blood: x / Protein: x / Nitrite: x   Leuk Esterase: x / RBC: x / WBC x   Sq Epi: x / Non Sq Epi: x / Bacteria: x          RADIOLOGY & ADDITIONAL TESTS:  Results Reviewed:   Imaging Personally Reviewed:  Electrocardiogram Personally Reviewed:    COORDINATION OF CARE:  Care Discussed with Consultants/Other Providers [Y/N]:  Prior or Outpatient Records Reviewed [Y/N]:

## 2023-09-17 NOTE — PROGRESS NOTE ADULT - TIME BILLING
Time spent includes direct patient care  (interview and examination of patient), discussion with other providers, support staff and/or patient's family members, review of medical records, ordering diagnostic tests and analyzing results, and documentation.
Time spent includes direct patient care  (interview and examination of patient), discussion with other providers, support staff and/or patient's family members, review of medical records, ordering diagnostic tests and analyzing results, and documentation.

## 2023-09-17 NOTE — PROGRESS NOTE ADULT - PROBLEM SELECTOR PLAN 5
DVT ppx: Improve score 0  Diet: Regular  Dispo: Pending clinical improvement

## 2023-09-17 NOTE — PROGRESS NOTE ADULT - PROBLEM SELECTOR PROBLEM 1
Arthralgia of multiple joints

## 2023-09-17 NOTE — DIETITIAN INITIAL EVALUATION ADULT - PERTINENT LABORATORY DATA
09-17    137  |  101  |  15  ----------------------------<  82  4.1   |  27  |  0.60    Ca    9.3      17 Sep 2023 07:15  Phos  3.7     09-17  Mg     2.00     09-17

## 2023-09-17 NOTE — PROGRESS NOTE ADULT - PROVIDER SPECIALTY LIST ADULT
Hospitalist
Rheumatology
Rheumatology
Hospitalist
Infectious Disease
Hospitalist

## 2023-09-17 NOTE — DIETITIAN INITIAL EVALUATION ADULT - PERTINENT MEDS FT
MEDICATIONS  (STANDING):  ampicillin/sulbactam  IVPB 3 Gram(s) IV Intermittent every 6 hours  chlorhexidine 2% Cloths 1 Application(s) Topical daily  ferrous    sulfate 325 milliGRAM(s) Oral daily  naproxen 500 milliGRAM(s) Oral every 12 hours  polyethylene glycol 3350 17 Gram(s) Oral daily  predniSONE   Tablet 20 milliGRAM(s) Oral daily  senna 2 Tablet(s) Oral at bedtime    MEDICATIONS  (PRN):  acetaminophen     Tablet .. 650 milliGRAM(s) Oral every 6 hours PRN Temp greater or equal to 38C (100.4F), Mild Pain (1 - 3)  bisacodyl 5 milliGRAM(s) Oral every 12 hours PRN Constipation

## 2023-09-17 NOTE — PROGRESS NOTE ADULT - REASON FOR ADMISSION
Joint pain

## 2023-09-17 NOTE — PROGRESS NOTE ADULT - PROBLEM SELECTOR PLAN 1
Pt hospitalized in 5/2023 with L hip pain found to left sided sacroilitis/bursitis likely 2/2 spondyloarthropathy. HLA B27 was negative at the time but he was treated with NSAIDs with resolution of sx. Now presenting with BLE asymmetric polyarthritis - severe pain in his bilateral ankles (L>R) and milder pain in his knees (L>R) and R hip limiting his ambulation.   -Exam c/f bilateral dactylitis of RLE 3rd digit and LLE 2nd digit, swelling particularly around PIP joints; also c/f enthesitis of b/l achilles tendon   -Elevated inflammatory markers - , , WBC 17k  - Rheum consulted  - Continue Naproxen, considering prednisone in a few days
Pt hospitalized in 5/2023 with L hip pain found to left sided sacroilitis/bursitis likely 2/2 spondyloarthropathy. HLA B27 was negative at the time but he was treated with NSAIDs with resolution of sx. Now presenting with BLE asymmetric polyarthritis - severe pain in his bilateral ankles (L>R) and milder pain in his knees (L>R) and R hip limiting his ambulation.   -Exam c/f bilateral dactylitis of RLE 3rd digit and LLE 2nd digit, swelling particularly around PIP joints; also c/f enthesitis of b/l achilles tendon   -Elevated inflammatory markers - , , WBC 17k  -S/p prednisone 20mg x 1 and Toradol 15mg x 1 in ED  - Naproxen prn   -F/u rheumatologic workup sent by ED  -XR b/l ankles and toes ordered   -Rheum consult in AM
Pt hospitalized in 5/2023 with L hip pain found to left sided sacroilitis/bursitis likely 2/2 spondyloarthropathy. HLA B27 was negative at the time but he was treated with NSAIDs with resolution of sx. Now presenting with BLE asymmetric polyarthritis - severe pain in his bilateral ankles (L>R) and milder pain in his knees (L>R) and R hip limiting his ambulation.   -Exam c/f bilateral dactylitis of RLE 3rd digit and LLE 2nd digit, swelling particularly around PIP joints; also c/f enthesitis of b/l achilles tendon   -Elevated inflammatory markers - , , WBC 17k  - Rheum consulted  - Continue Naproxen, considering prednisone
Pt hospitalized in 5/2023 with L hip pain found to left sided sacroilitis/bursitis likely 2/2 spondyloarthropathy. HLA B27 was negative at the time but he was treated with NSAIDs with resolution of sx. Now presenting with BLE asymmetric polyarthritis - severe pain in his bilateral ankles (L>R) and milder pain in his knees (L>R) and R hip limiting his ambulation.   -Exam c/f bilateral dactylitis of RLE 3rd digit and LLE 2nd digit, swelling particularly around PIP joints; also c/f enthesitis of b/l achilles tendon   -Elevated inflammatory markers - , , WBC 17k  - Rheum consulted - recommend prednisone 20mg daily. To be reassessed on monday
Pt hospitalized in 5/2023 with L hip pain found to left sided sacroilitis/bursitis likely 2/2 spondyloarthropathy. HLA B27 was negative at the time but he was treated with NSAIDs with resolution of sx. Now presenting with BLE asymmetric polyarthritis - severe pain in his bilateral ankles (L>R) and milder pain in his knees (L>R) and R hip limiting his ambulation.   -Exam c/f bilateral dactylitis of RLE 3rd digit and LLE 2nd digit, swelling particularly around PIP joints; also c/f enthesitis of b/l achilles tendon   -Elevated inflammatory markers - , , WBC 17k  - Rheum consulted  - Continue Naproxen, considering prednisone
Pt hospitalized in 5/2023 with L hip pain found to left sided sacroilitis/bursitis likely 2/2 spondyloarthropathy. HLA B27 was negative at the time but he was treated with NSAIDs with resolution of sx. Now presenting with BLE asymmetric polyarthritis - severe pain in his bilateral ankles (L>R) and milder pain in his knees (L>R) and R hip limiting his ambulation.   -Exam c/f bilateral dactylitis of RLE 3rd digit and LLE 2nd digit, swelling particularly around PIP joints; also c/f enthesitis of b/l achilles tendon   -Elevated inflammatory markers - , , WBC 17k  - Rheum consulted  - Continue Naproxen, considering prednisone
Pt hospitalized in 5/2023 with L hip pain found to left sided sacroilitis/bursitis likely 2/2 spondyloarthropathy. HLA B27 was negative at the time but he was treated with NSAIDs with resolution of sx. Now presenting with BLE asymmetric polyarthritis - severe pain in his bilateral ankles (L>R) and milder pain in his knees (L>R) and R hip limiting his ambulation.   -Exam c/f bilateral dactylitis of RLE 3rd digit and LLE 2nd digit, swelling particularly around PIP joints; also c/f enthesitis of b/l achilles tendon   -Elevated inflammatory markers - , , WBC 17k  - Rheum consulted - recommend prednisone 20mg daily. To be reassessed on monday

## 2023-09-17 NOTE — DIETITIAN INITIAL EVALUATION ADULT - OTHER INFO
Patient seen at bedside. A&Ox4. No known food allergies. Patient is consuming 75 - 100% of meals. No chewing or swallowing issues. Denies nausea or vomiting. Patient complains of constipation, on bowel regimen. Last bowel movement 9/14. Per patient usual body weight is in between 170 - 190 lbs, weight drops when patient has pain, unable to eat due to pain. Per HIE patient weighed 192 lbs 8/8, current body weight 178.3 lbs, weight loss -13.8 lbs (-7%) in 1 month.

## 2023-09-17 NOTE — PROGRESS NOTE ADULT - PROBLEM SELECTOR PLAN 3
H/H 9.3/30.9 on presentation, around baseline. Microcytic. History iron deficiency anemia, pt recently prescribed iron supplementation but unsure if he's taking it.   -Started iron supplementation
Hidradenitis suppurativa (previously on Humira) s/p excision of hidradenitis of buttocks with skin graft reconstruction (2/2023) s/p excision of left axillary (4/2023) with skin graft reconstruction (5/2023). Pending right axillary hidradenitis removal, was cancelled in 8/2023 due to leukocytosis and anemia. S/p outpatient doxycycline x 10 days per PCP.   -Appears to be in a hidradenitis flare with active wounds in axilla, painful/draining, and elevated inflammatory markers   -Wound care consult   -Continue to monitor
H/H 9.3/30.9 on presentation, around baseline. Microcytic. History iron deficiency anemia, pt recently prescribed iron supplementation but unsure if he's taking it.   -Started iron supplementation

## 2023-09-17 NOTE — DIETITIAN INITIAL EVALUATION ADULT - REASON FOR ADMISSION
Pain in joint involving ankle and foot    33M with PMH of RBBB, hidradenitis suppurativa (previously on Humira) s/p excision of hidradenitis of buttocks with skin graft reconstruction (2/2023) s/p excision of left axillary (4/2023) with skin graft reconstruction (5/2023),  and [place] now p/w pain in his lower extremities x 1.5 weeks. Patient was hospitalized in 5/2023 with L hip pain found to left sided sacroilitis/bursitis likely 2/2 spondyloarthropathy

## 2023-09-17 NOTE — DIETITIAN INITIAL EVALUATION ADULT - ADD RECOMMEND
1. Honor food preferences as able  2. Monitor bowel movements, labs, PO intake/tolerance, skin integrity, and weights

## 2023-09-17 NOTE — DIETITIAN INITIAL EVALUATION ADULT - PERSON TAUGHT/METHOD
Verbally educated patient on consuming adequate calories and protein to meet increased nutrient needs./verbal instruction/patient instructed

## 2023-09-17 NOTE — PROGRESS NOTE ADULT - ASSESSMENT
34 yo man with hidradenitis suppurativa previously on Humira s/p excision of hidradenitis of sacrum with skin graft reconstruction 2/2023 s/p excision of left axillary lesions 4/2023 and skin graft reconstruction 5/2023 now with hidradenitis flare right axilla.   completed outpatient course of doxycycline x 10 days   afebrile   no s/s systemic toxicity     Hidradenitis flare improving Unasyn day #3   previous Humira     Suggest;    continue Unasyn 3 gm iv q 6 h    when ready to d/c home switch to augmentin 875 mg po q 12 h --> 9/22    will d/c f/u  call with questions  
A 32-year-old male with a history of Hidradenitis Suppurativa (HS) and multiple prior admissions for wound excisions presents with a two-week history of wrist, hand, knee, and ankle pain. No associated symptoms like fever, chills, cough, or dysuria. HS diagnosis since 2017, previously on Humira until January 2023 with no joint pain. Experienced a skin flare-up in January 2023 before armpit surgery. Recent admission in May 2023 for hip pain with sacroiliitis. Consulted Rheumatology for HS-related skin findings and joint pain.    # Hidradenitis Suppurativa with Seronegative Arthritis   - HS patients has high risk of developing seronegative arthritis   - ,   - MRI pelvis 5/18/23 with mild b/l sacroiliitis  - Has been off Humira since February/2023, joints symptoms started 4/23   - Hep B and Quantiferon TB negative  - Ideally the patient should be started on anti-TNF for skin and joint flares     Recommendations  1. C/w Naproxen 500 mg twice daily for now  2. ID was recommended Unasyn today so will consider to start prednisone 40 mg IV within 2-3 days    Rheumatology will follow the patient     D/w Dr. Ralph Pelletier MD  PGY-4  Rheumatology Fellow  Reachable on TEAMS  [phone]
33M with PMH of RBBB, hidradenitis suppurativa (previously on Humira) s/p excision of hidradenitis of buttocks with skin graft reconstruction (2/2023) s/p excision of left axillary (4/2023) with skin graft reconstruction (5/2023),  and [place] now p/w pain in his lower extremities x 1.5 weeks. Admitted due to c/f peripheral spondyloarthropathy, hidradenitis flare, leukocytosis, and anemia. 
33M with PMH of RBBB, hidradenitis suppurativa (previously on Humira) s/p excision of hidradenitis of buttocks with skin graft reconstruction (2/2023) s/p excision of left axillary (4/2023) with skin graft reconstruction (5/2023),  and [place] now p/w pain in his lower extremities x 1.5 weeks. Admitted due to c/f peripheral spondyloarthropathy, hidradenitis flare, leukocytosis, and anemia. 
A 32-year-old male with a history of Hidradenitis Suppurativa (HS) and multiple prior admissions for wound excisions presents with a two-week history of wrist, hand, knee, and ankle pain. No associated symptoms like fever, chills, cough, or dysuria. HS diagnosis since 2017, previously on Humira until January 2023 with no joint pain. Experienced a skin flare-up in January 2023 before armpit surgery. Recent admission in May 2023 for hip pain with sacroiliitis. Consulted Rheumatology for HS-related skin findings and joint pain.    # Hidradenitis Suppurativa with Seronegative polyarthritis   - Currently, he has active polyarthritis ( b/l wrists, both 2-3 rd MCO, left 3rd and 4th PIP, knees, ankles, and MTP joints)  - HS patients has high risk of developing seronegative arthritis   - ,   - MRI pelvis 5/18/23 with mild b/l sacroiliitis  - Has been off Humira since February/2023, joints symptoms started 4/23   - Hep B and QuantiFeron TB negative  - Ideally the patient should be started on anti-TNF for skin and joint flares     Recommendations  1. Please start prednisone 20 mg orally daily for now, will reassess him on Monday.   2. The patient will schedule a follow-up appointment with our Rheumatology clinic upon discharge.    Rheumatology will follow the patient     D/w Dr. Ralph Pelletier MD  PGY-4  Rheumatology Fellow  Reachable on TEAMS  [phone]
33M with PMH of RBBB, hidradenitis suppurativa (previously on Humira) s/p excision of hidradenitis of buttocks with skin graft reconstruction (2/2023) s/p excision of left axillary (4/2023) with skin graft reconstruction (5/2023),  and [place] now p/w pain in his lower extremities x 1.5 weeks. Admitted due to c/f peripheral spondyloarthropathy, hidradenitis flare, leukocytosis, and anemia. 

## 2023-09-17 NOTE — PROGRESS NOTE ADULT - PROBLEM SELECTOR PLAN 2
Hidradenitis suppurativa (previously on Humira) s/p excision of hidradenitis of buttocks with skin graft reconstruction (2/2023) s/p excision of left axillary (4/2023) with skin graft reconstruction (5/2023). Pending right axillary hidradenitis removal, was cancelled in 8/2023 due to leukocytosis and anemia. S/p outpatient doxycycline x 10 days per PCP.   -Appears to be in a hidradenitis flare with active wounds in axilla, painful/draining, and elevated inflammatory markers   - Plastic surgery following and considering surgical intervention to R axilla   - ID consulted, given concern for infection  -Wound care consult
Hidradenitis suppurativa (previously on Humira) s/p excision of hidradenitis of buttocks with skin graft reconstruction (2/2023) s/p excision of left axillary (4/2023) with skin graft reconstruction (5/2023). Pending right axillary hidradenitis removal, was cancelled in 8/2023 due to leukocytosis and anemia. S/p outpatient doxycycline x 10 days per PCP.   -Appears to be in a hidradenitis flare with active wounds in axilla, painful/draining, and elevated inflammatory markers   - Plastic surgery following and considering surgical intervention to R axilla   - ID consulted, rec unasyn  -Wound care consult
Hidradenitis suppurativa (previously on Humira) s/p excision of hidradenitis of buttocks with skin graft reconstruction (2/2023) s/p excision of left axillary (4/2023) with skin graft reconstruction (5/2023). Pending right axillary hidradenitis removal, was cancelled in 8/2023 due to leukocytosis and anemia. S/p outpatient doxycycline x 10 days per PCP.   -Appears to be in a hidradenitis flare with active wounds in axilla, painful/draining, and elevated inflammatory markers   - Plastic surgery following and considering surgical intervention to R axilla   -Wound care consult
WBC 17k on presentation, neutrophilic predominance. 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
Hidradenitis suppurativa (previously on Humira) s/p excision of hidradenitis of buttocks with skin graft reconstruction (2/2023) s/p excision of left axillary (4/2023) with skin graft reconstruction (5/2023). Pending right axillary hidradenitis removal, was cancelled in 8/2023 due to leukocytosis and anemia. S/p outpatient doxycycline x 10 days per PCP.   -Appears to be in a hidradenitis flare with active wounds in axilla, painful/draining, and elevated inflammatory markers   - Plastic surgery following, no plan for surgical intervention inpatient. Rec outpt f/u   - ID consulted  - Cont unasyn  - Wound care consult

## 2023-09-18 ENCOUNTER — TRANSCRIPTION ENCOUNTER (OUTPATIENT)
Age: 33
End: 2023-09-18

## 2023-09-18 VITALS
SYSTOLIC BLOOD PRESSURE: 125 MMHG | OXYGEN SATURATION: 99 % | TEMPERATURE: 98 F | RESPIRATION RATE: 16 BRPM | HEART RATE: 89 BPM | DIASTOLIC BLOOD PRESSURE: 80 MMHG

## 2023-09-18 PROCEDURE — 99239 HOSP IP/OBS DSCHRG MGMT >30: CPT

## 2023-09-18 RX ORDER — PREDNISOLONE 5 MG
4 TABLET ORAL
Qty: 120 | Refills: 0
Start: 2023-09-18 | End: 2023-10-17

## 2023-09-18 RX ORDER — SENNA PLUS 8.6 MG/1
2 TABLET ORAL
Qty: 0 | Refills: 0 | DISCHARGE
Start: 2023-09-18

## 2023-09-18 RX ORDER — FERROUS SULFATE 325(65) MG
1 TABLET ORAL
Qty: 0 | Refills: 0 | DISCHARGE
Start: 2023-09-18

## 2023-09-18 RX ORDER — POLYETHYLENE GLYCOL 3350 17 G/17G
17 POWDER, FOR SOLUTION ORAL
Qty: 0 | Refills: 0 | DISCHARGE
Start: 2023-09-18

## 2023-09-18 RX ADMIN — CHLORHEXIDINE GLUCONATE 1 APPLICATION(S): 213 SOLUTION TOPICAL at 12:36

## 2023-09-18 RX ADMIN — AMPICILLIN SODIUM AND SULBACTAM SODIUM 200 GRAM(S): 250; 125 INJECTION, POWDER, FOR SUSPENSION INTRAMUSCULAR; INTRAVENOUS at 12:36

## 2023-09-18 RX ADMIN — AMPICILLIN SODIUM AND SULBACTAM SODIUM 200 GRAM(S): 250; 125 INJECTION, POWDER, FOR SUSPENSION INTRAMUSCULAR; INTRAVENOUS at 06:55

## 2023-09-18 RX ADMIN — Medication 325 MILLIGRAM(S): at 12:37

## 2023-09-18 RX ADMIN — POLYETHYLENE GLYCOL 3350 17 GRAM(S): 17 POWDER, FOR SOLUTION ORAL at 12:37

## 2023-09-18 RX ADMIN — Medication 500 MILLIGRAM(S): at 06:51

## 2023-09-18 RX ADMIN — Medication 20 MILLIGRAM(S): at 06:51

## 2023-09-18 NOTE — DISCHARGE NOTE NURSING/CASE MANAGEMENT/SOCIAL WORK - PATIENT PORTAL LINK FT
You can access the FollowMyHealth Patient Portal offered by [facility] by registering at the following website: [email or web address]. By joining [employer]’s FollowMyHealth portal, you will also be able to view your health information using other applications (apps) compatible with our system.

## 2023-09-18 NOTE — CHART NOTE - NSCHARTNOTEFT_GEN_A_CORE
A 32-year-old male with a history of Hidradenitis Suppurativa (HS) and multiple prior admissions for wound excisions presents with a two-week history of wrist, hand, knee, and ankle pain. No associated symptoms like fever, chills, cough, or dysuria. HS diagnosis since 2017, previously on Humira until January 2023 with no joint pain. Experienced a skin flare-up in January 2023 before armpit surgery. Recent admission in May 2023 for hip pain with sacroiliitis. Consulted Rheumatology for HS-related skin findings and joint pain.    # Hidradenitis Suppurativa with Seronegative polyarthritis   - Currently, he has active polyarthritis ( b/l wrists, both 2-3 rd MCO, left 3rd and 4th PIP, knees, ankles, and MTP joints)  - HS patients has high risk of developing seronegative arthritis   - ,   - MRI pelvis 5/18/23 with mild b/l sacroiliitis  - Has been off Humira since February/2023, joints symptoms started 4/23   - Hep B and QuantiFeron TB negative  - Ideally the patient should be started on anti-TNF for skin and joint flares     Recommendations  1. Please c/w prednisone 20 mg orally daily for one week then decrease to 15 mg daily, he should continue prednisone 15 mg daily until he sees Dr. Rosas in 2 weeks (Dr Rosas's clinic phone number [phone])   2. The patient will schedule a follow-up appointment with our Rheumatology clinic upon discharge.    Rheumatology will follow the patient     D/w Dr. Ralph Pelletier MD  PGY-4  Rheumatology Fellow  Reachable on TEAMS
Rheumatology consulted. Appreciate recommendations.

## 2023-09-18 NOTE — DISCHARGE NOTE NURSING/CASE MANAGEMENT/SOCIAL WORK - NSDCPEFALRISK_GEN_ALL_CORE
For information on Fall & Injury Prevention, visit: [email or web address] OR  [email or web address] OR  https://www.cdc.gov/steadi/patient.html

## 2023-09-20 ENCOUNTER — APPOINTMENT (OUTPATIENT)
Dept: INTERNAL MEDICINE | Facility: CLINIC | Age: 33
End: 2023-09-20
Payer: MEDICAID

## 2023-09-20 VITALS
BODY MASS INDEX: 24.79 KG/M2 | DIASTOLIC BLOOD PRESSURE: 76 MMHG | HEART RATE: 84 BPM | SYSTOLIC BLOOD PRESSURE: 131 MMHG | TEMPERATURE: 98.2 F | WEIGHT: 183 LBS | OXYGEN SATURATION: 100 % | HEIGHT: 72 IN

## 2023-09-20 DIAGNOSIS — M25.572 PAIN IN LEFT ANKLE AND JOINTS OF LEFT FOOT: ICD-10-CM

## 2023-09-20 PROCEDURE — 99496 TRANSJ CARE MGMT HIGH F2F 7D: CPT

## 2023-09-25 LAB
ALBUMIN SERPL ELPH-MCNC: 3.3 G/DL
ALP BLD-CCNC: 115 U/L
ALT SERPL-CCNC: 17 U/L
ANION GAP SERPL CALC-SCNC: 11 MMOL/L
AST SERPL-CCNC: 19 U/L
BILIRUB SERPL-MCNC: 0.2 MG/DL
BUN SERPL-MCNC: 15 MG/DL
CALCIUM SERPL-MCNC: 8.8 MG/DL
CHLORIDE SERPL-SCNC: 103 MMOL/L
CHOLEST SERPL-MCNC: 124 MG/DL
CO2 SERPL-SCNC: 24 MMOL/L
CREAT SERPL-MCNC: 0.58 MG/DL
EGFR: 132 ML/MIN/1.73M2
GLUCOSE SERPL-MCNC: 120 MG/DL
HDLC SERPL-MCNC: 49 MG/DL
IRON SATN MFR SERPL: 7 %
IRON SERPL-MCNC: 18 UG/DL
LDLC SERPL CALC-MCNC: 61 MG/DL
NONHDLC SERPL-MCNC: 75 MG/DL
POTASSIUM SERPL-SCNC: 4 MMOL/L
PROT SERPL-MCNC: 9.1 G/DL
SODIUM SERPL-SCNC: 138 MMOL/L
T4 SERPL-MCNC: 7.3 UG/DL
TIBC SERPL-MCNC: 266 UG/DL
TRIGL SERPL-MCNC: 66 MG/DL
TSH SERPL-ACNC: 0.36 UIU/ML
UIBC SERPL-MCNC: 247 UG/DL

## 2023-09-26 ENCOUNTER — APPOINTMENT (OUTPATIENT)
Dept: INTERNAL MEDICINE | Facility: CLINIC | Age: 33
End: 2023-09-26
Payer: MEDICAID

## 2023-09-26 ENCOUNTER — LABORATORY RESULT (OUTPATIENT)
Age: 33
End: 2023-09-26

## 2023-09-26 PROCEDURE — 36415 COLL VENOUS BLD VENIPUNCTURE: CPT

## 2023-10-27 ENCOUNTER — APPOINTMENT (OUTPATIENT)
Dept: INTERNAL MEDICINE | Facility: CLINIC | Age: 33
End: 2023-10-27
Payer: MEDICAID

## 2023-10-27 VITALS
OXYGEN SATURATION: 98 % | TEMPERATURE: 96.6 F | BODY MASS INDEX: 34.16 KG/M2 | HEIGHT: 60 IN | SYSTOLIC BLOOD PRESSURE: 115 MMHG | DIASTOLIC BLOOD PRESSURE: 70 MMHG | HEART RATE: 116 BPM | WEIGHT: 174 LBS

## 2023-10-27 PROCEDURE — 99214 OFFICE O/P EST MOD 30 MIN: CPT | Mod: 25

## 2023-10-30 NOTE — ED ADULT NURSE NOTE - CHIEF COMPLAINT QUOTE
HR=50 bpm, YYMC=002/89 mmhg, SpO2=99.0 %, Resp=8 B/min, EtCO2=32 mmHg, Apnea=2 Seconds c/o body pain x1week. to wrists, ankles, knees. "took 2 Alieve's at 6pm" difficulty ambulating, arrives with cane. hx hidradenitis

## 2023-10-31 ENCOUNTER — NON-APPOINTMENT (OUTPATIENT)
Age: 33
End: 2023-10-31

## 2023-10-31 LAB
ALBUMIN SERPL ELPH-MCNC: 3.3 G/DL
ALP BLD-CCNC: 136 U/L
ALT SERPL-CCNC: 6 U/L
ANION GAP SERPL CALC-SCNC: 13 MMOL/L
AST SERPL-CCNC: 9 U/L
BASOPHILS # BLD AUTO: 0.07 K/UL
BASOPHILS NFR BLD AUTO: 0.4 %
BILIRUB SERPL-MCNC: 0.2 MG/DL
BUN SERPL-MCNC: 11 MG/DL
CALCIUM SERPL-MCNC: 9.3 MG/DL
CHLORIDE SERPL-SCNC: 97 MMOL/L
CO2 SERPL-SCNC: 24 MMOL/L
CREAT SERPL-MCNC: 0.77 MG/DL
EGFR: 121 ML/MIN/1.73M2
EOSINOPHIL # BLD AUTO: 0.14 K/UL
EOSINOPHIL NFR BLD AUTO: 0.9 %
GLUCOSE SERPL-MCNC: 146 MG/DL
HCT VFR BLD CALC: 35.9 %
HGB BLD-MCNC: 10.6 G/DL
IMM GRANULOCYTES NFR BLD AUTO: 0.3 %
LYMPHOCYTES # BLD AUTO: 2.4 K/UL
LYMPHOCYTES NFR BLD AUTO: 15.3 %
MAN DIFF?: NORMAL
MCHC RBC-ENTMCNC: 22.8 PG
MCHC RBC-ENTMCNC: 29.5 GM/DL
MCV RBC AUTO: 77.4 FL
MONOCYTES # BLD AUTO: 0.98 K/UL
MONOCYTES NFR BLD AUTO: 6.2 %
NEUTROPHILS # BLD AUTO: 12.09 K/UL
NEUTROPHILS NFR BLD AUTO: 76.9 %
PLATELET # BLD AUTO: 431 K/UL
POTASSIUM SERPL-SCNC: 4.1 MMOL/L
PROT SERPL-MCNC: 9.2 G/DL
RBC # BLD: 4.64 M/UL
RBC # FLD: 25.8 %
SODIUM SERPL-SCNC: 133 MMOL/L
TSH SERPL-ACNC: 0.39 UIU/ML
WBC # FLD AUTO: 15.73 K/UL

## 2023-11-08 ENCOUNTER — OUTPATIENT (OUTPATIENT)
Dept: OUTPATIENT SERVICES | Facility: HOSPITAL | Age: 33
LOS: 1 days | Discharge: HOME | End: 2023-11-08

## 2023-11-08 DIAGNOSIS — L73.2 HIDRADENITIS SUPPURATIVA: Chronic | ICD-10-CM

## 2023-11-08 DIAGNOSIS — C85.10 UNSPECIFIED B-CELL LYMPHOMA, UNSPECIFIED SITE: ICD-10-CM

## 2023-11-08 DIAGNOSIS — Z98.890 OTHER SPECIFIED POSTPROCEDURAL STATES: Chronic | ICD-10-CM

## 2023-11-24 ENCOUNTER — RESULT REVIEW (OUTPATIENT)
Age: 33
End: 2023-11-24

## 2023-11-24 ENCOUNTER — APPOINTMENT (OUTPATIENT)
Dept: HEMATOLOGY ONCOLOGY | Facility: CLINIC | Age: 33
End: 2023-11-24
Payer: MEDICAID

## 2023-11-24 VITALS
BODY MASS INDEX: 32.78 KG/M2 | HEIGHT: 60 IN | TEMPERATURE: 97.2 F | WEIGHT: 166.96 LBS | DIASTOLIC BLOOD PRESSURE: 86 MMHG | RESPIRATION RATE: 16 BRPM | HEART RATE: 106 BPM | OXYGEN SATURATION: 100 % | SYSTOLIC BLOOD PRESSURE: 123 MMHG

## 2023-11-24 LAB
BASOPHILS # BLD AUTO: 0.08 K/UL — SIGNIFICANT CHANGE UP (ref 0–0.2)
BASOPHILS # BLD AUTO: 0.08 K/UL — SIGNIFICANT CHANGE UP (ref 0–0.2)
BASOPHILS NFR BLD AUTO: 0.7 % — SIGNIFICANT CHANGE UP (ref 0–2)
BASOPHILS NFR BLD AUTO: 0.7 % — SIGNIFICANT CHANGE UP (ref 0–2)
EOSINOPHIL # BLD AUTO: 0.2 K/UL — SIGNIFICANT CHANGE UP (ref 0–0.5)
EOSINOPHIL # BLD AUTO: 0.2 K/UL — SIGNIFICANT CHANGE UP (ref 0–0.5)
EOSINOPHIL NFR BLD AUTO: 1.7 % — SIGNIFICANT CHANGE UP (ref 0–6)
EOSINOPHIL NFR BLD AUTO: 1.7 % — SIGNIFICANT CHANGE UP (ref 0–6)
HCT VFR BLD CALC: 34.7 % — LOW (ref 39–50)
HCT VFR BLD CALC: 34.7 % — LOW (ref 39–50)
HGB BLD-MCNC: 10.7 G/DL — LOW (ref 13–17)
HGB BLD-MCNC: 10.7 G/DL — LOW (ref 13–17)
IMM GRANULOCYTES NFR BLD AUTO: 0.3 % — SIGNIFICANT CHANGE UP (ref 0–0.9)
IMM GRANULOCYTES NFR BLD AUTO: 0.3 % — SIGNIFICANT CHANGE UP (ref 0–0.9)
LYMPHOCYTES # BLD AUTO: 2.96 K/UL — SIGNIFICANT CHANGE UP (ref 1–3.3)
LYMPHOCYTES # BLD AUTO: 2.96 K/UL — SIGNIFICANT CHANGE UP (ref 1–3.3)
LYMPHOCYTES # BLD AUTO: 25.2 % — SIGNIFICANT CHANGE UP (ref 13–44)
LYMPHOCYTES # BLD AUTO: 25.2 % — SIGNIFICANT CHANGE UP (ref 13–44)
MCHC RBC-ENTMCNC: 22.2 PG — LOW (ref 27–34)
MCHC RBC-ENTMCNC: 22.2 PG — LOW (ref 27–34)
MCHC RBC-ENTMCNC: 30.8 G/DL — LOW (ref 32–36)
MCHC RBC-ENTMCNC: 30.8 G/DL — LOW (ref 32–36)
MCV RBC AUTO: 72.1 FL — LOW (ref 80–100)
MCV RBC AUTO: 72.1 FL — LOW (ref 80–100)
MONOCYTES # BLD AUTO: 0.71 K/UL — SIGNIFICANT CHANGE UP (ref 0–0.9)
MONOCYTES # BLD AUTO: 0.71 K/UL — SIGNIFICANT CHANGE UP (ref 0–0.9)
MONOCYTES NFR BLD AUTO: 6 % — SIGNIFICANT CHANGE UP (ref 2–14)
MONOCYTES NFR BLD AUTO: 6 % — SIGNIFICANT CHANGE UP (ref 2–14)
NEUTROPHILS # BLD AUTO: 7.78 K/UL — HIGH (ref 1.8–7.4)
NEUTROPHILS # BLD AUTO: 7.78 K/UL — HIGH (ref 1.8–7.4)
NEUTROPHILS NFR BLD AUTO: 66.1 % — SIGNIFICANT CHANGE UP (ref 43–77)
NEUTROPHILS NFR BLD AUTO: 66.1 % — SIGNIFICANT CHANGE UP (ref 43–77)
NRBC # BLD: 0 /100 WBCS — SIGNIFICANT CHANGE UP (ref 0–0)
NRBC # BLD: 0 /100 WBCS — SIGNIFICANT CHANGE UP (ref 0–0)
PLATELET # BLD AUTO: 556 K/UL — HIGH (ref 150–400)
PLATELET # BLD AUTO: 556 K/UL — HIGH (ref 150–400)
RBC # BLD: 4.81 M/UL — SIGNIFICANT CHANGE UP (ref 4.2–5.8)
RBC # BLD: 4.81 M/UL — SIGNIFICANT CHANGE UP (ref 4.2–5.8)
RBC # FLD: 21.1 % — HIGH (ref 10.3–14.5)
RBC # FLD: 21.1 % — HIGH (ref 10.3–14.5)
RETICS #: 60.5 K/UL — SIGNIFICANT CHANGE UP (ref 25–125)
RETICS #: 60.5 K/UL — SIGNIFICANT CHANGE UP (ref 25–125)
RETICS/RBC NFR: 1.3 % — SIGNIFICANT CHANGE UP (ref 0.5–2.5)
RETICS/RBC NFR: 1.3 % — SIGNIFICANT CHANGE UP (ref 0.5–2.5)
WBC # BLD: 11.76 K/UL — HIGH (ref 3.8–10.5)
WBC # BLD: 11.76 K/UL — HIGH (ref 3.8–10.5)
WBC # FLD AUTO: 11.76 K/UL — HIGH (ref 3.8–10.5)
WBC # FLD AUTO: 11.76 K/UL — HIGH (ref 3.8–10.5)

## 2023-11-24 PROCEDURE — 99214 OFFICE O/P EST MOD 30 MIN: CPT

## 2023-11-24 RX ORDER — CHLORHEXIDINE GLUCONATE 4 %
325 (65 FE) LIQUID (ML) TOPICAL DAILY
Qty: 90 | Refills: 3 | Status: ACTIVE | COMMUNITY
Start: 2023-11-24 | End: 1900-01-01

## 2023-12-05 ENCOUNTER — NON-APPOINTMENT (OUTPATIENT)
Age: 33
End: 2023-12-05

## 2023-12-05 ENCOUNTER — LABORATORY RESULT (OUTPATIENT)
Age: 33
End: 2023-12-05

## 2023-12-05 ENCOUNTER — APPOINTMENT (OUTPATIENT)
Dept: INTERNAL MEDICINE | Facility: CLINIC | Age: 33
End: 2023-12-05
Payer: MEDICAID

## 2023-12-05 VITALS
TEMPERATURE: 98 F | WEIGHT: 166 LBS | OXYGEN SATURATION: 98 % | HEART RATE: 84 BPM | HEIGHT: 72 IN | DIASTOLIC BLOOD PRESSURE: 73 MMHG | SYSTOLIC BLOOD PRESSURE: 111 MMHG | BODY MASS INDEX: 22.48 KG/M2

## 2023-12-05 DIAGNOSIS — L73.2 HIDRADENITIS SUPPURATIVA: ICD-10-CM

## 2023-12-05 PROCEDURE — 93000 ELECTROCARDIOGRAM COMPLETE: CPT

## 2023-12-05 PROCEDURE — 99213 OFFICE O/P EST LOW 20 MIN: CPT | Mod: 25

## 2023-12-05 RX ORDER — PREDNISOLONE 5 MG/1
5 TABLET ORAL
Refills: 0 | Status: DISCONTINUED | COMMUNITY
Start: 2023-09-20 | End: 2023-12-05

## 2023-12-06 LAB
ALBUMIN MFR SERPL ELPH: 30.6 %
ALBUMIN SERPL-MCNC: 3 G/DL
ALBUMIN/GLOB SERPL: 0.4 RATIO
ALPHA1 GLOB MFR SERPL ELPH: 5.3 %
ALPHA1 GLOB SERPL ELPH-MCNC: 0.5 G/DL
ALPHA2 GLOB MFR SERPL ELPH: 11 %
ALPHA2 GLOB SERPL ELPH-MCNC: 1.1 G/DL
B-GLOBULIN MFR SERPL ELPH: 15 %
B-GLOBULIN SERPL ELPH-MCNC: 1.5 G/DL
DEPRECATED KAPPA LC FREE/LAMBDA SER: 1.94 RATIO
FERRITIN SERPL-MCNC: 147 NG/ML
FOLATE SERPL-MCNC: 8.1 NG/ML
GAMMA GLOB FLD ELPH-MCNC: 3.8 G/DL
GAMMA GLOB MFR SERPL ELPH: 38.1 %
HGB A MFR BLD: 98 %
HGB A2 MFR BLD: 2 %
HGB FRACT BLD-IMP: NORMAL
IGA SER QL IEP: 672 MG/DL
IGG SER QL IEP: 3756 MG/DL
IGM SER QL IEP: 352 MG/DL
INTERPRETATION SERPL IEP-IMP: NORMAL
IRON SATN MFR SERPL: 7 %
IRON SERPL-MCNC: 17 UG/DL
KAPPA LC CSF-MCNC: 6.43 MG/DL
KAPPA LC SERPL-MCNC: 12.48 MG/DL
M PROTEIN SPEC IFE-MCNC: NORMAL
PROT SERPL-MCNC: 9.9 G/DL
PROT SERPL-MCNC: 9.9 G/DL
TIBC SERPL-MCNC: 250 UG/DL
UIBC SERPL-MCNC: 234 UG/DL
VIT B12 SERPL-MCNC: 809 PG/ML

## 2023-12-07 PROBLEM — L73.2 HIDRADENITIS: Status: RESOLVED | Noted: 2017-04-04 | Resolved: 2023-12-07

## 2023-12-08 LAB
ALBUMIN SERPL ELPH-MCNC: 2.8 G/DL
ALP BLD-CCNC: 122 U/L
ALT SERPL-CCNC: 24 U/L
ANION GAP SERPL CALC-SCNC: 10 MMOL/L
AST SERPL-CCNC: 24 U/L
BASOPHILS # BLD AUTO: 0.1 K/UL
BASOPHILS NFR BLD AUTO: 0.7 %
BILIRUB SERPL-MCNC: 0.2 MG/DL
BUN SERPL-MCNC: 12 MG/DL
CALCIUM SERPL-MCNC: 9.1 MG/DL
CHLORIDE SERPL-SCNC: 100 MMOL/L
CHOLEST SERPL-MCNC: 145 MG/DL
CO2 SERPL-SCNC: 26 MMOL/L
CREAT SERPL-MCNC: 0.6 MG/DL
EGFR: 131 ML/MIN/1.73M2
EOSINOPHIL # BLD AUTO: 0.33 K/UL
EOSINOPHIL NFR BLD AUTO: 2.4 %
GLUCOSE SERPL-MCNC: 95 MG/DL
HCT VFR BLD CALC: 32 %
HDLC SERPL-MCNC: 46 MG/DL
HGB BLD-MCNC: 9.6 G/DL
IMM GRANULOCYTES NFR BLD AUTO: 0.3 %
LDLC SERPL CALC-MCNC: 87 MG/DL
LYMPHOCYTES # BLD AUTO: 3.5 K/UL
LYMPHOCYTES NFR BLD AUTO: 25.4 %
MAN DIFF?: NORMAL
MCHC RBC-ENTMCNC: 22.9 PG
MCHC RBC-ENTMCNC: 30 GM/DL
MCV RBC AUTO: 76.4 FL
MONOCYTES # BLD AUTO: 0.75 K/UL
MONOCYTES NFR BLD AUTO: 5.4 %
NEUTROPHILS # BLD AUTO: 9.07 K/UL
NEUTROPHILS NFR BLD AUTO: 65.8 %
NONHDLC SERPL-MCNC: 98 MG/DL
PLATELET # BLD AUTO: 538 K/UL
POTASSIUM SERPL-SCNC: 4.4 MMOL/L
PROT SERPL-MCNC: 9.1 G/DL
RBC # BLD: 4.19 M/UL
RBC # FLD: 21.1 %
SODIUM SERPL-SCNC: 135 MMOL/L
T4 FREE SERPL-MCNC: 1.3 NG/DL
T4 SERPL-MCNC: 7.3 UG/DL
TRIGL SERPL-MCNC: 54 MG/DL
TSH SERPL-ACNC: 0.5 UIU/ML
WBC # FLD AUTO: 13.79 K/UL

## 2024-02-04 ENCOUNTER — EMERGENCY (EMERGENCY)
Facility: HOSPITAL | Age: 34
LOS: 1 days | Discharge: HOME | End: 2024-02-04
Attending: EMERGENCY MEDICINE | Admitting: EMERGENCY MEDICINE
Payer: MEDICAID

## 2024-02-04 VITALS
SYSTOLIC BLOOD PRESSURE: 118 MMHG | DIASTOLIC BLOOD PRESSURE: 86 MMHG | HEART RATE: 140 BPM | RESPIRATION RATE: 18 BRPM | OXYGEN SATURATION: 100 % | TEMPERATURE: 98 F

## 2024-02-04 VITALS
HEART RATE: 106 BPM | OXYGEN SATURATION: 100 % | DIASTOLIC BLOOD PRESSURE: 73 MMHG | SYSTOLIC BLOOD PRESSURE: 115 MMHG | RESPIRATION RATE: 14 BRPM

## 2024-02-04 DIAGNOSIS — Z98.890 OTHER SPECIFIED POSTPROCEDURAL STATES: Chronic | ICD-10-CM

## 2024-02-04 DIAGNOSIS — L73.2 HIDRADENITIS SUPPURATIVA: Chronic | ICD-10-CM

## 2024-02-04 LAB
ALBUMIN SERPL ELPH-MCNC: 3.1 G/DL — LOW (ref 3.3–5)
ALP SERPL-CCNC: 119 U/L — SIGNIFICANT CHANGE UP (ref 40–120)
ALT FLD-CCNC: 10 U/L — SIGNIFICANT CHANGE UP (ref 4–41)
ANION GAP SERPL CALC-SCNC: 14 MMOL/L — SIGNIFICANT CHANGE UP (ref 7–14)
AST SERPL-CCNC: 19 U/L — SIGNIFICANT CHANGE UP (ref 4–40)
BASE EXCESS BLDV CALC-SCNC: 1.3 MMOL/L — SIGNIFICANT CHANGE UP (ref -2–3)
BASOPHILS # BLD AUTO: 0.09 K/UL — SIGNIFICANT CHANGE UP (ref 0–0.2)
BASOPHILS NFR BLD AUTO: 0.5 % — SIGNIFICANT CHANGE UP (ref 0–2)
BILIRUB SERPL-MCNC: 0.4 MG/DL — SIGNIFICANT CHANGE UP (ref 0.2–1.2)
BLOOD GAS VENOUS COMPREHENSIVE RESULT: SIGNIFICANT CHANGE UP
BUN SERPL-MCNC: 10 MG/DL — SIGNIFICANT CHANGE UP (ref 7–23)
CALCIUM SERPL-MCNC: 9.6 MG/DL — SIGNIFICANT CHANGE UP (ref 8.4–10.5)
CHLORIDE BLDV-SCNC: 100 MMOL/L — SIGNIFICANT CHANGE UP (ref 96–108)
CHLORIDE SERPL-SCNC: 95 MMOL/L — LOW (ref 98–107)
CO2 BLDV-SCNC: 27.1 MMOL/L — HIGH (ref 22–26)
CO2 SERPL-SCNC: 22 MMOL/L — SIGNIFICANT CHANGE UP (ref 22–31)
CREAT SERPL-MCNC: 0.63 MG/DL — SIGNIFICANT CHANGE UP (ref 0.5–1.3)
EGFR: 129 ML/MIN/1.73M2 — SIGNIFICANT CHANGE UP
EOSINOPHIL # BLD AUTO: 0.22 K/UL — SIGNIFICANT CHANGE UP (ref 0–0.5)
EOSINOPHIL NFR BLD AUTO: 1.3 % — SIGNIFICANT CHANGE UP (ref 0–6)
GAS PNL BLDV: 133 MMOL/L — LOW (ref 136–145)
GAS PNL BLDV: SIGNIFICANT CHANGE UP
GLUCOSE BLDV-MCNC: 112 MG/DL — HIGH (ref 70–99)
GLUCOSE SERPL-MCNC: 115 MG/DL — HIGH (ref 70–99)
HCO3 BLDV-SCNC: 26 MMOL/L — SIGNIFICANT CHANGE UP (ref 22–29)
HCT VFR BLD CALC: 32.8 % — LOW (ref 39–50)
HCT VFR BLDA CALC: 32 % — LOW (ref 39–51)
HGB BLD CALC-MCNC: 10.6 G/DL — LOW (ref 12.6–17.4)
HGB BLD-MCNC: 10.2 G/DL — LOW (ref 13–17)
IANC: 12.82 K/UL — HIGH (ref 1.8–7.4)
IMM GRANULOCYTES NFR BLD AUTO: 0.5 % — SIGNIFICANT CHANGE UP (ref 0–0.9)
LACTATE BLDV-MCNC: 2.1 MMOL/L — HIGH (ref 0.5–2)
LYMPHOCYTES # BLD AUTO: 17.9 % — SIGNIFICANT CHANGE UP (ref 13–44)
LYMPHOCYTES # BLD AUTO: 3.13 K/UL — SIGNIFICANT CHANGE UP (ref 1–3.3)
MCHC RBC-ENTMCNC: 22 PG — LOW (ref 27–34)
MCHC RBC-ENTMCNC: 31.1 GM/DL — LOW (ref 32–36)
MCV RBC AUTO: 70.7 FL — LOW (ref 80–100)
MONOCYTES # BLD AUTO: 1.16 K/UL — HIGH (ref 0–0.9)
MONOCYTES NFR BLD AUTO: 6.6 % — SIGNIFICANT CHANGE UP (ref 2–14)
NEUTROPHILS # BLD AUTO: 12.82 K/UL — HIGH (ref 1.8–7.4)
NEUTROPHILS NFR BLD AUTO: 73.2 % — SIGNIFICANT CHANGE UP (ref 43–77)
NRBC # BLD: 0 /100 WBCS — SIGNIFICANT CHANGE UP (ref 0–0)
NRBC # FLD: 0 K/UL — SIGNIFICANT CHANGE UP (ref 0–0)
PCO2 BLDV: 40 MMHG — LOW (ref 42–55)
PH BLDV: 7.42 — SIGNIFICANT CHANGE UP (ref 7.32–7.43)
PLATELET # BLD AUTO: 551 K/UL — HIGH (ref 150–400)
PO2 BLDV: 61 MMHG — HIGH (ref 25–45)
POTASSIUM BLDV-SCNC: 4.3 MMOL/L — SIGNIFICANT CHANGE UP (ref 3.5–5.1)
POTASSIUM SERPL-MCNC: 4.1 MMOL/L — SIGNIFICANT CHANGE UP (ref 3.5–5.3)
POTASSIUM SERPL-SCNC: 4.1 MMOL/L — SIGNIFICANT CHANGE UP (ref 3.5–5.3)
PROT SERPL-MCNC: 10.8 G/DL — HIGH (ref 6–8.3)
RBC # BLD: 4.64 M/UL — SIGNIFICANT CHANGE UP (ref 4.2–5.8)
RBC # FLD: 19.3 % — HIGH (ref 10.3–14.5)
SAO2 % BLDV: 90.4 % — HIGH (ref 67–88)
SODIUM SERPL-SCNC: 131 MMOL/L — LOW (ref 135–145)
WBC # BLD: 17.51 K/UL — HIGH (ref 3.8–10.5)
WBC # FLD AUTO: 17.51 K/UL — HIGH (ref 3.8–10.5)

## 2024-02-04 PROCEDURE — 93010 ELECTROCARDIOGRAM REPORT: CPT

## 2024-02-04 PROCEDURE — 99284 EMERGENCY DEPT VISIT MOD MDM: CPT

## 2024-02-04 RX ORDER — KETOROLAC TROMETHAMINE 30 MG/ML
15 SYRINGE (ML) INJECTION ONCE
Refills: 0 | Status: DISCONTINUED | OUTPATIENT
Start: 2024-02-04 | End: 2024-02-04

## 2024-02-04 RX ORDER — SODIUM CHLORIDE 9 MG/ML
1000 INJECTION INTRAMUSCULAR; INTRAVENOUS; SUBCUTANEOUS ONCE
Refills: 0 | Status: COMPLETED | OUTPATIENT
Start: 2024-02-04 | End: 2024-02-04

## 2024-02-04 RX ADMIN — SODIUM CHLORIDE 1000 MILLILITER(S): 9 INJECTION INTRAMUSCULAR; INTRAVENOUS; SUBCUTANEOUS at 22:46

## 2024-02-04 RX ADMIN — Medication 15 MILLIGRAM(S): at 22:46

## 2024-02-04 NOTE — ED PROVIDER NOTE - ATTENDING APP SHARED VISIT CONTRIBUTION OF CARE
Attending note:   After face to face evaluation of this patient, I concur with above noted hx, pe, and care plan for this patient.  Krishna: 33-year-old male with past medical history of hidradenitis suppurativa with multiple surgeries.  Patient complains of worsening body pain over the last 1 week.  Patient has been having intermittent symptoms for many months.  Patient notes that he is now having swelling of the left hand and pain in the right knee making it difficult to walk.  Patient has been using extra strength Tylenol but that has not been successful in controlling his pain.  Patient is currently on antibiotics and steroids.  Patient follows with rheumatology and dermatology.  Patient denies any fevers or chills.  On exam patient appears very uncomfortable and is notably tachycardic.  Patient has a large right axillary wound with granulation tissue around the edges but significant malodorous drainage and discharge.  There is no skin edema or erythema noted.  There is small wounds around left ear and mandibular area.  There is mild edema of the left fingers and of the right knee as well.  However patient is able to range those well.  There is no pitting edema noted.  Will check labs and give IV fluids and pain medication.  Patient likely require IV antibiotics and admission.  Patient signed out to Dr. Vera at 11 PM at change of shift with plan as noted above.

## 2024-02-04 NOTE — ED PROVIDER NOTE - NSCAREINITIATED _GEN_ER
What Is The Reason For Today's Visit?: Full Body Skin Examination
Myrtle Krishna(Attending)
What Is The Reason For Today's Visit? (Being Monitored For X): concerning skin lesions on a periodic basis
Cyclosporine Counseling:  I discussed with the patient the risks of cyclosporine including but not limited to hypertension, gingival hyperplasia,myelosuppression, immunosuppression, liver damage, kidney damage, neurotoxicity, lymphoma, and serious infections. The patient understands that monitoring is required including baseline blood pressure, CBC, CMP, lipid panel and uric acid, and then 1-2 times monthly CMP and blood pressure.

## 2024-02-04 NOTE — ED PROVIDER NOTE - PATIENT PORTAL LINK FT
You can access the FollowMyHealth Patient Portal offered by [facility] by registering at the following website: [email or web address]. By joining [employer]’s FollowMyHealth portal, you will also be able to view your health information using other applications (apps) compatible with our system.

## 2024-02-04 NOTE — ED PROVIDER NOTE - OBJECTIVE STATEMENT
32 y/o M with PMH of hidradenitis presents c/o various areas of body pain worsening over 1 week. Patient notes that these issues have been on and off for many months due to his condition but over the past week he has had particular worsened swelling in his left hand as well as pain by his right knee and left ankle making it difficult for him to walk. He usually tries to take extra strength tylenol to help with his pain but lately it has not been working prompting his current visit. He has been on antibiotics and steroids in the past but is not currently on either. Denies any other complaints or concerns. Denies chest pain, SOB, cough, fevers, chills, abdominal pain, N/V/D/C, urinary complaints, HA, dizziness, numbness, tingling, weakness, trauma, injuries, falls, sick contacts, recent travel.

## 2024-02-04 NOTE — ED ADULT NURSE NOTE - NSFALLRISKINTERV_ED_ALL_ED
Assistance OOB with selected safe patient handling equipment if applicable/Assistance with ambulation/Communicate fall risk and risk factors to all staff, patient, and family/Monitor gait and stability/Provide visual cue: yellow wristband, yellow gown, etc/Reinforce activity limits and safety measures with patient and family/Call bell, personal items and telephone in reach/Instruct patient to call for assistance before getting out of bed/chair/stretcher/Non-slip footwear applied when patient is off stretcher/[place] to call system/Physically safe environment - no spills, clutter or unnecessary equipment/Purposeful Proactive Rounding/Room/bathroom lighting operational, light cord in reach

## 2024-02-04 NOTE — ED PROVIDER NOTE - NSFOLLOWUPINSTRUCTIONS_ED_ALL_ED_FT
Hidradenitis Suppurativa  Cross-sectional view of the skin showing a normal pore, a blocked pore, and an infected pore.   Hidradenitis suppurativa is a long-term (chronic) skin disease. It is similar to a severe form of acne, but it affects areas of the body where acne would be unusual, especially areas of the body where skin rubs against skin and becomes moist. These include:  Underarms.  Groin.  Genital area.  Buttocks.  Upper thighs.  Breasts.  Hidradenitis suppurativa may start out as small lumps or pimples caused by blocked skin pores, sweat glands, or hair follicles. Pimples may develop into deep sores that break open (rupture) and drain pus. Over time, affected areas of skin may thicken and become scarred. This condition is rare and does not spread from person to person (non-contagious).    What are the causes?  The exact cause of this condition is not known. It may be related to:  Male and female hormones.  An overactive disease-fighting system (immune system). The immune system may over-react to blocked hair follicles or sweat glands and cause swelling and pus-filled sores.  What increases the risk?  You are more likely to develop this condition if you:  Are female.  Are 11–55 years old.  Have a family history of hidradenitis suppurativa.  Have a personal history of acne.  Are overweight.  Smoke.  Take the medicine lithium.  What are the signs or symptoms?  The first symptoms are usually painful bumps in the skin, similar to pimples. The condition may get worse over time (progress), or it may only cause mild symptoms. If the disease progresses, symptoms may include:  Skin bumps getting bigger and growing deeper into the skin.  Bumps rupturing and draining pus.  Itchy, infected skin.  Skin getting thicker and scarred.  Tunnels under the skin (fistulas) where pus drains from a bump.  Pain during daily activities, such as pain during walking if your groin area is affected.  Emotional problems, such as stress or depression. This condition may affect your appearance and your ability or willingness to wear certain clothes or do certain activities.  How is this diagnosed?  This condition is diagnosed by a health care provider who specializes in skin conditions (dermatologist). You may be diagnosed based on:  Your symptoms and medical history.  A physical exam.  Testing a pus sample for infection.  Blood tests.  How is this treated?  Your treatment will depend on how severe your symptoms are. The same treatment will not work for everybody with this condition. You may need to try several treatments to find what works best for you. Treatment may include:  Cleaning and bandaging (dressing) your wounds as needed.  Lifestyle changes, such as new skin care routines.  Taking medicines, such as:  Antibiotics.  Acne medicines.  Medicines to reduce the activity of the immune system.  A diabetes medicine (metformin).  Birth control pills, for women.  Steroids to reduce swelling and pain.  Working with a mental health care provider, if you experience emotional distress due to this condition.  If you have severe symptoms that do not get better with medicine, you may need surgery. Surgery may involve:  Using a laser to clear the skin and remove hair follicles.  Opening and draining deep sores.  Removing the areas of skin that are diseased and scarred.  Follow these instructions at home:  Medicines    A prescription pill bottle with an example of a pill.  Take over-the-counter and prescription medicines only as told by your health care provider.  If you were prescribed antibiotics, take them as told by your health care provider. Do not stop using the antibiotic even if your condition improves.  Skin care    If you have open wounds, cover them with a clean dressing as told by your health care provider. Keep wounds clean by washing them gently with soap and water when you bathe.  Do not shave the areas where you get hidradenitis suppurativa.  Wear loose-fitting clothes.  Try to avoid getting overheated or sweaty. If you get sweaty or wet, change into clean, dry clothes as soon as you can.  To help relieve pain and itchiness, cover sore areas with a warm, clean washcloth (warm compress) for 5–10 minutes as often as needed.  Your healthcare provider may recommend an antiperspirant deodorant that may be gentle on your skin.  A daily antiseptic wash to cleanse affected areas may be suggested by your healthcare provider.  General instructions    Learn as much as you can about your disease so that you have an active role in your treatment. Work closely with your health care provider to find treatments that work for you.  If you are overweight, work with your health care provider to lose weight as recommended.  Do not use any products that contain nicotine or tobacco. These products include cigarettes, chewing tobacco, and vaping devices, such as e-cigarettes. If you need help quitting, ask your health care provider.  If you struggle with living with this condition, talk with your health care provider or work with a mental health care provider as recommended.  Keep all follow-up visits.  Where to find more information  Hidradenitis Suppurativa Foundation, Inc.: www.hs-foundation.org  American Academy of Dermatology: www.aad.org    Contact a health care provider if:  You have a flare-up of hidradenitis suppurativa.  You have a fever or chills.  You have trouble controlling your symptoms at home.  You have trouble doing your daily activities because of your symptoms.  You have trouble dealing with emotional problems related to your condition.    Summary  Hidradenitis suppurativa is a long-term (chronic) skin disease. It is similar to a severe form of acne, but it affects areas of the body where acne would be unusual.  The first symptoms are usually painful bumps in the skin, similar to pimples. The condition may only cause mild symptoms, or it may get worse over time (progress).  If you have open wounds, cover them with a clean dressing as told by your health care provider. Keep wounds clean by washing them gently with soap and water when you bathe.  Besides skin care, treatment may include medicines, laser treatment, and surgery.  This information is not intended to replace advice given to you by your health care provider. Make sure you discuss any questions you have with your health care provider.

## 2024-02-04 NOTE — ED PROVIDER NOTE - CLINICAL SUMMARY MEDICAL DECISION MAKING FREE TEXT BOX
34 y/o M with PMH of hidradenitis presents c/o various areas of body pain worsening over 1 week. Patient with hidradenitis flair up with tachycardia to 140 likely due to a combination of pain and dehydration since patient endorses being in too much pain to eat and drink properly. No obvious sources of infection at time of exam. Will order labs and treat with fluids and toradol and reassess. 34 y/o M with PMH of hidradenitis presents c/o various areas of body pain worsening over 1 week. Patient with hidradenitis flair up with tachycardia to 140 likely due to a combination of pain and dehydration since patient endorses being in too much pain to eat and drink properly. No obvious sources of infection at time of exam. Will order labs and treat with fluids and toradol and reassess.  Patient felt better in the ED after treatment.  Labs wnl.  Vitals improved after treatment.  Bandage changed by me.  Patient comfortable and stable for discharge with pcp follow up.  Instructed to return to the ED immediately for any worsening symptoms or new concerns.    PLAN AND FOLLOW-UP: Patient counseled on all findings, diagnosis and treatment plan. Patient's questions and concerns addressed. Patient stable, discharged with instructions to follow up with PMD, and to return to ED at any time for worsening symptoms or any other concerns. Patient demonstrates understanding of the findings and the importance of appropriate follow up care.

## 2024-02-04 NOTE — ED PROVIDER NOTE - PHYSICAL EXAMINATION
CONSTITUTIONAL: Comfortable; in no acute distress. Non-toxic appearing.   NEURO: Alert & oriented. Sensory and motor functions are grossly intact.  PSYCH: Mood appropriate. Thought processes intact.   HEAD: NCAT  CARD: Regular rate and rhythm, no murmurs  RESP: No accessory muscle use; breath sounds clear and equal bilaterally; no wheezes, rhonchi, or rales     ABD: Soft; non-distended; non-tender. No guarding or rebound.   MUSCULOSKELETAL/EXTREMITIES: FROM in all four extremities; no extremity edema.  SKIN: Warm; dry; no apparent lesions or exudate CONSTITUTIONAL: Comfortable; in no acute distress. Non-toxic appearing.   NEURO: Alert & oriented. Sensory and motor functions are grossly intact.  PSYCH: Mood appropriate. Thought processes intact.   HEAD: NCAT  CARD: Regular rate and rhythm, no murmurs  RESP: No accessory muscle use; breath sounds clear and equal bilaterally; no wheezes, rhonchi, or rales     ABD: Soft; non-distended; non-tender. No guarding or rebound.   MUSCULOSKELETAL/EXTREMITIES: (+) mild right knee and left ankle swelling with tenderness to palpation, without overlying skin changes or increased warmth; FROM in all four extremities; neurovascularly intact.  SKIN: (+) large right sided open axillary wound with granulation tissue around the edges and malodorous drainage in the center. No surrounding increased erythema or warmth. Scant bleeding. 2 small additional open wounds below the bilateral ears without any active drainage or bleeding. Multiple sites of skin grafting over back and left axillary area, CDI.

## 2024-02-04 NOTE — ED ADULT NURSE NOTE - OBJECTIVE STATEMENT
Pt received in on acute distress, A&OX4, reports waking with pain in his knee joints x2-3 days ago denies any trauma. Pt also report L ankle and foot pain that has been going on for  more than a week. Pt denies chest pain, nausea, vomiting, fever, and diarrhea. Labs drawn, IV established, medication, and IVF administered.

## 2024-02-05 RX ORDER — KETOROLAC TROMETHAMINE 30 MG/ML
15 SYRINGE (ML) INJECTION ONCE
Refills: 0 | Status: DISCONTINUED | OUTPATIENT
Start: 2024-02-05 | End: 2024-02-05

## 2024-02-05 RX ORDER — IBUPROFEN 200 MG
1 TABLET ORAL
Qty: 28 | Refills: 0
Start: 2024-02-05 | End: 2024-02-11

## 2024-02-05 RX ORDER — IBUPROFEN 200 MG
1 TABLET ORAL
Qty: 28 | Refills: 0 | DISCHARGE
Start: 2024-02-05 | End: 2024-02-11

## 2024-02-05 RX ADMIN — Medication 15 MILLIGRAM(S): at 03:59

## 2024-02-07 ENCOUNTER — OUTPATIENT (OUTPATIENT)
Dept: OUTPATIENT SERVICES | Facility: HOSPITAL | Age: 34
LOS: 1 days | End: 2024-02-07

## 2024-02-07 VITALS
DIASTOLIC BLOOD PRESSURE: 74 MMHG | TEMPERATURE: 99 F | OXYGEN SATURATION: 98 % | HEART RATE: 105 BPM | HEIGHT: 72 IN | RESPIRATION RATE: 16 BRPM | SYSTOLIC BLOOD PRESSURE: 109 MMHG | WEIGHT: 158.95 LBS

## 2024-02-07 DIAGNOSIS — L73.2 HIDRADENITIS SUPPURATIVA: ICD-10-CM

## 2024-02-07 DIAGNOSIS — L73.2 HIDRADENITIS SUPPURATIVA: Chronic | ICD-10-CM

## 2024-02-07 DIAGNOSIS — Z98.890 OTHER SPECIFIED POSTPROCEDURAL STATES: Chronic | ICD-10-CM

## 2024-02-07 LAB
HCT VFR BLD CALC: 31.3 % — LOW (ref 39–50)
HGB BLD-MCNC: 9.1 G/DL — LOW (ref 13–17)
MCHC RBC-ENTMCNC: 21.5 PG — LOW (ref 27–34)
MCHC RBC-ENTMCNC: 29.1 GM/DL — LOW (ref 32–36)
MCV RBC AUTO: 74 FL — LOW (ref 80–100)
NRBC # BLD: 0 /100 WBCS — SIGNIFICANT CHANGE UP (ref 0–0)
NRBC # FLD: 0 K/UL — SIGNIFICANT CHANGE UP (ref 0–0)
PLATELET # BLD AUTO: 379 K/UL — SIGNIFICANT CHANGE UP (ref 150–400)
RBC # BLD: 4.23 M/UL — SIGNIFICANT CHANGE UP (ref 4.2–5.8)
RBC # FLD: 19.7 % — HIGH (ref 10.3–14.5)
WBC # BLD: 17.2 K/UL — HIGH (ref 3.8–10.5)
WBC # FLD AUTO: 17.2 K/UL — HIGH (ref 3.8–10.5)

## 2024-02-07 RX ORDER — ACETAMINOPHEN 500 MG
2 TABLET ORAL
Refills: 0 | DISCHARGE

## 2024-02-07 RX ORDER — SODIUM CHLORIDE 9 MG/ML
1000 INJECTION, SOLUTION INTRAVENOUS
Refills: 0 | Status: DISCONTINUED | OUTPATIENT
Start: 2024-02-13 | End: 2024-02-19

## 2024-02-07 NOTE — H&P PST ADULT - CARDIOVASCULAR
regular rate and rhythm/S1 S2 present/no gallops/no rub/no murmur details… regular rate and rhythm/S1 S2 present

## 2024-02-07 NOTE — H&P PST ADULT - GASTROINTESTINAL
details… soft/nontender/nondistended/normal active bowel sounds/no guarding/no rigidity/no organomegaly/no palpable marco antonio/no masses palpable normal/nontender/nondistended/normal active bowel sounds

## 2024-02-07 NOTE — H&P PST ADULT - PAIN SITE
patient is brought in by ems from restaurant, LOC for 30 seconds, felt heat when passed out, denies any pain, knee and ankle

## 2024-02-07 NOTE — H&P PST ADULT - ENMT COMMENTS
full rom of neck mallampti = 2, no loose teeth noted while at pst full rom of neck,  mallampti = 2, no loose teeth noted while at pst

## 2024-02-07 NOTE — H&P PST ADULT - HISTORY OF PRESENT ILLNESS
32y/o male scheduled for excision of right axillary hidradenitis on 02/13/24.  Pt states, "hx hidradenitis suppurativa , S/P excision of hidradenitis of the buttocks with skin graft reconstruction (2/2023) s/p excision of left axillary hidradenitis  (4/2023).  Underwent Left Axillary Wound Reconstruction with Thoracodorsal Artery  Flap, graft from left thigh 5/2023.  Now having right axillary hidradenitis removed."  Of note this procedure was rescheduled from August 2023 for medical evaluation and hematology evaluation.           32y/o male scheduled for excision of right axillary hidradenitis on 02/13/24.  Pt states, "hx hidradenitis suppurativa , S/P excision of hidradenitis of the buttocks with skin graft reconstruction (2/2023) s/p excision of left axillary hidradenitis  (4/2023).  Underwent Left Axillary Wound Reconstruction with Thoracodorsal Artery  Flap, graft from left thigh 5/2023.  Now having right axillary hidradenitis removed."  Of note this procedure was rescheduled from August 2023 for medical evaluation and hematology evaluation.    Of note patient was recently evaluated and treated (IV fluids and tramadol) in ED for hidradenitis flare-up.

## 2024-02-07 NOTE — H&P PST ADULT - PROBLEM SELECTOR PLAN 1
Scheduled for Excision of Right Axillary Hidradenitis on 02/13/24  Preop instructions provided and patient verbalizes understanding.  Labs done on December 2023 by pmd for medical evaluation.  Famotidine provided with instructions. Hibiclens provided with instructions and was signed by patient. Teach-back method was utilized to assess patient's understanding. Patient verbalized understanding.

## 2024-02-07 NOTE — H&P PST ADULT - SKIN COMMENTS
left axillary hidradenitis with drsg, s/p right hidradenitis grafting skin dry and scaly in right axillary and mid back area

## 2024-02-07 NOTE — H&P PST ADULT - RESPIRATORY
clear to auscultation bilaterally/no wheezes/no rales/no rhonchi/no respiratory distress/no use of accessory muscles/no subcutaneous emphysema/airway patent/breath sounds equal/good air movement/respirations non-labored/no intercostal retractions clear to auscultation bilaterally/no wheezes

## 2024-02-12 ENCOUNTER — TRANSCRIPTION ENCOUNTER (OUTPATIENT)
Age: 34
End: 2024-02-12

## 2024-02-12 ENCOUNTER — OUTPATIENT (OUTPATIENT)
Dept: OUTPATIENT SERVICES | Facility: HOSPITAL | Age: 34
LOS: 1 days | Discharge: HOME | End: 2024-02-12

## 2024-02-12 DIAGNOSIS — L73.2 HIDRADENITIS SUPPURATIVA: Chronic | ICD-10-CM

## 2024-02-12 DIAGNOSIS — Z98.890 OTHER SPECIFIED POSTPROCEDURAL STATES: Chronic | ICD-10-CM

## 2024-02-12 DIAGNOSIS — C85.10 UNSPECIFIED B-CELL LYMPHOMA, UNSPECIFIED SITE: ICD-10-CM

## 2024-02-12 NOTE — ASU PATIENT PROFILE, ADULT - FALL HARM RISK - HARM RISK INTERVENTIONS

## 2024-02-12 NOTE — ASU PATIENT PROFILE, ADULT - FALL HARM RISK - PATIENT NEEDS ASSISTANCE
Problem: Mobility Impaired (Adult and Pediatric)  Goal: *Acute Goals and Plan of Care (Insert Text)  Description: FUNCTIONAL STATUS PRIOR TO ADMISSION: Pt poor historian. Reports living alone in ILF and independence with mobility and ADLs. HOME SUPPORT PRIOR TO ADMISSION: Unclear level of support available     Physical Therapy Goals  Goals re-assessed 7/14/2021   1. Patient will move from supine to sit and sit to supine , scoot up and down, and roll side to side in bed with Nayeli assistance  within 7 day(s). 2.  Patient will transfer from bed to chair and chair to bed with Nayeli assistance using the least restrictive device within 7 day(s). 3.  Patient will perform sit to stand with Nayeli assistance within 7 day(s). 4.  Patient will ambulate with modA for 15 feet with the least restrictive device within 7 day(s). Initiated 7/8/2021  1. Patient will move from supine to sit and sit to supine , scoot up and down, and roll side to side in bed with moderate assistance  within 7 day(s). 2.  Patient will transfer from bed to chair and chair to bed with maximal assistance using the least restrictive device within 7 day(s). 3.  Patient will perform sit to stand with maximal assistance within 7 day(s). 4.  Patient will ambulate with maximal assistance for 15 feet with the least restrictive device within 7 day(s). Outcome: Progressing Towards Goal  PHYSICAL THERAPY TREATMENT: WEEKLY REASSESSMENT  Patient: Vivien Dhillon (59 y.o. female)  Date: 7/14/2021  Primary Diagnosis: Brain mass [G93.89]  Procedure(s) (LRB):  LEFT FRONTAL CRANIOTOMY WITH RESECTION INTRA CRANIAL METASTASIS WITH BRAIN LAB (URGENT) (Left) 7 Days Post-Op   Precautions: Fall      ASSESSMENT  Patient continues with skilled PT services and is progressing towards goals. Pt reported feeling tired and required encouragement to participate but ultimately was agreeable to work with therapy.   Continues to present with mild R sided weakness, impulsivity, impaired cognition, impaired balance, decreased activity tolerance, and overall decline in functional mobility. She required additional time and max VCs for sequencing/technique with all mobility. Transferred supine>sit with min-modA x2, sit<>stand with Nayeli x2, and took a few steps bed>chair with Nayeli x2. Pt required VCs for upright posture and safety awareness during transfer. Ended session in the chair with all needs met. Recommending SNF upon discharge. Current Level of Function Impacting Discharge (mobility/balance): Nayeli x2 for transfer     Other factors to consider for discharge: fall risk, medical course/treatment plan          PLAN :  Goals have been updated based on progression since last assessment. Patient continues to benefit from skilled intervention to address the above impairments. Recommendations and Planned Interventions: bed mobility training, transfer training, gait training, therapeutic exercises, neuromuscular re-education, patient and family training/education and therapeutic activities      Frequency/Duration: Patient will be followed by physical therapy:  5 times a week to address goals. Recommendation for discharge: (in order for the patient to meet his/her long term goals)  Therapy up to 5 days/week in SNF setting    This discharge recommendation:  Has been made in collaboration with the attending provider and/or case management    IF patient discharges home will need the following DME: to be determined (TBD)         SUBJECTIVE:   Patient stated I need to keep moaning out of inconvenience.     OBJECTIVE DATA SUMMARY:   HISTORY:    Past Medical History:   Diagnosis Date    Essential hypertension     Hypercholesterolemia     Malignant melanoma metastatic to brain ([facility] [address] 7/12/2021    Menopause     Stroke ([facility] [address] 2011     Past Surgical History:   Procedure Laterality Date    COLONOSCOPY N/A 6/14/2021    [facility] Fly [facility] ENDOSCOPY    Patsey Liter  2021         HX COLONOSCOPY  2010    HX GYN       VI,Para V,SAB i    UPPER GI ENDOSCOPY,BIOPSY  2021            Home Situation  Home Environment: Independent living  One/Two Story Residence: One story  Living Alone: Yes  Support Systems: Family member(s)  Patient Expects to be Discharged to[de-identified] House  Current DME Used/Available at Home: Saline Ravinder    EXAMINATION/PRESENTATION/DECISION MAKING:   Critical Behavior:  Neurologic State: Alert, Eyes open spontaneously  Orientation Level: Oriented X4 (POC, knows year but thought month was August )  Cognition: Appropriate decision making, Appropriate safety awareness, Follows commands  Safety/Judgement: Awareness of environment  Hearing: Auditory  Auditory Impairment: None    Functional Mobility:  Bed Mobility:  Supine to Sit: Minimum assistance; Moderate assistance;Assist x2  Scooting: Minimum assistance (max VCs)     Transfers:  Sit to Stand: Minimum assistance;Assist x2  Stand to Sit: Minimum assistance;Assist x2  Bed to Chair: Minimum assistance;Assist x2    Balance:   Sitting: Impaired; Without support  Sitting - Static: Fair (occasional)  Sitting - Dynamic: Poor (constant support)  Standing: Impaired; With support  Standing - Static: Poor;Constant support  Standing - Dynamic : Poor;Constant support    Activity Tolerance:   Fair and Poor    After treatment patient left in no apparent distress:   Sitting in chair, Call bell within reach and Bed / chair alarm activated    COMMUNICATION/EDUCATION:   The patients plan of care was discussed with: Occupational therapist and Registered nurse. Fall prevention education was provided and the patient/caregiver indicated understanding., Patient/family have participated as able in goal setting and plan of care. and Patient/family agree to work toward stated goals and plan of care.     Thank you for this referral.  Stefania Daniels, PT, DPT   Time Calculation: 25 mins Walking

## 2024-02-13 ENCOUNTER — INPATIENT (INPATIENT)
Facility: HOSPITAL | Age: 34
LOS: 7 days | Discharge: HOME HEALTH | End: 2024-02-21
Attending: SURGERY | Admitting: SURGERY
Payer: MEDICAID

## 2024-02-13 ENCOUNTER — RESULT REVIEW (OUTPATIENT)
Age: 34
End: 2024-02-13

## 2024-02-13 ENCOUNTER — APPOINTMENT (OUTPATIENT)
Dept: PLASTIC SURGERY | Facility: HOSPITAL | Age: 34
End: 2024-02-13

## 2024-02-13 VITALS
OXYGEN SATURATION: 100 % | TEMPERATURE: 98 F | HEIGHT: 72 IN | SYSTOLIC BLOOD PRESSURE: 122 MMHG | DIASTOLIC BLOOD PRESSURE: 81 MMHG | WEIGHT: 158.95 LBS | RESPIRATION RATE: 15 BRPM | HEART RATE: 117 BPM

## 2024-02-13 DIAGNOSIS — Z98.890 OTHER SPECIFIED POSTPROCEDURAL STATES: Chronic | ICD-10-CM

## 2024-02-13 DIAGNOSIS — L73.2 HIDRADENITIS SUPPURATIVA: ICD-10-CM

## 2024-02-13 DIAGNOSIS — L73.2 HIDRADENITIS SUPPURATIVA: Chronic | ICD-10-CM

## 2024-02-13 PROCEDURE — 88305 TISSUE EXAM BY PATHOLOGIST: CPT | Mod: 26

## 2024-02-13 PROCEDURE — 64905 NERVE PEDICLE TRANSFER: CPT | Mod: 52

## 2024-02-13 PROCEDURE — 11450 EXC SKN HDRDNT AX SMPL/NTRM: CPT

## 2024-02-13 DEVICE — CLIP APPLIER COVIDIEN SURGICLIP 11.5" MEDIUM: Type: IMPLANTABLE DEVICE | Site: RIGHT | Status: FUNCTIONAL

## 2024-02-13 RX ORDER — OXYCODONE HYDROCHLORIDE 5 MG/1
10 TABLET ORAL EVERY 6 HOURS
Refills: 0 | Status: DISCONTINUED | OUTPATIENT
Start: 2024-02-13 | End: 2024-02-20

## 2024-02-13 RX ORDER — DIPHENHYDRAMINE HCL 50 MG
25 CAPSULE ORAL EVERY 8 HOURS
Refills: 0 | Status: DISCONTINUED | OUTPATIENT
Start: 2024-02-13 | End: 2024-02-21

## 2024-02-13 RX ORDER — LANOLIN ALCOHOL/MO/W.PET/CERES
3 CREAM (GRAM) TOPICAL AT BEDTIME
Refills: 0 | Status: DISCONTINUED | OUTPATIENT
Start: 2024-02-13 | End: 2024-02-21

## 2024-02-13 RX ORDER — FENTANYL CITRATE 50 UG/ML
25 INJECTION INTRAVENOUS
Refills: 0 | Status: DISCONTINUED | OUTPATIENT
Start: 2024-02-13 | End: 2024-02-13

## 2024-02-13 RX ORDER — INFLUENZA VIRUS VACCINE 15; 15; 15; 15 UG/.5ML; UG/.5ML; UG/.5ML; UG/.5ML
0.5 SUSPENSION INTRAMUSCULAR ONCE
Refills: 0 | Status: DISCONTINUED | OUTPATIENT
Start: 2024-02-13 | End: 2024-02-21

## 2024-02-13 RX ORDER — OXYCODONE HYDROCHLORIDE 5 MG/1
5 TABLET ORAL ONCE
Refills: 0 | Status: DISCONTINUED | OUTPATIENT
Start: 2024-02-13 | End: 2024-02-13

## 2024-02-13 RX ORDER — ACETAMINOPHEN 500 MG
975 TABLET ORAL EVERY 6 HOURS
Refills: 0 | Status: DISCONTINUED | OUTPATIENT
Start: 2024-02-13 | End: 2024-02-21

## 2024-02-13 RX ORDER — POLYETHYLENE GLYCOL 3350 17 G/17G
17 POWDER, FOR SOLUTION ORAL DAILY
Refills: 0 | Status: DISCONTINUED | OUTPATIENT
Start: 2024-02-13 | End: 2024-02-21

## 2024-02-13 RX ORDER — OXYCODONE HYDROCHLORIDE 5 MG/1
5 TABLET ORAL EVERY 6 HOURS
Refills: 0 | Status: DISCONTINUED | OUTPATIENT
Start: 2024-02-13 | End: 2024-02-20

## 2024-02-13 RX ORDER — FENTANYL CITRATE 50 UG/ML
50 INJECTION INTRAVENOUS
Refills: 0 | Status: DISCONTINUED | OUTPATIENT
Start: 2024-02-13 | End: 2024-02-13

## 2024-02-13 RX ORDER — ONDANSETRON 8 MG/1
4 TABLET, FILM COATED ORAL ONCE
Refills: 0 | Status: DISCONTINUED | OUTPATIENT
Start: 2024-02-13 | End: 2024-02-13

## 2024-02-13 RX ORDER — FERROUS SULFATE 325(65) MG
325 TABLET ORAL DAILY
Refills: 0 | Status: DISCONTINUED | OUTPATIENT
Start: 2024-02-13 | End: 2024-02-21

## 2024-02-13 RX ADMIN — FENTANYL CITRATE 50 MICROGRAM(S): 50 INJECTION INTRAVENOUS at 11:22

## 2024-02-13 RX ADMIN — OXYCODONE HYDROCHLORIDE 5 MILLIGRAM(S): 5 TABLET ORAL at 11:50

## 2024-02-13 RX ADMIN — FENTANYL CITRATE 50 MICROGRAM(S): 50 INJECTION INTRAVENOUS at 11:38

## 2024-02-13 RX ADMIN — Medication 325 MILLIGRAM(S): at 16:37

## 2024-02-13 RX ADMIN — Medication 975 MILLIGRAM(S): at 21:45

## 2024-02-13 RX ADMIN — Medication 975 MILLIGRAM(S): at 16:37

## 2024-02-13 RX ADMIN — Medication 975 MILLIGRAM(S): at 17:30

## 2024-02-13 RX ADMIN — SODIUM CHLORIDE 30 MILLILITER(S): 9 INJECTION, SOLUTION INTRAVENOUS at 11:05

## 2024-02-13 RX ADMIN — OXYCODONE HYDROCHLORIDE 5 MILLIGRAM(S): 5 TABLET ORAL at 12:50

## 2024-02-13 NOTE — PHYSICAL THERAPY INITIAL EVALUATION ADULT - PASSIVE RANGE OF MOTION EXAMINATION, REHAB EVAL
Right shoulder ROM deferred per patient request/bilateral lower extremity Passive ROM was WNL/Left UE Passive ROM was WFL (within functional limits)

## 2024-02-13 NOTE — PHYSICAL THERAPY INITIAL EVALUATION ADULT - IMPAIRMENTS FOUND, PT EVAL
aerobic capacity/endurance/decreased midline orientation/ergonomics and body mechanics/fine motor/gait, locomotion, and balance/gross motor/joint integrity and mobility/muscle strength/posture/ROM

## 2024-02-13 NOTE — PHYSICAL THERAPY INITIAL EVALUATION ADULT - ACTIVE RANGE OF MOTION EXAMINATION, REHAB EVAL
left shoulder flexion ~ 90 degrees; right shoulder flexion ~10 degrees; bilateral elbow and wrist WNL/bilateral lower extremity Active ROM was WNL (within normal limits)

## 2024-02-13 NOTE — PHYSICAL THERAPY INITIAL EVALUATION ADULT - MANUAL MUSCLE TESTING RESULTS, REHAB EVAL
Bilateral LE grossly 4-/5; Left UE grossly 3-/5; Right shoulder 2/5; Right elbow and wrist grossly 3/5

## 2024-02-13 NOTE — ASU PATIENT PROFILE, ADULT - MENTAL HEALTH CONDITIONS/SYMPTOMS, PROFILE
Medicare Wellness Visit  Plan for Preventive Care    A good way for you to stay healthy is to use preventive care. Medicare covers many services that can help you stay healthy. * The goal of these services is to find any health problems as quickly as possible. Finding problems early can help make them easier to treat. Your personal plan below lists the services you may need and when they are due. Health Maintenance Summary     Topic Due On Due Status Completed On    MAMMOGRAM - BREAST CANCER SCREENING Dec 5, 2018 Due Soon Dec 5, 2016    Osteoporosis Screening  Completed Nov 14, 2014    Colorectal Cancer Screening - Colonoscopy Mar 8, 2021 Not Due Mar 8, 2016    Immunization - TDAP Pregnancy  Hidden     Medicare Wellness Visit Nov 8, 2019 Not Due Nov 8, 2018    IMMUNIZATION - DTaP/Tdap/Td Jun 9, 2021 Not Due Jun 9, 2011    Immunization-Influenza Aug 1, 2018 Overdue Nov 10, 2017    Pneumococcal Vaccine 65+ High/Highest Risk  Completed Nov 19, 2014    Immunization - Shingles Feb 5, 2013 Overdue Dec 11, 65-75 who has smoked, you are at higher risk of an AAA. Your provider can order this test, if needed. Colorectal Screening:  Â· There are many tests that are used to check for cancer of your colon and rectum. You and your provider should discuss what test is best for you and when to have it done. Options include:  Â· Screening Colonoscopy: exam of the entire colon, seen through a flexible lighted tube. Â· Flexible Sigmoidoscopy: exam of the last third (sigmoid portion) of the colon and rectum, seen through a flexible lighted tube. Â· Cologuard DNA stool test: a sample of your stool is used to screen for cancer and unseen blood in your stool. Â· Fecal Occult Blood Test: a sample of your stool is studied to find any unseen blood    Flu Shot:  Â· An immunization that helps to prevent influenza (the flu). You should get this every year. The best time to get the shot is in the fall. Pneumococcal Shot:  â¢ Vaccines are available that can help prevent pneumococcal disease, which is any type of infection caused by Streptococcus pneumoniae bacteria. Their use can prevent some cases of pneumonia, meningitis, and sepsis. There are two types of pneumococcal vaccines:   o Conjugate vaccines (PCV-13 or Prevnar 13Â®) - helps protect against the 13 types of pneumococcal bacteria that are the most common causes of serious infections in children and adults. o Polysaccharide vaccine (PPSV23 or Kitbchxwy20Â®) - helps protect against 23 types of pneumococcal bacteria for patients who are recommended to get it. These vaccines should be given at least 12 months apart. A booster is usually not needed. Hepatitis B Shot:  Â· An immunization that helps to protect people from getting Hepatitis B. Hepatitis B is a virus that spreads through contact with infected blood or body fluids. Many people with the virus do not have symptoms. The virus can lead to serious problems, such as liver disease.  Some people are at higher risk than others. Your doctor will tell you if you need this shot. Diabetes Screening:  Â· A test to measure sugar (glucose) in your blood is called a fasting blood sugar. Fasting means you cannot have food or drink for at least 8 hours before the test. This test can detect diabetes long before you may notice symptoms. Glaucoma Screening:  Â· Glaucoma screening is performed by your eye doctor. The test measures the fluid pressure inside your eyes to determine if you have glaucoma. Hepatitis C Screening:  Â· A blood test to see if you have the hepatitis C virus. Hepatitis C attacks the liver and is a major cause of chronic liver disease. Medicare will cover a single screening for all adults born between [facility], or high risk patients (people who have injected illegal drugs or people who have had blood transfusions). High risk patients who continue to inject illegal drugs can be screened for Hepatitis C every year. Smoking and Tobacco-Use Cessation Counseling:  Â· Tobacco is the single greatest cause of disease and early death in our country today. Medication and counseling together can increase a personâs chance of quitting for good. Â· Medicare covers two quitting attempts per year, with four counseling sessions per attempt (eight sessions in a 12 month period)    Preventive Screening tests for Women    Screening Mammograms and Breast Exams:  Â· An x-ray of your breasts to check for breast cancer before you or your doctor may be able to feel it. If breast cancer is found early it can usually be treated with success. Pelvic Exams and Pap Tests:  Â· An exam to check for cervical and vaginal cancer. A Pap test is a lab test in which cells are taken from your cervix and sent to the lab to look for signs of cervical cancer. If cancer of the cervix is found early, chances for a cure are good. Testing can generally end at age 72, or if a woman has a hysterectomy for a benign condition.  Your provider may recommend more frequent testing if certain abnormal results are found. Bone Mass Measurements:  Â· A painless x-ray of your bone density to see if you are at risk for a broken bone. Bone density refers to the thickness of bones or how tightly the bone tissue is packed. Preventive Screening tests for Men    Prostate Screening:  Â· PSA - Prostate Cancer blood test.  Experts do not recommend routine screening of healthy men with no signs or symptoms of prostate disease. However, men should not ignore urinary symptoms, and should discuss their family history with their doctor. *Medicare pays for many preventive services to keep you healthy. For some of these services, you might have to pay a deductible, coinsurance, and / or copayment. The amounts vary depending on the type of services you need and the kind of Medicare health plan you have. none

## 2024-02-13 NOTE — PHYSICAL THERAPY INITIAL EVALUATION ADULT - GENERAL OBSERVATIONS, REHAB EVAL
Upon entry, pt semi-supine in bed in NAD; + wound vac. /68 HR 98 bpm SpO2 100% on RA. Pt left as received with all tubes/lines intact, bed alarm on, call bell in reach and in NAD.

## 2024-02-13 NOTE — ASU PATIENT PROFILE, ADULT - FALL HARM RISK - HARM RISK INTERVENTIONS

## 2024-02-13 NOTE — PHYSICAL THERAPY INITIAL EVALUATION ADULT - PATIENT PROFILE REVIEW, REHAB EVAL
PT initial evaluation received and chart review completed. Pt agreeable to participate in PT evaluation. Pt cleared by ROYAL Rocha. ACTIVITY:/yes PT initial evaluation received and chart review completed. Pt agreeable to participate in PT evaluation. Pt cleared by ROYAL Rocha./yes

## 2024-02-13 NOTE — PHYSICAL THERAPY INITIAL EVALUATION ADULT - GAIT DEVIATIONS NOTED, PT EVAL
decreased duncan/decreased velocity of limb motion/decreased step length/decreased stride length/decreased weight-shifting ability

## 2024-02-13 NOTE — PHYSICAL THERAPY INITIAL EVALUATION ADULT - ADDITIONAL COMMENTS
Pt lives in an apartment with his mother; there is elevator access. Recently ambulating with a cane due to ankle and knee problems.

## 2024-02-13 NOTE — PATIENT PROFILE ADULT - FALL HARM RISK - HARM RISK INTERVENTIONS
Assistance with ambulation/Assistance OOB with selected safe patient handling equipment/Communicate Risk of Fall with Harm to all staff/Discuss with provider need for PT consult/Monitor gait and stability/Provide patient with walking aids - walker, cane, crutches/Reinforce activity limits and safety measures with patient and family/Sit up slowly, dangle for a short time, stand at bedside before walking/Tailored Fall Risk Interventions/Use of alarms - bed, chair and/or voice tab/Visual Cue: Yellow wristband and red socks/Bed in lowest position, wheels locked, appropriate side rails in place/Call bell, personal items and telephone in reach/Instruct patient to call for assistance before getting out of bed or chair/Non-slip footwear when patient is out of bed/[place] to call system/Physically safe environment - no spills, clutter or unnecessary equipment/Purposeful Proactive Rounding/Room/bathroom lighting operational, light cord in reach

## 2024-02-13 NOTE — PHYSICAL THERAPY INITIAL EVALUATION ADULT - PERTINENT HX OF CURRENT PROBLEM, REHAB EVAL
Pt is a 33 year old male presenting for scheduled surgery to right axilla for hidradenitis suppurativa.

## 2024-02-13 NOTE — PHYSICAL THERAPY INITIAL EVALUATION ADULT - DIAGNOSIS, PT EVAL
Impairments in transfers, balance, bilateral LE strength, bilateral UE ROM right > left, gait, and endurance

## 2024-02-14 RX ADMIN — Medication 975 MILLIGRAM(S): at 08:06

## 2024-02-14 RX ADMIN — Medication 975 MILLIGRAM(S): at 18:05

## 2024-02-14 RX ADMIN — OXYCODONE HYDROCHLORIDE 5 MILLIGRAM(S): 5 TABLET ORAL at 06:00

## 2024-02-14 RX ADMIN — Medication 975 MILLIGRAM(S): at 00:23

## 2024-02-14 RX ADMIN — Medication 975 MILLIGRAM(S): at 13:24

## 2024-02-14 RX ADMIN — Medication 975 MILLIGRAM(S): at 12:24

## 2024-02-14 RX ADMIN — Medication 325 MILLIGRAM(S): at 12:24

## 2024-02-14 RX ADMIN — Medication 975 MILLIGRAM(S): at 05:03

## 2024-02-14 RX ADMIN — Medication 975 MILLIGRAM(S): at 23:24

## 2024-02-14 RX ADMIN — OXYCODONE HYDROCHLORIDE 5 MILLIGRAM(S): 5 TABLET ORAL at 05:04

## 2024-02-14 RX ADMIN — Medication 975 MILLIGRAM(S): at 19:05

## 2024-02-14 NOTE — DIETITIAN INITIAL EVALUATION ADULT - OTHER INFO
Nutrition assessment for requested consult MST Score 2 or Greater.    33M with PMH of RBBB, hidradenitis suppurativa (previously on Humira) s/p excision of hidradenitis of buttocks with skin graft reconstruction (2/2023) s/p excision of left axillary (4/2023) with skin graft reconstruction (5/2023),  and [place] now p/w pain in his lower extremities x 1.5 weeks. Admitted due to c/f peripheral spondyloarthropathy.    Patient endorses good appetite at this time, No chewing or swallowing difficulties reported. No GI distress reported i.e. nausea, vomiting, diarrhea. No food allergies noted or reported. Patient was minimally engaged during RD encounter.  Denies recent weight loss, reported usual body weight fluctuates between 165-180 pounds.  Weight ~1 year ago 74.2kgs; current weight 72.1kg, which is below patient's reported usual body weight range; patient with non-significant weight loss x1 year (2.1kg / 2.8%).  Patient completing daily menus and did not indicate any specific food preferences at this time.

## 2024-02-14 NOTE — DIETITIAN INITIAL EVALUATION ADULT - PERTINENT MEDS FT
MEDICATIONS  (STANDING):  acetaminophen     Tablet .. 975 milliGRAM(s) Oral every 6 hours  ferrous    sulfate 325 milliGRAM(s) Oral daily  influenza   Vaccine 0.5 milliLiter(s) IntraMuscular once  lactated ringers. 1000 milliLiter(s) (30 mL/Hr) IV Continuous <Continuous>    MEDICATIONS  (PRN):  diphenhydrAMINE Injectable 25 milliGRAM(s) IV Push every 8 hours PRN Itching  melatonin 3 milliGRAM(s) Oral at bedtime PRN Insomnia  oxyCODONE    IR 10 milliGRAM(s) Oral every 6 hours PRN Severe Pain (7 - 10)  oxyCODONE    IR 5 milliGRAM(s) Oral every 6 hours PRN Moderate Pain (4 - 6)  polyethylene glycol 3350 17 Gram(s) Oral daily PRN Constipation

## 2024-02-15 RX ADMIN — POLYETHYLENE GLYCOL 3350 17 GRAM(S): 17 POWDER, FOR SOLUTION ORAL at 12:50

## 2024-02-15 RX ADMIN — Medication 975 MILLIGRAM(S): at 23:02

## 2024-02-15 RX ADMIN — Medication 975 MILLIGRAM(S): at 17:52

## 2024-02-15 RX ADMIN — Medication 975 MILLIGRAM(S): at 00:24

## 2024-02-15 RX ADMIN — Medication 975 MILLIGRAM(S): at 23:55

## 2024-02-15 RX ADMIN — Medication 975 MILLIGRAM(S): at 13:50

## 2024-02-15 RX ADMIN — Medication 325 MILLIGRAM(S): at 12:50

## 2024-02-15 RX ADMIN — Medication 975 MILLIGRAM(S): at 12:50

## 2024-02-15 RX ADMIN — Medication 975 MILLIGRAM(S): at 18:52

## 2024-02-15 RX ADMIN — Medication 975 MILLIGRAM(S): at 05:40

## 2024-02-16 DIAGNOSIS — R55 SYNCOPE AND COLLAPSE: ICD-10-CM

## 2024-02-16 DIAGNOSIS — L73.2 HIDRADENITIS SUPPURATIVA: ICD-10-CM

## 2024-02-16 PROCEDURE — 93010 ELECTROCARDIOGRAM REPORT: CPT

## 2024-02-16 PROCEDURE — 99223 1ST HOSP IP/OBS HIGH 75: CPT

## 2024-02-16 RX ADMIN — OXYCODONE HYDROCHLORIDE 5 MILLIGRAM(S): 5 TABLET ORAL at 12:58

## 2024-02-16 RX ADMIN — OXYCODONE HYDROCHLORIDE 5 MILLIGRAM(S): 5 TABLET ORAL at 11:58

## 2024-02-16 RX ADMIN — Medication 975 MILLIGRAM(S): at 23:31

## 2024-02-16 RX ADMIN — Medication 975 MILLIGRAM(S): at 05:12

## 2024-02-16 RX ADMIN — Medication 975 MILLIGRAM(S): at 06:00

## 2024-02-16 RX ADMIN — Medication 325 MILLIGRAM(S): at 11:58

## 2024-02-16 RX ADMIN — Medication 975 MILLIGRAM(S): at 18:12

## 2024-02-16 NOTE — CONSULT NOTE ADULT - ASSESSMENT
33 yr old male with a pmh of HS who underwent right axillary excision on 2/14 now with wound vac in place was asked to be seen by the internal medicine team due to a syncopal event.

## 2024-02-16 NOTE — CONSULT NOTE ADULT - PROBLEM SELECTOR RECOMMENDATION 9
New  EKG: sinus tachycardia  Likely vasovagal given BM prior to event and him feeling faint shortly thereafter with improvement of symptoms with Trendelenburg / lying down   Orthostatics ordered  Would recommend labs in AM CBC/BMP as has not had any on this admission   Continue to monitor at this time

## 2024-02-16 NOTE — CONSULT NOTE ADULT - SUBJECTIVE AND OBJECTIVE BOX
33 yr old male with a pmh of HS who underwent right axillary excision on 2/14 now with wound vac in place was asked to be seen by the internal medicine team due to a syncopal event. Pt reports earlier this evening following a bowel movement he felt faint and dizzy and felt like he needed to sit down. He leaned over his walker for support fainted. Pt states the next thing he knew he was on the bed in Trendelenburg position and feeling significantly better. Denies head injury   Denies  headache ,chest pain, palpitations, SOB, abdominal pain, joint pain, diarrhea/constipation, urinary symptoms.   Vitals: 98.9, , /75, RR 18 satting 100% RA    PAST MEDICAL/SURGICAL HISTORY  PAST MEDICAL & SURGICAL HISTORY:  Hidradenitis suppurativa  RBBB  H/O sepsis  S/P excisional debridement  Left axillary hidradenitis  Family hx: No known pertinent  family history related to current admission   Social hx: consumes marijuana daily for appetite stimulation, denies tobacco/alcohol use     REVIEW OF SYSTEMS:    CONSTITUTIONAL: No weakness, fevers or chills + syncope   EYES/ENT: No visual changes;  No dysphagia; No sore throat; No rhinorrhea; No sinus pain/pressure  NECK: No pain or stiffness  RESPIRATORY: No cough, wheezing, hemoptysis; No shortness of breath  CARDIOVASCULAR: No chest pain or palpitations; No lower extremity edema  GASTROINTESTINAL: No abdominal or epigastric pain. No nausea, vomiting, or hematemesis; No diarrhea or constipation. No melena or hematochezia.  GENITOURINARY: No dysuria, frequency or hematuria  NEUROLOGICAL: No numbness or weakness  MSK: ambulates with a walker  SKIN: No itching, burning, rashes, or lesions   All other review of systems is negative unless indicated above.      PHYSICAL EXAM:  GENERAL: NAD, well-developed, well-nourished  HEAD:  Atraumatic, Normocephalic  EYES: EOMI, PERRL, conjunctiva and sclera clear  NECK: Supple, No JVD  CHEST/LUNG: Clear to auscultation bilaterally; No wheezes, rales or rhonchi  HEART: Regular rate and rhythm; No murmurs, rubs, or gallops, (+)S1, S2  ABDOMEN: Soft, Nontender, Nondistended; Normal Bowel sounds   EXTREMITIES:  2+ Peripheral Pulses, No clubbing, cyanosis, or edema  PSYCH: normal mood and affect  NEUROLOGY: AAOx3, moving all extremities spontaneously, no slurred speech/facial droop, no nystagmus, gait not assessed, strength 4-5/5 on bilateral upper and lower extremities slightly less on LUE compared to RUE) - pt reports this is baseline   SKIN: No rashes or lesions    RADIOLOGY & ADDITIONAL TESTS:  EKG interpreted by myself: sinus tachycardia   Consultant(s) Notes Reviewed:  [x ] YES  [ ] NO    Care Discussed with Consultants/Other Providers [ x] YES  [ ] NO

## 2024-02-17 LAB
ANION GAP SERPL CALC-SCNC: 9 MMOL/L — SIGNIFICANT CHANGE UP (ref 7–14)
BUN SERPL-MCNC: 7 MG/DL — SIGNIFICANT CHANGE UP (ref 7–23)
CALCIUM SERPL-MCNC: 8.7 MG/DL — SIGNIFICANT CHANGE UP (ref 8.4–10.5)
CHLORIDE SERPL-SCNC: 101 MMOL/L — SIGNIFICANT CHANGE UP (ref 98–107)
CO2 SERPL-SCNC: 28 MMOL/L — SIGNIFICANT CHANGE UP (ref 22–31)
CREAT SERPL-MCNC: 0.45 MG/DL — LOW (ref 0.5–1.3)
EGFR: 143 ML/MIN/1.73M2 — SIGNIFICANT CHANGE UP
GLUCOSE SERPL-MCNC: 118 MG/DL — HIGH (ref 70–99)
HCT VFR BLD CALC: 30.5 % — LOW (ref 39–50)
HGB BLD-MCNC: 9.2 G/DL — LOW (ref 13–17)
MAGNESIUM SERPL-MCNC: 2 MG/DL — SIGNIFICANT CHANGE UP (ref 1.6–2.6)
MCHC RBC-ENTMCNC: 21.9 PG — LOW (ref 27–34)
MCHC RBC-ENTMCNC: 30.2 GM/DL — LOW (ref 32–36)
MCV RBC AUTO: 72.4 FL — LOW (ref 80–100)
NRBC # BLD: 0 /100 WBCS — SIGNIFICANT CHANGE UP (ref 0–0)
NRBC # FLD: 0 K/UL — SIGNIFICANT CHANGE UP (ref 0–0)
PHOSPHATE SERPL-MCNC: 3.6 MG/DL — SIGNIFICANT CHANGE UP (ref 2.5–4.5)
PLATELET # BLD AUTO: 638 K/UL — HIGH (ref 150–400)
POTASSIUM SERPL-MCNC: 4.1 MMOL/L — SIGNIFICANT CHANGE UP (ref 3.5–5.3)
POTASSIUM SERPL-SCNC: 4.1 MMOL/L — SIGNIFICANT CHANGE UP (ref 3.5–5.3)
RBC # BLD: 4.21 M/UL — SIGNIFICANT CHANGE UP (ref 4.2–5.8)
RBC # FLD: 19.7 % — HIGH (ref 10.3–14.5)
SODIUM SERPL-SCNC: 138 MMOL/L — SIGNIFICANT CHANGE UP (ref 135–145)
WBC # BLD: 16.62 K/UL — HIGH (ref 3.8–10.5)
WBC # FLD AUTO: 16.62 K/UL — HIGH (ref 3.8–10.5)

## 2024-02-17 PROCEDURE — 99232 SBSQ HOSP IP/OBS MODERATE 35: CPT

## 2024-02-17 RX ADMIN — Medication 975 MILLIGRAM(S): at 12:00

## 2024-02-17 RX ADMIN — Medication 325 MILLIGRAM(S): at 11:18

## 2024-02-17 RX ADMIN — Medication 975 MILLIGRAM(S): at 18:01

## 2024-02-17 RX ADMIN — Medication 975 MILLIGRAM(S): at 06:00

## 2024-02-17 RX ADMIN — Medication 975 MILLIGRAM(S): at 05:34

## 2024-02-17 RX ADMIN — Medication 975 MILLIGRAM(S): at 00:20

## 2024-02-17 RX ADMIN — Medication 975 MILLIGRAM(S): at 11:17

## 2024-02-17 RX ADMIN — Medication 975 MILLIGRAM(S): at 17:25

## 2024-02-17 RX ADMIN — SODIUM CHLORIDE 75 MILLILITER(S): 9 INJECTION, SOLUTION INTRAVENOUS at 11:25

## 2024-02-17 NOTE — PROGRESS NOTE ADULT - PROBLEM SELECTOR PLAN 2
S/p right axillary excision on 2/14 now with wound vac in place   Management per primary. S/p right axillary excision on 2/14 now with wound vac in place   wound vac changed 2/16  leukocytosis, trend temp curve, if febrile then culture and consider abx   Management per primary.

## 2024-02-17 NOTE — PROVIDER CONTACT NOTE (OTHER) - SITUATION
Patient was ambulating in hallway with PT, HR went up to 140s, patient is asymptomatic
Pt admitted for right axilla wound vac. Ambulated to commode assist with walker. After having a bowel movement, pt became dizzy and fainted. Pt returned back to bed 3 assist. VS /77 P104 Ox 99%.

## 2024-02-17 NOTE — PROVIDER CONTACT NOTE (OTHER) - ASSESSMENT
Pt admitted for right axilla wound vac. Ambulated to commode assist with walker. After having a bowel movement, pt became dizzy and fainted. Pt returned back to bed 3 assist. VS /77 P104 Ox 99%.
Patient is A&O x 4, was ambulating in hallway with PT but HR went up to 140s, patient was asymptomatic, denies any shortness of breath, chest pain, light headed or dizziness. Patient is back in room, sitting in chair- HR is 110s. Orthostatics were also completed

## 2024-02-17 NOTE — PROVIDER CONTACT NOTE (OTHER) - ACTION/TREATMENT ORDERED:
Per provider, they are aware that patient is tachy into 140s when he ambulates and also aware that resting HR is in 110s, no new orders at this time. Continue to monitor
Will come see patient and follow up with team.

## 2024-02-17 NOTE — PROGRESS NOTE ADULT - PROBLEM SELECTOR PLAN 1
New  EKG: sinus tachycardia 103  likely d/t hypovolemia, orthostatic positive , less likely vasovagal   check echo  inc IVF to LR 75 ml/h  labs reviewed WBC 16, h/h 9.2/30.5, plts 638, lytes acceptable, Cr 0.45   Continue to monitor at this time. New  EKG: sinus tachycardia 101, no acute ischemic ST/T change  likely d/t hypovolemia, orthostatic positive , less likely vasovagal   check echo  inc IVF to LR 75 ml/h  labs reviewed WBC 16, h/h 9.2/30.5, plts 638, lytes acceptable, Cr 0.45   Continue to monitor at this time.

## 2024-02-17 NOTE — PROVIDER CONTACT NOTE (OTHER) - BACKGROUND
Pt admitted for right axilla wound vac. Ambulated to commode assist with walker. After having a bowel movement, pt became dizzy and fainted. Pt returned back to bed 3 assist. VS /77 P104 Ox 99%.
Patient admitted with hidradenitis suppuratiuva, s/p right axillary- 2/13 wound vac in place.

## 2024-02-18 PROCEDURE — 99232 SBSQ HOSP IP/OBS MODERATE 35: CPT

## 2024-02-18 RX ADMIN — Medication 975 MILLIGRAM(S): at 05:55

## 2024-02-18 RX ADMIN — Medication 975 MILLIGRAM(S): at 17:53

## 2024-02-18 RX ADMIN — Medication 975 MILLIGRAM(S): at 12:38

## 2024-02-18 RX ADMIN — Medication 975 MILLIGRAM(S): at 18:45

## 2024-02-18 RX ADMIN — Medication 975 MILLIGRAM(S): at 05:25

## 2024-02-18 RX ADMIN — Medication 325 MILLIGRAM(S): at 12:08

## 2024-02-18 RX ADMIN — Medication 975 MILLIGRAM(S): at 12:08

## 2024-02-18 NOTE — PROGRESS NOTE ADULT - PROBLEM SELECTOR PLAN 1
EKG: sinus tachycardia 101, no acute ischemic ST/T change  likely d/t hypovolemia, orthostatic positive , less likely vasovagal   pending TTE, if normal , no further w/up  c/w LR 75 ml/h  labs reviewed WBC 16, h/h 9.2/30.5, plts 638, lytes acceptable, Cr 0.45   Continue to monitor at this time.

## 2024-02-18 NOTE — PROGRESS NOTE ADULT - PROBLEM SELECTOR PLAN 2
S/p right axillary excision on 2/14 now with wound vac in place   wound vac changed 2/16  leukocytosis, trend temp curve, if febrile then culture and consider abx   Management per primary.

## 2024-02-19 PROCEDURE — 99232 SBSQ HOSP IP/OBS MODERATE 35: CPT

## 2024-02-19 RX ORDER — DIAZEPAM 5 MG
5 TABLET ORAL DAILY
Refills: 0 | Status: DISCONTINUED | OUTPATIENT
Start: 2024-02-19 | End: 2024-02-21

## 2024-02-19 RX ORDER — OXYCODONE HYDROCHLORIDE 5 MG/1
5 TABLET ORAL ONCE
Refills: 0 | Status: DISCONTINUED | OUTPATIENT
Start: 2024-02-19 | End: 2024-02-19

## 2024-02-19 RX ADMIN — OXYCODONE HYDROCHLORIDE 10 MILLIGRAM(S): 5 TABLET ORAL at 14:49

## 2024-02-19 RX ADMIN — Medication 975 MILLIGRAM(S): at 00:46

## 2024-02-19 RX ADMIN — Medication 975 MILLIGRAM(S): at 00:00

## 2024-02-19 RX ADMIN — OXYCODONE HYDROCHLORIDE 10 MILLIGRAM(S): 5 TABLET ORAL at 15:19

## 2024-02-19 RX ADMIN — Medication 325 MILLIGRAM(S): at 13:19

## 2024-02-19 RX ADMIN — Medication 975 MILLIGRAM(S): at 13:20

## 2024-02-19 RX ADMIN — Medication 975 MILLIGRAM(S): at 13:50

## 2024-02-19 RX ADMIN — Medication 975 MILLIGRAM(S): at 17:58

## 2024-02-19 NOTE — PROGRESS NOTE ADULT - PROBLEM SELECTOR PLAN 1
EKG: sinus tachycardia 101, no acute ischemic ST/T change  likely d/t hypovolemia, orthostatic positive , less likely vasovagal   pending TTE, if normal , no further w/up  given IVF, can stop IVF, encourage regular diet  labs reviewed has leukocytosis and thrombocytosis likely reactive   Continue to monitor at this time, not on abx EKG: sinus tachycardia 101, no acute ischemic ST/T change  likely d/t hypovolemia, orthostatic positive , less likely vasovagal   pending TTE, if normal , no further w/up  given IVF, can stop IVF, encourage regular diet  labs reviewed has leukocytosis and thrombocytosis likely reactive   Continue to monitor at this time, not on abx    Will sign off, please reconsult if abnormal Echo.

## 2024-02-19 NOTE — ADVANCED PRACTICE NURSE CONSULT - ASSESSMENT
Patient resting comfortably in bed. Vac dressing removed with saline soaks. Once dressing off pulsatile bleed noted to right inner arm- Dr Lakisha parker called to bedside- pending stitching to stop bleed, NPWT dressing held until stable. Plastic surgery resident and primary RN at bedside for management. Recommending contact layer and hydrofiber as dressing of choice once bleed controlled.

## 2024-02-20 ENCOUNTER — RESULT REVIEW (OUTPATIENT)
Age: 34
End: 2024-02-20

## 2024-02-20 PROCEDURE — 93306 TTE W/DOPPLER COMPLETE: CPT | Mod: 26

## 2024-02-20 RX ORDER — OXYCODONE HYDROCHLORIDE 5 MG/1
10 TABLET ORAL EVERY 4 HOURS
Refills: 0 | Status: DISCONTINUED | OUTPATIENT
Start: 2024-02-20 | End: 2024-02-21

## 2024-02-20 RX ORDER — OXYCODONE HYDROCHLORIDE 5 MG/1
5 TABLET ORAL EVERY 4 HOURS
Refills: 0 | Status: DISCONTINUED | OUTPATIENT
Start: 2024-02-20 | End: 2024-02-21

## 2024-02-20 RX ADMIN — Medication 975 MILLIGRAM(S): at 05:41

## 2024-02-20 RX ADMIN — Medication 975 MILLIGRAM(S): at 12:17

## 2024-02-20 RX ADMIN — Medication 975 MILLIGRAM(S): at 00:00

## 2024-02-20 RX ADMIN — Medication 975 MILLIGRAM(S): at 18:10

## 2024-02-20 RX ADMIN — POLYETHYLENE GLYCOL 3350 17 GRAM(S): 17 POWDER, FOR SOLUTION ORAL at 18:11

## 2024-02-20 RX ADMIN — Medication 325 MILLIGRAM(S): at 12:18

## 2024-02-20 RX ADMIN — Medication 975 MILLIGRAM(S): at 06:10

## 2024-02-20 NOTE — PROGRESS NOTE ADULT - PROVIDER SPECIALTY LIST ADULT
Anesthesia
Hospitalist
Hospitalist
Plastic Surgery
Hospitalist

## 2024-02-20 NOTE — PROGRESS NOTE ADULT - ASSESSMENT
33 y.o. M w/ hx of HS underwent right axillary excision on 2/14 now with wound vac in place    Plan:  - change wound vac today with vac team  - continue MWF vac changes with white sponge over mid axilla and black sponge for remaining areas  - home with vac, will coordinate with case management.   - pain prn medications  - ambulate as tolerated    Paul Maurer PGY3  Plastic Surgery  [id number]# [facility] pager  [phone] [facility] pager  Available on Teams 
33 y.o. M w/ hx of HS underwent right axillary excision on 2/14 now with wound vac in place    Plan:  - change wound vac today with vac team  - continue MWF vac changes with white sponge over mid axilla and black sponge for remaining areas  - home with vac, will coordinate with case management.   - pain prn medications  - ambulate as tolerated    Paul Maurer PGY3  Plastic Surgery  [id number]# [facility] pager  [phone] [facility] pager  Available on Teams     
33 y.o. M w/ hx of HS underwent right axillary excision on 2/14 now with wound vac in place    Plan:  - wound vac changed 2/16, next change 2/19 with wound care   - continue MWF vac changes with white sponge over mid axilla and black sponge for remaining areas  - home with vac, will coordinate with case management  - pain prn medications  - ambulate as tolerated  - appreciate medicine recs after syncopal episode 2/16: likely vasovagal      Plastic Surgery  [id number]# [facility] pager  [phone] [facility] pager
33 y.o. M w/ hx of HS underwent right axillary excision on 2/14 now with wound vac in place.    Plan:  - due to bleeding changed from vac to aquacel advantage every other day.  - will discuss home vac versus dressing changes for home.   - pain prn medications  - ambulate as tolerated      Plastic Surgery  [id number]  
33 y.o. M w/ hx of HS underwent right axillary excision on 2/14 now with wound vac in place.    Plan:  - wound vac changed 2/16, next change 2/19 with wound care   - continue MWF vac changes with white sponge over mid axilla and black sponge for remaining areas  - home with vac, will coordinate with case management  - pain prn medications  - ambulate as tolerated  - appreciate medicine recommendations       Plastic Surgery  [id number]
33 y.o. M w/ hx of HS underwent right axillary excision on 2/14 now with wound vac in place    Plan:  - wound vac changed 2/16  - continue MWF vac changes with white sponge over mid axilla and black sponge for remaining areas  - home with vac, will coordinate with case management.   - pain prn medications  - ambulate as tolerated  - appreciate medicine recs after syncopal episode 2/16: likely vasovagal  - f/u labs    Plastic Surgery  [id number]# [facility] pager  [phone] [facility] pager
33 yr old male with a pmh of HS who underwent right axillary excision on 2/14 now with wound vac in place was asked to be seen by the internal medicine team due to a syncopal event.

## 2024-02-20 NOTE — PROGRESS NOTE ADULT - SUBJECTIVE AND OBJECTIVE BOX
ANESTHESIA POSTOP CHECK    33y Male POSTOP DAY 1     Vital Signs Last 24 Hrs  T(C): 36.8 (14 Feb 2024 14:00), Max: 37.1 (13 Feb 2024 22:14)  T(F): 98.2 (14 Feb 2024 14:00), Max: 98.7 (13 Feb 2024 22:14)  HR: 96 (14 Feb 2024 14:00) (84 - 105)  BP: 109/56 (14 Feb 2024 14:00) (109/56 - 128/72)  BP(mean): --  RR: 17 (14 Feb 2024 14:00) (17 - 18)  SpO2: 100% (14 Feb 2024 14:00) (100% - 100%)    Parameters below as of 14 Feb 2024 14:00  Patient On (Oxygen Delivery Method): room air      I&O's Summary    13 Feb 2024 07:01  -  14 Feb 2024 07:00  --------------------------------------------------------  IN: 540 mL / OUT: 1075 mL / NET: -535 mL    14 Feb 2024 07:01  -  14 Feb 2024 14:50  --------------------------------------------------------  IN: 0 mL / OUT: 600 mL / NET: -600 mL        [X ] NO APPARENT ANESTHESIA COMPLICATIONS      Comments: 
Plastic Surgery    SUBJECTIVE: Pt seen and examined on rounds with team. NAEON.      VITALS  T(C): 36.8 (02-16-24 @ 05:17), Max: 37.1 (02-15-24 @ 09:34)  HR: 98 (02-16-24 @ 05:17) (87 - 99)  BP: 119/74 (02-16-24 @ 05:17) (113/62 - 132/72)  RR: 16 (02-16-24 @ 05:17) (16 - 18)  SpO2: 100% (02-16-24 @ 05:17) (100% - 100%)  CAPILLARY BLOOD GLUCOSE          Is/Os    02-15 @ 07:01  -  02-16 @ 07:00  --------------------------------------------------------  IN:  Total IN: 0 mL    OUT:    VAC (Vacuum Assisted Closure) System (mL): 25 mL    Voided (mL): 3375 mL  Total OUT: 3400 mL    Total NET: -3400 mL      02-16 @ 07:01  -  02-16 @ 08:38  --------------------------------------------------------  IN:  Total IN: 0 mL    OUT:    VAC (Vacuum Assisted Closure) System (mL): 0 mL  Total OUT: 0 mL    Total NET: 0 mL      \Exam:  General: comfortable appearing  Right axilla - wound vac holding suction  Abdomen: soft nontender      LABS                  
Plastic Surgery    SUBJECTIVE: Pt seen and examined on rounds with team. NAEON.      VITALS  T(C): 36.8 (02-16-24 @ 05:17), Max: 37.1 (02-15-24 @ 09:34)  HR: 98 (02-16-24 @ 05:17) (87 - 99)  BP: 119/74 (02-16-24 @ 05:17) (113/62 - 132/72)  RR: 16 (02-16-24 @ 05:17) (16 - 18)  SpO2: 100% (02-16-24 @ 05:17) (100% - 100%)  CAPILLARY BLOOD GLUCOSE          Is/Os    02-15 @ 07:01  -  02-16 @ 07:00  --------------------------------------------------------  IN:  Total IN: 0 mL    OUT:    VAC (Vacuum Assisted Closure) System (mL): 50 mL    Voided (mL): 3375 mL  Total OUT: 3425 mL    Total NET: -3425 mL        Exam:  General: comfortable appearing  Right axilla - wound vac holding suction  Abdomen: soft nontender    LABS                  
Plastic Surgery Progress Note (pg [facility]: [id number], [facility]: [phone])    SUBJECTIVE  Pt comfortable in bed. Pain well controlled, no complaints.    OBJECTIVE  ___________________________________________________  VITAL SIGNS / I&O's   Vital Signs Last 24 Hrs  T(C): 36.8 (17 Feb 2024 08:00), Max: 37.2 (16 Feb 2024 17:48)  T(F): 98.3 (17 Feb 2024 08:00), Max: 98.9 (16 Feb 2024 17:48)  HR: 91 (17 Feb 2024 08:00) (80 - 104)  BP: 101/71 (17 Feb 2024 08:00) (101/71 - 120/75)  BP(mean): --  RR: 19 (17 Feb 2024 08:00) (17 - 19)  SpO2: 98% (17 Feb 2024 08:00) (98% - 100%)    Parameters below as of 17 Feb 2024 08:00  Patient On (Oxygen Delivery Method): room air          16 Feb 2024 07:01  -  17 Feb 2024 07:00  --------------------------------------------------------  IN:    Oral Fluid: 150 mL  Total IN: 150 mL    OUT:    VAC (Vacuum Assisted Closure) System (mL): 25 mL    Voided (mL): 2975 mL  Total OUT: 3000 mL    Total NET: -2850 mL      17 Feb 2024 07:01  -  17 Feb 2024 10:29  --------------------------------------------------------  IN:    Lactated Ringers: 90 mL    Oral Fluid: 480 mL  Total IN: 570 mL    OUT:    IV PiggyBack: 0 mL    VAC (Vacuum Assisted Closure) System (mL): 0 mL    Voided (mL): 0 mL  Total OUT: 0 mL    Total NET: 570 mL        ___________________________________________________  PHYSICAL EXAM    General: comfortable appearing  Right axilla - wound vac holding suction  Abdomen: soft nontender    ___________________________________________________  LABS            CAPILLARY BLOOD GLUCOSE              ___________________________________________________  MICRO  Recent Cultures:    ___________________________________________________  MEDICATIONS  (STANDING):  acetaminophen     Tablet .. 975 milliGRAM(s) Oral every 6 hours  ferrous    sulfate 325 milliGRAM(s) Oral daily  influenza   Vaccine 0.5 milliLiter(s) IntraMuscular once  lactated ringers. 1000 milliLiter(s) (30 mL/Hr) IV Continuous <Continuous>    MEDICATIONS  (PRN):  diphenhydrAMINE Injectable 25 milliGRAM(s) IV Push every 8 hours PRN Itching  melatonin 3 milliGRAM(s) Oral at bedtime PRN Insomnia  oxyCODONE    IR 5 milliGRAM(s) Oral every 6 hours PRN Moderate Pain (4 - 6)  oxyCODONE    IR 10 milliGRAM(s) Oral every 6 hours PRN Severe Pain (7 - 10)  polyethylene glycol 3350 17 Gram(s) Oral daily PRN Constipation  
Plastic Surgery    SUBJECTIVE: Pt seen and examined on rounds with team. NAEON.      VITALS  T(C): 37.1 (02-20-24 @ 04:42), Max: 37.1 (02-20-24 @ 04:42)  HR: 119 (02-20-24 @ 04:42) (85 - 119)  BP: 103/66 (02-20-24 @ 04:42) (103/66 - 122/68)  RR: 18 (02-20-24 @ 04:42) (18 - 18)  SpO2: 100% (02-20-24 @ 04:42) (97% - 100%)  CAPILLARY BLOOD GLUCOSE          Is/Os    02-19 @ 07:01  -  02-20 @ 07:00  --------------------------------------------------------  IN:    Oral Fluid: 890 mL  Total IN: 890 mL    OUT:    Blood Loss (mL): 0 mL    VAC (Vacuum Assisted Closure) System (mL): 0 mL    Voided (mL): 2375 mL  Total OUT: 2375 mL    Total NET: -1485 mL      ___________________________________________________  PHYSICAL EXAM    General: comfortable appearing, NAD  Right axilla: wet to dry dressing changed to aquacel today. No active bleeding today. Healthy appearing wound edges and base.   Left axilla: moderate serosanguinous drainage from inferior aspect of previous incision   Abdomen: soft nontender  Extremities: moving all spontaneously      LABS                  
Plastic Surgery Progress Note (pg [facility]: [id number], [facility]: [phone])    SUBJECTIVE  Pt seen and examined on bedside rounds. Resting comfortably in bed. Patient states pain is controlled.     OBJECTIVE  ___________________________________________________  VITAL SIGNS / I&O's   Vital Signs Last 24 Hrs  T(C): 36.7 (19 Feb 2024 08:30), Max: 37.3 (18 Feb 2024 20:40)  T(F): 98.1 (19 Feb 2024 08:30), Max: 99.1 (18 Feb 2024 20:40)  HR: 99 (19 Feb 2024 08:30) (84 - 99)  BP: 122/68 (19 Feb 2024 08:30) (115/62 - 127/87)  BP(mean): --  RR: 18 (19 Feb 2024 08:30) (18 - 18)  SpO2: 100% (19 Feb 2024 08:30) (99% - 100%)    Parameters below as of 19 Feb 2024 08:30  Patient On (Oxygen Delivery Method): room air          18 Feb 2024 07:01  -  19 Feb 2024 07:00  --------------------------------------------------------  IN:    Lactated Ringers: 1800 mL    Oral Fluid: 1455 mL  Total IN: 3255 mL    OUT:    Blood Loss (mL): 0 mL    IV PiggyBack: 0 mL    VAC (Vacuum Assisted Closure) System (mL): 0 mL    Voided (mL): 1735 mL  Total OUT: 1735 mL    Total NET: 1520 mL      19 Feb 2024 07:01  -  19 Feb 2024 11:05  --------------------------------------------------------  IN:    Oral Fluid: 350 mL  Total IN: 350 mL    OUT:    Voided (mL): 475 mL  Total OUT: 475 mL    Total NET: -125 mL        ___________________________________________________  PHYSICAL EXAM    General: comfortable appearing, NAD  Right axilla: wound vac holding suction  Left axilla: moderate serosanguinous drainage from inferior aspect of previous incision   Abdomen: soft nontender  Extremities: moving all spontaneously  ___________________________________________________  LABS            CAPILLARY BLOOD GLUCOSE              ___________________________________________________  MICRO  Recent Cultures:    ___________________________________________________  MEDICATIONS  (STANDING):  acetaminophen     Tablet .. 975 milliGRAM(s) Oral every 6 hours  ferrous    sulfate 325 milliGRAM(s) Oral daily  influenza   Vaccine 0.5 milliLiter(s) IntraMuscular once  lactated ringers. 1000 milliLiter(s) (75 mL/Hr) IV Continuous <Continuous>    MEDICATIONS  (PRN):  diphenhydrAMINE Injectable 25 milliGRAM(s) IV Push every 8 hours PRN Itching  melatonin 3 milliGRAM(s) Oral at bedtime PRN Insomnia  oxyCODONE    IR 5 milliGRAM(s) Oral every 6 hours PRN Moderate Pain (4 - 6)  oxyCODONE    IR 10 milliGRAM(s) Oral every 6 hours PRN Severe Pain (7 - 10)  polyethylene glycol 3350 17 Gram(s) Oral daily PRN Constipation  
Plastic Surgery Progress Note (pg [facility]: [id number], [facility]: [phone])    SUBJECTIVE  Pt seen and examined on bedside rounds. Resting comfortably in bed. no acute events overnight. Patient states pain is controlled.     OBJECTIVE  ___________________________________________________  VITAL SIGNS / I&O's   Vital Signs Last 24 Hrs  T(C): 36.8 (18 Feb 2024 05:10), Max: 37.1 (17 Feb 2024 21:03)  T(F): 98.3 (18 Feb 2024 05:10), Max: 98.8 (17 Feb 2024 21:03)  HR: 96 (18 Feb 2024 05:10) (94 - 144)  BP: 113/66 (18 Feb 2024 05:10) (104/64 - 124/69)  BP(mean): --  RR: 18 (18 Feb 2024 05:10) (17 - 18)  SpO2: 100% (18 Feb 2024 05:10) (99% - 100%)    Parameters below as of 18 Feb 2024 05:10  Patient On (Oxygen Delivery Method): room air          17 Feb 2024 07:01  -  18 Feb 2024 07:00  --------------------------------------------------------  IN:    Lactated Ringers: 1395 mL    Oral Fluid: 1620 mL  Total IN: 3015 mL    OUT:    IV PiggyBack: 0 mL    VAC (Vacuum Assisted Closure) System (mL): 10 mL    Voided (mL): 3100 mL  Total OUT: 3110 mL    Total NET: -95 mL        ___________________________________________________  PHYSICAL EXAM    General: comfortable appearing, NAD  Right axilla: wound vac holding suction  Abdomen: soft nontender  Extremities: moving all spontaneousy   ___________________________________________________  LABS                        9.2    16.62 )-----------( 638      ( 17 Feb 2024 10:05 )             30.5     17 Feb 2024 10:05    138    |  101    |  7      ----------------------------<  118    4.1     |  28     |  0.45     Ca    8.7        17 Feb 2024 10:05  Phos  3.6       17 Feb 2024 10:05  Mg     2.00      17 Feb 2024 10:05        CAPILLARY BLOOD GLUCOSE            Urinalysis Basic - ( 17 Feb 2024 10:05 )    Color: x / Appearance: x / SG: x / pH: x  Gluc: 118 mg/dL / Ketone: x  / Bili: x / Urobili: x   Blood: x / Protein: x / Nitrite: x   Leuk Esterase: x / RBC: x / WBC x   Sq Epi: x / Non Sq Epi: x / Bacteria: x      ___________________________________________________  MICRO  Recent Cultures:    ___________________________________________________  MEDICATIONS  (STANDING):  acetaminophen     Tablet .. 975 milliGRAM(s) Oral every 6 hours  ferrous    sulfate 325 milliGRAM(s) Oral daily  influenza   Vaccine 0.5 milliLiter(s) IntraMuscular once  lactated ringers. 1000 milliLiter(s) (75 mL/Hr) IV Continuous <Continuous>    MEDICATIONS  (PRN):  diphenhydrAMINE Injectable 25 milliGRAM(s) IV Push every 8 hours PRN Itching  melatonin 3 milliGRAM(s) Oral at bedtime PRN Insomnia  oxyCODONE    IR 10 milliGRAM(s) Oral every 6 hours PRN Severe Pain (7 - 10)  oxyCODONE    IR 5 milliGRAM(s) Oral every 6 hours PRN Moderate Pain (4 - 6)  polyethylene glycol 3350 17 Gram(s) Oral daily PRN Constipation  
Dr. Pooja Inman  Pager [phone]    PROGRESS NOTE:     Patient is a 33y old  Male who presents with a chief complaint of Hidradenitis suppurativa     (14 Feb 2024 14:08)      SUBJECTIVE / OVERNIGHT EVENTS: pt reports near syncope yesterday very dizzy and lightheaded,  no chest pain/sob  ADDITIONAL REVIEW OF SYSTEMS: afebrile     MEDICATIONS  (STANDING):  acetaminophen     Tablet .. 975 milliGRAM(s) Oral every 6 hours  ferrous    sulfate 325 milliGRAM(s) Oral daily  influenza   Vaccine 0.5 milliLiter(s) IntraMuscular once  lactated ringers. 1000 milliLiter(s) (75 mL/Hr) IV Continuous <Continuous>    MEDICATIONS  (PRN):  diphenhydrAMINE Injectable 25 milliGRAM(s) IV Push every 8 hours PRN Itching  melatonin 3 milliGRAM(s) Oral at bedtime PRN Insomnia  oxyCODONE    IR 10 milliGRAM(s) Oral every 6 hours PRN Severe Pain (7 - 10)  oxyCODONE    IR 5 milliGRAM(s) Oral every 6 hours PRN Moderate Pain (4 - 6)  polyethylene glycol 3350 17 Gram(s) Oral daily PRN Constipation      CAPILLARY BLOOD GLUCOSE        I&O's Summary    16 Feb 2024 07:01  -  17 Feb 2024 07:00  --------------------------------------------------------  IN: 150 mL / OUT: 3000 mL / NET: -2850 mL    17 Feb 2024 07:01  -  17 Feb 2024 16:08  --------------------------------------------------------  IN: 1285 mL / OUT: 810 mL / NET: 475 mL        PHYSICAL EXAM:  Vital Signs Last 24 Hrs  T(C): 36.8 (17 Feb 2024 12:57), Max: 37.2 (16 Feb 2024 17:48)  T(F): 98.3 (17 Feb 2024 12:57), Max: 98.9 (16 Feb 2024 17:48)  HR: 112 (17 Feb 2024 12:57) (80 - 144)  BP: 117/78 (17 Feb 2024 12:57) (101/71 - 117/78)  BP(mean): --  RR: 18 (17 Feb 2024 12:57) (18 - 19)  SpO2: 99% (17 Feb 2024 12:57) (98% - 100%)    Parameters below as of 17 Feb 2024 12:57  Patient On (Oxygen Delivery Method): room air      CONSTITUTIONAL: nad, well developed  right axillary drain   RESPIRATORY: Normal respiratory effort; lungs are clear to auscultation bilaterally  CARDIOVASCULAR: Regular rate and rhythm, normal S1 and S2, no murmur/rub/gallop; No lower extremity edema; Peripheral pulses are 2+ bilaterally  ABDOMEN: Nontender to palpation, normoactive bowel sounds, no rebound/guarding; No hepatosplenomegaly  MUSCULOSKELETAL: no clubbing or cyanosis of digits; no joint swelling or tenderness to palpation  PSYCH: A+O to person, place, and time; affect appropriate    LABS:                        9.2    16.62 )-----------( 638      ( 17 Feb 2024 10:05 )             30.5     02-17    138  |  101  |  7   ----------------------------<  118<H>  4.1   |  28  |  0.45<L>    Ca    8.7      17 Feb 2024 10:05  Phos  3.6     02-17  Mg     2.00     02-17            Urinalysis Basic - ( 17 Feb 2024 10:05 )    Color: x / Appearance: x / SG: x / pH: x  Gluc: 118 mg/dL / Ketone: x  / Bili: x / Urobili: x   Blood: x / Protein: x / Nitrite: x   Leuk Esterase: x / RBC: x / WBC x   Sq Epi: x / Non Sq Epi: x / Bacteria: x          RADIOLOGY & ADDITIONAL TESTS:  Results Reviewed:   Imaging Personally Reviewed:  Electrocardiogram Personally Reviewed:    COORDINATION OF CARE:  Care Discussed with Consultants/Other Providers [Y/N]:  Prior or Outpatient Records Reviewed [Y/N]:  
Dr. Pooja Inman  Pager [phone]    PROGRESS NOTE:     Patient is a 33y old  Male who presents with a chief complaint of s/p surgery (17 Feb 2024 16:08)      SUBJECTIVE / OVERNIGHT EVENTS: denies chest pain or sob  ADDITIONAL REVIEW OF SYSTEMS: no dizziness, vital sign stable     MEDICATIONS  (STANDING):  acetaminophen     Tablet .. 975 milliGRAM(s) Oral every 6 hours  ferrous    sulfate 325 milliGRAM(s) Oral daily  influenza   Vaccine 0.5 milliLiter(s) IntraMuscular once  lactated ringers. 1000 milliLiter(s) (75 mL/Hr) IV Continuous <Continuous>    MEDICATIONS  (PRN):  diphenhydrAMINE Injectable 25 milliGRAM(s) IV Push every 8 hours PRN Itching  melatonin 3 milliGRAM(s) Oral at bedtime PRN Insomnia  oxyCODONE    IR 5 milliGRAM(s) Oral every 6 hours PRN Moderate Pain (4 - 6)  oxyCODONE    IR 10 milliGRAM(s) Oral every 6 hours PRN Severe Pain (7 - 10)  polyethylene glycol 3350 17 Gram(s) Oral daily PRN Constipation      CAPILLARY BLOOD GLUCOSE        I&O's Summary    17 Feb 2024 07:01  -  18 Feb 2024 07:00  --------------------------------------------------------  IN: 3015 mL / OUT: 3110 mL / NET: -95 mL    18 Feb 2024 07:01  -  18 Feb 2024 13:15  --------------------------------------------------------  IN: 450 mL / OUT: 0 mL / NET: 450 mL        PHYSICAL EXAM:  Vital Signs Last 24 Hrs  T(C): 36.7 (18 Feb 2024 08:45), Max: 37.1 (17 Feb 2024 21:03)  T(F): 98.1 (18 Feb 2024 08:45), Max: 98.8 (17 Feb 2024 21:03)  HR: 90 (18 Feb 2024 08:45) (90 - 99)  BP: 118/70 (18 Feb 2024 08:45) (104/64 - 124/69)  BP(mean): --  RR: 19 (18 Feb 2024 08:45) (17 - 19)  SpO2: 100% (18 Feb 2024 08:45) (100% - 100%)    Parameters below as of 18 Feb 2024 08:45  Patient On (Oxygen Delivery Method): room air      CONSTITUTIONAL: nad, well developed  right axillary drain to wound vac  RESPIRATORY: Normal respiratory effort; lungs are clear to auscultation bilaterally  CARDIOVASCULAR: Regular rate and rhythm, normal S1 and S2, no murmur/rub/gallop; No lower extremity edema; Peripheral pulses are 2+ bilaterally  ABDOMEN: Nontender to palpation, normoactive bowel sounds, no rebound/guarding; No hepatosplenomegaly  MUSCULOSKELETAL: no clubbing or cyanosis of digits; no joint swelling or tenderness to palpation  PSYCH: A+O to person, place, and time; affect appropriate    LABS:                        9.2    16.62 )-----------( 638      ( 17 Feb 2024 10:05 )             30.5     02-17    138  |  101  |  7   ----------------------------<  118<H>  4.1   |  28  |  0.45<L>    Ca    8.7      17 Feb 2024 10:05  Phos  3.6     02-17  Mg     2.00     02-17            Urinalysis Basic - ( 17 Feb 2024 10:05 )    Color: x / Appearance: x / SG: x / pH: x  Gluc: 118 mg/dL / Ketone: x  / Bili: x / Urobili: x   Blood: x / Protein: x / Nitrite: x   Leuk Esterase: x / RBC: x / WBC x   Sq Epi: x / Non Sq Epi: x / Bacteria: x          RADIOLOGY & ADDITIONAL TESTS:  Results Reviewed:   Imaging Personally Reviewed:  Electrocardiogram Personally Reviewed:    COORDINATION OF CARE:  Care Discussed with Consultants/Other Providers [Y/N]:  Prior or Outpatient Records Reviewed [Y/N]:  
Dr. Pooja Inman  Pager [phone]    PROGRESS NOTE:     Patient is a 33y old  Male who presents with a chief complaint of s/p surgery (18 Feb 2024 13:15)      SUBJECTIVE / OVERNIGHT EVENTS: denies chest pain or sob  ADDITIONAL REVIEW OF SYSTEMS: tolerating regular diet     MEDICATIONS  (STANDING):  acetaminophen     Tablet .. 975 milliGRAM(s) Oral every 6 hours  ferrous    sulfate 325 milliGRAM(s) Oral daily  influenza   Vaccine 0.5 milliLiter(s) IntraMuscular once  lactated ringers. 1000 milliLiter(s) (75 mL/Hr) IV Continuous <Continuous>    MEDICATIONS  (PRN):  diphenhydrAMINE Injectable 25 milliGRAM(s) IV Push every 8 hours PRN Itching  melatonin 3 milliGRAM(s) Oral at bedtime PRN Insomnia  oxyCODONE    IR 5 milliGRAM(s) Oral every 6 hours PRN Moderate Pain (4 - 6)  oxyCODONE    IR 10 milliGRAM(s) Oral every 6 hours PRN Severe Pain (7 - 10)  polyethylene glycol 3350 17 Gram(s) Oral daily PRN Constipation      CAPILLARY BLOOD GLUCOSE        I&O's Summary    18 Feb 2024 07:01  -  19 Feb 2024 07:00  --------------------------------------------------------  IN: 3255 mL / OUT: 1735 mL / NET: 1520 mL    19 Feb 2024 07:01  -  19 Feb 2024 13:21  --------------------------------------------------------  IN: 350 mL / OUT: 875 mL / NET: -525 mL        PHYSICAL EXAM:  Vital Signs Last 24 Hrs  T(C): 36.9 (19 Feb 2024 12:15), Max: 37.3 (18 Feb 2024 20:40)  T(F): 98.4 (19 Feb 2024 12:15), Max: 99.1 (18 Feb 2024 20:40)  HR: 88 (19 Feb 2024 12:15) (84 - 99)  BP: 122/63 (19 Feb 2024 12:15) (115/62 - 127/87)  BP(mean): --  RR: 18 (19 Feb 2024 12:15) (18 - 18)  SpO2: 100% (19 Feb 2024 12:15) (99% - 100%)    Parameters below as of 19 Feb 2024 12:15  Patient On (Oxygen Delivery Method): room air      CONSTITUTIONAL: nad, well developed  right axillary wound vac  RESPIRATORY: Normal respiratory effort; lungs are clear to auscultation bilaterally  CARDIOVASCULAR: Regular rate and rhythm, normal S1 and S2, no murmur/rub/gallop; No lower extremity edema; Peripheral pulses are 2+ bilaterally  ABDOMEN: Nontender to palpation, normoactive bowel sounds, no rebound/guarding; No hepatosplenomegaly  MUSCULOSKELETAL: no clubbing or cyanosis of digits; no joint swelling or tenderness to palpation  PSYCH: A+O to person, place, and time; affect appropriate    LABS:                      RADIOLOGY & ADDITIONAL TESTS:  Results Reviewed:   Imaging Personally Reviewed:  Electrocardiogram Personally Reviewed:    COORDINATION OF CARE:  Care Discussed with Consultants/Other Providers [Y/N]:  Prior or Outpatient Records Reviewed [Y/N]:

## 2024-02-21 ENCOUNTER — TRANSCRIPTION ENCOUNTER (OUTPATIENT)
Age: 34
End: 2024-02-21

## 2024-02-21 VITALS
OXYGEN SATURATION: 100 % | DIASTOLIC BLOOD PRESSURE: 82 MMHG | SYSTOLIC BLOOD PRESSURE: 115 MMHG | RESPIRATION RATE: 18 BRPM | TEMPERATURE: 98 F | HEART RATE: 108 BPM

## 2024-02-21 LAB — SURGICAL PATHOLOGY STUDY: SIGNIFICANT CHANGE UP

## 2024-02-21 RX ADMIN — Medication 975 MILLIGRAM(S): at 13:00

## 2024-02-21 RX ADMIN — Medication 325 MILLIGRAM(S): at 12:21

## 2024-02-21 RX ADMIN — Medication 975 MILLIGRAM(S): at 05:57

## 2024-02-21 RX ADMIN — Medication 975 MILLIGRAM(S): at 06:30

## 2024-02-21 RX ADMIN — Medication 975 MILLIGRAM(S): at 12:21

## 2024-02-21 NOTE — DISCHARGE NOTE NURSING/CASE MANAGEMENT/SOCIAL WORK - NSDCPEFALRISK_GEN_ALL_CORE
For information on Fall & Injury Prevention, visit: [email or web address] OR  [email or web address] OR  https://www.cdc.gov/steadi/patient.html

## 2024-02-21 NOTE — DISCHARGE NOTE PROVIDER - NSDCFUADDINST_GEN_ALL_CORE_FT
You may take over the counter pain medication as needed.  You may take over the counter pain medication as needed. A visiting nurse will come to help change your aquacel and abdominal pad dressing every other day.  You may take over the counter pain medication as needed. A visiting nurse will come to help change your dressing every other day. Your dressing is aquacel extra pad against the wound, covered with abdominal pads and tape.

## 2024-02-21 NOTE — DISCHARGE NOTE NURSING/CASE MANAGEMENT/SOCIAL WORK - NSSCTYPOFSERV_GEN_ALL_CORE
Nurse will call and visit the day after discharge for wound care. Nurse will send supplies for wound home with you.

## 2024-02-21 NOTE — DISCHARGE NOTE PROVIDER - NSDCFUSCHEDAPPT_GEN_ALL_CORE_FT
[facility]  [facility]  Scheduled Appointment: 02/27/2024    Juan Parra  [facility]  [facility]  Scheduled Appointment: 02/27/2024

## 2024-02-21 NOTE — DISCHARGE NOTE PROVIDER - NSDCMRMEDTOKEN_GEN_ALL_CORE_FT
ferrous sulfate 325 mg (65 mg elemental iron) oral tablet: 1 tab(s) orally once a day  ibuprofen 600 mg oral tablet: 1 tab(s) orally every 6 hours as needed for  moderate pain  polyethylene glycol 3350 oral powder for reconstitution: 17 gram(s) orally once a day

## 2024-02-21 NOTE — DISCHARGE NOTE PROVIDER - HOSPITAL COURSE
33 y.o. M w/ hx of HS underwent right axillary excision on 2/14 with wound vac placement. Patient recovered well and ambulated without issue with physical therapy. Patient had a brief LOC following a bowel movement on 2/16. A medicine consultation was requested for further evaluation, and the episode was deemed to be consistent with vasovagal syncope. During a vac change on 2/19, patient had a small superficial arterial bleed which was sutured at bedside to achieve hemostasis. At that point, the patient was transitioned from a wound vac to aquacel dressing to be changed every other day with visiting nursing. Patient was vitally stable and pain controlled upon discharge home.

## 2024-02-21 NOTE — DISCHARGE NOTE PROVIDER - CARE PROVIDER_API CALL
Margarito Tariq  Plastic Surgery  [address], [address]  [place] [address]  Phone: [phone]  Fax: [phone]  Follow Up Time: 1 week

## 2024-02-27 ENCOUNTER — RESULT REVIEW (OUTPATIENT)
Age: 34
End: 2024-02-27

## 2024-02-27 ENCOUNTER — APPOINTMENT (OUTPATIENT)
Dept: HEMATOLOGY ONCOLOGY | Facility: CLINIC | Age: 34
End: 2024-02-27

## 2024-02-27 ENCOUNTER — APPOINTMENT (OUTPATIENT)
Dept: HEMATOLOGY ONCOLOGY | Facility: CLINIC | Age: 34
End: 2024-02-27
Payer: MEDICAID

## 2024-02-27 VITALS
DIASTOLIC BLOOD PRESSURE: 69 MMHG | HEART RATE: 110 BPM | OXYGEN SATURATION: 97 % | SYSTOLIC BLOOD PRESSURE: 106 MMHG | RESPIRATION RATE: 18 BRPM | TEMPERATURE: 99.1 F

## 2024-02-27 LAB
ANISOCYTOSIS BLD QL: SLIGHT — SIGNIFICANT CHANGE UP
BASOPHILS # BLD AUTO: 0.08 K/UL — SIGNIFICANT CHANGE UP (ref 0–0.2)
BASOPHILS NFR BLD AUTO: 0.7 % — SIGNIFICANT CHANGE UP (ref 0–2)
EOSINOPHIL # BLD AUTO: 0.28 K/UL — SIGNIFICANT CHANGE UP (ref 0–0.5)
EOSINOPHIL NFR BLD AUTO: 2.4 % — SIGNIFICANT CHANGE UP (ref 0–6)
HCT VFR BLD CALC: 31.1 % — LOW (ref 39–50)
HGB BLD-MCNC: 9.3 G/DL — LOW (ref 13–17)
IMM GRANULOCYTES NFR BLD AUTO: 0.8 % — SIGNIFICANT CHANGE UP (ref 0–0.9)
LYMPHOCYTES # BLD AUTO: 2.58 K/UL — SIGNIFICANT CHANGE UP (ref 1–3.3)
LYMPHOCYTES # BLD AUTO: 21.7 % — SIGNIFICANT CHANGE UP (ref 13–44)
MCHC RBC-ENTMCNC: 21.7 PG — LOW (ref 27–34)
MCHC RBC-ENTMCNC: 29.9 G/DL — LOW (ref 32–36)
MCV RBC AUTO: 72.5 FL — LOW (ref 80–100)
MONOCYTES # BLD AUTO: 0.96 K/UL — HIGH (ref 0–0.9)
MONOCYTES NFR BLD AUTO: 8.1 % — SIGNIFICANT CHANGE UP (ref 2–14)
NEUTROPHILS # BLD AUTO: 7.92 K/UL — HIGH (ref 1.8–7.4)
NEUTROPHILS NFR BLD AUTO: 66.3 % — SIGNIFICANT CHANGE UP (ref 43–77)
NRBC # BLD: 0 /100 WBCS — SIGNIFICANT CHANGE UP (ref 0–0)
PLAT MORPH BLD: NORMAL — SIGNIFICANT CHANGE UP
PLATELET # BLD AUTO: 616 K/UL — HIGH (ref 150–400)
POIKILOCYTOSIS BLD QL AUTO: SIGNIFICANT CHANGE UP
POLYCHROMASIA BLD QL SMEAR: SLIGHT — SIGNIFICANT CHANGE UP
RBC # BLD: 4.29 M/UL — SIGNIFICANT CHANGE UP (ref 4.2–5.8)
RBC # FLD: 20.2 % — HIGH (ref 10.3–14.5)
RBC BLD AUTO: ABNORMAL
RETICS #: 100.6 K/UL — SIGNIFICANT CHANGE UP (ref 25–125)
RETICS/RBC NFR: 2.4 % — SIGNIFICANT CHANGE UP (ref 0.5–2.5)
SCHISTOCYTES BLD QL AUTO: SLIGHT — SIGNIFICANT CHANGE UP
TARGETS BLD QL SMEAR: SIGNIFICANT CHANGE UP
WBC # BLD: 11.91 K/UL — HIGH (ref 3.8–10.5)
WBC # FLD AUTO: 11.91 K/UL — HIGH (ref 3.8–10.5)

## 2024-02-27 PROCEDURE — 99214 OFFICE O/P EST MOD 30 MIN: CPT

## 2024-02-27 RX ORDER — POLYETHYLENE GLYCOL 3350 17 G/17G
17 POWDER, FOR SOLUTION ORAL
Qty: 30 | Refills: 2 | Status: ACTIVE | COMMUNITY
Start: 2024-02-27 | End: 1900-01-01

## 2024-02-27 RX ORDER — CHLORHEXIDINE GLUCONATE 4 %
325 (65 FE) LIQUID (ML) TOPICAL
Qty: 90 | Refills: 2 | Status: COMPLETED | COMMUNITY
Start: 2023-08-25 | End: 2024-02-27

## 2024-02-28 ENCOUNTER — APPOINTMENT (OUTPATIENT)
Dept: PLASTIC SURGERY | Facility: CLINIC | Age: 34
End: 2024-02-28
Payer: MEDICAID

## 2024-02-28 PROCEDURE — 99024 POSTOP FOLLOW-UP VISIT: CPT

## 2024-02-28 NOTE — ASSESSMENT
[FreeTextEntry1] : Plan for staged reconstruction of right axillary wound with Tdap flap in collaboration with Dr. Edgar Villareal.  Patient is currently scheduled for March 14th.  Wound care reviewed.  Potential revision of the left axilla was discussed with the patient, who expresses that he wishes to wait until after the right side has been addressed.

## 2024-02-28 NOTE — HISTORY OF PRESENT ILLNESS
[FreeTextEntry1] : The patient returns 2 weeks following excision of right axillary hidradenitis.  No additional issues with the right axillary site.  He is doing Aquacel extra every other day for wound care.  He has noted some drainage from the posterior area of the flap on the contralateral side.

## 2024-02-28 NOTE — PHYSICAL EXAM
[de-identified] : Right axillary open wound with appropriate granulation.  Left axillary flap site with area of granulation at inferior aspect of flap.  Some persistent granulation at interface between the skin graft and flap distally.
aching

## 2024-03-02 NOTE — PHYSICAL EXAM
[Normal] : PERRL, EOMI, no conjunctival infection, anicteric [de-identified] : Ulcerations on both sides of the face in the mandibular area, cleanly dressed

## 2024-03-02 NOTE — ASSESSMENT
[FreeTextEntry1] : This is a 33 year old man with a history of hidradenitis suppurativa. Patient on Humira and responding well, no perceivable side effects with treatment. Patient is feeling well, no major complaints. Patient has a persistent leukocytosis during hidradenitis flares, along with an iron deficiency anemia from the bleeding involved. Likely to have a component of anemia of chronic disease with this too, but that can not be corrected medically. Leukocytosis with normal diff, still appears to be a secondary leukocytosis, leukemoid reaction. Peripheral blood flow cytometry 11/24/23 was unremarkable. B12 809 pg/mL, folate 8.1 ng/mL, and SPEP/LYUDMILA did not identify a monoclonal band on 11/24/23.   WBC today 11.91 K/uL (improved), Hgb 9.3 g/dL, MCV 72.5 fl, platelets 616 K/uL.  Patient has been taking oral iron supplementation daily but developed constipation. Prescribed Miralax qd PRN.  RTC 3 months  Patient seen and examined with Dr. Parra.  Ever Tate M.D. Hematology/Oncology Fellow PGY-5 [facility]  I discussed this patient in a pre-clinic session with the fellow including review of clinical status and last labs.  I also saw the patient and discussed history, completed an exam and discussed the plan together with the fellow.  Total time spent today on this patient  36  minutes. Patient with history of iron deficiency secondary to bleeding from hidradenitis, patient having staged procedures for resection of the hidradenitis lesions.  Failed medical management.  Patient was on IV iron after some time more recently on PO supplementation.

## 2024-03-02 NOTE — HISTORY OF PRESENT ILLNESS
[de-identified] : This is a 33 year old man with a history of hidradenitis suppurativa, currently doing well with this for the past year on Humira since 2019. Hx of lymphadenopathy in the axillary and groin area. Biopsy in 2017 demonstrated reactive LN. Since then he has not required hematologic follow up until more recently, WBC increased to 14.6 with a lymphocytosis 49.7% with ALC 7,300. Patient was instructed to resume follow up with hematology. Patient states eh feels fine except for occasional flare despite the Humira 40mg weekly. FOr the most part this is much better controlled, only occasionally has a nodule that will exude pus. He denies fevers, chills, loss of appetite. His appetite is a lot better. no bleeding from anywhere. Lost a lot of weight in 2017 with the original flare of the Hydradenitis, and has since gained it back now 199 lbs.    patient's cell is [phone]. [de-identified] : Patient returns for evaluation of a leukocytosis and anemia. He had surgery of the axillae for his hidradenitis on 2/21/24. Still needs to have a graft for the axillae before the lesions on his face can be surgically removed. Taking oral iron supplementation but is having constipation.

## 2024-03-02 NOTE — REVIEW OF SYSTEMS
[Negative] : Psychiatric [Constipation] : constipation [Diarrhea: Grade 0] : Diarrhea: Grade 0 [Abdominal Pain] : no abdominal pain [Vomiting] : no vomiting

## 2024-03-05 ENCOUNTER — APPOINTMENT (OUTPATIENT)
Dept: INTERNAL MEDICINE | Facility: CLINIC | Age: 34
End: 2024-03-05
Payer: MEDICAID

## 2024-03-05 VITALS
DIASTOLIC BLOOD PRESSURE: 71 MMHG | TEMPERATURE: 98.1 F | SYSTOLIC BLOOD PRESSURE: 105 MMHG | RESPIRATION RATE: 18 BRPM | WEIGHT: 168 LBS | BODY MASS INDEX: 22.75 KG/M2 | HEIGHT: 72 IN | OXYGEN SATURATION: 99 % | HEART RATE: 95 BPM

## 2024-03-05 DIAGNOSIS — D72.829 ELEVATED WHITE BLOOD CELL COUNT, UNSPECIFIED: ICD-10-CM

## 2024-03-05 DIAGNOSIS — D64.9 ANEMIA, UNSPECIFIED: ICD-10-CM

## 2024-03-05 DIAGNOSIS — Z87.39 PERSONAL HISTORY OF OTHER DISEASES OF THE MUSCULOSKELETAL SYSTEM AND CONNECTIVE TISSUE: ICD-10-CM

## 2024-03-05 DIAGNOSIS — Z28.21 IMMUNIZATION NOT CARRIED OUT BECAUSE OF PATIENT REFUSAL: ICD-10-CM

## 2024-03-05 DIAGNOSIS — D72.820 LYMPHOCYTOSIS (SYMPTOMATIC): ICD-10-CM

## 2024-03-05 LAB
FERRITIN SERPL-MCNC: 100 NG/ML
IRON SATN MFR SERPL: 5 %
IRON SERPL-MCNC: 13 UG/DL
TIBC SERPL-MCNC: 263 UG/DL
UIBC SERPL-MCNC: 250 UG/DL

## 2024-03-05 PROCEDURE — 99395 PREV VISIT EST AGE 18-39: CPT

## 2024-03-05 RX ORDER — IBUPROFEN 600 MG/1
600 TABLET ORAL TWICE DAILY
Qty: 30 | Refills: 0 | Status: ACTIVE | COMMUNITY
Start: 2024-03-05 | End: 1900-01-01

## 2024-03-05 NOTE — ASSESSMENT
[FreeTextEntry1] : no interval change experiencing increased symptoms back pain attributes to not being off Humera

## 2024-03-05 NOTE — PLAN
[FreeTextEntry1] : blood sent for routine labs to repeat labs to give PRN motrin for back pain as tylenol has not been effective

## 2024-03-05 NOTE — PHYSICAL EXAM
[No Acute Distress] : no acute distress [Normal Oropharynx] : the oropharynx was normal [No JVD] : no jugular venous distention [No Lymphadenopathy] : no lymphadenopathy [No Respiratory Distress] : no respiratory distress  [Clear to Auscultation] : lungs were clear to auscultation bilaterally [Normal Rate] : normal rate  [Normal S1, S2] : normal S1 and S2 [No Edema] : there was no peripheral edema [Soft] : abdomen soft [Non Tender] : non-tender [Normal Supraclavicular Nodes] : no supraclavicular lymphadenopathy [No CVA Tenderness] : no CVA  tenderness [No Joint Swelling] : no joint swelling [No Focal Deficits] : no focal deficits [Alert and Oriented x3] : oriented to person, place, and time

## 2024-03-05 NOTE — REVIEW OF SYSTEMS
[Fever] : no fever [Earache] : no earache [Chest Pain] : no chest pain [Shortness Of Breath] : no shortness of breath [Dysuria] : no dysuria [Suicidal] : not suicidal [Anxiety] : no anxiety

## 2024-03-05 NOTE — HISTORY OF PRESENT ILLNESS
[FreeTextEntry1] : no current complaints recently underwent revision of  R axillary suppurativa skin region [de-identified] : no new complaints  taking iron as per hematology who has been following patient regularly

## 2024-03-05 NOTE — HEALTH RISK ASSESSMENT
[Fair] : ~his/her~ current health as fair  [Good] : ~his/her~  mood as  good [Intercurrent hospitalizations] : was admitted to the hospital  [No] : No [No falls in past year] : Patient reported no falls in the past year [0] : 2) Feeling down, depressed, or hopeless: Not at all (0) [None] : None [With Family] : lives with family [Unemployed] : unemployed [High School] : high school [Single] : single [Never] : Never [de-identified] : had surgery 2 weeks ago [Change in mental status noted] : No change in mental status noted [Sexually Active] : not sexually active [Reports changes in hearing] : Reports no changes in hearing [Reports changes in vision] : Reports no changes in vision [Smoke Detector] : no smoke detector [Carbon Monoxide Detector] : no carbon monoxide detector [Guns at Home] : no guns at home [Safety elements used in home] : no safety elements used in home [de-identified] : mother

## 2024-03-06 LAB
ALBUMIN SERPL ELPH-MCNC: 3.3 G/DL
ALP BLD-CCNC: 112 U/L
ALT SERPL-CCNC: 22 U/L
ANION GAP SERPL CALC-SCNC: 12 MMOL/L
AST SERPL-CCNC: 17 U/L
BILIRUB SERPL-MCNC: 0.3 MG/DL
BUN SERPL-MCNC: 12 MG/DL
CALCIUM SERPL-MCNC: 9.1 MG/DL
CHLORIDE SERPL-SCNC: 98 MMOL/L
CHOLEST SERPL-MCNC: 146 MG/DL
CO2 SERPL-SCNC: 24 MMOL/L
CREAT SERPL-MCNC: 0.61 MG/DL
EGFR: 130 ML/MIN/1.73M2
ESTIMATED AVERAGE GLUCOSE: 117 MG/DL
GLUCOSE SERPL-MCNC: 89 MG/DL
HBA1C MFR BLD HPLC: 5.7 %
HCT VFR BLD CALC: 33.3 %
HDLC SERPL-MCNC: 47 MG/DL
HGB BLD-MCNC: 9.6 G/DL
IRON SATN MFR SERPL: 5 %
IRON SERPL-MCNC: 13 UG/DL
LDLC SERPL CALC-MCNC: 88 MG/DL
MCHC RBC-ENTMCNC: 21.9 PG
MCHC RBC-ENTMCNC: 28.8 GM/DL
MCV RBC AUTO: 75.9 FL
NONHDLC SERPL-MCNC: 100 MG/DL
PLATELET # BLD AUTO: 547 K/UL
POTASSIUM SERPL-SCNC: 4.4 MMOL/L
PROT SERPL-MCNC: 9 G/DL
RBC # BLD: 4.39 M/UL
RBC # FLD: 20.3 %
SODIUM SERPL-SCNC: 134 MMOL/L
T4 SERPL-MCNC: 8 UG/DL
TIBC SERPL-MCNC: 245 UG/DL
TRIGL SERPL-MCNC: 57 MG/DL
TSH SERPL-ACNC: 1.25 UIU/ML
UIBC SERPL-MCNC: 232 UG/DL
WBC # FLD AUTO: 12.07 K/UL

## 2024-03-11 ENCOUNTER — OUTPATIENT (OUTPATIENT)
Dept: OUTPATIENT SERVICES | Facility: HOSPITAL | Age: 34
LOS: 1 days | End: 2024-03-11

## 2024-03-11 VITALS
RESPIRATION RATE: 16 BRPM | SYSTOLIC BLOOD PRESSURE: 106 MMHG | WEIGHT: 177.03 LBS | OXYGEN SATURATION: 99 % | HEIGHT: 74 IN | TEMPERATURE: 98 F | DIASTOLIC BLOOD PRESSURE: 70 MMHG | HEART RATE: 80 BPM

## 2024-03-11 DIAGNOSIS — Z98.890 OTHER SPECIFIED POSTPROCEDURAL STATES: Chronic | ICD-10-CM

## 2024-03-11 DIAGNOSIS — L73.2 HIDRADENITIS SUPPURATIVA: ICD-10-CM

## 2024-03-11 DIAGNOSIS — L73.2 HIDRADENITIS SUPPURATIVA: Chronic | ICD-10-CM

## 2024-03-11 LAB
BLD GP AB SCN SERPL QL: NEGATIVE — SIGNIFICANT CHANGE UP
RH IG SCN BLD-IMP: POSITIVE — SIGNIFICANT CHANGE UP
RH IG SCN BLD-IMP: POSITIVE — SIGNIFICANT CHANGE UP

## 2024-03-11 NOTE — H&P PST ADULT - PROBLEM SELECTOR PLAN 1
Patient tentatively scheduled for right axillary wound reconstruction with muscle flap and skin graft from any site 3/14/24.  Pre-op instructions provided. Pt given verbal and written instructions with teach back on chlorhexidine wash and pepcid. Pt verbalized understanding with return demonstration.     CBC - 3/5/24 : WBC 12.7, H/H 9.6/33.3,  ( follows up with hematologist)   CMP 3/5/24: Na 134, K 4.4, BUN 12, Cr 0.61   A1C : 3/5/24 5.7 %

## 2024-03-11 NOTE — H&P PST ADULT - NEGATIVE RESPIRATORY AND THORAX SYMPTOMS











MERRY MAXWELL MD               Jun 27, 2019 09:12 no wheezing/no dyspnea/no cough/no hemoptysis/no pleuritic chest pain

## 2024-03-11 NOTE — H&P PST ADULT - PRIMARY CARE PROVIDER
Dr. Willis pmd ([facility]) [phone] Dr. Willis ([facility]) [phone]

## 2024-03-11 NOTE — H&P PST ADULT - SKIN COMMENTS
left axillary hidradenitis with drsg, s/p right hidradenitis grafting skin dry and scaly in right axillary , b/l cheek and  mid back area preop dx: hidradenitis Suppurativa

## 2024-03-11 NOTE — H&P PST ADULT - NSICDXPASTSURGICALHX_GEN_ALL_CORE_FT
PAST SURGICAL HISTORY:  Left axillary hidradenitis     Right axillary hidradenitis     S/P excisional debridement

## 2024-03-11 NOTE — H&P PST ADULT - LAST ECHOCARDIOGRAM
2/20/24 - LV systolic function is low normal with an EF of 50- 55 % (done during right axillary hidradenitis removal  on 2/13/24 patient had an episode of Vasovagal syncope) 2/20/24 - LV systolic function is low normal with an EF of 50- 55 % (done during hospital stay post right axillary hidradenitis removal  on 2/13/24, patient had an episode of Vasovagal syncope) 2/20/24 - LV systolic function is low normal with an EF of 50- 55 % (done during hospital stay post right axillary hidradenitis removal  on 2/13/24, patient had an episode of Vasovagal syncope during the hospital stay)

## 2024-03-11 NOTE — H&P PST ADULT - HISTORY OF PRESENT ILLNESS
33 year old male with PMH of leukocytosis and anemia (follows up with hematologist last visit 2/27/24),  right axillary hidradenitis S/P excision of hidradenitis of the buttocks with skin graft reconstruction (2/2023) s/p excision of left axillary hidradenitis  (4/2023).  Underwent Left Axillary Wound Reconstruction with Thoracodorsal Artery  Flap, graft from left thigh 5/2023. Had right axillary hidradenitis removed on 2/13/24 presents to Presurgical testing scheduled for right axillary wound reconstruction with muscle flap and skin graft from any site

## 2024-03-13 ENCOUNTER — TRANSCRIPTION ENCOUNTER (OUTPATIENT)
Age: 34
End: 2024-03-13

## 2024-03-13 NOTE — ASU PATIENT PROFILE, ADULT - BRADEN SCORE (IF 18 OR LESS ACTIVATE SKIN INJURY RISK INCREASED GUIDELINE), MLM
[FreeTextEntry8] : Patient comes for an acute visit. \par \par She is here for evaluation of fatigue. \par \par She has felt unwell x 3 weeks, since mid-february. She initially developed nasal congestion, cough, sore throat, and fatigue. Also with intermittent tactile fevers. She did not check temps at home. She felt very warm a few days last week, also felt cold and needed to wear multiple blankets. Recent exposure to sick patient with flu few weeks ago, currently working in clinicals for nursing school. She received flu vaccine. She has felt nauseous for past few weeks, she is unsure if related to her IBS or recent medications recently prescribed by Dr. Magaña for urinary frequency. Was told she may have interstitial cystitis. Currently off all medications. Notes also have been juicing for past few weeks to try to reduction inflammation related to her IBS. Her muscles feel very weak, remains very fatigued.\par \par She also notes feeling shaky if she does not eat every few hours. She wants an A1c level checked, was previously normal. 20

## 2024-03-13 NOTE — ASU PATIENT PROFILE, ADULT - FALL HARM RISK - HARM RISK INTERVENTIONS

## 2024-03-13 NOTE — ASU PATIENT PROFILE, ADULT - NSSUBSTANCEUSE_GEN_ALL_CORE_SD
street drug/inhalant/medication abuse/caffeine Chonodrocutaneous Helical Advancement Flap Text: The defect edges were debeveled with a #15 scalpel blade.  Given the location of the defect and the proximity to free margins a chondrocutaneous helical advancement flap was deemed most appropriate.  Using a sterile surgical marker, the appropriate advancement flap was drawn incorporating the defect and placing the expected incisions within the relaxed skin tension lines where possible.    The area thus outlined was incised deep to adipose tissue with a #15 scalpel blade.  The skin margins were undermined to an appropriate distance in all directions utilizing iris scissors.

## 2024-03-14 ENCOUNTER — INPATIENT (INPATIENT)
Facility: HOSPITAL | Age: 34
LOS: 4 days | Discharge: HOME | End: 2024-03-19
Attending: SURGERY | Admitting: SURGERY
Payer: MEDICAID

## 2024-03-14 ENCOUNTER — APPOINTMENT (OUTPATIENT)
Dept: PLASTIC SURGERY | Facility: HOSPITAL | Age: 34
End: 2024-03-14
Payer: MEDICAID

## 2024-03-14 ENCOUNTER — APPOINTMENT (OUTPATIENT)
Dept: PLASTIC SURGERY | Facility: HOSPITAL | Age: 34
End: 2024-03-14

## 2024-03-14 VITALS
SYSTOLIC BLOOD PRESSURE: 108 MMHG | OXYGEN SATURATION: 99 % | HEIGHT: 74 IN | TEMPERATURE: 98 F | DIASTOLIC BLOOD PRESSURE: 72 MMHG | WEIGHT: 177.03 LBS | HEART RATE: 98 BPM | RESPIRATION RATE: 18 BRPM

## 2024-03-14 DIAGNOSIS — Z98.890 OTHER SPECIFIED POSTPROCEDURAL STATES: Chronic | ICD-10-CM

## 2024-03-14 DIAGNOSIS — L73.2 HIDRADENITIS SUPPURATIVA: Chronic | ICD-10-CM

## 2024-03-14 DIAGNOSIS — L73.2 HIDRADENITIS SUPPURATIVA: ICD-10-CM

## 2024-03-14 PROCEDURE — 15002 WOUND PREP TRK/ARM/LEG: CPT | Mod: 58

## 2024-03-14 PROCEDURE — 15003 WOUND PREP ADDL 100 CM: CPT

## 2024-03-14 PROCEDURE — 15101 SPLT AGRFT T/A/L EA ADDL 100: CPT

## 2024-03-14 PROCEDURE — 15734 MUSCLE-SKIN GRAFT TRUNK: CPT

## 2024-03-14 PROCEDURE — 15100 SPLT AGRFT T/A/L 1ST 100SQCM: CPT | Mod: 58

## 2024-03-14 PROCEDURE — 15734 MUSCLE-SKIN GRAFT TRUNK: CPT | Mod: 82,58

## 2024-03-14 DEVICE — SURGIFOAM PAD 8CM X 12.5CM X 2MM (100C): Type: IMPLANTABLE DEVICE | Status: FUNCTIONAL

## 2024-03-14 DEVICE — CLIP APPLIER COVIDIEN SURGICLIP III 9" SM: Type: IMPLANTABLE DEVICE | Status: FUNCTIONAL

## 2024-03-14 DEVICE — CLIP APPLIER COVIDIEN SURGICLIP 11.5" MEDIUM: Type: IMPLANTABLE DEVICE | Status: FUNCTIONAL

## 2024-03-14 RX ORDER — HYDROMORPHONE HYDROCHLORIDE 2 MG/ML
1 INJECTION INTRAMUSCULAR; INTRAVENOUS; SUBCUTANEOUS
Refills: 0 | Status: DISCONTINUED | OUTPATIENT
Start: 2024-03-14 | End: 2024-03-14

## 2024-03-14 RX ORDER — HYDROMORPHONE HYDROCHLORIDE 2 MG/ML
0.5 INJECTION INTRAMUSCULAR; INTRAVENOUS; SUBCUTANEOUS
Refills: 0 | Status: DISCONTINUED | OUTPATIENT
Start: 2024-03-14 | End: 2024-03-15

## 2024-03-14 RX ORDER — CEFAZOLIN SODIUM 1 G
2000 VIAL (EA) INJECTION EVERY 8 HOURS
Refills: 0 | Status: COMPLETED | OUTPATIENT
Start: 2024-03-14 | End: 2024-03-15

## 2024-03-14 RX ORDER — CALCIUM CARBONATE 500(1250)
1 TABLET ORAL EVERY 4 HOURS
Refills: 0 | Status: DISCONTINUED | OUTPATIENT
Start: 2024-03-14 | End: 2024-03-19

## 2024-03-14 RX ORDER — NALOXONE HYDROCHLORIDE 4 MG/.1ML
0.1 SPRAY NASAL
Refills: 0 | Status: DISCONTINUED | OUTPATIENT
Start: 2024-03-14 | End: 2024-03-19

## 2024-03-14 RX ORDER — INFLUENZA VIRUS VACCINE 15; 15; 15; 15 UG/.5ML; UG/.5ML; UG/.5ML; UG/.5ML
0.5 SUSPENSION INTRAMUSCULAR ONCE
Refills: 0 | Status: COMPLETED | OUTPATIENT
Start: 2024-03-14 | End: 2024-03-14

## 2024-03-14 RX ORDER — OXYCODONE HYDROCHLORIDE 5 MG/1
10 TABLET ORAL EVERY 4 HOURS
Refills: 0 | Status: DISCONTINUED | OUTPATIENT
Start: 2024-03-14 | End: 2024-03-14

## 2024-03-14 RX ORDER — ENOXAPARIN SODIUM 100 MG/ML
40 INJECTION SUBCUTANEOUS EVERY 24 HOURS
Refills: 0 | Status: DISCONTINUED | OUTPATIENT
Start: 2024-03-15 | End: 2024-03-19

## 2024-03-14 RX ORDER — ONDANSETRON 8 MG/1
4 TABLET, FILM COATED ORAL ONCE
Refills: 0 | Status: DISCONTINUED | OUTPATIENT
Start: 2024-03-14 | End: 2024-03-14

## 2024-03-14 RX ORDER — SENNA PLUS 8.6 MG/1
2 TABLET ORAL AT BEDTIME
Refills: 0 | Status: DISCONTINUED | OUTPATIENT
Start: 2024-03-14 | End: 2024-03-19

## 2024-03-14 RX ORDER — ACETAMINOPHEN 500 MG
1000 TABLET ORAL EVERY 6 HOURS
Refills: 0 | Status: COMPLETED | OUTPATIENT
Start: 2024-03-14 | End: 2024-03-15

## 2024-03-14 RX ORDER — HYDROMORPHONE HYDROCHLORIDE 2 MG/ML
0.5 INJECTION INTRAMUSCULAR; INTRAVENOUS; SUBCUTANEOUS
Refills: 0 | Status: DISCONTINUED | OUTPATIENT
Start: 2024-03-14 | End: 2024-03-14

## 2024-03-14 RX ORDER — ONDANSETRON 8 MG/1
4 TABLET, FILM COATED ORAL EVERY 6 HOURS
Refills: 0 | Status: DISCONTINUED | OUTPATIENT
Start: 2024-03-14 | End: 2024-03-19

## 2024-03-14 RX ORDER — DIPHENHYDRAMINE HCL 50 MG
50 CAPSULE ORAL EVERY 4 HOURS
Refills: 0 | Status: DISCONTINUED | OUTPATIENT
Start: 2024-03-14 | End: 2024-03-15

## 2024-03-14 RX ORDER — HYDROMORPHONE HYDROCHLORIDE 2 MG/ML
30 INJECTION INTRAMUSCULAR; INTRAVENOUS; SUBCUTANEOUS
Refills: 0 | Status: DISCONTINUED | OUTPATIENT
Start: 2024-03-14 | End: 2024-03-15

## 2024-03-14 RX ORDER — SODIUM CHLORIDE 9 MG/ML
1000 INJECTION, SOLUTION INTRAVENOUS
Refills: 0 | Status: DISCONTINUED | OUTPATIENT
Start: 2024-03-14 | End: 2024-03-16

## 2024-03-14 RX ORDER — OXYCODONE HYDROCHLORIDE 5 MG/1
5 TABLET ORAL EVERY 4 HOURS
Refills: 0 | Status: DISCONTINUED | OUTPATIENT
Start: 2024-03-14 | End: 2024-03-14

## 2024-03-14 RX ADMIN — HYDROMORPHONE HYDROCHLORIDE 30 MILLILITER(S): 2 INJECTION INTRAMUSCULAR; INTRAVENOUS; SUBCUTANEOUS at 19:31

## 2024-03-14 RX ADMIN — SODIUM CHLORIDE 30 MILLILITER(S): 9 INJECTION, SOLUTION INTRAVENOUS at 21:49

## 2024-03-14 RX ADMIN — Medication 100 MILLIGRAM(S): at 18:36

## 2024-03-14 RX ADMIN — HYDROMORPHONE HYDROCHLORIDE 30 MILLILITER(S): 2 INJECTION INTRAMUSCULAR; INTRAVENOUS; SUBCUTANEOUS at 14:16

## 2024-03-14 RX ADMIN — Medication 400 MILLIGRAM(S): at 18:15

## 2024-03-14 RX ADMIN — HYDROMORPHONE HYDROCHLORIDE 0.5 MILLIGRAM(S): 2 INJECTION INTRAMUSCULAR; INTRAVENOUS; SUBCUTANEOUS at 13:29

## 2024-03-14 RX ADMIN — SODIUM CHLORIDE 30 MILLILITER(S): 9 INJECTION, SOLUTION INTRAVENOUS at 18:36

## 2024-03-14 RX ADMIN — HYDROMORPHONE HYDROCHLORIDE 30 MILLILITER(S): 2 INJECTION INTRAMUSCULAR; INTRAVENOUS; SUBCUTANEOUS at 16:26

## 2024-03-14 RX ADMIN — HYDROMORPHONE HYDROCHLORIDE 0.5 MILLIGRAM(S): 2 INJECTION INTRAMUSCULAR; INTRAVENOUS; SUBCUTANEOUS at 13:45

## 2024-03-14 RX ADMIN — SENNA PLUS 2 TABLET(S): 8.6 TABLET ORAL at 21:35

## 2024-03-14 NOTE — PATIENT PROFILE ADULT - FALL HARM RISK - HARM RISK INTERVENTIONS
Assistance with ambulation/Assistance OOB with selected safe patient handling equipment/Communicate Risk of Fall with Harm to all staff/Discuss with provider need for PT consult/Monitor gait and stability/Provide patient with walking aids - walker, cane, crutches/Reinforce activity limits and safety measures with patient and family/Sit up slowly, dangle for a short time, stand at bedside before walking/Tailored Fall Risk Interventions/Use of alarms - bed, chair and/or voice tab/Visual Cue: Yellow wristband and red socks/Bed in lowest position, wheels locked, appropriate side rails in place/Call bell, personal items and telephone in reach/Instruct patient to call for assistance before getting out of bed or chair/Non-slip footwear when patient is out of bed/[place] to call system/Physically safe environment - no spills, clutter or unnecessary equipment/Purposeful Proactive Rounding/Room/bathroom lighting operational, light cord in reach

## 2024-03-14 NOTE — ASU PREOP CHECKLIST - VERIFY SURGICAL SITE/SIDE WITH PATIENT
"Subjective:      History was provided by the mother and patient.  Fabio Carpenter Jr. is a 10 y.o. male who presents for evaluation of abdominal   pain. The pain is described as sharp. Pain is located in the epigastric region and periumbilical region without radiation. Onset was 4 days ago. Symptoms have been unchanged since. Aggravating factors: none.  Alleviating factors: none. Associated symptoms:mild anxiety about school starting. The patient denies constipation; last bowel movement was yesterday, diarrhea, fever, headache and sore throat. Vomited one time after mom gave him mg citrate. Diet is high in salty, spicy foods    Review of Systems  Pertinent items are noted in HPI      Objective:      /68 (BP Location: Right arm, Patient Position: Sitting, BP Method: Medium (Manual))   Temp 97.5 °F (36.4 °C) (Tympanic)   Ht 5' 1.5" (1.562 m)   Wt 66.2 kg (145 lb 15.1 oz)   BMI 27.13 kg/m²   General:   alert, appears stated age, cooperative and no distress   Oropharynx:  lips, mucosa, and tongue normal; teeth and gums normal    Eyes:   conjunctivae/corneas clear. PERRL, EOM's intact. Fundi benign.    Ears:   normal TM's and external ear canals both ears   Neck:  no adenopathy, supple, symmetrical, trachea midline and thyroid not enlarged, symmetric, no tenderness/mass/nodules       Lung:  clear to auscultation bilaterally   Heart:   regular rate and rhythm, S1, S2 normal, no murmur, click, rub or gallop   Abdomen:  abnormal findings:  mild tenderness in the epigastrium and in the periumbilical area       Skin:  warm and dry, no hyperpigmentation, vitiligo, or suspicious lesions                      KUB is unremarkable  Assessment:      Mild acute gastritis related to diet and anxiety      Plan:       The diagnosis was discussed with the patient and evaluation and treatment plans outlined.  Adhere to simple, bland diet.  Initiate empiric trial of acid suppression; see orders.  Follow up as needed.      "
right axilla/done

## 2024-03-14 NOTE — BRIEF OPERATIVE NOTE - OPERATION/FINDINGS
L axilla debridement, L pedicled thoracodorsal artery  flap to axilla, L skin graft from thigh to axilla R axilla debridement, R pedicled thoracodorsal artery  flap to axilla, R skin graft from thigh to axilla

## 2024-03-14 NOTE — ASU PREOP CHECKLIST - 1.
given warm blanket right axilla has large open wound which has bandage,oly cheeks noted to have open areas also, groin,right side of back,scalp all have similar areas due to hidradenitis suppurativa

## 2024-03-15 RX ORDER — OXYCODONE HYDROCHLORIDE 5 MG/1
5 TABLET ORAL
Refills: 0 | Status: DISCONTINUED | OUTPATIENT
Start: 2024-03-15 | End: 2024-03-19

## 2024-03-15 RX ADMIN — Medication 100 MILLIGRAM(S): at 01:52

## 2024-03-15 RX ADMIN — Medication 1000 MILLIGRAM(S): at 01:25

## 2024-03-15 RX ADMIN — Medication 400 MILLIGRAM(S): at 05:31

## 2024-03-15 RX ADMIN — ENOXAPARIN SODIUM 40 MILLIGRAM(S): 100 INJECTION SUBCUTANEOUS at 05:33

## 2024-03-15 RX ADMIN — Medication 400 MILLIGRAM(S): at 12:41

## 2024-03-15 RX ADMIN — Medication 1000 MILLIGRAM(S): at 06:31

## 2024-03-15 RX ADMIN — SENNA PLUS 2 TABLET(S): 8.6 TABLET ORAL at 21:01

## 2024-03-15 RX ADMIN — SODIUM CHLORIDE 30 MILLILITER(S): 9 INJECTION, SOLUTION INTRAVENOUS at 21:00

## 2024-03-15 RX ADMIN — Medication 1000 MILLIGRAM(S): at 13:40

## 2024-03-15 RX ADMIN — Medication 400 MILLIGRAM(S): at 00:25

## 2024-03-15 RX ADMIN — OXYCODONE HYDROCHLORIDE 5 MILLIGRAM(S): 5 TABLET ORAL at 21:00

## 2024-03-15 RX ADMIN — OXYCODONE HYDROCHLORIDE 5 MILLIGRAM(S): 5 TABLET ORAL at 22:00

## 2024-03-15 RX ADMIN — HYDROMORPHONE HYDROCHLORIDE 30 MILLILITER(S): 2 INJECTION INTRAMUSCULAR; INTRAVENOUS; SUBCUTANEOUS at 07:27

## 2024-03-15 NOTE — DIETITIAN INITIAL EVALUATION ADULT - PERTINENT MEDS FT
MEDICATIONS  (STANDING):  acetaminophen   IVPB .. 1000 milliGRAM(s) IV Intermittent every 6 hours  enoxaparin Injectable 40 milliGRAM(s) SubCutaneous every 24 hours  HYDROmorphone PCA (1 mG/mL) 30 milliLiter(s) PCA Continuous PCA Continuous  influenza   Vaccine 0.5 milliLiter(s) IntraMuscular once  lactated ringers. 1000 milliLiter(s) (30 mL/Hr) IV Continuous <Continuous>  senna 2 Tablet(s) Oral at bedtime    MEDICATIONS  (PRN):  calcium carbonate    500 mG (Tums) Chewable 1 Tablet(s) Chew every 4 hours PRN Dyspepsia  diphenhydrAMINE Injectable 50 milliGRAM(s) IV Push every 4 hours PRN Pruritus  HYDROmorphone PCA (1 mG/mL) Rescue Clinician Bolus 0.5 milliGRAM(s) IV Push every 15 minutes PRN for Pain Scale GREATER THAN 6  naloxone Injectable 0.1 milliGRAM(s) IV Push every 3 minutes PRN For ANY of the following changes in patient status:  A. RR LESS THAN 10 breaths per minute, B. Oxygen saturation LESS THAN 90%, C. Sedation score of 6  ondansetron Injectable 4 milliGRAM(s) IV Push every 6 hours PRN Nausea and/or Vomiting  ondansetron Injectable 4 milliGRAM(s) IV Push every 6 hours PRN Nausea

## 2024-03-15 NOTE — DIETITIAN INITIAL EVALUATION ADULT - OTHER INFO
Nutrition trigger for reported weight loss. Pt with weight change of 180 ------> 159 pounds from Nov/Dec 2023 to Feb 2024. Noted current weight is 177 pounds. Pt  has regained weight and remains with good appetite. Post surgery appetite and PO intakes are improving to currently eating ~50-75% of meals in house.  Denies chewing, swallowing difficulties or any nausea, vomiting. NO BM, passing flatus.   Pt endorses a decrease in appetite when experiencing pain, would like to receive Magic Cup supplement once daily.  Nutrition trigger for reported weight loss. Pt with weight change of 180 ------> 159 pounds from Nov/Dec 2023 to Feb 2024. Noted current weight is 177 pounds, pt confirms weight now re gained. Post surgery appetite and PO intakes are improving to currently eating ~50-75% of meals in house.  Denies chewing, swallowing difficulties or any nausea, vomiting. NO BM, passing flatus.   Pt endorses a decrease in appetite when experiencing pain, would like to receive Magic Cup supplement once daily.

## 2024-03-15 NOTE — PROGRESS NOTE ADULT - SUBJECTIVE AND OBJECTIVE BOX
Plastic Surgery Progress Note (pg [facility]: [id number], [facility]: [phone])    SUBJECTIVE  NAEO. AVSS. Pt comfortable in bed. Pain well controlled, no complaints.    OBJECTIVE  ___________________________________________________  VITAL SIGNS / I&O's   Vital Signs Last 24 Hrs  T(C): 36.9 (15 Mar 2024 05:21), Max: 36.9 (15 Mar 2024 05:21)  T(F): 98.5 (15 Mar 2024 05:21), Max: 98.5 (15 Mar 2024 05:21)  HR: 67 (15 Mar 2024 05:21) (66 - 96)  BP: 125/74 (15 Mar 2024 05:21) (106/64 - 125/74)  BP(mean): 81 (14 Mar 2024 16:00) (70 - 81)  RR: 16 (15 Mar 2024 05:21) (11 - 19)  SpO2: 99% (15 Mar 2024 05:21) (99% - 100%)    Parameters below as of 15 Mar 2024 05:21  Patient On (Oxygen Delivery Method): room air          14 Mar 2024 07:01  -  15 Mar 2024 07:00  --------------------------------------------------------  IN:    IV PiggyBack: 400 mL    Lactated Ringers: 420 mL    Oral Fluid: 1080 mL  Total IN: 1900 mL    OUT:    Bulb (mL): 57.5 mL    VAC (Vacuum Assisted Closure) System (mL): 0 mL    Voided (mL): 1350 mL  Total OUT: 1407.5 mL    Total NET: 492.5 mL        ___________________________________________________  PHYSICAL EXAM    General: NAD  Extremities: Axilla vac in place holding suction. Doppler signal strong. No palpable fluid collection/hematoma.  Skin graft donor site dressing c/d/i  ___________________________________________________  LABS            CAPILLARY BLOOD GLUCOSE              ___________________________________________________  MICRO  Recent Cultures:    ___________________________________________________  MEDICATIONS  (STANDING):  acetaminophen   IVPB .. 1000 milliGRAM(s) IV Intermittent every 6 hours  enoxaparin Injectable 40 milliGRAM(s) SubCutaneous every 24 hours  HYDROmorphone PCA (1 mG/mL) 30 milliLiter(s) PCA Continuous PCA Continuous  influenza   Vaccine 0.5 milliLiter(s) IntraMuscular once  lactated ringers. 1000 milliLiter(s) (30 mL/Hr) IV Continuous <Continuous>  senna 2 Tablet(s) Oral at bedtime    MEDICATIONS  (PRN):  calcium carbonate    500 mG (Tums) Chewable 1 Tablet(s) Chew every 4 hours PRN Dyspepsia  diphenhydrAMINE Injectable 50 milliGRAM(s) IV Push every 4 hours PRN Pruritus  HYDROmorphone PCA (1 mG/mL) Rescue Clinician Bolus 0.5 milliGRAM(s) IV Push every 15 minutes PRN for Pain Scale GREATER THAN 6  naloxone Injectable 0.1 milliGRAM(s) IV Push every 3 minutes PRN For ANY of the following changes in patient status:  A. RR LESS THAN 10 breaths per minute, B. Oxygen saturation LESS THAN 90%, C. Sedation score of 6  ondansetron Injectable 4 milliGRAM(s) IV Push every 6 hours PRN Nausea and/or Vomiting  ondansetron Injectable 4 milliGRAM(s) IV Push every 6 hours PRN Nausea

## 2024-03-16 RX ORDER — SODIUM CHLORIDE 9 MG/ML
3 INJECTION INTRAMUSCULAR; INTRAVENOUS; SUBCUTANEOUS EVERY 8 HOURS
Refills: 0 | Status: DISCONTINUED | OUTPATIENT
Start: 2024-03-16 | End: 2024-03-19

## 2024-03-16 RX ADMIN — SODIUM CHLORIDE 3 MILLILITER(S): 9 INJECTION INTRAMUSCULAR; INTRAVENOUS; SUBCUTANEOUS at 22:12

## 2024-03-16 RX ADMIN — OXYCODONE HYDROCHLORIDE 5 MILLIGRAM(S): 5 TABLET ORAL at 20:44

## 2024-03-16 RX ADMIN — OXYCODONE HYDROCHLORIDE 5 MILLIGRAM(S): 5 TABLET ORAL at 11:30

## 2024-03-16 RX ADMIN — OXYCODONE HYDROCHLORIDE 5 MILLIGRAM(S): 5 TABLET ORAL at 19:44

## 2024-03-16 RX ADMIN — OXYCODONE HYDROCHLORIDE 5 MILLIGRAM(S): 5 TABLET ORAL at 12:25

## 2024-03-16 RX ADMIN — ENOXAPARIN SODIUM 40 MILLIGRAM(S): 100 INJECTION SUBCUTANEOUS at 05:22

## 2024-03-16 RX ADMIN — SENNA PLUS 2 TABLET(S): 8.6 TABLET ORAL at 21:44

## 2024-03-16 NOTE — PROGRESS NOTE ADULT - SUBJECTIVE AND OBJECTIVE BOX
Plastic Surgery Progress Note (pg [facility]: [id number], [facility]: [phone])    SUBJECTIVE  NAEON. AVSS. Pt comfortable in bed. Pain well controlled, no complaints.    OBJECTIVE  ___________________________________________________  VITAL SIGNS / I&O's   Vital Signs Last 24 Hrs  T(C): 36.9 (16 Mar 2024 05:30), Max: 36.9 (15 Mar 2024 22:00)  T(F): 98.5 (16 Mar 2024 05:30), Max: 98.5 (16 Mar 2024 05:30)  HR: 80 (16 Mar 2024 05:30) (71 - 80)  BP: 125/74 (16 Mar 2024 05:30) (115/62 - 125/74)  BP(mean): --  RR: 18 (16 Mar 2024 05:30) (17 - 18)  SpO2: 100% (16 Mar 2024 05:30) (99% - 100%)    Parameters below as of 16 Mar 2024 05:30  Patient On (Oxygen Delivery Method): room air          15 Mar 2024 07:01  -  16 Mar 2024 07:00  --------------------------------------------------------  IN:    Lactated Ringers: 720 mL    Oral Fluid: 1080 mL  Total IN: 1800 mL    OUT:    Bulb (mL): 16 mL    IV PiggyBack: 0 mL    VAC (Vacuum Assisted Closure) System (mL): 0 mL    Voided (mL): 2900 mL  Total OUT: 2916 mL    Total NET: -1116 mL        ___________________________________________________  PHYSICAL EXAM    General: NAD  Extremities: Axilla vac in place holding suction. Doppler signal strong. No palpable fluid collection/hematoma.  Skin graft donor site dressing intact   ___________________________________________________  LABS            CAPILLARY BLOOD GLUCOSE              ___________________________________________________  MICRO  Recent Cultures:    ___________________________________________________  MEDICATIONS  (STANDING):  enoxaparin Injectable 40 milliGRAM(s) SubCutaneous every 24 hours  influenza   Vaccine 0.5 milliLiter(s) IntraMuscular once  lactated ringers. 1000 milliLiter(s) (30 mL/Hr) IV Continuous <Continuous>  senna 2 Tablet(s) Oral at bedtime    MEDICATIONS  (PRN):  calcium carbonate    500 mG (Tums) Chewable 1 Tablet(s) Chew every 4 hours PRN Dyspepsia  naloxone Injectable 0.1 milliGRAM(s) IV Push every 3 minutes PRN For ANY of the following changes in patient status:  A. RR LESS THAN 10 breaths per minute, B. Oxygen saturation LESS THAN 90%, C. Sedation score of 6  ondansetron Injectable 4 milliGRAM(s) IV Push every 6 hours PRN Nausea and/or Vomiting  ondansetron Injectable 4 milliGRAM(s) IV Push every 6 hours PRN Nausea  oxyCODONE    IR 5 milliGRAM(s) Oral every 3 hours PRN Moderate - Severe Pain (4 - 10)

## 2024-03-17 RX ADMIN — SODIUM CHLORIDE 3 MILLILITER(S): 9 INJECTION INTRAMUSCULAR; INTRAVENOUS; SUBCUTANEOUS at 21:25

## 2024-03-17 RX ADMIN — SODIUM CHLORIDE 3 MILLILITER(S): 9 INJECTION INTRAMUSCULAR; INTRAVENOUS; SUBCUTANEOUS at 14:48

## 2024-03-17 RX ADMIN — OXYCODONE HYDROCHLORIDE 5 MILLIGRAM(S): 5 TABLET ORAL at 20:58

## 2024-03-17 RX ADMIN — SODIUM CHLORIDE 3 MILLILITER(S): 9 INJECTION INTRAMUSCULAR; INTRAVENOUS; SUBCUTANEOUS at 05:35

## 2024-03-17 RX ADMIN — OXYCODONE HYDROCHLORIDE 5 MILLIGRAM(S): 5 TABLET ORAL at 21:58

## 2024-03-17 RX ADMIN — ENOXAPARIN SODIUM 40 MILLIGRAM(S): 100 INJECTION SUBCUTANEOUS at 05:45

## 2024-03-17 RX ADMIN — SENNA PLUS 2 TABLET(S): 8.6 TABLET ORAL at 21:24

## 2024-03-17 NOTE — PROGRESS NOTE ADULT - SUBJECTIVE AND OBJECTIVE BOX
Plastic Surgery Progress Note (pg [facility]: [id number], [facility]: [phone])    SUBJECTIVE  The patient was seen and examined. No acute events overnight. Tolerating OOB ambulation. Pain controlled, afebrile w/ stable vitals.     OBJECTIVE  ___________________________________________________  VITAL SIGNS / I&O's   Vital Signs Last 24 Hrs  T(C): 36.8 (17 Mar 2024 10:18), Max: 37.3 (16 Mar 2024 17:24)  T(F): 98.2 (17 Mar 2024 10:18), Max: 99.1 (16 Mar 2024 17:24)  HR: 93 (17 Mar 2024 10:18) (78 - 93)  BP: 115/68 (17 Mar 2024 10:18) (108/68 - 121/71)  BP(mean): --  RR: 17 (17 Mar 2024 10:18) (15 - 18)  SpO2: 100% (17 Mar 2024 10:18) (99% - 100%)    Parameters below as of 17 Mar 2024 10:18  Patient On (Oxygen Delivery Method): room air          16 Mar 2024 07:01  -  17 Mar 2024 07:00  --------------------------------------------------------  IN:    Lactated Ringers: 240 mL    Oral Fluid: 960 mL  Total IN: 1200 mL    OUT:    Bulb (mL): 7.5 mL    VAC (Vacuum Assisted Closure) System (mL): 0 mL    Voided (mL): 2700 mL  Total OUT: 2707.5 mL    Total NET: -1507.5 mL      17 Mar 2024 07:01  -  17 Mar 2024 11:33  --------------------------------------------------------  IN:    Oral Fluid: 240 mL  Total IN: 240 mL    OUT:    Voided (mL): 400 mL  Total OUT: 400 mL    Total NET: -160 mL        ___________________________________________________  PHYSICAL EXAM    -- CONSTITUTIONAL: NAD, lying in bed  -- NEURO: Awake, alert  -- HEENT: NC/AT, mucous membranes moist  -- NECK: Soft, no asymmetry  -- PULM: Non-labored respirations, equal chest rise bilaterally  -- ABDOMEN: Nondistended  -- EXTREMITIES: Axilla vac in place holding suction. Doppler signal strong. No palpable fluid collection/hematoma.  Skin graft donor site dressing intact  -- PSYCH: Affect normal, A&Ox3    ___________________________________________________  LABS            CAPILLARY BLOOD GLUCOSE              ___________________________________________________  MICRO  Recent Cultures:    ___________________________________________________  MEDICATIONS  (STANDING):  enoxaparin Injectable 40 milliGRAM(s) SubCutaneous every 24 hours  influenza   Vaccine 0.5 milliLiter(s) IntraMuscular once  senna 2 Tablet(s) Oral at bedtime  sodium chloride 0.9% lock flush 3 milliLiter(s) IV Push every 8 hours    MEDICATIONS  (PRN):  calcium carbonate    500 mG (Tums) Chewable 1 Tablet(s) Chew every 4 hours PRN Dyspepsia  naloxone Injectable 0.1 milliGRAM(s) IV Push every 3 minutes PRN For ANY of the following changes in patient status:  A. RR LESS THAN 10 breaths per minute, B. Oxygen saturation LESS THAN 90%, C. Sedation score of 6  ondansetron Injectable 4 milliGRAM(s) IV Push every 6 hours PRN Nausea  ondansetron Injectable 4 milliGRAM(s) IV Push every 6 hours PRN Nausea and/or Vomiting  oxyCODONE    IR 5 milliGRAM(s) Oral every 3 hours PRN Moderate - Severe Pain (4 - 10)

## 2024-03-18 ENCOUNTER — TRANSCRIPTION ENCOUNTER (OUTPATIENT)
Age: 34
End: 2024-03-18

## 2024-03-18 RX ORDER — POLYETHYLENE GLYCOL 3350 17 G/17G
17 POWDER, FOR SOLUTION ORAL
Refills: 0 | DISCHARGE

## 2024-03-18 RX ORDER — FERROUS SULFATE 325(65) MG
1 TABLET ORAL
Refills: 0 | DISCHARGE

## 2024-03-18 RX ADMIN — OXYCODONE HYDROCHLORIDE 5 MILLIGRAM(S): 5 TABLET ORAL at 21:04

## 2024-03-18 RX ADMIN — OXYCODONE HYDROCHLORIDE 5 MILLIGRAM(S): 5 TABLET ORAL at 20:04

## 2024-03-18 RX ADMIN — SODIUM CHLORIDE 3 MILLILITER(S): 9 INJECTION INTRAMUSCULAR; INTRAVENOUS; SUBCUTANEOUS at 05:28

## 2024-03-18 RX ADMIN — ENOXAPARIN SODIUM 40 MILLIGRAM(S): 100 INJECTION SUBCUTANEOUS at 05:22

## 2024-03-18 RX ADMIN — SODIUM CHLORIDE 3 MILLILITER(S): 9 INJECTION INTRAMUSCULAR; INTRAVENOUS; SUBCUTANEOUS at 13:51

## 2024-03-18 RX ADMIN — SODIUM CHLORIDE 3 MILLILITER(S): 9 INJECTION INTRAMUSCULAR; INTRAVENOUS; SUBCUTANEOUS at 20:42

## 2024-03-18 NOTE — PROGRESS NOTE ADULT - SUBJECTIVE AND OBJECTIVE BOX
Plastic Surgery Progress Note (pg [facility]: [id number], [facility]: [phone])    SUBJECTIVE  Pt comfortable in bed. Pain well controlled, no complaints.    OBJECTIVE  ___________________________________________________  VITAL SIGNS / I&O's   Vital Signs Last 24 Hrs  T(C): 36.9 (18 Mar 2024 05:20), Max: 37.1 (17 Mar 2024 17:09)  T(F): 98.5 (18 Mar 2024 05:20), Max: 98.8 (17 Mar 2024 17:09)  HR: 91 (18 Mar 2024 05:20) (87 - 100)  BP: 127/77 (18 Mar 2024 05:20) (115/68 - 127/77)  BP(mean): --  RR: 18 (18 Mar 2024 05:20) (16 - 18)  SpO2: 99% (18 Mar 2024 05:20) (99% - 100%)    Parameters below as of 18 Mar 2024 05:20  Patient On (Oxygen Delivery Method): room air          16 Mar 2024 07:01  -  17 Mar 2024 07:00  --------------------------------------------------------  IN:    Lactated Ringers: 240 mL    Oral Fluid: 960 mL  Total IN: 1200 mL    OUT:    Bulb (mL): 7.5 mL    VAC (Vacuum Assisted Closure) System (mL): 0 mL    Voided (mL): 2700 mL  Total OUT: 2707.5 mL    Total NET: -1507.5 mL      17 Mar 2024 07:01  -  18 Mar 2024 06:20  --------------------------------------------------------  IN:    Oral Fluid: 840 mL  Total IN: 840 mL    OUT:    Bulb (mL): 2.5 mL    VAC (Vacuum Assisted Closure) System (mL): 0 mL    Voided (mL): 2300 mL  Total OUT: 2302.5 mL    Total NET: -1462.5 mL        ___________________________________________________  PHYSICAL EXAM    General: NAD    ___________________________________________________  LABS            CAPILLARY BLOOD GLUCOSE              ___________________________________________________  MICRO  Recent Cultures:    ___________________________________________________  MEDICATIONS  (STANDING):  enoxaparin Injectable 40 milliGRAM(s) SubCutaneous every 24 hours  influenza   Vaccine 0.5 milliLiter(s) IntraMuscular once  senna 2 Tablet(s) Oral at bedtime  sodium chloride 0.9% lock flush 3 milliLiter(s) IV Push every 8 hours    MEDICATIONS  (PRN):  calcium carbonate    500 mG (Tums) Chewable 1 Tablet(s) Chew every 4 hours PRN Dyspepsia  naloxone Injectable 0.1 milliGRAM(s) IV Push every 3 minutes PRN For ANY of the following changes in patient status:  A. RR LESS THAN 10 breaths per minute, B. Oxygen saturation LESS THAN 90%, C. Sedation score of 6  ondansetron Injectable 4 milliGRAM(s) IV Push every 6 hours PRN Nausea and/or Vomiting  ondansetron Injectable 4 milliGRAM(s) IV Push every 6 hours PRN Nausea  oxyCODONE    IR 5 milliGRAM(s) Oral every 3 hours PRN Moderate - Severe Pain (4 - 10)   Plastic Surgery Progress Note (pg [facility]: [id number], [facility]: [phone])    SUBJECTIVE  NAEON. AVSS. Pt comfortable in bed. Pain well controlled, no complaints.    OBJECTIVE  ___________________________________________________  VITAL SIGNS / I&O's   Vital Signs Last 24 Hrs  T(C): 36.9 (18 Mar 2024 05:20), Max: 37.1 (17 Mar 2024 17:09)  T(F): 98.5 (18 Mar 2024 05:20), Max: 98.8 (17 Mar 2024 17:09)  HR: 91 (18 Mar 2024 05:20) (87 - 100)  BP: 127/77 (18 Mar 2024 05:20) (115/68 - 127/77)  BP(mean): --  RR: 18 (18 Mar 2024 05:20) (16 - 18)  SpO2: 99% (18 Mar 2024 05:20) (99% - 100%)    Parameters below as of 18 Mar 2024 05:20  Patient On (Oxygen Delivery Method): room air          16 Mar 2024 07:01  -  17 Mar 2024 07:00  --------------------------------------------------------  IN:    Lactated Ringers: 240 mL    Oral Fluid: 960 mL  Total IN: 1200 mL    OUT:    Bulb (mL): 7.5 mL    VAC (Vacuum Assisted Closure) System (mL): 0 mL    Voided (mL): 2700 mL  Total OUT: 2707.5 mL    Total NET: -1507.5 mL      17 Mar 2024 07:01  -  18 Mar 2024 06:20  --------------------------------------------------------  IN:    Oral Fluid: 840 mL  Total IN: 840 mL    OUT:    Bulb (mL): 2.5 mL    VAC (Vacuum Assisted Closure) System (mL): 0 mL    Voided (mL): 2300 mL  Total OUT: 2302.5 mL    Total NET: -1462.5 mL        ___________________________________________________  PHYSICAL EXAM    -- GENERAL: NAD, lying in bed  -- NEURO: Awake, alert  -- NECK: Soft, no asymmetry  -- PULM: Non-labored respirations, equal chest rise bilaterally  -- EXTREMITIES: R Axilla vac in place holding suction, No palpable fluid collection/hematoma.  R thigh Skin graft donor site dressing intact  ___________________________________________________  LABS            CAPILLARY BLOOD GLUCOSE              ___________________________________________________  MICRO  Recent Cultures:    ___________________________________________________  MEDICATIONS  (STANDING):  enoxaparin Injectable 40 milliGRAM(s) SubCutaneous every 24 hours  influenza   Vaccine 0.5 milliLiter(s) IntraMuscular once  senna 2 Tablet(s) Oral at bedtime  sodium chloride 0.9% lock flush 3 milliLiter(s) IV Push every 8 hours    MEDICATIONS  (PRN):  calcium carbonate    500 mG (Tums) Chewable 1 Tablet(s) Chew every 4 hours PRN Dyspepsia  naloxone Injectable 0.1 milliGRAM(s) IV Push every 3 minutes PRN For ANY of the following changes in patient status:  A. RR LESS THAN 10 breaths per minute, B. Oxygen saturation LESS THAN 90%, C. Sedation score of 6  ondansetron Injectable 4 milliGRAM(s) IV Push every 6 hours PRN Nausea and/or Vomiting  ondansetron Injectable 4 milliGRAM(s) IV Push every 6 hours PRN Nausea  oxyCODONE    IR 5 milliGRAM(s) Oral every 3 hours PRN Moderate - Severe Pain (4 - 10)

## 2024-03-18 NOTE — DISCHARGE NOTE PROVIDER - NSDCFUSCHEDAPPT_GEN_ALL_CORE_FT
Margarito Tariq  [place] Physician [facility]  [facility] [address]  Scheduled Appointment: 03/20/2024    Juan Parra  [facility] Physician [facility]  [facility]  Scheduled Appointment: 06/04/2024

## 2024-03-18 NOTE — DISCHARGE NOTE NURSING/CASE MANAGEMENT/SOCIAL WORK - PATIENT PORTAL LINK FT
You can access the FollowMyHealth Patient Portal offered by [facility] by registering at the following website: [email or web address]. By joining [facility]’s FollowMyHealth portal, you will also be able to view your health information using other applications (apps) compatible with our system.

## 2024-03-18 NOTE — DISCHARGE NOTE PROVIDER - HOSPITAL COURSE
33[facility] The patient had a right axillary excision with TDAP and skin graft performed in the OR. Post-operatively the patient was sent to the PACU, the patient was hemodynamically stable and sent to a surgical floor. Drain x1 were placed intraoperatively, which was removed before discharge.  The patient's pain was controlled by IV pain medications transitioned to po medications without issue. The patient was advanced to regular diet and tolerated it well. The patient was hemodynamically stable, placed on home medications, and discharged with instructions to follow up with Dr. Tariq in 1 week and had no other issues.

## 2024-03-18 NOTE — DISCHARGE NOTE PROVIDER - NSDCMRMEDTOKEN_GEN_ALL_CORE_FT
ibuprofen 600 mg oral tablet: 1 tab(s) orally prn as needed for  mild pain  Tylenol 325 mg oral tablet: 2 tab(s) orally prn as needed for  mild pain

## 2024-03-18 NOTE — DISCHARGE NOTE PROVIDER - CARE PROVIDER_API CALL
Margarito Tariq  Plastic Surgery  [address], [address]  [place] [address]  Phone: [phone]  Fax: [phone]  Follow Up Time:

## 2024-03-18 NOTE — DISCHARGE NOTE PROVIDER - NSDCFUADDINST_GEN_ALL_CORE_FT
Keep arm abducted and elevated at rest. Dressing changes daily.  Keep arm abducted and elevated at rest. Xeroform/guaze dressing changes daily. Keep arm abducted and elevated at rest. Bacitracin and single layer of xeroform over skin graft. May shower in two days (3/21) and use gentle soap and water on skin graft site. Leave donor site open to air for 24 hours and then start daily aquaphor starting 3/20.

## 2024-03-19 VITALS
TEMPERATURE: 98 F | OXYGEN SATURATION: 100 % | SYSTOLIC BLOOD PRESSURE: 127 MMHG | RESPIRATION RATE: 18 BRPM | HEART RATE: 98 BPM | DIASTOLIC BLOOD PRESSURE: 71 MMHG

## 2024-03-19 RX ORDER — OXYCODONE HYDROCHLORIDE 5 MG/1
1 TABLET ORAL
Qty: 12 | Refills: 0
Start: 2024-03-19

## 2024-03-19 RX ADMIN — OXYCODONE HYDROCHLORIDE 5 MILLIGRAM(S): 5 TABLET ORAL at 05:24

## 2024-03-19 RX ADMIN — OXYCODONE HYDROCHLORIDE 5 MILLIGRAM(S): 5 TABLET ORAL at 09:42

## 2024-03-19 RX ADMIN — OXYCODONE HYDROCHLORIDE 5 MILLIGRAM(S): 5 TABLET ORAL at 06:24

## 2024-03-19 RX ADMIN — OXYCODONE HYDROCHLORIDE 5 MILLIGRAM(S): 5 TABLET ORAL at 10:24

## 2024-03-19 RX ADMIN — SODIUM CHLORIDE 3 MILLILITER(S): 9 INJECTION INTRAMUSCULAR; INTRAVENOUS; SUBCUTANEOUS at 06:46

## 2024-03-19 RX ADMIN — ENOXAPARIN SODIUM 40 MILLIGRAM(S): 100 INJECTION SUBCUTANEOUS at 05:24

## 2024-03-19 NOTE — PROGRESS NOTE ADULT - SUBJECTIVE AND OBJECTIVE BOX
Plastic Surgery Progress Note (pg [facility]: [id number], [facility]: [phone])    SUBJECTIVE  NAEO. AVSS. Pt comfortable in bed. Vac removed today.    OBJECTIVE  ___________________________________________________  VITAL SIGNS / I&O's   Vital Signs Last 24 Hrs  T(C): 36.9 (19 Mar 2024 05:24), Max: 37.1 (18 Mar 2024 21:06)  T(F): 98.5 (19 Mar 2024 05:24), Max: 98.8 (18 Mar 2024 21:06)  HR: 96 (19 Mar 2024 05:24) (89 - 100)  BP: 117/76 (19 Mar 2024 05:24) (105/66 - 123/73)  BP(mean): --  RR: 18 (19 Mar 2024 05:24) (17 - 18)  SpO2: 100% (19 Mar 2024 05:24) (99% - 100%)    Parameters below as of 19 Mar 2024 05:24  Patient On (Oxygen Delivery Method): room air          17 Mar 2024 07:01  -  18 Mar 2024 07:00  --------------------------------------------------------  IN:    Oral Fluid: 840 mL  Total IN: 840 mL    OUT:    Bulb (mL): 2.5 mL    VAC (Vacuum Assisted Closure) System (mL): 0 mL    Voided (mL): 2300 mL  Total OUT: 2302.5 mL    Total NET: -1462.5 mL      18 Mar 2024 07:01  -  19 Mar 2024 06:52  --------------------------------------------------------  IN:    Oral Fluid: 960 mL  Total IN: 960 mL    OUT:    Bulb (mL): 5 mL    IV PiggyBack: 0 mL    VAC (Vacuum Assisted Closure) System (mL): 0 mL    Voided (mL): 3250 mL  Total OUT: 3255 mL    Total NET: -2295 mL        ___________________________________________________  PHYSICAL EXAM  -- GENERAL: NAD, lying in bed  -- NEURO: Awake, alert  -- NECK: Soft, no asymmetry  -- PULM: Non-labored respirations, equal chest rise bilaterally  -- EXTREMITIES: R Axilla vac removed. STSG with adequate take. No palpable fluid collection/hematoma.  R thigh Skin graft donor site dressing intact    ___________________________________________________  LABS            CAPILLARY BLOOD GLUCOSE              ___________________________________________________  MICRO  Recent Cultures:    ___________________________________________________  MEDICATIONS  (STANDING):  enoxaparin Injectable 40 milliGRAM(s) SubCutaneous every 24 hours  influenza   Vaccine 0.5 milliLiter(s) IntraMuscular once  senna 2 Tablet(s) Oral at bedtime  sodium chloride 0.9% lock flush 3 milliLiter(s) IV Push every 8 hours    MEDICATIONS  (PRN):  calcium carbonate    500 mG (Tums) Chewable 1 Tablet(s) Chew every 4 hours PRN Dyspepsia  naloxone Injectable 0.1 milliGRAM(s) IV Push every 3 minutes PRN For ANY of the following changes in patient status:  A. RR LESS THAN 10 breaths per minute, B. Oxygen saturation LESS THAN 90%, C. Sedation score of 6  ondansetron Injectable 4 milliGRAM(s) IV Push every 6 hours PRN Nausea  ondansetron Injectable 4 milliGRAM(s) IV Push every 6 hours PRN Nausea and/or Vomiting  oxyCODONE    IR 5 milliGRAM(s) Oral every 3 hours PRN Moderate - Severe Pain (4 - 10)

## 2024-03-19 NOTE — PROGRESS NOTE ADULT - ASSESSMENT
JANNA CYR is a 33y Male s/p R axillary excision with TDAP and STSG from thigh on 3/14.    Plan:  - Dressing change with xeroform, abd, tape  - Regular diet  - Pain control  - IS  - Activity as tolerated  - Continue DVT prophylaxis  - Dispo: pending [facility]       
JANNA CYR is a 33y Male s/p R axillary excision with TDAP and STSG from thigh on 3/14    Plan:  - Continue q4h flap checks with doppler  - Continue vac  - Regular diet  - Pain control  - IS  - Activity as tolerated  - Continue DVT prophylaxis  - Dispo: continue care on floor    Plastic and Reconstructive Surgery, PGY-1  [facility]: [id number]  
JANNA CYR is a 33y Male s/p R axillary excision with TDAP and STSG from thigh on 3/14.    Plan:  - Continue q4h flap checks with doppler  - Continue vac, will take down on Tuesday 3/19  - Regular diet  - Pain control  - IS  - Activity as tolerated  - Continue DVT prophylaxis  - Dispo: continue care on floor
ASSESSMENT/PLAN:   JANNA CYR is a 33y Male s/p R axillary excision with TDAP and STSG from thigh    - Continue q4h flap checks with doppler  - Continue vac  - Regular diet  - Pain control  - IS  - Activity as tolerated  - Continue DVT prophylaxis  - Dispo: continue care on floor    Jessa Carter MD  Plastic and Reconstructive Surgery, PGY-1  [facility]: [id number]
JANNA CYR is a 33y Male s/p R axillary excision with TDAP and STSG from thigh on 3/14.    Plan:  - Continue q4h flap checks with doppler  - Continue vac, will take down on Tuesday 3/19  - Regular diet  - Pain control  - IS  - Activity as tolerated  - Continue DVT prophylaxis  - Dispo: continue care on floor    Plastic and Reconstructive Surgery, PGY-1  [facility]: [id number]

## 2024-03-27 ENCOUNTER — APPOINTMENT (OUTPATIENT)
Dept: PLASTIC SURGERY | Facility: CLINIC | Age: 34
End: 2024-03-27
Payer: MEDICAID

## 2024-03-27 PROCEDURE — 99024 POSTOP FOLLOW-UP VISIT: CPT

## 2024-03-29 NOTE — PHYSICAL EXAM
[de-identified] : Flap healthy appearing. Moderate wound separation noted anteriorly and superiorly in axilla. Partial skin graft slough adjacent to flap, 80% attached. Mild turbid fluid expressed from  under flap. Back incision intact. Staples partially removed.

## 2024-03-29 NOTE — ASSESSMENT
[FreeTextEntry1] : Wound care and activity restrictions were reviewed. Reached out to home health care nurse (Janey) to advise her on the use of Aquacel wound dressings and Aquaphor to the skin graft.  Follow-up next week.

## 2024-03-29 NOTE — REASON FOR VISIT
[Post Op: _________] : a [unfilled] post op visit [FreeTextEntry1] : Dos: 3/14/24 S/P: reconstruction of right axillary wound with Tdap flap

## 2024-03-29 NOTE — HISTORY OF PRESENT ILLNESS
[FreeTextEntry1] : The patient returns 2 weeks following reconstruction of right axillary wound with Tdap flap in collaboration with Dr. Edgar Villareal. Denies any significant issues.  States he has been using the hydrocolloid dressing but does not have it on, today.

## 2024-04-03 ENCOUNTER — APPOINTMENT (OUTPATIENT)
Dept: PLASTIC SURGERY | Facility: CLINIC | Age: 34
End: 2024-04-03
Payer: MEDICAID

## 2024-04-03 PROCEDURE — 99024 POSTOP FOLLOW-UP VISIT: CPT

## 2024-04-03 NOTE — PHYSICAL EXAM
[de-identified] : On the right side, flap healthy appearing. Moderate wound separation noted anteriorly and superiorly in axilla. Partial skin graft slough adjacent to flap, 80% attached. Mild turbid fluid expressed from under flap. Back incision intact. Staples partially removed. On the left side, mild clear fluid expressed from under flap.

## 2024-04-03 NOTE — REASON FOR VISIT
[Post Op: _________] : a [unfilled] post op visit [FreeTextEntry1] : Dos: 3/14/24 S/P: reconstruction of right axillary wound with Tdap flap.

## 2024-04-03 NOTE — HISTORY OF PRESENT ILLNESS
[FreeTextEntry1] : The patient returns 3 weeks following reconstruction of right axillary wound with Tdap flap in collaboration with Dr. Edgar Villareal. Denies any significant issues. States he has been using the hydrocolloid dressing.

## 2024-04-09 NOTE — ED PROVIDER NOTE - CROS ED MUSC ALL NEG
- - - 69-year-old presenting with left flank pain rating to the left low abdomen times several days history of kidney stones in the past endorses feeling similar pain endorses nausea but no vomiting no diarrhea dysuria hematuria fever chills or cough denies chest pain shortness of breath

## 2024-04-10 ENCOUNTER — APPOINTMENT (OUTPATIENT)
Dept: PLASTIC SURGERY | Facility: CLINIC | Age: 34
End: 2024-04-10
Payer: MEDICAID

## 2024-04-10 PROCEDURE — 99024 POSTOP FOLLOW-UP VISIT: CPT

## 2024-04-10 NOTE — ASSESSMENT
[FreeTextEntry1] : No evidence of infection.  Wound care and activity restrictions were reviewed. Follow up in 1 week.

## 2024-04-10 NOTE — PHYSICAL EXAM
[de-identified] : Back wound intact.  Tdap flap healthy appearing.  Moderate granulation within axilla anteriorly and posteriorly.  Remaining visible staples removed.

## 2024-04-10 NOTE — HISTORY OF PRESENT ILLNESS
[FreeTextEntry1] : The patient returns 4 weeks following reconstruction of right axillary wound with Tdap flap in collaboration with Dr. Edgar Villareal. Denies any significant issues. States he has been using the hydrocolloid dressing.

## 2024-04-17 ENCOUNTER — APPOINTMENT (OUTPATIENT)
Dept: PLASTIC SURGERY | Facility: CLINIC | Age: 34
End: 2024-04-17
Payer: MEDICAID

## 2024-04-17 PROCEDURE — 99024 POSTOP FOLLOW-UP VISIT: CPT

## 2024-04-17 NOTE — HISTORY OF PRESENT ILLNESS
[FreeTextEntry1] : The patient returns 5 weeks following reconstruction of right axillary wound with Tdap flap in collaboration with Dr. Edgar Villareal. Denies any significant issues. States he has been using the hydrocolloid dressing.

## 2024-04-17 NOTE — PHYSICAL EXAM
[de-identified] : Back wound healing well. Tdap flap healthy appearing. Moderate granulation within axilla anterior and posterior to flap. Silver nitrate applied to granulation tissue. Area cleaned and dressing applied.

## 2024-04-17 NOTE — ASSESSMENT
[FreeTextEntry1] : No evidence of infection. Wound care and activity restrictions were reviewed. Follow up in 1-2 weeks.

## 2024-04-17 NOTE — PROCEDURE
[FreeTextEntry1] : Open wound of left axillary region, subsequent encounter [FreeTextEntry2] : silver nitrate application to granulation tissue of wound

## 2024-05-01 ENCOUNTER — APPOINTMENT (OUTPATIENT)
Dept: PLASTIC SURGERY | Facility: CLINIC | Age: 34
End: 2024-05-01
Payer: MEDICAID

## 2024-05-01 VITALS
DIASTOLIC BLOOD PRESSURE: 66 MMHG | HEART RATE: 79 BPM | TEMPERATURE: 98.1 F | WEIGHT: 166 LBS | HEIGHT: 71 IN | SYSTOLIC BLOOD PRESSURE: 118 MMHG | BODY MASS INDEX: 23.24 KG/M2

## 2024-05-01 PROCEDURE — 99024 POSTOP FOLLOW-UP VISIT: CPT

## 2024-05-01 NOTE — ASSESSMENT
[FreeTextEntry1] : No evidence of infection. Wound care and activity restrictions were reviewed. Follow up in 2 weeks.

## 2024-05-01 NOTE — HISTORY OF PRESENT ILLNESS
[FreeTextEntry1] : The patient returns 7 weeks following reconstruction of right axillary wound with Tdap flap in collaboration with Dr. Edgar Villareal. Denies any significant issues. States he has been using the hydrocolloid dressing. Patient has been attending physical therapy.

## 2024-05-01 NOTE — REASON FOR VISIT
[Follow-Up: _____] : a [unfilled] follow-up visit [FreeTextEntry1] : Dos: 3/14/24 S/P: reconstruction of right axillary wound with Tdap flap.

## 2024-05-01 NOTE — PHYSICAL EXAM
[de-identified] : Back wound healing well. Tdap flap remains healthy appearing. Significantly improved, moderate granulation within axilla anterior and posterior to flap. Silver nitrate applied to granulation tissue. Area cleaned and dressing applied.

## 2024-05-15 ENCOUNTER — APPOINTMENT (OUTPATIENT)
Dept: PLASTIC SURGERY | Facility: CLINIC | Age: 34
End: 2024-05-15
Payer: MEDICAID

## 2024-05-15 PROCEDURE — 99024 POSTOP FOLLOW-UP VISIT: CPT

## 2024-05-15 NOTE — PHYSICAL EXAM
[de-identified] : Donor site incisions healing well. Tdap flap remains healthy appearing. Significantly improved, improving granulation within the axilla at distal flap border. Silver nitrate applied to granulation tissue. Area cleaned and dressing applied.  Left side with persistent area of granulation at inferior aspect of flap.  Some ulceration at distal edge of flap at fold of axilla

## 2024-05-15 NOTE — HISTORY OF PRESENT ILLNESS
[FreeTextEntry1] : The patient returns 9 weeks following reconstruction of right axillary wound with Tdap flap in collaboration with Dr. Edgar Villareal. Denies any significant issues. States he has been using the hydrocolloid dressing. Patient has been attending physical therapy. He was discharged from home health.

## 2024-05-15 NOTE — ASSESSMENT
[FreeTextEntry1] : No evidence of infection.  Mild stable recurrence in left axilla.  Wound care reviewed. Continue physical therapy. Follow up in 2 weeks.

## 2024-05-24 NOTE — ASU PREOP CHECKLIST - SURGICAL CONSENT
5/26/24:         Patient room/with patient:             1 dark blue zip-up long sleeve        1 orange t-shirt         2 sweatpants--1 blue and 1 light pink         3 boxers--1 dark blue, 1 light blue, 1 black        1 blue long sleeve                  Locker room:         2 pair of socks       1 pair of boxers       1 sweater       1 pink bag       1 shorts with hole in front         5/24/24: Update: Pt consented to remove strings from shorts - LW          Behavioral Health Belongings     Informed of Valuables Policy  Yes No         Item                  Qty   Clothing:     Home   Bin   Security Lock Up Room Returned   Date/Initials   1 Shorts with strings - blue  x      1 Shirt - blue  x      2 Sandals - gray     x    1 Socks - black    x     Other                               Behavioral Health Belongings List                     [id number]                          Qty       Luggage / Bags:   Home   Bin   Security Lock Up Room Returned Date/Initials    Suitcases / Luggage with straps         Gym Bags / purses with long straps         Backpack         Plastic bags (including Ziploc)         Other                     Qty   Grooming / Hygiene Items:   Home   Bin   Security Lock Up Room Returned Date/Initials    Perfume / What Cheer         Mouthwash         Items in glass bottles (nail polish, makeup, etc.)         Aerosol cans (hair spray, mousse, etc         Electric razor         Disposable razor         Shaving cream         Dental floss         Metal files, nail clippers, tweezers, etc.         Hairdryer         Curling iron         Other                                                             Behavioral Health Belongings List                            [id number]                               Qty   Electronic Devices:   Home   Bin   Security Lock Up Room Returned Date/Initials    Cell phone, iPhone, Blackberry, etc.         iPod / Media player         Chargers / cords         Headphones         Other                               Qty   Smoking Items:   Home   Bin   Security Lock Up Room Returned Date/Initials    Cigarettes, cigars         Lighters         Matches         Loose tobacco for rolling cigarettes, Chew, Pipe tobacco         Tobacco pipes         Rolling Papers         Other                                                  Behavioral Health Belongings List                          [id number]                                                   .                                .            Qty   Cards, Keys, Valuables:   Home   Bin   Security Lock Up Room Returned Date/Initials    Keys         Wallet / Purse         Cash (Encourage  to home or Loss Prevention)         Credit Cards (Encourage home or Loss Prevention)         Checkbook (Encourage home or Loss Prevention)         Jewelry - Specify         Other                              Qty   Other / Various Sharps:   Home   Bin   Security Lock Up Room Returned Date/Initials    Brace         Cane         Contacts         Dentures U / L         Hearing Aide L / R         Partials / Retainer         Prothessylvia Stephens         Walker         W/C         Medication         Other                                                                             Behavioral Health Belongings List                            [id number]                           Qty   Other Misc Items   Home   Bin   Security Lock Up Room Returned Date/Initials   1 Highlighter - yellow    x    1 Puzzle book   x      1 The Confucianism Study Bible: New Testament and Psalms (Rodney [id number])  x                                            Signature at Admission ___________________________________________Date: _________    Signature at Update ______________________________________________Date: _________    Signature at Discharge ____________________________________________Date: _________                                                                    Behavioral Health Belongings List                        [id number]            done

## 2024-05-28 ENCOUNTER — OUTPATIENT (OUTPATIENT)
Dept: OUTPATIENT SERVICES | Facility: HOSPITAL | Age: 34
LOS: 1 days | Discharge: HOME | End: 2024-05-28

## 2024-05-28 DIAGNOSIS — Z98.890 OTHER SPECIFIED POSTPROCEDURAL STATES: Chronic | ICD-10-CM

## 2024-05-28 DIAGNOSIS — L73.2 HIDRADENITIS SUPPURATIVA: Chronic | ICD-10-CM

## 2024-05-28 DIAGNOSIS — C85.10 UNSPECIFIED B-CELL LYMPHOMA, UNSPECIFIED SITE: ICD-10-CM

## 2024-05-29 ENCOUNTER — APPOINTMENT (OUTPATIENT)
Dept: PLASTIC SURGERY | Facility: CLINIC | Age: 34
End: 2024-05-29
Payer: MEDICAID

## 2024-05-29 PROCEDURE — 99213 OFFICE O/P EST LOW 20 MIN: CPT | Mod: 24

## 2024-05-29 NOTE — ASSESSMENT
[FreeTextEntry1] : No evidence of infection. Mild stable recurrence in left axilla. Wound care reviewed. Continue physical therapy. Follow up in 4 weeks.  Pt interested in proceeding with excision of bilateral groin HS followed by delayed skin grafting. Will submit for insurance auth. Due to the extensive area of HS and high likelihood of postoperative bleeding, the patient will need to admitted to the hospital for a few days following excision.

## 2024-05-29 NOTE — PHYSICAL EXAM
[de-identified] : genital exam performed with a medical chaperone present (DM). Extensive hidradenitis present in left groin extending into scrotum, perineum, and mons. Right side hidradenitis more localized at medial groin and mons. [de-identified] : Right donor site incision healing well. Tdap flap remains healthy appearing. Significantly improving granulation within the axilla at distal flap border. Silver nitrate applied to granulation tissue. Area cleaned and dressing applied. Left side with decreased area of granulation at inferior aspect of flap and decreased ulceration at distal edge of flap at fold of axilla. Lateral thigh skin graft donor sites well-healed.

## 2024-05-29 NOTE — HISTORY OF PRESENT ILLNESS
[FreeTextEntry1] : The patient returns 11 weeks following reconstruction of right axillary wound with Tdap flap in collaboration with Dr. Edgar Villareal. Denies any significant issues. States he has been using the hydrocolloid dressing. Patient has been attending physical therapy. He states he feels his range of motion is significantly improved. He is interested in proceeding with excision of his groin hidradenitis next.

## 2024-06-04 ENCOUNTER — RESULT REVIEW (OUTPATIENT)
Age: 34
End: 2024-06-04

## 2024-06-04 ENCOUNTER — APPOINTMENT (OUTPATIENT)
Dept: HEMATOLOGY ONCOLOGY | Facility: CLINIC | Age: 34
End: 2024-06-04
Payer: MEDICAID

## 2024-06-04 VITALS
WEIGHT: 178.22 LBS | RESPIRATION RATE: 18 BRPM | HEART RATE: 86 BPM | HEIGHT: 71 IN | TEMPERATURE: 98.2 F | SYSTOLIC BLOOD PRESSURE: 118 MMHG | BODY MASS INDEX: 24.95 KG/M2 | DIASTOLIC BLOOD PRESSURE: 79 MMHG | OXYGEN SATURATION: 99 %

## 2024-06-04 LAB
BASOPHILS # BLD AUTO: 0.1 K/UL — SIGNIFICANT CHANGE UP (ref 0–0.2)
BASOPHILS NFR BLD AUTO: 0.9 % — SIGNIFICANT CHANGE UP (ref 0–2)
EOSINOPHIL # BLD AUTO: 0.37 K/UL — SIGNIFICANT CHANGE UP (ref 0–0.5)
EOSINOPHIL NFR BLD AUTO: 3.4 % — SIGNIFICANT CHANGE UP (ref 0–6)
HCT VFR BLD CALC: 31.6 % — LOW (ref 39–50)
HGB BLD-MCNC: 9.6 G/DL — LOW (ref 13–17)
IMM GRANULOCYTES NFR BLD AUTO: 0.3 % — SIGNIFICANT CHANGE UP (ref 0–0.9)
LYMPHOCYTES # BLD AUTO: 3.69 K/UL — HIGH (ref 1–3.3)
LYMPHOCYTES # BLD AUTO: 33.9 % — SIGNIFICANT CHANGE UP (ref 13–44)
MCHC RBC-ENTMCNC: 20.4 PG — LOW (ref 27–34)
MCHC RBC-ENTMCNC: 30.4 G/DL — LOW (ref 32–36)
MCV RBC AUTO: 67.1 FL — LOW (ref 80–100)
MONOCYTES # BLD AUTO: 0.72 K/UL — SIGNIFICANT CHANGE UP (ref 0–0.9)
MONOCYTES NFR BLD AUTO: 6.6 % — SIGNIFICANT CHANGE UP (ref 2–14)
NEUTROPHILS # BLD AUTO: 5.99 K/UL — SIGNIFICANT CHANGE UP (ref 1.8–7.4)
NEUTROPHILS NFR BLD AUTO: 54.9 % — SIGNIFICANT CHANGE UP (ref 43–77)
NRBC # BLD: 0 /100 WBCS — SIGNIFICANT CHANGE UP (ref 0–0)
PLATELET # BLD AUTO: 486 K/UL — HIGH (ref 150–400)
RBC # BLD: 4.71 M/UL — SIGNIFICANT CHANGE UP (ref 4.2–5.8)
RBC # FLD: 19.3 % — HIGH (ref 10.3–14.5)
WBC # BLD: 10.9 K/UL — HIGH (ref 3.8–10.5)
WBC # FLD AUTO: 10.9 K/UL — HIGH (ref 3.8–10.5)

## 2024-06-04 PROCEDURE — 99214 OFFICE O/P EST MOD 30 MIN: CPT

## 2024-06-07 ENCOUNTER — APPOINTMENT (OUTPATIENT)
Dept: INFUSION THERAPY | Facility: HOSPITAL | Age: 34
End: 2024-06-07

## 2024-06-07 LAB
FERRITIN SERPL-MCNC: 56 NG/ML
IRON SATN MFR SERPL: 6 %
IRON SERPL-MCNC: 16 UG/DL
TIBC SERPL-MCNC: 270 UG/DL
UIBC SERPL-MCNC: 253 UG/DL

## 2024-06-07 RX ORDER — ACETAMINOPHEN 500 MG
2 TABLET ORAL
Refills: 0 | DISCHARGE

## 2024-06-07 RX ORDER — IBUPROFEN 200 MG
1 TABLET ORAL
Refills: 0 | DISCHARGE

## 2024-06-07 NOTE — ASSESSMENT
[FreeTextEntry1] : This is a 35 year old man with a history of hidradenitis suppurativa. Patient on Humira and responding well, no perceivable side effects with treatment. Patient is feeling well, no major complaints. Patient has a persistent leukocytosis during hidradenitis flares, along with an iron deficiency anemia from the bleeding involved. Likely to have a component of anemia of chronic disease with this too, but that can not be corrected medically. Leukocytosis with normal diff, still appears to be a secondary leukocytosis, leukemoid reaction. Peripheral blood flow cytometry 11/24/23 was unremarkable. B12 809 pg/mL, folate 8.1 ng/mL, and SPEP/LYUDMILA did not identify a monoclonal band on 11/24/23.  Hg remains 9.7g/dl. Patient unable to tolerate PO supplementation due to constipation. Was taken off of iron tableets.  Will have patient set up with 2 doses of IV ferriheme, follow up in 3 months for redosing.

## 2024-06-07 NOTE — PHYSICAL EXAM
[Normal] : no peripheral adenopathy appreciated [de-identified] : Hydradenitis wounds in dressing.

## 2024-06-07 NOTE — HISTORY OF PRESENT ILLNESS
[de-identified] : This is a 34 year old man with a history of hidradenitis suppurativa, currently doing well with this for the past year on Humira since 2019.  Hx of lymphadenopathy in the axillary and groin area. Biopsy in 2017 demonstrated reactive LN.  Since then he has not required hematologic follow up until more recently, WBC increased to 14.6 with a lymphocytosis 49.7% with ALC 7,300.  Patient was instructed to resume follow up with hematology.  Patient states eh feels fine except for occasional flare despite the Humira 40mg weekly.  FOr the most part this is much better controlled, only occasionally has a nodule that will exude pus.  He denies fevers, chills, loss of appetite. His appetite is a lot better. no bleeding from anywhere.   Lost a lot of weight in 2017 with the original flare of the Hydradenitis, and has since gained it back now 199 lbs.    patient's cell is [phone] [de-identified] : Patient  was instructed by the hospital recently.  Was instructed to stop iron supplements due to severe constipation had a bowel movement every 2-3 days.  Was taking MiraLAX with this but still had severe constipation. Hg still 9.7g/dl which is not surprising given the lack of iron supplements.    Patient had a surgical flap placed which decreased the amount of bleeding from the sties. The flap healed well.

## 2024-06-10 DIAGNOSIS — D50.9 IRON DEFICIENCY ANEMIA, UNSPECIFIED: ICD-10-CM

## 2024-06-14 ENCOUNTER — APPOINTMENT (OUTPATIENT)
Dept: INFUSION THERAPY | Facility: HOSPITAL | Age: 34
End: 2024-06-14

## 2024-06-21 PROBLEM — S41.102D: Status: ACTIVE | Noted: 2023-07-05

## 2024-06-21 PROBLEM — L73.2 HIDRADENITIS SUPPURATIVA: Status: ACTIVE | Noted: 2017-04-04

## 2024-06-26 ENCOUNTER — APPOINTMENT (OUTPATIENT)
Dept: PLASTIC SURGERY | Facility: CLINIC | Age: 34
End: 2024-06-26
Payer: MEDICAID

## 2024-06-26 DIAGNOSIS — L73.2 HIDRADENITIS SUPPURATIVA: ICD-10-CM

## 2024-06-26 DIAGNOSIS — S41.102D UNSPECIFIED OPEN WOUND OF LEFT UPPER ARM, SUBSEQUENT ENCOUNTER: ICD-10-CM

## 2024-06-26 PROCEDURE — 99213 OFFICE O/P EST LOW 20 MIN: CPT

## 2024-06-26 NOTE — ASSESSMENT
[FreeTextEntry1] : No evidence of infection. Mild stable recurrence in left axilla. Wound care reviewed. Continue physical therapy. Follow up in 4 weeks.  We are awaiting insurance approval for excision of bilateral groin HS.

## 2024-06-26 NOTE — HISTORY OF PRESENT ILLNESS
[FreeTextEntry1] :  The patient returns 3.5 months following reconstruction of right axillary wound with Tdap flap in collaboration with Dr. Edgar Villareal. Denies any significant issues. States he has been using the hydrocolloid dressing. Patient has been attending physical therapy.  He states he feels his range of motion is significantly improved.   He continues to be interested in proceeding with the groin excision.

## 2024-06-26 NOTE — PHYSICAL EXAM
[de-identified] : Right donor site incision healing well. Tdap flap remains healthy appearing. Improving granulation within the axilla at distal flap border. Silver nitrate applied to granulation tissue. Area cleaned and dressing applied. Left side with decreased area of granulation at inferolateral aspect of flap.  Area of recurrent hidradenitis at anterior distal axilla unroofed after prepping with alcohol.  Silver nitrate applied to granulation.  Wound packed.

## 2024-07-24 ENCOUNTER — APPOINTMENT (OUTPATIENT)
Dept: PLASTIC SURGERY | Facility: CLINIC | Age: 34
End: 2024-07-24

## 2024-07-24 NOTE — ASSESSMENT
[FreeTextEntry1] : No evidence of infection. Wound care reviewed. Continue physical therapy. Follow up in 4 weeks.

## 2024-07-24 NOTE — PHYSICAL EXAM
[de-identified] : Right donor site incision well healed. Tdap flap remains healthy appearing. [] granulation within the axilla at distal flap border. Silver nitrate applied to granulation tissue. Area cleaned and dressing applied. Left side with [] area of granulation at inferolateral aspect of flap. Area of recurrent hidradenitis at anterior distal axilla.

## 2024-07-24 NOTE — HISTORY OF PRESENT ILLNESS
[FreeTextEntry1] : The patient returns a little over 4 months following reconstruction of right axillary wound with Tdap flap in collaboration with Dr. Edgar Villareal. Denies any significant issues. States he has been using the hydrocolloid dressing. Patient has been attending physical therapy. He states his range of motion continues to improve.  He is scheduled 9/23/24 for excision of perineal and bilateral inguinal hidradenitis.

## 2024-08-07 ENCOUNTER — APPOINTMENT (OUTPATIENT)
Dept: PLASTIC SURGERY | Facility: CLINIC | Age: 34
End: 2024-08-07

## 2024-08-07 PROCEDURE — 99212 OFFICE O/P EST SF 10 MIN: CPT

## 2024-08-11 NOTE — PHYSICAL EXAM
[de-identified] : Right donor site incision healing well. Tdap flap well healed. Granulation within the axilla at distal flap border. Silver nitrate applied to granulation tissue. Area cleaned and dressing applied. Left side with increased area of granulation at inferolateral aspect of flap. Area cleaned and dressing applied.

## 2024-08-11 NOTE — PHYSICAL EXAM
[de-identified] : Right donor site incision healing well. Tdap flap well healed. Granulation within the axilla at distal flap border. Silver nitrate applied to granulation tissue. Area cleaned and dressing applied. Left side with increased area of granulation at inferolateral aspect of flap. Area cleaned and dressing applied.

## 2024-08-11 NOTE — ASSESSMENT
[FreeTextEntry1] : No evidence of infection. Wound care reviewed. Continue physical therapy. Would consider revision of left side at this upcoming operation.  Follow up 1 week after upcoming surgery.

## 2024-08-11 NOTE — HISTORY OF PRESENT ILLNESS
[FreeTextEntry1] : The patient returns about 4 months following reconstruction of right axillary wound with Tdap flap in collaboration with Dr. Edgar Villareal. Denies any significant issues. States he has been using the hydrocolloid dressing. Patient has been attending physical therapy. He states he feels his range of motion is significantly improved. Notes some worsening drainage on the left.  He is scheduled for upcoming groin HS excision.

## 2024-08-11 NOTE — PHYSICAL EXAM
[de-identified] : Right donor site incision healing well. Tdap flap well healed. Granulation within the axilla at distal flap border. Silver nitrate applied to granulation tissue. Area cleaned and dressing applied. Left side with increased area of granulation at inferolateral aspect of flap. Area cleaned and dressing applied.

## 2024-09-03 ENCOUNTER — OUTPATIENT (OUTPATIENT)
Dept: OUTPATIENT SERVICES | Facility: HOSPITAL | Age: 34
LOS: 1 days | End: 2024-09-03
Payer: MEDICAID

## 2024-09-03 VITALS
HEIGHT: 72 IN | SYSTOLIC BLOOD PRESSURE: 124 MMHG | HEART RATE: 122 BPM | DIASTOLIC BLOOD PRESSURE: 84 MMHG | RESPIRATION RATE: 12 BRPM | WEIGHT: 177.91 LBS | TEMPERATURE: 98 F | OXYGEN SATURATION: 99 %

## 2024-09-03 DIAGNOSIS — Z98.890 OTHER SPECIFIED POSTPROCEDURAL STATES: Chronic | ICD-10-CM

## 2024-09-03 DIAGNOSIS — Z01.818 ENCOUNTER FOR OTHER PREPROCEDURAL EXAMINATION: ICD-10-CM

## 2024-09-03 DIAGNOSIS — D64.9 ANEMIA, UNSPECIFIED: ICD-10-CM

## 2024-09-03 DIAGNOSIS — I47.10 SUPRAVENTRICULAR TACHYCARDIA, UNSPECIFIED: ICD-10-CM

## 2024-09-03 DIAGNOSIS — L73.2 HIDRADENITIS SUPPURATIVA: Chronic | ICD-10-CM

## 2024-09-03 DIAGNOSIS — L73.2 HIDRADENITIS SUPPURATIVA: ICD-10-CM

## 2024-09-03 LAB
ANION GAP SERPL CALC-SCNC: 13 MMOL/L — SIGNIFICANT CHANGE UP (ref 5–17)
BUN SERPL-MCNC: 16 MG/DL — SIGNIFICANT CHANGE UP (ref 7–23)
CALCIUM SERPL-MCNC: 10 MG/DL — SIGNIFICANT CHANGE UP (ref 8.4–10.5)
CHLORIDE SERPL-SCNC: 99 MMOL/L — SIGNIFICANT CHANGE UP (ref 96–108)
CO2 SERPL-SCNC: 23 MMOL/L — SIGNIFICANT CHANGE UP (ref 22–31)
CREAT SERPL-MCNC: 0.56 MG/DL — SIGNIFICANT CHANGE UP (ref 0.5–1.3)
EGFR: 133 ML/MIN/1.73M2 — SIGNIFICANT CHANGE UP
GLUCOSE SERPL-MCNC: 118 MG/DL — HIGH (ref 70–99)
HCT VFR BLD CALC: 38.5 % — LOW (ref 39–50)
HGB BLD-MCNC: 12 G/DL — LOW (ref 13–17)
MCHC RBC-ENTMCNC: 24.2 PG — LOW (ref 27–34)
MCHC RBC-ENTMCNC: 31.2 GM/DL — LOW (ref 32–36)
MCV RBC AUTO: 77.6 FL — LOW (ref 80–100)
NRBC # BLD: 0 /100 WBCS — SIGNIFICANT CHANGE UP (ref 0–0)
PLATELET # BLD AUTO: 378 K/UL — SIGNIFICANT CHANGE UP (ref 150–400)
POTASSIUM SERPL-MCNC: 3.8 MMOL/L — SIGNIFICANT CHANGE UP (ref 3.5–5.3)
POTASSIUM SERPL-SCNC: 3.8 MMOL/L — SIGNIFICANT CHANGE UP (ref 3.5–5.3)
RBC # BLD: 4.96 M/UL — SIGNIFICANT CHANGE UP (ref 4.2–5.8)
RBC # FLD: 18.8 % — HIGH (ref 10.3–14.5)
SODIUM SERPL-SCNC: 135 MMOL/L — SIGNIFICANT CHANGE UP (ref 135–145)
WBC # BLD: 13.47 K/UL — HIGH (ref 3.8–10.5)
WBC # FLD AUTO: 13.47 K/UL — HIGH (ref 3.8–10.5)

## 2024-09-03 PROCEDURE — G0463: CPT

## 2024-09-03 PROCEDURE — 85027 COMPLETE CBC AUTOMATED: CPT

## 2024-09-03 PROCEDURE — 80048 BASIC METABOLIC PNL TOTAL CA: CPT

## 2024-09-03 RX ORDER — SODIUM CHLORIDE 0.9 % (FLUSH) 0.9 %
3 SYRINGE (ML) INJECTION EVERY 8 HOURS
Refills: 0 | Status: DISCONTINUED | OUTPATIENT
Start: 2024-09-23 | End: 2024-09-23

## 2024-09-03 RX ORDER — LIDOCAINE HCL 20 MG/ML
0.2 AMPUL (ML) INJECTION ONCE
Refills: 0 | Status: DISCONTINUED | OUTPATIENT
Start: 2024-09-23 | End: 2024-09-23

## 2024-09-03 NOTE — H&P PST ADULT - LAST ECHOCARDIOGRAM
2/20/24 - LV systolic function is low normal with an EF of 50- 55 % (done during hospital stay post right axillary hidradenitis removal  on 2/13/24, patient had an episode of Vasovagal syncope during the hospital stay)

## 2024-09-03 NOTE — H&P PST ADULT - PROBLEM SELECTOR PLAN 1
Excision of Perineal, Bilateral Inguinal Hidradenitis, Revision of Left Axillary Wound on 9/23/24.  CBC and BMP done today at [facility].   Pre op instructions provided and all questions answered.

## 2024-09-03 NOTE — H&P PST ADULT - ASSESSMENT
Activity: Patient states he can only walk short distances due to significant pain in his joints due to arthritis.     DASI: 5.81    Mallampati: 2    Dental: Denies loose teeth or dentures.

## 2024-09-03 NOTE — H&P PST ADULT - COMMENTS
Patient states that he is in significant pain in his joints due to arthritis, which has caused his HR to be elevated in the past.- advised to schedule visit with PCP.

## 2024-09-03 NOTE — H&P PST ADULT - ALLERGIC/IMMUNOLOGIC
Hypertension is unchanged.  Continue current treatment regimen.  Dietary sodium restriction.  Blood pressure will be reassessed in 4 weeks.   negative

## 2024-09-03 NOTE — H&P PST ADULT - HISTORY OF PRESENT ILLNESS
34 year old male with PMH of Leukocytosis, Anemia (follows with hematologist- states he was last seen 6/2024 and received iron infusion), Arthritis, Hidradenitis Suppurative s/p right axillary hidradenitis S/P excision of hidradenitis of the buttocks with skin graft reconstruction (2/2023), s/p excision of left axillary hidradenitis (4/2023), Left Axillary Wound Reconstruction with Thoracodorsal Artery  Flap, graft from left thigh 5/2023, right axillary hidradenitis removed on 2/13/24, and right axillary wound reconstruction with muscle flap and skin graft 3/2024. He presents today to PST prior to scheduled Excision of Perineal, Bilateral Inguinal Hidradenitis, Revision of Left Axillary Wound on 9/23/24. Patient denies recent fever, chills, chest pain, SOB, palpitations, or recent exposure to COVID-19.

## 2024-09-04 ENCOUNTER — APPOINTMENT (OUTPATIENT)
Dept: PLASTIC SURGERY | Facility: CLINIC | Age: 34
End: 2024-09-04
Payer: MEDICAID

## 2024-09-04 DIAGNOSIS — S41.102D UNSPECIFIED OPEN WOUND OF LEFT UPPER ARM, SUBSEQUENT ENCOUNTER: ICD-10-CM

## 2024-09-04 PROBLEM — D64.9 ANEMIA, UNSPECIFIED: Chronic | Status: ACTIVE | Noted: 2024-09-03

## 2024-09-04 PROCEDURE — 99212 OFFICE O/P EST SF 10 MIN: CPT

## 2024-09-05 NOTE — ASSESSMENT
[FreeTextEntry1] : Plan for staged genital skin excision and grafting. Will attempt to revise inferior/anterior aspect of left TDAP flap.

## 2024-09-05 NOTE — PHYSICAL EXAM
[de-identified] : Deferred [de-identified] : Right axilla reconstruction site relatively unchanged. Superior area of granulation addressed with silver nitrate. left axilla with granulation and partial separation of flap at anterior and inferior aspects.

## 2024-09-05 NOTE — PHYSICAL EXAM
[de-identified] : Deferred [de-identified] : Right axilla reconstruction site relatively unchanged. Superior area of granulation addressed with silver nitrate. left axilla with granulation and partial separation of flap at anterior and inferior aspects.

## 2024-09-05 NOTE — HISTORY OF PRESENT ILLNESS
[FreeTextEntry1] : The patient returns about 5 months following reconstruction of right axillary wound with Tdap flap in collaboration with Dr. Edgar Villareal. Denies any significant issues. States he has been using the hydrocolloid dressing. Patient has been attending physical therapy. He states he feels his range of motion is significantly improved. Notes some worsening drainage on the left.  He is scheduled for upcoming groin HS excision.

## 2024-09-06 ENCOUNTER — APPOINTMENT (OUTPATIENT)
Dept: INTERNAL MEDICINE | Facility: CLINIC | Age: 34
End: 2024-09-06
Payer: MEDICAID

## 2024-09-06 ENCOUNTER — NON-APPOINTMENT (OUTPATIENT)
Age: 34
End: 2024-09-06

## 2024-09-06 VITALS
DIASTOLIC BLOOD PRESSURE: 68 MMHG | HEIGHT: 71 IN | TEMPERATURE: 97.8 F | SYSTOLIC BLOOD PRESSURE: 100 MMHG | WEIGHT: 170 LBS | BODY MASS INDEX: 23.8 KG/M2 | OXYGEN SATURATION: 99 % | HEART RATE: 107 BPM

## 2024-09-06 DIAGNOSIS — M25.572 PAIN IN LEFT ANKLE AND JOINTS OF LEFT FOOT: ICD-10-CM

## 2024-09-06 DIAGNOSIS — D64.9 ANEMIA, UNSPECIFIED: ICD-10-CM

## 2024-09-06 DIAGNOSIS — L73.2 HIDRADENITIS SUPPURATIVA: ICD-10-CM

## 2024-09-06 DIAGNOSIS — Z01.818 ENCOUNTER FOR OTHER PREPROCEDURAL EXAMINATION: ICD-10-CM

## 2024-09-06 DIAGNOSIS — D72.829 ELEVATED WHITE BLOOD CELL COUNT, UNSPECIFIED: ICD-10-CM

## 2024-09-06 PROCEDURE — 99214 OFFICE O/P EST MOD 30 MIN: CPT

## 2024-09-08 NOTE — HISTORY OF PRESENT ILLNESS
[No Pertinent Cardiac History] : no history of aortic stenosis, atrial fibrillation, coronary artery disease, recent myocardial infarction, or implantable device/pacemaker [No Pertinent Pulmonary History] : no history of asthma, COPD, sleep apnea, or smoking [No Adverse Anesthesia Reaction] : no adverse anesthesia reaction in self or family member [(Patient denies any chest pain, claudication, dyspnea on exertion, orthopnea, palpitations or syncope)] : Patient denies any chest pain, claudication, dyspnea on exertion, orthopnea, palpitations or syncope [Chronic Anticoagulation] : no chronic anticoagulation [Chronic Kidney Disease] : no chronic kidney disease [FreeTextEntry1] : Groin HS excision  [Diabetes] : no diabetes [FreeTextEntry2] : 09/23/2024 [FreeTextEntry3] : Dr Margarito Tariq  [FreeTextEntry4] : plans for management of HS lesions groin  no new acute complaints experiences chronic L ankle pain which has been recurrent issue

## 2024-09-08 NOTE — REVIEW OF SYSTEMS
[Joint Pain] : joint pain [Fever] : no fever [Earache] : no earache [Chest Pain] : no chest pain [Shortness Of Breath] : no shortness of breath [Dysuria] : no dysuria [Memory Loss] : no memory loss [Suicidal] : not suicidal [Anxiety] : no anxiety

## 2024-09-08 NOTE — HISTORY OF PRESENT ILLNESS
[No Pertinent Cardiac History] : no history of aortic stenosis, atrial fibrillation, coronary artery disease, recent myocardial infarction, or implantable device/pacemaker [No Pertinent Pulmonary History] : no history of asthma, COPD, sleep apnea, or smoking [No Adverse Anesthesia Reaction] : no adverse anesthesia reaction in self or family member [(Patient denies any chest pain, claudication, dyspnea on exertion, orthopnea, palpitations or syncope)] : Patient denies any chest pain, claudication, dyspnea on exertion, orthopnea, palpitations or syncope [Chronic Anticoagulation] : no chronic anticoagulation [Chronic Kidney Disease] : no chronic kidney disease [Diabetes] : no diabetes [FreeTextEntry1] : Groin HS excision  [FreeTextEntry2] : 09/23/2024 [FreeTextEntry3] : Dr Margarito Tariq  [FreeTextEntry4] : plans for management of HS lesions groin  no new acute complaints experiences chronic L ankle pain which has been recurrent issue

## 2024-09-08 NOTE — PHYSICAL EXAM
[No Acute Distress] : no acute distress [Clear to Auscultation] : lungs were clear to auscultation bilaterally [Regular Rhythm] : with a regular rhythm [Soft] : abdomen soft [Non Tender] : non-tender [No Focal Deficits] : no focal deficits [Alert and Oriented x3] : oriented to person, place, and time [No CVA Tenderness] : no CVA  tenderness [de-identified] : bilateral groin skin changes

## 2024-09-08 NOTE — PHYSICAL EXAM
[No Acute Distress] : no acute distress [Clear to Auscultation] : lungs were clear to auscultation bilaterally [Regular Rhythm] : with a regular rhythm [Soft] : abdomen soft [Non Tender] : non-tender [No Focal Deficits] : no focal deficits [Alert and Oriented x3] : oriented to person, place, and time [No CVA Tenderness] : no CVA  tenderness [de-identified] : bilateral groin skin changes

## 2024-09-22 ENCOUNTER — TRANSCRIPTION ENCOUNTER (OUTPATIENT)
Age: 34
End: 2024-09-22

## 2024-09-23 ENCOUNTER — APPOINTMENT (OUTPATIENT)
Dept: PLASTIC SURGERY | Facility: HOSPITAL | Age: 34
End: 2024-09-23

## 2024-09-23 ENCOUNTER — RESULT REVIEW (OUTPATIENT)
Age: 34
End: 2024-09-23

## 2024-09-23 ENCOUNTER — INPATIENT (INPATIENT)
Facility: HOSPITAL | Age: 34
LOS: 5 days | Discharge: HOME HEALTH | DRG: 578 | End: 2024-09-29
Attending: SURGERY | Admitting: SURGERY
Payer: MEDICAID

## 2024-09-23 VITALS
DIASTOLIC BLOOD PRESSURE: 55 MMHG | WEIGHT: 177.91 LBS | HEART RATE: 117 BPM | OXYGEN SATURATION: 98 % | HEIGHT: 72 IN | TEMPERATURE: 99 F | SYSTOLIC BLOOD PRESSURE: 113 MMHG | RESPIRATION RATE: 18 BRPM

## 2024-09-23 DIAGNOSIS — L73.2 HIDRADENITIS SUPPURATIVA: Chronic | ICD-10-CM

## 2024-09-23 DIAGNOSIS — L73.2 HIDRADENITIS SUPPURATIVA: ICD-10-CM

## 2024-09-23 DIAGNOSIS — Z98.890 OTHER SPECIFIED POSTPROCEDURAL STATES: Chronic | ICD-10-CM

## 2024-09-23 PROCEDURE — 88304 TISSUE EXAM BY PATHOLOGIST: CPT | Mod: 26

## 2024-09-23 RX ORDER — HYDROMORPHONE HYDROCHLORIDE 1 MG/ML
1 INJECTION, SOLUTION INTRAMUSCULAR; INTRAVENOUS; SUBCUTANEOUS
Refills: 0 | Status: DISCONTINUED | OUTPATIENT
Start: 2024-09-23 | End: 2024-09-23

## 2024-09-23 RX ORDER — BENZOCAINE AND LEVOMENTHOL 200; 5 MG/G; MG/G
1 SPRAY TOPICAL
Refills: 0 | Status: DISCONTINUED | OUTPATIENT
Start: 2024-09-23 | End: 2024-09-29

## 2024-09-23 RX ORDER — HYDROMORPHONE HYDROCHLORIDE 1 MG/ML
0.5 INJECTION, SOLUTION INTRAMUSCULAR; INTRAVENOUS; SUBCUTANEOUS
Refills: 0 | Status: DISCONTINUED | OUTPATIENT
Start: 2024-09-23 | End: 2024-09-23

## 2024-09-23 RX ORDER — SENNOSIDES 8.6 MG
2 TABLET ORAL AT BEDTIME
Refills: 0 | Status: DISCONTINUED | OUTPATIENT
Start: 2024-09-23 | End: 2024-09-29

## 2024-09-23 RX ORDER — METOCLOPRAMIDE HCL 5 MG
10 TABLET ORAL EVERY 6 HOURS
Refills: 0 | Status: DISCONTINUED | OUTPATIENT
Start: 2024-09-23 | End: 2024-09-29

## 2024-09-23 RX ORDER — DIPHENHYDRAMINE HCL 12.5MG/5ML
25 LIQUID (ML) ORAL EVERY 4 HOURS
Refills: 0 | Status: DISCONTINUED | OUTPATIENT
Start: 2024-09-23 | End: 2024-09-29

## 2024-09-23 RX ORDER — 5-HYDROXYTRYPTOPHAN (5-HTP) 100 MG
3 TABLET,DISINTEGRATING ORAL AT BEDTIME
Refills: 0 | Status: DISCONTINUED | OUTPATIENT
Start: 2024-09-23 | End: 2024-09-29

## 2024-09-23 RX ORDER — ONDANSETRON HCL/PF 4 MG/2 ML
4 VIAL (ML) INJECTION ONCE
Refills: 0 | Status: DISCONTINUED | OUTPATIENT
Start: 2024-09-23 | End: 2024-09-23

## 2024-09-23 RX ORDER — POLYVINYL ALCOHOL 1 %
1 DROPS OPHTHALMIC (EYE)
Refills: 0 | Status: DISCONTINUED | OUTPATIENT
Start: 2024-09-23 | End: 2024-09-29

## 2024-09-23 RX ORDER — NALBUPHINE HYDROCHLORIDE 10 MG/ML
2.5 INJECTION, SOLUTION INTRAMUSCULAR; INTRAVENOUS; SUBCUTANEOUS EVERY 6 HOURS
Refills: 0 | Status: DISCONTINUED | OUTPATIENT
Start: 2024-09-23 | End: 2024-09-25

## 2024-09-23 RX ORDER — ACETAMINOPHEN 325 MG
1000 TABLET ORAL EVERY 6 HOURS
Refills: 0 | Status: COMPLETED | OUTPATIENT
Start: 2024-09-23 | End: 2024-09-24

## 2024-09-23 RX ORDER — INFLUENZA VIRUS VACCINE 15; 15; 15; 15 UG/.5ML; UG/.5ML; UG/.5ML; UG/.5ML
0.5 SUSPENSION INTRAMUSCULAR ONCE
Refills: 0 | Status: DISCONTINUED | OUTPATIENT
Start: 2024-09-23 | End: 2024-09-29

## 2024-09-23 RX ORDER — HYDROMORPHONE HYDROCHLORIDE 1 MG/ML
0.5 INJECTION, SOLUTION INTRAMUSCULAR; INTRAVENOUS; SUBCUTANEOUS
Refills: 0 | Status: DISCONTINUED | OUTPATIENT
Start: 2024-09-23 | End: 2024-09-25

## 2024-09-23 RX ORDER — ANTACID TABLETS 500 MG/1
1 TABLET, CHEWABLE ORAL EVERY 4 HOURS
Refills: 0 | Status: DISCONTINUED | OUTPATIENT
Start: 2024-09-23 | End: 2024-09-29

## 2024-09-23 RX ORDER — HYDROMORPHONE HYDROCHLORIDE 1 MG/ML
30 INJECTION, SOLUTION INTRAMUSCULAR; INTRAVENOUS; SUBCUTANEOUS
Refills: 0 | Status: DISCONTINUED | OUTPATIENT
Start: 2024-09-23 | End: 2024-09-25

## 2024-09-23 RX ORDER — ONDANSETRON HCL/PF 4 MG/2 ML
4 VIAL (ML) INJECTION EVERY 6 HOURS
Refills: 0 | Status: DISCONTINUED | OUTPATIENT
Start: 2024-09-23 | End: 2024-09-25

## 2024-09-23 RX ORDER — SODIUM CHLORIDE IRRIG SOLUTION 0.9 %
1000 SOLUTION, IRRIGATION IRRIGATION
Refills: 0 | Status: DISCONTINUED | OUTPATIENT
Start: 2024-09-23 | End: 2024-09-26

## 2024-09-23 RX ORDER — NALOXONE HYDROCHLORIDE 0.4 MG/ML
0.1 INJECTION, SOLUTION INTRAMUSCULAR; INTRAVENOUS; SUBCUTANEOUS
Refills: 0 | Status: DISCONTINUED | OUTPATIENT
Start: 2024-09-23 | End: 2024-09-25

## 2024-09-23 RX ADMIN — HYDROMORPHONE HYDROCHLORIDE 30 MILLILITER(S): 1 INJECTION, SOLUTION INTRAMUSCULAR; INTRAVENOUS; SUBCUTANEOUS at 18:18

## 2024-09-23 RX ADMIN — HYDROMORPHONE HYDROCHLORIDE 30 MILLILITER(S): 1 INJECTION, SOLUTION INTRAMUSCULAR; INTRAVENOUS; SUBCUTANEOUS at 20:41

## 2024-09-23 NOTE — BRIEF OPERATIVE NOTE - SPECIMENS
Hidradenitis of left axilla, Hidradenitis of right groin, hidradenitis of left groin scrotum medial mons

## 2024-09-23 NOTE — H&P ADULT - HISTORY OF PRESENT ILLNESS
HPI:  Mr. Jack Reyna is a 34 year old male with PMH of Leukocytosis, Anemia (follows with hematologist- states he was last seen 6/2024 and received iron infusion), Arthritis, Hidradenitis Suppurative s/p right axillary hidradenitis S/P excision of hidradenitis of the buttocks with skin graft reconstruction (2/2023), s/p excision of left axillary hidradenitis (4/2023), Left Axillary Wound Reconstruction with Thoracodorsal Artery  Flap, graft from left thigh 5/2023, right axillary hidradenitis removed on 2/13/24, and right axillary wound reconstruction with muscle flap and skin graft 3/2024.     Patient presented today (9/23) for revision of L axillary wound and excision of perineal, b/l inguinal hidradenitis with Dr. Tariq. Patient to be admitted for pain control and close hemodynamic monitoring post operatively.      Patient denies recent fever, chills, chest pain, SOB, palpitations, or recent exposure to COVID-19.      REVIEW OF SYSTEMS      General: No Weight change/ Fatigue/ HA/Dizzy	    Skin/Breast: Active hidradenitis of groin and axilla  	  Ophthalmologic: No Blurry vision/ Glaucoma/ Blindness	    Respiratory and Thorax: No Cough/ Wheezing/ SOB/ Hemoptysis/ Sputum production  	  Cardiovascular: No Chest pain/ Palpitations/ Diaphoresis	    Gastrointestinal: No Nausea/ Vomiting/ Constipation/ Appetite Change	    Musculoskeletal: Reported weakness. No Pain/Claudication	    Neurological: No Seizures/ TIA/CVA/ Parastesias	    Psychiatric: No Dementia/ Depression/ SI/HI	    MEDICATIONS  (STANDING):  HYDROmorphone PCA (1 mG/mL) 30 milliLiter(s) PCA Continuous PCA Continuous  influenza   Vaccine 0.5 milliLiter(s) IntraMuscular once    MEDICATIONS  (PRN):  HYDROmorphone  Injectable 0.5 milliGRAM(s) IV Push every 10 minutes PRN Moderate Pain (4 - 6)  HYDROmorphone  Injectable 1 milliGRAM(s) IV Push every 10 minutes PRN Severe Pain (7 - 10)  HYDROmorphone PCA (1 mG/mL) Rescue Clinician Bolus 0.5 milliGRAM(s) IV Push every 15 minutes PRN for Pain Scale GREATER THAN 6  nalbuphine Injectable 2.5 milliGRAM(s) IV Push every 6 hours PRN Pruritus  naloxone Injectable 0.1 milliGRAM(s) IV Push every 3 minutes PRN For ANY of the following changes in patient status:  A. RR LESS THAN 10 breaths per minute, B. Oxygen saturation LESS THAN 90%, C. Sedation score of 6  ondansetron Injectable 4 milliGRAM(s) IV Push every 6 hours PRN Nausea  ondansetron Injectable 4 milliGRAM(s) IV Push once PRN Nausea and/or Vomiting    Allergies    No Known Allergies    Intolerances    Vital Signs Last 24 Hrs  T(C): 36.5 (23 Sep 2024 17:00), Max: 37 (23 Sep 2024 11:33)  T(F): 97.7 (23 Sep 2024 17:00), Max: 98.6 (23 Sep 2024 11:33)  HR: 92 (23 Sep 2024 17:15) (92 - 117)  BP: 115/71 (23 Sep 2024 17:15) (113/55 - 124/78)  BP(mean): 88 (23 Sep 2024 17:15) (88 - 97)  RR: 18 (23 Sep 2024 17:15) (18 - 18)  SpO2: 98% (23 Sep 2024 17:15) (98% - 100%)    Parameters below as of 23 Sep 2024 17:00  Patient On (Oxygen Delivery Method): nasal cannula  O2 Flow (L/min): 2      General: Resting in bed comfortably in no acute distress.   Neurology: Awake and oriented. Moves all 4 extremities spontaneously.   Eyes: Scleras clear, PERRLA/ EOMI, Gross vision intact  Respiratory: No increased WOB, no tachypnea  CV: regular rate and rhythm on monitor.   Abdominal: Soft, NT, ND  Extremities: Warm and well perfused. Wound vac in place on left axillary wound with good seal. No drainage in canister.   Groin: Surgical dressing in place (aquacell, ABD pads) with no strikethrough. No active bleeding or drainage seen.   Psych: Oriented x 3, normal affect

## 2024-09-23 NOTE — PATIENT PROFILE ADULT - FALL HARM RISK - HARM RISK INTERVENTIONS

## 2024-09-23 NOTE — H&P ADULT - ASSESSMENT
Assessment:   Jack Reyna is a 33yo M with PMHx of hidradenitis suppurativa with multiple prior excisional and reconstructive procedures. Patient presented 9/23 for excisional of perineal, b/l inguinal and scrotal hidradenitis and revision of L axillary wound with Dr. Tariq. Patient tolerated procedure well and was discharged to PACU in stable condition. Patient admitted on 9/23 for pain control due to large surgical wound and hemodynamic monitoring.     Plan:  - Admit to plastic surgery service under Dr. Tariq   - Regular diet   - No abx  - Bed rest 24hrs   - SCDs for DVT ppx -- Begin HSQ 9/24 in AM   - PCA for pain control     Fermin Arciniega, PGY1  Plastic Surgery   [phone] [facility] pager  Available on teams

## 2024-09-23 NOTE — BRIEF OPERATIVE NOTE - OPERATION/FINDINGS
Excision of hidradenitis of left axilla, sharp debridement of skin and subcutaneous tissue of the left axilla performed. Hemostasis achieved with electrocautery. Wound vac applied to left axilla in sterile fashion.     Excision of hidradenitis of b/l groin, scrotum and superior mons - Sharp debridement of skin and subcutaneous tissue of the b/l groin, scrotum and mons performed. Hemostasis achieved with electrocautery. Wound dressed with aquacell and ABD pads.

## 2024-09-23 NOTE — BRIEF OPERATIVE NOTE - NSICDXBRIEFPROCEDURE_GEN_ALL_CORE_FT
PROCEDURES:  Excision of skin and subcutaneous tissue of groin for hidradenitis 23-Sep-2024 17:29:10  Fermin Arciniega  Excision of hidradenitis of upper extremity 23-Sep-2024 17:29:37  Fermin Arciniega

## 2024-09-23 NOTE — PRE-ANESTHESIA EVALUATION ADULT - NSANTHADDINFOFT_GEN_ALL_CORE
Patient has history of syncopal episodes (a few times a month). They typically occur when he stands up after moving his bowels. He will feel light headed, dizzy, racing heart, and has to sit down otherwise he passes out. One occurred this morning after moving his bowels.     An episode occurred during his admission in February 2024. At that time he was seen by medicine who performed a TTE that was not notable for an significant abnormality. Per Medicine notes at that time, his episodes we attributed to post-operative hypovolemia & vasovagal episode.     Patient was tachycardic to 120's bpm when he visited Pre-operative testing at the beginning of September, that was attributed to arthritic pain. But his pre-operative medical note does not mention his syncopal episodes. Patient denies ever seeing a cardiologist, and there is no cardiac evaluation in the chart.     Patient has had multiple general anesthetics over the past 2 years, without complications. He has seen a hematologist since ~2017 for anemia, and been actively received IV iron infusions.

## 2024-09-24 LAB
ANION GAP SERPL CALC-SCNC: 11 MMOL/L — SIGNIFICANT CHANGE UP (ref 5–17)
BUN SERPL-MCNC: 8 MG/DL — SIGNIFICANT CHANGE UP (ref 7–23)
CALCIUM SERPL-MCNC: 8.5 MG/DL — SIGNIFICANT CHANGE UP (ref 8.4–10.5)
CHLORIDE SERPL-SCNC: 97 MMOL/L — SIGNIFICANT CHANGE UP (ref 96–108)
CO2 SERPL-SCNC: 24 MMOL/L — SIGNIFICANT CHANGE UP (ref 22–31)
CREAT SERPL-MCNC: 0.42 MG/DL — LOW (ref 0.5–1.3)
EGFR: 145 ML/MIN/1.73M2 — SIGNIFICANT CHANGE UP
GLUCOSE SERPL-MCNC: 122 MG/DL — HIGH (ref 70–99)
HCT VFR BLD CALC: 30.7 % — LOW (ref 39–50)
HGB BLD-MCNC: 9.6 G/DL — LOW (ref 13–17)
MAGNESIUM SERPL-MCNC: 2 MG/DL — SIGNIFICANT CHANGE UP (ref 1.6–2.6)
MCHC RBC-ENTMCNC: 24 PG — LOW (ref 27–34)
MCHC RBC-ENTMCNC: 31.3 GM/DL — LOW (ref 32–36)
MCV RBC AUTO: 76.8 FL — LOW (ref 80–100)
NRBC # BLD: 0 /100 WBCS — SIGNIFICANT CHANGE UP (ref 0–0)
PHOSPHATE SERPL-MCNC: 2.5 MG/DL — SIGNIFICANT CHANGE UP (ref 2.5–4.5)
PLATELET # BLD AUTO: 367 K/UL — SIGNIFICANT CHANGE UP (ref 150–400)
POTASSIUM SERPL-MCNC: 3.7 MMOL/L — SIGNIFICANT CHANGE UP (ref 3.5–5.3)
POTASSIUM SERPL-SCNC: 3.7 MMOL/L — SIGNIFICANT CHANGE UP (ref 3.5–5.3)
RBC # BLD: 4 M/UL — LOW (ref 4.2–5.8)
RBC # FLD: 16.8 % — HIGH (ref 10.3–14.5)
SODIUM SERPL-SCNC: 132 MMOL/L — LOW (ref 135–145)
WBC # BLD: 21.08 K/UL — HIGH (ref 3.8–10.5)
WBC # FLD AUTO: 21.08 K/UL — HIGH (ref 3.8–10.5)

## 2024-09-24 RX ORDER — ACETAMINOPHEN 325 MG
975 TABLET ORAL EVERY 6 HOURS
Refills: 0 | Status: DISCONTINUED | OUTPATIENT
Start: 2024-09-25 | End: 2024-09-29

## 2024-09-24 RX ADMIN — Medication 400 MILLIGRAM(S): at 11:29

## 2024-09-24 RX ADMIN — HYDROMORPHONE HYDROCHLORIDE 30 MILLILITER(S): 1 INJECTION, SOLUTION INTRAMUSCULAR; INTRAVENOUS; SUBCUTANEOUS at 07:48

## 2024-09-24 RX ADMIN — Medication 1000 MILLIGRAM(S): at 11:50

## 2024-09-24 RX ADMIN — Medication 5000 UNIT(S): at 05:53

## 2024-09-24 RX ADMIN — Medication 5000 UNIT(S): at 14:23

## 2024-09-24 RX ADMIN — Medication 400 MILLIGRAM(S): at 05:54

## 2024-09-24 RX ADMIN — Medication 1000 MILLIGRAM(S): at 17:30

## 2024-09-24 RX ADMIN — Medication 400 MILLIGRAM(S): at 17:06

## 2024-09-24 RX ADMIN — HYDROMORPHONE HYDROCHLORIDE 30 MILLILITER(S): 1 INJECTION, SOLUTION INTRAMUSCULAR; INTRAVENOUS; SUBCUTANEOUS at 19:31

## 2024-09-24 RX ADMIN — Medication 1000 MILLIGRAM(S): at 08:00

## 2024-09-24 RX ADMIN — Medication 5000 UNIT(S): at 22:42

## 2024-09-24 NOTE — ADVANCED PRACTICE NURSE CONSULT - REASON FOR CONSULT
Wound care consult initiated by RN to assess patient's skin for a possible skin injury on face, axilla and groin    Reason for Admission: Pain control and hemodynamic monitoring  History of Present Illness:   HPI:  Mr. Jack Reyna is a 34 year old male with PMH of Leukocytosis, Anemia (follows with hematologist- states he was last seen 6/2024 and received iron infusion), Arthritis, Hidradenitis Suppurative s/p right axillary hidradenitis S/P excision of hidradenitis of the buttocks with skin graft reconstruction (2/2023), s/p excision of left axillary hidradenitis (4/2023), Left Axillary Wound Reconstruction with Thoracodorsal Artery  Flap, graft from left thigh 5/2023, right axillary hidradenitis removed on 2/13/24, and right axillary wound reconstruction with muscle flap and skin graft 3/2024.     Patient presented today (9/23) for revision of L axillary wound and excision of perineal, b/l inguinal hidradenitis with Dr. Tariq. Patient to be admitted for pain control and close hemodynamic monitoring post operatively.      Patient denies recent fever, chills, chest pain, SOB, palpitations, or recent exposure to COVID-19.

## 2024-09-24 NOTE — ADVANCED PRACTICE NURSE CONSULT - ASSESSMENT
patient with a history of hidradenitis, care deferred to plastic surgery - patient is covered by MD Margarito Tariq in surgical group

## 2024-09-24 NOTE — ADVANCED PRACTICE NURSE CONSULT - RECOMMEDATIONS
Impression:     patient with a history of hidradenitis, care deferred to plastic surgery    Recommendations:     1) turn and position q2 and PRN utilizing offloading assistive devices  2) routine pericare daily and PRN soiling  3) encourage optimal nutrition  4) waffle cushion when oob to chair  5) B/L LE complete cair air fluidized boots or maty-lock pillow to offload heels/feet  6) alireza protective barrier cream to B/L buttocks/sacrum daily and PRN soiling (prophylactic use)  7) incontinence management - consider external urinary catheter to divert urine from skin if incontinent  8) consult with plastic surgery for treatment recommendations regarding face/groin/axilla    Plan discussed with ROYAL Davenport    For questions/comments regarding the recommendations in this consult, please contact Elisabet at [phone]. Wound care will not actively follow, please place future consults for new concerns. Thank you!

## 2024-09-25 RX ORDER — OXYCODONE HYDROCHLORIDE 30 MG/1
10 TABLET, FILM COATED, EXTENDED RELEASE ORAL EVERY 4 HOURS
Refills: 0 | Status: DISCONTINUED | OUTPATIENT
Start: 2024-09-25 | End: 2024-09-29

## 2024-09-25 RX ORDER — OXYCODONE HYDROCHLORIDE 30 MG/1
5 TABLET, FILM COATED, EXTENDED RELEASE ORAL EVERY 4 HOURS
Refills: 0 | Status: DISCONTINUED | OUTPATIENT
Start: 2024-09-25 | End: 2024-09-29

## 2024-09-25 RX ADMIN — OXYCODONE HYDROCHLORIDE 5 MILLIGRAM(S): 30 TABLET, FILM COATED, EXTENDED RELEASE ORAL at 19:59

## 2024-09-25 RX ADMIN — Medication 975 MILLIGRAM(S): at 17:41

## 2024-09-25 RX ADMIN — OXYCODONE HYDROCHLORIDE 5 MILLIGRAM(S): 30 TABLET, FILM COATED, EXTENDED RELEASE ORAL at 20:29

## 2024-09-25 RX ADMIN — Medication 975 MILLIGRAM(S): at 11:55

## 2024-09-25 RX ADMIN — Medication 975 MILLIGRAM(S): at 00:04

## 2024-09-25 RX ADMIN — Medication 975 MILLIGRAM(S): at 01:04

## 2024-09-25 RX ADMIN — Medication 975 MILLIGRAM(S): at 06:42

## 2024-09-25 RX ADMIN — HYDROMORPHONE HYDROCHLORIDE 30 MILLILITER(S): 1 INJECTION, SOLUTION INTRAMUSCULAR; INTRAVENOUS; SUBCUTANEOUS at 07:49

## 2024-09-25 RX ADMIN — Medication 975 MILLIGRAM(S): at 05:42

## 2024-09-25 RX ADMIN — Medication 5000 UNIT(S): at 05:44

## 2024-09-25 RX ADMIN — Medication 975 MILLIGRAM(S): at 18:11

## 2024-09-25 RX ADMIN — Medication 5000 UNIT(S): at 23:02

## 2024-09-25 RX ADMIN — Medication 5000 UNIT(S): at 13:39

## 2024-09-25 NOTE — PHYSICAL THERAPY INITIAL EVALUATION ADULT - NSPTDISCHREC_GEN_A_CORE
Recommend d/c home with home PT services for general strengthening, fall prevention, balance training, safety assessment, and to restore pts prior level of function./Home PT

## 2024-09-25 NOTE — PHYSICAL THERAPY INITIAL EVALUATION ADULT - RANGE OF MOTION EXAMINATION, REHAB EVAL
PROM WFL except shoulder flexion and ER sligthyl limited 2/2 pain/bilateral upper extremity ROM was WFL (within functional limits)/bilateral lower extremity ROM was WFL (within functional limits)

## 2024-09-25 NOTE — PHYSICAL THERAPY INITIAL EVALUATION ADULT - ADDITIONAL COMMENTS
Pt lives with his mother in an apartment +elevator. Pt has a shower chair and RW. Pt was independent with mobility and ADLs prior to admission with the use of a RW .

## 2024-09-25 NOTE — PHYSICAL THERAPY INITIAL EVALUATION ADULT - PERTINENT HX OF CURRENT PROBLEM, REHAB EVAL
Pt is a 35 y/o male admitted for pain control and hemodynamic monitoring. PMH:  Leukocytosis, Anemia (follows with hematologist- states he was last seen 6/2024 and received iron infusion), Arthritis, Hidradenitis Suppurative s/p right axillary hidradenitis S/P excision of hidradenitis of the buttocks with skin graft reconstruction (2/2023), s/p excision of left axillary hidradenitis (4/2023), Left Axillary Wound Reconstruction with Thoracodorsal Artery  Flap, graft from left thigh 5/2023, right axillary hidradenitis removed on 2/13/24, and right axillary wound reconstruction with muscle flap and skin graft 3/2024.     Patient presented today (9/23) for revision of L axillary wound and excision of perineal, b/l inguinal hidradenitis with Dr. Tariq. Patient to be admitted for pain control and close hemodynamic monitoring post operatively. Now s/p

## 2024-09-26 RX ORDER — HYDROMORPHONE HYDROCHLORIDE 1 MG/ML
0.2 INJECTION, SOLUTION INTRAMUSCULAR; INTRAVENOUS; SUBCUTANEOUS ONCE
Refills: 0 | Status: DISCONTINUED | OUTPATIENT
Start: 2024-09-26 | End: 2024-09-29

## 2024-09-26 RX ADMIN — Medication 975 MILLIGRAM(S): at 17:53

## 2024-09-26 RX ADMIN — Medication 975 MILLIGRAM(S): at 23:50

## 2024-09-26 RX ADMIN — Medication 975 MILLIGRAM(S): at 10:50

## 2024-09-26 RX ADMIN — Medication 975 MILLIGRAM(S): at 09:53

## 2024-09-26 RX ADMIN — Medication 5000 UNIT(S): at 04:45

## 2024-09-26 RX ADMIN — Medication 975 MILLIGRAM(S): at 23:14

## 2024-09-26 RX ADMIN — Medication 5000 UNIT(S): at 15:01

## 2024-09-26 RX ADMIN — Medication 975 MILLIGRAM(S): at 18:50

## 2024-09-26 RX ADMIN — Medication 5000 UNIT(S): at 21:01

## 2024-09-27 ENCOUNTER — TRANSCRIPTION ENCOUNTER (OUTPATIENT)
Age: 34
End: 2024-09-27

## 2024-09-27 RX ADMIN — OXYCODONE HYDROCHLORIDE 10 MILLIGRAM(S): 30 TABLET, FILM COATED, EXTENDED RELEASE ORAL at 09:34

## 2024-09-27 RX ADMIN — OXYCODONE HYDROCHLORIDE 10 MILLIGRAM(S): 30 TABLET, FILM COATED, EXTENDED RELEASE ORAL at 10:15

## 2024-09-27 RX ADMIN — Medication 5000 UNIT(S): at 14:23

## 2024-09-27 RX ADMIN — Medication 5000 UNIT(S): at 05:32

## 2024-09-27 RX ADMIN — Medication 975 MILLIGRAM(S): at 06:30

## 2024-09-27 RX ADMIN — Medication 5000 UNIT(S): at 23:05

## 2024-09-27 RX ADMIN — Medication 975 MILLIGRAM(S): at 05:32

## 2024-09-27 RX ADMIN — Medication 975 MILLIGRAM(S): at 23:05

## 2024-09-27 NOTE — DISCHARGE NOTE PROVIDER - CARE PROVIDER_API CALL
Margarito Tariq.  Plastic Surgery  [address], [address]  [place] [address]  Phone: [phone]  Fax: [phone]  Established Patient  Follow Up Time: 1 week

## 2024-09-27 NOTE — DISCHARGE NOTE PROVIDER - NSDCCPTREATMENT_GEN_ALL_CORE_FT
PRINCIPAL PROCEDURE  Procedure: Excision of skin and subcutaneous tissue of groin for hidradenitis  Findings and Treatment: WOUND CARE:  DO NOT use lotion or powder on wounds. VNS arranged to change left axillary wound vac at home. VNS will also be able to assist with groin dressing changes. Please shower regularly and use soap and water to cleanse groin area. Remove dressings prior to shower. Please change groin dressings every other day.   BATHING: You may shower and/or sponge bathe. You may use warm soapy water in the shower and rinse, pat dry. NO baths no pools for 6 weeks.  ACTIVITY: You may return to your usual level of physical activity.   DIET: Return to your usual diet.  NOTIFY YOUR SURGEON IF YOU HAVE: any bleeding that does not stop, any pus draining from your wound(s), any fever (over 100.4 F) persistent nausea/vomiting, or if your pain is not controlled on your discharge pain medications, unable to urinate.  Please follow up with your surgeon, Dr. Tariq      SECONDARY PROCEDURE  Procedure: Excision of hidradenitis of upper extremity  Findings and Treatment:

## 2024-09-27 NOTE — DISCHARGE NOTE PROVIDER - NSDCMRMEDTOKEN_GEN_ALL_CORE_FT
ibuprofen 600 mg oral tablet: 1 tab(s) orally prn as needed for  mild pain  Tylenol 325 mg oral tablet: 2 tab(s) orally prn as needed for  mild pain   ibuprofen 600 mg oral tablet: 1 tab(s) orally prn as needed for  mild pain  oxyCODONE 5 mg oral tablet: 1 tab(s) orally every 6 hours as needed for  severe pain MDD: 4  Tylenol 325 mg oral tablet: 2 tab(s) orally prn as needed for  mild pain

## 2024-09-27 NOTE — DISCHARGE NOTE PROVIDER - NSDCFUADDINST_GEN_ALL_CORE_FT
DO NOT use lotion or powder on wounds. VNS arranged to change left axillary wound vac at home. VNS will also be able to assist with groin dressing changes. Please shower regularly and use soap and water to cleanse groin area. Remove dressings prior to shower. Please change groin dressings every other day.  DO NOT use lotion or powder on wounds. VNS arranged to change left axillary wound vac at home. VNS will also be able to assist with groin dressing changes. Please shower regularly and use soap and water to cleanse groin area. Remove dressings prior to shower. Please change groin dressings every other day.     Oxycodone sent to home [facility]

## 2024-09-27 NOTE — DISCHARGE NOTE PROVIDER - HOSPITAL COURSE
Patient presented 9/23 for revision of L axillary wound and excision of perineal, b/l inguinal hidradenitis with Dr. Tariq. Patient tolerated the procedure well with no complications. He was then admitted postoperatively for pain control and close hemodynamic monitoring. He was admitted to the plastic surgery service under Dr. Tariq.     During his hospital stay his left axillary wound vac was changed by the PT wound vac team 3 times per week. Groin dressing was changed by the plastic surgery team every other day. Patient educated on need for visiting nurse services for at home wound care, as well as the need for self dressing changes for his groin dressing. When wounds were examined during dressing changes no evidence of active bleeding or infection was identified. Wounds continued to improve and patient remained hemodynamically stable on the floors.     At the time of discharge, the patient was hemodynamically stable, voiding urine and passing stool. Comfortable with adequate pain control.  The patient was instructed to follow up with Dr. Tariq within 1-2 weeks after discharge from the hospital.  The patient felt comfortable with discharge and was set up with home wound vac and VNS.  The patient had no other issues.

## 2024-09-27 NOTE — DISCHARGE NOTE NURSING/CASE MANAGEMENT/SOCIAL WORK - PATIENT PORTAL LINK FT
You can access the FollowMyHealth Patient Portal offered by  by registering at the following website: [email or web address]. By joining [facility]’s FollowMyHealth portal, you will also be able to view your health information using other applications (apps) compatible with our system.

## 2024-09-27 NOTE — DISCHARGE NOTE PROVIDER - NSDCFUSCHEDAPPT_GEN_ALL_CORE_FT
Margarito Tariq  [facility]  PLASTICSUR [address]  Scheduled Appointment: 10/09/2024    [facility]  [facility]  Scheduled Appointment: 12/03/2024    Juan Parra  [facility]  [facility]  Scheduled Appointment: 12/03/2024

## 2024-09-28 RX ADMIN — Medication 975 MILLIGRAM(S): at 05:17

## 2024-09-28 RX ADMIN — Medication 975 MILLIGRAM(S): at 06:00

## 2024-09-28 RX ADMIN — Medication 975 MILLIGRAM(S): at 13:41

## 2024-09-28 RX ADMIN — Medication 975 MILLIGRAM(S): at 22:26

## 2024-09-28 RX ADMIN — Medication 975 MILLIGRAM(S): at 21:26

## 2024-09-28 RX ADMIN — Medication 975 MILLIGRAM(S): at 00:05

## 2024-09-28 RX ADMIN — Medication 5000 UNIT(S): at 21:26

## 2024-09-28 RX ADMIN — Medication 5000 UNIT(S): at 13:41

## 2024-09-28 RX ADMIN — Medication 975 MILLIGRAM(S): at 14:20

## 2024-09-28 RX ADMIN — Medication 5000 UNIT(S): at 05:16

## 2024-09-29 VITALS
DIASTOLIC BLOOD PRESSURE: 73 MMHG | SYSTOLIC BLOOD PRESSURE: 112 MMHG | TEMPERATURE: 98 F | OXYGEN SATURATION: 100 % | RESPIRATION RATE: 18 BRPM | HEART RATE: 100 BPM

## 2024-09-29 PROCEDURE — 97162 PT EVAL MOD COMPLEX 30 MIN: CPT

## 2024-09-29 PROCEDURE — 86901 BLOOD TYPING SEROLOGIC RH(D): CPT

## 2024-09-29 PROCEDURE — C9399: CPT

## 2024-09-29 PROCEDURE — 84100 ASSAY OF PHOSPHORUS: CPT

## 2024-09-29 PROCEDURE — 97606 NEG PRS WND THER DME>50 SQCM: CPT

## 2024-09-29 PROCEDURE — 83735 ASSAY OF MAGNESIUM: CPT

## 2024-09-29 PROCEDURE — 86900 BLOOD TYPING SEROLOGIC ABO: CPT

## 2024-09-29 PROCEDURE — 85027 COMPLETE CBC AUTOMATED: CPT

## 2024-09-29 PROCEDURE — 88304 TISSUE EXAM BY PATHOLOGIST: CPT

## 2024-09-29 PROCEDURE — 80048 BASIC METABOLIC PNL TOTAL CA: CPT

## 2024-09-29 PROCEDURE — 86850 RBC ANTIBODY SCREEN: CPT

## 2024-09-29 RX ORDER — OXYCODONE HYDROCHLORIDE 30 MG/1
1 TABLET, FILM COATED, EXTENDED RELEASE ORAL
Qty: 8 | Refills: 0
Start: 2024-09-29

## 2024-09-29 RX ADMIN — Medication 975 MILLIGRAM(S): at 09:02

## 2024-09-29 RX ADMIN — Medication 5000 UNIT(S): at 05:11

## 2024-09-29 NOTE — PROVIDER CONTACT NOTE (MEDICATION) - BACKGROUND
Patient status post revision of Left axillary wound and excision of perineal bilateral inguinal hidradenitis. Patient states that he does not want to the tylenol scheduled at 2am because he is not in any pain. Patient status post revision of left axillary wound and excision of perineal bilateral inguinal hidradenitis.

## 2024-09-29 NOTE — PROGRESS NOTE ADULT - PROVIDER SPECIALTY LIST ADULT
Anesthesia
Anesthesia
Plastic Surgery
Plastic Surgery
Anesthesia
Anesthesia
Plastic Surgery

## 2024-09-29 NOTE — PROGRESS NOTE ADULT - ASSESSMENT
Assessment:   Jack Reyna is a 35yo M with PMHx of hidradenitis suppurativa with multiple prior excisional and reconstructive procedures. Patient presented 9/23 for excisional of perineal, b/l inguinal and scrotal hidradenitis and revision of L axillary wound with Dr. Tariq. Patient tolerated procedure well and was discharged to PACU in stable condition. Patient admitted on 9/23 for pain control due to large surgical wound and hemodynamic monitoring. Patient currently with PCA for pain control.     Plan:  - Resume HSQ dvt ppx 9/24  - regular diet   - c/w PCA   - trial of oral pain medications 9/25    Fermin Arciniega, PGY1  Plastic Surgery   [phone] [facility] pager  Available on teams
Jack eRyna is a 35yo M with PMHx of hidradenitis suppurativa with multiple prior excisional and reconstructive procedures. Patient presented 9/23 for excisional of perineal, b/l inguinal and scrotal hidradenitis and revision of L axillary wound with Dr. Tariq. Patient tolerated procedure well and was discharged to PACU in stable condition. Patient admitted on 9/23 for pain control due to large surgical wound and hemodynamic monitoring. Patient currently with PCA for pain control.     Plan:  - DVT ppx -- HSQ  - regular diet   - wound vac last changed 9/25  - switch to oral pain control regimen after wound vac change  - OOB as tolerated     Fermin Arciniega, PGY1  Plastic Surgery   [phone] [facility] pager  Available on teams
Jack Reyna is a 33yo M with PMHx of hidradenitis suppurativa with multiple prior excisional and reconstructive procedures. Patient presented 9/23 for excisional of perineal, b/l inguinal and scrotal hidradenitis and revision of L axillary wound with Dr. Tariq. Patient tolerated procedure well and was discharged to PACU in stable condition. Patient admitted on 9/23 for pain control due to large surgical wound and hemodynamic monitoring. Patient pain currently well controlled with oral pain regimen. Possibility of discharge discussed, will work to arrange VNS for home management of wound vac.     Plan:  - DVT ppx -- HSQ  - regular diet   - wound vac last changed 9/27, please monitor wound vac output  - groin dressing last changed 9/28, PRS team will change dressing every other day  - OOB as tolerated     - dc today    Alex Navarro PGY2  Plastic Surgery   [phone] [facility] pager  Available on teams
Jack Reyna is a 35yo M with PMHx of hidradenitis suppurativa with multiple prior excisional and reconstructive procedures. Patient presented 9/23 for excisional of perineal, b/l inguinal and scrotal hidradenitis and revision of L axillary wound with Dr. Tariq. Patient tolerated procedure well and was discharged to PACU in stable condition. Patient admitted on 9/23 for pain control due to large surgical wound and hemodynamic monitoring. Patient pain currently well controlled with oral pain regimen. Possibility of discharge discussed, will work to arrange VNS for home management of wound vac.     Plan:  - DVT ppx -- HSQ  - regular diet   - wound vac last changed 9/25, please monitor wound vac output  - OOB as tolerated   - Will work to arrange VNS to manage vac as outpatient     Fermin Arciniega, PGY1  Plastic Surgery   [phone] [facility] pager  Available on teams
Jack Reyna is a 33yo M with PMHx of hidradenitis suppurativa with multiple prior excisional and reconstructive procedures. Patient presented 9/23 for excisional of perineal, b/l inguinal and scrotal hidradenitis and revision of L axillary wound with Dr. Tariq. Patient tolerated procedure well and was discharged to PACU in stable condition. Patient admitted on 9/23 for pain control due to large surgical wound and hemodynamic monitoring. Patient pain currently well controlled with oral pain regimen. Possibility of discharge discussed, will work to arrange VNS for home management of wound vac.     Plan:  - DVT ppx -- HSQ  - regular diet   - wound vac last changed 9/27, please monitor wound vac output  - groin dressing last changed 9/28, PRS team will change dressing every other day  - OOB as tolerated   - Will work to arrange VNS to manage vac as outpatient   - Possible discharge Sunday    Fermin Arciniega, PGY1  Plastic Surgery   [phone] [facility] pager  Available on teams
Jack Reyna is a 35yo M with PMHx of hidradenitis suppurativa with multiple prior excisional and reconstructive procedures. Patient presented 9/23 for excisional of perineal, b/l inguinal and scrotal hidradenitis and revision of L axillary wound with Dr. Tariq. Patient tolerated procedure well and was discharged to PACU in stable condition. Patient admitted on 9/23 for pain control due to large surgical wound and hemodynamic monitoring. Patient pain currently well controlled with oral pain regimen. Possibility of discharge discussed, will work to arrange VNS for home management of wound vac.     Plan:  - DVT ppx -- HSQ  - regular diet   - wound vac last changed 9/27, please monitor wound vac output  - groin dressing last changed 9/27, PRS team will change dressing every other day  - OOB as tolerated   - Will work to arrange VNS to manage vac as outpatient   - Possible discharge Saturday/Sunday    Fermin Arciniega, PGY1  Plastic Surgery   [phone] [facility] pager  Available on teams

## 2024-09-29 NOTE — PROGRESS NOTE ADULT - SUBJECTIVE AND OBJECTIVE BOX
Day 2 of Anesthesia Pain Management Service    SUBJECTIVE: I'm doing ok    Pain Scale Score:	[X] Refer to charted pain scores    THERAPY:    [ ] IV PCA Morphine		[ ] 5 mg/mL	[ ] 1 mg/mL  [X] IV PCA Hydromorphone	[ ] 5 mg/mL	[X] 1 mg/mL  [ ] IV PCA Fentanyl		[ ] 50 micrograms/mL    Demand dose: 0.2 mg     Lockout: 6 minutes   Continuous Rate: 0 mg/hr  4 Hour Limit: 4 mg    MEDICATIONS  (STANDING):  acetaminophen     Tablet .. 975 milliGRAM(s) Oral every 6 hours  heparin   Injectable 5000 Unit(s) SubCutaneous every 8 hours  HYDROmorphone PCA (1 mG/mL) 30 milliLiter(s) PCA Continuous PCA Continuous  influenza   Vaccine 0.5 milliLiter(s) IntraMuscular once  lactated ringers. 1000 milliLiter(s) (75 mL/Hr) IV Continuous <Continuous>    MEDICATIONS  (PRN):  artificial tears (preservative free) Ophthalmic Solution 1 Drop(s) Both EYES every 1 hour PRN Dry Eyes  benzocaine/menthol Lozenge 1 Lozenge Oral every 3 hours PRN Sore Throat  calcium carbonate    500 mG (Tums) Chewable 1 Tablet(s) Chew every 4 hours PRN Dyspepsia  diphenhydrAMINE Injectable 25 milliGRAM(s) IV Push every 4 hours PRN Rash and/or Itching  HYDROmorphone PCA (1 mG/mL) Rescue Clinician Bolus 0.5 milliGRAM(s) IV Push every 15 minutes PRN for Pain Scale GREATER THAN 6  melatonin 3 milliGRAM(s) Oral at bedtime PRN Sleep  metoclopramide Injectable 10 milliGRAM(s) IV Push every 6 hours PRN Breakthrough nausea/vomiting  nalbuphine Injectable 2.5 milliGRAM(s) IV Push every 6 hours PRN Pruritus  naloxone Injectable 0.1 milliGRAM(s) IV Push every 3 minutes PRN For ANY of the following changes in patient status:  A. RR LESS THAN 10 breaths per minute, B. Oxygen saturation LESS THAN 90%, C. Sedation score of 6  ondansetron Injectable 4 milliGRAM(s) IV Push every 6 hours PRN Nausea  senna 2 Tablet(s) Oral at bedtime PRN Constipation  simethicone 80 milliGRAM(s) Chew every 6 hours PRN Gas      OBJECTIVE:    Sedation Score:	[ X] Alert 	[ ] Drowsy 	[ ] Arousable	[ ] Asleep	[ ] Unresponsive    Side Effects:	[X ] None	[ ] Nausea	[ ] Vomiting	[ ] Pruritus  		[ ] Other:    Vital Signs Last 24 Hrs  T(C): 36.6 (25 Sep 2024 09:19), Max: 37 (24 Sep 2024 17:24)  T(F): 97.9 (25 Sep 2024 09:19), Max: 98.6 (24 Sep 2024 17:24)  HR: 81 (25 Sep 2024 09:19) (75 - 95)  BP: 116/73 (25 Sep 2024 09:19) (110/71 - 120/77)  BP(mean): --  RR: 18 (25 Sep 2024 09:19) (18 - 18)  SpO2: 98% (25 Sep 2024 09:19) (97% - 100%)    Parameters below as of 25 Sep 2024 09:19  Patient On (Oxygen Delivery Method): room air        ASSESSMENT/ PLAN    Therapy to  be:               [X] Continued   [ ] Discontinued   [ ] Changed to PRN Analgesics    Documentation and Verification of current medications:   [X] Done	[ ] Not done, not eligible    Comments: Endorsing good analgesia with PCA. Total PCA use 1.6mg / 24 hours. Reeducated to use. 
Pain Management Attending Addendum    SUBJECTIVE: Patient doing well with IV PCA    Therapy:    [X] IV PCA         [ ] PRN Analgesics    OBJECTIVE:   [X] Pain appropriately controlled    [ ] Other:    Side Effects:  [X] None	             [ ] Nausea              [ ] Pruritis                	[ ] Other:    ASSESSMENT/PLAN: Continue current therapy    Comments:
Surgery Progress Note  Patient is a 34y old  Male who presents with a chief complaint of Pain control and hemodynamic monitoring (25 Sep 2024 13:51)      SUBJECTIVE: Patient seen and examined at bedside with surgical team, patient without complaints.     Physical Exam  Constitutional: NAD, resting in bed comfortably   Respiratory: no increased WOB, no tachypnea   Ext: warm and well perfused. L axilla wound vac in place with good seal, minimal serous drainage in vac canister   Groin: Dressing in place with no strikethrough. No active bleeding or discharge from wound.     Vital Signs Last 24 Hrs  T(C): 37.2 (26 Sep 2024 05:28), Max: 37.2 (26 Sep 2024 05:28)  T(F): 98.9 (26 Sep 2024 05:28), Max: 98.9 (26 Sep 2024 05:28)  HR: 95 (26 Sep 2024 05:28) (81 - 98)  BP: 110/64 (26 Sep 2024 05:28) (110/63 - 132/67)  BP(mean): --  RR: 18 (26 Sep 2024 05:28) (14 - 18)  SpO2: 98% (26 Sep 2024 05:28) (94% - 100%)    Parameters below as of 26 Sep 2024 05:28  Patient On (Oxygen Delivery Method): room air        I&O's Detail    25 Sep 2024 07:01  -  26 Sep 2024 07:00  --------------------------------------------------------  IN:    Lactated Ringers: 900 mL    Oral Fluid: 840 mL  Total IN: 1740 mL    OUT:    Voided (mL): 3400 mL  Total OUT: 3400 mL    Total NET: -1660 mL      MEDICATIONS  (STANDING):  acetaminophen     Tablet .. 975 milliGRAM(s) Oral every 6 hours  heparin   Injectable 5000 Unit(s) SubCutaneous every 8 hours  influenza   Vaccine 0.5 milliLiter(s) IntraMuscular once  lactated ringers. 1000 milliLiter(s) (75 mL/Hr) IV Continuous <Continuous>    MEDICATIONS  (PRN):  artificial tears (preservative free) Ophthalmic Solution 1 Drop(s) Both EYES every 1 hour PRN Dry Eyes  benzocaine/menthol Lozenge 1 Lozenge Oral every 3 hours PRN Sore Throat  calcium carbonate    500 mG (Tums) Chewable 1 Tablet(s) Chew every 4 hours PRN Dyspepsia  diphenhydrAMINE Injectable 25 milliGRAM(s) IV Push every 4 hours PRN Rash and/or Itching  melatonin 3 milliGRAM(s) Oral at bedtime PRN Sleep  metoclopramide Injectable 10 milliGRAM(s) IV Push every 6 hours PRN Breakthrough nausea/vomiting  oxyCODONE    IR 5 milliGRAM(s) Oral every 4 hours PRN Moderate Pain (4 - 6)  oxyCODONE    IR 10 milliGRAM(s) Oral every 4 hours PRN Severe Pain (7 - 10)  senna 2 Tablet(s) Oral at bedtime PRN Constipation  simethicone 80 milliGRAM(s) Chew every 6 hours PRN Gas      LABS:                    
Surgery Progress Note  Patient is a 34y old  Male who presents with a chief complaint of Pain control and hemodynamic monitoring (27 Sep 2024 17:47)      SUBJECTIVE: Patient seen and examined at bedside with surgical team, patient without complaints. Pain is well controlled. Plan for discharge discussed, patient would like to be discharged today so then VNS is available to help him on his first day at home. No other complaints or questions at this time.     Physical Exam  Constitutional: sitting in chair, in no acute distress.   Respiratory: no increased WOB, no tachypnea   Ext: warm and well perfused. L axilla wound vac in place with good seal, minimal serous drainage in vac canister   Groin: Groin dressing in place with no strikethrough.     Vital Signs Last 24 Hrs  T(C): 36.7 (28 Sep 2024 09:40), Max: 37.3 (27 Sep 2024 21:39)  T(F): 98.1 (28 Sep 2024 09:40), Max: 99.2 (27 Sep 2024 21:39)  HR: 86 (28 Sep 2024 09:40) (81 - 94)  BP: 124/81 (28 Sep 2024 09:40) (111/66 - 124/81)  BP(mean): --  RR: 18 (28 Sep 2024 09:40) (18 - 18)  SpO2: 98% (28 Sep 2024 09:40) (98% - 100%)    Parameters below as of 28 Sep 2024 09:40  Patient On (Oxygen Delivery Method): room air        I&O's Detail    27 Sep 2024 07:01  -  28 Sep 2024 07:00  --------------------------------------------------------  IN:    Oral Fluid: 1400 mL  Total IN: 1400 mL    OUT:    Voided (mL): 3200 mL  Total OUT: 3200 mL    Total NET: -1800 mL      28 Sep 2024 07:01  -  28 Sep 2024 11:25  --------------------------------------------------------  IN:    Oral Fluid: 240 mL  Total IN: 240 mL    OUT:  Total OUT: 0 mL    Total NET: 240 mL      MEDICATIONS  (STANDING):  acetaminophen     Tablet .. 975 milliGRAM(s) Oral every 6 hours  heparin   Injectable 5000 Unit(s) SubCutaneous every 8 hours  influenza   Vaccine 0.5 milliLiter(s) IntraMuscular once    MEDICATIONS  (PRN):  artificial tears (preservative free) Ophthalmic Solution 1 Drop(s) Both EYES every 1 hour PRN Dry Eyes  benzocaine/menthol Lozenge 1 Lozenge Oral every 3 hours PRN Sore Throat  calcium carbonate    500 mG (Tums) Chewable 1 Tablet(s) Chew every 4 hours PRN Dyspepsia  diphenhydrAMINE Injectable 25 milliGRAM(s) IV Push every 4 hours PRN Rash and/or Itching  HYDROmorphone  Injectable 0.2 milliGRAM(s) IV Push once PRN Breathrough Pain  melatonin 3 milliGRAM(s) Oral at bedtime PRN Sleep  metoclopramide Injectable 10 milliGRAM(s) IV Push every 6 hours PRN Breakthrough nausea/vomiting  oxyCODONE    IR 5 milliGRAM(s) Oral every 4 hours PRN Moderate Pain (4 - 6)  oxyCODONE    IR 10 milliGRAM(s) Oral every 4 hours PRN Severe Pain (7 - 10)  senna 2 Tablet(s) Oral at bedtime PRN Constipation  simethicone 80 milliGRAM(s) Chew every 6 hours PRN Gas      LABS:                    
Day 1 of Anesthesia Pain Management Service    SUBJECTIVE: I'm doing ok    Pain Scale Score:	[X] Refer to charted pain scores    THERAPY:    [ ] IV PCA Morphine		[ ] 5 mg/mL	[ ] 1 mg/mL  [X] IV PCA Hydromorphone	[ ] 5 mg/mL	[X] 1 mg/mL  [ ] IV PCA Fentanyl		[ ] 50 micrograms/mL    Demand dose: 0.2 mg     Lockout: 6 minutes   Continuous Rate: 0 mg/hr  4 Hour Limit: 4 mg    MEDICATIONS  (STANDING):  acetaminophen   IVPB .. 1000 milliGRAM(s) IV Intermittent every 6 hours  heparin   Injectable 5000 Unit(s) SubCutaneous every 8 hours  HYDROmorphone PCA (1 mG/mL) 30 milliLiter(s) PCA Continuous PCA Continuous  influenza   Vaccine 0.5 milliLiter(s) IntraMuscular once  lactated ringers. 1000 milliLiter(s) (75 mL/Hr) IV Continuous <Continuous>    MEDICATIONS  (PRN):  artificial tears (preservative free) Ophthalmic Solution 1 Drop(s) Both EYES every 1 hour PRN Dry Eyes  benzocaine/menthol Lozenge 1 Lozenge Oral every 3 hours PRN Sore Throat  calcium carbonate    500 mG (Tums) Chewable 1 Tablet(s) Chew every 4 hours PRN Dyspepsia  diphenhydrAMINE Injectable 25 milliGRAM(s) IV Push every 4 hours PRN Rash and/or Itching  HYDROmorphone PCA (1 mG/mL) Rescue Clinician Bolus 0.5 milliGRAM(s) IV Push every 15 minutes PRN for Pain Scale GREATER THAN 6  melatonin 3 milliGRAM(s) Oral at bedtime PRN Sleep  metoclopramide Injectable 10 milliGRAM(s) IV Push every 6 hours PRN Breakthrough nausea/vomiting  nalbuphine Injectable 2.5 milliGRAM(s) IV Push every 6 hours PRN Pruritus  naloxone Injectable 0.1 milliGRAM(s) IV Push every 3 minutes PRN For ANY of the following changes in patient status:  A. RR LESS THAN 10 breaths per minute, B. Oxygen saturation LESS THAN 90%, C. Sedation score of 6  ondansetron Injectable 4 milliGRAM(s) IV Push every 6 hours PRN Nausea  senna 2 Tablet(s) Oral at bedtime PRN Constipation  simethicone 80 milliGRAM(s) Chew every 6 hours PRN Gas      OBJECTIVE:    Sedation Score:	[ X] Alert 	[ ] Drowsy 	[ ] Arousable	[ ] Asleep	[ ] Unresponsive    Side Effects:	[X ] None	[ ] Nausea	[ ] Vomiting	[ ] Pruritus  		[ ] Other:    Vital Signs Last 24 Hrs  T(C): 36.6 (24 Sep 2024 09:02), Max: 37 (23 Sep 2024 11:33)  T(F): 97.9 (24 Sep 2024 09:02), Max: 98.6 (23 Sep 2024 11:33)  HR: 72 (24 Sep 2024 09:02) (72 - 117)  BP: 126/74 (24 Sep 2024 09:02) (108/67 - 126/74)  BP(mean): 69 (23 Sep 2024 20:30) (69 - 97)  RR: 18 (24 Sep 2024 09:02) (16 - 18)  SpO2: 99% (24 Sep 2024 09:02) (98% - 100%)    Parameters below as of 24 Sep 2024 09:02  Patient On (Oxygen Delivery Method): room air        ASSESSMENT/ PLAN    Therapy to  be:               [X] Continued   [ ] Discontinued   [ ] Changed to PRN Analgesics    Documentation and Verification of current medications:   [X] Done	[ ] Not done, not eligible    Comments: Endorsing good analgesia with PCA. Total use 11mg / 15.5hrs. Reeducated to PCA use. 
Surgery Progress Note  Patient is a 34y old  Male who presents with a chief complaint of Pain control and hemodynamic monitoring (25 Sep 2024 11:56)      SUBJECTIVE: Patient seen and examined at bedside with surgical team, patient without complaints. Pain is well controlled with PCA, but patient willing to try and switch to oral pain control regimen today after dressing and vac change. Overnight wound vac lost power and was turned off. Wound vac was turned on at bedside and a good seal was established.     Physical Exam  Constitutional: resting in bed comfortably, in no acute distress.   Respiratory: no increased WOB, no tachypnea  Ext: warm and well perfused. L axilla wound vac in place. Good seal after turning on vac  Groin: Dressing taken down at bedside. Debrided area with no active bleeding or drainage. No surrounding erythema or swelling. Dressing changed, patient tolerated well.     Vital Signs Last 24 Hrs  T(C): 36.9 (25 Sep 2024 13:31), Max: 37 (24 Sep 2024 17:24)  T(F): 98.5 (25 Sep 2024 13:31), Max: 98.6 (24 Sep 2024 17:24)  HR: 89 (25 Sep 2024 13:31) (75 - 95)  BP: 123/79 (25 Sep 2024 13:31) (110/71 - 123/79)  BP(mean): --  RR: 18 (25 Sep 2024 13:31) (18 - 18)  SpO2: 100% (25 Sep 2024 13:31) (97% - 100%)    Parameters below as of 25 Sep 2024 13:31  Patient On (Oxygen Delivery Method): room air        I&O's Detail    24 Sep 2024 07:01  -  25 Sep 2024 07:00  --------------------------------------------------------  IN:    IV PiggyBack: 100 mL    Lactated Ringers: 825 mL    Oral Fluid: 800 mL  Total IN: 1725 mL    OUT:    Voided (mL): 1600 mL  Total OUT: 1600 mL    Total NET: 125 mL      25 Sep 2024 07:01  -  25 Sep 2024 13:51  --------------------------------------------------------  IN:    Lactated Ringers: 300 mL    Oral Fluid: 600 mL  Total IN: 900 mL    OUT:    Voided (mL): 700 mL  Total OUT: 700 mL    Total NET: 200 mL      MEDICATIONS  (STANDING):  acetaminophen     Tablet .. 975 milliGRAM(s) Oral every 6 hours  heparin   Injectable 5000 Unit(s) SubCutaneous every 8 hours  HYDROmorphone PCA (1 mG/mL) 30 milliLiter(s) PCA Continuous PCA Continuous  influenza   Vaccine 0.5 milliLiter(s) IntraMuscular once  lactated ringers. 1000 milliLiter(s) (75 mL/Hr) IV Continuous <Continuous>    MEDICATIONS  (PRN):  artificial tears (preservative free) Ophthalmic Solution 1 Drop(s) Both EYES every 1 hour PRN Dry Eyes  benzocaine/menthol Lozenge 1 Lozenge Oral every 3 hours PRN Sore Throat  calcium carbonate    500 mG (Tums) Chewable 1 Tablet(s) Chew every 4 hours PRN Dyspepsia  diphenhydrAMINE Injectable 25 milliGRAM(s) IV Push every 4 hours PRN Rash and/or Itching  HYDROmorphone PCA (1 mG/mL) Rescue Clinician Bolus 0.5 milliGRAM(s) IV Push every 15 minutes PRN for Pain Scale GREATER THAN 6  melatonin 3 milliGRAM(s) Oral at bedtime PRN Sleep  metoclopramide Injectable 10 milliGRAM(s) IV Push every 6 hours PRN Breakthrough nausea/vomiting  nalbuphine Injectable 2.5 milliGRAM(s) IV Push every 6 hours PRN Pruritus  naloxone Injectable 0.1 milliGRAM(s) IV Push every 3 minutes PRN For ANY of the following changes in patient status:  A. RR LESS THAN 10 breaths per minute, B. Oxygen saturation LESS THAN 90%, C. Sedation score of 6  ondansetron Injectable 4 milliGRAM(s) IV Push every 6 hours PRN Nausea  senna 2 Tablet(s) Oral at bedtime PRN Constipation  simethicone 80 milliGRAM(s) Chew every 6 hours PRN Gas      LABS:                        9.6    21.08 )-----------( 367      ( 24 Sep 2024 06:25 )             30.7     09-24    132[L]  |  97  |  8   ----------------------------<  122[H]  3.7   |  24  |  0.42[L]    Ca    8.5      24 Sep 2024 06:25  Phos  2.5     09-24  Mg     2.0     09-24          Urinalysis Basic - ( 24 Sep 2024 06:25 )    Color: x / Appearance: x / SG: x / pH: x  Gluc: 122 mg/dL / Ketone: x  / Bili: x / Urobili: x   Blood: x / Protein: x / Nitrite: x   Leuk Esterase: x / RBC: x / WBC x   Sq Epi: x / Non Sq Epi: x / Bacteria: x          
Surgery Progress Note  Patient is a 34y old  Male who presents with a chief complaint of Pain control and hemodynamic monitoring (26 Sep 2024 07:31)      SUBJECTIVE: Patient seen and examined at bedside with surgical team, patient without complaints. Possibility of discharge over the weekend discussed. Patient slightly hesitant, but willing to maybe go home Sat/Sun if visiting nurse services    Physical Exam  Constitutional: NAD, resting in bed comfortably   Respiratory: no increased WOB, no tachypnea   Ext: warm and well perfused. L axilla wound vac in place with good seal, minimal serous drainage in vac canister   Groin: Dressing taken down. Wound wiped clean at bedside. No active bleeding or drainage. Dressing changed by PRS team.     Vital Signs Last 24 Hrs  T(C): 36.9 (27 Sep 2024 17:04), Max: 37.2 (26 Sep 2024 21:38)  T(F): 98.5 (27 Sep 2024 17:04), Max: 98.9 (26 Sep 2024 21:38)  HR: 90 (27 Sep 2024 17:04) (81 - 90)  BP: 111/66 (27 Sep 2024 17:04) (111/66 - 132/62)  BP(mean): --  RR: 18 (27 Sep 2024 17:04) (18 - 18)  SpO2: 100% (27 Sep 2024 17:04) (99% - 100%)    Parameters below as of 27 Sep 2024 17:04  Patient On (Oxygen Delivery Method): room air        I&O's Detail    26 Sep 2024 07:01  -  27 Sep 2024 07:00  --------------------------------------------------------  IN:    Oral Fluid: 1080 mL  Total IN: 1080 mL    OUT:    Voided (mL): 3050 mL  Total OUT: 3050 mL    Total NET: -1970 mL      27 Sep 2024 07:01  -  27 Sep 2024 17:23  --------------------------------------------------------  IN:    Oral Fluid: 720 mL  Total IN: 720 mL    OUT:    Voided (mL): 550 mL  Total OUT: 550 mL    Total NET: 170 mL      MEDICATIONS  (STANDING):  acetaminophen     Tablet .. 975 milliGRAM(s) Oral every 6 hours  heparin   Injectable 5000 Unit(s) SubCutaneous every 8 hours  influenza   Vaccine 0.5 milliLiter(s) IntraMuscular once    MEDICATIONS  (PRN):  artificial tears (preservative free) Ophthalmic Solution 1 Drop(s) Both EYES every 1 hour PRN Dry Eyes  benzocaine/menthol Lozenge 1 Lozenge Oral every 3 hours PRN Sore Throat  calcium carbonate    500 mG (Tums) Chewable 1 Tablet(s) Chew every 4 hours PRN Dyspepsia  diphenhydrAMINE Injectable 25 milliGRAM(s) IV Push every 4 hours PRN Rash and/or Itching  HYDROmorphone  Injectable 0.2 milliGRAM(s) IV Push once PRN Breathrough Pain  melatonin 3 milliGRAM(s) Oral at bedtime PRN Sleep  metoclopramide Injectable 10 milliGRAM(s) IV Push every 6 hours PRN Breakthrough nausea/vomiting  oxyCODONE    IR 5 milliGRAM(s) Oral every 4 hours PRN Moderate Pain (4 - 6)  oxyCODONE    IR 10 milliGRAM(s) Oral every 4 hours PRN Severe Pain (7 - 10)  senna 2 Tablet(s) Oral at bedtime PRN Constipation  simethicone 80 milliGRAM(s) Chew every 6 hours PRN Gas      LABS:                    
Pain Management Attending Addendum    SUBJECTIVE: Patient doing well with IV PCA    Therapy:    [X] IV PCA         [ ] PRN Analgesics    OBJECTIVE:   [X] Pain appropriately controlled    [ ] Other:    Side Effects:  [X] None	             [ ] Nausea              [ ] Pruritis                	[ ] Other:    ASSESSMENT/PLAN: Continue current therapy    Comments:
Surgery Progress Note  Patient is a 34y old  Male who presents with a chief complaint of Pain control and hemodynamic monitoring (24 Sep 2024 10:28)      SUBJECTIVE: Patient seen and examined at bedside with surgical team, patient without complaints. States he feels well and is hungry. He has been tolerating liquids, but did not eat dinner last night. States that PCA is adequately controlling pain, and he is willing to try oral pain medications tomorrow.     Physical Exam  Constitutional: resting in bed, in no acute distress  Respiratory: no increased wob, no tachypnea  Ext: warm and well perfused. L axilla wound vac in place with good seal.   Groin: Surgical dressing in place with minimal strikethrough at lateral edge of right groin dressing. No active bleeding.     Vital Signs Last 24 Hrs  T(C): 36.6 (24 Sep 2024 09:02), Max: 37 (23 Sep 2024 11:33)  T(F): 97.9 (24 Sep 2024 09:02), Max: 98.6 (23 Sep 2024 11:33)  HR: 72 (24 Sep 2024 09:02) (72 - 117)  BP: 126/74 (24 Sep 2024 09:02) (108/67 - 126/74)  BP(mean): 69 (23 Sep 2024 20:30) (69 - 97)  RR: 18 (24 Sep 2024 09:02) (16 - 18)  SpO2: 99% (24 Sep 2024 09:02) (98% - 100%)    Parameters below as of 24 Sep 2024 09:02  Patient On (Oxygen Delivery Method): room air        I&O's Detail    23 Sep 2024 07:01  -  24 Sep 2024 07:00  --------------------------------------------------------  IN:    Lactated Ringers: 750 mL  Total IN: 750 mL    OUT:    Voided (mL): 1350 mL  Total OUT: 1350 mL    Total NET: -600 mL      24 Sep 2024 07:01  -  24 Sep 2024 11:29  --------------------------------------------------------  IN:    Oral Fluid: 240 mL  Total IN: 240 mL    OUT:  Total OUT: 0 mL    Total NET: 240 mL      MEDICATIONS  (STANDING):  acetaminophen   IVPB .. 1000 milliGRAM(s) IV Intermittent every 6 hours  heparin   Injectable 5000 Unit(s) SubCutaneous every 8 hours  HYDROmorphone PCA (1 mG/mL) 30 milliLiter(s) PCA Continuous PCA Continuous  influenza   Vaccine 0.5 milliLiter(s) IntraMuscular once  lactated ringers. 1000 milliLiter(s) (75 mL/Hr) IV Continuous <Continuous>    MEDICATIONS  (PRN):  artificial tears (preservative free) Ophthalmic Solution 1 Drop(s) Both EYES every 1 hour PRN Dry Eyes  benzocaine/menthol Lozenge 1 Lozenge Oral every 3 hours PRN Sore Throat  calcium carbonate    500 mG (Tums) Chewable 1 Tablet(s) Chew every 4 hours PRN Dyspepsia  diphenhydrAMINE Injectable 25 milliGRAM(s) IV Push every 4 hours PRN Rash and/or Itching  HYDROmorphone PCA (1 mG/mL) Rescue Clinician Bolus 0.5 milliGRAM(s) IV Push every 15 minutes PRN for Pain Scale GREATER THAN 6  melatonin 3 milliGRAM(s) Oral at bedtime PRN Sleep  metoclopramide Injectable 10 milliGRAM(s) IV Push every 6 hours PRN Breakthrough nausea/vomiting  nalbuphine Injectable 2.5 milliGRAM(s) IV Push every 6 hours PRN Pruritus  naloxone Injectable 0.1 milliGRAM(s) IV Push every 3 minutes PRN For ANY of the following changes in patient status:  A. RR LESS THAN 10 breaths per minute, B. Oxygen saturation LESS THAN 90%, C. Sedation score of 6  ondansetron Injectable 4 milliGRAM(s) IV Push every 6 hours PRN Nausea  senna 2 Tablet(s) Oral at bedtime PRN Constipation  simethicone 80 milliGRAM(s) Chew every 6 hours PRN Gas      LABS:                        9.6    21.08 )-----------( 367      ( 24 Sep 2024 06:25 )             30.7     09-24    132[L]  |  97  |  8   ----------------------------<  122[H]  3.7   |  24  |  0.42[L]    Ca    8.5      24 Sep 2024 06:25  Phos  2.5     09-24  Mg     2.0     09-24          Urinalysis Basic - ( 24 Sep 2024 06:25 )    Color: x / Appearance: x / SG: x / pH: x  Gluc: 122 mg/dL / Ketone: x  / Bili: x / Urobili: x   Blood: x / Protein: x / Nitrite: x   Leuk Esterase: x / RBC: x / WBC x   Sq Epi: x / Non Sq Epi: x / Bacteria: x      ABO Interpretation: O (09-23-24 @ 14:37)      
Surgery Progress Note  Patient is a 34y old  Male who presents with a chief complaint of Pain control and hemodynamic monitoring (27 Sep 2024 17:47)      SUBJECTIVE: Patient seen and examined at bedside with surgical team, patient without complaints. Pain is well controlled. Plan for discharge discussed, patient would like to be discharged tomorrow so then VNS is available to help him on his first day at home. No other complaints or questions at this time.     Physical Exam  Constitutional: sitting in chair, in no acute distress.   Respiratory: no increased WOB, no tachypnea   Ext: warm and well perfused. L axilla wound vac in place with good seal, minimal serous drainage in vac canister   Groin: Groin dressing in place with no strikethrough.     Vital Signs Last 24 Hrs  T(C): 36.7 (28 Sep 2024 09:40), Max: 37.3 (27 Sep 2024 21:39)  T(F): 98.1 (28 Sep 2024 09:40), Max: 99.2 (27 Sep 2024 21:39)  HR: 86 (28 Sep 2024 09:40) (81 - 94)  BP: 124/81 (28 Sep 2024 09:40) (111/66 - 124/81)  BP(mean): --  RR: 18 (28 Sep 2024 09:40) (18 - 18)  SpO2: 98% (28 Sep 2024 09:40) (98% - 100%)    Parameters below as of 28 Sep 2024 09:40  Patient On (Oxygen Delivery Method): room air        I&O's Detail    27 Sep 2024 07:01  -  28 Sep 2024 07:00  --------------------------------------------------------  IN:    Oral Fluid: 1400 mL  Total IN: 1400 mL    OUT:    Voided (mL): 3200 mL  Total OUT: 3200 mL    Total NET: -1800 mL      28 Sep 2024 07:01  -  28 Sep 2024 11:25  --------------------------------------------------------  IN:    Oral Fluid: 240 mL  Total IN: 240 mL    OUT:  Total OUT: 0 mL    Total NET: 240 mL      MEDICATIONS  (STANDING):  acetaminophen     Tablet .. 975 milliGRAM(s) Oral every 6 hours  heparin   Injectable 5000 Unit(s) SubCutaneous every 8 hours  influenza   Vaccine 0.5 milliLiter(s) IntraMuscular once    MEDICATIONS  (PRN):  artificial tears (preservative free) Ophthalmic Solution 1 Drop(s) Both EYES every 1 hour PRN Dry Eyes  benzocaine/menthol Lozenge 1 Lozenge Oral every 3 hours PRN Sore Throat  calcium carbonate    500 mG (Tums) Chewable 1 Tablet(s) Chew every 4 hours PRN Dyspepsia  diphenhydrAMINE Injectable 25 milliGRAM(s) IV Push every 4 hours PRN Rash and/or Itching  HYDROmorphone  Injectable 0.2 milliGRAM(s) IV Push once PRN Breathrough Pain  melatonin 3 milliGRAM(s) Oral at bedtime PRN Sleep  metoclopramide Injectable 10 milliGRAM(s) IV Push every 6 hours PRN Breakthrough nausea/vomiting  oxyCODONE    IR 5 milliGRAM(s) Oral every 4 hours PRN Moderate Pain (4 - 6)  oxyCODONE    IR 10 milliGRAM(s) Oral every 4 hours PRN Severe Pain (7 - 10)  senna 2 Tablet(s) Oral at bedtime PRN Constipation  simethicone 80 milliGRAM(s) Chew every 6 hours PRN Gas      LABS:

## 2024-09-29 NOTE — PROVIDER CONTACT NOTE (OTHER) - ASSESSMENT
Purulent drainage from bilateral groin surgical incisions found during assessment. Patient states that he is not sure if this is a new finding. Purulent drainage from bilateral groin surgical incisions found during assessment.

## 2024-09-29 NOTE — PROGRESS NOTE ADULT - REASON FOR ADMISSION
Pain control and hemodynamic monitoring

## 2024-09-29 NOTE — PROVIDER CONTACT NOTE (OTHER) - BACKGROUND
Patient status post revision of Left axillary wound and excision of perineal bilateral inguinal hidradenitis Patient s/p revision of Left axillary wound and excision of perineal bilateral inguinal hidradenitis.

## 2024-09-29 NOTE — PROVIDER CONTACT NOTE (OTHER) - SITUATION
Purulent drainage from bilateral groin surgical incisions found during assessment Purulent drainage from bilateral groin surgical incisions found during assessment.

## 2024-09-30 LAB — SURGICAL PATHOLOGY STUDY: SIGNIFICANT CHANGE UP

## 2024-10-09 ENCOUNTER — APPOINTMENT (OUTPATIENT)
Dept: PLASTIC SURGERY | Facility: CLINIC | Age: 34
End: 2024-10-09
Payer: MEDICAID

## 2024-10-09 PROCEDURE — 99024 POSTOP FOLLOW-UP VISIT: CPT

## 2024-10-14 NOTE — H&P ADULT - SOCIAL HISTORY: SUBSTANCE USE
Pt has cp, nausea, sweating at work this am.  She ran out to car to take 1/2 metoprolol and Zyprexa but pain continues.  She feels like her heart is flip flopping.  Hx 2 MIs - no stents.     medical marihuana

## 2024-10-15 ENCOUNTER — OUTPATIENT (OUTPATIENT)
Dept: OUTPATIENT SERVICES | Facility: HOSPITAL | Age: 34
LOS: 1 days | End: 2024-10-15
Payer: MEDICAID

## 2024-10-15 VITALS
DIASTOLIC BLOOD PRESSURE: 74 MMHG | SYSTOLIC BLOOD PRESSURE: 120 MMHG | HEIGHT: 72 IN | OXYGEN SATURATION: 99 % | RESPIRATION RATE: 18 BRPM | WEIGHT: 175.05 LBS | TEMPERATURE: 98 F | HEART RATE: 99 BPM

## 2024-10-15 DIAGNOSIS — S41.002S: ICD-10-CM

## 2024-10-15 DIAGNOSIS — L73.2 HIDRADENITIS SUPPURATIVA: ICD-10-CM

## 2024-10-15 DIAGNOSIS — Z98.890 OTHER SPECIFIED POSTPROCEDURAL STATES: Chronic | ICD-10-CM

## 2024-10-15 DIAGNOSIS — Z29.9 ENCOUNTER FOR PROPHYLACTIC MEASURES, UNSPECIFIED: ICD-10-CM

## 2024-10-15 DIAGNOSIS — L73.2 HIDRADENITIS SUPPURATIVA: Chronic | ICD-10-CM

## 2024-10-15 LAB
ANION GAP SERPL CALC-SCNC: 13 MMOL/L — SIGNIFICANT CHANGE UP (ref 5–17)
BLD GP AB SCN SERPL QL: NEGATIVE — SIGNIFICANT CHANGE UP
BUN SERPL-MCNC: 16 MG/DL — SIGNIFICANT CHANGE UP (ref 7–23)
CALCIUM SERPL-MCNC: 9.2 MG/DL — SIGNIFICANT CHANGE UP (ref 8.4–10.5)
CHLORIDE SERPL-SCNC: 102 MMOL/L — SIGNIFICANT CHANGE UP (ref 96–108)
CO2 SERPL-SCNC: 22 MMOL/L — SIGNIFICANT CHANGE UP (ref 22–31)
CREAT SERPL-MCNC: 0.6 MG/DL — SIGNIFICANT CHANGE UP (ref 0.5–1.3)
EGFR: 130 ML/MIN/1.73M2 — SIGNIFICANT CHANGE UP
GLUCOSE SERPL-MCNC: 121 MG/DL — HIGH (ref 70–99)
HCT VFR BLD CALC: 35.2 % — LOW (ref 39–50)
HGB BLD-MCNC: 10.7 G/DL — LOW (ref 13–17)
MCHC RBC-ENTMCNC: 23.6 PG — LOW (ref 27–34)
MCHC RBC-ENTMCNC: 30.4 GM/DL — LOW (ref 32–36)
MCV RBC AUTO: 77.7 FL — LOW (ref 80–100)
NRBC # BLD: 0 /100 WBCS — SIGNIFICANT CHANGE UP (ref 0–0)
PLATELET # BLD AUTO: 538 K/UL — HIGH (ref 150–400)
POTASSIUM SERPL-MCNC: 3.7 MMOL/L — SIGNIFICANT CHANGE UP (ref 3.5–5.3)
POTASSIUM SERPL-SCNC: 3.7 MMOL/L — SIGNIFICANT CHANGE UP (ref 3.5–5.3)
RBC # BLD: 4.53 M/UL — SIGNIFICANT CHANGE UP (ref 4.2–5.8)
RBC # FLD: 18.1 % — HIGH (ref 10.3–14.5)
RH IG SCN BLD-IMP: POSITIVE — SIGNIFICANT CHANGE UP
SODIUM SERPL-SCNC: 137 MMOL/L — SIGNIFICANT CHANGE UP (ref 135–145)
WBC # BLD: 11.65 K/UL — HIGH (ref 3.8–10.5)
WBC # FLD AUTO: 11.65 K/UL — HIGH (ref 3.8–10.5)

## 2024-10-15 PROCEDURE — 80048 BASIC METABOLIC PNL TOTAL CA: CPT

## 2024-10-15 PROCEDURE — G0463: CPT

## 2024-10-15 PROCEDURE — 86900 BLOOD TYPING SEROLOGIC ABO: CPT

## 2024-10-15 PROCEDURE — 86901 BLOOD TYPING SEROLOGIC RH(D): CPT

## 2024-10-15 PROCEDURE — 86850 RBC ANTIBODY SCREEN: CPT

## 2024-10-15 PROCEDURE — 85027 COMPLETE CBC AUTOMATED: CPT

## 2024-10-15 NOTE — H&P PST ADULT - SKIN COMMENTS
b/l cheeks with broken skin; yellow crusting. left axilla w/ wound vac draining serosanguinous fluid. b/l groins with dressings.

## 2024-10-15 NOTE — H&P PST ADULT - ASSESSMENT
DASI score:  DASI activity:  Loose teeth or denture: denies DASI score: 5  DASI activity: ADLs, walks at home, mild chores  Loose teeth or denture: denies    CAPRINI SCORE    AGE RELATED RISK FACTORS                                                             [ ] Age 41-60 years                                            (1 Point)  [ ] Age: 61-74 years                                           (2 Points)                 [ ] Age= 75 years                                                (3 Points)             DISEASE RELATED RISK FACTORS                                                       [ ] Edema in the lower extremities                 (1 Point)                     [ ] Varicose veins                                               (1 Point)                                 [ ] BMI > 25 Kg/m2                                            (1 Point)                                  [ ] Serious infection (ie PNA)                            (1 Point)                     [ ] Lung disease ( COPD, Emphysema)            (1 Point)                                                                          [ ] Acute myocardial infarction                         (1 Point)                  [ ] Congestive heart failure (in the previous month)  (1 Point)         [ ] Inflammatory bowel disease                            (1 Point)                  [ ] Central venous access, PICC or Port               (2 points)       (within the last month)                                                                [ ] Stroke (in the previous month)                        (5 Points)    [ ] Previous or present malignancy                       (2 points)                                                                                                                                                         HEMATOLOGY RELATED FACTORS                                                         [ ] Prior episodes of VTE                                     (3 Points)                     [ ] Positive family history for VTE                      (3 Points)                  [ ] Prothrombin 26280 A                                     (3 Points)                     [ ] Factor V Leiden                                                (3 Points)                        [ ] Lupus anticoagulants                                      (3 Points)                                                           [ ] Anticardiolipin antibodies                              (3 Points)                                                       [ ] High homocysteine in the blood                   (3 Points)                                             [ ] Other congenital or acquired thrombophilia      (3 Points)                                                [ ] Heparin induced thrombocytopenia                  (3 Points)                                        MOBILITY RELATED FACTORS  [ ] Bed rest                                                         (1 Point)  [ ] Plaster cast                                                    (2 points)  [ ] Bed bound for more than 72 hours           (2 Points)    GENDER SPECIFIC FACTORS  [ ] Pregnancy or had a baby within the last month   (1 Point)  [ ] Post-partum < 6 weeks                                   (1 Point)  [ ] Hormonal therapy  or oral contraception   (1 Point)  [ ] History of pregnancy complications              (1 point)  [ ] Unexplained or recurrent              (1 Point)    OTHER RISK FACTORS                                           (1 Point)  [x ] BMI >40, smoking, diabetes requiring insulin, chemotherapy  blood transfusions and length of surgery over 2 hours    SURGERY RELATED RISK FACTORS  [ ]  Section within the last month     (1 Point)  [ ] Minor surgery                                                  (1 Point)  [ ] Arthroscopic surgery                                       (2 Points)  [ x] Planned major surgery lasting more            (2 Points)      than 45 minutes     [ ] Elective hip or knee joint replacement       (5 points)       surgery                                                TRAUMA RELATED RISK FACTORS  [ ] Fracture of the hip, pelvis, or leg                       (5 Points)  [ ] Spinal cord injury resulting in paralysis             (5 points)       (in the previous month)    [ ] Paralysis  (less than 1 month)                             (5 Points)  [ ] Multiple Trauma within 1 month                        (5 Points)    Total Score [   3     ]    Caprini Score 0-2: Low Risk, NO VTE prophylaxis required for most patients, encourage ambulation  Caprini Score 3-6: Moderate Risk , pharmacologic VTE prophylaxis is indicated for most patients (in the absence of contraindications)  Caprini Score Greater than or =7: High risk, pharmocologic VTE prophylaxis indicated for most patients (in the absence of contraindications)

## 2024-10-15 NOTE — H&P PST ADULT - ATTENDING PHYSICIAN: I HAVE REVIEWED THE CLINICAL DOCUMENTATION AND AGREE WITH THE ABOVE NOTE
[address] [facility] coding opportunities       Chart reviewed, no opportunity found: CHART REVIEWED, NO OPPORTUNITY FOUND        Patients Insurance     Medicare Insurance: Medicare Statement Selected

## 2024-10-15 NOTE — H&P PST ADULT - OTHER CARE PROVIDERS
Dr Juan Parra (hematologist) [phone], Dermatologist - Alessia, Dr Juan Parra (hematologist) [phone], Dermatologist - [place] [phone]

## 2024-10-15 NOTE — H&P PST ADULT - SKIN/BREAST COMMENTS
hidradenitis suppurativa- patient with dressing in place to B/L cheeks and B/L groins hidradenitis suppurativa- patient with dressing in place to B/L cheeks and B/L groins, left axilla w/ wound vac

## 2024-10-15 NOTE — H&P PST ADULT - HISTORY OF PRESENT ILLNESS
34 year old male with PMH of Leukocytosis, Anemia (follows with hematologist- states he was last seen 6/2024 and received iron infusion), Arthritis, Hidradenitis Suppurative s/p right axillary hidradenitis S/P excision of hidradenitis of the buttocks with skin graft reconstruction (2/2023), s/p excision of left axillary hidradenitis (4/2023), Left Axillary Wound Reconstruction with Thoracodorsal Artery  Flap, graft from left thigh 5/2023, right axillary hidradenitis removed on 2/13/24, and right axillary wound reconstruction with muscle flap and skin graft 3/2024. He presents today to PST prior to scheduled Excision of Perineal, Bilateral Inguinal Hidradenitis, Revision of Left Axillary Wound on 9/23/24. Patient denies recent fever, chills, chest pain, SOB, palpitations, or recent exposure to COVID-19.               34 year old male presents to PST prior to scheduled Complex Repair of Left Axilla, Debridement and split thickness skin grafting of fenitalia and perineum on 10/18/24 with Dr. Margarito Tariq. PMH of Leukocytosis, Anemia (follows with hematologist- states he was last seen 6/2024 and received iron infusion), Arthritis, Hidradenitis Suppurative s/p right axillary hidradenitis S/P excision of hidradenitis of the buttocks with skin graft reconstruction (2/2023), s/p excision of left axillary hidradenitis (4/2023), Left Axillary Wound Reconstruction with Thoracodorsal Artery  Flap, graft from left thigh 5/2023, right axillary hidradenitis removed on 2/13/24, and right axillary wound reconstruction with muscle flap and skin graft 3/2024. Most recently, underwent Excision of left axillary, bilateral inguinal, and perineal hidradenitis on 9/23. Patient was discharged home w/ vound vac to the left axilla on 9/29.    Denies chest pain, palpitations, sob, dizziness, fever, chills.    ?

## 2024-10-15 NOTE — H&P PST ADULT - PROBLEM SELECTOR PLAN 1
Complex Repair of Left Axilla, Debridement and split thickness skin grafting of fenitalia and perineum on 10/18/24 with Dr. Margarito Tariq.  Pre-op instructions given. Questions answered.

## 2024-10-15 NOTE — H&P PST ADULT - NSWEIGHTCALCTOOLDRUG_GEN_A_CORE
[facility] Hospitalist Progress Note      Subjective    Seen and examined  SOB slightly improved      Objective    Vitals with min/max:    Vital Last Value 24 Hour Range   Temperature 98.6 °F (37 °C) (10/05/21 1402) Temp  Min: 96.6 °F (35.9 °C)  Max: 99.7 °F (37.6 °C)   Pulse 96 (10/05/21 1403) Pulse  Min: 81  Max: 103   Respiratory 18 (10/05/21 1403) Resp  Min: 18  Max: 26   Non-Invasive  Blood Pressure 109/69 (10/05/21 1403) BP  Min: 100/66  Max: 157/82   Pulse Oximetry 91 % (10/05/21 1403) SpO2  Min: 91 %  Max: 95 %   Arterial   Blood Pressure   No data recorded      Body mass index is 44.05 kg/m².      I/O's:  No intake or output data in the 24 hours ending 10/05/21 1654      Physical Exam  General:  In no apparent distress.    Head:  No signs of head trauma.  Eyes:  Pupils are equal.  Extraocular motions intact.    Ears:  Hearing grossly intact.  Mouth:  Oropharynx is normal.   Neck:  No adenopathy, no JVD.     Chest:  Chest with clear breath sounds bilaterally.  No wheezes, rales, or rhonchi.    Cardiac:  Regular rate and rhythm.  S1 and S2, without murmurs, gallops, or rubs.  Abdomen:  Soft, without detectable tenderness.  No sign of distention.  No   rebound or guarding, and no masses palpated.   Bowel Sounds normal.  Musculoskeletal:  Good range of motion of all major joints. Extremities without clubbing, cyanosis or edema.  Vascular:  No Edema.  Peripheral pulses normal and equal in all extremities.    Neuro:  Alert and oriented x 3.  No focal sensory or strength deficits.   Speech normal.  Follows commands.  Psychiatric:  Mood normal.  Skin:  No rash or lesions.        Current Medications:  • sodium chloride (PF)  2 mL Intracatheter 2 times per day   • Potassium Standard Replacement Protocol   Does not apply See Admin Instructions   • Magnesium Standard Replacement Protocol   Does not apply See Admin Instructions   • pantoprazole  40 mg Oral Nightly   • budesonide-formoterol  2 puff Inhalation BID Resp    • sertraline  25 mg Oral Daily   • montelukast  10 mg Oral Nightly   • anastrozole  1 mg Oral Daily   • enoxaparin  40 mg Subcutaneous Q12H   • [START ON 10/6/2021] remdesivir 100 mg/250 mL NS IVPB  100 mg Intravenous Daily at noon   • [START ON 10/6/2021] dexamethasone  10 mg Intravenous Daily   • potassium CHLORIDE  40 mEq Oral Once         sodium chloride, sodium chloride, ondansetron, acetaminophen, HYDROcodone-acetaminophen, polyethylene glycol, docusate sodium-sennosides, aluminum-magnesium hydroxide-simethicone, fluticasone, guaifenesin, albuterol        Labs     Recent Results (from the past 24 hour(s))   ECG    Collection Time: 10/04/21  6:58 PM   Result Value Ref Range    Ventricular Rate EKG/Min (BPM) 82     Atrial Rate (BPM) 82     CA-Interval (MSEC) 194     QRS-Interval (MSEC) 96     QT-Interval (MSEC) 414     QTc 483     P Axis (Degrees) 51     R Axis (Degrees) 12     T Axis (Degrees) -2     REPORT TEXT       Normal sinus rhythm  Possible  Left atrial enlargement  Borderline ECG  No previous ECGs available  Confirmed by PAT LEON MD ([id number]) on 10/5/2021 9:46:37 AM     Pink Top Tube    Collection Time: 10/05/21  4:55 AM   Result Value Ref Range    Extra Tube Hold for Add Ons    Comprehensive Metabolic Panel    Collection Time: 10/05/21  4:59 AM   Result Value Ref Range    Fasting Status      Sodium 136 135 - 145 mmol/L    Potassium 3.6 3.4 - 5.1 mmol/L    Chloride 104 98 - 107 mmol/L    Carbon Dioxide 26 21 - 32 mmol/L    Anion Gap 10 10 - 20 mmol/L    Glucose 91 70 - 99 mg/dL    BUN 17 6 - 20 mg/dL    Creatinine 0.83 0.51 - 0.95 mg/dL    Glomerular Filtration Rate 80 >=60    BUN/ Creatinine Ratio 20 7 - 25    Calcium 9.0 8.4 - 10.2 mg/dL    Bilirubin, Total 0.7 0.2 - 1.0 mg/dL    GOT/AST 48 (H) <=37 Units/L    GPT/ALT 32 <64 Units/L    Alkaline Phosphatase 94 45 - 117 Units/L    Albumin 3.4 (L) 3.6 - 5.1 g/dL    Protein, Total 8.6 (H) 6.4 - 8.2 g/dL    Globulin 5.2 (H) 2.0 - 4.0 g/dL    A/G Ratio  0.7 (L) 1.0 - 2.4   CBC with Automated Differential (performable only)    Collection Time: 10/05/21  4:59 AM   Result Value Ref Range    WBC 9.0 4.2 - 11.0 K/mcL    RBC 5.87 (H) 4.00 - 5.20 mil/mcL    HGB 15.8 (H) 12.0 - 15.5 g/dL    HCT 48.6 (H) 36.0 - 46.5 %    MCV 82.8 78.0 - 100.0 fl    MCH 26.9 26.0 - 34.0 pg    MCHC 32.5 32.0 - 36.5 g/dL    RDW-CV 15.3 (H) 11.0 - 15.0 %    RDW-SD 45.6 39.0 - 50.0 fL     140 - 450 K/mcL    NRBC 0 <=0 /100 WBC    Neutrophil, Percent 88 %    Lymphocytes, Percent 9 %    Mono, Percent 3 %    Eosinophils, Percent 0 %    Basophils, Percent 0 %    Immature Granulocytes 0 %    Absolute Neutrophils 8.0 (H) 1.8 - 7.7 K/mcL    Absolute Lymphocytes 0.8 (L) 1.0 - 4.0 K/mcL    Absolute Monocytes 0.2 (L) 0.3 - 0.9 K/mcL    Absolute Eosinophils  0.0 0.0 - 0.5 K/mcL    Absolute Basophils 0.0 0.0 - 0.3 K/mcL    Absolute Immmature Granulocytes 0.0 0.0 - 0.2 K/mcL   Light Blue Top    Collection Time: 10/05/21  5:02 AM   Result Value Ref Range    Extra Tube Hold for Add Ons    Lavender Top    Collection Time: 10/05/21  5:02 AM   Result Value Ref Range    Extra Tube Hold for Add Ons    Gold Top    Collection Time: 10/05/21  5:02 AM   Result Value Ref Range    Extra Tube Hold for Add Ons    Gold Top    Collection Time: 10/05/21  5:02 AM   Result Value Ref Range    Extra Tube Hold for Add Ons    Pink Top Tube    Collection Time: 10/05/21  5:02 AM   Result Value Ref Range    Extra Tube Hold for Add Ons    Troponin I Ultra Sensitive    Collection Time: 10/05/21  5:02 AM   Result Value Ref Range    Troponin I, Ultra Sensitive <0.02 <=0.04 ng/mL   C Reactive Protein    Collection Time: 10/05/21  5:02 AM   Result Value Ref Range    C-Reactive Protein 17.0 (H) <=1.0 mg/dL   Ferritin    Collection Time: 10/05/21  5:02 AM   Result Value Ref Range    Ferritin 304 (H) 8 - 252 ng/mL   C Reactive Protein    Collection Time: 10/05/21  5:02 AM   Result Value Ref Range    C-Reactive Protein 16.0 (H) <=1.0  mg/dL   Ferritin    Collection Time: 10/05/21  5:02 AM   Result Value Ref Range    Ferritin 292 (H) 8 - 252 ng/mL   D Dimer, Quantitative    Collection Time: 10/05/21  8:21 AM   Result Value Ref Range    D Dimer, Quantitative 0.50 <0.57 mg/L (FEU)       Microbiology Results     None           Imaging    [unfilled]     CXR personally reviewed:  XR CHEST PA AND LATERAL 2 VIEWS   Final Result      Interval increase in multifocal airspace opacities.  Findings likely   represent interval progression of atypical infectious process such as   Covid-19 pneumonia.      Electronically Signed by: ELISE BAKER MD    Signed on: 10/4/2021 8:00 PM          CTA CHEST PULMONARY EMBOLISM W CONTRAST    (Results Pending)       Cardiac studies:   ECG personally reviewed:   Encounter Date: 10/05/21   ECG   Result Value    Ventricular Rate EKG/Min (BPM) 82    Atrial Rate (BPM) 82    NC-Interval (MSEC) 194    QRS-Interval (MSEC) 96    QT-Interval (MSEC) 414    QTc 483    P Axis (Degrees) 51    R Axis (Degrees) 12    T Axis (Degrees) -2    REPORT TEXT      Normal sinus rhythm  Possible  Left atrial enlargement  Borderline ECG  No previous ECGs available  Confirmed by PAT LEON MD ([id number]) on 10/5/2021 9:46:37 AM         Microbiology Results     None            Assessment and Plan    Acute hypoxic respiratory failure secondary to COVID-19 pneumonia  Chest x-ray personally reviewed.  Noted to have interval increase in multifocal airspace opacities which likely represent progression of a stable infectious process such as Covid pneumonia.  Started remdesivir for 5 days  Increase Decadron to 10 mg  Per pulm recs  CRP elevated to 17, D-dimer WNL  procalcitonin WNL  F/u CTPE   Pulmonology following  Wean oxygen to maintain O2 sats greater than 92%  Incentive spirometry  Self proning as tolerated     Asthma, moderate persistent  Continue Symbicort and montelukast    Diarrhea  Suspect from Vocid infection  Supportive care     Mixed connective  tissue disease  Resume Plaquenil. Pending pharmacy verification of dose     Hypertension  Resume bisoprolol pending reconciliation  Hold hydrochlorothiazide for now     History of breast cancer  Continue anastrozole     History of Hodgkin's lymphoma     GERD  Continue pantoprazole     Depression  Continue Zoloft 25 mg      DVT prophylaxis:SCD,   Current Active Medications for DVT Prophylaxis (From admission, onward)         Stop     enoxaparin (LOVENOX) injection 40 mg  40 mg,   Subcutaneous,   EVERY 12 HOURS         --               Diet:The patient is asked to make an attempt to improve diet and exercise patterns to aid in medical management of this problem.    Code status:  Code Status Information     Code Status    Full Resuscitation          Disposition: Inpatient    Communication: discussed plan of care with nurse, patient     PCP: informed Leigh Ann Goldman MD of patient admission by [employer]    Time spent >40 minutes and greater than 50% of the time was spent reviewing the patient records, coordinating patient care plan and discussing the above care plan  with the patient, nurse and consultants.    Destin Lucero MD  [facility]    10/5/2021 4:54 PM      used

## 2024-10-16 ENCOUNTER — APPOINTMENT (OUTPATIENT)
Dept: PLASTIC SURGERY | Facility: CLINIC | Age: 34
End: 2024-10-16

## 2024-10-16 DIAGNOSIS — S41.102D UNSPECIFIED OPEN WOUND OF LEFT UPPER ARM, SUBSEQUENT ENCOUNTER: ICD-10-CM

## 2024-10-16 DIAGNOSIS — L73.2 HIDRADENITIS SUPPURATIVA: ICD-10-CM

## 2024-10-18 ENCOUNTER — APPOINTMENT (OUTPATIENT)
Dept: PLASTIC SURGERY | Facility: HOSPITAL | Age: 34
End: 2024-10-18

## 2024-10-18 ENCOUNTER — INPATIENT (INPATIENT)
Facility: HOSPITAL | Age: 34
LOS: 8 days | Discharge: HOME | DRG: 607 | End: 2024-10-27
Attending: SURGERY | Admitting: SURGERY
Payer: MEDICAID

## 2024-10-18 VITALS
OXYGEN SATURATION: 98 % | HEART RATE: 91 BPM | SYSTOLIC BLOOD PRESSURE: 122 MMHG | RESPIRATION RATE: 20 BRPM | DIASTOLIC BLOOD PRESSURE: 78 MMHG | TEMPERATURE: 98 F

## 2024-10-18 DIAGNOSIS — Z98.890 OTHER SPECIFIED POSTPROCEDURAL STATES: Chronic | ICD-10-CM

## 2024-10-18 DIAGNOSIS — L73.2 HIDRADENITIS SUPPURATIVA: Chronic | ICD-10-CM

## 2024-10-18 DIAGNOSIS — L73.2 HIDRADENITIS SUPPURATIVA: ICD-10-CM

## 2024-10-18 PROCEDURE — 15120 SPLT AGRFT F/S/N/H/F/G/M 1ST: CPT | Mod: 58

## 2024-10-18 PROCEDURE — 15121 SPLT AGRFT F/S/N/H/F/G/M EA: CPT

## 2024-10-18 PROCEDURE — 15005 WND PREP F/N/HF/G ADDL CM: CPT

## 2024-10-18 PROCEDURE — 15004 WOUND PREP F/N/HF/G: CPT | Mod: 58

## 2024-10-18 RX ORDER — CEFAZOLIN SODIUM 1 G
2000 VIAL (EA) INJECTION EVERY 8 HOURS
Refills: 0 | Status: DISCONTINUED | OUTPATIENT
Start: 2024-10-18 | End: 2024-10-23

## 2024-10-18 RX ORDER — HEPARIN SODIUM 10000 [USP'U]/ML
5000 INJECTION INTRAVENOUS; SUBCUTANEOUS EVERY 8 HOURS
Refills: 0 | Status: DISCONTINUED | OUTPATIENT
Start: 2024-10-18 | End: 2024-10-20

## 2024-10-18 RX ORDER — MAGNESIUM, ALUMINUM HYDROXIDE 200-200 MG
30 TABLET,CHEWABLE ORAL EVERY 4 HOURS
Refills: 0 | Status: DISCONTINUED | OUTPATIENT
Start: 2024-10-18 | End: 2024-10-27

## 2024-10-18 RX ORDER — ONDANSETRON HYDROCHLORIDE 2 MG/ML
4 INJECTION, SOLUTION INTRAMUSCULAR; INTRAVENOUS EVERY 6 HOURS
Refills: 0 | Status: DISCONTINUED | OUTPATIENT
Start: 2024-10-18 | End: 2024-10-25

## 2024-10-18 RX ORDER — ONDANSETRON HYDROCHLORIDE 2 MG/ML
4 INJECTION, SOLUTION INTRAMUSCULAR; INTRAVENOUS EVERY 6 HOURS
Refills: 0 | Status: DISCONTINUED | OUTPATIENT
Start: 2024-10-18 | End: 2024-10-27

## 2024-10-18 RX ORDER — LIDOCAINE HCL 60 MG/3 ML
0.2 SYRINGE (ML) INJECTION ONCE
Refills: 0 | Status: DISCONTINUED | OUTPATIENT
Start: 2024-10-18 | End: 2024-10-18

## 2024-10-18 RX ORDER — ACETAMINOPHEN 500 MG
650 TABLET ORAL EVERY 8 HOURS
Refills: 0 | Status: DISCONTINUED | OUTPATIENT
Start: 2024-10-18 | End: 2024-10-24

## 2024-10-18 RX ORDER — CEFAZOLIN SODIUM 1 G
2000 VIAL (EA) INJECTION ONCE
Refills: 0 | Status: COMPLETED | OUTPATIENT
Start: 2024-10-18 | End: 2024-10-18

## 2024-10-18 RX ORDER — CHLORHEXIDINE GLUCONATE 40 MG/ML
1 SOLUTION TOPICAL ONCE
Refills: 0 | Status: DISCONTINUED | OUTPATIENT
Start: 2024-10-18 | End: 2024-10-18

## 2024-10-18 RX ORDER — ONDANSETRON HYDROCHLORIDE 2 MG/ML
4 INJECTION, SOLUTION INTRAMUSCULAR; INTRAVENOUS ONCE
Refills: 0 | Status: DISCONTINUED | OUTPATIENT
Start: 2024-10-18 | End: 2024-10-18

## 2024-10-18 RX ORDER — HEPARIN SODIUM 10000 [USP'U]/ML
5000 INJECTION INTRAVENOUS; SUBCUTANEOUS EVERY 8 HOURS
Refills: 0 | Status: DISCONTINUED | OUTPATIENT
Start: 2024-10-18 | End: 2024-10-18

## 2024-10-18 RX ORDER — ACETAMINOPHEN 500 MG
975 TABLET ORAL EVERY 8 HOURS
Refills: 0 | Status: DISCONTINUED | OUTPATIENT
Start: 2024-10-18 | End: 2024-10-18

## 2024-10-18 RX ORDER — CALCIUM CARBONATE 400(1000)
1 TABLET,CHEWABLE ORAL EVERY 4 HOURS
Refills: 0 | Status: DISCONTINUED | OUTPATIENT
Start: 2024-10-18 | End: 2024-10-27

## 2024-10-18 RX ORDER — HYDROMORPHONE HCL/0.9% NACL/PF 6 MG/30 ML
0.5 PATIENT CONTROLLED ANALGESIA SYRINGE INTRAVENOUS
Refills: 0 | Status: DISCONTINUED | OUTPATIENT
Start: 2024-10-18 | End: 2024-10-22

## 2024-10-18 RX ORDER — NALOXONE HYDROCHLORIDE 1 MG/ML
0.1 INJECTION, SOLUTION INTRAMUSCULAR; INTRAVENOUS; SUBCUTANEOUS
Refills: 0 | Status: DISCONTINUED | OUTPATIENT
Start: 2024-10-18 | End: 2024-10-25

## 2024-10-18 RX ORDER — HYDROMORPHONE HCL/0.9% NACL/PF 6 MG/30 ML
30 PATIENT CONTROLLED ANALGESIA SYRINGE INTRAVENOUS
Refills: 0 | Status: DISCONTINUED | OUTPATIENT
Start: 2024-10-18 | End: 2024-10-21

## 2024-10-18 RX ORDER — NALBUPHINE HCL 100 %
2.5 POWDER (GRAM) MISCELLANEOUS EVERY 6 HOURS
Refills: 0 | Status: DISCONTINUED | OUTPATIENT
Start: 2024-10-18 | End: 2024-10-25

## 2024-10-18 RX ORDER — SODIUM CHLORIDE 9 MG/ML
3 INJECTION, SOLUTION INTRAMUSCULAR; INTRAVENOUS; SUBCUTANEOUS EVERY 8 HOURS
Refills: 0 | Status: DISCONTINUED | OUTPATIENT
Start: 2024-10-18 | End: 2024-10-18

## 2024-10-18 RX ORDER — HYDROMORPHONE HCL/0.9% NACL/PF 6 MG/30 ML
0.5 PATIENT CONTROLLED ANALGESIA SYRINGE INTRAVENOUS
Refills: 0 | Status: DISCONTINUED | OUTPATIENT
Start: 2024-10-18 | End: 2024-10-18

## 2024-10-18 RX ADMIN — Medication 0.5 MILLIGRAM(S): at 11:10

## 2024-10-18 RX ADMIN — Medication 75 MILLILITER(S): at 12:00

## 2024-10-18 RX ADMIN — Medication 30 MILLILITER(S): at 14:57

## 2024-10-18 RX ADMIN — Medication 30 MILLILITER(S): at 12:05

## 2024-10-18 RX ADMIN — Medication 30 MILLILITER(S): at 14:34

## 2024-10-18 RX ADMIN — Medication 30 MILLILITER(S): at 19:16

## 2024-10-18 RX ADMIN — HEPARIN SODIUM 5000 UNIT(S): 10000 INJECTION INTRAVENOUS; SUBCUTANEOUS at 21:26

## 2024-10-18 RX ADMIN — Medication 100 MILLIGRAM(S): at 16:27

## 2024-10-18 NOTE — PATIENT PROFILE ADULT - SAFE PLACE TO LIVE
Post-Op Assessment Note    CV Status:  Stable  Pain Score: 0    Pain management: adequate     Mental Status:  Alert and awake   Hydration Status:  Euvolemic   PONV Controlled:  Controlled   Airway Patency:  Patent   Post Op Vitals Reviewed: Yes      Staff: CRNA           BP   146/64   Temp   98 3   Pulse 72   Resp   19   SpO2   99% 6LO2nc no

## 2024-10-18 NOTE — PATIENT PROFILE ADULT - FALL HARM RISK - FACTORS NURSING JUDGEMENT
PHYSICAL EXAM:  GENERAL: in NAD, Sitting comfortable in bed, in no respiratory distress  HEAD: Atraumatic, no gardner's sign, no periorbital ecchymosis   EYES: PERRL, EOMs intact b/l w/out deficits  ENMT: Moist membranes, no anterior/posterior, or supraclavicular LAD  CHEST/LUNG: CTAB no wheezes/rhonchi/rales  HEART: RRR no murmur/gallops/rubs  ABDOMEN: +epigastric tenderness noted with voluntary guarding but without rigidity or rebound  EXTREMITIES: No LE edema, +2 radial pulses b/l  NERVOUS SYSTEM:  A&Ox4, No motor deficits or sensory deficits to b/l UEs  Heme/LYMPH: No ecchymosis or bruising or LAD  SKIN:  No new rashes or DTIs No

## 2024-10-18 NOTE — PRE-ANESTHESIA EVALUATION ADULT - HEART RATE (BEATS/MIN)
ONCOLOGY/HEMATOLOGY OUTPATIENT CLINIC FOLLOW-UP NOTE    Patient:  Lolis Cunningham   MRN:  [id number]  YOB: 1988  Date of Visit:  12/6/2023     REQUESTING PHYSICIAN:  Buzz Escobedo 
91
Sarwat Jama MD  Radiation Oncology  [phone]     [facility]  [address]  [address]  [facility] [address]  Phone: [phone]  
([place], SP1 clone), RI ([place], 8020-0420.     Interpretation by Elisabeth Pleitez MD  Case interpreted at [address] [address]  [phone]         Addendum electronically signed by Elisabeth Pleitez [id number],M0, IA ER negative, TX negative, her 2 travis: [phone]    
15-May-2023

## 2024-10-18 NOTE — BRIEF OPERATIVE NOTE - OPERATION/FINDINGS
Debridement of groin wound. Application of split thickness skin graft, sutured in place. Donor site left thigh. L axilla not debrided or addressed in OR, wound dressed with aquacell, abd pads.     Graft dressed with bacitracin, xeroform, gauze fluffs, abd pads, foam tape -- Donor site dressed with aquacell, ioband    For full operative details please see operative dictation.

## 2024-10-18 NOTE — PATIENT PROFILE ADULT - NS PRO AD ANY ON CHART
Addendum Note - Medicine Attending:  ========================================================    The patient was seen and examined by me today.     The patient's SIMEON is improving. Will check Cr in the morning. Depending on renal function will decide on CTA chest to r/o PE. For now continue heparin gtt given hypoxia and high d-dimer.   COVID-19 infection - continue remdesivir, decadron.     Marquez Garner MD, SUPRIYA  [phone]
BRIAN BARBA  MRN [id number]    Interval History: Patient with A fib w/ RVR during the day, started on Cardizem gtt per cards and Cardizem 30mg PO q 6.    Patient recently converted to NSR, with HR in 70s on telemetry. HD stable.  As per cards, will wean off Cardizem gtt and maintain patient on PO Cardizem 30 mg q 6.  Will continue to closely monitor and assess.  Day team and attending to follow.    -Jaqueline Jack PA-C, [id number], Dept of Medicine
Called by RN to report that pt desaturated to 78% on 4L nasal cannula when attempting to ambulate to bathroom. HR in the 160's (on tele monitor Afib with RVR). Pt now saturating 96% on 4L and -120's (rapid afib) in bed. Pt denies any discomfort/distress. Discussed with cardiology NP (Christel) continue current regimen. Can give Cardizem IVP if needed. Will continue to closely monitor.     Carmen Saini, NP-c  [id number]
Notified by  RN that patient converted by to Afib 120-130's after walking to the restroom. Pt seen and evaluated, no acute findings. VSS. EKG - Afib with RVR 115bpm.  Pt resting comfortable in bed. Case discussed with Dr Ananth Morse. Will continue to monitor
Patient João Choudhary will require home oxygen based on a diagnosis of Covid 19, ICD U07.1. Acute conditions and exacerbations have been treated, are currently resolved and pt is in a chronic stable state.   Room air saturation at rest 84%.      Valerie Snow NP   SL [phone]
Notified by RN; patient's 's ( 140's at times). VSS. Will give Cardizem 5mg IVP;  as discussed earlier with Dr Hickey. Will continue to monitor
No

## 2024-10-18 NOTE — PATIENT PROFILE ADULT - FALL HARM RISK - UNIVERSAL INTERVENTIONS
Bed in lowest position, wheels locked, appropriate side rails in place/Call bell, personal items and telephone in reach/Instruct patient to call for assistance before getting out of bed or chair/Non-slip footwear when patient is out of bed/[place] to call system/Physically safe environment - no spills, clutter or unnecessary equipment/Purposeful Proactive Rounding/Room/bathroom lighting operational, light cord in reach

## 2024-10-19 LAB
ANION GAP SERPL CALC-SCNC: 11 MMOL/L — SIGNIFICANT CHANGE UP (ref 5–17)
BUN SERPL-MCNC: 8 MG/DL — SIGNIFICANT CHANGE UP (ref 7–23)
CALCIUM SERPL-MCNC: 8.5 MG/DL — SIGNIFICANT CHANGE UP (ref 8.4–10.5)
CHLORIDE SERPL-SCNC: 101 MMOL/L — SIGNIFICANT CHANGE UP (ref 96–108)
CO2 SERPL-SCNC: 23 MMOL/L — SIGNIFICANT CHANGE UP (ref 22–31)
CREAT SERPL-MCNC: 0.51 MG/DL — SIGNIFICANT CHANGE UP (ref 0.5–1.3)
EGFR: 136 ML/MIN/1.73M2 — SIGNIFICANT CHANGE UP
GLUCOSE SERPL-MCNC: 113 MG/DL — HIGH (ref 70–99)
HCT VFR BLD CALC: 29.1 % — LOW (ref 39–50)
HGB BLD-MCNC: 8.7 G/DL — LOW (ref 13–17)
MCHC RBC-ENTMCNC: 23.5 PG — LOW (ref 27–34)
MCHC RBC-ENTMCNC: 29.9 GM/DL — LOW (ref 32–36)
MCV RBC AUTO: 78.6 FL — LOW (ref 80–100)
NRBC # BLD: 0 /100 WBCS — SIGNIFICANT CHANGE UP (ref 0–0)
PLATELET # BLD AUTO: 429 K/UL — HIGH (ref 150–400)
POTASSIUM SERPL-MCNC: 3.5 MMOL/L — SIGNIFICANT CHANGE UP (ref 3.5–5.3)
POTASSIUM SERPL-SCNC: 3.5 MMOL/L — SIGNIFICANT CHANGE UP (ref 3.5–5.3)
RBC # BLD: 3.7 M/UL — LOW (ref 4.2–5.8)
RBC # FLD: 18.1 % — HIGH (ref 10.3–14.5)
SODIUM SERPL-SCNC: 135 MMOL/L — SIGNIFICANT CHANGE UP (ref 135–145)
WBC # BLD: 12.8 K/UL — HIGH (ref 3.8–10.5)
WBC # FLD AUTO: 12.8 K/UL — HIGH (ref 3.8–10.5)

## 2024-10-19 RX ORDER — POTASSIUM CHLORIDE 10 MEQ
40 TABLET, EXTENDED RELEASE ORAL ONCE
Refills: 0 | Status: COMPLETED | OUTPATIENT
Start: 2024-10-19 | End: 2024-10-19

## 2024-10-19 RX ADMIN — Medication 40 MILLIEQUIVALENT(S): at 08:28

## 2024-10-19 RX ADMIN — HEPARIN SODIUM 5000 UNIT(S): 10000 INJECTION INTRAVENOUS; SUBCUTANEOUS at 13:10

## 2024-10-19 RX ADMIN — Medication 650 MILLIGRAM(S): at 22:40

## 2024-10-19 RX ADMIN — Medication 30 MILLILITER(S): at 07:32

## 2024-10-19 RX ADMIN — Medication 100 MILLIGRAM(S): at 00:12

## 2024-10-19 RX ADMIN — HEPARIN SODIUM 5000 UNIT(S): 10000 INJECTION INTRAVENOUS; SUBCUTANEOUS at 05:49

## 2024-10-19 RX ADMIN — Medication 650 MILLIGRAM(S): at 14:11

## 2024-10-19 RX ADMIN — Medication 650 MILLIGRAM(S): at 05:48

## 2024-10-19 RX ADMIN — HEPARIN SODIUM 5000 UNIT(S): 10000 INJECTION INTRAVENOUS; SUBCUTANEOUS at 21:41

## 2024-10-19 RX ADMIN — Medication 100 MILLIGRAM(S): at 08:27

## 2024-10-19 RX ADMIN — Medication 100 MILLIGRAM(S): at 23:23

## 2024-10-19 RX ADMIN — Medication 650 MILLIGRAM(S): at 21:41

## 2024-10-19 RX ADMIN — Medication 650 MILLIGRAM(S): at 06:45

## 2024-10-19 RX ADMIN — Medication 30 MILLILITER(S): at 19:20

## 2024-10-19 RX ADMIN — Medication 100 MILLIGRAM(S): at 15:45

## 2024-10-19 RX ADMIN — Medication 650 MILLIGRAM(S): at 13:11

## 2024-10-19 NOTE — PROGRESS NOTE ADULT - ASSESSMENT
A: Mr Reyna is now s/p L groin debridement and STSG from L thigh on 10/18/24. Patient recovering well.     Plan:  -Bedrest until POD5  -Cont alaniz until POD5  -Ancef until POD5  -Pain control: PCA  -Keep dressings in place    [facility]  Plastic Surgery  #[phone] [facility] pager  [phone] [facility] pager  Available on teams  A: Mr Reyna is now s/p L groin debridement and STSG from L thigh on 10/18/24. Patient recovering well.     Plan:  -Bedrest until POD5  -Cont alaniz until POD5  -Ancef until POD5  -Pain control: PCA  -No need for daily labs; will only get if status changes  -Keep dressings in place until POD5 as bolster dressing; can reinforce if needed    [facility]  Plastic Surgery  #[phone] [facility] pager  [phone] [facility] pager  Available on teams

## 2024-10-19 NOTE — PROGRESS NOTE ADULT - SUBJECTIVE AND OBJECTIVE BOX
Day 1 of Anesthesia Pain Management Service    SUBJECTIVE: Patient is doing well with IV PCA    Pain Scale Score:	[X] Refer to charted pain scores    THERAPY:    [ ] IV PCA Morphine		[ ] 5 mg/mL	[ ] 1 mg/mL  [X] IV PCA Hydromorphone	[ ] 5 mg/mL	[X] 1 mg/mL  [ ] IV PCA Fentanyl		[ ] 50 micrograms/mL    Demand dose: 0.2 mg     Lockout: 6 minutes   Continuous Rate: 0 mg/hr  4 Hour Limit: 4 mg    MEDICATIONS  (STANDING):  acetaminophen     Tablet .. 650 milliGRAM(s) Oral every 8 hours  ceFAZolin   IVPB 2000 milliGRAM(s) IV Intermittent every 8 hours  heparin   Injectable 5000 Unit(s) SubCutaneous every 8 hours  HYDROmorphone PCA (1 mG/mL) 30 milliLiter(s) PCA Continuous PCA Continuous  lactated ringers. 1000 milliLiter(s) (75 mL/Hr) IV Continuous <Continuous>    MEDICATIONS  (PRN):  aluminum hydroxide/magnesium hydroxide/simethicone Suspension 30 milliLiter(s) Oral every 4 hours PRN Gas  calcium carbonate    500 mG (Tums) Chewable 1 Tablet(s) Chew every 4 hours PRN Dyspepsia  HYDROmorphone PCA (1 mG/mL) Rescue Clinician Bolus 0.5 milliGRAM(s) IV Push every 15 minutes PRN for Pain Scale GREATER THAN 6  nalbuphine Injectable 2.5 milliGRAM(s) IV Push every 6 hours PRN Pruritus  naloxone Injectable 0.1 milliGRAM(s) IV Push every 3 minutes PRN For ANY of the following changes in patient status:  A. RR LESS THAN 10 breaths per minute, B. Oxygen saturation LESS THAN 90%, C. Sedation score of 6  ondansetron Injectable 4 milliGRAM(s) IV Push every 6 hours PRN Nausea and/or Vomiting  ondansetron Injectable 4 milliGRAM(s) IV Push every 6 hours PRN Nausea      OBJECTIVE:    Sedation Score:	[ X] Alert	[ ] Drowsy 	[ ] Arousable	[ ] Asleep	[ ] Unresponsive    Side Effects:	[X ] None	[ ] Nausea	[ ] Vomiting	[ ] Pruritus  		[ ] Other:    Vital Signs Last 24 Hrs  T(C): 36.4 (19 Oct 2024 05:02), Max: 36.8 (18 Oct 2024 09:42)  T(F): 97.6 (19 Oct 2024 05:02), Max: 98 (18 Oct 2024 18:12)  HR: 81 (19 Oct 2024 05:02) (64 - 91)  BP: 117/69 (19 Oct 2024 05:02) (89/53 - 122/78)  BP(mean): 67 (18 Oct 2024 14:30) (64 - 74)  RR: 18 (19 Oct 2024 05:02) (14 - 20)  SpO2: 99% (19 Oct 2024 05:02) (98% - 100%)    Parameters below as of 19 Oct 2024 05:02  Patient On (Oxygen Delivery Method): room air        ASSESSMENT/ PLAN    Therapy to  be:               [X] Continued   [ ] Discontinued   [ ] Changed to PRN Analgesics    Documentation and Verification of current medications:   [X] Done	[ ] Not done, not eligible    Comments:

## 2024-10-19 NOTE — PROGRESS NOTE ADULT - SUBJECTIVE AND OBJECTIVE BOX
[facility] Plastic Surgery Daily Progress Note  =====================================================    SUBJECTIVE: Patient seen and examined at bedside on AM rounds. Patient reports that they're feeling well.       ALLERGIES:  No Known Allergies      --------------------------------------------------------------------------------------    MEDICATIONS:    Neurologic Medications  acetaminophen     Tablet .. 650 milliGRAM(s) Oral every 8 hours  HYDROmorphone PCA (1 mG/mL) 30 milliLiter(s) PCA Continuous PCA Continuous  HYDROmorphone PCA (1 mG/mL) Rescue Clinician Bolus 0.5 milliGRAM(s) IV Push every 15 minutes PRN for Pain Scale GREATER THAN 6  nalbuphine Injectable 2.5 milliGRAM(s) IV Push every 6 hours PRN Pruritus  ondansetron Injectable 4 milliGRAM(s) IV Push every 6 hours PRN Nausea and/or Vomiting  ondansetron Injectable 4 milliGRAM(s) IV Push every 6 hours PRN Nausea    Respiratory Medications    Cardiovascular Medications    Gastrointestinal Medications  aluminum hydroxide/magnesium hydroxide/simethicone Suspension 30 milliLiter(s) Oral every 4 hours PRN Gas  calcium carbonate    500 mG (Tums) Chewable 1 Tablet(s) Chew every 4 hours PRN Dyspepsia  lactated ringers. 1000 milliLiter(s) IV Continuous <Continuous>    Genitourinary Medications    Hematologic/Oncologic Medications  heparin   Injectable 5000 Unit(s) SubCutaneous every 8 hours    Antimicrobial/Immunologic Medications  ceFAZolin   IVPB 2000 milliGRAM(s) IV Intermittent every 8 hours    Endocrine/Metabolic Medications    Topical/Other Medications  naloxone Injectable 0.1 milliGRAM(s) IV Push every 3 minutes PRN For ANY of the following changes in patient status:  A. RR LESS THAN 10 breaths per minute, B. Oxygen saturation LESS THAN 90%, C. Sedation score of 6    --------------------------------------------------------------------------------------    VITAL SIGNS:  Vital Signs Last 24 Hrs  T(C): 36.6 (19 Oct 2024 09:08), Max: 36.7 (18 Oct 2024 18:12)  T(F): 97.8 (19 Oct 2024 09:08), Max: 98 (18 Oct 2024 18:12)  HR: 74 (19 Oct 2024 09:08) (64 - 81)  BP: 120/72 (19 Oct 2024 09:08) (89/53 - 120/72)  BP(mean): 67 (18 Oct 2024 14:30) (64 - 73)  ABP: --  ABP(mean): --  RR: 18 (19 Oct 2024 09:08) (15 - 20)  SpO2: 98% (19 Oct 2024 09:08) (98% - 100%)    O2 Parameters below as of 19 Oct 2024 09:08  Patient On (Oxygen Delivery Method): room air      --------------------------------------------------------------------------------------    EXAM    General: NAD, resting in bed comfortably.  Cardiac: regular rate, warm and well perfused  Extremities:   LLE: Groin dressing cdi and in place. Donor site dressing in place and with some SS strikethrough. Extremities warm and appear well perfused.     --------------------------------------------------------------------------------------    LABS                          8.7    12.80 )-----------( 429      ( 19 Oct 2024 07:03 )             29.1     10-19    135  |  101  |  8   ----------------------------<  113[H]  3.5   |  23  |  0.51    Ca    8.5      19 Oct 2024 07:03        --------------------------------------------------------------------------------------    INS AND OUTS:    I&O's Detail    18 Oct 2024 07:01  -  19 Oct 2024 07:00  --------------------------------------------------------  IN:    Lactated Ringers: 1050 mL    Oral Fluid: 440 mL  Total IN: 1490 mL    OUT:    Indwelling Catheter - Urethral (mL): 2925 mL  Total OUT: 2925 mL    Total NET: -1435 mL      19 Oct 2024 07:01  -  19 Oct 2024 11:25  --------------------------------------------------------  IN:    Oral Fluid: 120 mL  Total IN: 120 mL    OUT:    Indwelling Catheter - Urethral (mL): 750 mL  Total OUT: 750 mL    Total NET: -630 mL    --------------------------------------------------------------------------------------

## 2024-10-20 RX ORDER — ENOXAPARIN SODIUM 40MG/0.4ML
40 SYRINGE (ML) SUBCUTANEOUS EVERY 24 HOURS
Refills: 0 | Status: DISCONTINUED | OUTPATIENT
Start: 2024-10-20 | End: 2024-10-25

## 2024-10-20 RX ADMIN — Medication 650 MILLIGRAM(S): at 23:02

## 2024-10-20 RX ADMIN — Medication 650 MILLIGRAM(S): at 14:44

## 2024-10-20 RX ADMIN — Medication 30 MILLILITER(S): at 07:20

## 2024-10-20 RX ADMIN — Medication 650 MILLIGRAM(S): at 13:41

## 2024-10-20 RX ADMIN — Medication 30 MILLILITER(S): at 19:35

## 2024-10-20 RX ADMIN — Medication 100 MILLIGRAM(S): at 23:02

## 2024-10-20 RX ADMIN — Medication 100 MILLIGRAM(S): at 08:00

## 2024-10-20 RX ADMIN — Medication 650 MILLIGRAM(S): at 06:30

## 2024-10-20 RX ADMIN — HEPARIN SODIUM 5000 UNIT(S): 10000 INJECTION INTRAVENOUS; SUBCUTANEOUS at 05:30

## 2024-10-20 RX ADMIN — Medication 100 MILLIGRAM(S): at 15:48

## 2024-10-20 RX ADMIN — Medication 40 MILLIGRAM(S): at 13:40

## 2024-10-20 RX ADMIN — Medication 650 MILLIGRAM(S): at 05:30

## 2024-10-20 NOTE — PROGRESS NOTE ADULT - SUBJECTIVE AND OBJECTIVE BOX
Day __ of Anesthesia Pain Management Service    SUBJECTIVE: Patient is doing well with IV PCA    Pain Scale Score:	[X] Refer to charted pain scores    THERAPY:    [ ] IV PCA Morphine		[ ] 5 mg/mL	[ ] 1 mg/mL  [X] IV PCA Hydromorphone	[ ] 5 mg/mL	[X] 1 mg/mL  [ ] IV PCA Fentanyl		[ ] 50 micrograms/mL    Demand dose: 0.2 mg     Lockout: 6 minutes   Continuous Rate: 0 mg/hr  4 Hour Limit: 4 mg    MEDICATIONS  (STANDING):  acetaminophen     Tablet .. 650 milliGRAM(s) Oral every 8 hours  ceFAZolin   IVPB 2000 milliGRAM(s) IV Intermittent every 8 hours  heparin   Injectable 5000 Unit(s) SubCutaneous every 8 hours  HYDROmorphone PCA (1 mG/mL) 30 milliLiter(s) PCA Continuous PCA Continuous  lactated ringers. 1000 milliLiter(s) (30 mL/Hr) IV Continuous <Continuous>    MEDICATIONS  (PRN):  aluminum hydroxide/magnesium hydroxide/simethicone Suspension 30 milliLiter(s) Oral every 4 hours PRN Gas  calcium carbonate    500 mG (Tums) Chewable 1 Tablet(s) Chew every 4 hours PRN Dyspepsia  HYDROmorphone PCA (1 mG/mL) Rescue Clinician Bolus 0.5 milliGRAM(s) IV Push every 15 minutes PRN for Pain Scale GREATER THAN 6  nalbuphine Injectable 2.5 milliGRAM(s) IV Push every 6 hours PRN Pruritus  naloxone Injectable 0.1 milliGRAM(s) IV Push every 3 minutes PRN For ANY of the following changes in patient status:  A. RR LESS THAN 10 breaths per minute, B. Oxygen saturation LESS THAN 90%, C. Sedation score of 6  ondansetron Injectable 4 milliGRAM(s) IV Push every 6 hours PRN Nausea and/or Vomiting  ondansetron Injectable 4 milliGRAM(s) IV Push every 6 hours PRN Nausea      OBJECTIVE:    Sedation Score:	[ X] Alert	[ ] Drowsy 	[ ] Arousable	[ ] Asleep	[ ] Unresponsive    Side Effects:	[X ] None	[ ] Nausea	[ ] Vomiting	[ ] Pruritus  		[ ] Other:    Vital Signs Last 24 Hrs  T(C): 36.7 (20 Oct 2024 05:38), Max: 37 (19 Oct 2024 21:31)  T(F): 98.1 (20 Oct 2024 05:38), Max: 98.6 (19 Oct 2024 21:31)  HR: 65 (20 Oct 2024 05:38) (65 - 79)  BP: 122/77 (20 Oct 2024 05:38) (101/64 - 122/77)  BP(mean): --  RR: 18 (20 Oct 2024 05:38) (18 - 18)  SpO2: 97% (20 Oct 2024 05:38) (97% - 100%)    Parameters below as of 20 Oct 2024 05:38  Patient On (Oxygen Delivery Method): room air        ASSESSMENT/ PLAN    Therapy to  be:               [X] Continued   [ ] Discontinued   [ ] Changed to PRN Analgesics    Documentation and Verification of current medications:   [X] Done	[ ] Not done, not eligible    Comments:

## 2024-10-20 NOTE — PROGRESS NOTE ADULT - SUBJECTIVE AND OBJECTIVE BOX
[facility] Plastic Surgery Daily Progress Note  =====================================================    SUBJECTIVE: Patient seen and examined at bedside on AM rounds. Patient reports that they're feeling well.       ALLERGIES:  No Known Allergies      --------------------------------------------------------------------------------------    MEDICATIONS:    Neurologic Medications  acetaminophen     Tablet .. 650 milliGRAM(s) Oral every 8 hours  HYDROmorphone PCA (1 mG/mL) 30 milliLiter(s) PCA Continuous PCA Continuous  HYDROmorphone PCA (1 mG/mL) Rescue Clinician Bolus 0.5 milliGRAM(s) IV Push every 15 minutes PRN for Pain Scale GREATER THAN 6  nalbuphine Injectable 2.5 milliGRAM(s) IV Push every 6 hours PRN Pruritus  ondansetron Injectable 4 milliGRAM(s) IV Push every 6 hours PRN Nausea and/or Vomiting  ondansetron Injectable 4 milliGRAM(s) IV Push every 6 hours PRN Nausea    Respiratory Medications    Cardiovascular Medications    Gastrointestinal Medications  aluminum hydroxide/magnesium hydroxide/simethicone Suspension 30 milliLiter(s) Oral every 4 hours PRN Gas  calcium carbonate    500 mG (Tums) Chewable 1 Tablet(s) Chew every 4 hours PRN Dyspepsia  lactated ringers. 1000 milliLiter(s) IV Continuous <Continuous>    Genitourinary Medications    Hematologic/Oncologic Medications  heparin   Injectable 5000 Unit(s) SubCutaneous every 8 hours    Antimicrobial/Immunologic Medications  ceFAZolin   IVPB 2000 milliGRAM(s) IV Intermittent every 8 hours    Endocrine/Metabolic Medications    Topical/Other Medications  naloxone Injectable 0.1 milliGRAM(s) IV Push every 3 minutes PRN For ANY of the following changes in patient status:  A. RR LESS THAN 10 breaths per minute, B. Oxygen saturation LESS THAN 90%, C. Sedation score of 6    --------------------------------------------------------------------------------------    VITAL SIGNS:  Vital Signs Last 24 Hrs  T(C): 36.7 (20 Oct 2024 05:38), Max: 37 (19 Oct 2024 21:31)  T(F): 98.1 (20 Oct 2024 05:38), Max: 98.6 (19 Oct 2024 21:31)  HR: 65 (20 Oct 2024 05:38) (65 - 79)  BP: 122/77 (20 Oct 2024 05:38) (101/64 - 122/77)  BP(mean): --  ABP: --  ABP(mean): --  RR: 18 (20 Oct 2024 05:38) (18 - 18)  SpO2: 97% (20 Oct 2024 05:38) (97% - 100%)    O2 Parameters below as of 20 Oct 2024 05:38  Patient On (Oxygen Delivery Method): room air        --------------------------------------------------------------------------------------    EXAM    General: NAD, resting in bed comfortably.  Cardiac: regular rate, warm and well perfused  Extremities:   LLE: Groin dressing cdi and in place. Donor site dressing in place and with some SS strikethrough. Extremities warm and appear well perfused.   LUE: Wound with healthy granulation tissue at base and with some fibrinous exudate. Packed with aquacel strips. No obvious infection or cellulitis.    --------------------------------------------------------------------------------------    LABS                          8.7    12.80 )-----------( 429      ( 19 Oct 2024 07:03 )             29.1     10-19    135  |  101  |  8   ----------------------------<  113[H]  3.5   |  23  |  0.51    Ca    8.5      19 Oct 2024 07:03         --------------------------------------------------------------------------------------    INS AND OUTS:    I&O's Detail    19 Oct 2024 07:01  -  20 Oct 2024 07:00  --------------------------------------------------------  IN:    Lactated Ringers: 750 mL    Oral Fluid: 680 mL  Total IN: 1430 mL    OUT:    Indwelling Catheter - Urethral (mL): 3300 mL  Total OUT: 3300 mL    Total NET: -1870 mL      --------------------------------------------------------------------------------------

## 2024-10-20 NOTE — PROGRESS NOTE ADULT - ASSESSMENT
A: Mr Reyna is now s/p L groin debridement and STSG from L thigh on 10/18/24. Patient recovering well.     Plan:  -Bedrest until POD5  -Cont alaniz until POD5  -Ancef until POD5  -Pain control: PCA  -No need for daily labs; will only get if status changes  -Keep dressings in place until POD5 as bolster dressing; can reinforce if needed  -DVT ppx: [facility] [place][note 2]  Plastic Surgery  #[phone] [facility] pager  [phone] [facility] pager  Available on teams

## 2024-10-21 ENCOUNTER — TRANSCRIPTION ENCOUNTER (OUTPATIENT)
Age: 34
End: 2024-10-21

## 2024-10-21 RX ORDER — HYDROMORPHONE HCL/0.9% NACL/PF 6 MG/30 ML
30 PATIENT CONTROLLED ANALGESIA SYRINGE INTRAVENOUS
Refills: 0 | Status: DISCONTINUED | OUTPATIENT
Start: 2024-10-21 | End: 2024-10-22

## 2024-10-21 RX ADMIN — Medication 650 MILLIGRAM(S): at 05:09

## 2024-10-21 RX ADMIN — Medication 650 MILLIGRAM(S): at 21:39

## 2024-10-21 RX ADMIN — Medication 100 MILLIGRAM(S): at 17:21

## 2024-10-21 RX ADMIN — Medication 100 MILLIGRAM(S): at 08:54

## 2024-10-21 RX ADMIN — Medication 30 MILLILITER(S): at 10:16

## 2024-10-21 RX ADMIN — Medication 650 MILLIGRAM(S): at 13:53

## 2024-10-21 RX ADMIN — Medication 650 MILLIGRAM(S): at 14:53

## 2024-10-21 RX ADMIN — Medication 650 MILLIGRAM(S): at 06:09

## 2024-10-21 RX ADMIN — Medication 650 MILLIGRAM(S): at 00:02

## 2024-10-21 RX ADMIN — Medication 650 MILLIGRAM(S): at 22:30

## 2024-10-21 RX ADMIN — Medication 30 MILLILITER(S): at 19:15

## 2024-10-21 RX ADMIN — Medication 40 MILLIGRAM(S): at 13:53

## 2024-10-21 RX ADMIN — Medication 30 MILLILITER(S): at 08:54

## 2024-10-21 NOTE — PROGRESS NOTE ADULT - SUBJECTIVE AND OBJECTIVE BOX
Plastic Surgery Progress Note (pg [facility]: [id number], NS: [phone])    SUBJECTIVE  The patient was seen and examined. No acute events overnight. Pain controlled, afebrile w/ stable vitals.     OBJECTIVE  ___________________________________________________  VITAL SIGNS / I&O's   Vital Signs Last 24 Hrs  T(C): 36.5 (21 Oct 2024 05:01), Max: 37.1 (20 Oct 2024 21:57)  T(F): 97.7 (21 Oct 2024 05:01), Max: 98.7 (20 Oct 2024 21:57)  HR: 76 (21 Oct 2024 05:01) (73 - 82)  BP: 119/76 (21 Oct 2024 05:01) (107/64 - 123/69)  BP(mean): --  RR: 18 (21 Oct 2024 05:01) (18 - 18)  SpO2: 98% (21 Oct 2024 05:01) (98% - 99%)    Parameters below as of 21 Oct 2024 05:01  Patient On (Oxygen Delivery Method): room air    20 Oct 2024 07:01  -  21 Oct 2024 07:00  --------------------------------------------------------  IN:    Lactated Ringers: 690 mL    Oral Fluid: 2700 mL  Total IN: 3390 mL    OUT:    Indwelling Catheter - Urethral (mL): 3550 mL  Total OUT: 3550 mL    Total NET: -160 mL    ___________________________________________________  PHYSICAL EXAM    General: NAD, resting in bed comfortably.  Cardiac: regular rate, warm and well perfused  Extremities:   LLE: Groin dressing cdi and in place. Donor site dressing in place and with some SS strikethrough. Extremities warm and appear well perfused.   LUE: Wound with healthy granulation tissue at base and with some fibrinous exudate. Packed with aquacel strips. No obvious infection or cellulitis.    ___________________________________________________  LABS    ___________________________________________________  MICRO  Recent Cultures:    ___________________________________________________  MEDICATIONS  (STANDING):  acetaminophen     Tablet .. 650 milliGRAM(s) Oral every 8 hours  ceFAZolin   IVPB 2000 milliGRAM(s) IV Intermittent every 8 hours  enoxaparin Injectable 40 milliGRAM(s) SubCutaneous every 24 hours  HYDROmorphone PCA (1 mG/mL) 30 milliLiter(s) PCA Continuous PCA Continuous  lactated ringers. 1000 milliLiter(s) (30 mL/Hr) IV Continuous <Continuous>    MEDICATIONS  (PRN):  aluminum hydroxide/magnesium hydroxide/simethicone Suspension 30 milliLiter(s) Oral every 4 hours PRN Gas  calcium carbonate    500 mG (Tums) Chewable 1 Tablet(s) Chew every 4 hours PRN Dyspepsia  HYDROmorphone PCA (1 mG/mL) Rescue Clinician Bolus 0.5 milliGRAM(s) IV Push every 15 minutes PRN for Pain Scale GREATER THAN 6  nalbuphine Injectable 2.5 milliGRAM(s) IV Push every 6 hours PRN Pruritus  naloxone Injectable 0.1 milliGRAM(s) IV Push every 3 minutes PRN For ANY of the following changes in patient status:  A. RR LESS THAN 10 breaths per minute, B. Oxygen saturation LESS THAN 90%, C. Sedation score of 6  ondansetron Injectable 4 milliGRAM(s) IV Push every 6 hours PRN Nausea and/or Vomiting  ondansetron Injectable 4 milliGRAM(s) IV Push every 6 hours PRN Nausea    _______________________________________    A: Mr Reyna is now s/p L groin debridement and STSG from L thigh on 10/18/24. Patient recovering well.     Plan:  -Bedrest until POD5  -Cont alaniz until POD5  -Ancef until POD5  -Pain control: PCA  -No need for daily labs; will only get if status changes  -Keep dressings in place until POD5 as bolster dressing; can reinforce if needed  -DVT ppx: [facility]    Plastic Surgery  #[phone] [facility] pager  [phone] [facility] pager  Available on teams

## 2024-10-21 NOTE — DISCHARGE NOTE PROVIDER - CARE PROVIDER_API CALL
Margarito Tariq.  Plastic Surgery  [address], [address]  [place] [address]  Phone: [phone]  Fax: [phone]  Scheduled Appointment: 10/30/2024

## 2024-10-21 NOTE — PROGRESS NOTE ADULT - SUBJECTIVE AND OBJECTIVE BOX
Day 3 of Anesthesia Pain Management Service    SUBJECTIVE: I'm doing ok    Pain Scale Score:	[X] Refer to charted pain scores    THERAPY:    [ ] IV PCA Morphine		[ ] 5 mg/mL	[ ] 1 mg/mL  [X] IV PCA Hydromorphone	[ ] 5 mg/mL	[X] 1 mg/mL  [ ] IV PCA Fentanyl		[ ] 50 micrograms/mL    Demand dose: 0.2 mg     Lockout: 6 minutes   Continuous Rate: 0 mg/hr  4 Hour Limit: 4 mg    MEDICATIONS  (STANDING):  acetaminophen     Tablet .. 650 milliGRAM(s) Oral every 8 hours  ceFAZolin   IVPB 2000 milliGRAM(s) IV Intermittent every 8 hours  enoxaparin Injectable 40 milliGRAM(s) SubCutaneous every 24 hours  HYDROmorphone PCA (1 mG/mL) 30 milliLiter(s) PCA Continuous PCA Continuous  lactated ringers. 1000 milliLiter(s) (30 mL/Hr) IV Continuous <Continuous>    MEDICATIONS  (PRN):  aluminum hydroxide/magnesium hydroxide/simethicone Suspension 30 milliLiter(s) Oral every 4 hours PRN Gas  calcium carbonate    500 mG (Tums) Chewable 1 Tablet(s) Chew every 4 hours PRN Dyspepsia  HYDROmorphone PCA (1 mG/mL) Rescue Clinician Bolus 0.5 milliGRAM(s) IV Push every 15 minutes PRN for Pain Scale GREATER THAN 6  nalbuphine Injectable 2.5 milliGRAM(s) IV Push every 6 hours PRN Pruritus  naloxone Injectable 0.1 milliGRAM(s) IV Push every 3 minutes PRN For ANY of the following changes in patient status:  A. RR LESS THAN 10 breaths per minute, B. Oxygen saturation LESS THAN 90%, C. Sedation score of 6  ondansetron Injectable 4 milliGRAM(s) IV Push every 6 hours PRN Nausea  ondansetron Injectable 4 milliGRAM(s) IV Push every 6 hours PRN Nausea and/or Vomiting      OBJECTIVE:    Sedation Score:	[ X] Alert 	[ ] Drowsy 	[ ] Arousable	[ ] Asleep	[ ] Unresponsive    Side Effects:	[X ] None	[ ] Nausea	[ ] Vomiting	[ ] Pruritus  		[ ] Other:    Vital Signs Last 24 Hrs  T(C): 36.5 (21 Oct 2024 05:01), Max: 37.1 (20 Oct 2024 21:57)  T(F): 97.7 (21 Oct 2024 05:01), Max: 98.7 (20 Oct 2024 21:57)  HR: 76 (21 Oct 2024 05:01) (73 - 82)  BP: 119/76 (21 Oct 2024 05:01) (107/64 - 123/69)  BP(mean): --  RR: 18 (21 Oct 2024 05:01) (18 - 18)  SpO2: 98% (21 Oct 2024 05:01) (98% - 99%)    Parameters below as of 21 Oct 2024 05:01  Patient On (Oxygen Delivery Method): room air        ASSESSMENT/ PLAN    Therapy to  be:               [X] Continued   [ ] Discontinued   [ ] Changed to PRN Analgesics    Documentation and Verification of current medications:   [X] Done	[ ] Not done, not eligible    Comments: Endorsing minimal pain. Total PCA use 0.4 mg / 24 hours. Does not like the way the PCA makes him feel.  Decrease demand dose to 0.15mg. Consider D\C PCA and transition to Oxycodone po prn with hydromorphone IVP prior to VAC changes.

## 2024-10-21 NOTE — PROGRESS NOTE ADULT - SUBJECTIVE AND OBJECTIVE BOX
Pain Management Attending Addendum    SUBJECTIVE: Patient doing well with IV PCA    Therapy:    [X] IV PCA         [ ] PRN Analgesics    OBJECTIVE:   [X] Pain appropriately controlled    [ ] Other:    Side Effects:  [X] None	             [ ] Nausea              [ ] Pruritis                	[ ] Other:    ASSESSMENT/PLAN: Continue current therapy at lower dose    Comments:

## 2024-10-21 NOTE — DISCHARGE NOTE PROVIDER - NSDCMRMEDTOKEN_GEN_ALL_CORE_FT
ibuprofen 600 mg oral tablet: 1 tab(s) orally prn as needed for  mild pain  oxyCODONE 5 mg oral tablet: 1 tab(s) orally every 6 hours as needed for  severe pain MDD: 4  Polyethylene Glycol 3350: 1 dose(s) orally once a day as needed for  constipation  Tylenol 325 mg oral tablet: 2 tab(s) orally prn as needed for  mild pain   ibuprofen 600 mg oral tablet: 1 tab(s) orally prn as needed for  mild pain  Polyethylene Glycol 3350: 1 dose(s) orally once a day as needed for  constipation  Tylenol 325 mg oral tablet: 2 tab(s) orally prn as needed for  mild pain   Eliquis 5 mg oral tablet: 1 tab(s) orally 2 times a day Begin taking after completing 7 day course of Lovenox injections  enoxaparin 80 mg/0.8 mL injectable solution: 80 milligram(s) injectable every 12 hours Begin taking  injections the evening of 10/26. This will complete a 7 day course. 1.5 days of the course were completed while you were in the hospital.  ibuprofen 600 mg oral tablet: 1 tab(s) orally prn as needed for  mild pain  Polyethylene Glycol 3350: 1 dose(s) orally once a day as needed for  constipation  Tylenol 325 mg oral tablet: 2 tab(s) orally prn as needed for  mild pain

## 2024-10-21 NOTE — DISCHARGE NOTE PROVIDER - NSDCFUSCHEDAPPT_GEN_ALL_CORE_FT
Margarito Tariq  [facility]  [address]AST[facility] [address]  Scheduled Appointment: 10/30/2024    [facility]  [facility]  Scheduled Appointment: 12/03/2024    Juan Parra  [facility]  [facility]  Scheduled Appointment: 12/03/2024

## 2024-10-21 NOTE — DISCHARGE NOTE PROVIDER - NSDCFUADDINST_GEN_ALL_CORE_FT
Home care instructions:    Groin/scrotal/perineal skin graft area: Apply Aquacel Extra sheets, overlay ABD pads, and secure with tape. Change daily. Ensure good perirectal and perineal hygiene.  Left thigh skin graft donor site: Apply Aquaphor or Vaseline to keep area moist daily Home care instructions:    Groin/scrotal/perineal skin graft area: Apply Aquacel Extra sheets, overlay ABD pads, and secure with tape. Change daily. Ensure good perirectal and perineal hygiene.  Left thigh skin graft donor site: Apply Aquaphor or Vaseline to keep area moist daily    Follow up with plastic surgery in one week. Follow-up with hematology on 12/3.

## 2024-10-21 NOTE — DISCHARGE NOTE PROVIDER - HOSPITAL COURSE
Patient is a 34 year old male PMH of Leukocytosis, Anemia (follows with hematologist- states he was last seen 6/2024 and received iron infusion), Arthritis, Hidradenitis Suppurative s/p right axillary hidradenitis S/P excision of hidradenitis of the buttocks with skin graft reconstruction (2/2023), s/p excision of left axillary hidradenitis (4/2023), Left Axillary Wound Reconstruction with Thoracodorsal Artery  Flap, graft from left thigh 5/2023, right axillary hidradenitis removed on 2/13/24, and right axillary wound reconstruction with muscle flap and skin graft 3/2024. Most recently, underwent Excision of left axillary, bilateral inguinal, and perineal hidradenitis on 9/23. He was admitted to [facility] for L groin debridement and STSG from L thigh on 10/18/24.    Patient is now s/p L groin debridement and STSG from L thigh on 10/18/24. Patient tolerated operation well and there were no post-operative complications identified. Patient remained hemodynamically stable in the PACU and transferred to the surgical floor. The patient's pain was controlled via PCA pump for the initial 5 postoperative days. During this period he was also on strict bedrest with alaniz in place, and on acef for prophylaxis.     Physical therapy evaluated patient while admitted; recommended _____.    At this time, patient is currently ambulating, voiding, tolerating a regular diet. Pain well controlled on PO pain meds. Patient is hemodynamically stable and felt safe with being discharged. Patient understood and agreed with plan. Per  ____, patient is stable for discharge to ____ with outpatient follow up within 1-2 weeks of discharge. Patient is a 34 year old male PMH of Leukocytosis, Anemia (follows with hematologist- states he was last seen 6/2024 and received iron infusion), Arthritis, Hidradenitis Suppurative s/p right axillary hidradenitis S/P excision of hidradenitis of the buttocks with skin graft reconstruction (2/2023), s/p excision of left axillary hidradenitis (4/2023), Left Axillary Wound Reconstruction with Thoracodorsal Artery  Flap, graft from left thigh 5/2023, right axillary hidradenitis removed on 2/13/24, and right axillary wound reconstruction with muscle flap and skin graft 3/2024. Most recently, underwent Excision of left axillary, bilateral inguinal, and perineal hidradenitis on 9/23. He was admitted to [facility] for L groin debridement and STSG from L thigh on 10/18/24.    Patient is now s/p L groin debridement and STSG from L thigh on 10/18/24. Patient tolerated operation well and there were no post-operative complications identified. Patient remained hemodynamically stable in the PACU and transferred to the surgical floor. The patient's pain was controlled via PCA pump for the initial 5 postoperative days. During this period he was also on strict bedrest with alaniz in place, and on ancef for prophylaxis.     Bolster dressings were taken down on POD5 and replaced with daily Aquacel Extra dressing changes. Alaniz was removed and patient passed TOV afterwards.     At this time, patient is currently ambulating, voiding, tolerating a regular diet. Pain well controlled on PO pain meds. Patient is hemodynamically stable and felt safe with being discharged. Patient understood and agreed with plan. Per Dr. Tariq patient is stable for discharge to home with home wound care with outpatient follow up within 1-2 weeks of discharge. Patient is a 34 year old male PMH of Leukocytosis, Anemia (follows with hematologist- states he was last seen 6/2024 and received iron infusion), Arthritis, Hidradenitis Suppurative s/p right axillary hidradenitis S/P excision of hidradenitis of the buttocks with skin graft reconstruction (2/2023), s/p excision of left axillary hidradenitis (4/2023), Left Axillary Wound Reconstruction with Thoracodorsal Artery  Flap, graft from left thigh 5/2023, right axillary hidradenitis removed on 2/13/24, and right axillary wound reconstruction with muscle flap and skin graft 3/2024. Most recently, underwent Excision of left axillary, bilateral inguinal, and perineal hidradenitis on 9/23. He was admitted to [facility] for L groin debridement and STSG from L thigh on 10/18/24.    Patient is now s/p L groin debridement and STSG from L thigh on 10/18/24. Patient tolerated operation well and there were no post-operative complications identified. Patient remained hemodynamically stable in the PACU and transferred to the surgical floor. The patient's pain was controlled via PCA pump for the initial 5 postoperative days. During this period he was also on strict bedrest with alaniz in place, and on ancef for prophylaxis.     Bolster dressings were taken down on POD5 and replaced with daily Aquacel Extra dressing changes. Alaniz was removed and patient passed TOV afterwards. Post operative period complicated by tachycardia on 10/24. Patient remained asymptomatic throughout and workup was negative for DVT or PE.     At this time, patient is currently ambulating, voiding, tolerating a regular diet. Pain well controlled on PO pain meds. Patient is hemodynamically stable and felt safe with being discharged. Patient understood and agreed with plan. Per Dr. Tariq patient is stable for discharge to home with home wound care with outpatient follow up within 1-2 weeks of discharge. Patient is a 34 year old male PMH of Leukocytosis, Anemia (follows with hematologist- states he was last seen 6/2024 and received iron infusion), Arthritis, Hidradenitis Suppurative s/p right axillary hidradenitis S/P excision of hidradenitis of the buttocks with skin graft reconstruction (2/2023), s/p excision of left axillary hidradenitis (4/2023), Left Axillary Wound Reconstruction with Thoracodorsal Artery  Flap, graft from left thigh 5/2023, right axillary hidradenitis removed on 2/13/24, and right axillary wound reconstruction with muscle flap and skin graft 3/2024. Most recently, underwent Excision of left axillary, bilateral inguinal, and perineal hidradenitis on 9/23. He was admitted to [facility] for L groin debridement and STSG from L thigh on 10/18/24.    Patient is now s/p L groin debridement and STSG from L thigh on 10/18/24. Patient tolerated operation well and there were no post-operative complications identified. Patient remained hemodynamically stable in the PACU and transferred to the surgical floor. The patient's pain was controlled via PCA pump for the initial 5 postoperative days. During this period he was also on strict bedrest with alaniz in place, and on ancef for prophylaxis.     Bolster dressings were taken down on POD5 and replaced with daily Aquacel Extra dressing changes. Alaniz was removed and patient passed TOV afterwards. Post operative period complicated by tachycardia on 10/24. Patient remained asymptomatic throughout and workup was negative for PE. Found to have LE DVT. Hematology consulted and recommending 7 days Lovenox BID and 3 months Eliquis.    At this time, patient is currently ambulating, voiding, tolerating a regular diet. Pain well controlled on PO pain meds. Patient is hemodynamically stable and felt safe with being discharged. Patient understood and agreed with plan. Per Dr. Tariq patient is stable for discharge to home with home wound care with outpatient follow up within 1-2 weeks of discharge.

## 2024-10-21 NOTE — DISCHARGE NOTE PROVIDER - NSDCCAREPROVSEEN_GEN_ALL_CORE_FT
For Your Information   If you are in need of a medication refill please use one of the following options. You can expect your medication to be filled within 2-3 business days.   1. Call your pharmacy for all medication refills and renewals.   2. [facility]- [email or web address]  3. Call your providers office    If your provider ordered any imaging for you today. Our pre-scheduling services will be reaching out to you within 2 business days to schedule this. [employer] can be reached by calling [phone]     If your provider ordered testing today, you will be notified of your test results within 3-5 business days unless specified otherwise. If you have not received your results within 5 business days please call your provider's office.    You may be receiving a survey.  Please take the time to complete this, as your feedback is very important to us!  We strive to make your experience exceptional and your comments help us with that goal.  We look forward to hearing from you!    For all future appointments please arrive 15 minutes prior to your scheduled visit.     Patient Contact Center Business Office: assistance with medical billing & financial inquires [phone]           Margarito Tariq

## 2024-10-22 RX ORDER — OXYCODONE HYDROCHLORIDE 30 MG/1
10 TABLET ORAL EVERY 4 HOURS
Refills: 0 | Status: DISCONTINUED | OUTPATIENT
Start: 2024-10-22 | End: 2024-10-27

## 2024-10-22 RX ORDER — OXYCODONE HYDROCHLORIDE 30 MG/1
5 TABLET ORAL EVERY 4 HOURS
Refills: 0 | Status: DISCONTINUED | OUTPATIENT
Start: 2024-10-22 | End: 2024-10-27

## 2024-10-22 RX ADMIN — Medication 650 MILLIGRAM(S): at 14:23

## 2024-10-22 RX ADMIN — Medication 650 MILLIGRAM(S): at 21:35

## 2024-10-22 RX ADMIN — Medication 650 MILLIGRAM(S): at 15:23

## 2024-10-22 RX ADMIN — Medication 100 MILLIGRAM(S): at 16:04

## 2024-10-22 RX ADMIN — Medication 650 MILLIGRAM(S): at 06:10

## 2024-10-22 RX ADMIN — Medication 650 MILLIGRAM(S): at 05:10

## 2024-10-22 RX ADMIN — Medication 40 MILLIGRAM(S): at 14:23

## 2024-10-22 RX ADMIN — Medication 650 MILLIGRAM(S): at 21:05

## 2024-10-22 RX ADMIN — Medication 100 MILLIGRAM(S): at 00:42

## 2024-10-22 RX ADMIN — Medication 100 MILLIGRAM(S): at 09:06

## 2024-10-22 NOTE — PROGRESS NOTE ADULT - SUBJECTIVE AND OBJECTIVE BOX
Plastic Surgery Progress Note    S: Patient seen and examined Pain controlled, no fevers. vitals stable. No significant events overnight.     Vital Signs Last 24 Hrs  T(C): 36.7 (22 Oct 2024 05:40), Max: 36.8 (21 Oct 2024 09:02)  T(F): 98.1 (22 Oct 2024 05:40), Max: 98.3 (21 Oct 2024 21:05)  HR: 83 (22 Oct 2024 05:40) (65 - 83)  BP: 118/80 (22 Oct 2024 05:40) (110/72 - 130/77)  BP(mean): --  RR: 18 (22 Oct 2024 05:40) (18 - 18)  SpO2: 97% (22 Oct 2024 05:40) (97% - 100%)    Parameters below as of 22 Oct 2024 05:40  Patient On (Oxygen Delivery Method): room air    I&O's Detail    21 Oct 2024 07:01  -  22 Oct 2024 07:00  --------------------------------------------------------  IN:    Lactated Ringers: 690 mL    Oral Fluid: 960 mL  Total IN: 1650 mL    OUT:    Indwelling Catheter - Urethral (mL): 2775 mL  Total OUT: 2775 mL    Total NET: -1125 mL    Physical Exam:  General: NAD, resting in bed comfortably.  Cardiac: regular rate, warm and well perfused  Extremities:   LLE: Groin dressing cdi and in place. Donor site dressing in place and with some SS strikethrough. Extremities warm and appear well perfused.   LUE: Wound with healthy granulation tissue at base and with some fibrinous exudate. Packed with aquacel strips. No obvious infection or cellulitis.    A: Mr Reyna is now s/p L groin debridement and STSG from L thigh on 10/18/24. Patient recovering well.     Plan:  -Bedrest until POD5  -Cont alaniz until POD5  -Ancef until POD5  -Pain control: PCA  -No need for daily labs; will only get if status changes  -Keep dressings in place until POD5 as bolster dressing; can reinforce if needed  -DVT ppx: [facility]    Plastic Surgery  #[id number] J pager  [phone] [facility] pager  Available on teams  Plastic Surgery Progress Note    S: Patient seen and examined Pain controlled, no fevers. vitals stable. No significant events overnight.     Vital Signs Last 24 Hrs  T(C): 36.7 (22 Oct 2024 05:40), Max: 36.8 (21 Oct 2024 09:02)  T(F): 98.1 (22 Oct 2024 05:40), Max: 98.3 (21 Oct 2024 21:05)  HR: 83 (22 Oct 2024 05:40) (65 - 83)  BP: 118/80 (22 Oct 2024 05:40) (110/72 - 130/77)  BP(mean): --  RR: 18 (22 Oct 2024 05:40) (18 - 18)  SpO2: 97% (22 Oct 2024 05:40) (97% - 100%)    Parameters below as of 22 Oct 2024 05:40  Patient On (Oxygen Delivery Method): room air    I&O's Detail    21 Oct 2024 07:01  -  22 Oct 2024 07:00  --------------------------------------------------------  IN:    Lactated Ringers: 690 mL    Oral Fluid: 960 mL  Total IN: 1650 mL    OUT:    Indwelling Catheter - Urethral (mL): 2775 mL  Total OUT: 2775 mL    Total NET: -1125 mL    Physical Exam:  General: NAD, resting in bed comfortably.  Cardiac: regular rate, warm and well perfused  Extremities:   LLE: Groin dressing cdi and in place. Donor site dressing in place and with some SS strikethrough. Extremities warm and appear well perfused.   LUE: Wound with healthy granulation tissue at base and with some fibrinous exudate. Packed with aquacel strips. No obvious infection or cellulitis.    A: Mr Reyna is now s/p L groin debridement and STSG from L thigh on 10/18/24. Patient recovering well.     Plan:  -Bedrest until POD5  -Cont alaniz until POD5  -Ancef until POD5  -Pain control: Oxy 5/10   -No need for daily labs; will only get if status changes  -Keep dressings in place until POD5 as bolster dressing; can reinforce if needed  -DVT ppx: [facility]    Plastic Surgery  #[id number] J pager  [phone] [facility] pager  Available on teams

## 2024-10-22 NOTE — PROGRESS NOTE ADULT - SUBJECTIVE AND OBJECTIVE BOX
Day 4 of Anesthesia Pain [place]    Pain Scale Score:	[X] Refer to charted pain scores    THERAPY:    [ ] IV PCA Morphine		        [ ] 5 mg/mL	[ ] 1 mg/mL  [X] IV PCA Hydromorphone	[ ] 5 mg/mL	[X] 1 mg/mL  [ ] IV PCA Fentanyl		        [ ] 50 micrograms/mL    Demand dose: 0.15 mg     Lockout: 6 minutes   Continuous Rate: 0 mg/hr  4 Hour Limit: 4 mg    MEDICATIONS  (STANDING):  acetaminophen     Tablet .. 650 milliGRAM(s) Oral every 8 hours  ceFAZolin   IVPB 2000 milliGRAM(s) IV Intermittent every 8 hours  enoxaparin Injectable 40 milliGRAM(s) SubCutaneous every 24 hours    MEDICATIONS  (PRN):  aluminum hydroxide/magnesium hydroxide/simethicone Suspension 30 milliLiter(s) Oral every 4 hours PRN Gas  calcium carbonate    500 mG (Tums) Chewable 1 Tablet(s) Chew every 4 hours PRN Dyspepsia  nalbuphine Injectable 2.5 milliGRAM(s) IV Push every 6 hours PRN Pruritus  naloxone Injectable 0.1 milliGRAM(s) IV Push every 3 minutes PRN For ANY of the following changes in patient status:  A. RR LESS THAN 10 breaths per minute, B. Oxygen saturation LESS THAN 90%, C. Sedation score of 6  ondansetron Injectable 4 milliGRAM(s) IV Push every 6 hours PRN Nausea  ondansetron Injectable 4 milliGRAM(s) IV Push every 6 hours PRN Nausea and/or Vomiting  oxyCODONE    IR 5 milliGRAM(s) Oral every 4 hours PRN Moderate Pain (4 - 6)  oxyCODONE    IR 10 milliGRAM(s) Oral every 4 hours PRN Severe Pain (7 - 10)      OBJECTIVE:    Sedation Score:	[ X] Alert	 [ ] Drowsy 	[ ] Arousable	[ ] Asleep	[ ] Unresponsive    Side Effects:	[X ] None	[ ] Nausea	[ ] Vomiting	[ ] Pruritus  		[ ] Other:    Vital Signs Last 24 Hrs  T(C): 36.6 (22 Oct 2024 09:58), Max: 36.8 (21 Oct 2024 21:05)  T(F): 97.9 (22 Oct 2024 09:58), Max: 98.3 (21 Oct 2024 21:05)  HR: 90 (22 Oct 2024 09:58) (65 - 90)  BP: 114/76 (22 Oct 2024 09:58) (110/72 - 121/80)  BP(mean): --  RR: 18 (22 Oct 2024 09:58) (18 - 18)  SpO2: 99% (22 Oct 2024 09:58) (97% - 100%)    Parameters below as of 22 Oct 2024 09:58  Patient On (Oxygen Delivery Method): room air        ASSESSMENT/ PLAN    Therapy to  be:               [  ] Continued   [X ] Discontinued   [ X] Changed to PRN Analgesics    Documentation and Verification of current medications:   [X] Done	[ ] Not done, not eligible    Comments: PCA d\c'd by primary service.

## 2024-10-23 ENCOUNTER — TRANSCRIPTION ENCOUNTER (OUTPATIENT)
Age: 34
End: 2024-10-23

## 2024-10-23 RX ORDER — HYDROMORPHONE HCL/0.9% NACL/PF 6 MG/30 ML
0.5 PATIENT CONTROLLED ANALGESIA SYRINGE INTRAVENOUS EVERY 4 HOURS
Refills: 0 | Status: DISCONTINUED | OUTPATIENT
Start: 2024-10-23 | End: 2024-10-27

## 2024-10-23 RX ADMIN — Medication 30 MILLILITER(S): at 18:52

## 2024-10-23 RX ADMIN — OXYCODONE HYDROCHLORIDE 10 MILLIGRAM(S): 30 TABLET ORAL at 13:14

## 2024-10-23 RX ADMIN — OXYCODONE HYDROCHLORIDE 10 MILLIGRAM(S): 30 TABLET ORAL at 14:14

## 2024-10-23 RX ADMIN — OXYCODONE HYDROCHLORIDE 10 MILLIGRAM(S): 30 TABLET ORAL at 05:11

## 2024-10-23 RX ADMIN — OXYCODONE HYDROCHLORIDE 10 MILLIGRAM(S): 30 TABLET ORAL at 08:52

## 2024-10-23 RX ADMIN — Medication 650 MILLIGRAM(S): at 13:14

## 2024-10-23 RX ADMIN — Medication 650 MILLIGRAM(S): at 14:14

## 2024-10-23 RX ADMIN — Medication 650 MILLIGRAM(S): at 22:49

## 2024-10-23 RX ADMIN — Medication 650 MILLIGRAM(S): at 22:19

## 2024-10-23 RX ADMIN — Medication 100 MILLIGRAM(S): at 00:47

## 2024-10-23 RX ADMIN — OXYCODONE HYDROCHLORIDE 5 MILLIGRAM(S): 30 TABLET ORAL at 17:46

## 2024-10-23 RX ADMIN — OXYCODONE HYDROCHLORIDE 5 MILLIGRAM(S): 30 TABLET ORAL at 18:46

## 2024-10-23 RX ADMIN — OXYCODONE HYDROCHLORIDE 10 MILLIGRAM(S): 30 TABLET ORAL at 22:19

## 2024-10-23 RX ADMIN — OXYCODONE HYDROCHLORIDE 10 MILLIGRAM(S): 30 TABLET ORAL at 05:41

## 2024-10-23 RX ADMIN — Medication 40 MILLIGRAM(S): at 13:15

## 2024-10-23 RX ADMIN — OXYCODONE HYDROCHLORIDE 10 MILLIGRAM(S): 30 TABLET ORAL at 22:49

## 2024-10-23 RX ADMIN — OXYCODONE HYDROCHLORIDE 10 MILLIGRAM(S): 30 TABLET ORAL at 09:52

## 2024-10-23 NOTE — PROGRESS NOTE ADULT - SUBJECTIVE AND OBJECTIVE BOX
Plastic Surgery Progress Note    S: Patient seen and examined Pain controlled, no fevers. vitals stable. No significant events overnight.     Vital Signs Last 24 Hrs  T(C): 36.7 (23 Oct 2024 05:08), Max: 37.3 (22 Oct 2024 17:27)  T(F): 98.1 (23 Oct 2024 05:08), Max: 99.1 (22 Oct 2024 17:27)  HR: 89 (23 Oct 2024 05:08) (74 - 90)  BP: 108/75 (23 Oct 2024 05:08) (108/75 - 119/80)  BP(mean): --  RR: 18 (23 Oct 2024 05:08) (18 - 18)  SpO2: 99% (23 Oct 2024 05:08) (99% - 99%)    Parameters below as of 23 Oct 2024 05:08  Patient On (Oxygen Delivery Method): room air      I&O's Detail    22 Oct 2024 07:01  -  23 Oct 2024 07:00  --------------------------------------------------------  IN:    IV PiggyBack: 50 mL    Oral Fluid: 1000 mL  Total IN: 1050 mL    OUT:    Indwelling Catheter - Urethral (mL): 2500 mL  Total OUT: 2500 mL    Total NET: -1450 mL    Physical Exam:  General: NAD, resting in bed comfortably.  Cardiac: regular rate, warm and well perfused  Extremities:   LLE: Groin dressing cdi and in place. Donor site dressing in place and with some SS strikethrough. Extremities warm and appear well perfused.   LUE: Wound with healthy granulation tissue at base and with some fibrinous exudate. Packed with aquacel strips. No obvious infection or cellulitis.    A: Mr Reyna is now s/p L groin debridement and STSG from L thigh on 10/18/24. Patient recovering well.     Plan:  - activity as tolerated  - remove alaniz, trial of void  - d/c ancef  - Pain control: Oxy 5/10   - No need for daily labs; will only get if status changes  - take down dressing today, leave thigh donor site open to air, aquacel extra on skin graft  - DVT ppx: [facility]    Plastic Surgery  #[id number] J pager  [phone] [facility] pager  Available on teams  Plastic Surgery Progress Note    S: Patient seen and examined Pain controlled, no fevers. vitals stable. No significant events overnight.     Vital Signs Last 24 Hrs  T(C): 36.7 (23 Oct 2024 05:08), Max: 37.3 (22 Oct 2024 17:27)  T(F): 98.1 (23 Oct 2024 05:08), Max: 99.1 (22 Oct 2024 17:27)  HR: 89 (23 Oct 2024 05:08) (74 - 90)  BP: 108/75 (23 Oct 2024 05:08) (108/75 - 119/80)  BP(mean): --  RR: 18 (23 Oct 2024 05:08) (18 - 18)  SpO2: 99% (23 Oct 2024 05:08) (99% - 99%)    Parameters below as of 23 Oct 2024 05:08  Patient On (Oxygen Delivery Method): room air      I&O's Detail    22 Oct 2024 07:01  -  23 Oct 2024 07:00  --------------------------------------------------------  IN:    IV PiggyBack: 50 mL    Oral Fluid: 1000 mL  Total IN: 1050 mL    OUT:    Indwelling Catheter - Urethral (mL): 2500 mL  Total OUT: 2500 mL    Total NET: -1450 mL    Physical Exam:  General: NAD, resting in bed comfortably.  Cardiac: regular rate, warm and well perfused  Extremities:   LLE: Groin dressing cdi and in place. Donor site dressing in place and with some SS strikethrough. Extremities warm and appear well perfused.   LUE: Wound with healthy granulation tissue at base and with some fibrinous exudate. Packed with aquacel strips. No obvious infection or cellulitis.    A: Mr Reyna is now s/p L groin debridement and STSG from L thigh on 10/18/24. Patient recovering well. Will need wound care upon discharge. Hoping to have things set up for d/c on 10/25.     Plan:  - activity as tolerated  - remove alaniz, trial of void  - d/c ancef  - Pain control: Oxy 5/10   - No need for daily labs; will only get if status changes  - take down dressing today, leave thigh donor site open to air, aquacel extra on skin graft  - DVT ppx: [facility]  - Dispo: needs home care for wounds    Plastic Surgery  #[id number] [facility] pager  [phone] [facility] pager  Available on teams

## 2024-10-23 NOTE — DISCHARGE NOTE NURSING/CASE MANAGEMENT/SOCIAL WORK - NSDCPEFALRISK_GEN_ALL_CORE
For information on Fall & Injury Prevention, visit: [email or web address] OR  [email or web address] OR  https://www.cdc.gov/steadi/patient.html

## 2024-10-23 NOTE — DISCHARGE NOTE NURSING/CASE MANAGEMENT/SOCIAL WORK - NSDCPELOVENOXREACT_GEN_ALL_CORE
Previous Omeprazole RX sent on 8/13/2019 did not go to pharmacy.  Prescription resent to [address] Enoxaparin/Lovenox increases your risk for bleeding. Notify your doctor if you experience any of the following side effects: unusual bleeding or bruising, coughing up or vomiting blood, red or black stool, blood in your urine, itching or hives, chest tightness, chest pain, shortness of breath, trouble breathing, swelling in your face or hands, swelling in your mouth or throat, fever, large flat blue or purplish patches on the skin, numbness or weakness in your arm or leg on one side, pain in your lower leg, sudden or severe headache with vision, speech or walking problems. When Enoxaparin/Lovenox is taken with other medicines, they can affect how it works. Taking other medications such as aspirin, blood thinners, and nonsteroidal anti-inflammatories increases your risk of bleeding. It is very important to tell your health care provider about all of the other medicines, including over-the-counter medications, herbs, and vitamins you are taking. DO NOT start, stop, or change the dosage of any medicine, including over-the-counter medicines, vitamins, and herbal products without your doctor’s approval. Any products containing aspirin or are nonsteroidal anti-inflammatories lessen the blood’s ability to form clots and adds to the effect of Enoxaparin/Lovenox. Never take aspirin or medicines that contain aspirin without speaking to your doctor.

## 2024-10-23 NOTE — DISCHARGE NOTE NURSING/CASE MANAGEMENT/SOCIAL WORK - FINANCIAL ASSISTANCE
[facility] provides services at a reduced cost to those who are determined to be eligible through [facility]’s financial assistance program. Information regarding [facility]’s financial assistance program can be found by going to [email or web address] or by calling [phone].

## 2024-10-23 NOTE — DISCHARGE NOTE NURSING/CASE MANAGEMENT/SOCIAL WORK - NSSCTYPOFSERV_GEN_ALL_CORE
Home RN services to assist with wound care management. RN will contact you to schedule a visit time. Anticipated start of care 10/26. Home RN services to assist with wound care management. RN will contact you to schedule a visit time. Anticipated start of care 10/25.

## 2024-10-23 NOTE — DISCHARGE NOTE NURSING/CASE MANAGEMENT/SOCIAL WORK - PATIENT PORTAL LINK FT
You can access the FollowMyHealth Patient Portal offered by [facility] by registering at the following website: [email or web address]. By joining [employer]’s FollowMyHealth portal, you will also be able to view your health information using other applications (apps) compatible with our system.

## 2024-10-24 LAB
ADD ON TEST-SPECIMEN IN LAB: SIGNIFICANT CHANGE UP
ALBUMIN SERPL ELPH-MCNC: 3.3 G/DL — SIGNIFICANT CHANGE UP (ref 3.3–5)
ALP SERPL-CCNC: 126 U/L — HIGH (ref 40–120)
ALT FLD-CCNC: 6 U/L — LOW (ref 10–45)
ANION GAP SERPL CALC-SCNC: 10 MMOL/L — SIGNIFICANT CHANGE UP (ref 5–17)
ANISOCYTOSIS BLD QL: SLIGHT — SIGNIFICANT CHANGE UP
AST SERPL-CCNC: 13 U/L — SIGNIFICANT CHANGE UP (ref 10–40)
BASOPHILS # BLD AUTO: 0 K/UL — SIGNIFICANT CHANGE UP (ref 0–0.2)
BASOPHILS NFR BLD AUTO: 0 % — SIGNIFICANT CHANGE UP (ref 0–2)
BILIRUB SERPL-MCNC: 0.4 MG/DL — SIGNIFICANT CHANGE UP (ref 0.2–1.2)
BUN SERPL-MCNC: 14 MG/DL — SIGNIFICANT CHANGE UP (ref 7–23)
CALCIUM SERPL-MCNC: 9.1 MG/DL — SIGNIFICANT CHANGE UP (ref 8.4–10.5)
CHLORIDE SERPL-SCNC: 96 MMOL/L — SIGNIFICANT CHANGE UP (ref 96–108)
CO2 SERPL-SCNC: 23 MMOL/L — SIGNIFICANT CHANGE UP (ref 22–31)
CREAT SERPL-MCNC: 0.63 MG/DL — SIGNIFICANT CHANGE UP (ref 0.5–1.3)
DACRYOCYTES BLD QL SMEAR: SLIGHT — SIGNIFICANT CHANGE UP
EGFR: 128 ML/MIN/1.73M2 — SIGNIFICANT CHANGE UP
EOSINOPHIL # BLD AUTO: 0.87 K/UL — HIGH (ref 0–0.5)
EOSINOPHIL NFR BLD AUTO: 3 % — SIGNIFICANT CHANGE UP (ref 0–6)
GLUCOSE SERPL-MCNC: 115 MG/DL — HIGH (ref 70–99)
HCT VFR BLD CALC: 33.7 % — LOW (ref 39–50)
HGB BLD-MCNC: 10.4 G/DL — LOW (ref 13–17)
HYPOCHROMIA BLD QL: SLIGHT — SIGNIFICANT CHANGE UP
LYMPHOCYTES # BLD AUTO: 10 % — LOW (ref 13–44)
LYMPHOCYTES # BLD AUTO: 2.89 K/UL — SIGNIFICANT CHANGE UP (ref 1–3.3)
MACROCYTES BLD QL: SLIGHT — SIGNIFICANT CHANGE UP
MAGNESIUM SERPL-MCNC: 1.7 MG/DL — SIGNIFICANT CHANGE UP (ref 1.6–2.6)
MANUAL SMEAR VERIFICATION: SIGNIFICANT CHANGE UP
MCHC RBC-ENTMCNC: 23.4 PG — LOW (ref 27–34)
MCHC RBC-ENTMCNC: 30.9 GM/DL — LOW (ref 32–36)
MCV RBC AUTO: 75.9 FL — LOW (ref 80–100)
MICROCYTES BLD QL: SLIGHT — SIGNIFICANT CHANGE UP
MONOCYTES # BLD AUTO: 3.18 K/UL — HIGH (ref 0–0.9)
MONOCYTES NFR BLD AUTO: 11 % — SIGNIFICANT CHANGE UP (ref 2–14)
NEUTROPHILS # BLD AUTO: 21.98 K/UL — HIGH (ref 1.8–7.4)
NEUTROPHILS NFR BLD AUTO: 76 % — SIGNIFICANT CHANGE UP (ref 43–77)
NRBC # BLD: 0 /100 WBCS — SIGNIFICANT CHANGE UP (ref 0–0)
NRBC # BLD: 0 /100 WBCS — SIGNIFICANT CHANGE UP (ref 0–0)
OVALOCYTES BLD QL SMEAR: SLIGHT — SIGNIFICANT CHANGE UP
PHOSPHATE SERPL-MCNC: 2.7 MG/DL — SIGNIFICANT CHANGE UP (ref 2.5–4.5)
PLAT MORPH BLD: NORMAL — SIGNIFICANT CHANGE UP
PLATELET # BLD AUTO: 478 K/UL — HIGH (ref 150–400)
POIKILOCYTOSIS BLD QL AUTO: SLIGHT — SIGNIFICANT CHANGE UP
POLYCHROMASIA BLD QL SMEAR: SLIGHT — SIGNIFICANT CHANGE UP
POTASSIUM SERPL-MCNC: 4.2 MMOL/L — SIGNIFICANT CHANGE UP (ref 3.5–5.3)
POTASSIUM SERPL-SCNC: 4.2 MMOL/L — SIGNIFICANT CHANGE UP (ref 3.5–5.3)
PROT SERPL-MCNC: 8.7 G/DL — HIGH (ref 6–8.3)
RBC # BLD: 4.44 M/UL — SIGNIFICANT CHANGE UP (ref 4.2–5.8)
RBC # FLD: 18.5 % — HIGH (ref 10.3–14.5)
RBC BLD AUTO: ABNORMAL
SODIUM SERPL-SCNC: 129 MMOL/L — LOW (ref 135–145)
WBC # BLD: 28.92 K/UL — HIGH (ref 3.8–10.5)
WBC # FLD AUTO: 28.92 K/UL — HIGH (ref 3.8–10.5)

## 2024-10-24 PROCEDURE — 93010 ELECTROCARDIOGRAM REPORT: CPT

## 2024-10-24 RX ORDER — OXYCODONE HYDROCHLORIDE 30 MG/1
1 TABLET ORAL
Qty: 12 | Refills: 0
Start: 2024-10-24 | End: 2024-10-26

## 2024-10-24 RX ORDER — ACETAMINOPHEN 500 MG
1000 TABLET ORAL EVERY 6 HOURS
Refills: 0 | Status: COMPLETED | OUTPATIENT
Start: 2024-10-24 | End: 2024-10-25

## 2024-10-24 RX ORDER — POLYETHYLENE GLYCOL 3350 17 G/17G
17 POWDER, FOR SOLUTION ORAL
Refills: 0 | Status: DISCONTINUED | OUTPATIENT
Start: 2024-10-24 | End: 2024-10-27

## 2024-10-24 RX ADMIN — OXYCODONE HYDROCHLORIDE 5 MILLIGRAM(S): 30 TABLET ORAL at 17:03

## 2024-10-24 RX ADMIN — Medication 650 MILLIGRAM(S): at 06:27

## 2024-10-24 RX ADMIN — Medication 650 MILLIGRAM(S): at 14:58

## 2024-10-24 RX ADMIN — OXYCODONE HYDROCHLORIDE 5 MILLIGRAM(S): 30 TABLET ORAL at 16:03

## 2024-10-24 RX ADMIN — Medication 1000 MILLIGRAM(S): at 20:00

## 2024-10-24 RX ADMIN — OXYCODONE HYDROCHLORIDE 5 MILLIGRAM(S): 30 TABLET ORAL at 14:58

## 2024-10-24 RX ADMIN — OXYCODONE HYDROCHLORIDE 10 MILLIGRAM(S): 30 TABLET ORAL at 06:57

## 2024-10-24 RX ADMIN — Medication 40 MILLIGRAM(S): at 17:45

## 2024-10-24 RX ADMIN — Medication 650 MILLIGRAM(S): at 06:57

## 2024-10-24 RX ADMIN — OXYCODONE HYDROCHLORIDE 10 MILLIGRAM(S): 30 TABLET ORAL at 06:27

## 2024-10-24 RX ADMIN — Medication 400 MILLIGRAM(S): at 19:09

## 2024-10-24 RX ADMIN — OXYCODONE HYDROCHLORIDE 5 MILLIGRAM(S): 30 TABLET ORAL at 13:58

## 2024-10-24 NOTE — PROVIDER CONTACT NOTE (OTHER) - ASSESSMENT
HR between 110-120. Other VSS. Patient complaining of pain, oxycodone and Tylenol given. Patient denies chest pain and SOB.

## 2024-10-24 NOTE — PROGRESS NOTE ADULT - SUBJECTIVE AND OBJECTIVE BOX
Plastic Surgery Progress Note     Interval/Subjective:  Patient seen and examined. No acute events overnight. Vitals stable. Afebrile. Pain controlled with medications.   __________________________________________________________________  Vitals:  T(C): 36.6 (05:06), Max: 36.9 (17:04)  HR: 97 (05:06) (86 - 100)  BP: 117/75 (05:06) (113/72 - 137/89)  RR: 18 (05:06) (18 - 18)  SpO2: 98% (05:06) (98% - 100%)    I & O's:  IN:    IV PiggyBack: 50 mL    Oral Fluid: 1000 mL  Total IN: 1050 mL    OUT:    Indwelling Catheter - Urethral (mL): 2500 mL  Total OUT: 2500 mL    Physical Exam:  General: NAD, resting in bed comfortably.  Cardiac: regular rate, warm and well perfused  Extremities:   LLE: Groin dressing cdi and in place. Donor site open to air. Extremities warm and appear well perfused.   LUE: Wound with healthy granulation tissue at base and with some fibrinous exudate. Packed with aquacel strips. No obvious infection or cellulitis.    Labs:    __________________________________________________________________    A: Mr Reyna is now s/p L groin debridement and STSG from L thigh on 10/18/24. Patient recovering well. Will need wound care upon discharge.    Plan:  - miralax for BM  - OOB and ambulating  - Pain control: Oxy 5/10   - No need for daily labs; will only get if status changes  - aquacel extra on skin graft  - DVT ppx: SQH   - Dispo: needs home care for wounds    Plastic Surgery  #[id number] [facility] pager  [phone] [facility] pager  Available on teams

## 2024-10-25 PROCEDURE — 93970 EXTREMITY STUDY: CPT | Mod: 26

## 2024-10-25 PROCEDURE — 99223 1ST HOSP IP/OBS HIGH 75: CPT | Mod: GC

## 2024-10-25 PROCEDURE — 71275 CT ANGIOGRAPHY CHEST: CPT | Mod: 26

## 2024-10-25 RX ORDER — ENOXAPARIN SODIUM 40MG/0.4ML
80 SYRINGE (ML) SUBCUTANEOUS EVERY 12 HOURS
Refills: 0 | Status: DISCONTINUED | OUTPATIENT
Start: 2024-10-25 | End: 2024-10-27

## 2024-10-25 RX ORDER — ACETAMINOPHEN 500 MG
650 TABLET ORAL EVERY 6 HOURS
Refills: 0 | Status: DISCONTINUED | OUTPATIENT
Start: 2024-10-25 | End: 2024-10-27

## 2024-10-25 RX ORDER — IBUPROFEN 200 MG
400 TABLET ORAL EVERY 6 HOURS
Refills: 0 | Status: DISCONTINUED | OUTPATIENT
Start: 2024-10-25 | End: 2024-10-27

## 2024-10-25 RX ADMIN — POLYETHYLENE GLYCOL 3350 17 GRAM(S): 17 POWDER, FOR SOLUTION ORAL at 06:27

## 2024-10-25 RX ADMIN — POLYETHYLENE GLYCOL 3350 17 GRAM(S): 17 POWDER, FOR SOLUTION ORAL at 17:28

## 2024-10-25 RX ADMIN — Medication 80 MILLIGRAM(S): at 12:51

## 2024-10-25 NOTE — CONSULT NOTE ADULT - SUBJECTIVE AND OBJECTIVE BOX
HPI:  34M PMH iron deficiency anemia ([facility] Dr. Juan Parra) admitted to surgical service for complex Repair of Left Axilla, Debridement and split thickness skin grafting of fenitalia and perineum on 10/18/24 with Dr. Margarito Tariq, c/b LLE acute DVT. Pt overall feels well, denies pain, SOB, BM or urine changes - tolerating PO diet well, getting out of bed to chair.    Hematology consulted for acute LLE dvt.      PAST MEDICAL & SURGICAL HISTORY:  Hidradenitis suppurativa      RBBB      H/O sepsis      Anemia      S/P excisional debridement      Left axillary hidradenitis      Right axillary hidradenitis          FAMILY HISTORY:  FH: hypertension (Mother)        Allergies    No Known Allergies    Intolerances        Home Medications:  ibuprofen 600 mg oral tablet: 1 tab(s) orally prn as needed for  mild pain (18 Oct 2024 06:31)  Polyethylene Glycol 3350: 1 dose(s) orally once a day as needed for  constipation (18 Oct 2024 06:31)  Tylenol 325 mg oral tablet: 2 tab(s) orally prn as needed for  mild pain (18 Oct 2024 06:31)        REVIEW OF SYSTEMS:  CONSTITUTIONAL: No weakness, fevers, chills, sick contacts, or unintended weight loss  EYES: No visual changes or vertigo  ENT: No throat pain, rhinorrhea, or hearing loss   NECK: No pain or stiffness  RESPIRATORY: No cough, wheezing, hemoptysis; No shortness of breath  CARDIOVASCULAR: No chest pain or palpitations  GASTROINTESTINAL: No abdominal or epigastric pain. No nausea, vomiting, or hematemesis; No diarrhea or constipation. No melena or hematochezia.  GENITOURINARY: No dysuria, frequency or hematuria  NEUROLOGICAL: No numbness or weakness  SKIN: HS wounds  Psych: Good mood, no substance use      OBJECTIVE:  ICU Vital Signs Last 24 Hrs  T(C): 36.8 (25 Oct 2024 14:45), Max: 37.8 (24 Oct 2024 18:00)  T(F): 98.3 (25 Oct 2024 14:45), Max: 100.1 (24 Oct 2024 18:00)  HR: 109 (25 Oct 2024 14:45) (109 - 151)  BP: 116/74 (25 Oct 2024 14:45) (107/70 - 122/87)  BP(mean): --  ABP: --  ABP(mean): --  RR: 18 (25 Oct 2024 14:45) (18 - 18)  SpO2: 97% (25 Oct 2024 14:45) (97% - 99%)    O2 Parameters below as of 25 Oct 2024 14:45  Patient On (Oxygen Delivery Method): room air            GENERAL: NAD, sitting in chair comfortably on RA  HEAD:  Atraumatic, Normocephalic  EYES: EOMI, conjunctiva and sclera clear  ENT: Moist mucous membranes  NECK: Supple  CHEST/LUNG: Unlabored respirations; no wheezing  HEART: Regular rate and rhythm  ABDOMEN: Soft, nontender, nondistended  EXTREMITIES:  No cyanosis or edema, LLE without calf/foot edema, no tenderness calf/foot  NERVOUS SYSTEM:  A&Ox3, no focal deficits   SKIN: LLE wound dressing c/d/i, LUE wound dressing c/d/i  Psych: Normal speech, normal behavior, normal affect      LABS:                        10.4   28.92 )-----------( 478      ( 24 Oct 2024 19:37 )             33.7     10-24    129[L]  |  96  |  14  ----------------------------<  115[H]  4.2   |  23  |  0.63    Ca    9.1      24 Oct 2024 19:37  Phos  2.7     10-24  Mg     1.7     10-24    TPro  8.7[H]  /  Alb  3.3  /  TBili  0.4  /  DBili  x   /  AST  13  /  ALT  6[L]  /  AlkPhos  126[H]  10-24

## 2024-10-25 NOTE — PROGRESS NOTE ADULT - SUBJECTIVE AND OBJECTIVE BOX
Plastic Surgery Progress Note     Interval/Subjective:  Patient seen and examined. Pain controlled with medications. Overnight was tachycardic up to 150s, had an episode of emesis, labs showed wbc elevated to 28, subsequent CT PE (-) for PE. Pt feeling well this morning.   __________________________________________________________________  Vitals:  T(C): 36.9 (05:23), Max: 37.8 (18:00)  HR: 114 (05:23) (100 - 151)  BP: 119/80 (05:23) (107/70 - 120/78)  RR: 18 (05:23) (18 - 18)  SpO2: 99% (05:23) (97% - 99%)    I & O's:  IN:    Oral Fluid: 360 mL  Total IN: 360 mL    OUT:    Emesis (mL): 500 mL    Voided (mL): 2750 mL  Total OUT: 3250 mL      Physical Exam:  General: NAD, resting comfortably in bed  Cardiac: regular rate, warm and well perfused  Extremities:   LLE: Groin dressing cdi and in place. Donor site open to air. Extremities warm and appear well perfused.   LUE: Wound with healthy granulation tissue at base and with some fibrinous exudate. Packed with aquacel strips. No obvious infection or cellulitis.     Labs:  CBC: 10-24-24 @ 19:37          10.4   28.92 >----< 478          33.7    Chemistry: 10-24-24 @ 19:37  129|96|14    ------------< 115  4.2|23|0.63    Ca: 9.1 10-24-24 @ 19:37  M.7 10-24-24 @ 19:37  Phos: 2.7 10-24-24 @ 19:37    LFT's: 10-24-24 @ 19:37  AST: 13  ALT: 6  ALP: 126  T.Bili: 0.4  D.Bili: --    __________________________________________________________________    A:  Mr Reyna is now s/p L groin debridement and STSG from L thigh on 10/18/24. Patient recovering well. Workup for tachycardia on 10/24 negative. Will need wound care upon discharge.    Plan:  - duplex pending  - OOB and ambulating  - Pain control: Oxy 5/10   - No need for daily labs; will only get if status changes  - aquacel extra on skin graft  - DVT ppx: SQH   - Dispo: needs home care for wounds    Plastic Surgery  #[id number] [facility] pager  [phone] [facility] pager  Available on teams

## 2024-10-25 NOTE — PROVIDER CONTACT NOTE (SEPSIS SCREENING) - BACKGROUND:
pt with hydradenitis suppurativa  s/p debridement and split thickness grafting of genitalia and perineum.

## 2024-10-25 NOTE — CONSULT NOTE ADULT - ASSESSMENT
Assessment: 34M PMH iron deficiency anemia ([facility] Dr. Juan Parra) admitted to surgical service for complex Repair of Left Axilla, Debridement and split thickness skin grafting of fenitalia and perineum on 10/18/24 with Dr. Margarito Tariq, c/b LLE acute DVT.      Acute, Provoked, Proximal LLE DVT  - Transition from full dose lovenox to DOAC for 3 month course  - Follow up with Dr. Juan Parra on 12/3/24 (scheduled)      Please message me on MS teams with any additional questions.    Anupam Gunter, PGY4  Hematology & Oncology Fellow  MS Teams - Call or Text

## 2024-10-25 NOTE — CONSULT NOTE ADULT - ATTENDING COMMENTS
The patient has received debridement of cellulitis involving buttock and pelvic region. Suppurative disease present for several months prior to current operative procedure. The patient has a deep venous thrombosis that is provoked by chronic infection and I recommend oral anticoagulation including use of DOAC prior to discharge. He is currently on enoxaparin without bleeding.

## 2024-10-25 NOTE — CONSULT NOTE ADULT - CONSULT REQUESTED BY NAME
Pt called in regards to recent podiatrist apt. Pt stated that the doctor ordered a bone density scan but his insurance will not cover it unless it is through his PCP. Pt was wondering if Dr. CSHROEDER could possibly order that bone density test for him so it will be covered    surgery

## 2024-10-25 NOTE — PROVIDER CONTACT NOTE (SEPSIS SCREENING) - NOTIFICATION DETAILS (SBAR) SITUATION:
pt supposed to be discharged 10/24 but became tachycardic and had temp of 100.1. WBC elevated at 28. blood cultures initially ordered but cancelled d/t suspicion of PE not sepsis. Ct angio negative for PE. Pt pending [facility] duplex BL [facility]. 
Is This A New Presentation, Or A Follow-Up?: Skin Lesion
What Type Of Note Output Would You Prefer (Optional)?: Bullet Format
How Severe Is Your Skin Lesion?: mild
Has Your Skin Lesion Been Treated?: not been treated

## 2024-10-26 RX ORDER — APIXABAN 5 MG/1
1 TABLET, FILM COATED ORAL
Qty: 180 | Refills: 0
Start: 2024-10-26 | End: 2025-01-23

## 2024-10-26 RX ORDER — ENOXAPARIN SODIUM 40MG/0.4ML
80 SYRINGE (ML) SUBCUTANEOUS
Qty: 11 | Refills: 0
Start: 2024-10-26 | End: 2024-10-31

## 2024-10-26 RX ADMIN — Medication 80 MILLIGRAM(S): at 12:21

## 2024-10-26 RX ADMIN — Medication 80 MILLIGRAM(S): at 00:55

## 2024-10-26 NOTE — PHYSICAL THERAPY INITIAL EVALUATION ADULT - CRITERIA FOR SKILLED THERAPEUTIC INTERVENTIONS
home/  home PT for bed mobs, transfers, amb training, balance training and ther exercises/anticipated discharge recommendation

## 2024-10-26 NOTE — PROGRESS NOTE ADULT - ASSESSMENT
A:  Mr Reyna is now s/p L groin debridement and STSG from L thigh on 10/18/24. Patient recovering well. Workup for tachycardia on 10/24 negative. Will need wound care upon discharge.    Plan:  - OOB and ambulating - PT consult pending  - Pain control: Oxy 5/10   - No need for daily labs; will only get if status changes  - aquacel extra on skin graft  - Dispo: needs home care for wounds  - Appreciate Heme recs fo Acute, Provoked, Proximal LLE DVT: Transition from full dose lovenox to DOAC for 3 month course w/ Follow up with Dr. Juan Parra on 12/3/24 (scheduled)    Plastic Surgery  #[phone] [facility] pager  [phone] [facility] pager  Available on teams

## 2024-10-26 NOTE — PHYSICAL THERAPY INITIAL EVALUATION ADULT - PERTINENT HX OF CURRENT PROBLEM, REHAB EVAL
33 y/o male presents to [facility] prior to scheduled Complex Repair of Left Axilla, Debridement and split thickness skin grafting of fenitalia and perineum on 10/18/24 with Dr. Margarito Tariq. PMH of Leukocytosis, Anemia (follows with hematologist- states he was last seen 6/2024 and received iron infusion), Arthritis, Hidradenitis Suppurative s/p right axillary hidradenitis S/P excision of hidradenitis of the buttocks with skin graft reconstruction (2/2023), s/p excision of left axillary hidradenitis (4/2023), Left Axillary Wound Reconstruction with Thoracodorsal Artery  Flap, graft from left thigh 5/2023, right axillary hidradenitis removed on 2/13/24, and right axillary wound reconstruction with muscle flap and skin graft 3/2024. Most recently, underwent Excision of left axillary, bilateral inguinal, and perineal hidradenitis on 9/23. Patient was discharged home w/ wound vac to the left axilla on 9/29. 35 y/o male presents to [facility] prior to scheduled Complex Repair of Left Axilla, Debridement and split thickness skin grafting of fenitalia and perineum on 10/18/24 with Dr. Margarito Tariq. PMH of Leukocytosis, Anemia (follows with hematologist- states he was last seen 6/2024 and received iron infusion), Arthritis, Hidradenitis Suppurative s/p right axillary hidradenitis S/P excision of hidradenitis of the buttocks with skin graft reconstruction (2/2023), s/p excision of left axillary hidradenitis (4/2023), Left Axillary Wound Reconstruction with Thoracodorsal Artery  Flap, graft from left thigh 5/2023, right axillary hidradenitis removed on 2/13/24, and right axillary wound reconstruction with muscle flap and skin graft 3/2024. Most recently, underwent Excision of left axillary, bilateral inguinal, and perineal hidradenitis on 9/23. Patient was discharged home w/ wound vac to the left axilla on 9/29.CT angio (-), B/L venous dopplers (-) DVT B/L LE;s 35 y/o male presents to [facility] prior to scheduled Complex Repair of Left Axilla, Debridement and split thickness skin grafting of fenitalia and perineum on 10/18/24 with Dr. Margarito Tariq. PMH of Leukocytosis, Anemia (follows with hematologist- states he was last seen 6/2024 and received iron infusion), Arthritis, Hidradenitis Suppurative s/p right axillary hidradenitis S/P excision of hidradenitis of the buttocks with skin graft reconstruction (2/2023), s/p excision of left axillary hidradenitis (4/2023), Left Axillary Wound Reconstruction with Thoracodorsal Artery  Flap, graft from left thigh 5/2023, right axillary hidradenitis removed on 2/13/24, and right axillary wound reconstruction with muscle flap and skin graft 3/2024. Most recently, underwent Excision of left axillary, bilateral inguinal, and perineal hidradenitis on 9/23. Patient was discharged home w/ wound vac to the left axilla on 9/29.CT angio (-), B/L venous dopplers (-) DVT Acute above the knee DVT confined to L deep fem vein

## 2024-10-26 NOTE — PROGRESS NOTE ADULT - SUBJECTIVE AND OBJECTIVE BOX
Plastic Surgery Progress Note (pg [facility]: [id number], [place]: [phone])    SUBJECTIVE  The patient was seen and examined.     OBJECTIVE  ___________________________________________________  VITAL SIGNS / I&O's   Vital Signs Last 24 Hrs  T(C): 36.7 (26 Oct 2024 05:11), Max: 37.1 (25 Oct 2024 18:34)  T(F): 98 (26 Oct 2024 05:11), Max: 98.7 (25 Oct 2024 18:34)  HR: 89 (26 Oct 2024 05:11) (89 - 109)  BP: 129/79 (26 Oct 2024 05:11) (109/69 - 129/79)  BP(mean): --  RR: 18 (26 Oct 2024 05:11) (18 - 18)  SpO2: 100% (26 Oct 2024 05:11) (97% - 100%)    Parameters below as of 26 Oct 2024 05:11  Patient On (Oxygen Delivery Method): room air          25 Oct 2024 07:01  -  26 Oct 2024 07:00  --------------------------------------------------------  IN:    Oral Fluid: 720 mL  Total IN: 720 mL    OUT:    Emesis (mL): 0 mL    Voided (mL): 1250 mL  Total OUT: 1250 mL    Total NET: -530 mL        ___________________________________________________  PHYSICAL EXAM    General: NAD, resting comfortably in bed  Cardiac: regular rate, warm and well perfused  Extremities:   LLE: Groin dressing cdi and in place. Donor site open to air. Extremities warm and appear well perfused.   LUE: Wound with healthy granulation tissue at base and with some fibrinous exudate. Packed with aquacel strips. No obvious infection or cellulitis.     ___________________________________________________  LABS                        10.4   28.92 )-----------( 478      ( 24 Oct 2024 19:37 )             33.7     24 Oct 2024 19:37    129    |  96     |  14     ----------------------------<  115    4.2     |  23     |  0.63     Ca    9.1        24 Oct 2024 19:37  Phos  2.7       24 Oct 2024 19:37  Mg     1.7       24 Oct 2024 19:37    TPro  8.7    /  Alb  3.3    /  TBili  0.4    /  DBili  x      /  AST  13     /  ALT  6      /  AlkPhos  126    24 Oct 2024 19:37      CAPILLARY BLOOD GLUCOSE            Urinalysis Basic - ( 24 Oct 2024 19:37 )    Color: x / Appearance: x / SG: x / pH: x  Gluc: 115 mg/dL / Ketone: x  / Bili: x / Urobili: x   Blood: x / Protein: x / Nitrite: x   Leuk Esterase: x / RBC: x / WBC x   Sq Epi: x / Non Sq Epi: x / Bacteria: x      ___________________________________________________  MICRO  Recent Cultures:    ___________________________________________________  MEDICATIONS  (STANDING):  acetaminophen     Tablet .. 650 milliGRAM(s) Oral every 6 hours  enoxaparin Injectable 80 milliGRAM(s) SubCutaneous every 12 hours  ibuprofen  Tablet. 400 milliGRAM(s) Oral every 6 hours  polyethylene glycol 3350 17 Gram(s) Oral two times a day    MEDICATIONS  (PRN):  aluminum hydroxide/magnesium hydroxide/simethicone Suspension 30 milliLiter(s) Oral every 4 hours PRN Gas  calcium carbonate    500 mG (Tums) Chewable 1 Tablet(s) Chew every 4 hours PRN Dyspepsia  HYDROmorphone  Injectable 0.5 milliGRAM(s) IV Push every 4 hours PRN Severe breakthrough Pain (7 - 10)  ondansetron Injectable 4 milliGRAM(s) IV Push every 6 hours PRN Nausea and/or Vomiting  oxyCODONE    IR 10 milliGRAM(s) Oral every 4 hours PRN Severe Pain (7 - 10)  oxyCODONE    IR 5 milliGRAM(s) Oral every 4 hours PRN Moderate Pain (4 - 6)

## 2024-10-26 NOTE — PHYSICAL THERAPY INITIAL EVALUATION ADULT - ADDITIONAL COMMENTS
lives w/ mother in elevator access apt, used rolling walker PTA has cane, , shower seat and tub shower/ hearing good, left handed / hearing good

## 2024-10-26 NOTE — PHYSICAL THERAPY INITIAL EVALUATION ADULT - DID THE PATIENT HAVE SURGERY?
Complex Repair of Left Axilla, Debridement and split thickness skin grafting of fenitalia and perineum on 10/18/24/n/a

## 2024-10-26 NOTE — PHYSICAL THERAPY INITIAL EVALUATION ADULT - IMPAIRMENTS CONTRIBUTING TO GAIT DEVIATIONS, PT EVAL
flexed trunk, hips 2/2 to pain and pressure LLE/impaired balance/impaired motor control/pain/impaired postural control/decreased strength

## 2024-10-27 VITALS
OXYGEN SATURATION: 100 % | HEART RATE: 102 BPM | RESPIRATION RATE: 20 BRPM | SYSTOLIC BLOOD PRESSURE: 110 MMHG | TEMPERATURE: 99 F | DIASTOLIC BLOOD PRESSURE: 74 MMHG

## 2024-10-27 PROCEDURE — 93970 EXTREMITY STUDY: CPT

## 2024-10-27 PROCEDURE — 97161 PT EVAL LOW COMPLEX 20 MIN: CPT

## 2024-10-27 PROCEDURE — 80053 COMPREHEN METABOLIC PANEL: CPT

## 2024-10-27 PROCEDURE — C9399: CPT

## 2024-10-27 PROCEDURE — 93005 ELECTROCARDIOGRAM TRACING: CPT

## 2024-10-27 PROCEDURE — 83735 ASSAY OF MAGNESIUM: CPT

## 2024-10-27 PROCEDURE — 71275 CT ANGIOGRAPHY CHEST: CPT | Mod: MC

## 2024-10-27 PROCEDURE — 84100 ASSAY OF PHOSPHORUS: CPT

## 2024-10-27 PROCEDURE — 85027 COMPLETE CBC AUTOMATED: CPT

## 2024-10-27 PROCEDURE — 80048 BASIC METABOLIC PNL TOTAL CA: CPT

## 2024-10-27 RX ADMIN — Medication 80 MILLIGRAM(S): at 11:43

## 2024-10-27 RX ADMIN — Medication 80 MILLIGRAM(S): at 00:13

## 2024-10-27 NOTE — PROGRESS NOTE ADULT - PROVIDER SPECIALTY LIST ADULT
Anesthesia
Plastic Surgery
Anesthesia
Anesthesia
Plastic Surgery

## 2024-10-27 NOTE — PROGRESS NOTE ADULT - SUBJECTIVE AND OBJECTIVE BOX
Plastic Surgery Progress Note     Interval/Subjective:  Patient seen and examined. No acute events overnight. Vitals stable. Afebrile. Pain controlled with medications.   __________________________________________________________________  Vitals:  T(C): 36.7 (05:05), Max: 37 (22:07)  HR: 97 (05:05) (85 - 110)  BP: 111/76 (05:05) (110/74 - 121/77)  RR: 18 (05:05) (18 - 18)  SpO2: 99% (05:05) (98% - 100%)    I & O's:  IN:    Oral Fluid: 720 mL  Total IN: 720 mL    OUT:    Emesis (mL): 0 mL    Voided (mL): 1250 mL  Total OUT: 1250 mL      Physical Exam:  General: NAD, resting comfortably in bed  Cardiac: regular rate, warm and well perfused  Extremities:   LLE: Groin dressing cdi and in place. Donor site open to air. Extremities warm and appear well perfused.   LUE: Wound with healthy granulation tissue at base and with some fibrinous exudate. Packed with aquacel strips. No obvious infection or cellulitis.     Labs:    __________________________________________________________________    A:  Mr Reyna is now s/p L groin debridement and STSG from L thigh on 10/18/24. Patient recovering well. Workup for tachycardia on 10/24 negative. Will need wound care upon discharge.    Plan:  - OOB and ambulating - Home PT   - Pain control: Oxy 5/10   - No need for daily labs; will only get if status changes  - aquacel extra on skin graft  - Dispo: needs home care for wounds  - Appreciate Heme recs fo Acute, Provoked, Proximal LLE DVT: Transition from full dose lovenox to DOAC for 3 month course w/ Follow up with Dr. Juan Parra on 12/3/24 (scheduled)    Plastic Surgery  #[id number] [facility] pager  [phone] [facility] pager  Available on teams       Plastic Surgery Progress Note     Interval/Subjective:  Patient seen and examined. No acute events overnight. Vitals stable. Afebrile. Pain controlled with medications.   __________________________________________________________________  Vitals:  T(C): 36.7 (05:05), Max: 37 (22:07)  HR: 97 (05:05) (85 - 110)  BP: 111/76 (05:05) (110/74 - 121/77)  RR: 18 (05:05) (18 - 18)  SpO2: 99% (05:05) (98% - 100%)    I & O's:  IN:    Oral Fluid: 720 mL  Total IN: 720 mL    OUT:    Emesis (mL): 0 mL    Voided (mL): 1250 mL  Total OUT: 1250 mL      Physical Exam:  General: NAD, resting comfortably in bed  Cardiac: regular rate, warm and well perfused  Extremities:   LLE: Groin dressing cdi and in place. Donor site open to air. Extremities warm and appear well perfused.   LUE: Wound with healthy granulation tissue at base and with some fibrinous exudate. Packed with aquacel strips. No obvious infection or cellulitis.     Labs:    __________________________________________________________________    A:  Mr Reyna is now s/p L groin debridement and STSG from L thigh on 10/18/24. Patient recovering well. Workup for tachycardia on 10/24 revealed L deep femoral DVT. Plan to go home on therapeutic AC per Heme recs.  Will need wound care upon discharge.    Plan:  - OOB and ambulating - Home PT   - Pain control: Oxy 5/10   - No need for daily labs; will only get if status changes  - aquacel extra on skin graft  - Dispo: needs home care for wounds  - Appreciate Heme recs fo Acute, Provoked, Proximal LLE DVT: Transition from full dose lovenox to DOAC for 3 month course w/ Follow up with Dr. Juan Parra on 12/3/24 (scheduled)    Plastic Surgery  #[phone] [facility] pager  [phone] [facility] pager  Available on teams

## 2024-10-30 ENCOUNTER — APPOINTMENT (OUTPATIENT)
Dept: PLASTIC SURGERY | Facility: CLINIC | Age: 34
End: 2024-10-30
Payer: MEDICAID

## 2024-10-30 DIAGNOSIS — L73.2 HIDRADENITIS SUPPURATIVA: ICD-10-CM

## 2024-10-30 PROCEDURE — 99024 POSTOP FOLLOW-UP VISIT: CPT

## 2024-11-06 RX ORDER — SILVER/HYDROCOLLOID DRESSING 1.2-3.5X12
BANDAGE TOPICAL
Qty: 60 | Refills: 5 | Status: ACTIVE | OUTPATIENT
Start: 2024-11-06

## 2024-11-13 ENCOUNTER — APPOINTMENT (OUTPATIENT)
Dept: PLASTIC SURGERY | Facility: CLINIC | Age: 34
End: 2024-11-13

## 2024-11-13 ENCOUNTER — APPOINTMENT (OUTPATIENT)
Dept: PLASTIC SURGERY | Facility: CLINIC | Age: 34
End: 2024-11-13
Payer: MEDICAID

## 2024-11-13 DIAGNOSIS — S41.102D UNSPECIFIED OPEN WOUND OF LEFT UPPER ARM, SUBSEQUENT ENCOUNTER: ICD-10-CM

## 2024-11-13 DIAGNOSIS — K61.0 ANAL ABSCESS: ICD-10-CM

## 2024-11-13 PROCEDURE — 99024 POSTOP FOLLOW-UP VISIT: CPT

## 2024-11-19 ENCOUNTER — APPOINTMENT (OUTPATIENT)
Dept: INTERNAL MEDICINE | Facility: CLINIC | Age: 34
End: 2024-11-19
Payer: MEDICAID

## 2024-11-19 VITALS
SYSTOLIC BLOOD PRESSURE: 126 MMHG | HEART RATE: 103 BPM | OXYGEN SATURATION: 99 % | DIASTOLIC BLOOD PRESSURE: 86 MMHG | HEIGHT: 71 IN | BODY MASS INDEX: 25.2 KG/M2 | WEIGHT: 180 LBS | TEMPERATURE: 97.9 F

## 2024-11-19 DIAGNOSIS — I82.409 ACUTE EMBOLISM AND THROMBOSIS OF UNSPECIFIED DEEP VEINS OF UNSPECIFIED LOWER EXTREMITY: ICD-10-CM

## 2024-11-19 DIAGNOSIS — D64.9 ANEMIA, UNSPECIFIED: ICD-10-CM

## 2024-11-19 DIAGNOSIS — M25.561 PAIN IN RIGHT KNEE: ICD-10-CM

## 2024-11-19 DIAGNOSIS — M25.572 PAIN IN LEFT ANKLE AND JOINTS OF LEFT FOOT: ICD-10-CM

## 2024-11-19 PROCEDURE — 99214 OFFICE O/P EST MOD 30 MIN: CPT | Mod: 25

## 2024-11-19 PROCEDURE — 99495 TRANSJ CARE MGMT MOD F2F 14D: CPT | Mod: 25

## 2024-11-19 RX ORDER — APIXABAN 5 MG/1
5 TABLET, FILM COATED ORAL
Qty: 2 | Refills: 2 | Status: ACTIVE | COMMUNITY

## 2024-11-21 ENCOUNTER — LABORATORY RESULT (OUTPATIENT)
Age: 34
End: 2024-11-21

## 2024-11-22 DIAGNOSIS — E55.9 VITAMIN D DEFICIENCY, UNSPECIFIED: ICD-10-CM

## 2024-11-22 LAB
25(OH)D3 SERPL-MCNC: 11.9 NG/ML
ALBUMIN SERPL ELPH-MCNC: 3.9 G/DL
ALP BLD-CCNC: 135 U/L
ALT SERPL-CCNC: 8 U/L
ANION GAP SERPL CALC-SCNC: 15 MMOL/L
AST SERPL-CCNC: 25 U/L
BASOPHILS # BLD AUTO: 0.14 K/UL
BASOPHILS NFR BLD AUTO: 1.1 %
BILIRUB SERPL-MCNC: 0.3 MG/DL
BUN SERPL-MCNC: 14 MG/DL
CALCIUM SERPL-MCNC: 9.2 MG/DL
CHLORIDE SERPL-SCNC: 99 MMOL/L
CHOLEST SERPL-MCNC: 175 MG/DL
CO2 SERPL-SCNC: 21 MMOL/L
CREAT SERPL-MCNC: 0.52 MG/DL
EGFR: 136 ML/MIN/1.73M2
EOSINOPHIL # BLD AUTO: 0.33 K/UL
EOSINOPHIL NFR BLD AUTO: 2.6 %
ESTIMATED AVERAGE GLUCOSE: 94 MG/DL
GLUCOSE SERPL-MCNC: 91 MG/DL
HBA1C MFR BLD HPLC: 4.9 %
HCT VFR BLD CALC: 39.6 %
HCT VFR BLD CALC: 41.9 %
HCV AB SER QL: NONREACTIVE
HCV S/CO RATIO: 0.23 S/CO
HDLC SERPL-MCNC: 47 MG/DL
HGB BLD-MCNC: 11.6 G/DL
HGB BLD-MCNC: 11.7 G/DL
HIV1+2 AB SPEC QL IA.RAPID: NONREACTIVE
IMM GRANULOCYTES NFR BLD AUTO: 0.5 %
LDLC SERPL CALC-MCNC: 114 MG/DL
LYMPHOCYTES # BLD AUTO: 4.08 K/UL
LYMPHOCYTES NFR BLD AUTO: 31.9 %
MAN DIFF?: NORMAL
MCHC RBC-ENTMCNC: 23.5 PG
MCHC RBC-ENTMCNC: 23.6 PG
MCHC RBC-ENTMCNC: 27.9 G/DL
MCHC RBC-ENTMCNC: 29.3 G/DL
MCV RBC AUTO: 80.7 FL
MCV RBC AUTO: 84.3 FL
MONOCYTES # BLD AUTO: 0.74 K/UL
MONOCYTES NFR BLD AUTO: 5.8 %
NEUTROPHILS # BLD AUTO: 7.42 K/UL
NEUTROPHILS NFR BLD AUTO: 58.1 %
NONHDLC SERPL-MCNC: 127 MG/DL
PLATELET # BLD AUTO: 497 K/UL
PLATELET # BLD AUTO: 508 K/UL
POTASSIUM SERPL-SCNC: 6.1 MMOL/L
PROT SERPL-MCNC: 9.9 G/DL
RBC # BLD: 4.91 M/UL
RBC # BLD: 4.97 M/UL
RBC # FLD: 18.9 %
RBC # FLD: 19.4 %
SODIUM SERPL-SCNC: 135 MMOL/L
T4 SERPL-MCNC: 9.1 UG/DL
TRIGL SERPL-MCNC: 72 MG/DL
TSH SERPL-ACNC: 0.74 UIU/ML
WBC # FLD AUTO: 12.77 K/UL
WBC # FLD AUTO: 13.21 K/UL

## 2024-11-22 RX ORDER — ERGOCALCIFEROL 1.25 MG/1
1.25 MG CAPSULE, LIQUID FILLED ORAL
Qty: 12 | Refills: 0 | Status: ACTIVE | COMMUNITY
Start: 2024-11-22 | End: 1900-01-01

## 2024-11-26 ENCOUNTER — OUTPATIENT (OUTPATIENT)
Dept: OUTPATIENT SERVICES | Facility: HOSPITAL | Age: 34
LOS: 1 days | Discharge: HOME | End: 2024-11-26

## 2024-11-26 DIAGNOSIS — C85.10 UNSPECIFIED B-CELL LYMPHOMA, UNSPECIFIED SITE: ICD-10-CM

## 2024-11-26 DIAGNOSIS — L73.2 HIDRADENITIS SUPPURATIVA: Chronic | ICD-10-CM

## 2024-11-26 DIAGNOSIS — Z98.890 OTHER SPECIFIED POSTPROCEDURAL STATES: Chronic | ICD-10-CM

## 2024-11-27 ENCOUNTER — OUTPATIENT (OUTPATIENT)
Dept: OUTPATIENT SERVICES | Facility: HOSPITAL | Age: 34
LOS: 1 days | Discharge: HOME | End: 2024-11-27
Payer: MEDICAID

## 2024-11-27 ENCOUNTER — NON-APPOINTMENT (OUTPATIENT)
Age: 34
End: 2024-11-27

## 2024-11-27 DIAGNOSIS — L73.2 HIDRADENITIS SUPPURATIVA: Chronic | ICD-10-CM

## 2024-11-27 DIAGNOSIS — Z98.890 OTHER SPECIFIED POSTPROCEDURAL STATES: Chronic | ICD-10-CM

## 2024-11-27 DIAGNOSIS — L89.90 PRESSURE ULCER OF UNSPECIFIED SITE, UNSPECIFIED STAGE: ICD-10-CM

## 2024-11-27 PROCEDURE — 99214 OFFICE O/P EST MOD 30 MIN: CPT

## 2024-12-02 DIAGNOSIS — M19.90 UNSPECIFIED OSTEOARTHRITIS, UNSPECIFIED SITE: ICD-10-CM

## 2024-12-02 DIAGNOSIS — D64.9 ANEMIA, UNSPECIFIED: ICD-10-CM

## 2024-12-02 DIAGNOSIS — L73.2 HIDRADENITIS SUPPURATIVA: ICD-10-CM

## 2024-12-02 DIAGNOSIS — F12.90 CANNABIS USE, UNSPECIFIED, UNCOMPLICATED: ICD-10-CM

## 2024-12-02 DIAGNOSIS — I82.502 CHRONIC EMBOLISM AND THROMBOSIS OF UNSPECIFIED DEEP VEINS OF LEFT LOWER EXTREMITY: ICD-10-CM

## 2024-12-03 ENCOUNTER — RESULT REVIEW (OUTPATIENT)
Age: 34
End: 2024-12-03

## 2024-12-03 ENCOUNTER — APPOINTMENT (OUTPATIENT)
Dept: HEMATOLOGY ONCOLOGY | Facility: CLINIC | Age: 34
End: 2024-12-03
Payer: MEDICAID

## 2024-12-03 VITALS
BODY MASS INDEX: 25.09 KG/M2 | HEART RATE: 93 BPM | DIASTOLIC BLOOD PRESSURE: 80 MMHG | RESPIRATION RATE: 16 BRPM | WEIGHT: 179.9 LBS | TEMPERATURE: 97 F | OXYGEN SATURATION: 99 % | SYSTOLIC BLOOD PRESSURE: 122 MMHG

## 2024-12-03 LAB
BASOPHILS # BLD AUTO: 0.07 K/UL — SIGNIFICANT CHANGE UP (ref 0–0.2)
BASOPHILS NFR BLD AUTO: 0.5 % — SIGNIFICANT CHANGE UP (ref 0–2)
EOSINOPHIL # BLD AUTO: 0.29 K/UL — SIGNIFICANT CHANGE UP (ref 0–0.5)
EOSINOPHIL NFR BLD AUTO: 2.1 % — SIGNIFICANT CHANGE UP (ref 0–6)
HCT VFR BLD CALC: 37.5 % — LOW (ref 39–50)
HGB BLD-MCNC: 11.6 G/DL — LOW (ref 13–17)
IMM GRANULOCYTES NFR BLD AUTO: 0.5 % — SIGNIFICANT CHANGE UP (ref 0–0.9)
LYMPHOCYTES # BLD AUTO: 23.2 % — SIGNIFICANT CHANGE UP (ref 13–44)
LYMPHOCYTES # BLD AUTO: 3.21 K/UL — SIGNIFICANT CHANGE UP (ref 1–3.3)
MCHC RBC-ENTMCNC: 23.2 PG — LOW (ref 27–34)
MCHC RBC-ENTMCNC: 30.9 G/DL — LOW (ref 32–36)
MCV RBC AUTO: 75.2 FL — LOW (ref 80–100)
MONOCYTES # BLD AUTO: 0.82 K/UL — SIGNIFICANT CHANGE UP (ref 0–0.9)
MONOCYTES NFR BLD AUTO: 5.9 % — SIGNIFICANT CHANGE UP (ref 2–14)
NEUTROPHILS # BLD AUTO: 9.38 K/UL — HIGH (ref 1.8–7.4)
NEUTROPHILS NFR BLD AUTO: 67.8 % — SIGNIFICANT CHANGE UP (ref 43–77)
NRBC # BLD: 0 /100 WBCS — SIGNIFICANT CHANGE UP (ref 0–0)
NRBC BLD-RTO: 0 /100 WBCS — SIGNIFICANT CHANGE UP (ref 0–0)
PLATELET # BLD AUTO: 469 K/UL — HIGH (ref 150–400)
RBC # BLD: 4.99 M/UL — SIGNIFICANT CHANGE UP (ref 4.2–5.8)
RBC # FLD: 17.4 % — HIGH (ref 10.3–14.5)
WBC # BLD: 13.84 K/UL — HIGH (ref 3.8–10.5)
WBC # FLD AUTO: 13.84 K/UL — HIGH (ref 3.8–10.5)

## 2024-12-03 PROCEDURE — 99214 OFFICE O/P EST MOD 30 MIN: CPT

## 2024-12-04 ENCOUNTER — APPOINTMENT (OUTPATIENT)
Dept: PLASTIC SURGERY | Facility: CLINIC | Age: 34
End: 2024-12-04
Payer: MEDICAID

## 2024-12-04 DIAGNOSIS — L73.2 HIDRADENITIS SUPPURATIVA: ICD-10-CM

## 2024-12-04 DIAGNOSIS — S41.102D UNSPECIFIED OPEN WOUND OF LEFT UPPER ARM, SUBSEQUENT ENCOUNTER: ICD-10-CM

## 2024-12-04 LAB — FERRITIN SERPL-MCNC: 57 NG/ML

## 2024-12-04 PROCEDURE — 99024 POSTOP FOLLOW-UP VISIT: CPT

## 2024-12-05 ENCOUNTER — OUTPATIENT (OUTPATIENT)
Dept: OUTPATIENT SERVICES | Facility: HOSPITAL | Age: 34
LOS: 1 days | Discharge: HOME | End: 2024-12-05
Payer: MEDICAID

## 2024-12-05 DIAGNOSIS — L89.90 PRESSURE ULCER OF UNSPECIFIED SITE, UNSPECIFIED STAGE: ICD-10-CM

## 2024-12-05 DIAGNOSIS — L73.2 HIDRADENITIS SUPPURATIVA: Chronic | ICD-10-CM

## 2024-12-05 DIAGNOSIS — Z98.890 OTHER SPECIFIED POSTPROCEDURAL STATES: Chronic | ICD-10-CM

## 2024-12-05 PROCEDURE — 99213 OFFICE O/P EST LOW 20 MIN: CPT

## 2024-12-06 DIAGNOSIS — I82.502 CHRONIC EMBOLISM AND THROMBOSIS OF UNSPECIFIED DEEP VEINS OF LEFT LOWER EXTREMITY: ICD-10-CM

## 2024-12-06 DIAGNOSIS — M19.90 UNSPECIFIED OSTEOARTHRITIS, UNSPECIFIED SITE: ICD-10-CM

## 2024-12-06 DIAGNOSIS — F12.90 CANNABIS USE, UNSPECIFIED, UNCOMPLICATED: ICD-10-CM

## 2024-12-06 DIAGNOSIS — D64.9 ANEMIA, UNSPECIFIED: ICD-10-CM

## 2024-12-06 DIAGNOSIS — L73.2 HIDRADENITIS SUPPURATIVA: ICD-10-CM

## 2024-12-10 ENCOUNTER — APPOINTMENT (OUTPATIENT)
Dept: INFUSION THERAPY | Facility: HOSPITAL | Age: 34
End: 2024-12-10

## 2024-12-10 ENCOUNTER — NON-APPOINTMENT (OUTPATIENT)
Age: 34
End: 2024-12-10

## 2024-12-11 ENCOUNTER — OUTPATIENT (OUTPATIENT)
Dept: OUTPATIENT SERVICES | Facility: HOSPITAL | Age: 34
LOS: 1 days | Discharge: HOME | End: 2024-12-11
Payer: MEDICAID

## 2024-12-11 DIAGNOSIS — D50.9 IRON DEFICIENCY ANEMIA, UNSPECIFIED: ICD-10-CM

## 2024-12-11 DIAGNOSIS — Z98.890 OTHER SPECIFIED POSTPROCEDURAL STATES: Chronic | ICD-10-CM

## 2024-12-11 DIAGNOSIS — L73.2 HIDRADENITIS SUPPURATIVA: Chronic | ICD-10-CM

## 2024-12-11 DIAGNOSIS — L89.90 PRESSURE ULCER OF UNSPECIFIED SITE, UNSPECIFIED STAGE: ICD-10-CM

## 2024-12-11 PROCEDURE — 99214 OFFICE O/P EST MOD 30 MIN: CPT

## 2024-12-12 DIAGNOSIS — M19.90 UNSPECIFIED OSTEOARTHRITIS, UNSPECIFIED SITE: ICD-10-CM

## 2024-12-12 DIAGNOSIS — L73.2 HIDRADENITIS SUPPURATIVA: ICD-10-CM

## 2024-12-12 DIAGNOSIS — F12.90 CANNABIS USE, UNSPECIFIED, UNCOMPLICATED: ICD-10-CM

## 2024-12-12 DIAGNOSIS — I82.502 CHRONIC EMBOLISM AND THROMBOSIS OF UNSPECIFIED DEEP VEINS OF LEFT LOWER EXTREMITY: ICD-10-CM

## 2024-12-12 DIAGNOSIS — D64.9 ANEMIA, UNSPECIFIED: ICD-10-CM

## 2024-12-17 ENCOUNTER — APPOINTMENT (OUTPATIENT)
Dept: INFUSION THERAPY | Facility: HOSPITAL | Age: 34
End: 2024-12-17

## 2024-12-19 ENCOUNTER — OUTPATIENT (OUTPATIENT)
Dept: OUTPATIENT SERVICES | Facility: HOSPITAL | Age: 34
LOS: 1 days | Discharge: HOME | End: 2024-12-19
Payer: MEDICAID

## 2024-12-19 DIAGNOSIS — L73.2 HIDRADENITIS SUPPURATIVA: Chronic | ICD-10-CM

## 2024-12-19 DIAGNOSIS — Z98.890 OTHER SPECIFIED POSTPROCEDURAL STATES: Chronic | ICD-10-CM

## 2024-12-19 DIAGNOSIS — L89.90 PRESSURE ULCER OF UNSPECIFIED SITE, UNSPECIFIED STAGE: ICD-10-CM

## 2024-12-19 PROCEDURE — 99213 OFFICE O/P EST LOW 20 MIN: CPT

## 2024-12-24 DIAGNOSIS — D64.9 ANEMIA, UNSPECIFIED: ICD-10-CM

## 2024-12-24 DIAGNOSIS — L73.2 HIDRADENITIS SUPPURATIVA: ICD-10-CM

## 2024-12-24 DIAGNOSIS — I82.502 CHRONIC EMBOLISM AND THROMBOSIS OF UNSPECIFIED DEEP VEINS OF LEFT LOWER EXTREMITY: ICD-10-CM

## 2024-12-24 DIAGNOSIS — M19.90 UNSPECIFIED OSTEOARTHRITIS, UNSPECIFIED SITE: ICD-10-CM

## 2024-12-24 DIAGNOSIS — F12.90 CANNABIS USE, UNSPECIFIED, UNCOMPLICATED: ICD-10-CM

## 2024-12-26 ENCOUNTER — OUTPATIENT (OUTPATIENT)
Dept: OUTPATIENT SERVICES | Facility: HOSPITAL | Age: 34
LOS: 1 days | Discharge: HOME | End: 2024-12-26
Payer: MEDICAID

## 2024-12-26 DIAGNOSIS — Z98.890 OTHER SPECIFIED POSTPROCEDURAL STATES: Chronic | ICD-10-CM

## 2024-12-26 DIAGNOSIS — L73.2 HIDRADENITIS SUPPURATIVA: Chronic | ICD-10-CM

## 2024-12-26 DIAGNOSIS — L89.90 PRESSURE ULCER OF UNSPECIFIED SITE, UNSPECIFIED STAGE: ICD-10-CM

## 2024-12-26 PROCEDURE — 99213 OFFICE O/P EST LOW 20 MIN: CPT

## 2024-12-27 DIAGNOSIS — M19.90 UNSPECIFIED OSTEOARTHRITIS, UNSPECIFIED SITE: ICD-10-CM

## 2024-12-27 DIAGNOSIS — L73.2 HIDRADENITIS SUPPURATIVA: ICD-10-CM

## 2024-12-27 DIAGNOSIS — F12.90 CANNABIS USE, UNSPECIFIED, UNCOMPLICATED: ICD-10-CM

## 2024-12-27 DIAGNOSIS — I82.502 CHRONIC EMBOLISM AND THROMBOSIS OF UNSPECIFIED DEEP VEINS OF LEFT LOWER EXTREMITY: ICD-10-CM

## 2024-12-27 DIAGNOSIS — D64.9 ANEMIA, UNSPECIFIED: ICD-10-CM

## 2025-01-02 ENCOUNTER — OUTPATIENT (OUTPATIENT)
Dept: OUTPATIENT SERVICES | Facility: HOSPITAL | Age: 35
LOS: 1 days | Discharge: HOME | End: 2025-01-02
Payer: MEDICAID

## 2025-01-02 DIAGNOSIS — L89.90 PRESSURE ULCER OF UNSPECIFIED SITE, UNSPECIFIED STAGE: ICD-10-CM

## 2025-01-02 DIAGNOSIS — L73.2 HIDRADENITIS SUPPURATIVA: Chronic | ICD-10-CM

## 2025-01-02 DIAGNOSIS — Z98.890 OTHER SPECIFIED POSTPROCEDURAL STATES: Chronic | ICD-10-CM

## 2025-01-02 PROCEDURE — 99213 OFFICE O/P EST LOW 20 MIN: CPT

## 2025-01-03 DIAGNOSIS — M19.90 UNSPECIFIED OSTEOARTHRITIS, UNSPECIFIED SITE: ICD-10-CM

## 2025-01-03 DIAGNOSIS — L73.2 HIDRADENITIS SUPPURATIVA: ICD-10-CM

## 2025-01-03 DIAGNOSIS — I82.502 CHRONIC EMBOLISM AND THROMBOSIS OF UNSPECIFIED DEEP VEINS OF LEFT LOWER EXTREMITY: ICD-10-CM

## 2025-01-03 DIAGNOSIS — D64.9 ANEMIA, UNSPECIFIED: ICD-10-CM

## 2025-01-03 DIAGNOSIS — F12.90 CANNABIS USE, UNSPECIFIED, UNCOMPLICATED: ICD-10-CM

## 2025-01-09 ENCOUNTER — OUTPATIENT (OUTPATIENT)
Dept: OUTPATIENT SERVICES | Facility: HOSPITAL | Age: 35
LOS: 1 days | Discharge: HOME | End: 2025-01-09
Payer: MEDICAID

## 2025-01-09 DIAGNOSIS — L73.2 HIDRADENITIS SUPPURATIVA: Chronic | ICD-10-CM

## 2025-01-09 DIAGNOSIS — Z98.890 OTHER SPECIFIED POSTPROCEDURAL STATES: Chronic | ICD-10-CM

## 2025-01-09 DIAGNOSIS — L89.90 PRESSURE ULCER OF UNSPECIFIED SITE, UNSPECIFIED STAGE: ICD-10-CM

## 2025-01-09 PROCEDURE — 99213 OFFICE O/P EST LOW 20 MIN: CPT

## 2025-01-10 DIAGNOSIS — L73.2 HIDRADENITIS SUPPURATIVA: ICD-10-CM

## 2025-01-10 DIAGNOSIS — I82.502 CHRONIC EMBOLISM AND THROMBOSIS OF UNSPECIFIED DEEP VEINS OF LEFT LOWER EXTREMITY: ICD-10-CM

## 2025-01-10 DIAGNOSIS — F12.90 CANNABIS USE, UNSPECIFIED, UNCOMPLICATED: ICD-10-CM

## 2025-01-10 DIAGNOSIS — D64.9 ANEMIA, UNSPECIFIED: ICD-10-CM

## 2025-01-10 DIAGNOSIS — M19.90 UNSPECIFIED OSTEOARTHRITIS, UNSPECIFIED SITE: ICD-10-CM

## 2025-01-13 ENCOUNTER — NON-APPOINTMENT (OUTPATIENT)
Age: 35
End: 2025-01-13

## 2025-01-13 ENCOUNTER — APPOINTMENT (OUTPATIENT)
Dept: DERMATOLOGY | Facility: CLINIC | Age: 35
End: 2025-01-13
Payer: MEDICAID

## 2025-01-13 VITALS — HEIGHT: 72 IN | WEIGHT: 179 LBS | BODY MASS INDEX: 24.24 KG/M2

## 2025-01-13 DIAGNOSIS — Z79.899 OTHER LONG TERM (CURRENT) DRUG THERAPY: ICD-10-CM

## 2025-01-13 DIAGNOSIS — L73.2 HIDRADENITIS SUPPURATIVA: ICD-10-CM

## 2025-01-13 PROCEDURE — 99214 OFFICE O/P EST MOD 30 MIN: CPT

## 2025-01-16 ENCOUNTER — OUTPATIENT (OUTPATIENT)
Dept: OUTPATIENT SERVICES | Facility: HOSPITAL | Age: 35
LOS: 1 days | Discharge: HOME | End: 2025-01-16
Payer: MEDICAID

## 2025-01-16 DIAGNOSIS — L73.2 HIDRADENITIS SUPPURATIVA: Chronic | ICD-10-CM

## 2025-01-16 DIAGNOSIS — Z98.890 OTHER SPECIFIED POSTPROCEDURAL STATES: Chronic | ICD-10-CM

## 2025-01-16 DIAGNOSIS — L89.90 PRESSURE ULCER OF UNSPECIFIED SITE, UNSPECIFIED STAGE: ICD-10-CM

## 2025-01-16 PROCEDURE — 99213 OFFICE O/P EST LOW 20 MIN: CPT

## 2025-01-17 LAB
ALBUMIN SERPL ELPH-MCNC: 3.6 G/DL
ALP BLD-CCNC: 135 U/L
ALT SERPL-CCNC: 21 U/L
ANION GAP SERPL CALC-SCNC: 16 MMOL/L
AST SERPL-CCNC: 19 U/L
BILIRUB SERPL-MCNC: 0.2 MG/DL
BUN SERPL-MCNC: 16 MG/DL
CALCIUM SERPL-MCNC: 9.5 MG/DL
CHLORIDE SERPL-SCNC: 101 MMOL/L
CO2 SERPL-SCNC: 21 MMOL/L
CREAT SERPL-MCNC: 0.69 MG/DL
EGFR: 125 ML/MIN/1.73M2
GLUCOSE SERPL-MCNC: 91 MG/DL
HBV CORE IGG+IGM SER QL: NONREACTIVE
HBV SURFACE AB SER QL: REACTIVE
HBV SURFACE AG SER QL: NONREACTIVE
HCV AB SER QL: NONREACTIVE
HCV S/CO RATIO: 0.22 S/CO
M TB IFN-G BLD-IMP: NEGATIVE
POTASSIUM SERPL-SCNC: 4.5 MMOL/L
PROT SERPL-MCNC: 10 G/DL
QUANTIFERON TB PLUS MITOGEN MINUS NIL: >10 IU/ML
QUANTIFERON TB PLUS NIL: 0.03 IU/ML
QUANTIFERON TB PLUS TB1 MINUS NIL: 0 IU/ML
QUANTIFERON TB PLUS TB2 MINUS NIL: 0.01 IU/ML
SODIUM SERPL-SCNC: 137 MMOL/L

## 2025-01-17 RX ORDER — ADALIMUMAB 40MG/0.4ML
40 KIT SUBCUTANEOUS
Qty: 2 | Refills: 2 | Status: ACTIVE | COMMUNITY
Start: 2025-01-17 | End: 1900-01-01

## 2025-01-17 RX ORDER — ADALIMUMAB 80MG/0.8ML
80 KIT SUBCUTANEOUS
Qty: 1 | Refills: 0 | Status: ACTIVE | COMMUNITY
Start: 2025-01-17 | End: 1900-01-01

## 2025-01-22 DIAGNOSIS — M19.90 UNSPECIFIED OSTEOARTHRITIS, UNSPECIFIED SITE: ICD-10-CM

## 2025-01-22 DIAGNOSIS — L73.2 HIDRADENITIS SUPPURATIVA: ICD-10-CM

## 2025-01-22 DIAGNOSIS — I82.502 CHRONIC EMBOLISM AND THROMBOSIS OF UNSPECIFIED DEEP VEINS OF LEFT LOWER EXTREMITY: ICD-10-CM

## 2025-01-22 DIAGNOSIS — D64.9 ANEMIA, UNSPECIFIED: ICD-10-CM

## 2025-01-22 DIAGNOSIS — F12.90 CANNABIS USE, UNSPECIFIED, UNCOMPLICATED: ICD-10-CM

## 2025-01-23 ENCOUNTER — OUTPATIENT (OUTPATIENT)
Dept: OUTPATIENT SERVICES | Facility: HOSPITAL | Age: 35
LOS: 1 days | Discharge: HOME | End: 2025-01-23
Payer: MEDICAID

## 2025-01-23 DIAGNOSIS — Z98.890 OTHER SPECIFIED POSTPROCEDURAL STATES: Chronic | ICD-10-CM

## 2025-01-23 DIAGNOSIS — L73.2 HIDRADENITIS SUPPURATIVA: Chronic | ICD-10-CM

## 2025-01-23 DIAGNOSIS — L89.90 PRESSURE ULCER OF UNSPECIFIED SITE, UNSPECIFIED STAGE: ICD-10-CM

## 2025-01-23 PROCEDURE — 99213 OFFICE O/P EST LOW 20 MIN: CPT

## 2025-01-24 DIAGNOSIS — L73.2 HIDRADENITIS SUPPURATIVA: ICD-10-CM

## 2025-01-24 DIAGNOSIS — I82.502 CHRONIC EMBOLISM AND THROMBOSIS OF UNSPECIFIED DEEP VEINS OF LEFT LOWER EXTREMITY: ICD-10-CM

## 2025-01-24 DIAGNOSIS — F17.210 NICOTINE DEPENDENCE, CIGARETTES, UNCOMPLICATED: ICD-10-CM

## 2025-01-24 DIAGNOSIS — M19.90 UNSPECIFIED OSTEOARTHRITIS, UNSPECIFIED SITE: ICD-10-CM

## 2025-01-24 DIAGNOSIS — D64.9 ANEMIA, UNSPECIFIED: ICD-10-CM

## 2025-01-30 ENCOUNTER — OUTPATIENT (OUTPATIENT)
Dept: OUTPATIENT SERVICES | Facility: HOSPITAL | Age: 35
LOS: 1 days | Discharge: HOME | End: 2025-01-30
Payer: MEDICAID

## 2025-01-30 DIAGNOSIS — L89.90 PRESSURE ULCER OF UNSPECIFIED SITE, UNSPECIFIED STAGE: ICD-10-CM

## 2025-01-30 DIAGNOSIS — L73.2 HIDRADENITIS SUPPURATIVA: Chronic | ICD-10-CM

## 2025-01-30 DIAGNOSIS — Z98.890 OTHER SPECIFIED POSTPROCEDURAL STATES: Chronic | ICD-10-CM

## 2025-01-30 PROCEDURE — 99213 OFFICE O/P EST LOW 20 MIN: CPT

## 2025-01-31 ENCOUNTER — NON-APPOINTMENT (OUTPATIENT)
Age: 35
End: 2025-01-31

## 2025-02-03 DIAGNOSIS — F17.210 NICOTINE DEPENDENCE, CIGARETTES, UNCOMPLICATED: ICD-10-CM

## 2025-02-03 DIAGNOSIS — M19.90 UNSPECIFIED OSTEOARTHRITIS, UNSPECIFIED SITE: ICD-10-CM

## 2025-02-03 DIAGNOSIS — L73.2 HIDRADENITIS SUPPURATIVA: ICD-10-CM

## 2025-02-03 DIAGNOSIS — I82.502 CHRONIC EMBOLISM AND THROMBOSIS OF UNSPECIFIED DEEP VEINS OF LEFT LOWER EXTREMITY: ICD-10-CM

## 2025-02-03 DIAGNOSIS — D64.9 ANEMIA, UNSPECIFIED: ICD-10-CM

## 2025-02-04 NOTE — BRIEF OPERATIVE NOTE - NSICDXBRIEFPROCEDURE_GEN_ALL_CORE_FT
PROCEDURES:  Complex excision of lesion of sweat glands of axilla 27-Apr-2023 10:40:19  Vicky Poe   deviated septum

## 2025-02-06 ENCOUNTER — OUTPATIENT (OUTPATIENT)
Dept: OUTPATIENT SERVICES | Facility: HOSPITAL | Age: 35
LOS: 1 days | Discharge: HOME | End: 2025-02-06
Payer: MEDICAID

## 2025-02-06 DIAGNOSIS — Z98.890 OTHER SPECIFIED POSTPROCEDURAL STATES: Chronic | ICD-10-CM

## 2025-02-06 DIAGNOSIS — L89.90 PRESSURE ULCER OF UNSPECIFIED SITE, UNSPECIFIED STAGE: ICD-10-CM

## 2025-02-06 DIAGNOSIS — L73.2 HIDRADENITIS SUPPURATIVA: Chronic | ICD-10-CM

## 2025-02-06 PROCEDURE — 99213 OFFICE O/P EST LOW 20 MIN: CPT

## 2025-02-07 DIAGNOSIS — F12.90 CANNABIS USE, UNSPECIFIED, UNCOMPLICATED: ICD-10-CM

## 2025-02-07 DIAGNOSIS — M19.90 UNSPECIFIED OSTEOARTHRITIS, UNSPECIFIED SITE: ICD-10-CM

## 2025-02-07 DIAGNOSIS — L73.2 HIDRADENITIS SUPPURATIVA: ICD-10-CM

## 2025-02-07 DIAGNOSIS — I82.502 CHRONIC EMBOLISM AND THROMBOSIS OF UNSPECIFIED DEEP VEINS OF LEFT LOWER EXTREMITY: ICD-10-CM

## 2025-02-07 DIAGNOSIS — D64.9 ANEMIA, UNSPECIFIED: ICD-10-CM

## 2025-02-12 ENCOUNTER — APPOINTMENT (OUTPATIENT)
Dept: PLASTIC SURGERY | Facility: CLINIC | Age: 35
End: 2025-02-12
Payer: MEDICAID

## 2025-02-12 DIAGNOSIS — L73.2 HIDRADENITIS SUPPURATIVA: ICD-10-CM

## 2025-02-12 DIAGNOSIS — S41.102D UNSPECIFIED OPEN WOUND OF LEFT UPPER ARM, SUBSEQUENT ENCOUNTER: ICD-10-CM

## 2025-02-12 PROCEDURE — 99212 OFFICE O/P EST SF 10 MIN: CPT

## 2025-02-28 ENCOUNTER — OUTPATIENT (OUTPATIENT)
Dept: OUTPATIENT SERVICES | Facility: HOSPITAL | Age: 35
LOS: 1 days | Discharge: HOME | End: 2025-02-28

## 2025-02-28 DIAGNOSIS — L73.2 HIDRADENITIS SUPPURATIVA: Chronic | ICD-10-CM

## 2025-02-28 DIAGNOSIS — Z98.890 OTHER SPECIFIED POSTPROCEDURAL STATES: Chronic | ICD-10-CM

## 2025-02-28 DIAGNOSIS — C85.10 UNSPECIFIED B-CELL LYMPHOMA, UNSPECIFIED SITE: ICD-10-CM

## 2025-03-04 ENCOUNTER — APPOINTMENT (OUTPATIENT)
Dept: HEMATOLOGY ONCOLOGY | Facility: CLINIC | Age: 35
End: 2025-03-04
Payer: MEDICAID

## 2025-03-04 ENCOUNTER — RESULT REVIEW (OUTPATIENT)
Age: 35
End: 2025-03-04

## 2025-03-04 VITALS
WEIGHT: 215.15 LBS | SYSTOLIC BLOOD PRESSURE: 143 MMHG | DIASTOLIC BLOOD PRESSURE: 94 MMHG | TEMPERATURE: 97 F | OXYGEN SATURATION: 98 % | RESPIRATION RATE: 16 BRPM | HEART RATE: 81 BPM | BODY MASS INDEX: 29.18 KG/M2

## 2025-03-04 LAB
BASOPHILS # BLD AUTO: 0.11 K/UL — SIGNIFICANT CHANGE UP (ref 0–0.2)
BASOPHILS NFR BLD AUTO: 1 % — SIGNIFICANT CHANGE UP (ref 0–2)
EOSINOPHIL # BLD AUTO: 0.35 K/UL — SIGNIFICANT CHANGE UP (ref 0–0.5)
EOSINOPHIL NFR BLD AUTO: 3.3 % — SIGNIFICANT CHANGE UP (ref 0–6)
HCT VFR BLD CALC: 41.5 % — SIGNIFICANT CHANGE UP (ref 39–50)
HGB BLD-MCNC: 13.1 G/DL — SIGNIFICANT CHANGE UP (ref 13–17)
IMM GRANULOCYTES NFR BLD AUTO: 0.3 % — SIGNIFICANT CHANGE UP (ref 0–0.9)
LYMPHOCYTES # BLD AUTO: 4.85 K/UL — HIGH (ref 1–3.3)
LYMPHOCYTES # BLD AUTO: 45.1 % — HIGH (ref 13–44)
MCHC RBC-ENTMCNC: 25.8 PG — LOW (ref 27–34)
MCHC RBC-ENTMCNC: 31.6 G/DL — LOW (ref 32–36)
MCV RBC AUTO: 81.9 FL — SIGNIFICANT CHANGE UP (ref 80–100)
MONOCYTES # BLD AUTO: 0.82 K/UL — SIGNIFICANT CHANGE UP (ref 0–0.9)
MONOCYTES NFR BLD AUTO: 7.6 % — SIGNIFICANT CHANGE UP (ref 2–14)
NEUTROPHILS # BLD AUTO: 4.59 K/UL — SIGNIFICANT CHANGE UP (ref 1.8–7.4)
NEUTROPHILS NFR BLD AUTO: 42.7 % — LOW (ref 43–77)
NRBC BLD AUTO-RTO: 0 /100 WBCS — SIGNIFICANT CHANGE UP (ref 0–0)
PLATELET # BLD AUTO: 284 K/UL — SIGNIFICANT CHANGE UP (ref 150–400)
RBC # BLD: 5.07 M/UL — SIGNIFICANT CHANGE UP (ref 4.2–5.8)
RBC # FLD: 21.8 % — HIGH (ref 10.3–14.5)
WBC # BLD: 10.75 K/UL — HIGH (ref 3.8–10.5)
WBC # FLD AUTO: 10.75 K/UL — HIGH (ref 3.8–10.5)

## 2025-03-04 PROCEDURE — 99214 OFFICE O/P EST MOD 30 MIN: CPT

## 2025-03-05 LAB
FERRITIN SERPL-MCNC: 74 NG/ML
IRON SATN MFR SERPL: 9 %
IRON SERPL-MCNC: 25 UG/DL
TIBC SERPL-MCNC: 270 UG/DL
UIBC SERPL-MCNC: 244 UG/DL

## 2025-03-08 NOTE — DISCHARGE NOTE NURSING/CASE MANAGEMENT/SOCIAL WORK - NSTRANSFERBELONGINGSDISPO_GEN_A_NUR
"  History  Chief Complaint   Patient presents with    Chest Pain     Pt to the ER w/ complaints of a "throbbing" pain to the center of her chest x 24 hours. States pain began while sleeping, worsens when laying flat. Denies any cardiac hx. 17 week pregnant, .     32-year-old female with history of sickle cell disease currently 18 weeks pregnant () presents for evaluation of chest pain.  Symptoms ongoing for the past 24 hours.  Tylenol taken for symptom relief without improvement.  Patient denies history of similar chest pain but does have reported history of acute chest syndrome.  Patient is on folic acid in the outpatient setting.  Patient is followed by Hematology but states she not seen them in the past few months.  Patient endorsing any fevers, chills, cough, congestion, shortness of breath, nausea, vomiting, diarrhea, dysuria or hematuria but did note intermittent vaginal spotting.  Currently pain rated a 5/10 and described as throbbing.          Past Medical History:   Diagnosis Date    Anemia     Encounter for blood transfusion     Pneumonia     Pregnancy         Sickle cell anemia     Sickle cell disease 2015    UTI (urinary tract infection)        Past Surgical History:   Procedure Laterality Date    CHOLECYSTECTOMY         Family History   Problem Relation Name Age of Onset    No Known Problems Mother      No Known Problems Father         Social History[1]    ROS  Review of Systems   Cardiovascular:  Positive for chest pain.   Genitourinary:         Vaginal spotting       Physical Exam  BP 98/61   Pulse 79   Temp 99.6 °F (37.6 °C) (Oral)   Resp (!) 27   Ht 5' 7" (1.702 m)   Wt 79.6 kg (175 lb 7.8 oz)   LMP 2024 (Approximate)   SpO2 99%   Breastfeeding No   BMI 27.49 kg/m²   Physical Exam    Constitutional: She appears well-developed and well-nourished. She is cooperative.   HENT:   Head: Normocephalic and atraumatic.   Eyes: Conjunctivae, EOM and lids are normal. Pupils are " not applicable equal, round, and reactive to light.   Neck: Phonation normal.   Normal range of motion.  Cardiovascular:  Normal rate, regular rhythm and intact distal pulses.           Pulmonary/Chest: Breath sounds normal. No respiratory distress. She has no wheezes.   Abdominal: Abdomen is soft. There is no abdominal tenderness.   Normal gravid abdomen   Musculoskeletal:      Cervical back: Normal range of motion.     Neurological: She is alert and oriented to person, place, and time.   Skin: Skin is warm and dry.   Psychiatric: She has a normal mood and affect. Her speech is normal and behavior is normal.               Labs Reviewed   COMPREHENSIVE METABOLIC PANEL - Abnormal       Result Value    Sodium 136      Potassium 3.9      Chloride 108      CO2 20 (*)     Glucose 89      BUN 6      Creatinine 0.4 (*)     Calcium 8.7      Total Protein 7.1      Albumin 3.2 (*)     Total Bilirubin 2.6 (*)     Alkaline Phosphatase 66      AST 43 (*)     ALT 14      eGFR >60.0      Anion Gap 8     CBC W/ AUTO DIFFERENTIAL - Abnormal    WBC 15.06 (*)     RBC 2.62 (*)     Hemoglobin 8.2 (*)     Hematocrit 22.9 (*)     MCV 87      MCH 31.3 (*)     MCHC 35.8      RDW 17.1 (*)     Platelets 361      MPV 9.5      Immature Granulocytes 0.7 (*)     Gran # (ANC) 10.4 (*)     Immature Grans (Abs) 0.11 (*)     Lymph # 2.8      Mono # 1.5 (*)     Eos # 0.2      Baso # 0.05      nRBC 3 (*)     Gran % 68.8      Lymph % 18.7      Mono % 9.9      Eosinophil % 1.6      Basophil % 0.3      Differential Method Automated     RETICULOCYTES - Abnormal    Retic 19.2 (*)    URINALYSIS, REFLEX TO URINE CULTURE - Abnormal    Specimen UA Urine, Clean Catch      Color, UA Orange (*)     Appearance, UA Cloudy (*)     pH, UA 7.0      Specific Gravity, UA 1.010      Protein, UA Trace (*)     Glucose, UA Negative      Ketones, UA Negative      Bilirubin (UA) Negative      Occult Blood UA 3+ (*)     Nitrite, UA Negative      Urobilinogen, UA 1.0      Leukocytes, UA 3+ (*)      Narrative:     Preferred Collection Type->Urine, Clean Catch  Specimen Source->Urine   URINALYSIS MICROSCOPIC - Abnormal    RBC, UA >100 (*)     WBC, UA >100 (*)     Bacteria Occasional      Squam Epithel, UA 11      Hyaline Casts, UA 0.7 (*)     Microscopic Comment SEE COMMENT      Narrative:     Preferred Collection Type->Urine, Clean Catch  Specimen Source->Urine   CULTURE, URINE   LIPASE    Lipase 13     TROPONIN I HIGH SENSITIVITY    Troponin I High Sensitivity <3     TYPE & SCREEN    Group & Rh B POS      Indirect Gurpreet NEG      Specimen Outdate 03/11/2025 23:59       EKG Readings: (Independently Interpreted)   Initial Reading: No STEMI. Rhythm: Normal Sinus Rhythm. Heart Rate: 78.        Imaging Results              US OB 14+ Wks, TransAbd, Single Gestation (Final result)  Result time 03/08/25 07:25:47      Final result by Marcelina Goodman MD (03/08/25 07:25:47)                   Impression:      Single live 18 week 0 day intrauterine gestation      Electronically signed by: Marcelina Goodman MD  Date:    03/08/2025  Time:    07:25               Narrative:    EXAMINATION:  US OB 14+ WEEKS, TRANSABDOM, SINGLE GESTATION    CLINICAL HISTORY:  Abnormal uterine and vaginal bleeding, unspecified    FINDINGS:  Single live intrauterine gestation with an average sonographic age of 18 weeks 0 days.  Fetal heart rate of 161 beats per minute.  Anterior placenta without evidence of abruption lying 2 cm from internal cervical os.  Cervix measures approximately 5 cm.  Amniotic fluid volume is within normal limits.                                       X-Ray Chest PA And Lateral (In process)                                 Procedures             Medical Decision Making  Differentials include ACS (acute chest syndrome versus acute coronary syndrome), musculoskeletal pain, costochondritis, pericarditis, pneumonia or alternate pathology.  Will obtain labs and imaging.  Did offer pain medicine for symptom relief but  patient declined.  Given history of sickle cell disease with chest discomfort did discuss x-ray for evaluation of pneumonia to rule out acute chest syndrome and patient is agreeable.  Will provide shielding to abdomen for this.  See ED course for updates    Amount and/or Complexity of Data Reviewed  Labs: ordered. Decision-making details documented in ED Course.  Radiology: ordered. Decision-making details documented in ED Course.    Risk  Prescription drug management.               ED Course as of 03/08/25 0806   Sat Mar 08, 2025   0459 Troponin I High Sensitivity: <3 [NA]   0509 Retic(!): 19.2 [NA]   0509 WBC(!): 15.06 [NA]   0509 Hemoglobin(!): 8.2 [NA]   0509 Hematocrit(!): 22.9 [NA]   0509 Platelet Count: 361 [NA]   0516 Lipase: 13 [NA]   0516 BILIRUBIN TOTAL(!): 2.6 [NA]   0516 X-Ray Chest PA And Lateral  No focal consolidation, effusion, or pneumothorax [NA]   0529 Suspect symptoms related to sickle cell pain episode.  Did offer medication for symptom relief but patient continues to defer.  Awaiting results of urinalysis and ultrasound. [NA]   0546 Leukocyte Esterase, UA(!): 3+ [NA]   0546 NITRITE UA: Negative [NA]   0546 RBC, UA(!): >100 [NA]   0546 WBC, UA(!): >100 [NA]   0546 Bacteria, UA: Occasional [NA]      ED Course User Index  [NA] Melva Horta MD     8:04 AM  Assumed care at 0600.  Patient presents with chest in the setting of history of sickle cell.  Patient afebrile.  Reviewed chest radiograph which showed no infiltrates for acute findings.  Lung fields clear on my exam.  Patient not in respiratory distress.  She is afebrile.  Reviewed labs which demonstrate baseline leukocytosis and anemia.  Ultrasound was obtained which demonstrated normal-appearing pregnancy at 18 weeks with low-lying placenta.  I discussed this finding with patient follow up with her OB.  Discussed pain control however patient is declining any pain medication.  Discussed with her she may use Tylenol as needed has had a  safe in pregnancy.  She is instructed to return to ED for findings of infection including fever, cough, chills, wheezing, shortness of breath.  Patient expressed understanding and agreement of plan she is requesting discharge at this time.    Chepe HACKETT Pedro      DISCHARGE NOTE:       Boni Duarte's  results have been reviewed with her.  She has been counseled regarding her diagnosis, treatment, and plan.  She verbally conveys understanding and agreement of the signs, symptoms, diagnosis, treatment and prognosis and additionally agrees to follow up as discussed.  She also agrees with the care-plan and conveys that all of her questions have been answered.  I have also provided discharge instructions for her that include: educational information regarding their diagnosis and treatment, and list of reasons why they would want to return to the ED prior to their follow-up appointment, should her condition change. She has been provided with education for proper emergency department utilization.      CLINICAL IMPRESSION:         1. Chest pain    2. Sickle cell pain crisis    3. Vaginal spotting    4. Urinary tract infection without hematuria, site unspecified              PLAN:   1. Discharge Home  2.   New Prescriptions    CEPHALEXIN (KEFLEX) 500 MG CAPSULE    Take 1 capsule (500 mg total) by mouth every 12 (twelve) hours. for 7 days     3. Cal Clay MD  [address] [facility] [address] [phone]    Schedule an appointment as soon as possible for a visit   Primary care follow up    Mariela Pathak MD  [address] [facility] [id number] [address] [phone]    Schedule an appointment as soon as possible for a visit       [facility] Emergency Department  [address] [place] [id number] [phone]  Go to   If symptoms worsen            [1]   Social History  Tobacco Use    Smoking status: Never    Smokeless tobacco: Never   Substance Use Topics     Alcohol use: No     Alcohol/week: 0.0 standard drinks of alcohol     Comment: occ    Drug use: No        Chepe Vasquez MD  03/08/25 0807

## 2025-04-08 ENCOUNTER — APPOINTMENT (OUTPATIENT)
Dept: DERMATOLOGY | Facility: CLINIC | Age: 35
End: 2025-04-08
Payer: MEDICAID

## 2025-04-08 DIAGNOSIS — L73.2 HIDRADENITIS SUPPURATIVA: ICD-10-CM

## 2025-04-08 DIAGNOSIS — Z79.899 OTHER LONG TERM (CURRENT) DRUG THERAPY: ICD-10-CM

## 2025-04-08 DIAGNOSIS — L91.0 HYPERTROPHIC SCAR: ICD-10-CM

## 2025-04-08 PROCEDURE — 99214 OFFICE O/P EST MOD 30 MIN: CPT

## 2025-04-08 PROCEDURE — G2211 COMPLEX E/M VISIT ADD ON: CPT | Mod: NC

## 2025-04-24 NOTE — PATIENT PROFILE ADULT - MEDICATIONS/VISITS
Patient verified name, , and procedure.    Type: 1a; abbreviated assessment per anesthesia guidelines    Labs per anesthesia: None    Instructed pt that they will be notified the day before their procedure by the GI Lab for time of arrival if their procedure is [place] and Pre-op for [place] cases. Arrival times should be called by 5 pm. If no phone is received the patient should contact their respective hospital. The GI lab telephone number is [phone] and ES Pre-op is [phone].     Follow diet and prep instructions per office.     Bath or shower the night before and the am of surgery with non-moisturizing soap. No lotions, oils, powders, cologne on skin. No make up, eye make up or jewelry. Wear loose fitting comfortable, clean clothing.     Must have adult present in building the entire time .     Medications for the day of procedure Rosuvastatin    Hold Meloxicam x 5 days prior to procedure.    Please hold all vitamins x 7 days prior to procedure and NSAIDS (Aspirin, Excedrin, Goody powders, Motrin, Ibuprofen, Advil, Aleve and Naproxen) x 5 days prior to procedure. Should you have a procedure date that does not allow for the amount of time instructed above, please stop taking vitamins, supplements, and NSAIDS IMMEDIATELY.       The following discharge instructions reviewed with patient: medication given during procedure may cause drowsiness for several hours, therefore, do not drive or operate machinery for remainder of the day. You may not drink alcohol on the day of your procedure, please resume regular diet and activity unless otherwise directed. You may experience abdominal distention for several hours that is relieved by the passage of gas. Contact your physician if you have any of the following: fever or chills, severe abdominal pain or excessive amount of bleeding or a large amount when having a bowel movement. Occasional specks of blood with bowel movement would not be unusual.    
no

## 2025-05-01 NOTE — PATIENT PROFILE ADULT - FUNCTIONAL ASSESSMENT - DAILY ACTIVITY ASSESSMENT TYPE
[facility] msg sent to initiate monthly outreach call as patient requested.   
Admission

## 2025-05-06 ENCOUNTER — RX RENEWAL (OUTPATIENT)
Age: 35
End: 2025-05-06

## 2025-07-15 ENCOUNTER — APPOINTMENT (OUTPATIENT)
Dept: DERMATOLOGY | Facility: CLINIC | Age: 35
End: 2025-07-15
Payer: MEDICAID

## 2025-07-15 PROCEDURE — 11900 INJECT SKIN LESIONS </W 7: CPT

## 2025-07-15 PROCEDURE — 99214 OFFICE O/P EST MOD 30 MIN: CPT | Mod: 25

## 2025-08-01 ENCOUNTER — APPOINTMENT (OUTPATIENT)
Dept: INTERNAL MEDICINE | Facility: CLINIC | Age: 35
End: 2025-08-01
Payer: MEDICAID

## 2025-08-01 VITALS
TEMPERATURE: 98 F | OXYGEN SATURATION: 97 % | HEIGHT: 72 IN | DIASTOLIC BLOOD PRESSURE: 90 MMHG | WEIGHT: 242 LBS | HEART RATE: 78 BPM | BODY MASS INDEX: 32.78 KG/M2 | SYSTOLIC BLOOD PRESSURE: 135 MMHG

## 2025-08-01 DIAGNOSIS — L73.2 HIDRADENITIS SUPPURATIVA: ICD-10-CM

## 2025-08-01 DIAGNOSIS — D64.9 ANEMIA, UNSPECIFIED: ICD-10-CM

## 2025-08-01 DIAGNOSIS — Z87.39 PERSONAL HISTORY OF OTHER DISEASES OF THE MUSCULOSKELETAL SYSTEM AND CONNECTIVE TISSUE: ICD-10-CM

## 2025-08-01 DIAGNOSIS — D72.829 ELEVATED WHITE BLOOD CELL COUNT, UNSPECIFIED: ICD-10-CM

## 2025-08-01 PROCEDURE — 99214 OFFICE O/P EST MOD 30 MIN: CPT

## 2025-08-11 LAB
25(OH)D3 SERPL-MCNC: 15.9 NG/ML
ALBUMIN SERPL ELPH-MCNC: 4.1 G/DL
ALP BLD-CCNC: 178 U/L
ALT SERPL-CCNC: 27 U/L
ANION GAP SERPL CALC-SCNC: 10 MMOL/L
AST SERPL-CCNC: 30 U/L
BASOPHILS # BLD AUTO: 0.12 K/UL
BASOPHILS NFR BLD AUTO: 1.3 %
BILIRUB SERPL-MCNC: 0.4 MG/DL
BUN SERPL-MCNC: 16 MG/DL
CALCIUM SERPL-MCNC: 9.3 MG/DL
CHLORIDE SERPL-SCNC: 106 MMOL/L
CHOLEST SERPL-MCNC: 194 MG/DL
CO2 SERPL-SCNC: 22 MMOL/L
CREAT SERPL-MCNC: 0.77 MG/DL
EGFRCR SERPLBLD CKD-EPI 2021: 120 ML/MIN/1.73M2
EOSINOPHIL # BLD AUTO: 0.2 K/UL
EOSINOPHIL NFR BLD AUTO: 2.1 %
ESTIMATED AVERAGE GLUCOSE: 117 MG/DL
GLUCOSE SERPL-MCNC: 90 MG/DL
HBA1C MFR BLD HPLC: 5.7 %
HCT VFR BLD CALC: 44 %
HDLC SERPL-MCNC: 58 MG/DL
HGB BLD-MCNC: 13.8 G/DL
IMM GRANULOCYTES NFR BLD AUTO: 0.2 %
LDLC SERPL-MCNC: 124 MG/DL
LYMPHOCYTES # BLD AUTO: 3.77 K/UL
LYMPHOCYTES NFR BLD AUTO: 39.5 %
MAN DIFF?: NORMAL
MCHC RBC-ENTMCNC: 27.6 PG
MCHC RBC-ENTMCNC: 31.4 G/DL
MCV RBC AUTO: 88 FL
MONOCYTES # BLD AUTO: 0.63 K/UL
MONOCYTES NFR BLD AUTO: 6.6 %
NEUTROPHILS # BLD AUTO: 4.81 K/UL
NEUTROPHILS NFR BLD AUTO: 50.3 %
NONHDLC SERPL-MCNC: 136 MG/DL
PLATELET # BLD AUTO: 272 K/UL
POTASSIUM SERPL-SCNC: 4.3 MMOL/L
PROT SERPL-MCNC: 8.4 G/DL
RBC # BLD: 5 M/UL
RBC # FLD: 16.4 %
SODIUM SERPL-SCNC: 138 MMOL/L
TRIGL SERPL-MCNC: 64 MG/DL
TSH SERPL-ACNC: 0.59 UIU/ML
VIT B12 SERPL-MCNC: 530 PG/ML
WBC # FLD AUTO: 9.55 K/UL

## 2025-08-12 RX ORDER — UBIDECARENONE/VIT E ACET 100MG-5
25 MCG CAPSULE ORAL
Qty: 90 | Refills: 1 | Status: ACTIVE | COMMUNITY
Start: 2025-08-11 | End: 1900-01-01

## 2025-09-02 ENCOUNTER — APPOINTMENT (OUTPATIENT)
Dept: HEMATOLOGY ONCOLOGY | Facility: CLINIC | Age: 35
End: 2025-09-02

## (undated) DEVICE — PACK EXTREMITY

## (undated) DEVICE — ZIMMER BLADE DERMATONE

## (undated) DEVICE — DRAPE LIGHT HANDLE COVER (BLUE)

## (undated) DEVICE — GLV 7.5 PROTEXIS (WHITE)

## (undated) DEVICE — SUT SOFSILK 2-0 18" C-23

## (undated) DEVICE — CANISTER W/GEL INFOVAC 500ML

## (undated) DEVICE — GLV 7 PROTEXIS (WHITE)

## (undated) DEVICE — ZIMMER DERMACARRIER SKIN GRAFT MESH 3:1

## (undated) DEVICE — Device

## (undated) DEVICE — DRSG XEROFORM 1 X 8"

## (undated) DEVICE — GLV 8 PROTEXIS (WHITE)

## (undated) DEVICE — DRSG VAC GRANUFOAM MEDIUM (BLACK)

## (undated) DEVICE — PREP BETADINE KIT

## (undated) DEVICE — DRSG MASTISOL

## (undated) DEVICE — DRSG BIOPATCH DISK W CHG 1" W 7.0MM HOLE

## (undated) DEVICE — DRSG MAMMARY SUPPORT LG SIZE 4

## (undated) DEVICE — LAP PAD 18 X 18"

## (undated) DEVICE — ZIMMER DERMACARRIER SKIN GRAFT MESH 1.5:1

## (undated) DEVICE — SUT MONOSOF 6-0 18" P-13

## (undated) DEVICE — DRSG CURITY GAUZE SPONGE 4 X 4" 12-PLY

## (undated) DEVICE — DRAIN JACKSON PRATT 10MM FLAT FULL NO TROCAR

## (undated) DEVICE — LONE STAR ELASTIC STAY HOOK 12MM BLUNT

## (undated) DEVICE — SUT HISTOACRYL BLUE

## (undated) DEVICE — SUT VICRYL 3-0 27" SH UNDYED

## (undated) DEVICE — DRAPE THYROID 77" X 123"

## (undated) DEVICE — TAPE SILK 3"

## (undated) DEVICE — DRAIN RESERVOIR FOR JACKSON PRATT 100CC CARDINAL

## (undated) DEVICE — WARMING BLANKET FULL UNDERBODY

## (undated) DEVICE — PREP CHLORAPREP HI-LITE ORANGE 26ML

## (undated) DEVICE — WARMING BLANKET LOWER ADULT

## (undated) DEVICE — MEDICATION LABELS W MARKER

## (undated) DEVICE — DRAIN JACKSON PRATT 7MM FLAT FULL NO TROCAR

## (undated) DEVICE — CANISTER W/GEL INFOVAC 1000ML

## (undated) DEVICE — STAPLER SKIN VISI-STAT 35 WIDE

## (undated) DEVICE — TUBING SUCTION 20FT

## (undated) DEVICE — DRSG CURITY GAUZE SPONGE 4 X 4" 12-PLY NON-STERILE

## (undated) DEVICE — BLADE SCALPEL SAFETYLOCK #10

## (undated) DEVICE — ELCTR BOVIE PENCIL HANDPIECE

## (undated) DEVICE — ZIMMER DERMACARRIER SKIN GRAFT MESH 8"

## (undated) DEVICE — SUCTION YANKAUER NO CONTROL VENT

## (undated) DEVICE — VENODYNE/SCD SLEEVE CALF LARGE

## (undated) DEVICE — DRSG MAMMARY SUPPORT XL SIZE 5

## (undated) DEVICE — POSITIONER STRAP ARMBOARD VELCRO TS-30

## (undated) DEVICE — DRAIN BLAKE 15FR BARD CHANNEL

## (undated) DEVICE — VENODYNE/SCD SLEEVE CALF MEDIUM

## (undated) DEVICE — BLADE SCALPEL SAFETYLOCK #15

## (undated) DEVICE — SUT POLYSORB 2-0 30" V-20 UNDYED

## (undated) DEVICE — GOWN TRIMAX LG

## (undated) DEVICE — SUT MONOCRYL 3-0 27" PS-2 UNDYED

## (undated) DEVICE — DRSG KLING 4"

## (undated) DEVICE — SYR LUER LOK 10CC

## (undated) DEVICE — CANISTER KCI 500ML GEL SENSA TRAC

## (undated) DEVICE — DRAPE INSTRUMENT POUCH 6.75" X 11"

## (undated) DEVICE — POSITIONER FOAM EGG CRATE ULNAR 2PCS (PINK)

## (undated) DEVICE — SUT BIOSYN 4-0 18" P-12

## (undated) DEVICE — GLV 8.5 PROTEXIS (WHITE)

## (undated) DEVICE — DRAPE TOWEL BLUE 17" X 24"

## (undated) DEVICE — SUT POLYSORB 3-0 18" P-12 UNDYED

## (undated) DEVICE — GLV 6.5 PROTEXIS (WHITE)

## (undated) DEVICE — SUT CHROMIC 4-0 18" P-12

## (undated) DEVICE — VISITEC 4X4

## (undated) DEVICE — DRSG KERLIX ROLL 4.5"

## (undated) DEVICE — PACK MINOR

## (undated) DEVICE — FOLEY TRAY 16FR 5CC LTX UMETER CLOSED

## (undated) DEVICE — DRSG AQUACEL 3.5 X 14"

## (undated) DEVICE — SOL IRR POUR NS 0.9% 500ML

## (undated) DEVICE — DRAPE 3/4 SHEET W REINFORCEMENT 56X77"

## (undated) DEVICE — DRSG TAPE MEDIPORE 3"

## (undated) DEVICE — DRAPE 3/4 SHEET 52X76"

## (undated) DEVICE — ELCTR BOVIE TIP BLADE INSULATED 2.75" EDGE

## (undated) DEVICE — DRSG VAC GRANUFOAM SMALL (BLACK)

## (undated) DEVICE — PACK MINOR WITH LAP

## (undated) DEVICE — BLADE SURGICAL #15 CARBON

## (undated) DEVICE — DRSG XEROFORM 5 X 9"

## (undated) DEVICE — SPECIMEN CONTAINER 100ML

## (undated) DEVICE — SUT CHROMIC 4-0 27" SH

## (undated) DEVICE — DRAPE SPLIT SHEET 77" X 108"

## (undated) DEVICE — STRYKER INTERPULSE HANDPIECE W IRR SUCTION TUBE

## (undated) DEVICE — DRAPE MAYO STAND 30"

## (undated) DEVICE — WARMING BLANKET UPPER ADULT

## (undated) DEVICE — DRSG VAC WHITEFOAM LARGE (WHITE)

## (undated) DEVICE — LABELS BLANK W PEN

## (undated) DEVICE — PREP BETADINE SPONGE STICKS

## (undated) DEVICE — LAP PAD W RING 18 X 18"

## (undated) DEVICE — SOL IRR BAG NS 0.9% 3000ML

## (undated) DEVICE — GOWN XL

## (undated) DEVICE — ELCTR GROUNDING PAD ADULT COVIDIEN

## (undated) DEVICE — DRAPE 1/2 SHEET 40X57"

## (undated) DEVICE — SUT POLYSORB 2-0 30" GS-21 UNDYED

## (undated) DEVICE — DRSG MAMMARY SUPPORT MED SIZE 3

## (undated) DEVICE — DRSG STERISTRIPS 0.5 X 4"

## (undated) DEVICE — BASIN SET DOUBLE

## (undated) DEVICE — SUT POLYSORB 3-0 30" V-20 UNDYED

## (undated) DEVICE — WARMING BLANKET FULL ADULT

## (undated) DEVICE — DRSG COMBINE 5X9"

## (undated) DEVICE — PACK THYROID HEAD NECK

## (undated) DEVICE — POSITIONER BLUE BOLSTER

## (undated) DEVICE — CANISTER DISPOSABLE THIN WALL 3000CC

## (undated) DEVICE — DRSG DERMABOND PRINEO 22CM

## (undated) DEVICE — DRSG VAC GRANUFOAM LARGE (BLACK)

## (undated) DEVICE — COTTONBALL LG

## (undated) DEVICE — DRSG MAMMARY SUPPORT SM SIZE 1

## (undated) DEVICE — PACK BREAST MAJOR

## (undated) DEVICE — PACK GENERAL MINOR

## (undated) DEVICE — DRSG TEGADERM + PAD 3.5X4"

## (undated) DEVICE — SUT VICRYL 2-0 27" SH UNDYED

## (undated) DEVICE — SOL IRR POUR H2O 250ML

## (undated) DEVICE — PACKING STRIP PLAIN 1"

## (undated) DEVICE — SUT POLYSORB 3-0 30" P-12 UNDYED

## (undated) DEVICE — DRSG GAUZE VASELINE PETROLEUM 3 X 18"

## (undated) DEVICE — DRSG DERMABOND 0.7ML

## (undated) DEVICE — PACK MAJOR ABDOMINAL WITH LAP

## (undated) DEVICE — DRSG MAMMARY SUPPORT MED SIZE 2

## (undated) DEVICE — PACK MAJOR ABDOMINAL SUPINE

## (undated) DEVICE — DRSG ADAPTIC CURITY OIL EMULSION 3 X 8"

## (undated) DEVICE — SOL IRR POUR NS 0.9% 1500ML

## (undated) DEVICE — BIPOLAR FORCEP KIRWAN JEWELERS STR 4" X 0.4MM W 12FT CORD (GREEN)

## (undated) DEVICE — SOL IRR POUR H2O 500ML

## (undated) DEVICE — FOLEY TRAY 16FR 5CC LF UMETER CLOSED

## (undated) DEVICE — GOWN LG

## (undated) DEVICE — SUT POLYSORB 4-0 P-12 UNDYED

## (undated) DEVICE — DRAPE LAPAROTOMY TRANSVERSE

## (undated) DEVICE — DRAPE SPLIT SHEET 77" X 120"

## (undated) DEVICE — PACKING GAUZE PLAIN 0.5"

## (undated) DEVICE — DRSG ADAPTIC 3 X 8"

## (undated) DEVICE — SOL IRR POUR H2O 1500ML

## (undated) DEVICE — DRSG GAUZE VASELINE PETROLEUM 1 X 8" CISION

## (undated) DEVICE — DRSG KLING 6"

## (undated) DEVICE — ELCTR BOVIE PENCIL SMOKE EVACUATION

## (undated) DEVICE — MARKING PEN W RULER

## (undated) DEVICE — LIJ-ZIMMER MESHGRAFTER: Type: DURABLE MEDICAL EQUIPMENT

## (undated) DEVICE — SUT PDS II 0 36" CT-1